# Patient Record
Sex: FEMALE | Race: BLACK OR AFRICAN AMERICAN | Employment: UNEMPLOYED | URBAN - METROPOLITAN AREA
[De-identification: names, ages, dates, MRNs, and addresses within clinical notes are randomized per-mention and may not be internally consistent; named-entity substitution may affect disease eponyms.]

---

## 2017-01-11 ENCOUNTER — OFFICE VISIT (OUTPATIENT)
Dept: HEMATOLOGY/ONCOLOGY | Facility: HOSPITAL | Age: 47
End: 2017-01-11
Attending: INTERNAL MEDICINE
Payer: COMMERCIAL

## 2017-01-11 VITALS
HEART RATE: 75 BPM | WEIGHT: 178 LBS | TEMPERATURE: 99 F | DIASTOLIC BLOOD PRESSURE: 75 MMHG | RESPIRATION RATE: 16 BRPM | SYSTOLIC BLOOD PRESSURE: 119 MMHG | BODY MASS INDEX: 29.66 KG/M2 | HEIGHT: 65 IN

## 2017-01-11 DIAGNOSIS — C34.90 CARCINOMA OF LUNG, UNSPECIFIED LATERALITY (HCC): ICD-10-CM

## 2017-01-11 DIAGNOSIS — E03.9 HYPOTHYROIDISM (ACQUIRED): ICD-10-CM

## 2017-01-11 DIAGNOSIS — C34.92 CARCINOMA OF LUNG, LEFT (HCC): ICD-10-CM

## 2017-01-11 DIAGNOSIS — C34.90 CARCINOMA OF LUNG, UNSPECIFIED LATERALITY (HCC): Primary | ICD-10-CM

## 2017-01-11 DIAGNOSIS — E03.9 HYPOTHYROIDISM, UNSPECIFIED TYPE: ICD-10-CM

## 2017-01-11 DIAGNOSIS — Z09 CHEMOTHERAPY FOLLOW-UP EXAMINATION: ICD-10-CM

## 2017-01-11 DIAGNOSIS — C78.7 LIVER METASTASES (HCC): ICD-10-CM

## 2017-01-11 DIAGNOSIS — Z45.2 ENCOUNTER FOR ADJUSTMENT OR MANAGEMENT OF VASCULAR ACCESS DEVICE: Primary | ICD-10-CM

## 2017-01-11 DIAGNOSIS — C79.31 BRAIN METASTASES (HCC): ICD-10-CM

## 2017-01-11 LAB
ALBUMIN SERPL BCP-MCNC: 3.4 G/DL (ref 3.5–4.8)
ALBUMIN/GLOB SERPL: 0.8 {RATIO} (ref 1–2)
ALP SERPL-CCNC: 107 U/L (ref 32–100)
ALT SERPL-CCNC: 21 U/L (ref 14–54)
ANION GAP SERPL CALC-SCNC: 8 MMOL/L (ref 0–18)
AST SERPL-CCNC: 16 U/L (ref 15–41)
BASOPHILS # BLD: 0.1 K/UL (ref 0–0.2)
BASOPHILS NFR BLD: 1 %
BILIRUB SERPL-MCNC: 0.3 MG/DL (ref 0.3–1.2)
BUN SERPL-MCNC: 6 MG/DL (ref 8–20)
BUN/CREAT SERPL: 7.9 (ref 10–20)
CALCIUM SERPL-MCNC: 8.4 MG/DL (ref 8.5–10.5)
CHLORIDE SERPL-SCNC: 101 MMOL/L (ref 95–110)
CO2 SERPL-SCNC: 28 MMOL/L (ref 22–32)
CREAT SERPL-MCNC: 0.76 MG/DL (ref 0.5–1.5)
EOSINOPHIL # BLD: 0.1 K/UL (ref 0–0.7)
EOSINOPHIL NFR BLD: 1 %
ERYTHROCYTE [DISTWIDTH] IN BLOOD BY AUTOMATED COUNT: 18.4 % (ref 11–15)
GLOBULIN PLAS-MCNC: 4.4 G/DL (ref 2.5–3.7)
GLUCOSE SERPL-MCNC: 121 MG/DL (ref 70–99)
HCT VFR BLD AUTO: 30 % (ref 35–48)
HGB BLD-MCNC: 9.9 G/DL (ref 12–16)
LYMPHOCYTES # BLD: 3.2 K/UL (ref 1–4)
LYMPHOCYTES NFR BLD: 31 %
MCH RBC QN AUTO: 28.5 PG (ref 27–32)
MCHC RBC AUTO-ENTMCNC: 33.2 G/DL (ref 32–37)
MCV RBC AUTO: 85.9 FL (ref 80–100)
MONOCYTES # BLD: 0.7 K/UL (ref 0–1)
MONOCYTES NFR BLD: 7 %
NEUTROPHILS # BLD AUTO: 6.1 K/UL (ref 1.8–7.7)
NEUTROPHILS NFR BLD: 60 %
OSMOLALITY UR CALC.SUM OF ELEC: 283 MOSM/KG (ref 275–295)
PLATELET # BLD AUTO: 482 K/UL (ref 140–400)
PMV BLD AUTO: 8 FL (ref 7.4–10.3)
POTASSIUM SERPL-SCNC: 3.1 MMOL/L (ref 3.3–5.1)
PROT SERPL-MCNC: 7.8 G/DL (ref 5.9–8.4)
RBC # BLD AUTO: 3.49 M/UL (ref 3.7–5.4)
SODIUM SERPL-SCNC: 137 MMOL/L (ref 136–144)
TSH SERPL-ACNC: 87.17 UIU/ML (ref 0.34–5.6)
WBC # BLD AUTO: 10.1 K/UL (ref 4–11)

## 2017-01-11 PROCEDURE — 85025 COMPLETE CBC W/AUTO DIFF WBC: CPT

## 2017-01-11 PROCEDURE — 99215 OFFICE O/P EST HI 40 MIN: CPT | Performed by: INTERNAL MEDICINE

## 2017-01-11 PROCEDURE — 84439 ASSAY OF FREE THYROXINE: CPT

## 2017-01-11 PROCEDURE — 99213 OFFICE O/P EST LOW 20 MIN: CPT | Performed by: INTERNAL MEDICINE

## 2017-01-11 PROCEDURE — 84443 ASSAY THYROID STIM HORMONE: CPT

## 2017-01-11 PROCEDURE — 36591 DRAW BLOOD OFF VENOUS DEVICE: CPT

## 2017-01-11 PROCEDURE — 80053 COMPREHEN METABOLIC PANEL: CPT

## 2017-01-11 RX ORDER — SODIUM CHLORIDE 0.9 % (FLUSH) 0.9 %
SYRINGE (ML) INJECTION
Status: DISCONTINUED
Start: 2017-01-11 | End: 2017-01-11

## 2017-01-11 RX ORDER — 0.9 % SODIUM CHLORIDE 0.9 %
VIAL (ML) INJECTION
Status: DISCONTINUED
Start: 2017-01-11 | End: 2017-01-11

## 2017-01-11 RX ORDER — HEPARIN SODIUM (PORCINE) LOCK FLUSH IV SOLN 100 UNIT/ML 100 UNIT/ML
SOLUTION INTRAVENOUS
Status: COMPLETED
Start: 2017-01-11 | End: 2017-01-11

## 2017-01-11 RX ORDER — HEPARIN SODIUM (PORCINE) LOCK FLUSH IV SOLN 100 UNIT/ML 100 UNIT/ML
5 SOLUTION INTRAVENOUS AS NEEDED
Status: DISCONTINUED | OUTPATIENT
Start: 2017-01-11 | End: 2017-01-11

## 2017-01-11 RX ADMIN — HEPARIN SODIUM (PORCINE) LOCK FLUSH IV SOLN 100 UNIT/ML 500 UNITS: 100 SOLUTION INTRAVENOUS at 09:10:00

## 2017-01-11 NOTE — PROGRESS NOTES
Carcinoma of lung ([facility] [address]    7/17/2015 Biopsy Left upper lob aspirate, no malignant cells identified    7/20/2015 Surgery Occipital Tumor resection    7/20/2015 Biopsy Left Occipital Tumor: ALK and EGFR alterations NOT DETECTED.   Two biopsies both positiv OPDIVO    5/19/2016 -  Chemotherapy Cycle #4 OPDIVO;  Scans ordered    6/10/2016 -  Chemotherapy Cycle #5 OPDIVO continues for favorable PET:  MRI Brain shows progression; RT and surgery deferred for now    6/23/2016 -  Chemotherapy Cycle #6 OPDIVO,     7/ pain and neck stiffness. Skin: Negative for pallor and rash. Neurological: Negative for dizziness, seizures, weakness, light-headedness and headaches. Hematological: Negative for adenopathy. Does not bruise/bleed easily.    Psychiatric/Behavioral: Neg BREAST, BENIGN    XS PORT-A-CATH INSERTION/EXCH/CHK      BRAIN SURGERY  JULY 2015    Comment TUMOR RESECTION    BRAIN SURGERY Left     Comment OCCIPITAL CRANIOTOMY    BRAIN SURGERY Left     Comment LOBECTOMY    BRAIN SURGERY  AUG  2016    Comment REMOVAL O tenderness. Abdominal: Soft. Bowel sounds are normal. She exhibits no distension and no mass. There is no tenderness. Musculoskeletal: Normal range of motion. She exhibits no edema or tenderness.    Lymphadenopathy:        Head (right side): No submenta mg/dL   Calcium, Total 8.4 (L) 8.5-10.5 mg/dL   ALT 21 14-54 U/L   AST 16 15-41 U/L   Alkaline Phosphatase 107 (H)  U/L   Bilirubin, Total 0.3 0.3-1.2 mg/dL   Total Protein 7.8 5.9-8.4 g/dL   Albumin 3.4 (L) 3.5-4.8 g/dL   Globulin 4.4 (H) 2.5-3.7 g/

## 2017-01-11 NOTE — PROGRESS NOTES
Pt here for fast track labs. Continue to have sinus congestion. seening md today. Port accessed ,labs drawn ,port flushed and de cannulated. Pt discharged to [facility] .

## 2017-01-12 ENCOUNTER — TELEPHONE (OUTPATIENT)
Dept: ENDOCRINOLOGY CLINIC | Facility: CLINIC | Age: 47
End: 2017-01-12

## 2017-01-12 ENCOUNTER — OFFICE VISIT (OUTPATIENT)
Dept: HEMATOLOGY/ONCOLOGY | Facility: HOSPITAL | Age: 47
End: 2017-01-12
Attending: INTERNAL MEDICINE
Payer: COMMERCIAL

## 2017-01-12 VITALS
RESPIRATION RATE: 16 BRPM | HEART RATE: 93 BPM | TEMPERATURE: 99 F | DIASTOLIC BLOOD PRESSURE: 82 MMHG | SYSTOLIC BLOOD PRESSURE: 131 MMHG

## 2017-01-12 DIAGNOSIS — E03.9 HYPOTHYROIDISM, UNSPECIFIED TYPE: Primary | ICD-10-CM

## 2017-01-12 DIAGNOSIS — C34.92 CARCINOMA OF LUNG, LEFT (HCC): ICD-10-CM

## 2017-01-12 DIAGNOSIS — Z45.2 ENCOUNTER FOR ADJUSTMENT OR MANAGEMENT OF VASCULAR ACCESS DEVICE: Primary | ICD-10-CM

## 2017-01-12 DIAGNOSIS — C34.90 CARCINOMA OF LUNG, UNSPECIFIED LATERALITY (HCC): ICD-10-CM

## 2017-01-12 LAB — T4 FREE SERPL-MCNC: 0.39 NG/DL (ref 0.58–1.64)

## 2017-01-12 PROCEDURE — 96413 CHEMO IV INFUSION 1 HR: CPT

## 2017-01-12 RX ORDER — HEPARIN SODIUM (PORCINE) LOCK FLUSH IV SOLN 100 UNIT/ML 100 UNIT/ML
5 SOLUTION INTRAVENOUS AS NEEDED
Status: DISCONTINUED | OUTPATIENT
Start: 2017-01-12 | End: 2017-01-12

## 2017-01-12 RX ORDER — 0.9 % SODIUM CHLORIDE 0.9 %
VIAL (ML) INJECTION
Status: DISCONTINUED
Start: 2017-01-12 | End: 2017-01-12

## 2017-01-12 RX ORDER — SODIUM CHLORIDE 0.9 % (FLUSH) 0.9 %
SYRINGE (ML) INJECTION
Status: DISCONTINUED
Start: 2017-01-12 | End: 2017-01-12 | Stop reason: WASHOUT

## 2017-01-12 RX ORDER — SODIUM CHLORIDE 9 MG/ML
INJECTION, SOLUTION INTRAVENOUS
Status: DISCONTINUED
Start: 2017-01-12 | End: 2017-01-12

## 2017-01-12 RX ORDER — HEPARIN SODIUM (PORCINE) LOCK FLUSH IV SOLN 100 UNIT/ML 100 UNIT/ML
SOLUTION INTRAVENOUS
Status: COMPLETED
Start: 2017-01-12 | End: 2017-01-12

## 2017-01-12 RX ADMIN — HEPARIN SODIUM (PORCINE) LOCK FLUSH IV SOLN 100 UNIT/ML 500 UNITS: 100 SOLUTION INTRAVENOUS at 12:25:00

## 2017-01-12 NOTE — PROGRESS NOTES
Pt to infusion for C18D1 Opdivo. Pt states no GI complaints. Denies fevers/infections. States a \"slight headache\" from [employer] nose\". Reports sinus pressure. K noted to be 3.1.   Pt states that MD told her to take some additional potassium suppleme

## 2017-01-12 NOTE — TELEPHONE ENCOUNTER
I have added a ft4 to specimen from yesterday. Will recommend changes once i have the results.    Thx.

## 2017-01-16 NOTE — TELEPHONE ENCOUNTER
Called patient and she states she has been taking medication consistently. Discussed that per AM will plan to increase dose to 150mcg PO daily and it was sent to the pharmacy. She understands to repeat labs in 4-5 weeks.

## 2017-01-16 NOTE — TELEPHONE ENCOUNTER
Asha and Tashia   FT4 is low. I have a strong feeling that she is forgetting her medication. Can we confirm with her ? Please let me know. Thanks! We can increase to 150 ( from 125). Repeat tsh and ft 4 in 5 weeks. Ordered.      Dr. Odin Hernandez an

## 2017-01-25 ENCOUNTER — OFFICE VISIT (OUTPATIENT)
Dept: HEMATOLOGY/ONCOLOGY | Facility: HOSPITAL | Age: 47
End: 2017-01-25
Attending: INTERNAL MEDICINE
Payer: COMMERCIAL

## 2017-01-25 VITALS
WEIGHT: 179 LBS | TEMPERATURE: 99 F | SYSTOLIC BLOOD PRESSURE: 110 MMHG | HEART RATE: 105 BPM | RESPIRATION RATE: 16 BRPM | BODY MASS INDEX: 29.82 KG/M2 | DIASTOLIC BLOOD PRESSURE: 67 MMHG | HEIGHT: 65 IN

## 2017-01-25 DIAGNOSIS — Z45.2 ENCOUNTER FOR ADJUSTMENT OR MANAGEMENT OF VASCULAR ACCESS DEVICE: Primary | ICD-10-CM

## 2017-01-25 DIAGNOSIS — Z09 CHEMOTHERAPY FOLLOW-UP EXAMINATION: ICD-10-CM

## 2017-01-25 DIAGNOSIS — C78.7 LIVER METASTASES (HCC): ICD-10-CM

## 2017-01-25 DIAGNOSIS — C34.92 CARCINOMA OF LUNG, LEFT (HCC): ICD-10-CM

## 2017-01-25 DIAGNOSIS — C79.31 BRAIN METASTASES (HCC): ICD-10-CM

## 2017-01-25 DIAGNOSIS — C34.90 CARCINOMA OF LUNG, UNSPECIFIED LATERALITY (HCC): Primary | ICD-10-CM

## 2017-01-25 DIAGNOSIS — C34.90 CARCINOMA OF LUNG, UNSPECIFIED LATERALITY (HCC): ICD-10-CM

## 2017-01-25 LAB
ALBUMIN SERPL BCP-MCNC: 3.3 G/DL (ref 3.5–4.8)
ALBUMIN/GLOB SERPL: 0.8 {RATIO} (ref 1–2)
ALP SERPL-CCNC: 118 U/L (ref 32–100)
ALT SERPL-CCNC: 13 U/L (ref 14–54)
ANION GAP SERPL CALC-SCNC: 6 MMOL/L (ref 0–18)
AST SERPL-CCNC: 14 U/L (ref 15–41)
BASOPHILS # BLD: 0.1 K/UL (ref 0–0.2)
BASOPHILS NFR BLD: 1 %
BILIRUB SERPL-MCNC: 0.4 MG/DL (ref 0.3–1.2)
BUN SERPL-MCNC: 6 MG/DL (ref 8–20)
BUN/CREAT SERPL: 9.5 (ref 10–20)
CALCIUM SERPL-MCNC: 8.8 MG/DL (ref 8.5–10.5)
CHLORIDE SERPL-SCNC: 104 MMOL/L (ref 95–110)
CO2 SERPL-SCNC: 29 MMOL/L (ref 22–32)
CREAT SERPL-MCNC: 0.63 MG/DL (ref 0.5–1.5)
EOSINOPHIL # BLD: 0.1 K/UL (ref 0–0.7)
EOSINOPHIL NFR BLD: 1 %
ERYTHROCYTE [DISTWIDTH] IN BLOOD BY AUTOMATED COUNT: 19 % (ref 11–15)
GLOBULIN PLAS-MCNC: 4.2 G/DL (ref 2.5–3.7)
GLUCOSE SERPL-MCNC: 140 MG/DL (ref 70–99)
HCT VFR BLD AUTO: 29.5 % (ref 35–48)
HGB BLD-MCNC: 9.6 G/DL (ref 12–16)
LYMPHOCYTES # BLD: 2.9 K/UL (ref 1–4)
LYMPHOCYTES NFR BLD: 25 %
MCH RBC QN AUTO: 28.1 PG (ref 27–32)
MCHC RBC AUTO-ENTMCNC: 32.5 G/DL (ref 32–37)
MCV RBC AUTO: 86.4 FL (ref 80–100)
MONOCYTES # BLD: 1 K/UL (ref 0–1)
MONOCYTES NFR BLD: 8 %
NEUTROPHILS # BLD AUTO: 7.9 K/UL (ref 1.8–7.7)
NEUTROPHILS NFR BLD: 66 %
OSMOLALITY UR CALC.SUM OF ELEC: 288 MOSM/KG (ref 275–295)
PLATELET # BLD AUTO: 359 K/UL (ref 140–400)
PMV BLD AUTO: 8.2 FL (ref 7.4–10.3)
POTASSIUM SERPL-SCNC: 3.5 MMOL/L (ref 3.3–5.1)
PROT SERPL-MCNC: 7.5 G/DL (ref 5.9–8.4)
RBC # BLD AUTO: 3.41 M/UL (ref 3.7–5.4)
SODIUM SERPL-SCNC: 139 MMOL/L (ref 136–144)
T4 FREE SERPL-MCNC: 1.18 NG/DL (ref 0.58–1.64)
TSH SERPL-ACNC: 9.55 UIU/ML (ref 0.34–5.6)
WBC # BLD AUTO: 11.9 K/UL (ref 4–11)

## 2017-01-25 PROCEDURE — 84443 ASSAY THYROID STIM HORMONE: CPT

## 2017-01-25 PROCEDURE — 85025 COMPLETE CBC W/AUTO DIFF WBC: CPT

## 2017-01-25 PROCEDURE — 80053 COMPREHEN METABOLIC PANEL: CPT

## 2017-01-25 PROCEDURE — 99212 OFFICE O/P EST SF 10 MIN: CPT | Performed by: INTERNAL MEDICINE

## 2017-01-25 PROCEDURE — 84439 ASSAY OF FREE THYROXINE: CPT

## 2017-01-25 PROCEDURE — 36591 DRAW BLOOD OFF VENOUS DEVICE: CPT

## 2017-01-25 PROCEDURE — 99215 OFFICE O/P EST HI 40 MIN: CPT | Performed by: INTERNAL MEDICINE

## 2017-01-25 RX ORDER — HEPARIN SODIUM (PORCINE) LOCK FLUSH IV SOLN 100 UNIT/ML 100 UNIT/ML
SOLUTION INTRAVENOUS
Status: COMPLETED
Start: 2017-01-25 | End: 2017-01-25

## 2017-01-25 RX ORDER — 0.9 % SODIUM CHLORIDE 0.9 %
VIAL (ML) INJECTION
Status: DISCONTINUED
Start: 2017-01-25 | End: 2017-01-25

## 2017-01-25 RX ORDER — SODIUM CHLORIDE 0.9 % (FLUSH) 0.9 %
SYRINGE (ML) INJECTION
Status: DISCONTINUED
Start: 2017-01-25 | End: 2017-01-25

## 2017-01-25 RX ORDER — HEPARIN SODIUM (PORCINE) LOCK FLUSH IV SOLN 100 UNIT/ML 100 UNIT/ML
5 SOLUTION INTRAVENOUS AS NEEDED
Status: DISCONTINUED | OUTPATIENT
Start: 2017-01-25 | End: 2017-01-25

## 2017-01-25 RX ADMIN — HEPARIN SODIUM (PORCINE) LOCK FLUSH IV SOLN 100 UNIT/ML 500 UNITS: 100 SOLUTION INTRAVENOUS at 09:05:00

## 2017-01-25 NOTE — PROGRESS NOTES
Pt here for pre-chemo labs. Port accessed using sterile technique. Labs collected per order. Port deaccessed, site covered w/ 2x2 et secured w/ tape. Appeared to tolerate well. Discharged from lab, ambulating independently w/ steady gait.

## 2017-01-25 NOTE — PROGRESS NOTES
Carcinoma of lung ([facility] [address]    7/17/2015 Biopsy Left upper lob aspirate, no malignant cells identified    7/20/2015 Surgery Occipital Tumor resection    7/20/2015 Biopsy Left Occipital Tumor: ALK and EGFR alterations NOT DETECTED.   Two biopsies both positiv OPDIVO    5/19/2016 -  Chemotherapy Cycle #4 OPDIVO;  Scans ordered    6/10/2016 -  Chemotherapy Cycle #5 OPDIVO continues for favorable PET:  MRI Brain shows progression; RT and surgery deferred for now    6/23/2016 -  Chemotherapy Cycle #6 OPDIVO,     7/ Negative for back pain, joint swelling, gait problem, neck pain and neck stiffness. Skin: Negative for pallor and rash. Neurological: Negative for dizziness, seizures, weakness, light-headedness and headaches. Hematological: Negative for adenopathy. 2016    Comment REMOVAL OF DEAD TISSUE     OTHER SURGICAL HISTORY         Social History   Marital Status:   Spouse Name: N/A    Years of Education: N/A  Number of Children: N/A     Occupational History  SELF EMPLOYED       Social History Main Topic knee: Tenderness found. Lymphadenopathy:        Head (right side): No submental and no submandibular adenopathy present. Head (left side): No submental and no submandibular adenopathy present. She has no cervical adenopathy.      She has no axi 359 140-400 K/UL   MPV 8.2 7.4-10.3 fL   Neutrophil % 66 %   Lymphocyte % 25 %   Monocyte % 8 %   Eosinophil % 1 %   Basophil % 1 %   Neutrophil Absolute 7.9 (H) 1.8-7.7 K/UL   Lymphocyte Absolute 2.9 1.0-4.0 K/UL   Monocyte Absolute 1.0 0.0-1.0 K/UL   Eos

## 2017-01-26 ENCOUNTER — OFFICE VISIT (OUTPATIENT)
Dept: HEMATOLOGY/ONCOLOGY | Facility: HOSPITAL | Age: 47
End: 2017-01-26
Attending: INTERNAL MEDICINE
Payer: COMMERCIAL

## 2017-01-26 VITALS
BODY MASS INDEX: 30 KG/M2 | HEART RATE: 103 BPM | WEIGHT: 179 LBS | SYSTOLIC BLOOD PRESSURE: 108 MMHG | TEMPERATURE: 98 F | DIASTOLIC BLOOD PRESSURE: 76 MMHG | RESPIRATION RATE: 16 BRPM

## 2017-01-26 DIAGNOSIS — C34.90 CARCINOMA OF LUNG, UNSPECIFIED LATERALITY (HCC): ICD-10-CM

## 2017-01-26 DIAGNOSIS — C34.92 CARCINOMA OF LUNG, LEFT (HCC): ICD-10-CM

## 2017-01-26 DIAGNOSIS — Z45.2 ENCOUNTER FOR ADJUSTMENT OR MANAGEMENT OF VASCULAR ACCESS DEVICE: Primary | ICD-10-CM

## 2017-01-26 PROCEDURE — 96413 CHEMO IV INFUSION 1 HR: CPT

## 2017-01-26 RX ORDER — SODIUM CHLORIDE 9 MG/ML
INJECTION, SOLUTION INTRAVENOUS
Status: DISCONTINUED
Start: 2017-01-26 | End: 2017-01-26

## 2017-01-26 RX ORDER — HEPARIN SODIUM (PORCINE) LOCK FLUSH IV SOLN 100 UNIT/ML 100 UNIT/ML
5 SOLUTION INTRAVENOUS AS NEEDED
Status: DISCONTINUED | OUTPATIENT
Start: 2017-01-26 | End: 2017-01-26

## 2017-01-26 RX ORDER — 0.9 % SODIUM CHLORIDE 0.9 %
VIAL (ML) INJECTION
Status: DISCONTINUED
Start: 2017-01-26 | End: 2017-01-26

## 2017-01-26 RX ORDER — HEPARIN SODIUM (PORCINE) LOCK FLUSH IV SOLN 100 UNIT/ML 100 UNIT/ML
SOLUTION INTRAVENOUS
Status: COMPLETED
Start: 2017-01-26 | End: 2017-01-26

## 2017-01-26 RX ADMIN — HEPARIN SODIUM (PORCINE) LOCK FLUSH IV SOLN 100 UNIT/ML 500 UNITS: 100 SOLUTION INTRAVENOUS at 12:09:00

## 2017-01-26 NOTE — PROGRESS NOTES
Pt to infusion for C19 D1 Opdivo. Pt arrived stable and ambulatory. Feeling well overall, offers no complaints. Denies any fatigue, reports good energy level. Denies fevers/infections at this time. States she is eating and drinking well without difficulty.

## 2017-01-31 ENCOUNTER — TELEPHONE (OUTPATIENT)
Dept: INTERNAL MEDICINE CLINIC | Facility: CLINIC | Age: 47
End: 2017-01-31

## 2017-01-31 NOTE — TELEPHONE ENCOUNTER
Please talk to  to clarify what medications she needs refills for, and if there is something else she going on.

## 2017-01-31 NOTE — TELEPHONE ENCOUNTER
Call transferred to triage as patient was asking for medication refills but did not know the names of the medications.  I spoke to patient who states she needs lorazepam refill-I explained to the patient that the lorazepam was called-in for her on 1/6/17 fo

## 2017-02-01 RX ORDER — QUETIAPINE 25 MG/1
25 TABLET, FILM COATED ORAL NIGHTLY
Qty: 30 TABLET | Refills: 5 | Status: ON HOLD | OUTPATIENT
Start: 2017-02-01 | End: 2017-11-14

## 2017-02-01 RX ORDER — LORAZEPAM 0.5 MG/1
0.5 TABLET ORAL NIGHTLY PRN
Qty: 30 TABLET | Refills: 5 | COMMUNITY
Start: 2017-02-01 | End: 2017-04-05 | Stop reason: DRUGHIGH

## 2017-02-01 NOTE — TELEPHONE ENCOUNTER
Called and discussed with patient ( not available). She stated she needs refill of lorazepam prior to her CT.  Also needed refill for what she thinks is seroquel-- she stated it was the \"sleeping aid\"--the only pill she takes at night, but could no

## 2017-02-01 NOTE — TELEPHONE ENCOUNTER
Medications called in/eRx'd per Dr. Bayron Godinez verbal order, Seroquel 25mg, 1 tablet nightly #30 with 5 refills and Lorazepam 0.5mg q hs PRN #30 with 5 refills.      Discussed knee pain verbally with Dr. Bayron Godinez, Pt can speak with pain clinic next time she is in [place]

## 2017-02-07 ENCOUNTER — OFFICE VISIT (OUTPATIENT)
Dept: HEMATOLOGY/ONCOLOGY | Facility: HOSPITAL | Age: 47
End: 2017-02-07
Attending: INTERNAL MEDICINE
Payer: COMMERCIAL

## 2017-02-07 VITALS
HEIGHT: 65 IN | SYSTOLIC BLOOD PRESSURE: 116 MMHG | HEART RATE: 91 BPM | BODY MASS INDEX: 29.32 KG/M2 | DIASTOLIC BLOOD PRESSURE: 70 MMHG | TEMPERATURE: 99 F | WEIGHT: 176 LBS | RESPIRATION RATE: 16 BRPM

## 2017-02-07 DIAGNOSIS — Z09 CHEMOTHERAPY FOLLOW-UP EXAMINATION: ICD-10-CM

## 2017-02-07 DIAGNOSIS — C34.90 CARCINOMA OF LUNG, UNSPECIFIED LATERALITY (HCC): Primary | ICD-10-CM

## 2017-02-07 DIAGNOSIS — C78.7 LIVER METASTASES (HCC): ICD-10-CM

## 2017-02-07 DIAGNOSIS — C34.92 CARCINOMA OF LUNG, LEFT (HCC): ICD-10-CM

## 2017-02-07 DIAGNOSIS — C79.31 BRAIN METASTASES (HCC): ICD-10-CM

## 2017-02-07 DIAGNOSIS — Z45.2 ENCOUNTER FOR ADJUSTMENT OR MANAGEMENT OF VASCULAR ACCESS DEVICE: Primary | ICD-10-CM

## 2017-02-07 DIAGNOSIS — C34.90 CARCINOMA OF LUNG, UNSPECIFIED LATERALITY (HCC): ICD-10-CM

## 2017-02-07 LAB
ALBUMIN SERPL BCP-MCNC: 3.1 G/DL (ref 3.5–4.8)
ALBUMIN/GLOB SERPL: 0.8 {RATIO} (ref 1–2)
ALP SERPL-CCNC: 108 U/L (ref 32–100)
ALT SERPL-CCNC: 7 U/L (ref 14–54)
ANION GAP SERPL CALC-SCNC: 6 MMOL/L (ref 0–18)
AST SERPL-CCNC: 11 U/L (ref 15–41)
BASOPHILS # BLD: 0 K/UL (ref 0–0.2)
BASOPHILS NFR BLD: 0 %
BILIRUB SERPL-MCNC: 0.5 MG/DL (ref 0.3–1.2)
BUN SERPL-MCNC: 3 MG/DL (ref 8–20)
BUN/CREAT SERPL: 5.6 (ref 10–20)
CALCIUM SERPL-MCNC: 8.6 MG/DL (ref 8.5–10.5)
CHLORIDE SERPL-SCNC: 103 MMOL/L (ref 95–110)
CO2 SERPL-SCNC: 30 MMOL/L (ref 22–32)
CREAT SERPL-MCNC: 0.54 MG/DL (ref 0.5–1.5)
EOSINOPHIL # BLD: 0.1 K/UL (ref 0–0.7)
EOSINOPHIL NFR BLD: 1 %
ERYTHROCYTE [DISTWIDTH] IN BLOOD BY AUTOMATED COUNT: 18.3 % (ref 11–15)
GLOBULIN PLAS-MCNC: 4 G/DL (ref 2.5–3.7)
GLUCOSE SERPL-MCNC: 104 MG/DL (ref 70–99)
HCT VFR BLD AUTO: 28.5 % (ref 35–48)
HGB BLD-MCNC: 9.2 G/DL (ref 12–16)
LYMPHOCYTES # BLD: 2.6 K/UL (ref 1–4)
LYMPHOCYTES NFR BLD: 26 %
MCH RBC QN AUTO: 27.9 PG (ref 27–32)
MCHC RBC AUTO-ENTMCNC: 32.2 G/DL (ref 32–37)
MCV RBC AUTO: 86.6 FL (ref 80–100)
MONOCYTES # BLD: 0.8 K/UL (ref 0–1)
MONOCYTES NFR BLD: 8 %
NEUTROPHILS # BLD AUTO: 6.5 K/UL (ref 1.8–7.7)
NEUTROPHILS NFR BLD: 65 %
OSMOLALITY UR CALC.SUM OF ELEC: 285 MOSM/KG (ref 275–295)
PLATELET # BLD AUTO: 423 K/UL (ref 140–400)
PMV BLD AUTO: 7.6 FL (ref 7.4–10.3)
POTASSIUM SERPL-SCNC: 3.2 MMOL/L (ref 3.3–5.1)
PROT SERPL-MCNC: 7.1 G/DL (ref 5.9–8.4)
RBC # BLD AUTO: 3.28 M/UL (ref 3.7–5.4)
SODIUM SERPL-SCNC: 139 MMOL/L (ref 136–144)
T4 FREE SERPL-MCNC: 1.21 NG/DL (ref 0.58–1.64)
TSH SERPL-ACNC: 1.56 UIU/ML (ref 0.34–5.6)
WBC # BLD AUTO: 10 K/UL (ref 4–11)

## 2017-02-07 PROCEDURE — 84439 ASSAY OF FREE THYROXINE: CPT | Performed by: INTERNAL MEDICINE

## 2017-02-07 PROCEDURE — 36591 DRAW BLOOD OFF VENOUS DEVICE: CPT

## 2017-02-07 PROCEDURE — 85025 COMPLETE CBC W/AUTO DIFF WBC: CPT

## 2017-02-07 PROCEDURE — 99212 OFFICE O/P EST SF 10 MIN: CPT | Performed by: INTERNAL MEDICINE

## 2017-02-07 PROCEDURE — 99215 OFFICE O/P EST HI 40 MIN: CPT | Performed by: INTERNAL MEDICINE

## 2017-02-07 PROCEDURE — 80053 COMPREHEN METABOLIC PANEL: CPT

## 2017-02-07 PROCEDURE — 84443 ASSAY THYROID STIM HORMONE: CPT

## 2017-02-07 RX ORDER — HEPARIN SODIUM (PORCINE) LOCK FLUSH IV SOLN 100 UNIT/ML 100 UNIT/ML
SOLUTION INTRAVENOUS
Status: COMPLETED
Start: 2017-02-07 | End: 2017-02-07

## 2017-02-07 RX ORDER — 0.9 % SODIUM CHLORIDE 0.9 %
VIAL (ML) INJECTION
Status: DISCONTINUED
Start: 2017-02-07 | End: 2017-02-07

## 2017-02-07 RX ORDER — SODIUM CHLORIDE 0.9 % (FLUSH) 0.9 %
SYRINGE (ML) INJECTION
Status: DISCONTINUED
Start: 2017-02-07 | End: 2017-02-07

## 2017-02-07 RX ORDER — HEPARIN SODIUM (PORCINE) LOCK FLUSH IV SOLN 100 UNIT/ML 100 UNIT/ML
5 SOLUTION INTRAVENOUS AS NEEDED
Status: DISCONTINUED | OUTPATIENT
Start: 2017-02-07 | End: 2017-02-07

## 2017-02-07 RX ADMIN — HEPARIN SODIUM (PORCINE) LOCK FLUSH IV SOLN 100 UNIT/ML 500 UNITS: 100 SOLUTION INTRAVENOUS at 10:24:00

## 2017-02-07 NOTE — PROGRESS NOTES
Carcinoma of lung ([facility] [address]    7/17/2015 Biopsy Left upper lob aspirate, no malignant cells identified    7/20/2015 Surgery Occipital Tumor resection    7/20/2015 Biopsy Left Occipital Tumor: ALK and EGFR alterations NOT DETECTED.   Two biopsies both positiv OPDIVO    5/19/2016 -  Chemotherapy Cycle #4 OPDIVO;  Scans ordered    6/10/2016 -  Chemotherapy Cycle #5 OPDIVO continues for favorable PET:  MRI Brain shows progression; RT and surgery deferred for now    6/23/2016 -  Chemotherapy Cycle #6 OPDIVO,     7/ abdominal pain, diarrhea, constipation, blood in stool and abdominal distention. Genitourinary: Negative for dysuria, urgency, hematuria and flank pain. Musculoskeletal: Positive for arthralgias (bilateral knee pain. ).  Negative for back pain, joint swe HEMORRHOIDECTOMY      CHOLECYSTECTOMY  2009    APPENDECTOMY  1999    OTHER  8-7-15    Comment CYBERKNIFE    CYST REMOVAL      Comment BREAST, BENIGN    XS PORT-A-CATH INSERTION/EXCH/CHK      BRAIN SURGERY  JULY 2015    Comment TUMOR RESECTION    BRAIN SURG friction rub. No murmur heard. Pulmonary/Chest: Effort normal and breath sounds normal. No respiratory distress. She exhibits no tenderness. Abdominal: Soft. Bowel sounds are normal. She exhibits no distension and no mass. There is no tenderness.    Brendon Barnes the past 48 hour(s))  -COMP METABOLIC PANEL (14)   Collection Time: 02/07/17 10:07 AM   Result Value Ref Range   Glucose 104 (H) 70-99 mg/dL   Sodium 139 136-144 mmol/L   Potassium 3.2 (L) 3.3-5.1 mmol/L   Chloride 103  mmol/L   CO2 30 22-32 mmol/L

## 2017-02-09 ENCOUNTER — OFFICE VISIT (OUTPATIENT)
Dept: HEMATOLOGY/ONCOLOGY | Facility: HOSPITAL | Age: 47
End: 2017-02-09
Attending: INTERNAL MEDICINE
Payer: COMMERCIAL

## 2017-02-09 VITALS
HEART RATE: 108 BPM | SYSTOLIC BLOOD PRESSURE: 111 MMHG | RESPIRATION RATE: 16 BRPM | TEMPERATURE: 99 F | DIASTOLIC BLOOD PRESSURE: 64 MMHG

## 2017-02-09 DIAGNOSIS — Z45.2 ENCOUNTER FOR ADJUSTMENT OR MANAGEMENT OF VASCULAR ACCESS DEVICE: Primary | ICD-10-CM

## 2017-02-09 DIAGNOSIS — C34.90 CARCINOMA OF LUNG, UNSPECIFIED LATERALITY (HCC): ICD-10-CM

## 2017-02-09 DIAGNOSIS — C34.92 CARCINOMA OF LUNG, LEFT (HCC): ICD-10-CM

## 2017-02-09 PROCEDURE — 96413 CHEMO IV INFUSION 1 HR: CPT

## 2017-02-09 RX ORDER — SODIUM CHLORIDE 9 MG/ML
INJECTION, SOLUTION INTRAVENOUS
Status: DISCONTINUED
Start: 2017-02-09 | End: 2017-02-09

## 2017-02-09 RX ORDER — 0.9 % SODIUM CHLORIDE 0.9 %
VIAL (ML) INJECTION
Status: DISCONTINUED
Start: 2017-02-09 | End: 2017-02-09

## 2017-02-09 RX ORDER — HEPARIN SODIUM (PORCINE) LOCK FLUSH IV SOLN 100 UNIT/ML 100 UNIT/ML
5 SOLUTION INTRAVENOUS AS NEEDED
Status: DISCONTINUED | OUTPATIENT
Start: 2017-02-09 | End: 2017-02-09

## 2017-02-09 RX ORDER — HEPARIN SODIUM (PORCINE) LOCK FLUSH IV SOLN 100 UNIT/ML 100 UNIT/ML
SOLUTION INTRAVENOUS
Status: COMPLETED
Start: 2017-02-09 | End: 2017-02-09

## 2017-02-09 RX ADMIN — HEPARIN SODIUM (PORCINE) LOCK FLUSH IV SOLN 100 UNIT/ML 500 UNITS: 100 SOLUTION INTRAVENOUS at 12:00:00

## 2017-02-09 NOTE — PROGRESS NOTES
Pt to infusion for C20D1 Opdivo. Pt states no GI complaints. Denies fevers/infections. States she is still suffering from sinus congestion. Reports sinus pressure and fatigue. Denies other complaints.    Port accessed and flushed with good blood return

## 2017-02-20 RX ORDER — OXYCODONE HCL 20 MG/1
20 TABLET, FILM COATED, EXTENDED RELEASE ORAL EVERY 12 HOURS
Qty: 60 TABLET | Refills: 0 | Status: SHIPPED | OUTPATIENT
Start: 2017-02-20 | End: 2017-03-17

## 2017-02-20 RX ORDER — OXYCODONE HYDROCHLORIDE 15 MG/1
15 TABLET ORAL EVERY 4 HOURS PRN
Qty: 180 TABLET | Refills: 0 | Status: SHIPPED | OUTPATIENT
Start: 2017-02-20 | End: 2017-03-17

## 2017-02-21 ENCOUNTER — HOSPITAL ENCOUNTER (OUTPATIENT)
Dept: NUCLEAR MEDICINE | Facility: HOSPITAL | Age: 47
Discharge: HOME | End: 2017-02-21
Attending: INTERNAL MEDICINE
Payer: COMMERCIAL

## 2017-02-21 DIAGNOSIS — C79.31 BRAIN METASTASES (HCC): ICD-10-CM

## 2017-02-21 DIAGNOSIS — C34.90 CARCINOMA OF LUNG, UNSPECIFIED LATERALITY (HCC): ICD-10-CM

## 2017-02-21 DIAGNOSIS — C78.7 LIVER METASTASES (HCC): ICD-10-CM

## 2017-02-21 LAB — GLUCOSE BLDC GLUCOMTR-MCNC: 110 MG/DL (ref 70–99)

## 2017-02-21 PROCEDURE — 78815 PET IMAGE W/CT SKULL-THIGH: CPT

## 2017-02-21 PROCEDURE — 82962 GLUCOSE BLOOD TEST: CPT

## 2017-02-22 ENCOUNTER — NURSE ONLY (OUTPATIENT)
Dept: HEMATOLOGY/ONCOLOGY | Facility: HOSPITAL | Age: 47
End: 2017-02-22
Attending: INTERNAL MEDICINE
Payer: COMMERCIAL

## 2017-02-22 VITALS
RESPIRATION RATE: 16 BRPM | SYSTOLIC BLOOD PRESSURE: 109 MMHG | TEMPERATURE: 99 F | HEIGHT: 65 IN | DIASTOLIC BLOOD PRESSURE: 74 MMHG | HEART RATE: 91 BPM | BODY MASS INDEX: 28.32 KG/M2 | WEIGHT: 170 LBS

## 2017-02-22 DIAGNOSIS — C34.90 CARCINOMA OF LUNG, UNSPECIFIED LATERALITY (HCC): ICD-10-CM

## 2017-02-22 DIAGNOSIS — E03.9 HYPOTHYROIDISM (ACQUIRED): ICD-10-CM

## 2017-02-22 DIAGNOSIS — Z09 CHEMOTHERAPY FOLLOW-UP EXAMINATION: ICD-10-CM

## 2017-02-22 DIAGNOSIS — C34.92 CARCINOMA OF LUNG, LEFT (HCC): ICD-10-CM

## 2017-02-22 DIAGNOSIS — C79.31 BRAIN METASTASES (HCC): ICD-10-CM

## 2017-02-22 DIAGNOSIS — C78.7 LIVER METASTASES (HCC): ICD-10-CM

## 2017-02-22 DIAGNOSIS — Z45.2 ENCOUNTER FOR ADJUSTMENT OR MANAGEMENT OF VASCULAR ACCESS DEVICE: Primary | ICD-10-CM

## 2017-02-22 DIAGNOSIS — C34.90 CARCINOMA OF LUNG, UNSPECIFIED LATERALITY (HCC): Primary | ICD-10-CM

## 2017-02-22 LAB
ALBUMIN SERPL BCP-MCNC: 3.5 G/DL (ref 3.5–4.8)
ALBUMIN/GLOB SERPL: 0.9 {RATIO} (ref 1–2)
ALP SERPL-CCNC: 117 U/L (ref 32–100)
ALT SERPL-CCNC: 7 U/L (ref 14–54)
ANION GAP SERPL CALC-SCNC: 8 MMOL/L (ref 0–18)
AST SERPL-CCNC: 10 U/L (ref 15–41)
BASOPHILS # BLD: 0.1 K/UL (ref 0–0.2)
BASOPHILS NFR BLD: 1 %
BILIRUB SERPL-MCNC: 0.5 MG/DL (ref 0.3–1.2)
BUN SERPL-MCNC: 5 MG/DL (ref 8–20)
BUN/CREAT SERPL: 7.9 (ref 10–20)
CALCIUM SERPL-MCNC: 9 MG/DL (ref 8.5–10.5)
CHLORIDE SERPL-SCNC: 104 MMOL/L (ref 95–110)
CO2 SERPL-SCNC: 27 MMOL/L (ref 22–32)
CREAT SERPL-MCNC: 0.63 MG/DL (ref 0.5–1.5)
EOSINOPHIL # BLD: 0.1 K/UL (ref 0–0.7)
EOSINOPHIL NFR BLD: 1 %
ERYTHROCYTE [DISTWIDTH] IN BLOOD BY AUTOMATED COUNT: 17.8 % (ref 11–15)
GLOBULIN PLAS-MCNC: 4.1 G/DL (ref 2.5–3.7)
GLUCOSE SERPL-MCNC: 104 MG/DL (ref 70–99)
HCT VFR BLD AUTO: 31.7 % (ref 35–48)
HGB BLD-MCNC: 10.3 G/DL (ref 12–16)
LYMPHOCYTES # BLD: 3.2 K/UL (ref 1–4)
LYMPHOCYTES NFR BLD: 34 %
MCH RBC QN AUTO: 28 PG (ref 27–32)
MCHC RBC AUTO-ENTMCNC: 32.5 G/DL (ref 32–37)
MCV RBC AUTO: 86.2 FL (ref 80–100)
MONOCYTES # BLD: 0.6 K/UL (ref 0–1)
MONOCYTES NFR BLD: 6 %
NEUTROPHILS # BLD AUTO: 5.6 K/UL (ref 1.8–7.7)
NEUTROPHILS NFR BLD: 59 %
OSMOLALITY UR CALC.SUM OF ELEC: 286 MOSM/KG (ref 275–295)
PLATELET # BLD AUTO: 394 K/UL (ref 140–400)
PMV BLD AUTO: 7.9 FL (ref 7.4–10.3)
POTASSIUM SERPL-SCNC: 3.4 MMOL/L (ref 3.3–5.1)
PROT SERPL-MCNC: 7.6 G/DL (ref 5.9–8.4)
RBC # BLD AUTO: 3.67 M/UL (ref 3.7–5.4)
SODIUM SERPL-SCNC: 139 MMOL/L (ref 136–144)
T4 FREE SERPL-MCNC: 0.76 NG/DL (ref 0.58–1.64)
TSH SERPL-ACNC: 21.79 UIU/ML (ref 0.34–5.6)
WBC # BLD AUTO: 9.6 K/UL (ref 4–11)

## 2017-02-22 PROCEDURE — 99215 OFFICE O/P EST HI 40 MIN: CPT | Performed by: INTERNAL MEDICINE

## 2017-02-22 PROCEDURE — 85025 COMPLETE CBC W/AUTO DIFF WBC: CPT

## 2017-02-22 PROCEDURE — 99212 OFFICE O/P EST SF 10 MIN: CPT | Performed by: INTERNAL MEDICINE

## 2017-02-22 PROCEDURE — 84443 ASSAY THYROID STIM HORMONE: CPT

## 2017-02-22 PROCEDURE — 80053 COMPREHEN METABOLIC PANEL: CPT

## 2017-02-22 PROCEDURE — 36591 DRAW BLOOD OFF VENOUS DEVICE: CPT

## 2017-02-22 PROCEDURE — 84439 ASSAY OF FREE THYROXINE: CPT | Performed by: INTERNAL MEDICINE

## 2017-02-22 RX ORDER — HEPARIN SODIUM (PORCINE) LOCK FLUSH IV SOLN 100 UNIT/ML 100 UNIT/ML
5 SOLUTION INTRAVENOUS AS NEEDED
Status: DISCONTINUED | OUTPATIENT
Start: 2017-02-22 | End: 2017-02-22

## 2017-02-22 RX ORDER — 0.9 % SODIUM CHLORIDE 0.9 %
VIAL (ML) INJECTION
Status: DISCONTINUED
Start: 2017-02-22 | End: 2017-02-22

## 2017-02-22 RX ORDER — HEPARIN SODIUM (PORCINE) LOCK FLUSH IV SOLN 100 UNIT/ML 100 UNIT/ML
SOLUTION INTRAVENOUS
Status: DISCONTINUED
Start: 2017-02-22 | End: 2017-02-22

## 2017-02-22 RX ADMIN — HEPARIN SODIUM (PORCINE) LOCK FLUSH IV SOLN 100 UNIT/ML 500 UNITS: 100 SOLUTION INTRAVENOUS at 09:47:00

## 2017-02-22 NOTE — PROGRESS NOTES
Carcinoma of lung ([facility] [address]    7/17/2015 Biopsy Left upper lob aspirate, no malignant cells identified    7/20/2015 Surgery Occipital Tumor resection    7/20/2015 Biopsy Left Occipital Tumor: ALK and EGFR alterations NOT DETECTED.   Two biopsies both positiv OPDIVO    5/19/2016 -  Chemotherapy Cycle #4 OPDIVO;  Scans ordered    6/10/2016 -  Chemotherapy Cycle #5 OPDIVO continues for favorable PET:  MRI Brain shows progression; RT and surgery deferred for now    6/23/2016 -  Chemotherapy Cycle #6 OPDIVO,     7/ Positive for arthralgias (bilateral knee pain. Chronic). Negative for back pain, joint swelling, gait problem, neck pain and neck stiffness. Skin: Negative for pallor and rash.    Neurological: Negative for dizziness, seizures, weakness, light-headedness HYSTERECTOMY      Comment heavy menstrual flow,     LUMPECTOMY RIGHT  2012    Comment FIBROADENOMA    HEMORRHOIDECTOMY      CHOLECYSTECTOMY  2009    APPENDECTOMY  1999    OTHER  8-7-15    Comment CYBERKNIFE    CYST REMOVAL      Comment BREAST, BENIGN    XS No thyromegaly present. Cardiovascular: Normal rate and normal heart sounds. Exam reveals no friction rub. No murmur heard. Pulmonary/Chest: Effort normal and breath sounds normal. No respiratory distress. She exhibits no tenderness.    Abdominal: So of the time was spent counseling and/or coordinating care. No orders of the defined types were placed in this encounter.        Results From Past 48 Hours:    Recent Results (from the past 48 hour(s))  -POCT GLUCOSE   Collection Time: 02/21/17  7:0 relative decreased FDG to be in the anterior right frontal lobe, adjacent to right frontal craniotomy site. Prior left parietal craniotomy. .  No pathologic FDG activity.     LUNGS:            There is evidence of left upper lobectomy.  Atelectasis and/or sc

## 2017-02-22 NOTE — PROGRESS NOTES
Labs collected per protocol via port. Excellent blood return and inflow. Labs collected, flushed per protocol, decannulated, dsg to site, tolerated well.

## 2017-02-23 ENCOUNTER — OFFICE VISIT (OUTPATIENT)
Dept: HEMATOLOGY/ONCOLOGY | Facility: HOSPITAL | Age: 47
End: 2017-02-23
Attending: INTERNAL MEDICINE
Payer: COMMERCIAL

## 2017-02-23 VITALS
TEMPERATURE: 99 F | SYSTOLIC BLOOD PRESSURE: 98 MMHG | HEART RATE: 103 BPM | DIASTOLIC BLOOD PRESSURE: 52 MMHG | RESPIRATION RATE: 18 BRPM

## 2017-02-23 DIAGNOSIS — Z45.2 ENCOUNTER FOR ADJUSTMENT OR MANAGEMENT OF VASCULAR ACCESS DEVICE: Primary | ICD-10-CM

## 2017-02-23 DIAGNOSIS — C34.92 CARCINOMA OF LUNG, LEFT (HCC): ICD-10-CM

## 2017-02-23 DIAGNOSIS — C34.90 CARCINOMA OF LUNG, UNSPECIFIED LATERALITY (HCC): ICD-10-CM

## 2017-02-23 PROCEDURE — 96413 CHEMO IV INFUSION 1 HR: CPT

## 2017-02-23 RX ORDER — HEPARIN SODIUM (PORCINE) LOCK FLUSH IV SOLN 100 UNIT/ML 100 UNIT/ML
SOLUTION INTRAVENOUS
Status: COMPLETED
Start: 2017-02-23 | End: 2017-02-23

## 2017-02-23 RX ORDER — 0.9 % SODIUM CHLORIDE 0.9 %
VIAL (ML) INJECTION
Status: DISCONTINUED
Start: 2017-02-23 | End: 2017-02-23

## 2017-02-23 RX ORDER — SODIUM CHLORIDE 9 MG/ML
INJECTION, SOLUTION INTRAVENOUS
Status: DISCONTINUED
Start: 2017-02-23 | End: 2017-02-23

## 2017-02-23 RX ORDER — HEPARIN SODIUM (PORCINE) LOCK FLUSH IV SOLN 100 UNIT/ML 100 UNIT/ML
5 SOLUTION INTRAVENOUS AS NEEDED
Status: DISCONTINUED | OUTPATIENT
Start: 2017-02-23 | End: 2017-02-23

## 2017-02-23 RX ADMIN — HEPARIN SODIUM (PORCINE) LOCK FLUSH IV SOLN 100 UNIT/ML 500 UNITS: 100 SOLUTION INTRAVENOUS at 11:30:00

## 2017-02-23 NOTE — PROGRESS NOTES
[place] to infusion today for C21 D1 of Opdivo. She is feeling well, offers no complaints. She denies fevers or flu-like illness. No N/V/D/C. She reports her PET scan last week had good results.     Lab Results  Component Value Date   WBC 9.6 02/22/2017   H

## 2017-02-24 ENCOUNTER — TELEPHONE (OUTPATIENT)
Dept: ENDOCRINOLOGY CLINIC | Facility: CLINIC | Age: 47
End: 2017-02-24

## 2017-02-24 DIAGNOSIS — E03.9 HYPOTHYROIDISM, UNSPECIFIED TYPE: Primary | ICD-10-CM

## 2017-02-24 RX ORDER — LEVOTHYROXINE SODIUM 175 UG/1
175 TABLET ORAL
Qty: 30 TABLET | Refills: 1 | Status: SHIPPED | OUTPATIENT
Start: 2017-02-24 | End: 2017-06-09

## 2017-02-24 NOTE — TELEPHONE ENCOUNTER
I reviewed labs. Thyroid hormone is still on the lower side. Please ask patient to increase LT 4 from 150 to 175. Also, she should repeat TSH and Ft 4 in 6 weeks. Ordered.    Thx.

## 2017-02-24 NOTE — TELEPHONE ENCOUNTER
Spoke with Sid Pena and informed her of Dr. Hernandez Sit message below. She verbalized her understanding and had no further questions at this time. Verified pharmacy. Sent Rx.  Labs already ordered by AM.

## 2017-03-07 ENCOUNTER — TELEPHONE (OUTPATIENT)
Dept: INTERNAL MEDICINE CLINIC | Facility: CLINIC | Age: 47
End: 2017-03-07

## 2017-03-07 DIAGNOSIS — M17.10 PRIMARY OSTEOARTHRITIS OF KNEE, UNSPECIFIED LATERALITY: Primary | ICD-10-CM

## 2017-03-07 NOTE — TELEPHONE ENCOUNTER
Pt said that Dr. Earnestine Gonzalez told her that  is referring her to see someone at the pain clinic for cortisone shots. Pt needs to know who to see, and contact information.                   Tasked to Nursing

## 2017-03-08 ENCOUNTER — NURSE ONLY (OUTPATIENT)
Dept: HEMATOLOGY/ONCOLOGY | Facility: HOSPITAL | Age: 47
End: 2017-03-08
Attending: INTERNAL MEDICINE
Payer: COMMERCIAL

## 2017-03-08 VITALS
SYSTOLIC BLOOD PRESSURE: 95 MMHG | DIASTOLIC BLOOD PRESSURE: 69 MMHG | HEIGHT: 65 IN | BODY MASS INDEX: 28.32 KG/M2 | WEIGHT: 170 LBS | RESPIRATION RATE: 16 BRPM | TEMPERATURE: 99 F | HEART RATE: 92 BPM

## 2017-03-08 DIAGNOSIS — C34.92 CARCINOMA OF LUNG, LEFT (HCC): ICD-10-CM

## 2017-03-08 DIAGNOSIS — C34.90 CARCINOMA OF LUNG, UNSPECIFIED LATERALITY (HCC): Primary | ICD-10-CM

## 2017-03-08 DIAGNOSIS — Z45.2 ENCOUNTER FOR ADJUSTMENT OR MANAGEMENT OF VASCULAR ACCESS DEVICE: Primary | ICD-10-CM

## 2017-03-08 DIAGNOSIS — Z09 CHEMOTHERAPY FOLLOW-UP EXAMINATION: ICD-10-CM

## 2017-03-08 DIAGNOSIS — C34.90 CARCINOMA OF LUNG, UNSPECIFIED LATERALITY (HCC): ICD-10-CM

## 2017-03-08 DIAGNOSIS — C79.31 BRAIN METASTASES (HCC): ICD-10-CM

## 2017-03-08 LAB
ALBUMIN SERPL BCP-MCNC: 2.9 G/DL (ref 3.5–4.8)
ALBUMIN/GLOB SERPL: 0.7 {RATIO} (ref 1–2)
ALP SERPL-CCNC: 111 U/L (ref 32–100)
ALT SERPL-CCNC: 8 U/L (ref 14–54)
ANION GAP SERPL CALC-SCNC: 9 MMOL/L (ref 0–18)
AST SERPL-CCNC: 14 U/L (ref 15–41)
BASOPHILS # BLD: 0 K/UL (ref 0–0.2)
BASOPHILS NFR BLD: 0 %
BILIRUB SERPL-MCNC: 0.2 MG/DL (ref 0.3–1.2)
BUN SERPL-MCNC: 3 MG/DL (ref 8–20)
BUN/CREAT SERPL: 5.1 (ref 10–20)
CALCIUM SERPL-MCNC: 8.4 MG/DL (ref 8.5–10.5)
CHLORIDE SERPL-SCNC: 100 MMOL/L (ref 95–110)
CO2 SERPL-SCNC: 31 MMOL/L (ref 22–32)
CREAT SERPL-MCNC: 0.59 MG/DL (ref 0.5–1.5)
EOSINOPHIL # BLD: 0.1 K/UL (ref 0–0.7)
EOSINOPHIL NFR BLD: 1 %
ERYTHROCYTE [DISTWIDTH] IN BLOOD BY AUTOMATED COUNT: 17.2 % (ref 11–15)
GLOBULIN PLAS-MCNC: 4.3 G/DL (ref 2.5–3.7)
GLUCOSE SERPL-MCNC: 106 MG/DL (ref 70–99)
HCT VFR BLD AUTO: 29.1 % (ref 35–48)
HGB BLD-MCNC: 9.3 G/DL (ref 12–16)
LYMPHOCYTES # BLD: 2.3 K/UL (ref 1–4)
LYMPHOCYTES NFR BLD: 23 %
MCH RBC QN AUTO: 27.5 PG (ref 27–32)
MCHC RBC AUTO-ENTMCNC: 32 G/DL (ref 32–37)
MCV RBC AUTO: 86 FL (ref 80–100)
MONOCYTES # BLD: 0.8 K/UL (ref 0–1)
MONOCYTES NFR BLD: 8 %
NEUTROPHILS # BLD AUTO: 6.8 K/UL (ref 1.8–7.7)
NEUTROPHILS NFR BLD: 68 %
OSMOLALITY UR CALC.SUM OF ELEC: 287 MOSM/KG (ref 275–295)
PLATELET # BLD AUTO: 380 K/UL (ref 140–400)
PMV BLD AUTO: 7.7 FL (ref 7.4–10.3)
POTASSIUM SERPL-SCNC: 3.3 MMOL/L (ref 3.3–5.1)
PROT SERPL-MCNC: 7.2 G/DL (ref 5.9–8.4)
RBC # BLD AUTO: 3.38 M/UL (ref 3.7–5.4)
SODIUM SERPL-SCNC: 140 MMOL/L (ref 136–144)
T4 FREE SERPL-MCNC: 0.68 NG/DL (ref 0.58–1.64)
TSH SERPL-ACNC: 10.9 UIU/ML (ref 0.34–5.6)
WBC # BLD AUTO: 10.1 K/UL (ref 4–11)

## 2017-03-08 PROCEDURE — 80053 COMPREHEN METABOLIC PANEL: CPT

## 2017-03-08 PROCEDURE — 84443 ASSAY THYROID STIM HORMONE: CPT

## 2017-03-08 PROCEDURE — 36591 DRAW BLOOD OFF VENOUS DEVICE: CPT

## 2017-03-08 PROCEDURE — 84439 ASSAY OF FREE THYROXINE: CPT | Performed by: INTERNAL MEDICINE

## 2017-03-08 PROCEDURE — 99212 OFFICE O/P EST SF 10 MIN: CPT | Performed by: INTERNAL MEDICINE

## 2017-03-08 PROCEDURE — 99215 OFFICE O/P EST HI 40 MIN: CPT | Performed by: INTERNAL MEDICINE

## 2017-03-08 PROCEDURE — 85025 COMPLETE CBC W/AUTO DIFF WBC: CPT

## 2017-03-08 RX ORDER — HEPARIN SODIUM (PORCINE) LOCK FLUSH IV SOLN 100 UNIT/ML 100 UNIT/ML
5 SOLUTION INTRAVENOUS AS NEEDED
Status: DISCONTINUED | OUTPATIENT
Start: 2017-03-08 | End: 2017-03-08

## 2017-03-08 RX ORDER — 0.9 % SODIUM CHLORIDE 0.9 %
VIAL (ML) INJECTION
Status: DISCONTINUED
Start: 2017-03-08 | End: 2017-03-08

## 2017-03-08 RX ORDER — HEPARIN SODIUM (PORCINE) LOCK FLUSH IV SOLN 100 UNIT/ML 100 UNIT/ML
SOLUTION INTRAVENOUS
Status: DISCONTINUED
Start: 2017-03-08 | End: 2017-03-08

## 2017-03-08 RX ADMIN — HEPARIN SODIUM (PORCINE) LOCK FLUSH IV SOLN 100 UNIT/ML 500 UNITS: 100 SOLUTION INTRAVENOUS at 10:42:00

## 2017-03-08 NOTE — TELEPHONE ENCOUNTER
Was given referral last year. New referral placed for Dr. Valente Manriquez. Please provide her with contact information.  Also please have patient return to clinic for a physical

## 2017-03-08 NOTE — PROGRESS NOTES
Patient here for Pre-chemotherapy lab draw. Right chest port accessed per sterile technique, good blood return noted, cbc,cmp,tsh, T4 obtained and sent to lab. Port flushed per protocol and pandey needle removed. Gauze and paper tape to site.   Patient di

## 2017-03-08 NOTE — PROGRESS NOTES
Carcinoma of lung ([facility] [address]    7/17/2015 Biopsy Left upper lob aspirate, no malignant cells identified    7/20/2015 Surgery Occipital Tumor resection    7/20/2015 Biopsy Left Occipital Tumor: ALK and EGFR alterations NOT DETECTED.   Two biopsies both positiv OPDIVO    5/19/2016 -  Chemotherapy Cycle #4 OPDIVO;  Scans ordered    6/10/2016 -  Chemotherapy Cycle #5 OPDIVO continues for favorable PET:  MRI Brain shows progression; RT and surgery deferred for now    6/23/2016 -  Chemotherapy Cycle #6 OPDIVO,     7/ swelling. Gastrointestinal: Negative for nausea, vomiting, abdominal pain, diarrhea, constipation, blood in stool and abdominal distention. Genitourinary: Negative for dysuria, urgency, hematuria and flank pain.    Musculoskeletal: Positive for arthralg cancer [facility])    • Pancreatic cancer ([facility] [address] JULY 2015   • Depression    • Personal history of antineoplastic chemotherapy      EVERY 2 WEEKS BEGINNING 2015    • Exposure to radiation      DISCONTINUED AUG 2015          Past Surgical History    HYSTERECTOMY Mouth/Throat: Oropharynx is clear and moist. No oropharyngeal exudate. Eyes: Conjunctivae and EOM are normal. Pupils are equal, round, and reactive to light. Neck: Normal range of motion. Neck supple. No JVD present. No tracheal deviation present.  No arthralgias can occur with Opdivo. Pt will follow up in pain clinic as she is known to clinic and has been receiving medications from them in the past.  Pt is smoking a pack a day per .  Discussed smoking cessation and encouraged pt to f/u with Dr. Nirmal Interiano 10.0-20.0   Calculated Osmolality 287 275-295 mOsm/kg   GFR, Non-African American >60 >=60   GFR, -American >60 >=60       Meds This Visit:        Imaging & Referrals:  None   No orders of the defined types were placed in this encounter.

## 2017-03-08 NOTE — TELEPHONE ENCOUNTER
Pt states this is regarding her knee pain. Informed her referral placed for Dr. Kim Morton. Phone number and location given to pt to schedule appointment.

## 2017-03-09 ENCOUNTER — TELEPHONE (OUTPATIENT)
Dept: ENDOCRINOLOGY CLINIC | Facility: CLINIC | Age: 47
End: 2017-03-09

## 2017-03-09 ENCOUNTER — OFFICE VISIT (OUTPATIENT)
Dept: HEMATOLOGY/ONCOLOGY | Facility: HOSPITAL | Age: 47
End: 2017-03-09
Attending: INTERNAL MEDICINE
Payer: COMMERCIAL

## 2017-03-09 VITALS
SYSTOLIC BLOOD PRESSURE: 114 MMHG | DIASTOLIC BLOOD PRESSURE: 63 MMHG | HEART RATE: 92 BPM | TEMPERATURE: 99 F | RESPIRATION RATE: 16 BRPM

## 2017-03-09 DIAGNOSIS — E03.9 ACQUIRED HYPOTHYROIDISM: Primary | ICD-10-CM

## 2017-03-09 DIAGNOSIS — C34.92 CARCINOMA OF LUNG, LEFT (HCC): ICD-10-CM

## 2017-03-09 DIAGNOSIS — C34.90 CARCINOMA OF LUNG, UNSPECIFIED LATERALITY (HCC): ICD-10-CM

## 2017-03-09 DIAGNOSIS — Z45.2 ENCOUNTER FOR ADJUSTMENT OR MANAGEMENT OF VASCULAR ACCESS DEVICE: Primary | ICD-10-CM

## 2017-03-09 PROCEDURE — 96413 CHEMO IV INFUSION 1 HR: CPT

## 2017-03-09 RX ORDER — 0.9 % SODIUM CHLORIDE 0.9 %
VIAL (ML) INJECTION
Status: DISCONTINUED
Start: 2017-03-09 | End: 2017-03-09

## 2017-03-09 RX ORDER — HEPARIN SODIUM (PORCINE) LOCK FLUSH IV SOLN 100 UNIT/ML 100 UNIT/ML
SOLUTION INTRAVENOUS
Status: COMPLETED
Start: 2017-03-09 | End: 2017-03-09

## 2017-03-09 RX ORDER — SODIUM CHLORIDE 9 MG/ML
INJECTION, SOLUTION INTRAVENOUS
Status: DISCONTINUED
Start: 2017-03-09 | End: 2017-03-09

## 2017-03-09 RX ORDER — HEPARIN SODIUM (PORCINE) LOCK FLUSH IV SOLN 100 UNIT/ML 100 UNIT/ML
5 SOLUTION INTRAVENOUS AS NEEDED
Status: DISCONTINUED | OUTPATIENT
Start: 2017-03-09 | End: 2017-03-09

## 2017-03-09 RX ADMIN — HEPARIN SODIUM (PORCINE) LOCK FLUSH IV SOLN 100 UNIT/ML 500 UNITS: 100 SOLUTION INTRAVENOUS at 11:40:00

## 2017-03-09 NOTE — PROGRESS NOTES
Pt presents today to infusion for C22D1 Opdivo.  Pt appears well and offers no complaints of nausea/vomiting/constipation/diarrhea.   Pt denies any fevers or flu like symptoms.  Pt states that she is fatigued after chemo but it resolves in a couple days.  N

## 2017-03-10 NOTE — TELEPHONE ENCOUNTER
Spoke with Jeb Landeros. She states she never started the 175 mcg tablet. She is currently taking 150 mcg. Her  will  the 175 mcg tablets today. Provided information regarding lab testing. Dr. Derek Marsh OK for her to increase to 175?

## 2017-03-10 NOTE — TELEPHONE ENCOUNTER
Spoke with Manas Chan and informed her of Dr. Cassidy Antoine message below. She verbalized her understanding.

## 2017-03-10 NOTE — TELEPHONE ENCOUNTER
I reviewed labs. Thyroid hormone is still on the lower side. Please ask patient to increase LT 4 from 175 to 200. Also, she should repeat TSH and Ft 4 in 6 weeks. Ordered. Please confirm that she is taking the 175 mcg daily regularly.   Please refill whe

## 2017-03-16 ENCOUNTER — HOSPITAL ENCOUNTER (OUTPATIENT)
Dept: GENERAL RADIOLOGY | Age: 47
Discharge: HOME | End: 2017-03-16
Attending: ORTHOPAEDIC SURGERY
Payer: COMMERCIAL

## 2017-03-16 DIAGNOSIS — R52 PAIN: ICD-10-CM

## 2017-03-16 PROCEDURE — 73564 X-RAY EXAM KNEE 4 OR MORE: CPT

## 2017-03-21 RX ORDER — OXYCODONE HCL 20 MG/1
20 TABLET, FILM COATED, EXTENDED RELEASE ORAL EVERY 12 HOURS
Qty: 60 TABLET | Refills: 0 | Status: SHIPPED | OUTPATIENT
Start: 2017-03-21 | End: 2017-04-03

## 2017-03-21 RX ORDER — OXYCODONE HYDROCHLORIDE 15 MG/1
15 TABLET ORAL EVERY 4 HOURS PRN
Qty: 180 TABLET | Refills: 0 | Status: SHIPPED | OUTPATIENT
Start: 2017-03-21 | End: 2017-04-03

## 2017-03-22 ENCOUNTER — OFFICE VISIT (OUTPATIENT)
Dept: HEMATOLOGY/ONCOLOGY | Facility: HOSPITAL | Age: 47
End: 2017-03-22
Attending: INTERNAL MEDICINE
Payer: COMMERCIAL

## 2017-03-22 VITALS
HEIGHT: 65 IN | SYSTOLIC BLOOD PRESSURE: 99 MMHG | HEART RATE: 98 BPM | TEMPERATURE: 98 F | DIASTOLIC BLOOD PRESSURE: 69 MMHG | WEIGHT: 172 LBS | RESPIRATION RATE: 18 BRPM | BODY MASS INDEX: 28.66 KG/M2

## 2017-03-22 DIAGNOSIS — Z09 CHEMOTHERAPY FOLLOW-UP EXAMINATION: ICD-10-CM

## 2017-03-22 DIAGNOSIS — C78.7 LIVER METASTASES (HCC): ICD-10-CM

## 2017-03-22 DIAGNOSIS — C79.31 BRAIN METASTASES (HCC): ICD-10-CM

## 2017-03-22 DIAGNOSIS — C34.90 CARCINOMA OF LUNG, UNSPECIFIED LATERALITY (HCC): ICD-10-CM

## 2017-03-22 DIAGNOSIS — C34.90 CARCINOMA OF LUNG, UNSPECIFIED LATERALITY (HCC): Primary | ICD-10-CM

## 2017-03-22 DIAGNOSIS — C34.92 CARCINOMA OF LUNG, LEFT (HCC): ICD-10-CM

## 2017-03-22 DIAGNOSIS — Z45.2 ENCOUNTER FOR ADJUSTMENT OR MANAGEMENT OF VASCULAR ACCESS DEVICE: Primary | ICD-10-CM

## 2017-03-22 LAB
ALBUMIN SERPL BCP-MCNC: 3 G/DL (ref 3.5–4.8)
ALBUMIN/GLOB SERPL: 0.7 {RATIO} (ref 1–2)
ALP SERPL-CCNC: 120 U/L (ref 32–100)
ALT SERPL-CCNC: 7 U/L (ref 14–54)
ANION GAP SERPL CALC-SCNC: 10 MMOL/L (ref 0–18)
AST SERPL-CCNC: 13 U/L (ref 15–41)
BASOPHILS # BLD: 0.1 K/UL (ref 0–0.2)
BASOPHILS NFR BLD: 1 %
BILIRUB SERPL-MCNC: 0.2 MG/DL (ref 0.3–1.2)
BUN SERPL-MCNC: 9 MG/DL (ref 8–20)
BUN/CREAT SERPL: 13.2 (ref 10–20)
CALCIUM SERPL-MCNC: 8.5 MG/DL (ref 8.5–10.5)
CHLORIDE SERPL-SCNC: 100 MMOL/L (ref 95–110)
CO2 SERPL-SCNC: 29 MMOL/L (ref 22–32)
CREAT SERPL-MCNC: 0.68 MG/DL (ref 0.5–1.5)
EOSINOPHIL # BLD: 0.1 K/UL (ref 0–0.7)
EOSINOPHIL NFR BLD: 1 %
ERYTHROCYTE [DISTWIDTH] IN BLOOD BY AUTOMATED COUNT: 17 % (ref 11–15)
GLOBULIN PLAS-MCNC: 4.1 G/DL (ref 2.5–3.7)
GLUCOSE SERPL-MCNC: 140 MG/DL (ref 70–99)
HCT VFR BLD AUTO: 28.8 % (ref 35–48)
HGB BLD-MCNC: 9.3 G/DL (ref 12–16)
LYMPHOCYTES # BLD: 3.7 K/UL (ref 1–4)
LYMPHOCYTES NFR BLD: 40 %
MCH RBC QN AUTO: 27.4 PG (ref 27–32)
MCHC RBC AUTO-ENTMCNC: 32.2 G/DL (ref 32–37)
MCV RBC AUTO: 84.9 FL (ref 80–100)
MONOCYTES # BLD: 0.7 K/UL (ref 0–1)
MONOCYTES NFR BLD: 7 %
NEUTROPHILS # BLD AUTO: 4.7 K/UL (ref 1.8–7.7)
NEUTROPHILS NFR BLD: 51 %
OSMOLALITY UR CALC.SUM OF ELEC: 289 MOSM/KG (ref 275–295)
PLATELET # BLD AUTO: 400 K/UL (ref 140–400)
PMV BLD AUTO: 7.3 FL (ref 7.4–10.3)
POTASSIUM SERPL-SCNC: 3.4 MMOL/L (ref 3.3–5.1)
PROT SERPL-MCNC: 7.1 G/DL (ref 5.9–8.4)
RBC # BLD AUTO: 3.39 M/UL (ref 3.7–5.4)
SODIUM SERPL-SCNC: 139 MMOL/L (ref 136–144)
T4 FREE SERPL-MCNC: 1.05 NG/DL (ref 0.58–1.64)
TSH SERPL-ACNC: 7.76 UIU/ML (ref 0.34–5.6)
WBC # BLD AUTO: 9.2 K/UL (ref 4–11)

## 2017-03-22 PROCEDURE — 84443 ASSAY THYROID STIM HORMONE: CPT

## 2017-03-22 PROCEDURE — 85025 COMPLETE CBC W/AUTO DIFF WBC: CPT

## 2017-03-22 PROCEDURE — 36591 DRAW BLOOD OFF VENOUS DEVICE: CPT

## 2017-03-22 PROCEDURE — 80053 COMPREHEN METABOLIC PANEL: CPT

## 2017-03-22 PROCEDURE — 99212 OFFICE O/P EST SF 10 MIN: CPT | Performed by: INTERNAL MEDICINE

## 2017-03-22 PROCEDURE — 84439 ASSAY OF FREE THYROXINE: CPT | Performed by: INTERNAL MEDICINE

## 2017-03-22 PROCEDURE — 99215 OFFICE O/P EST HI 40 MIN: CPT | Performed by: INTERNAL MEDICINE

## 2017-03-22 RX ORDER — HEPARIN SODIUM (PORCINE) LOCK FLUSH IV SOLN 100 UNIT/ML 100 UNIT/ML
SOLUTION INTRAVENOUS
Status: DISCONTINUED
Start: 2017-03-22 | End: 2017-03-22

## 2017-03-22 RX ORDER — 0.9 % SODIUM CHLORIDE 0.9 %
VIAL (ML) INJECTION
Status: DISCONTINUED
Start: 2017-03-22 | End: 2017-03-22

## 2017-03-22 RX ORDER — HEPARIN SODIUM (PORCINE) LOCK FLUSH IV SOLN 100 UNIT/ML 100 UNIT/ML
5 SOLUTION INTRAVENOUS AS NEEDED
Status: DISCONTINUED | OUTPATIENT
Start: 2017-03-22 | End: 2017-03-22

## 2017-03-22 RX ADMIN — HEPARIN SODIUM (PORCINE) LOCK FLUSH IV SOLN 100 UNIT/ML 500 UNITS: 100 SOLUTION INTRAVENOUS at 10:20:00

## 2017-03-22 NOTE — PROGRESS NOTES
Carcinoma of lung ([facility] [address]    7/17/2015 Biopsy Left upper lob aspirate, no malignant cells identified    7/20/2015 Surgery Occipital Tumor resection    7/20/2015 Biopsy Left Occipital Tumor: ALK and EGFR alterations NOT DETECTED.   Two biopsies both positiv OPDIVO    5/19/2016 -  Chemotherapy Cycle #4 OPDIVO;  Scans ordered    6/10/2016 -  Chemotherapy Cycle #5 OPDIVO continues for favorable PET:  MRI Brain shows progression; RT and surgery deferred for now    6/23/2016 -  Chemotherapy Cycle #6 OPDIVO,     7/ swelling. Gastrointestinal: Negative for nausea, vomiting, abdominal pain, diarrhea, constipation, blood in stool and abdominal distention. Genitourinary: Negative for dysuria, urgency, hematuria and flank pain.    Musculoskeletal: Positive for arthralg [facility]) JULY 2015   • Depression    • Personal history of antineoplastic chemotherapy      EVERY 2 WEEKS BEGINNING 2015    • Exposure to radiation      DISCONTINUED AUG 2015          Past Surgical History    HYSTERECTOMY      Comment heavy menstrual flow, No oropharyngeal exudate. Eyes: Conjunctivae and EOM are normal. Pupils are equal, round, and reactive to light. Neck: Normal range of motion. Neck supple. No JVD present. No tracheal deviation present. No thyromegaly present.    Cardiovascular: Normal Opdivo. Pt will follow up in pain clinic for further evaluation and management. Pt is smoking a pack a day per . Discussed smoking cessation and encouraged pt to f/u with Dr. Bhargav Pacheco for further management. This is a visit of 40 minutes.  Greater than encounter.

## 2017-03-23 ENCOUNTER — OFFICE VISIT (OUTPATIENT)
Dept: HEMATOLOGY/ONCOLOGY | Facility: HOSPITAL | Age: 47
End: 2017-03-23
Attending: INTERNAL MEDICINE
Payer: COMMERCIAL

## 2017-03-23 VITALS
SYSTOLIC BLOOD PRESSURE: 107 MMHG | TEMPERATURE: 98 F | DIASTOLIC BLOOD PRESSURE: 58 MMHG | HEART RATE: 92 BPM | RESPIRATION RATE: 18 BRPM

## 2017-03-23 DIAGNOSIS — Z45.2 ENCOUNTER FOR ADJUSTMENT OR MANAGEMENT OF VASCULAR ACCESS DEVICE: Primary | ICD-10-CM

## 2017-03-23 DIAGNOSIS — C34.92 CARCINOMA OF LUNG, LEFT (HCC): ICD-10-CM

## 2017-03-23 DIAGNOSIS — C34.90 CARCINOMA OF LUNG, UNSPECIFIED LATERALITY (HCC): ICD-10-CM

## 2017-03-23 PROCEDURE — 96413 CHEMO IV INFUSION 1 HR: CPT

## 2017-03-23 RX ORDER — HEPARIN SODIUM (PORCINE) LOCK FLUSH IV SOLN 100 UNIT/ML 100 UNIT/ML
SOLUTION INTRAVENOUS
Status: DISCONTINUED
Start: 2017-03-23 | End: 2017-03-23

## 2017-03-23 RX ORDER — HEPARIN SODIUM (PORCINE) LOCK FLUSH IV SOLN 100 UNIT/ML 100 UNIT/ML
5 SOLUTION INTRAVENOUS AS NEEDED
Status: DISCONTINUED | OUTPATIENT
Start: 2017-03-23 | End: 2017-03-23

## 2017-03-23 RX ORDER — SODIUM CHLORIDE 9 MG/ML
INJECTION, SOLUTION INTRAVENOUS
Status: DISCONTINUED
Start: 2017-03-23 | End: 2017-03-23

## 2017-03-23 RX ORDER — 0.9 % SODIUM CHLORIDE 0.9 %
VIAL (ML) INJECTION
Status: DISCONTINUED
Start: 2017-03-23 | End: 2017-03-23

## 2017-03-23 RX ADMIN — HEPARIN SODIUM (PORCINE) LOCK FLUSH IV SOLN 100 UNIT/ML 500 UNITS: 100 SOLUTION INTRAVENOUS at 11:32:00

## 2017-03-23 NOTE — PROGRESS NOTES
Pt to infusion for C23 D1 Opdivo. Pt arrived stable and ambulatory. Feeling well overall, offers no complaints. Denies any fatigue, reports good energy level. States she is returning to work again this coming [address]. Denies fevers/infections at this time.

## 2017-04-03 ENCOUNTER — OFFICE VISIT (OUTPATIENT)
Dept: PAIN CLINIC | Facility: HOSPITAL | Age: 47
End: 2017-04-03
Attending: NURSE PRACTITIONER
Payer: COMMERCIAL

## 2017-04-03 VITALS — WEIGHT: 170 LBS | BODY MASS INDEX: 28 KG/M2

## 2017-04-03 DIAGNOSIS — C34.92: Primary | ICD-10-CM

## 2017-04-03 PROCEDURE — 99211 OFF/OP EST MAY X REQ PHY/QHP: CPT

## 2017-04-03 RX ORDER — OXYCODONE HYDROCHLORIDE 15 MG/1
15 TABLET ORAL EVERY 4 HOURS PRN
Qty: 180 TABLET | Refills: 0 | Status: SHIPPED | OUTPATIENT
Start: 2017-04-20 | End: 2017-05-19

## 2017-04-03 RX ORDER — OXYCODONE HCL 20 MG/1
20 TABLET, FILM COATED, EXTENDED RELEASE ORAL EVERY 12 HOURS
Qty: 60 TABLET | Refills: 0 | Status: SHIPPED | OUTPATIENT
Start: 2017-04-20 | End: 2017-05-19

## 2017-04-03 NOTE — CHRONIC PAIN
MEDICATION EVALUATION    HISTORY OF PRESENT ILLNESS:  Keiko Salter is a 51year old old female with history of chronic headaches presents for medication evaluation. She continues to report headaches but states they are \"tolerable. \" She reports using her Negative  Hematologic: No active bleeding  Lymphatic: No current lymphedema  Allergic/Immunologic:  Negative  Musculoskeletal: As above  Neurological: As above  Denies chest pain, shortness of breath.     MEDICAL HISTORY:  Patient Active Problem List:     C Problem Relation Age of Onset   • Blood Disorder Mother    • Stroke Mother    • Clotting Disorder Mother    • Cancer Father      PROSTATE   • Psychiatric Father        SOCIAL HISTORY:    Social History   Marital Status:   Spouse Name: N/A    Years

## 2017-04-05 ENCOUNTER — NURSE ONLY (OUTPATIENT)
Dept: HEMATOLOGY/ONCOLOGY | Facility: HOSPITAL | Age: 47
End: 2017-04-05
Attending: INTERNAL MEDICINE
Payer: COMMERCIAL

## 2017-04-05 VITALS
DIASTOLIC BLOOD PRESSURE: 67 MMHG | TEMPERATURE: 100 F | HEIGHT: 65 IN | HEART RATE: 88 BPM | WEIGHT: 174 LBS | RESPIRATION RATE: 16 BRPM | SYSTOLIC BLOOD PRESSURE: 110 MMHG | BODY MASS INDEX: 28.99 KG/M2

## 2017-04-05 DIAGNOSIS — Z45.2 ENCOUNTER FOR ADJUSTMENT OR MANAGEMENT OF VASCULAR ACCESS DEVICE: Primary | ICD-10-CM

## 2017-04-05 DIAGNOSIS — C78.7 LIVER METASTASES (HCC): ICD-10-CM

## 2017-04-05 DIAGNOSIS — C79.31 BRAIN METASTASES (HCC): ICD-10-CM

## 2017-04-05 DIAGNOSIS — C34.90 CARCINOMA OF LUNG, UNSPECIFIED LATERALITY (HCC): Primary | ICD-10-CM

## 2017-04-05 DIAGNOSIS — C34.92 CARCINOMA OF LUNG, LEFT (HCC): ICD-10-CM

## 2017-04-05 DIAGNOSIS — C34.90 CARCINOMA OF LUNG, UNSPECIFIED LATERALITY (HCC): ICD-10-CM

## 2017-04-05 DIAGNOSIS — Z09 CHEMOTHERAPY FOLLOW-UP EXAMINATION: ICD-10-CM

## 2017-04-05 PROCEDURE — 85025 COMPLETE CBC W/AUTO DIFF WBC: CPT

## 2017-04-05 PROCEDURE — 36591 DRAW BLOOD OFF VENOUS DEVICE: CPT

## 2017-04-05 PROCEDURE — 84439 ASSAY OF FREE THYROXINE: CPT | Performed by: INTERNAL MEDICINE

## 2017-04-05 PROCEDURE — 99212 OFFICE O/P EST SF 10 MIN: CPT | Performed by: INTERNAL MEDICINE

## 2017-04-05 PROCEDURE — 84443 ASSAY THYROID STIM HORMONE: CPT

## 2017-04-05 PROCEDURE — 80053 COMPREHEN METABOLIC PANEL: CPT

## 2017-04-05 PROCEDURE — 99215 OFFICE O/P EST HI 40 MIN: CPT | Performed by: INTERNAL MEDICINE

## 2017-04-05 RX ORDER — 0.9 % SODIUM CHLORIDE 0.9 %
VIAL (ML) INJECTION
Status: DISCONTINUED
Start: 2017-04-05 | End: 2017-04-05

## 2017-04-05 RX ORDER — HEPARIN SODIUM (PORCINE) LOCK FLUSH IV SOLN 100 UNIT/ML 100 UNIT/ML
5 SOLUTION INTRAVENOUS AS NEEDED
Status: DISCONTINUED | OUTPATIENT
Start: 2017-04-05 | End: 2017-04-05

## 2017-04-05 RX ORDER — HEPARIN SODIUM (PORCINE) LOCK FLUSH IV SOLN 100 UNIT/ML 100 UNIT/ML
SOLUTION INTRAVENOUS
Status: COMPLETED
Start: 2017-04-05 | End: 2017-04-05

## 2017-04-05 RX ADMIN — HEPARIN SODIUM (PORCINE) LOCK FLUSH IV SOLN 100 UNIT/ML 500 UNITS: 100 SOLUTION INTRAVENOUS at 09:20:00

## 2017-04-05 NOTE — PROGRESS NOTES
Carcinoma of lung ([facility] [address]    7/17/2015 Biopsy Left upper lob aspirate, no malignant cells identified    7/20/2015 Surgery Occipital Tumor resection    7/20/2015 Biopsy Left Occipital Tumor: ALK and EGFR alterations NOT DETECTED.   Two biopsies both positiv OPDIVO    5/19/2016 -  Chemotherapy Cycle #4 OPDIVO;  Scans ordered    6/10/2016 -  Chemotherapy Cycle #5 OPDIVO continues for favorable PET:  MRI Brain shows progression; RT and surgery deferred for now    6/23/2016 -  Chemotherapy Cycle #6 OPDIVO,     7/ itching and visual disturbance. Respiratory: Negative for cough, chest tightness and shortness of breath. Cardiovascular: Negative for chest pain, palpitations and leg swelling.    Gastrointestinal: Negative for nausea, vomiting, abdominal pain, diarrh blood pressure    • Pneumonia, organism unspecified    • Migraines    • Hypothyroid    • Osteoarthritis    • Anemia    • Hepatitis C    • Gallbladder disease    • Brain cancer [facility])    • Lung cancer ([facility]    • Pancreatic cancer ([facility] JULY 2015   • Reddyi Constitutional: She is oriented to person, place, and time. She appears well-developed and well-nourished. No distress. HENT:   Head: Normocephalic and atraumatic.    Nose: Nose normal.   Mouth/Throat: Oropharynx is clear and moist. No oropharyngeal exu with next cycle treatment( cycle 24). She will continue to follow-up with endocrinology for Opdivo induced hypothyroidism. Patient is on levothyroxine and the doses was recently increased. TSH today is WNL. Discussed with pt. Pt c/o bilateral knee pain. 83.9 80.0-100.0 fL   MCH 27.7 27.0-32.0 pg   MCHC 33.0 32.0-37.0 g/dl   RDW 16.5 (H) 11.0-15.0 %    140-400 K/UL   MPV 7.7 7.4-10.3 fL   Neutrophil % 56 %   Lymphocyte % 32 %   Monocyte % 10 %   Eosinophil % 1 %   Basophil % 1 %   Neutrophil Absolut

## 2017-04-05 NOTE — PROGRESS NOTES
Patient here for fast track labs. Right chest port accessed with sterile technique ,labs drawn,port flushed and de accessed. pt seeing md today. discharged to [facility] ambulatory,gait steady.

## 2017-04-06 ENCOUNTER — OFFICE VISIT (OUTPATIENT)
Dept: HEMATOLOGY/ONCOLOGY | Facility: HOSPITAL | Age: 47
End: 2017-04-06
Attending: INTERNAL MEDICINE
Payer: COMMERCIAL

## 2017-04-06 VITALS
HEART RATE: 108 BPM | DIASTOLIC BLOOD PRESSURE: 69 MMHG | TEMPERATURE: 99 F | RESPIRATION RATE: 16 BRPM | SYSTOLIC BLOOD PRESSURE: 109 MMHG

## 2017-04-06 DIAGNOSIS — C34.92 CARCINOMA OF LUNG, LEFT (HCC): ICD-10-CM

## 2017-04-06 DIAGNOSIS — C34.90 CARCINOMA OF LUNG, UNSPECIFIED LATERALITY (HCC): ICD-10-CM

## 2017-04-06 DIAGNOSIS — Z45.2 ENCOUNTER FOR ADJUSTMENT OR MANAGEMENT OF VASCULAR ACCESS DEVICE: Primary | ICD-10-CM

## 2017-04-06 PROCEDURE — 96413 CHEMO IV INFUSION 1 HR: CPT

## 2017-04-06 RX ORDER — 0.9 % SODIUM CHLORIDE 0.9 %
VIAL (ML) INJECTION
Status: DISCONTINUED
Start: 2017-04-06 | End: 2017-04-06

## 2017-04-06 RX ORDER — HEPARIN SODIUM (PORCINE) LOCK FLUSH IV SOLN 100 UNIT/ML 100 UNIT/ML
5 SOLUTION INTRAVENOUS AS NEEDED
Status: DISCONTINUED | OUTPATIENT
Start: 2017-04-06 | End: 2017-04-06

## 2017-04-06 RX ORDER — SODIUM CHLORIDE 9 MG/ML
INJECTION, SOLUTION INTRAVENOUS
Status: DISCONTINUED
Start: 2017-04-06 | End: 2017-04-06

## 2017-04-06 RX ORDER — HEPARIN SODIUM (PORCINE) LOCK FLUSH IV SOLN 100 UNIT/ML 100 UNIT/ML
SOLUTION INTRAVENOUS
Status: DISCONTINUED
Start: 2017-04-06 | End: 2017-04-06

## 2017-04-06 RX ADMIN — HEPARIN SODIUM (PORCINE) LOCK FLUSH IV SOLN 100 UNIT/ML 500 UNITS: 100 SOLUTION INTRAVENOUS at 11:00:00

## 2017-04-06 NOTE — PROGRESS NOTES
Pt here for C24 Opdivo. She is in good spirits. Denies pain, n/v, diarrh/constip, or PN. She yusra early today to go to her son's school; she is tired today, resting in chair. Pt denies other new complaints/concerns.     Labs, including thyroid levels,

## 2017-04-10 ENCOUNTER — TELEPHONE (OUTPATIENT)
Dept: HEMATOLOGY/ONCOLOGY | Facility: HOSPITAL | Age: 47
End: 2017-04-10

## 2017-04-10 NOTE — TELEPHONE ENCOUNTER
LM on identified voice mail indicating that his [facility] paperwork is completed and signed and ready for  at the [facility]. Copies sent for scanning.

## 2017-04-13 ENCOUNTER — TELEPHONE (OUTPATIENT)
Dept: HEMATOLOGY/ONCOLOGY | Facility: HOSPITAL | Age: 47
End: 2017-04-13

## 2017-04-13 NOTE — TELEPHONE ENCOUNTER
Dalia Wright called and said she is not feeling well at all. She has these pains and similar symptoms as she had before which lead to a hospitalization. She wouldn't give more details. But she would like to speak with a nurse right away.  Please advise

## 2017-04-13 NOTE — TELEPHONE ENCOUNTER
Toxicities:  4/6/2017 C24 Opdivo    Headache/LButtock pain     Headache: Samantha Machado reports having headaches that started 4 days ago. The pain is located in the back of her head. It does not radiate anywhere. The headaches last about 30 min and then resolves.

## 2017-04-14 ENCOUNTER — TELEPHONE (OUTPATIENT)
Dept: HEMATOLOGY/ONCOLOGY | Facility: HOSPITAL | Age: 47
End: 2017-04-14

## 2017-04-14 DIAGNOSIS — C79.31 BRAIN METASTASES (HCC): ICD-10-CM

## 2017-04-14 DIAGNOSIS — R51.9 HEADACHE, UNSPECIFIED HEADACHE TYPE: ICD-10-CM

## 2017-04-14 DIAGNOSIS — C34.90 CARCINOMA OF LUNG, UNSPECIFIED LATERALITY (HCC): Primary | ICD-10-CM

## 2017-04-17 ENCOUNTER — TELEPHONE (OUTPATIENT)
Dept: HEMATOLOGY/ONCOLOGY | Facility: HOSPITAL | Age: 47
End: 2017-04-17

## 2017-04-17 NOTE — TELEPHONE ENCOUNTER
Fabby Otero reminded her that MRI was approved and to schedule her MRI. Patient stated understanding. Patient see's Dr. Aftab Mauricio tomorrow. Encouraged patient to call with any questions.

## 2017-04-18 ENCOUNTER — OFFICE VISIT (OUTPATIENT)
Dept: HEMATOLOGY/ONCOLOGY | Facility: HOSPITAL | Age: 47
End: 2017-04-18
Attending: INTERNAL MEDICINE
Payer: COMMERCIAL

## 2017-04-18 VITALS
DIASTOLIC BLOOD PRESSURE: 61 MMHG | WEIGHT: 172 LBS | HEART RATE: 90 BPM | TEMPERATURE: 99 F | HEIGHT: 65 IN | RESPIRATION RATE: 18 BRPM | SYSTOLIC BLOOD PRESSURE: 96 MMHG | BODY MASS INDEX: 28.66 KG/M2

## 2017-04-18 DIAGNOSIS — C79.31 BRAIN METASTASES (HCC): ICD-10-CM

## 2017-04-18 DIAGNOSIS — Z45.2 ENCOUNTER FOR ADJUSTMENT OR MANAGEMENT OF VASCULAR ACCESS DEVICE: ICD-10-CM

## 2017-04-18 DIAGNOSIS — C34.92 CARCINOMA OF LUNG, LEFT (HCC): Primary | ICD-10-CM

## 2017-04-18 DIAGNOSIS — E03.9 HYPOTHYROIDISM, UNSPECIFIED TYPE: ICD-10-CM

## 2017-04-18 DIAGNOSIS — C34.90: Primary | ICD-10-CM

## 2017-04-18 DIAGNOSIS — Z51.11 CHEMOTHERAPY MANAGEMENT, ENCOUNTER FOR: ICD-10-CM

## 2017-04-18 DIAGNOSIS — C78.7 LIVER METASTASIS (HCC): ICD-10-CM

## 2017-04-18 DIAGNOSIS — C34.92 CARCINOMA OF LUNG, LEFT (HCC): ICD-10-CM

## 2017-04-18 PROCEDURE — 80053 COMPREHEN METABOLIC PANEL: CPT

## 2017-04-18 PROCEDURE — 99215 OFFICE O/P EST HI 40 MIN: CPT | Performed by: INTERNAL MEDICINE

## 2017-04-18 PROCEDURE — 84443 ASSAY THYROID STIM HORMONE: CPT

## 2017-04-18 PROCEDURE — 99212 OFFICE O/P EST SF 10 MIN: CPT | Performed by: INTERNAL MEDICINE

## 2017-04-18 PROCEDURE — 36591 DRAW BLOOD OFF VENOUS DEVICE: CPT

## 2017-04-18 PROCEDURE — 85025 COMPLETE CBC W/AUTO DIFF WBC: CPT

## 2017-04-18 RX ORDER — HEPARIN SODIUM (PORCINE) LOCK FLUSH IV SOLN 100 UNIT/ML 100 UNIT/ML
SOLUTION INTRAVENOUS
Status: DISCONTINUED
Start: 2017-04-18 | End: 2017-04-18

## 2017-04-18 RX ORDER — HEPARIN SODIUM (PORCINE) LOCK FLUSH IV SOLN 100 UNIT/ML 100 UNIT/ML
5 SOLUTION INTRAVENOUS AS NEEDED
Status: DISCONTINUED | OUTPATIENT
Start: 2017-04-18 | End: 2017-04-18

## 2017-04-18 RX ORDER — 0.9 % SODIUM CHLORIDE 0.9 %
VIAL (ML) INJECTION
Status: DISCONTINUED
Start: 2017-04-18 | End: 2017-04-18

## 2017-04-18 RX ORDER — QUETIAPINE 25 MG/1
25 TABLET, FILM COATED ORAL NIGHTLY
Qty: 30 TABLET | Refills: 5 | Status: CANCELLED | OUTPATIENT
Start: 2017-04-18

## 2017-04-18 RX ORDER — LORAZEPAM 1 MG/1
TABLET ORAL
Qty: 30 TABLET | Refills: 0 | Status: CANCELLED | OUTPATIENT
Start: 2017-04-18

## 2017-04-18 RX ADMIN — HEPARIN SODIUM (PORCINE) LOCK FLUSH IV SOLN 100 UNIT/ML 500 UNITS: 100 SOLUTION INTRAVENOUS at 08:21:00

## 2017-04-18 NOTE — TELEPHONE ENCOUNTER
Pt called for refills of Lorazepam & Quetiapine Fumarate - is out of medications - uses [facility]

## 2017-04-18 NOTE — PROGRESS NOTES
Patient to center for fast track labs  Patient arrived ambulatory and report feeling well but has concerns about frequent headaches.  Narayanan April will see MD today to discuss her concerns  Port accessed with good blood return and flushed well  Labs drawn, port f

## 2017-04-18 NOTE — TELEPHONE ENCOUNTER
Pt is aware that you are off today and will wait till tomorrow on Lorazepam. Seroquel had refills, pt notified

## 2017-04-19 ENCOUNTER — APPOINTMENT (OUTPATIENT)
Dept: HEMATOLOGY/ONCOLOGY | Facility: HOSPITAL | Age: 47
End: 2017-04-19
Attending: PHYSICIAN ASSISTANT
Payer: COMMERCIAL

## 2017-04-19 NOTE — PROGRESS NOTES
Cancer Center Progress Note    Patient Name: Agnes Johnson   YOB: 1970   Medical Record Number: [id number]   Attending Physician: Jeff Mazariegos M.D. Chief Complaint:  Metastatic adenocarcinoma of the lung, brain metastasis.     History of unspecified    • Migraines    • Hypothyroid    • Osteoarthritis    • Anemia    • Hepatitis C    • Gallbladder disease    • Brain cancer [facility])    • Lung cancer ([facility]    • Pancreatic cancer ([facility] JULY 2015   • Depression    • Personal history of antineoplast Oral Tablet Extended Release 12 hour Abuse-Deterrent, Take 1 tablet (20 mg total) by mouth Q12H., Disp: 60 tablet, Rfl: 0  •  [START ON 4/20/2017] OxyCODONE HCl IR 15 MG Oral Tab, Take 1 tablet (15 mg total) by mouth every 4 (four) hours as needed for Pain 285 04/18/2017   ALKPHO 135* 04/18/2017   AST 11* 04/18/2017   ALT 9* 04/18/2017   ALKPHOS 131* 09/21/2016   BILT 0.1* 04/18/2017   TP 7.3 04/18/2017   ALB 3.0* 04/18/2017   GLOBULIN 4.3* 04/18/2017   AGRATIO 0.9* 09/21/2016    04/18/2017   K 3.5 04/ resources were discussed. Appropriate resources were reviewed and discussed with the pateint and family. I spent 40 minutes face to face with the patient. More than 50% of that time was spent counseling the patient and/or on coordination of care.   [place]

## 2017-04-20 ENCOUNTER — OFFICE VISIT (OUTPATIENT)
Dept: HEMATOLOGY/ONCOLOGY | Facility: HOSPITAL | Age: 47
End: 2017-04-20
Attending: INTERNAL MEDICINE
Payer: COMMERCIAL

## 2017-04-20 ENCOUNTER — TELEPHONE (OUTPATIENT)
Dept: INTERNAL MEDICINE CLINIC | Facility: CLINIC | Age: 47
End: 2017-04-20

## 2017-04-20 VITALS — HEART RATE: 86 BPM | SYSTOLIC BLOOD PRESSURE: 96 MMHG | TEMPERATURE: 99 F | DIASTOLIC BLOOD PRESSURE: 61 MMHG

## 2017-04-20 DIAGNOSIS — Z45.2 ENCOUNTER FOR ADJUSTMENT OR MANAGEMENT OF VASCULAR ACCESS DEVICE: ICD-10-CM

## 2017-04-20 DIAGNOSIS — C34.90 CARCINOMA OF LUNG, UNSPECIFIED LATERALITY (HCC): Primary | ICD-10-CM

## 2017-04-20 DIAGNOSIS — C34.92 CARCINOMA OF LUNG, LEFT (HCC): ICD-10-CM

## 2017-04-20 PROCEDURE — 96413 CHEMO IV INFUSION 1 HR: CPT

## 2017-04-20 RX ORDER — HEPARIN SODIUM (PORCINE) LOCK FLUSH IV SOLN 100 UNIT/ML 100 UNIT/ML
SOLUTION INTRAVENOUS
Status: COMPLETED
Start: 2017-04-20 | End: 2017-04-20

## 2017-04-20 RX ORDER — 0.9 % SODIUM CHLORIDE 0.9 %
VIAL (ML) INJECTION
Status: DISCONTINUED
Start: 2017-04-20 | End: 2017-04-20

## 2017-04-20 RX ORDER — SODIUM CHLORIDE 9 MG/ML
INJECTION, SOLUTION INTRAVENOUS
Status: DISCONTINUED
Start: 2017-04-20 | End: 2017-04-20

## 2017-04-20 RX ORDER — HEPARIN SODIUM (PORCINE) LOCK FLUSH IV SOLN 100 UNIT/ML 100 UNIT/ML
5 SOLUTION INTRAVENOUS AS NEEDED
Status: DISCONTINUED | OUTPATIENT
Start: 2017-04-20 | End: 2017-04-20

## 2017-04-20 RX ADMIN — HEPARIN SODIUM (PORCINE) LOCK FLUSH IV SOLN 100 UNIT/ML 500 UNITS: 100 SOLUTION INTRAVENOUS at 11:05:00

## 2017-04-20 NOTE — PROGRESS NOTES
Pt presents to infusion today for C25D1 Opdivo. Pt appears well and denies any nausea/vomiting/constipation/diarrhea. Pt denies any fevers or flu like symptoms. Pt denies any shortness of breath or difficulty breathing. Pt eating and drinking well.   Pt

## 2017-04-21 NOTE — TELEPHONE ENCOUNTER
Patient asking about lorazepam and seroquel refills. Spoke with pharmacist and patient can  refills.

## 2017-05-03 ENCOUNTER — NURSE NAVIGATOR ENCOUNTER (OUTPATIENT)
Dept: HEMATOLOGY/ONCOLOGY | Facility: HOSPITAL | Age: 47
End: 2017-05-03

## 2017-05-03 ENCOUNTER — NURSE ONLY (OUTPATIENT)
Dept: HEMATOLOGY/ONCOLOGY | Facility: HOSPITAL | Age: 47
End: 2017-05-03
Attending: INTERNAL MEDICINE
Payer: COMMERCIAL

## 2017-05-03 VITALS
DIASTOLIC BLOOD PRESSURE: 67 MMHG | TEMPERATURE: 99 F | WEIGHT: 174 LBS | BODY MASS INDEX: 28.99 KG/M2 | SYSTOLIC BLOOD PRESSURE: 118 MMHG | RESPIRATION RATE: 16 BRPM | HEART RATE: 96 BPM | HEIGHT: 65 IN

## 2017-05-03 DIAGNOSIS — C34.92 CARCINOMA OF LUNG, LEFT (HCC): ICD-10-CM

## 2017-05-03 DIAGNOSIS — D50.9 IRON DEFICIENCY ANEMIA, UNSPECIFIED IRON DEFICIENCY ANEMIA TYPE: ICD-10-CM

## 2017-05-03 DIAGNOSIS — E03.9 HYPOTHYROIDISM, UNSPECIFIED TYPE: ICD-10-CM

## 2017-05-03 DIAGNOSIS — C34.90 CARCINOMA OF LUNG, UNSPECIFIED LATERALITY (HCC): Primary | ICD-10-CM

## 2017-05-03 DIAGNOSIS — Z51.11 CHEMOTHERAPY MANAGEMENT, ENCOUNTER FOR: Primary | ICD-10-CM

## 2017-05-03 DIAGNOSIS — Z45.2 ENCOUNTER FOR ADJUSTMENT OR MANAGEMENT OF VASCULAR ACCESS DEVICE: ICD-10-CM

## 2017-05-03 DIAGNOSIS — C79.31 BRAIN METASTASES (HCC): ICD-10-CM

## 2017-05-03 PROCEDURE — 80053 COMPREHEN METABOLIC PANEL: CPT

## 2017-05-03 PROCEDURE — 82607 VITAMIN B-12: CPT

## 2017-05-03 PROCEDURE — 36591 DRAW BLOOD OFF VENOUS DEVICE: CPT

## 2017-05-03 PROCEDURE — 82746 ASSAY OF FOLIC ACID SERUM: CPT

## 2017-05-03 PROCEDURE — 99212 OFFICE O/P EST SF 10 MIN: CPT | Performed by: INTERNAL MEDICINE

## 2017-05-03 PROCEDURE — 99215 OFFICE O/P EST HI 40 MIN: CPT | Performed by: INTERNAL MEDICINE

## 2017-05-03 PROCEDURE — 85025 COMPLETE CBC W/AUTO DIFF WBC: CPT

## 2017-05-03 PROCEDURE — 84466 ASSAY OF TRANSFERRIN: CPT

## 2017-05-03 PROCEDURE — 83540 ASSAY OF IRON: CPT

## 2017-05-03 RX ORDER — HEPARIN SODIUM (PORCINE) LOCK FLUSH IV SOLN 100 UNIT/ML 100 UNIT/ML
SOLUTION INTRAVENOUS
Status: COMPLETED
Start: 2017-05-03 | End: 2017-05-03

## 2017-05-03 RX ORDER — 0.9 % SODIUM CHLORIDE 0.9 %
VIAL (ML) INJECTION
Status: DISCONTINUED
Start: 2017-05-03 | End: 2017-05-03

## 2017-05-03 RX ORDER — HEPARIN SODIUM (PORCINE) LOCK FLUSH IV SOLN 100 UNIT/ML 100 UNIT/ML
5 SOLUTION INTRAVENOUS ONCE
Status: COMPLETED | OUTPATIENT
Start: 2017-05-03 | End: 2017-05-03

## 2017-05-03 RX ORDER — LORAZEPAM 1 MG/1
TABLET ORAL
Qty: 30 TABLET | Refills: 0 | Status: SHIPPED | OUTPATIENT
Start: 2017-05-03 | End: 2017-05-08

## 2017-05-03 RX ADMIN — HEPARIN SODIUM (PORCINE) LOCK FLUSH IV SOLN 100 UNIT/ML 500 UNITS: 100 SOLUTION INTRAVENOUS at 09:55:00

## 2017-05-03 NOTE — PROGRESS NOTES
Cancer Center Progress Note    Patient Name: Fatoumata Redd   YOB: 1970   Medical Record Number: [id number]   Attending Physician: Nalini Dominguez M.D. Chief Complaint:  Metastatic adenocarcinoma of the lung, brain metastasis.     History of Wears glasses    • High blood pressure    • Pneumonia, organism unspecified    • Migraines    • Hypothyroid    • Osteoarthritis    • Anemia    • Hepatitis C    • Gallbladder disease    • Brain cancer [facility])    • Lung cancer (HCC)    • Pancreatic cancer ([facility]. prescriptions:   •  OxyCODONE HCl ER 20 MG Oral Tablet Extended Release 12 hour Abuse-Deterrent, Take 1 tablet (20 mg total) by mouth Q12H., Disp: 60 tablet, Rfl: 0  •  OxyCODONE HCl IR 15 MG Oral Tab, Take 1 tablet (15 mg total) by mouth every 4 (four) ho 8.4* 05/03/2017   OSMOCALC 290 05/03/2017   ALKPHO 99 05/03/2017   AST 14* 05/03/2017   ALT 9* 05/03/2017   ALKPHOS 131* 09/21/2016   BILT 0.4 05/03/2017   TP 6.6 05/03/2017   ALB 2.6* 05/03/2017   GLOBULIN 4.0* 05/03/2017   AGRATIO 0.9* 09/21/2016   NA 14 nivolumab. She is on thyroid replacement--we will continue to follow her thyroid tests on treatment. –She is following with the pain service with regard to her arthralgias.   –She has anemia on treatment and we will evaluate for nutritional deficiencies a

## 2017-05-03 NOTE — PROGRESS NOTES
Patient to lab for pre chemotherapy labs. Patient reports difficulty walking due to bilateral lower extremity edema.   Patient stated she had talked with Dr. Renita Youngblood and an MRI was ordered but is scheduled for May 10th. 2+ pitting edema noted to bilateral l

## 2017-05-03 NOTE — PROGRESS NOTES
Spoke with Sid Pena today regarding need for MRI of brain and left knee.   Scheduled iSd Pena for MRI of brain Tuesday, 5/9 at 9am and MRI of knee Wednesday, 5/10 at 9am.  Discussed need to give the transportation service that she utilizes 4 business days befor

## 2017-05-04 ENCOUNTER — TELEPHONE (OUTPATIENT)
Dept: HEMATOLOGY/ONCOLOGY | Facility: HOSPITAL | Age: 47
End: 2017-05-04

## 2017-05-04 ENCOUNTER — APPOINTMENT (OUTPATIENT)
Dept: HEMATOLOGY/ONCOLOGY | Facility: HOSPITAL | Age: 47
End: 2017-05-04
Attending: INTERNAL MEDICINE
Payer: COMMERCIAL

## 2017-05-04 NOTE — TELEPHONE ENCOUNTER
I returned Virginia's call. No answer. I left a  msg asking her to please call the office so I may do a telephone assessment.

## 2017-05-04 NOTE — TELEPHONE ENCOUNTER
Toxicities: due for C25 D1 Opdivo today    Nausea/vomiting/abdominal cramping/diarrhea      Fever: Grade 0 (Denies c/s/f. )     Flu Like Symptoms: Grade 1 (Cookie Joya reports having seasonal allergies.  She has a runny nose (clear mucus) & a sore throat x 3 day

## 2017-05-04 NOTE — TELEPHONE ENCOUNTER
Patient complaint of stomach pain feels like she has a virus no diarrhea  has to cancel today's appt.  lucille

## 2017-05-05 ENCOUNTER — TELEPHONE (OUTPATIENT)
Dept: NEUROLOGY | Facility: CLINIC | Age: 47
End: 2017-05-05

## 2017-05-05 ENCOUNTER — OFFICE VISIT (OUTPATIENT)
Dept: HEMATOLOGY/ONCOLOGY | Facility: HOSPITAL | Age: 47
End: 2017-05-05
Attending: INTERNAL MEDICINE
Payer: COMMERCIAL

## 2017-05-05 VITALS
TEMPERATURE: 98 F | HEART RATE: 79 BPM | DIASTOLIC BLOOD PRESSURE: 73 MMHG | RESPIRATION RATE: 16 BRPM | SYSTOLIC BLOOD PRESSURE: 117 MMHG

## 2017-05-05 DIAGNOSIS — C34.92 CARCINOMA OF LUNG, LEFT (HCC): ICD-10-CM

## 2017-05-05 DIAGNOSIS — C34.90 CARCINOMA OF LUNG, UNSPECIFIED LATERALITY (HCC): Primary | ICD-10-CM

## 2017-05-05 DIAGNOSIS — Z45.2 ENCOUNTER FOR ADJUSTMENT OR MANAGEMENT OF VASCULAR ACCESS DEVICE: ICD-10-CM

## 2017-05-05 PROCEDURE — 96413 CHEMO IV INFUSION 1 HR: CPT

## 2017-05-05 RX ORDER — HEPARIN SODIUM (PORCINE) LOCK FLUSH IV SOLN 100 UNIT/ML 100 UNIT/ML
5 SOLUTION INTRAVENOUS ONCE
Status: COMPLETED | OUTPATIENT
Start: 2017-05-05 | End: 2017-05-05

## 2017-05-05 RX ORDER — 0.9 % SODIUM CHLORIDE 0.9 %
VIAL (ML) INJECTION
Status: DISCONTINUED
Start: 2017-05-05 | End: 2017-05-05

## 2017-05-05 RX ORDER — SODIUM CHLORIDE 9 MG/ML
INJECTION, SOLUTION INTRAVENOUS
Status: DISCONTINUED
Start: 2017-05-05 | End: 2017-05-05

## 2017-05-05 RX ORDER — HEPARIN SODIUM (PORCINE) LOCK FLUSH IV SOLN 100 UNIT/ML 100 UNIT/ML
SOLUTION INTRAVENOUS
Status: COMPLETED
Start: 2017-05-05 | End: 2017-05-05

## 2017-05-05 RX ADMIN — HEPARIN SODIUM (PORCINE) LOCK FLUSH IV SOLN 100 UNIT/ML 500 UNITS: 100 SOLUTION INTRAVENOUS at 13:05:00

## 2017-05-08 ENCOUNTER — TELEPHONE (OUTPATIENT)
Dept: INTERNAL MEDICINE CLINIC | Facility: CLINIC | Age: 47
End: 2017-05-08

## 2017-05-08 RX ORDER — LORAZEPAM 0.5 MG/1
0.5 TABLET ORAL EVERY 12 HOURS PRN
Qty: 60 TABLET | Refills: 5 | Status: SHIPPED
Start: 2017-05-08 | End: 2017-11-20 | Stop reason: ALTCHOICE

## 2017-05-08 RX ORDER — LORAZEPAM 1 MG/1
TABLET ORAL
Qty: 30 TABLET | Refills: 0 | Status: CANCELLED | OUTPATIENT
Start: 2017-05-08

## 2017-05-08 NOTE — TELEPHONE ENCOUNTER
5/3 was a historical order. Dr. Wilfred Ludwig checked with pharm and there is not a prescription on file from Dr. Mesha Watts. Dr. Herminia Srinivasan refilled lorazepam and Rx was faxed to pharm.  Pt notified--verbalized understanding

## 2017-05-08 NOTE — TELEPHONE ENCOUNTER
Discussed with pharmacist-no rx was sent by Dr. Moe Job office. Last rx is from me for lorazepam 0.5mg qhsprn #30, 5 refills. I sent in a new rx for lorazepam 0.5mg bidprn #60, 5 refills. Patient was notified by Eugene Pearson RN.

## 2017-05-08 NOTE — TELEPHONE ENCOUNTER
Pt need refill and also like to speak to  regarding Lorazepam. Then she added she is having in brain MRI  tomorrow.

## 2017-05-09 ENCOUNTER — HOSPITAL ENCOUNTER (OUTPATIENT)
Dept: MRI IMAGING | Facility: HOSPITAL | Age: 47
Discharge: HOME | End: 2017-05-09
Attending: INTERNAL MEDICINE
Payer: COMMERCIAL

## 2017-05-09 DIAGNOSIS — C34.90 CARCINOMA OF LUNG, UNSPECIFIED LATERALITY (HCC): ICD-10-CM

## 2017-05-09 DIAGNOSIS — R51.9 HEADACHE, UNSPECIFIED HEADACHE TYPE: ICD-10-CM

## 2017-05-09 DIAGNOSIS — C79.31 BRAIN METASTASES (HCC): ICD-10-CM

## 2017-05-09 PROCEDURE — 82565 ASSAY OF CREATININE: CPT

## 2017-05-09 PROCEDURE — A9575 INJ GADOTERATE MEGLUMI 0.1ML: HCPCS | Performed by: RADIOLOGY

## 2017-05-09 PROCEDURE — 70553 MRI BRAIN STEM W/O & W/DYE: CPT | Performed by: INTERNAL MEDICINE

## 2017-05-12 ENCOUNTER — APPOINTMENT (OUTPATIENT)
Dept: HEMATOLOGY/ONCOLOGY | Facility: HOSPITAL | Age: 47
End: 2017-05-12
Attending: INTERNAL MEDICINE
Payer: COMMERCIAL

## 2017-05-12 ENCOUNTER — TELEPHONE (OUTPATIENT)
Dept: HEMATOLOGY/ONCOLOGY | Facility: HOSPITAL | Age: 47
End: 2017-05-12

## 2017-05-12 NOTE — TELEPHONE ENCOUNTER
Pt called and LM with answering service to cancel iron dextran appt for 5/12.  LM for pt to call back and r/s

## 2017-05-17 ENCOUNTER — APPOINTMENT (OUTPATIENT)
Dept: HEMATOLOGY/ONCOLOGY | Facility: HOSPITAL | Age: 47
End: 2017-05-17
Attending: INTERNAL MEDICINE
Payer: COMMERCIAL

## 2017-05-17 ENCOUNTER — TELEPHONE (OUTPATIENT)
Dept: HEMATOLOGY/ONCOLOGY | Facility: HOSPITAL | Age: 47
End: 2017-05-17

## 2017-05-17 NOTE — TELEPHONE ENCOUNTER
Toxicities: C25 D1 Opdivo on 5/5/2017  Due to see Dr Vanda Daniel & have labs drawn today. Due for C26 tomorrow    Fever/Flu like symptoms/Fatigue    Fever: Grade 2 Nohelia Fair reports she had a fever of 102.8 last night.  She took tylenol and she had drenching night s

## 2017-05-17 NOTE — TELEPHONE ENCOUNTER
I returned Virginia's call. No answer. I left a vm, msg asking her to please call the office so I may do a telephone assessment.

## 2017-05-17 NOTE — TELEPHONE ENCOUNTER
Pt called to cancel labs and MD visit for today. stts she has a fever and sore throat. Made her aware she needs those appts in order to receive tx.

## 2017-05-18 ENCOUNTER — NURSE ONLY (OUTPATIENT)
Dept: HEMATOLOGY/ONCOLOGY | Facility: HOSPITAL | Age: 47
End: 2017-05-18
Attending: INTERNAL MEDICINE
Payer: COMMERCIAL

## 2017-05-18 VITALS
BODY MASS INDEX: 28.49 KG/M2 | TEMPERATURE: 98 F | HEART RATE: 90 BPM | DIASTOLIC BLOOD PRESSURE: 61 MMHG | SYSTOLIC BLOOD PRESSURE: 97 MMHG | RESPIRATION RATE: 18 BRPM | WEIGHT: 171 LBS | HEIGHT: 65 IN

## 2017-05-18 DIAGNOSIS — C34.92 CARCINOMA OF LUNG, LEFT (HCC): ICD-10-CM

## 2017-05-18 DIAGNOSIS — C79.31 BRAIN METASTASES (HCC): ICD-10-CM

## 2017-05-18 DIAGNOSIS — C34.92 CARCINOMA OF LUNG, LEFT (HCC): Primary | ICD-10-CM

## 2017-05-18 DIAGNOSIS — D50.9 IRON DEFICIENCY ANEMIA, UNSPECIFIED IRON DEFICIENCY ANEMIA TYPE: ICD-10-CM

## 2017-05-18 DIAGNOSIS — C78.7 LIVER METASTASIS (HCC): ICD-10-CM

## 2017-05-18 DIAGNOSIS — Z51.11 CHEMOTHERAPY MANAGEMENT, ENCOUNTER FOR: ICD-10-CM

## 2017-05-18 DIAGNOSIS — Z45.2 ENCOUNTER FOR ADJUSTMENT OR MANAGEMENT OF VASCULAR ACCESS DEVICE: ICD-10-CM

## 2017-05-18 DIAGNOSIS — C34.90 CARCINOMA OF LUNG, UNSPECIFIED LATERALITY (HCC): Primary | ICD-10-CM

## 2017-05-18 DIAGNOSIS — E03.9 HYPOTHYROIDISM (ACQUIRED): ICD-10-CM

## 2017-05-18 DIAGNOSIS — L03.116 CELLULITIS OF LEFT FOOT: ICD-10-CM

## 2017-05-18 PROCEDURE — 36591 DRAW BLOOD OFF VENOUS DEVICE: CPT

## 2017-05-18 PROCEDURE — 99212 OFFICE O/P EST SF 10 MIN: CPT | Performed by: INTERNAL MEDICINE

## 2017-05-18 PROCEDURE — 85025 COMPLETE CBC W/AUTO DIFF WBC: CPT

## 2017-05-18 PROCEDURE — 99215 OFFICE O/P EST HI 40 MIN: CPT | Performed by: INTERNAL MEDICINE

## 2017-05-18 PROCEDURE — 80053 COMPREHEN METABOLIC PANEL: CPT

## 2017-05-18 RX ORDER — DOXYCYCLINE HYCLATE 100 MG
100 TABLET ORAL 2 TIMES DAILY
Qty: 14 TABLET | Refills: 0 | Status: SHIPPED | OUTPATIENT
Start: 2017-05-18 | End: 2017-06-08 | Stop reason: ALTCHOICE

## 2017-05-18 RX ORDER — AMOXICILLIN AND CLAVULANATE POTASSIUM 875; 125 MG/1; MG/1
1 TABLET, FILM COATED ORAL 2 TIMES DAILY
Qty: 14 TABLET | Refills: 0 | Status: SHIPPED | OUTPATIENT
Start: 2017-05-18 | End: 2017-06-08 | Stop reason: ALTCHOICE

## 2017-05-18 RX ORDER — 0.9 % SODIUM CHLORIDE 0.9 %
VIAL (ML) INJECTION
Status: DISCONTINUED
Start: 2017-05-18 | End: 2017-05-18

## 2017-05-18 RX ORDER — HEPARIN SODIUM (PORCINE) LOCK FLUSH IV SOLN 100 UNIT/ML 100 UNIT/ML
5 SOLUTION INTRAVENOUS ONCE
Status: COMPLETED | OUTPATIENT
Start: 2017-05-18 | End: 2017-05-18

## 2017-05-18 RX ORDER — HEPARIN SODIUM (PORCINE) LOCK FLUSH IV SOLN 100 UNIT/ML 100 UNIT/ML
SOLUTION INTRAVENOUS
Status: DISCONTINUED
Start: 2017-05-18 | End: 2017-05-18

## 2017-05-18 RX ADMIN — HEPARIN SODIUM (PORCINE) LOCK FLUSH IV SOLN 100 UNIT/ML 500 UNITS: 100 SOLUTION INTRAVENOUS at 14:15:00

## 2017-05-18 NOTE — PROGRESS NOTES
Patient here for fast track followed by MD visit. Right chest port accessed per sterile technique, good blood return noted. Cbc,cmp obtained and sent to lab. Port flushed per protocol and pandey needle removed. Gauze and paper tape to site.   Patient rep

## 2017-05-18 NOTE — PROGRESS NOTES
Cancer Center Progress Note    Patient Name: Sparkle Dodd   YOB: 1970   Medical Record Number: [id number]   Attending Physician: Gerardo Ball M.D. Chief Complaint:  Metastatic adenocarcinoma of the lung, brain metastasis.     History of without complaints.       Performance Status:  ECOG 0  Past Medical History:  Past Medical History   Diagnosis Date   • Wears glasses    • High blood pressure    • Pneumonia, organism unspecified    • Migraines    • Hypothyroid    • Osteoarthritis    • Anem Comment: 2 or 1 cup of coffee daily     Social History Narrative         Current Medications:    Current outpatient prescriptions:   •  Doxycycline Hyclate 100 MG Oral Tab, Take 1 tablet (100 mg total) by mouth 2 (two) times daily. , Disp: 14 tablet, Rfl: 0 Laboratory:  Recent Labs   Lab  05/18/17   1407   WBC  11.3*   HGB  9.2*   PLT  364   NE  8.0*             Lab Results  Component Value Date   * 05/18/2017   BUN 4* 05/18/2017   BUNCREA 5.8* 05/18/2017   CREATSERUM 0.69 05/18/2017   ANIONGAP 8 0 weight dextran. This infusion was canceled by the patient. We will try to set this up again   –Hypokalemia on oral supplementation   –Repeat imaging with chest abdomen pelvis in June 2017  –Hypothyroidism and arthralgias secondary to nivolumab.   She is o

## 2017-05-19 ENCOUNTER — TELEPHONE (OUTPATIENT)
Dept: HEMATOLOGY/ONCOLOGY | Facility: HOSPITAL | Age: 47
End: 2017-05-19

## 2017-05-19 ENCOUNTER — OFFICE VISIT (OUTPATIENT)
Dept: HEMATOLOGY/ONCOLOGY | Facility: HOSPITAL | Age: 47
End: 2017-05-19
Attending: INTERNAL MEDICINE
Payer: COMMERCIAL

## 2017-05-19 ENCOUNTER — OFFICE VISIT (OUTPATIENT)
Dept: HEMATOLOGY/ONCOLOGY | Facility: HOSPITAL | Age: 47
End: 2017-05-19
Attending: PHYSICIAN ASSISTANT
Payer: COMMERCIAL

## 2017-05-19 VITALS
TEMPERATURE: 98 F | RESPIRATION RATE: 16 BRPM | DIASTOLIC BLOOD PRESSURE: 70 MMHG | SYSTOLIC BLOOD PRESSURE: 104 MMHG | HEART RATE: 96 BPM

## 2017-05-19 DIAGNOSIS — L03.116 CELLULITIS OF FOOT, LEFT: ICD-10-CM

## 2017-05-19 DIAGNOSIS — B34.9 VIRAL SYNDROME: ICD-10-CM

## 2017-05-19 DIAGNOSIS — Z45.2 ENCOUNTER FOR ADJUSTMENT OR MANAGEMENT OF VASCULAR ACCESS DEVICE: Primary | ICD-10-CM

## 2017-05-19 DIAGNOSIS — C34.92 CARCINOMA OF LUNG, LEFT (HCC): Primary | ICD-10-CM

## 2017-05-19 DIAGNOSIS — C79.31 BRAIN METASTASES (HCC): ICD-10-CM

## 2017-05-19 DIAGNOSIS — C78.7 LIVER METASTASES (HCC): ICD-10-CM

## 2017-05-19 DIAGNOSIS — C34.92 CARCINOMA OF LUNG, LEFT (HCC): ICD-10-CM

## 2017-05-19 PROCEDURE — 99213 OFFICE O/P EST LOW 20 MIN: CPT | Performed by: PHYSICIAN ASSISTANT

## 2017-05-19 PROCEDURE — 99212 OFFICE O/P EST SF 10 MIN: CPT | Performed by: PHYSICIAN ASSISTANT

## 2017-05-19 PROCEDURE — 96523 IRRIG DRUG DELIVERY DEVICE: CPT

## 2017-05-19 RX ORDER — 0.9 % SODIUM CHLORIDE 0.9 %
VIAL (ML) INJECTION
Status: DISCONTINUED
Start: 2017-05-19 | End: 2017-05-19

## 2017-05-19 RX ORDER — OXYCODONE HCL 20 MG/1
20 TABLET, FILM COATED, EXTENDED RELEASE ORAL EVERY 12 HOURS
Qty: 60 TABLET | Refills: 0 | Status: SHIPPED | OUTPATIENT
Start: 2017-05-19 | End: 2017-06-18

## 2017-05-19 RX ORDER — SODIUM CHLORIDE 9 MG/ML
INJECTION, SOLUTION INTRAVENOUS
Status: DISCONTINUED
Start: 2017-05-19 | End: 2017-05-19

## 2017-05-19 RX ORDER — HEPARIN SODIUM (PORCINE) LOCK FLUSH IV SOLN 100 UNIT/ML 100 UNIT/ML
SOLUTION INTRAVENOUS
Status: DISCONTINUED
Start: 2017-05-19 | End: 2017-05-19

## 2017-05-19 RX ORDER — HEPARIN SODIUM (PORCINE) LOCK FLUSH IV SOLN 100 UNIT/ML 100 UNIT/ML
5 SOLUTION INTRAVENOUS ONCE
Status: DISCONTINUED | OUTPATIENT
Start: 2017-05-19 | End: 2017-05-19

## 2017-05-19 RX ORDER — OXYCODONE HYDROCHLORIDE 15 MG/1
15 TABLET ORAL EVERY 4 HOURS PRN
Qty: 180 TABLET | Refills: 0 | Status: SHIPPED | OUTPATIENT
Start: 2017-05-19 | End: 2017-06-18

## 2017-05-19 NOTE — TELEPHONE ENCOUNTER
Spoke with pt to schedule lab, chemo and iron dextran. She stts she needs atleast 4 days so she can get transportation. She would like towards the end of next week. She is on for labs on Thurs 5/25 and chemo/iron dextran on Fri 5/26.  Pt is aware of the bessie

## 2017-05-19 NOTE — PROGRESS NOTES
Jenniffer Knapp presents today for [address]; feeling chilled, nausea at times. This rn notified Artem SAUNDERS of Jenniffer Knapp 's not feeling well, Artem to assess Jenniffer Knapp in infusion. Opdivio not be ordered pending assessment.     Jenniffer Knapp brought to infusion by this rn via whee

## 2017-05-19 NOTE — PROGRESS NOTES
HPI 51year old female presents today for her 27th cycle of nivolumab. She complains of weakness, nausea and intermittent fever x 2 - 3 days. She reports a ST and Tmax of 102 a few days ago. Last night 101.   She denies emesis, dysuria, abdominal pain, Amoxicillin-Pot Clavulanate 875-125 MG Oral Tab Take 1 tablet by mouth 2 (two) times daily. Disp: 14 tablet Rfl: 0   LORazepam 0.5 MG Oral Tab Take 1 tablet (0.5 mg total) by mouth every 12 (twelve) hours as needed for Anxiety.  Disp: 60 tablet Rfl: 5   L date: 08/05/2015    Smokeless tobacco: Not on file    Comment: Current some day smoker 04/2017    Alcohol Use: Yes    Comment: occassionally.  1-2 glasses wine per month    Drug Use: No    Sexual Activity: Yes    Partners: Male     Other Topics Concern    C cycles given at 240 mg dose.)  Defer nivolumab today due to viral symptoms (reviewed with Dr. Vanda Daniel). Reschedule for early next week. Reviewed CBC & CMP from 5/18/17.   Patient is currently on Augmentin and doxycycline for left cellulitis, which is improvi K/UL   MPV 7.9 7.4-10.3 fL   Neutrophil % 71 %   Lymphocyte % 23 %   Monocyte % 5 %   Eosinophil % 1 %   Basophil % 1 %   Neutrophil Absolute 8.0 (H) 1.8-7.7 K/UL   Lymphocyte Absolute 2.6 1.0-4.0 K/UL   Monocyte Absolute 0.6 0.0-1.0 K/UL   Eosinophil Abso

## 2017-05-23 ENCOUNTER — HOSPITAL ENCOUNTER (OUTPATIENT)
Dept: MRI IMAGING | Facility: HOSPITAL | Age: 47
Discharge: HOME | End: 2017-05-23
Attending: ORTHOPAEDIC SURGERY
Payer: COMMERCIAL

## 2017-05-23 DIAGNOSIS — M23.91 DERANGEMENT OF RIGHT KNEE: ICD-10-CM

## 2017-05-23 DIAGNOSIS — M23.92 DERANGEMENT OF LEFT KNEE: ICD-10-CM

## 2017-05-23 PROCEDURE — 73721 MRI JNT OF LWR EXTRE W/O DYE: CPT | Performed by: ORTHOPAEDIC SURGERY

## 2017-05-25 ENCOUNTER — NURSE ONLY (OUTPATIENT)
Dept: HEMATOLOGY/ONCOLOGY | Facility: HOSPITAL | Age: 47
End: 2017-05-25
Attending: INTERNAL MEDICINE
Payer: COMMERCIAL

## 2017-05-25 DIAGNOSIS — C34.92 CARCINOMA OF LUNG, LEFT (HCC): ICD-10-CM

## 2017-05-25 DIAGNOSIS — Z45.2 ENCOUNTER FOR ADJUSTMENT OR MANAGEMENT OF VASCULAR ACCESS DEVICE: ICD-10-CM

## 2017-05-25 DIAGNOSIS — C34.90 CARCINOMA OF LUNG, UNSPECIFIED LATERALITY (HCC): Primary | ICD-10-CM

## 2017-05-25 PROCEDURE — 80053 COMPREHEN METABOLIC PANEL: CPT

## 2017-05-25 PROCEDURE — 36591 DRAW BLOOD OFF VENOUS DEVICE: CPT

## 2017-05-25 PROCEDURE — 85025 COMPLETE CBC W/AUTO DIFF WBC: CPT

## 2017-05-25 RX ORDER — 0.9 % SODIUM CHLORIDE 0.9 %
VIAL (ML) INJECTION
Status: DISCONTINUED
Start: 2017-05-25 | End: 2017-05-25

## 2017-05-25 RX ORDER — HEPARIN SODIUM (PORCINE) LOCK FLUSH IV SOLN 100 UNIT/ML 100 UNIT/ML
SOLUTION INTRAVENOUS
Status: DISCONTINUED
Start: 2017-05-25 | End: 2017-05-25

## 2017-05-25 RX ORDER — HEPARIN SODIUM (PORCINE) LOCK FLUSH IV SOLN 100 UNIT/ML 100 UNIT/ML
5 SOLUTION INTRAVENOUS ONCE
Status: COMPLETED | OUTPATIENT
Start: 2017-05-25 | End: 2017-05-25

## 2017-05-25 RX ADMIN — HEPARIN SODIUM (PORCINE) LOCK FLUSH IV SOLN 100 UNIT/ML 500 UNITS: 100 SOLUTION INTRAVENOUS at 13:16:00

## 2017-05-25 NOTE — PROGRESS NOTES
Patient here for Lab draw today from [facility]. Edema to Dorsal aspect of left foot resolved, No further erythema noted. Patient denies pain to left foot. Swelling noted both hands, left hand worse then right.   Patient states since being on Opdivo her hands

## 2017-05-26 ENCOUNTER — OFFICE VISIT (OUTPATIENT)
Dept: HEMATOLOGY/ONCOLOGY | Facility: HOSPITAL | Age: 47
End: 2017-05-26
Attending: INTERNAL MEDICINE
Payer: COMMERCIAL

## 2017-05-26 VITALS
DIASTOLIC BLOOD PRESSURE: 69 MMHG | SYSTOLIC BLOOD PRESSURE: 90 MMHG | RESPIRATION RATE: 16 BRPM | TEMPERATURE: 98 F | HEART RATE: 75 BPM

## 2017-05-26 DIAGNOSIS — C34.92 CARCINOMA OF LUNG, LEFT (HCC): ICD-10-CM

## 2017-05-26 DIAGNOSIS — C34.90 CARCINOMA OF LUNG, UNSPECIFIED LATERALITY (HCC): Primary | ICD-10-CM

## 2017-05-26 DIAGNOSIS — Z45.2 ENCOUNTER FOR ADJUSTMENT OR MANAGEMENT OF VASCULAR ACCESS DEVICE: ICD-10-CM

## 2017-05-26 PROCEDURE — 96413 CHEMO IV INFUSION 1 HR: CPT

## 2017-05-26 RX ORDER — HEPARIN SODIUM (PORCINE) LOCK FLUSH IV SOLN 100 UNIT/ML 100 UNIT/ML
SOLUTION INTRAVENOUS
Status: COMPLETED
Start: 2017-05-26 | End: 2017-05-26

## 2017-05-26 RX ORDER — 0.9 % SODIUM CHLORIDE 0.9 %
VIAL (ML) INJECTION
Status: DISCONTINUED
Start: 2017-05-26 | End: 2017-05-26

## 2017-05-26 RX ORDER — SODIUM CHLORIDE 9 MG/ML
INJECTION, SOLUTION INTRAVENOUS
Status: DISCONTINUED
Start: 2017-05-26 | End: 2017-05-26

## 2017-05-26 RX ORDER — HEPARIN SODIUM (PORCINE) LOCK FLUSH IV SOLN 100 UNIT/ML 100 UNIT/ML
5 SOLUTION INTRAVENOUS ONCE
Status: COMPLETED | OUTPATIENT
Start: 2017-05-26 | End: 2017-05-26

## 2017-05-26 RX ADMIN — HEPARIN SODIUM (PORCINE) LOCK FLUSH IV SOLN 100 UNIT/ML 500 UNITS: 100 SOLUTION INTRAVENOUS at 09:35:00

## 2017-05-26 NOTE — PROGRESS NOTES
Pt here for C27 Opdivo. Was supposed to have iron dextran infusion but is refusing d/t issues at home with son. Asked when she would like to reschedule, states she will have to call later et may not do it based on the long appt time.   Informed her that b

## 2017-05-31 ENCOUNTER — APPOINTMENT (OUTPATIENT)
Dept: HEMATOLOGY/ONCOLOGY | Facility: HOSPITAL | Age: 47
End: 2017-05-31
Attending: INTERNAL MEDICINE
Payer: COMMERCIAL

## 2017-06-01 ENCOUNTER — APPOINTMENT (OUTPATIENT)
Dept: HEMATOLOGY/ONCOLOGY | Facility: HOSPITAL | Age: 47
End: 2017-06-01
Attending: INTERNAL MEDICINE
Payer: COMMERCIAL

## 2017-06-08 ENCOUNTER — NURSE ONLY (OUTPATIENT)
Dept: HEMATOLOGY/ONCOLOGY | Facility: HOSPITAL | Age: 47
End: 2017-06-08
Attending: INTERNAL MEDICINE
Payer: COMMERCIAL

## 2017-06-08 VITALS
HEIGHT: 65 IN | TEMPERATURE: 98 F | RESPIRATION RATE: 18 BRPM | HEART RATE: 78 BPM | SYSTOLIC BLOOD PRESSURE: 115 MMHG | DIASTOLIC BLOOD PRESSURE: 70 MMHG | WEIGHT: 175 LBS | BODY MASS INDEX: 29.16 KG/M2

## 2017-06-08 DIAGNOSIS — D50.9 IRON DEFICIENCY ANEMIA, UNSPECIFIED IRON DEFICIENCY ANEMIA TYPE: Primary | ICD-10-CM

## 2017-06-08 DIAGNOSIS — Z51.11 CHEMOTHERAPY MANAGEMENT, ENCOUNTER FOR: ICD-10-CM

## 2017-06-08 DIAGNOSIS — E03.9 HYPOTHYROIDISM, UNSPECIFIED TYPE: ICD-10-CM

## 2017-06-08 DIAGNOSIS — C34.92 CARCINOMA OF LUNG, LEFT (HCC): ICD-10-CM

## 2017-06-08 DIAGNOSIS — C78.7 LIVER METASTASIS (HCC): ICD-10-CM

## 2017-06-08 DIAGNOSIS — E03.9 HYPOTHYROIDISM (ACQUIRED): ICD-10-CM

## 2017-06-08 DIAGNOSIS — Z45.2 ENCOUNTER FOR ADJUSTMENT OR MANAGEMENT OF VASCULAR ACCESS DEVICE: ICD-10-CM

## 2017-06-08 DIAGNOSIS — C34.90 CARCINOMA OF LUNG, UNSPECIFIED LATERALITY (HCC): Primary | ICD-10-CM

## 2017-06-08 DIAGNOSIS — C79.31 BRAIN METASTASES (HCC): ICD-10-CM

## 2017-06-08 PROCEDURE — 36591 DRAW BLOOD OFF VENOUS DEVICE: CPT

## 2017-06-08 PROCEDURE — 80053 COMPREHEN METABOLIC PANEL: CPT

## 2017-06-08 PROCEDURE — 85025 COMPLETE CBC W/AUTO DIFF WBC: CPT

## 2017-06-08 PROCEDURE — 84443 ASSAY THYROID STIM HORMONE: CPT

## 2017-06-08 PROCEDURE — 99215 OFFICE O/P EST HI 40 MIN: CPT | Performed by: INTERNAL MEDICINE

## 2017-06-08 PROCEDURE — 99212 OFFICE O/P EST SF 10 MIN: CPT | Performed by: INTERNAL MEDICINE

## 2017-06-08 PROCEDURE — 84439 ASSAY OF FREE THYROXINE: CPT

## 2017-06-08 RX ORDER — 0.9 % SODIUM CHLORIDE 0.9 %
VIAL (ML) INJECTION
Status: DISCONTINUED
Start: 2017-06-08 | End: 2017-06-08

## 2017-06-08 RX ORDER — HEPARIN SODIUM (PORCINE) LOCK FLUSH IV SOLN 100 UNIT/ML 100 UNIT/ML
5 SOLUTION INTRAVENOUS ONCE
Status: COMPLETED | OUTPATIENT
Start: 2017-06-08 | End: 2017-06-08

## 2017-06-08 RX ORDER — HEPARIN SODIUM (PORCINE) LOCK FLUSH IV SOLN 100 UNIT/ML 100 UNIT/ML
SOLUTION INTRAVENOUS
Status: COMPLETED
Start: 2017-06-08 | End: 2017-06-08

## 2017-06-08 RX ADMIN — HEPARIN SODIUM (PORCINE) LOCK FLUSH IV SOLN 100 UNIT/ML 500 UNITS: 100 SOLUTION INTRAVENOUS at 11:25:00

## 2017-06-08 NOTE — PROGRESS NOTES
Cancer Center Progress Note    Patient Name: Agnes Johnson   YOB: 1970   Medical Record Number: [id number]   Attending Physician: Jeff Mazariegos M.D. Chief Complaint:  Metastatic adenocarcinoma of the lung, brain metastasis.     History of Wears glasses    • High blood pressure    • Pneumonia, organism unspecified    • Migraines    • Hypothyroid    • Osteoarthritis    • Anemia    • Hepatitis C    • Gallbladder disease    • Brain cancer [facility])    • Lung cancer (HCC)    • Pancreatic cancer ([facility] 75. prescriptions:   •  OxyCODONE HCl IR 15 MG Oral Tab, Take 1 tablet (15 mg total) by mouth every 4 (four) hours as needed for Pain., Disp: 180 tablet, Rfl: 0  •  OxyCODONE HCl ER 20 MG Oral Tablet Extended Release 12 hour Abuse-Deterrent, Take 1 tablet (20 06/08/2017   OSMOCALC 287 06/08/2017   ALKPHO 93 06/08/2017   AST 13* 06/08/2017   ALT 7* 06/08/2017   ALKPHOS 131* 09/21/2016   BILT 0.3 06/08/2017   TP 6.9 06/08/2017   ALB 3.1* 06/08/2017   GLOBULIN 3.8* 06/08/2017   AGRATIO 0.9* 09/21/2016    06/ thyroid tests on treatment. –She is following with the pain service with regard to her arthralgias. Return to clinic in 2 weeks      The patient's emotional well being was assessed and resources were discussed.   Appropriate resources were reviewed an

## 2017-06-08 NOTE — PROGRESS NOTES
Pt to lab for pre-chemo labs. Pt offers no new complaints today. Port accessed and flushed with good blood return. CBC, CMP, TSH drawn from port and sent to lab. Pt tolerated lab draw well. Port flushed with NS and Heparin and deaccessed per protocol.

## 2017-06-09 ENCOUNTER — TELEPHONE (OUTPATIENT)
Dept: ENDOCRINOLOGY CLINIC | Facility: CLINIC | Age: 47
End: 2017-06-09

## 2017-06-09 ENCOUNTER — OFFICE VISIT (OUTPATIENT)
Dept: HEMATOLOGY/ONCOLOGY | Facility: HOSPITAL | Age: 47
End: 2017-06-09
Attending: INTERNAL MEDICINE
Payer: COMMERCIAL

## 2017-06-09 ENCOUNTER — TELEPHONE (OUTPATIENT)
Dept: HEMATOLOGY/ONCOLOGY | Facility: HOSPITAL | Age: 47
End: 2017-06-09

## 2017-06-09 VITALS
RESPIRATION RATE: 16 BRPM | HEART RATE: 85 BPM | DIASTOLIC BLOOD PRESSURE: 71 MMHG | SYSTOLIC BLOOD PRESSURE: 125 MMHG | TEMPERATURE: 99 F

## 2017-06-09 DIAGNOSIS — Z45.2 ENCOUNTER FOR ADJUSTMENT OR MANAGEMENT OF VASCULAR ACCESS DEVICE: ICD-10-CM

## 2017-06-09 DIAGNOSIS — E03.9 HYPOTHYROIDISM, UNSPECIFIED TYPE: Primary | ICD-10-CM

## 2017-06-09 DIAGNOSIS — C34.92 CARCINOMA OF LUNG, LEFT (HCC): ICD-10-CM

## 2017-06-09 DIAGNOSIS — C34.90 CARCINOMA OF LUNG, UNSPECIFIED LATERALITY (HCC): Primary | ICD-10-CM

## 2017-06-09 PROCEDURE — 96413 CHEMO IV INFUSION 1 HR: CPT

## 2017-06-09 RX ORDER — HEPARIN SODIUM (PORCINE) LOCK FLUSH IV SOLN 100 UNIT/ML 100 UNIT/ML
5 SOLUTION INTRAVENOUS ONCE
Status: COMPLETED | OUTPATIENT
Start: 2017-06-09 | End: 2017-06-09

## 2017-06-09 RX ORDER — LEVOTHYROXINE SODIUM 175 UG/1
175 TABLET ORAL
Qty: 7 TABLET | Refills: 0 | Status: SHIPPED | OUTPATIENT
Start: 2017-06-09 | End: 2017-06-12

## 2017-06-09 RX ORDER — 0.9 % SODIUM CHLORIDE 0.9 %
VIAL (ML) INJECTION
Status: DISCONTINUED
Start: 2017-06-09 | End: 2017-06-09

## 2017-06-09 RX ORDER — SODIUM CHLORIDE 9 MG/ML
INJECTION, SOLUTION INTRAVENOUS
Status: DISCONTINUED
Start: 2017-06-09 | End: 2017-06-09

## 2017-06-09 RX ORDER — HEPARIN SODIUM (PORCINE) LOCK FLUSH IV SOLN 100 UNIT/ML 100 UNIT/ML
SOLUTION INTRAVENOUS
Status: COMPLETED
Start: 2017-06-09 | End: 2017-06-09

## 2017-06-09 RX ADMIN — HEPARIN SODIUM (PORCINE) LOCK FLUSH IV SOLN 100 UNIT/ML 500 UNITS: 100 SOLUTION INTRAVENOUS at 12:20:00

## 2017-06-09 NOTE — TELEPHONE ENCOUNTER
I added FT4. Please check with the lab if it has gone through. Please have the patient come in for an apt of possible. Can overbook for any time next week.

## 2017-06-09 NOTE — TELEPHONE ENCOUNTER
Virginia's TSH is very high and FT4 is undetectable. I suspect she is forgetting to take her LT4. I will be seeing her next week.   In the past she has not been very receptive to us talking to her , but I feel that she is forgetting to take her medic

## 2017-06-09 NOTE — TELEPHONE ENCOUNTER
Spoke with Aylin Luciano RN. She is calling to stated that TSH as of 6/8/17 is increased to 158. 31. TSH was drawn for standard chemo drug monitoring. No other thyroid labs were drawn.

## 2017-06-09 NOTE — TELEPHONE ENCOUNTER
Called lab. FT4 will be added onto bloodwork. Called patient. Informed her of Dr. Ralph Asher message below. Pt agreed to come in this coming Monday at 1pm. Emailed Alex Campbell to double book patient. Will await appt to be made.

## 2017-06-09 NOTE — PROGRESS NOTES
Pt presents to infusion today for C28D1 Opdivo.  Pt appears well and denies any nausea/vomiting/constipation/diarrhea.  Pt denies any fevers or flu like symptoms.  Pt denies any shortness of breath or difficulty breathing.  Pt eating and drinking well.  Pt

## 2017-06-12 ENCOUNTER — TELEPHONE (OUTPATIENT)
Dept: ENDOCRINOLOGY CLINIC | Facility: CLINIC | Age: 47
End: 2017-06-12

## 2017-06-12 ENCOUNTER — OFFICE VISIT (OUTPATIENT)
Dept: ENDOCRINOLOGY CLINIC | Facility: CLINIC | Age: 47
End: 2017-06-12

## 2017-06-12 VITALS
SYSTOLIC BLOOD PRESSURE: 120 MMHG | TEMPERATURE: 98 F | HEART RATE: 77 BPM | BODY MASS INDEX: 29 KG/M2 | WEIGHT: 174 LBS | DIASTOLIC BLOOD PRESSURE: 81 MMHG

## 2017-06-12 DIAGNOSIS — E03.9 HYPOTHYROIDISM, UNSPECIFIED TYPE: Primary | ICD-10-CM

## 2017-06-12 PROCEDURE — 99213 OFFICE O/P EST LOW 20 MIN: CPT | Performed by: INTERNAL MEDICINE

## 2017-06-12 PROCEDURE — 99212 OFFICE O/P EST SF 10 MIN: CPT | Performed by: INTERNAL MEDICINE

## 2017-06-12 RX ORDER — LEVOTHYROXINE SODIUM 175 UG/1
175 TABLET ORAL DAILY
Qty: 90 TABLET | Refills: 0 | Status: SHIPPED | OUTPATIENT
Start: 2017-06-12 | End: 2017-06-12

## 2017-06-12 RX ORDER — LEVOTHYROXINE SODIUM 175 UG/1
175 TABLET ORAL DAILY
Qty: 90 TABLET | Refills: 0 | Status: SHIPPED | OUTPATIENT
Start: 2017-06-12 | End: 2017-09-28

## 2017-06-12 NOTE — TELEPHONE ENCOUNTER
Will address during apt. However, patient has provided permission on EMR to talk to her . Also, I spoke with Dr. Marina Peace and she was of the opinion that we should involve him since patient's labs are dangerous for her health.    [employer].

## 2017-06-12 NOTE — PROGRESS NOTES
Return Office Visit     CHIEF COMPLAINT:    Hypothyroidism       HISTORY OF PRESENT ILLNESS:  Sparkle Dodd is a 51year old female who presents for follow up for for hypothyroidism.     She has Stage IV NSCLCA (poorly diff adeno) and is on chemotherapy with hours as needed for Anxiety. Disp: 60 tablet Rfl: 5   QUEtiapine Fumarate 25 MG Oral Tab Take 1 tablet (25 mg total) by mouth nightly. Disp: 30 tablet Rfl: 5   pregabalin 75 MG Oral Cap Take 75 mg by mouth 3 (three) times daily.  Disp:  Rfl:          ALLERG and S2  ABDOMEN:  normal bowel sounds, soft, non-distended, non-tender  SKIN:  no bruising or bleeding, no rashes and no lesions  EXTREMITIES: normal pulses, no edema      DATA:      Ref.  Range 6/8/2017 11:18   T4,Free (Direct) Latest Ref Range: 0.58-1.64

## 2017-06-12 NOTE — TELEPHONE ENCOUNTER
Spoke with Sergio Rudd. Informed him of Dr. Elina Benton message below.  He is at work now but he will leave work to come with Kassi Blue to her appt today at 1pm.

## 2017-06-12 NOTE — TELEPHONE ENCOUNTER
I have spoken to her  before about this, you should feel free to talk to him as well. Since the second surgery, she has been more forgetful.

## 2017-06-12 NOTE — TELEPHONE ENCOUNTER
Spoke with Bryce Malloy. She is very upset that her  was called and asked to come to her appt with her.  She states that she is a 35year old woman and does not appreciate our office calling her  without consulting her first. She states it makes her

## 2017-06-12 NOTE — TELEPHONE ENCOUNTER
Chaka Quinetro, let's call her  and let him know that I feel that she is forgetting to take her medications. I spoke with Dr. Hortensia Echeverria who mentioned that she has been more forgetful lately, may be due to the treatment for her cancer.   Will he be able to superv

## 2017-06-20 RX ORDER — OXYCODONE HCL 20 MG/1
20 TABLET, FILM COATED, EXTENDED RELEASE ORAL EVERY 12 HOURS
Qty: 60 TABLET | Refills: 0 | Status: SHIPPED | OUTPATIENT
Start: 2017-06-20 | End: 2017-07-20

## 2017-06-20 RX ORDER — OXYCODONE HYDROCHLORIDE 15 MG/1
15 TABLET ORAL EVERY 4 HOURS PRN
Qty: 180 TABLET | Refills: 0 | Status: SHIPPED | OUTPATIENT
Start: 2017-06-20 | End: 2017-07-20

## 2017-06-21 RX ORDER — LORAZEPAM 1 MG/1
TABLET ORAL
Qty: 60 TABLET | Refills: 0 | OUTPATIENT
Start: 2017-06-21

## 2017-06-22 ENCOUNTER — TELEPHONE (OUTPATIENT)
Dept: HEMATOLOGY/ONCOLOGY | Facility: HOSPITAL | Age: 47
End: 2017-06-22

## 2017-06-22 ENCOUNTER — NURSE ONLY (OUTPATIENT)
Dept: HEMATOLOGY/ONCOLOGY | Facility: HOSPITAL | Age: 47
End: 2017-06-22
Attending: INTERNAL MEDICINE
Payer: COMMERCIAL

## 2017-06-22 VITALS
WEIGHT: 170 LBS | DIASTOLIC BLOOD PRESSURE: 65 MMHG | HEART RATE: 89 BPM | RESPIRATION RATE: 16 BRPM | TEMPERATURE: 99 F | SYSTOLIC BLOOD PRESSURE: 100 MMHG | HEIGHT: 65 IN | BODY MASS INDEX: 28.32 KG/M2

## 2017-06-22 DIAGNOSIS — Z51.11 CHEMOTHERAPY MANAGEMENT, ENCOUNTER FOR: Primary | ICD-10-CM

## 2017-06-22 DIAGNOSIS — C34.92 CARCINOMA OF LUNG, LEFT (HCC): ICD-10-CM

## 2017-06-22 DIAGNOSIS — Z45.2 ENCOUNTER FOR ADJUSTMENT OR MANAGEMENT OF VASCULAR ACCESS DEVICE: ICD-10-CM

## 2017-06-22 DIAGNOSIS — C34.90 CARCINOMA OF LUNG, UNSPECIFIED LATERALITY (HCC): Primary | ICD-10-CM

## 2017-06-22 DIAGNOSIS — D50.9 IRON DEFICIENCY ANEMIA, UNSPECIFIED IRON DEFICIENCY ANEMIA TYPE: ICD-10-CM

## 2017-06-22 DIAGNOSIS — C78.7 LIVER METASTASIS (HCC): ICD-10-CM

## 2017-06-22 DIAGNOSIS — E03.9 HYPOTHYROIDISM (ACQUIRED): ICD-10-CM

## 2017-06-22 DIAGNOSIS — C79.31 BRAIN METASTASES (HCC): ICD-10-CM

## 2017-06-22 PROCEDURE — 99212 OFFICE O/P EST SF 10 MIN: CPT | Performed by: INTERNAL MEDICINE

## 2017-06-22 PROCEDURE — 36591 DRAW BLOOD OFF VENOUS DEVICE: CPT

## 2017-06-22 PROCEDURE — 80053 COMPREHEN METABOLIC PANEL: CPT

## 2017-06-22 PROCEDURE — 85025 COMPLETE CBC W/AUTO DIFF WBC: CPT

## 2017-06-22 PROCEDURE — 99215 OFFICE O/P EST HI 40 MIN: CPT | Performed by: INTERNAL MEDICINE

## 2017-06-22 RX ORDER — 0.9 % SODIUM CHLORIDE 0.9 %
VIAL (ML) INJECTION
Status: DISCONTINUED
Start: 2017-06-22 | End: 2017-06-22

## 2017-06-22 RX ORDER — HEPARIN SODIUM (PORCINE) LOCK FLUSH IV SOLN 100 UNIT/ML 100 UNIT/ML
5 SOLUTION INTRAVENOUS ONCE
Status: COMPLETED | OUTPATIENT
Start: 2017-06-22 | End: 2017-06-22

## 2017-06-22 RX ORDER — HEPARIN SODIUM (PORCINE) LOCK FLUSH IV SOLN 100 UNIT/ML 100 UNIT/ML
SOLUTION INTRAVENOUS
Status: COMPLETED
Start: 2017-06-22 | End: 2017-06-22

## 2017-06-22 RX ADMIN — HEPARIN SODIUM (PORCINE) LOCK FLUSH IV SOLN 100 UNIT/ML 500 UNITS: 100 SOLUTION INTRAVENOUS at 13:45:00

## 2017-06-22 NOTE — PROGRESS NOTES
Patient here for lab draw and MD visit. Escorted from lobby with steady gait. Port accessed, good blood return obtained, labs drawn and sent. Port flushed per protocol, de-accessed and site covered with 2X2 and gauze.   Patient escorted back to waiting a
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
## 2017-06-22 NOTE — PROGRESS NOTES
Cancer Center Progress Note    Patient Name: Melinda Malhotra   YOB: 1970   Medical Record Number: [id number]   Attending Physician: Charline Vincent M.D. Chief Complaint:  Metastatic adenocarcinoma of the lung, brain metastasis.     History of blood pressure    • Pneumonia, organism unspecified    • Migraines    • Hypothyroid    • Osteoarthritis    • Anemia    • Hepatitis C    • Gallbladder disease    • Brain cancer [facility])    • Lung cancer ([facility]    • Pancreatic cancer ([facility] JULY 2015   • Reddyi OxyCODONE HCl ER 20 MG Oral Tablet Extended Release 12 hour Abuse-Deterrent, Take 1 tablet (20 mg total) by mouth Q12H., Disp: 60 tablet, Rfl: 0  •  OxyCODONE HCl IR 15 MG Oral Tab, Take 1 tablet (15 mg total) by mouth every 4 (four) hours as needed for Pa 06/22/2017   ALT 8* 06/22/2017   ALKPHOS 131* 09/21/2016   BILT 0.5 06/22/2017   TP 7.8 06/22/2017   ALB 3.7 06/22/2017   GLOBULIN 4.1* 06/22/2017   AGRATIO 0.9* 09/21/2016    06/22/2017   K 3.8 06/22/2017    06/22/2017   CO2 27 06/22/2017 Endocrinology is managing this  Elvin Aw is following with the pain service with regard to her arthralgias. Return to clinic in 2 weeks      The patient's emotional well being was assessed and resources were discussed.   Appropriate resources were reviewed

## 2017-06-23 ENCOUNTER — OFFICE VISIT (OUTPATIENT)
Dept: HEMATOLOGY/ONCOLOGY | Facility: HOSPITAL | Age: 47
End: 2017-06-23
Attending: INTERNAL MEDICINE
Payer: COMMERCIAL

## 2017-06-23 VITALS
SYSTOLIC BLOOD PRESSURE: 103 MMHG | RESPIRATION RATE: 16 BRPM | DIASTOLIC BLOOD PRESSURE: 63 MMHG | HEART RATE: 93 BPM | TEMPERATURE: 100 F

## 2017-06-23 DIAGNOSIS — Z45.2 ENCOUNTER FOR ADJUSTMENT OR MANAGEMENT OF VASCULAR ACCESS DEVICE: ICD-10-CM

## 2017-06-23 DIAGNOSIS — C34.92 CARCINOMA OF LUNG, LEFT (HCC): ICD-10-CM

## 2017-06-23 DIAGNOSIS — C34.90 CARCINOMA OF LUNG, UNSPECIFIED LATERALITY (HCC): Primary | ICD-10-CM

## 2017-06-23 PROCEDURE — 96413 CHEMO IV INFUSION 1 HR: CPT

## 2017-06-23 RX ORDER — SODIUM CHLORIDE 9 MG/ML
INJECTION, SOLUTION INTRAVENOUS
Status: DISCONTINUED
Start: 2017-06-23 | End: 2017-06-23

## 2017-06-23 RX ORDER — 0.9 % SODIUM CHLORIDE 0.9 %
VIAL (ML) INJECTION
Status: DISCONTINUED
Start: 2017-06-23 | End: 2017-06-23

## 2017-06-23 RX ORDER — HEPARIN SODIUM (PORCINE) LOCK FLUSH IV SOLN 100 UNIT/ML 100 UNIT/ML
5 SOLUTION INTRAVENOUS ONCE
Status: COMPLETED | OUTPATIENT
Start: 2017-06-23 | End: 2017-06-23

## 2017-06-23 RX ORDER — HEPARIN SODIUM (PORCINE) LOCK FLUSH IV SOLN 100 UNIT/ML 100 UNIT/ML
SOLUTION INTRAVENOUS
Status: DISCONTINUED
Start: 2017-06-23 | End: 2017-06-23

## 2017-06-23 RX ADMIN — HEPARIN SODIUM (PORCINE) LOCK FLUSH IV SOLN 100 UNIT/ML 500 UNITS: 100 SOLUTION INTRAVENOUS at 12:15:00

## 2017-06-23 NOTE — PROGRESS NOTES
Pt here for C29 Opdivo. She is in good spirits. Denies pain, n/v, diarrh/constip, or PN. She did not arrive for her 0930 appt; I phoned her at 10am to check on her.  She called back at 1010 to say she'd been sleeping and would be on her way for treatmen

## 2017-07-06 ENCOUNTER — TELEPHONE (OUTPATIENT)
Dept: HEMATOLOGY/ONCOLOGY | Facility: HOSPITAL | Age: 47
End: 2017-07-06

## 2017-07-06 NOTE — TELEPHONE ENCOUNTER
Jose Cardenas is calling because she cant make her appointments tomorrow. She has labs, MD and Chemo. She would like to reschedule to Wednesday or Thursday next week. She is still requesting the appointment to all be on the same day.  Is this okay, please advise

## 2017-07-07 ENCOUNTER — OFFICE VISIT (OUTPATIENT)
Dept: HEMATOLOGY/ONCOLOGY | Facility: HOSPITAL | Age: 47
End: 2017-07-07
Attending: INTERNAL MEDICINE
Payer: COMMERCIAL

## 2017-07-07 VITALS
TEMPERATURE: 99 F | DIASTOLIC BLOOD PRESSURE: 74 MMHG | HEART RATE: 85 BPM | RESPIRATION RATE: 16 BRPM | SYSTOLIC BLOOD PRESSURE: 116 MMHG

## 2017-07-07 VITALS
WEIGHT: 167 LBS | DIASTOLIC BLOOD PRESSURE: 74 MMHG | RESPIRATION RATE: 16 BRPM | HEART RATE: 85 BPM | BODY MASS INDEX: 27.82 KG/M2 | HEIGHT: 65 IN | TEMPERATURE: 99 F | SYSTOLIC BLOOD PRESSURE: 116 MMHG

## 2017-07-07 DIAGNOSIS — C34.90 CARCINOMA OF LUNG, UNSPECIFIED LATERALITY (HCC): Primary | ICD-10-CM

## 2017-07-07 DIAGNOSIS — D50.9 IRON DEFICIENCY ANEMIA, UNSPECIFIED IRON DEFICIENCY ANEMIA TYPE: ICD-10-CM

## 2017-07-07 DIAGNOSIS — Z45.2 ENCOUNTER FOR ADJUSTMENT OR MANAGEMENT OF VASCULAR ACCESS DEVICE: ICD-10-CM

## 2017-07-07 DIAGNOSIS — C34.92 CARCINOMA OF LUNG, LEFT (HCC): ICD-10-CM

## 2017-07-07 DIAGNOSIS — Z51.11 CHEMOTHERAPY MANAGEMENT, ENCOUNTER FOR: ICD-10-CM

## 2017-07-07 DIAGNOSIS — C79.31 BRAIN METASTASES (HCC): ICD-10-CM

## 2017-07-07 DIAGNOSIS — E03.9 HYPOTHYROIDISM (ACQUIRED): ICD-10-CM

## 2017-07-07 LAB
ALBUMIN SERPL BCP-MCNC: 3.3 G/DL (ref 3.5–4.8)
ALBUMIN/GLOB SERPL: 0.8 {RATIO} (ref 1–2)
ALP SERPL-CCNC: 117 U/L (ref 32–100)
ALT SERPL-CCNC: 21 U/L (ref 14–54)
ANION GAP SERPL CALC-SCNC: 7 MMOL/L (ref 0–18)
AST SERPL-CCNC: 16 U/L (ref 15–41)
BASOPHILS # BLD: 0.1 K/UL (ref 0–0.2)
BASOPHILS NFR BLD: 1 %
BILIRUB SERPL-MCNC: 0.4 MG/DL (ref 0.3–1.2)
BUN SERPL-MCNC: 7 MG/DL (ref 8–20)
BUN/CREAT SERPL: 6.7 (ref 10–20)
CALCIUM SERPL-MCNC: 8.6 MG/DL (ref 8.5–10.5)
CHLORIDE SERPL-SCNC: 105 MMOL/L (ref 95–110)
CO2 SERPL-SCNC: 25 MMOL/L (ref 22–32)
CREAT SERPL-MCNC: 1.04 MG/DL (ref 0.5–1.5)
EOSINOPHIL # BLD: 0.1 K/UL (ref 0–0.7)
EOSINOPHIL NFR BLD: 1 %
ERYTHROCYTE [DISTWIDTH] IN BLOOD BY AUTOMATED COUNT: 20.3 % (ref 11–15)
GLOBULIN PLAS-MCNC: 4.1 G/DL (ref 2.5–3.7)
GLUCOSE SERPL-MCNC: 101 MG/DL (ref 70–99)
HCT VFR BLD AUTO: 27.3 % (ref 35–48)
HGB BLD-MCNC: 9 G/DL (ref 12–16)
LYMPHOCYTES # BLD: 3.7 K/UL (ref 1–4)
LYMPHOCYTES NFR BLD: 32 %
MCH RBC QN AUTO: 27.8 PG (ref 27–32)
MCHC RBC AUTO-ENTMCNC: 32.9 G/DL (ref 32–37)
MCV RBC AUTO: 84.3 FL (ref 80–100)
MONOCYTES # BLD: 0.8 K/UL (ref 0–1)
MONOCYTES NFR BLD: 7 %
NEUTROPHILS # BLD AUTO: 6.8 K/UL (ref 1.8–7.7)
NEUTROPHILS NFR BLD: 59 %
OSMOLALITY UR CALC.SUM OF ELEC: 282 MOSM/KG (ref 275–295)
PLATELET # BLD AUTO: 462 K/UL (ref 140–400)
PMV BLD AUTO: 7 FL (ref 7.4–10.3)
POTASSIUM SERPL-SCNC: 3.3 MMOL/L (ref 3.3–5.1)
PROT SERPL-MCNC: 7.4 G/DL (ref 5.9–8.4)
RBC # BLD AUTO: 3.24 M/UL (ref 3.7–5.4)
SODIUM SERPL-SCNC: 137 MMOL/L (ref 136–144)
WBC # BLD AUTO: 11.4 K/UL (ref 4–11)

## 2017-07-07 PROCEDURE — 99212 OFFICE O/P EST SF 10 MIN: CPT | Performed by: INTERNAL MEDICINE

## 2017-07-07 PROCEDURE — 96413 CHEMO IV INFUSION 1 HR: CPT

## 2017-07-07 PROCEDURE — 99215 OFFICE O/P EST HI 40 MIN: CPT | Performed by: INTERNAL MEDICINE

## 2017-07-07 PROCEDURE — 85025 COMPLETE CBC W/AUTO DIFF WBC: CPT

## 2017-07-07 PROCEDURE — 80053 COMPREHEN METABOLIC PANEL: CPT

## 2017-07-07 RX ORDER — MEPERIDINE HYDROCHLORIDE 25 MG/ML
INJECTION INTRAMUSCULAR; INTRAVENOUS; SUBCUTANEOUS AS NEEDED
Status: CANCELLED | OUTPATIENT
Start: 2017-07-07

## 2017-07-07 RX ORDER — SODIUM CHLORIDE 9 MG/ML
INJECTION, SOLUTION INTRAVENOUS
Status: DISCONTINUED
Start: 2017-07-07 | End: 2017-07-07

## 2017-07-07 RX ORDER — ACETAMINOPHEN 325 MG/1
TABLET ORAL EVERY 6 HOURS PRN
Status: CANCELLED | OUTPATIENT
Start: 2017-07-07

## 2017-07-07 RX ORDER — 0.9 % SODIUM CHLORIDE 0.9 %
VIAL (ML) INJECTION
Status: DISCONTINUED
Start: 2017-07-07 | End: 2017-07-07

## 2017-07-07 RX ORDER — HEPARIN SODIUM (PORCINE) LOCK FLUSH IV SOLN 100 UNIT/ML 100 UNIT/ML
5 SOLUTION INTRAVENOUS ONCE
Status: CANCELLED | OUTPATIENT
Start: 2017-07-07

## 2017-07-07 RX ORDER — HEPARIN SODIUM (PORCINE) LOCK FLUSH IV SOLN 100 UNIT/ML 100 UNIT/ML
5 SOLUTION INTRAVENOUS ONCE
Status: COMPLETED | OUTPATIENT
Start: 2017-07-07 | End: 2017-07-07

## 2017-07-07 RX ORDER — ACETAMINOPHEN 325 MG/1
650 TABLET ORAL ONCE
Status: CANCELLED | OUTPATIENT
Start: 2017-07-07

## 2017-07-07 RX ORDER — DIPHENHYDRAMINE HYDROCHLORIDE 50 MG/ML
INJECTION INTRAMUSCULAR; INTRAVENOUS EVERY 4 HOURS PRN
Status: CANCELLED | OUTPATIENT
Start: 2017-07-07

## 2017-07-07 RX ORDER — RANITIDINE 25 MG/ML
50 INJECTION, SOLUTION INTRAMUSCULAR; INTRAVENOUS AS NEEDED
Status: CANCELLED | OUTPATIENT
Start: 2017-07-07

## 2017-07-07 RX ORDER — DIPHENHYDRAMINE HCL 25 MG
25 CAPSULE ORAL ONCE
Status: CANCELLED | OUTPATIENT
Start: 2017-07-07

## 2017-07-07 RX ORDER — ALBUTEROL SULFATE 90 UG/1
2 AEROSOL, METERED RESPIRATORY (INHALATION) AS NEEDED
Status: CANCELLED | OUTPATIENT
Start: 2017-07-07

## 2017-07-07 RX ORDER — HEPARIN SODIUM (PORCINE) LOCK FLUSH IV SOLN 100 UNIT/ML 100 UNIT/ML
SOLUTION INTRAVENOUS
Status: COMPLETED
Start: 2017-07-07 | End: 2017-07-07

## 2017-07-07 RX ORDER — 0.9 % SODIUM CHLORIDE 0.9 %
10 VIAL (ML) INJECTION ONCE
Status: CANCELLED | OUTPATIENT
Start: 2017-07-07

## 2017-07-07 RX ORDER — DIPHENHYDRAMINE HYDROCHLORIDE 50 MG/ML
25 INJECTION INTRAMUSCULAR; INTRAVENOUS ONCE
Status: CANCELLED | OUTPATIENT
Start: 2017-07-07

## 2017-07-07 RX ADMIN — HEPARIN SODIUM (PORCINE) LOCK FLUSH IV SOLN 100 UNIT/ML 500 UNITS: 100 SOLUTION INTRAVENOUS at 11:50:00

## 2017-07-07 NOTE — PROGRESS NOTES
Patient arrives for labs prior to MD and possible chemotherapy. Patient reports she is feeling well. Port accessed using sterile technique, positive blood return noted. Labs collected. Port flushed with saline and remained accessed for possible treatment.

## 2017-07-07 NOTE — PROGRESS NOTES
Pt presents to infusion today for C30D1 Opdivo.  Pt appears well and denies any nausea/vomiting/constipation/diarrhea.  Pt denies any fevers or flu like symptoms.  Pt denies any shortness of breath or difficulty breathing.  Pt eating and drinking well.  Pt

## 2017-07-13 ENCOUNTER — TELEPHONE (OUTPATIENT)
Dept: HEMATOLOGY/ONCOLOGY | Facility: HOSPITAL | Age: 47
End: 2017-07-13

## 2017-07-13 NOTE — TELEPHONE ENCOUNTER
Pt called and stated that she will be leaving for [place] on 7/20 and will be back on 7/23. She is requesting to change her lab,md and chemo appt to 7/17 or 7/18.  Made her aware that I will have to notify MD and see if he's ok with her getting tx early or

## 2017-07-20 ENCOUNTER — APPOINTMENT (OUTPATIENT)
Dept: HEMATOLOGY/ONCOLOGY | Facility: HOSPITAL | Age: 47
End: 2017-07-20
Attending: INTERNAL MEDICINE
Payer: COMMERCIAL

## 2017-07-20 RX ORDER — OXYCODONE HYDROCHLORIDE 15 MG/1
15 TABLET ORAL EVERY 4 HOURS PRN
Qty: 180 TABLET | Refills: 0 | Status: SHIPPED | OUTPATIENT
Start: 2017-07-20 | End: 2017-08-21

## 2017-07-20 RX ORDER — OXYCODONE HCL 20 MG/1
20 TABLET, FILM COATED, EXTENDED RELEASE ORAL EVERY 12 HOURS
Qty: 60 TABLET | Refills: 0 | Status: SHIPPED | OUTPATIENT
Start: 2017-07-20 | End: 2017-08-19

## 2017-07-21 ENCOUNTER — APPOINTMENT (OUTPATIENT)
Dept: HEMATOLOGY/ONCOLOGY | Facility: HOSPITAL | Age: 47
End: 2017-07-21
Attending: INTERNAL MEDICINE
Payer: COMMERCIAL

## 2017-07-24 ENCOUNTER — APPOINTMENT (OUTPATIENT)
Dept: CT IMAGING | Facility: HOSPITAL | Age: 47
End: 2017-07-24
Attending: EMERGENCY MEDICINE
Payer: COMMERCIAL

## 2017-07-24 ENCOUNTER — TELEPHONE (OUTPATIENT)
Dept: HEMATOLOGY/ONCOLOGY | Facility: HOSPITAL | Age: 47
End: 2017-07-24

## 2017-07-24 ENCOUNTER — APPOINTMENT (OUTPATIENT)
Dept: GENERAL RADIOLOGY | Facility: HOSPITAL | Age: 47
End: 2017-07-24
Attending: EMERGENCY MEDICINE
Payer: COMMERCIAL

## 2017-07-24 ENCOUNTER — HOSPITAL ENCOUNTER (EMERGENCY)
Facility: HOSPITAL | Age: 47
Discharge: HOME | End: 2017-07-24
Attending: EMERGENCY MEDICINE
Payer: COMMERCIAL

## 2017-07-24 VITALS
SYSTOLIC BLOOD PRESSURE: 122 MMHG | HEIGHT: 65 IN | BODY MASS INDEX: 29.66 KG/M2 | DIASTOLIC BLOOD PRESSURE: 87 MMHG | TEMPERATURE: 98 F | WEIGHT: 178 LBS | RESPIRATION RATE: 18 BRPM | OXYGEN SATURATION: 98 % | HEART RATE: 70 BPM

## 2017-07-24 DIAGNOSIS — R51.9 HEADACHE, UNSPECIFIED HEADACHE TYPE: Primary | ICD-10-CM

## 2017-07-24 DIAGNOSIS — C34.92 METASTATIC LUNG CANCER (METASTASIS FROM LUNG TO OTHER SITE), LEFT (HCC): ICD-10-CM

## 2017-07-24 LAB
ANION GAP SERPL CALC-SCNC: 6 MMOL/L (ref 0–18)
BASOPHILS # BLD: 0 K/UL (ref 0–0.2)
BASOPHILS NFR BLD: 0 %
BILIRUB UR QL: NEGATIVE
BUN SERPL-MCNC: 8 MG/DL (ref 8–20)
BUN/CREAT SERPL: 13.8 (ref 10–20)
CALCIUM SERPL-MCNC: 8.4 MG/DL (ref 8.5–10.5)
CHLORIDE SERPL-SCNC: 104 MMOL/L (ref 95–110)
CLARITY UR: CLEAR
CO2 SERPL-SCNC: 27 MMOL/L (ref 22–32)
COLOR UR: YELLOW
CREAT SERPL-MCNC: 0.58 MG/DL (ref 0.5–1.5)
EOSINOPHIL # BLD: 0.1 K/UL (ref 0–0.7)
EOSINOPHIL NFR BLD: 1 %
ERYTHROCYTE [DISTWIDTH] IN BLOOD BY AUTOMATED COUNT: 19 % (ref 11–15)
GLUCOSE SERPL-MCNC: 95 MG/DL (ref 70–99)
GLUCOSE UR-MCNC: NEGATIVE MG/DL
HCT VFR BLD AUTO: 29.5 % (ref 35–48)
HGB BLD-MCNC: 9.6 G/DL (ref 12–16)
KETONES UR-MCNC: NEGATIVE MG/DL
LEUKOCYTE ESTERASE UR QL STRIP.AUTO: NEGATIVE
LYMPHOCYTES # BLD: 4.1 K/UL (ref 1–4)
LYMPHOCYTES NFR BLD: 42 %
MCH RBC QN AUTO: 27.9 PG (ref 27–32)
MCHC RBC AUTO-ENTMCNC: 32.7 G/DL (ref 32–37)
MCV RBC AUTO: 85.4 FL (ref 80–100)
MONOCYTES # BLD: 0.7 K/UL (ref 0–1)
MONOCYTES NFR BLD: 7 %
NEUTROPHILS # BLD AUTO: 5 K/UL (ref 1.8–7.7)
NEUTROPHILS NFR BLD: 50 %
NITRITE UR QL STRIP.AUTO: NEGATIVE
OSMOLALITY UR CALC.SUM OF ELEC: 282 MOSM/KG (ref 275–295)
PH UR: 6 [PH] (ref 5–8)
PLATELET # BLD AUTO: 353 K/UL (ref 140–400)
PMV BLD AUTO: 7.2 FL (ref 7.4–10.3)
POTASSIUM SERPL-SCNC: 3.3 MMOL/L (ref 3.3–5.1)
PROT UR-MCNC: NEGATIVE MG/DL
RBC # BLD AUTO: 3.45 M/UL (ref 3.7–5.4)
RBC #/AREA URNS AUTO: 6 /HPF
SODIUM SERPL-SCNC: 137 MMOL/L (ref 136–144)
SP GR UR STRIP: 1.01 (ref 1–1.03)
UROBILINOGEN UR STRIP-ACNC: <2
VIT C UR-MCNC: NEGATIVE MG/DL
WBC # BLD AUTO: 9.9 K/UL (ref 4–11)
WBC #/AREA URNS AUTO: 1 /HPF

## 2017-07-24 PROCEDURE — 96375 TX/PRO/DX INJ NEW DRUG ADDON: CPT

## 2017-07-24 PROCEDURE — 71020 XR CHEST PA + LAT CHEST (CPT=71020): CPT | Performed by: EMERGENCY MEDICINE

## 2017-07-24 PROCEDURE — 96374 THER/PROPH/DIAG INJ IV PUSH: CPT

## 2017-07-24 PROCEDURE — 99285 EMERGENCY DEPT VISIT HI MDM: CPT

## 2017-07-24 PROCEDURE — 70450 CT HEAD/BRAIN W/O DYE: CPT | Performed by: EMERGENCY MEDICINE

## 2017-07-24 PROCEDURE — 85025 COMPLETE CBC W/AUTO DIFF WBC: CPT | Performed by: EMERGENCY MEDICINE

## 2017-07-24 PROCEDURE — 80048 BASIC METABOLIC PNL TOTAL CA: CPT | Performed by: EMERGENCY MEDICINE

## 2017-07-24 PROCEDURE — 81001 URINALYSIS AUTO W/SCOPE: CPT | Performed by: EMERGENCY MEDICINE

## 2017-07-24 RX ORDER — ONDANSETRON 2 MG/ML
4 INJECTION INTRAMUSCULAR; INTRAVENOUS ONCE
Status: COMPLETED | OUTPATIENT
Start: 2017-07-24 | End: 2017-07-24

## 2017-07-24 RX ORDER — DEXAMETHASONE 4 MG/1
4 TABLET ORAL EVERY 6 HOURS PRN
Qty: 4 TABLET | Refills: 0 | Status: SHIPPED | OUTPATIENT
Start: 2017-07-24 | End: 2017-08-24 | Stop reason: ALTCHOICE

## 2017-07-24 RX ORDER — DEXAMETHASONE 6 MG/1
TABLET ORAL
Qty: 4 TABLET | Refills: 0 | Status: SHIPPED | OUTPATIENT
Start: 2017-07-24 | End: 2017-08-24 | Stop reason: ALTCHOICE

## 2017-07-24 RX ORDER — DEXAMETHASONE SODIUM PHOSPHATE 10 MG/ML
10 INJECTION, SOLUTION INTRAMUSCULAR; INTRAVENOUS ONCE
Status: COMPLETED | OUTPATIENT
Start: 2017-07-24 | End: 2017-07-24

## 2017-07-24 RX ORDER — HYDROMORPHONE HYDROCHLORIDE 1 MG/ML
0.5 INJECTION, SOLUTION INTRAMUSCULAR; INTRAVENOUS; SUBCUTANEOUS ONCE
Status: COMPLETED | OUTPATIENT
Start: 2017-07-24 | End: 2017-07-24

## 2017-07-24 RX ORDER — HEPARIN SODIUM (PORCINE) LOCK FLUSH IV SOLN 100 UNIT/ML 100 UNIT/ML
SOLUTION INTRAVENOUS
Status: COMPLETED
Start: 2017-07-24 | End: 2017-07-24

## 2017-07-24 NOTE — ED INITIAL ASSESSMENT (HPI)
Patient complains of headache \"episodes\" x1 week, states she has hx of tumor to brain with removal, patient states she has a feeling of a strobe light going on her R eye, denies symptoms at this time but states the symptoms she has been feeling are inter

## 2017-07-24 NOTE — ED PROVIDER NOTES
Patient Seen in: [facility] AND [facility] Emergency Department    History   Patient presents with:  Headache (neurologic)    Stated Complaint: HEADACHE,VISUAL CHANGES    HPI    48-year-old female patient presents her complaining of intermittent headache for the OTHER      Comment: CYBERKNIFE  No date: OTHER SURGICAL HISTORY  No date: XS PORT-A-CATH INSERTION/EXCH/CHK    Medications :   dexamethasone 6 MG Oral Tab,  Take every 6 hours as needed for headache.    dexamethasone (DECADRON) 4 MG tablet,  Take 1 tablet ( [07/24/17 1500]  O2 Device: None (Room air) [07/24/17 1949]    Current:/87   Pulse 70   Temp 98 °F (36.7 °C)   Resp 18   Ht 165.1 cm (5' 5\")   Wt 80.7 kg   SpO2 98%   BMI 29.62 kg/m²         Physical Exam    Gen: Awake alert in no apparent distress RAINBOW DRAW LIGHT GREEN   RAINBOW DRAW DARK GREEN   RAINBOW DRAW LAVENDER TALL (BNP)       ============================================================  ED Course  ------------------------------------------------------------  MDM           Disposition a

## 2017-07-25 ENCOUNTER — TELEPHONE (OUTPATIENT)
Dept: HEMATOLOGY/ONCOLOGY | Facility: HOSPITAL | Age: 47
End: 2017-07-25

## 2017-07-25 NOTE — TELEPHONE ENCOUNTER
Patient seen in the ER with increasing headache  She evidently has loss of vision with the increased having  Patient did have CT scan  She received steroid  Patient wishes to go home and seems appropriate to the ER physician  She will require repeat MRI of

## 2017-07-25 NOTE — TELEPHONE ENCOUNTER
Tom Diehl regarding her ER visit yesterday and H/A she states she is fine now no H/A or vision issues. Not able to come into see Dr Sudhir Gresham before her appt. On Thursday. Reinforced need to contact office for any problems.

## 2017-07-27 ENCOUNTER — OFFICE VISIT (OUTPATIENT)
Dept: HEMATOLOGY/ONCOLOGY | Facility: HOSPITAL | Age: 47
End: 2017-07-27
Attending: INTERNAL MEDICINE
Payer: COMMERCIAL

## 2017-07-27 VITALS
RESPIRATION RATE: 18 BRPM | BODY MASS INDEX: 28.66 KG/M2 | HEART RATE: 80 BPM | TEMPERATURE: 99 F | DIASTOLIC BLOOD PRESSURE: 68 MMHG | SYSTOLIC BLOOD PRESSURE: 113 MMHG | HEIGHT: 65 IN | WEIGHT: 172 LBS

## 2017-07-27 DIAGNOSIS — C34.92 CARCINOMA OF LUNG, LEFT (HCC): ICD-10-CM

## 2017-07-27 DIAGNOSIS — C79.31 BRAIN METASTASES (HCC): ICD-10-CM

## 2017-07-27 DIAGNOSIS — Z45.2 ENCOUNTER FOR ADJUSTMENT OR MANAGEMENT OF VASCULAR ACCESS DEVICE: ICD-10-CM

## 2017-07-27 DIAGNOSIS — E03.9 HYPOTHYROIDISM (ACQUIRED): ICD-10-CM

## 2017-07-27 DIAGNOSIS — C34.90 CARCINOMA OF LUNG, UNSPECIFIED LATERALITY (HCC): Primary | ICD-10-CM

## 2017-07-27 DIAGNOSIS — Z51.11 CHEMOTHERAPY MANAGEMENT, ENCOUNTER FOR: ICD-10-CM

## 2017-07-27 DIAGNOSIS — D50.9 IRON DEFICIENCY ANEMIA, UNSPECIFIED IRON DEFICIENCY ANEMIA TYPE: ICD-10-CM

## 2017-07-27 LAB
ALBUMIN SERPL BCP-MCNC: 3.4 G/DL (ref 3.5–4.8)
ALBUMIN/GLOB SERPL: 1 {RATIO} (ref 1–2)
ALP SERPL-CCNC: 94 U/L (ref 32–100)
ALT SERPL-CCNC: 8 U/L (ref 14–54)
ANION GAP SERPL CALC-SCNC: 6 MMOL/L (ref 0–18)
AST SERPL-CCNC: 11 U/L (ref 15–41)
BASOPHILS # BLD: 0.1 K/UL (ref 0–0.2)
BASOPHILS NFR BLD: 1 %
BILIRUB SERPL-MCNC: 0.4 MG/DL (ref 0.3–1.2)
BUN SERPL-MCNC: 4 MG/DL (ref 8–20)
BUN/CREAT SERPL: 6.1 (ref 10–20)
CALCIUM SERPL-MCNC: 8.5 MG/DL (ref 8.5–10.5)
CHLORIDE SERPL-SCNC: 102 MMOL/L (ref 95–110)
CO2 SERPL-SCNC: 29 MMOL/L (ref 22–32)
CREAT SERPL-MCNC: 0.66 MG/DL (ref 0.5–1.5)
EOSINOPHIL # BLD: 0.1 K/UL (ref 0–0.7)
EOSINOPHIL NFR BLD: 1 %
ERYTHROCYTE [DISTWIDTH] IN BLOOD BY AUTOMATED COUNT: 18.7 % (ref 11–15)
GLOBULIN PLAS-MCNC: 3.5 G/DL (ref 2.5–3.7)
GLUCOSE SERPL-MCNC: 108 MG/DL (ref 70–99)
HCT VFR BLD AUTO: 29.3 % (ref 35–48)
HGB BLD-MCNC: 9.4 G/DL (ref 12–16)
LYMPHOCYTES # BLD: 3.5 K/UL (ref 1–4)
LYMPHOCYTES NFR BLD: 30 %
MCH RBC QN AUTO: 28.1 PG (ref 27–32)
MCHC RBC AUTO-ENTMCNC: 32.2 G/DL (ref 32–37)
MCV RBC AUTO: 87 FL (ref 80–100)
MONOCYTES # BLD: 0.8 K/UL (ref 0–1)
MONOCYTES NFR BLD: 7 %
NEUTROPHILS # BLD AUTO: 7 K/UL (ref 1.8–7.7)
NEUTROPHILS NFR BLD: 61 %
OSMOLALITY UR CALC.SUM OF ELEC: 281 MOSM/KG (ref 275–295)
PLATELET # BLD AUTO: 338 K/UL (ref 140–400)
PMV BLD AUTO: 7.4 FL (ref 7.4–10.3)
POTASSIUM SERPL-SCNC: 3.4 MMOL/L (ref 3.3–5.1)
PROT SERPL-MCNC: 6.9 G/DL (ref 5.9–8.4)
RBC # BLD AUTO: 3.36 M/UL (ref 3.7–5.4)
SODIUM SERPL-SCNC: 137 MMOL/L (ref 136–144)
TSH SERPL-ACNC: 36.71 UIU/ML (ref 0.45–5.33)
WBC # BLD AUTO: 11.5 K/UL (ref 4–11)

## 2017-07-27 PROCEDURE — 85025 COMPLETE CBC W/AUTO DIFF WBC: CPT

## 2017-07-27 PROCEDURE — 36591 DRAW BLOOD OFF VENOUS DEVICE: CPT

## 2017-07-27 PROCEDURE — 99215 OFFICE O/P EST HI 40 MIN: CPT | Performed by: INTERNAL MEDICINE

## 2017-07-27 PROCEDURE — 80053 COMPREHEN METABOLIC PANEL: CPT

## 2017-07-27 PROCEDURE — 99212 OFFICE O/P EST SF 10 MIN: CPT | Performed by: INTERNAL MEDICINE

## 2017-07-27 PROCEDURE — 84443 ASSAY THYROID STIM HORMONE: CPT

## 2017-07-27 RX ORDER — HEPARIN SODIUM (PORCINE) LOCK FLUSH IV SOLN 100 UNIT/ML 100 UNIT/ML
5 SOLUTION INTRAVENOUS ONCE
Status: COMPLETED | OUTPATIENT
Start: 2017-07-27 | End: 2017-07-27

## 2017-07-27 RX ORDER — RANITIDINE 25 MG/ML
50 INJECTION, SOLUTION INTRAMUSCULAR; INTRAVENOUS AS NEEDED
Status: CANCELLED | OUTPATIENT
Start: 2017-07-28

## 2017-07-27 RX ORDER — DIPHENHYDRAMINE HYDROCHLORIDE 50 MG/ML
25 INJECTION INTRAMUSCULAR; INTRAVENOUS ONCE
Status: CANCELLED | OUTPATIENT
Start: 2017-07-27

## 2017-07-27 RX ORDER — 0.9 % SODIUM CHLORIDE 0.9 %
VIAL (ML) INJECTION
Status: DISCONTINUED
Start: 2017-07-27 | End: 2017-07-27

## 2017-07-27 RX ORDER — MEPERIDINE HYDROCHLORIDE 25 MG/ML
INJECTION INTRAMUSCULAR; INTRAVENOUS; SUBCUTANEOUS AS NEEDED
Status: CANCELLED | OUTPATIENT
Start: 2017-07-28

## 2017-07-27 RX ORDER — ACETAMINOPHEN 325 MG/1
TABLET ORAL EVERY 6 HOURS PRN
Status: CANCELLED | OUTPATIENT
Start: 2017-07-28

## 2017-07-27 RX ORDER — HEPARIN SODIUM (PORCINE) LOCK FLUSH IV SOLN 100 UNIT/ML 100 UNIT/ML
SOLUTION INTRAVENOUS
Status: COMPLETED
Start: 2017-07-27 | End: 2017-07-27

## 2017-07-27 RX ORDER — HEPARIN SODIUM (PORCINE) LOCK FLUSH IV SOLN 100 UNIT/ML 100 UNIT/ML
5 SOLUTION INTRAVENOUS ONCE
Status: CANCELLED | OUTPATIENT
Start: 2017-07-27

## 2017-07-27 RX ORDER — DIPHENHYDRAMINE HCL 25 MG
25 CAPSULE ORAL ONCE
Status: CANCELLED | OUTPATIENT
Start: 2017-07-27

## 2017-07-27 RX ORDER — ALBUTEROL SULFATE 90 UG/1
2 AEROSOL, METERED RESPIRATORY (INHALATION) AS NEEDED
Status: CANCELLED | OUTPATIENT
Start: 2017-07-28

## 2017-07-27 RX ORDER — DIPHENHYDRAMINE HYDROCHLORIDE 50 MG/ML
INJECTION INTRAMUSCULAR; INTRAVENOUS EVERY 4 HOURS PRN
Status: CANCELLED | OUTPATIENT
Start: 2017-07-28

## 2017-07-27 RX ORDER — 0.9 % SODIUM CHLORIDE 0.9 %
10 VIAL (ML) INJECTION ONCE
Status: CANCELLED | OUTPATIENT
Start: 2017-07-27

## 2017-07-27 RX ORDER — ACETAMINOPHEN 325 MG/1
650 TABLET ORAL ONCE
Status: CANCELLED | OUTPATIENT
Start: 2017-07-27

## 2017-07-27 RX ADMIN — HEPARIN SODIUM (PORCINE) LOCK FLUSH IV SOLN 100 UNIT/ML 500 UNITS: 100 SOLUTION INTRAVENOUS at 14:40:00

## 2017-07-27 NOTE — PROGRESS NOTES
Patient arrives for labs prior to MD visit. Port accessed using sterile technique, positive blood return noted. Labs collected. Port flushed with saline and heparin, de accessed, site covered with 2x2 and paper tape.  Patient discharged to waiting area for

## 2017-07-27 NOTE — PROGRESS NOTES
Cancer Center Progress Note    Patient Name: Td Larson   YOB: 1970   Medical Record Number: [id number]   Attending Physician: Olivia Dozier M.D. Chief Complaint:  Metastatic adenocarcinoma of the lung, brain metastasis.     History of deficits. She denies any sites of bruising or bleeding  Her energy is good.       Performance Status:  ECOG 0  Past Medical History:  Past Medical History:   Diagnosis Date   • Anemia    • Brain cancer ([facility]    • Depression    • Exposure to radiation     D month    Drug use: No    Sexual activity: Yes    Partners: Male     Other Topics Concern    Caffeine Concern Yes    Comment: 2 or 1 cup of coffee daily     Social History Narrative   None on file         Current Medications:    Current Outpatient Prescript Normal S1S2  Abdomen: Soft, non tender. No hepatosplenomegaly. No palpable mass. Extremities: Left foot with mild erythema and edema warm to touch   Neurological: 5/5 motor x4. Laboratory:  Recent Labs   Lab  07/27/17   1430   WBC  11.5*   HGB  9. metastatic disease to the pancreas. She is currently on treatment with nivolumab since April 2016. –Continue nivolumab. Has had excellent radiographic response.   Orders placed and signed  –Follow-up brain MRI from this month is stable   –She has anemia

## 2017-07-28 ENCOUNTER — OFFICE VISIT (OUTPATIENT)
Dept: HEMATOLOGY/ONCOLOGY | Facility: HOSPITAL | Age: 47
End: 2017-07-28
Attending: INTERNAL MEDICINE
Payer: COMMERCIAL

## 2017-07-28 VITALS
HEART RATE: 80 BPM | TEMPERATURE: 99 F | DIASTOLIC BLOOD PRESSURE: 69 MMHG | SYSTOLIC BLOOD PRESSURE: 104 MMHG | RESPIRATION RATE: 18 BRPM

## 2017-07-28 DIAGNOSIS — Z45.2 ENCOUNTER FOR ADJUSTMENT OR MANAGEMENT OF VASCULAR ACCESS DEVICE: ICD-10-CM

## 2017-07-28 DIAGNOSIS — C34.92 CARCINOMA OF LUNG, LEFT (HCC): ICD-10-CM

## 2017-07-28 DIAGNOSIS — C34.90 CARCINOMA OF LUNG, UNSPECIFIED LATERALITY (HCC): Primary | ICD-10-CM

## 2017-07-28 PROCEDURE — 96413 CHEMO IV INFUSION 1 HR: CPT

## 2017-07-28 RX ORDER — DIPHENHYDRAMINE HYDROCHLORIDE 50 MG/ML
25 INJECTION INTRAMUSCULAR; INTRAVENOUS ONCE
Status: CANCELLED | OUTPATIENT
Start: 2017-07-28

## 2017-07-28 RX ORDER — HEPARIN SODIUM (PORCINE) LOCK FLUSH IV SOLN 100 UNIT/ML 100 UNIT/ML
5 SOLUTION INTRAVENOUS ONCE
Status: COMPLETED | OUTPATIENT
Start: 2017-07-28 | End: 2017-07-28

## 2017-07-28 RX ORDER — DIPHENHYDRAMINE HCL 25 MG
25 CAPSULE ORAL ONCE
Status: CANCELLED | OUTPATIENT
Start: 2017-07-28

## 2017-07-28 RX ORDER — SODIUM CHLORIDE 9 MG/ML
INJECTION, SOLUTION INTRAVENOUS
Status: DISCONTINUED
Start: 2017-07-28 | End: 2017-07-28

## 2017-07-28 RX ORDER — 0.9 % SODIUM CHLORIDE 0.9 %
VIAL (ML) INJECTION
Status: DISCONTINUED
Start: 2017-07-28 | End: 2017-07-28

## 2017-07-28 RX ORDER — 0.9 % SODIUM CHLORIDE 0.9 %
10 VIAL (ML) INJECTION ONCE
Status: CANCELLED | OUTPATIENT
Start: 2017-07-28

## 2017-07-28 RX ORDER — HEPARIN SODIUM (PORCINE) LOCK FLUSH IV SOLN 100 UNIT/ML 100 UNIT/ML
SOLUTION INTRAVENOUS
Status: COMPLETED
Start: 2017-07-28 | End: 2017-07-28

## 2017-07-28 RX ORDER — ACETAMINOPHEN 325 MG/1
650 TABLET ORAL ONCE
Status: CANCELLED | OUTPATIENT
Start: 2017-07-28

## 2017-07-28 RX ORDER — HEPARIN SODIUM (PORCINE) LOCK FLUSH IV SOLN 100 UNIT/ML 100 UNIT/ML
5 SOLUTION INTRAVENOUS ONCE
Status: CANCELLED | OUTPATIENT
Start: 2017-07-28

## 2017-07-28 RX ADMIN — HEPARIN SODIUM (PORCINE) LOCK FLUSH IV SOLN 100 UNIT/ML 500 UNITS: 100 SOLUTION INTRAVENOUS at 14:00:00

## 2017-07-28 NOTE — PROGRESS NOTES
Pt presents to infusion today for C31 D1 Opdivo.  Pt appears well and denies any nausea/vomiting/constipation/diarrhea.  Pt denies any fevers or flu like symptoms.  Pt denies any shortness of breath or difficulty breathing.  Pt eating and drinking well.

## 2017-08-03 ENCOUNTER — APPOINTMENT (OUTPATIENT)
Dept: HEMATOLOGY/ONCOLOGY | Facility: HOSPITAL | Age: 47
End: 2017-08-03
Attending: INTERNAL MEDICINE
Payer: COMMERCIAL

## 2017-08-04 ENCOUNTER — APPOINTMENT (OUTPATIENT)
Dept: HEMATOLOGY/ONCOLOGY | Facility: HOSPITAL | Age: 47
End: 2017-08-04
Attending: INTERNAL MEDICINE
Payer: COMMERCIAL

## 2017-08-10 ENCOUNTER — OFFICE VISIT (OUTPATIENT)
Dept: HEMATOLOGY/ONCOLOGY | Facility: HOSPITAL | Age: 47
End: 2017-08-10
Attending: INTERNAL MEDICINE
Payer: COMMERCIAL

## 2017-08-10 VITALS
BODY MASS INDEX: 28.16 KG/M2 | TEMPERATURE: 99 F | HEIGHT: 65 IN | RESPIRATION RATE: 18 BRPM | SYSTOLIC BLOOD PRESSURE: 104 MMHG | DIASTOLIC BLOOD PRESSURE: 63 MMHG | WEIGHT: 169 LBS | HEART RATE: 89 BPM

## 2017-08-10 DIAGNOSIS — C34.90 CARCINOMA OF LUNG, UNSPECIFIED LATERALITY (HCC): Primary | ICD-10-CM

## 2017-08-10 DIAGNOSIS — Z45.2 ENCOUNTER FOR ADJUSTMENT OR MANAGEMENT OF VASCULAR ACCESS DEVICE: ICD-10-CM

## 2017-08-10 DIAGNOSIS — Z51.11 CHEMOTHERAPY MANAGEMENT, ENCOUNTER FOR: ICD-10-CM

## 2017-08-10 DIAGNOSIS — C78.7 LIVER METASTASIS (HCC): ICD-10-CM

## 2017-08-10 DIAGNOSIS — C34.92 CARCINOMA OF LUNG, LEFT (HCC): Primary | ICD-10-CM

## 2017-08-10 DIAGNOSIS — C79.31 BRAIN METASTASES (HCC): ICD-10-CM

## 2017-08-10 DIAGNOSIS — C34.92 CARCINOMA OF LUNG, LEFT (HCC): ICD-10-CM

## 2017-08-10 DIAGNOSIS — E03.9 HYPOTHYROIDISM, UNSPECIFIED TYPE: ICD-10-CM

## 2017-08-10 LAB
ALBUMIN SERPL BCP-MCNC: 3.5 G/DL (ref 3.5–4.8)
ALBUMIN/GLOB SERPL: 0.9 {RATIO} (ref 1–2)
ALP SERPL-CCNC: 93 U/L (ref 32–100)
ALT SERPL-CCNC: 10 U/L (ref 14–54)
ANION GAP SERPL CALC-SCNC: 5 MMOL/L (ref 0–18)
AST SERPL-CCNC: 13 U/L (ref 15–41)
BASOPHILS # BLD: 0.1 K/UL (ref 0–0.2)
BASOPHILS NFR BLD: 1 %
BILIRUB SERPL-MCNC: 0.6 MG/DL (ref 0.3–1.2)
BUN SERPL-MCNC: 5 MG/DL (ref 8–20)
BUN/CREAT SERPL: 9.8 (ref 10–20)
CALCIUM SERPL-MCNC: 8.9 MG/DL (ref 8.5–10.5)
CHLORIDE SERPL-SCNC: 106 MMOL/L (ref 95–110)
CO2 SERPL-SCNC: 28 MMOL/L (ref 22–32)
CREAT SERPL-MCNC: 0.51 MG/DL (ref 0.5–1.5)
EOSINOPHIL # BLD: 0 K/UL (ref 0–0.7)
EOSINOPHIL NFR BLD: 1 %
ERYTHROCYTE [DISTWIDTH] IN BLOOD BY AUTOMATED COUNT: 16.9 % (ref 11–15)
GLOBULIN PLAS-MCNC: 3.9 G/DL (ref 2.5–3.7)
GLUCOSE SERPL-MCNC: 117 MG/DL (ref 70–99)
HCT VFR BLD AUTO: 28.7 % (ref 35–48)
HGB BLD-MCNC: 9.4 G/DL (ref 12–16)
LYMPHOCYTES # BLD: 1.8 K/UL (ref 1–4)
LYMPHOCYTES NFR BLD: 18 %
MCH RBC QN AUTO: 28.3 PG (ref 27–32)
MCHC RBC AUTO-ENTMCNC: 32.8 G/DL (ref 32–37)
MCV RBC AUTO: 86.5 FL (ref 80–100)
MONOCYTES # BLD: 0.6 K/UL (ref 0–1)
MONOCYTES NFR BLD: 6 %
NEUTROPHILS # BLD AUTO: 7.2 K/UL (ref 1.8–7.7)
NEUTROPHILS NFR BLD: 75 %
OSMOLALITY UR CALC.SUM OF ELEC: 286 MOSM/KG (ref 275–295)
PLATELET # BLD AUTO: 397 K/UL (ref 140–400)
PMV BLD AUTO: 7.9 FL (ref 7.4–10.3)
POTASSIUM SERPL-SCNC: 3.6 MMOL/L (ref 3.3–5.1)
PROT SERPL-MCNC: 7.4 G/DL (ref 5.9–8.4)
RBC # BLD AUTO: 3.32 M/UL (ref 3.7–5.4)
SODIUM SERPL-SCNC: 139 MMOL/L (ref 136–144)
WBC # BLD AUTO: 9.7 K/UL (ref 4–11)

## 2017-08-10 PROCEDURE — 99212 OFFICE O/P EST SF 10 MIN: CPT | Performed by: INTERNAL MEDICINE

## 2017-08-10 PROCEDURE — 36591 DRAW BLOOD OFF VENOUS DEVICE: CPT

## 2017-08-10 PROCEDURE — 80053 COMPREHEN METABOLIC PANEL: CPT

## 2017-08-10 PROCEDURE — 99215 OFFICE O/P EST HI 40 MIN: CPT | Performed by: INTERNAL MEDICINE

## 2017-08-10 PROCEDURE — 85025 COMPLETE CBC W/AUTO DIFF WBC: CPT

## 2017-08-10 RX ORDER — ACETAMINOPHEN 325 MG/1
650 TABLET ORAL ONCE
Status: CANCELLED | OUTPATIENT
Start: 2017-08-10

## 2017-08-10 RX ORDER — ACETAMINOPHEN 325 MG/1
TABLET ORAL EVERY 6 HOURS PRN
Status: CANCELLED | OUTPATIENT
Start: 2017-08-11

## 2017-08-10 RX ORDER — ALBUTEROL SULFATE 90 UG/1
2 AEROSOL, METERED RESPIRATORY (INHALATION) AS NEEDED
Status: CANCELLED | OUTPATIENT
Start: 2017-08-11

## 2017-08-10 RX ORDER — DIPHENHYDRAMINE HYDROCHLORIDE 50 MG/ML
25 INJECTION INTRAMUSCULAR; INTRAVENOUS ONCE
Status: CANCELLED | OUTPATIENT
Start: 2017-08-10

## 2017-08-10 RX ORDER — RANITIDINE 25 MG/ML
50 INJECTION, SOLUTION INTRAMUSCULAR; INTRAVENOUS AS NEEDED
Status: CANCELLED | OUTPATIENT
Start: 2017-08-11

## 2017-08-10 RX ORDER — HEPARIN SODIUM (PORCINE) LOCK FLUSH IV SOLN 100 UNIT/ML 100 UNIT/ML
5 SOLUTION INTRAVENOUS ONCE
Status: COMPLETED | OUTPATIENT
Start: 2017-08-10 | End: 2017-08-10

## 2017-08-10 RX ORDER — HEPARIN SODIUM (PORCINE) LOCK FLUSH IV SOLN 100 UNIT/ML 100 UNIT/ML
SOLUTION INTRAVENOUS
Status: DISCONTINUED
Start: 2017-08-10 | End: 2017-08-10

## 2017-08-10 RX ORDER — DIPHENHYDRAMINE HCL 25 MG
25 CAPSULE ORAL ONCE
Status: CANCELLED | OUTPATIENT
Start: 2017-08-10

## 2017-08-10 RX ORDER — MEPERIDINE HYDROCHLORIDE 25 MG/ML
INJECTION INTRAMUSCULAR; INTRAVENOUS; SUBCUTANEOUS AS NEEDED
Status: CANCELLED | OUTPATIENT
Start: 2017-08-11

## 2017-08-10 RX ORDER — 0.9 % SODIUM CHLORIDE 0.9 %
VIAL (ML) INJECTION
Status: DISCONTINUED
Start: 2017-08-10 | End: 2017-08-10

## 2017-08-10 RX ORDER — HEPARIN SODIUM (PORCINE) LOCK FLUSH IV SOLN 100 UNIT/ML 100 UNIT/ML
5 SOLUTION INTRAVENOUS ONCE
Status: CANCELLED | OUTPATIENT
Start: 2017-08-10

## 2017-08-10 RX ORDER — DIPHENHYDRAMINE HYDROCHLORIDE 50 MG/ML
INJECTION INTRAMUSCULAR; INTRAVENOUS EVERY 4 HOURS PRN
Status: CANCELLED | OUTPATIENT
Start: 2017-08-11

## 2017-08-10 RX ORDER — 0.9 % SODIUM CHLORIDE 0.9 %
10 VIAL (ML) INJECTION ONCE
Status: CANCELLED | OUTPATIENT
Start: 2017-08-10

## 2017-08-10 RX ADMIN — HEPARIN SODIUM (PORCINE) LOCK FLUSH IV SOLN 100 UNIT/ML 500 UNITS: 100 SOLUTION INTRAVENOUS at 14:00:00

## 2017-08-10 NOTE — PROGRESS NOTES
Cancer Center Progress Note    Patient Name: Meghna Garcia   YOB: 1970   Medical Record Number: [id number]   Attending Physician: Anatoliy Sharp M.D. Chief Complaint:  Metastatic adenocarcinoma of the lung, brain metastasis.     History of Anemia    • Brain cancer ([facility] [address]    • Depression    • Exposure to radiation     DISCONTINUED AUG 2015    • Gallbladder disease    • Hepatitis C    • High blood pressure    • Hypothyroid    • Lung cancer (HCC)    • Migraines    • Osteoarthritis    • Pancreati Narrative   None on file         Current Medications:    Current Outpatient Prescriptions:   •  dexamethasone 6 MG Oral Tab, Take every 6 hours as needed for headache., Disp: 4 tablet, Rfl: 0  •  dexamethasone (DECADRON) 4 MG tablet, Take 1 tablet (4 mg to Neurological: 5/5 motor x4.       Laboratory:  Recent Labs   Lab  08/10/17   1344   WBC  9.7   HGB  9.4*   PLT  397   NE  7.2             Lab Results  Component Value Date    (H) 08/10/2017   BUN 5 (L) 08/10/2017   BUNCREA 9.8 (L) 08/10/2017   CREA response. Orders placed and signed  –Follow-up brain MRI from this month is stable   –She has anemia with severe iron deficiency. We ordered 1 g of IV iron low molecular weight dextran.   This infusion has been canceled by the patient on multiple occasion

## 2017-08-10 NOTE — PROGRESS NOTES
Pt here for pre-chemo labs and oncologist visit; she returns tomorrow for chemo infusion. Pt is feeling very well today, in great spirits. Port accessed per sterile technique. Easy blood return at first, only returning 5ml though. ..  Flushed extra and

## 2017-08-11 ENCOUNTER — OFFICE VISIT (OUTPATIENT)
Dept: HEMATOLOGY/ONCOLOGY | Facility: HOSPITAL | Age: 47
End: 2017-08-11
Attending: INTERNAL MEDICINE
Payer: COMMERCIAL

## 2017-08-11 ENCOUNTER — TELEPHONE (OUTPATIENT)
Dept: HEMATOLOGY/ONCOLOGY | Facility: HOSPITAL | Age: 47
End: 2017-08-11

## 2017-08-11 VITALS
SYSTOLIC BLOOD PRESSURE: 103 MMHG | HEART RATE: 103 BPM | RESPIRATION RATE: 16 BRPM | TEMPERATURE: 99 F | DIASTOLIC BLOOD PRESSURE: 65 MMHG

## 2017-08-11 DIAGNOSIS — C34.90 CARCINOMA OF LUNG, UNSPECIFIED LATERALITY (HCC): Primary | ICD-10-CM

## 2017-08-11 DIAGNOSIS — C34.92 CARCINOMA OF LUNG, LEFT (HCC): ICD-10-CM

## 2017-08-11 DIAGNOSIS — Z45.2 ENCOUNTER FOR ADJUSTMENT OR MANAGEMENT OF VASCULAR ACCESS DEVICE: ICD-10-CM

## 2017-08-11 PROCEDURE — 99211 OFF/OP EST MAY X REQ PHY/QHP: CPT

## 2017-08-11 PROCEDURE — 96523 IRRIG DRUG DELIVERY DEVICE: CPT

## 2017-08-11 RX ORDER — HEPARIN SODIUM (PORCINE) LOCK FLUSH IV SOLN 100 UNIT/ML 100 UNIT/ML
SOLUTION INTRAVENOUS
Status: DISCONTINUED
Start: 2017-08-11 | End: 2017-08-11

## 2017-08-11 RX ORDER — HEPARIN SODIUM (PORCINE) LOCK FLUSH IV SOLN 100 UNIT/ML 100 UNIT/ML
5 SOLUTION INTRAVENOUS ONCE
Status: CANCELLED | OUTPATIENT
Start: 2017-08-11

## 2017-08-11 RX ORDER — 0.9 % SODIUM CHLORIDE 0.9 %
VIAL (ML) INJECTION
Status: DISCONTINUED
Start: 2017-08-11 | End: 2017-08-11 | Stop reason: WASHOUT

## 2017-08-11 RX ORDER — DIPHENHYDRAMINE HCL 25 MG
25 CAPSULE ORAL ONCE
Status: CANCELLED | OUTPATIENT
Start: 2017-08-11

## 2017-08-11 RX ORDER — SODIUM CHLORIDE 9 MG/ML
INJECTION, SOLUTION INTRAVENOUS
Status: DISCONTINUED
Start: 2017-08-11 | End: 2017-08-11

## 2017-08-11 RX ORDER — HEPARIN SODIUM (PORCINE) LOCK FLUSH IV SOLN 100 UNIT/ML 100 UNIT/ML
5 SOLUTION INTRAVENOUS ONCE
Status: DISCONTINUED | OUTPATIENT
Start: 2017-08-11 | End: 2017-08-11

## 2017-08-11 RX ORDER — 0.9 % SODIUM CHLORIDE 0.9 %
VIAL (ML) INJECTION
Status: DISCONTINUED
Start: 2017-08-11 | End: 2017-08-11

## 2017-08-11 RX ORDER — ACETAMINOPHEN 325 MG/1
650 TABLET ORAL ONCE
Status: CANCELLED | OUTPATIENT
Start: 2017-08-11

## 2017-08-11 RX ORDER — DIPHENHYDRAMINE HYDROCHLORIDE 50 MG/ML
25 INJECTION INTRAMUSCULAR; INTRAVENOUS ONCE
Status: CANCELLED | OUTPATIENT
Start: 2017-08-11

## 2017-08-11 RX ORDER — 0.9 % SODIUM CHLORIDE 0.9 %
10 VIAL (ML) INJECTION ONCE
Status: CANCELLED | OUTPATIENT
Start: 2017-08-11

## 2017-08-11 RX ORDER — SODIUM CHLORIDE 9 MG/ML
INJECTION, SOLUTION INTRAVENOUS
Status: DISCONTINUED
Start: 2017-08-11 | End: 2017-08-11 | Stop reason: WASHOUT

## 2017-08-11 NOTE — PROGRESS NOTES
Pt had called and said she would be around 15 minutes late for her appointment. Arrived 1107,  30 minutes late. Yesterday's labs adequate for treatment and drug released. Pt denies any problems, states she is feeling well.  Port accessed without difficulty

## 2017-08-12 ENCOUNTER — OFFICE VISIT (OUTPATIENT)
Dept: HEMATOLOGY/ONCOLOGY | Facility: HOSPITAL | Age: 47
End: 2017-08-12
Attending: INTERNAL MEDICINE
Payer: COMMERCIAL

## 2017-08-12 VITALS
TEMPERATURE: 99 F | SYSTOLIC BLOOD PRESSURE: 86 MMHG | HEART RATE: 10 BPM | RESPIRATION RATE: 16 BRPM | DIASTOLIC BLOOD PRESSURE: 56 MMHG

## 2017-08-12 DIAGNOSIS — D50.9 IRON DEFICIENCY ANEMIA, UNSPECIFIED IRON DEFICIENCY ANEMIA TYPE: ICD-10-CM

## 2017-08-12 DIAGNOSIS — Z45.2 ENCOUNTER FOR ADJUSTMENT OR MANAGEMENT OF VASCULAR ACCESS DEVICE: Primary | ICD-10-CM

## 2017-08-12 DIAGNOSIS — C34.92 CARCINOMA OF LUNG, LEFT (HCC): ICD-10-CM

## 2017-08-12 PROCEDURE — 96413 CHEMO IV INFUSION 1 HR: CPT

## 2017-08-12 RX ORDER — 0.9 % SODIUM CHLORIDE 0.9 %
10 VIAL (ML) INJECTION ONCE
Status: CANCELLED | OUTPATIENT
Start: 2017-08-12

## 2017-08-12 RX ORDER — ACETAMINOPHEN 325 MG/1
650 TABLET ORAL ONCE
Status: CANCELLED | OUTPATIENT
Start: 2017-08-12

## 2017-08-12 RX ORDER — DIPHENHYDRAMINE HCL 25 MG
25 CAPSULE ORAL ONCE
Status: DISCONTINUED | OUTPATIENT
Start: 2017-08-12 | End: 2017-08-12

## 2017-08-12 RX ORDER — 0.9 % SODIUM CHLORIDE 0.9 %
10 VIAL (ML) INJECTION ONCE
Status: COMPLETED | OUTPATIENT
Start: 2017-08-12 | End: 2017-08-12

## 2017-08-12 RX ORDER — DIPHENHYDRAMINE HCL 25 MG
25 CAPSULE ORAL ONCE
Status: CANCELLED | OUTPATIENT
Start: 2017-08-12

## 2017-08-12 RX ORDER — DIPHENHYDRAMINE HYDROCHLORIDE 50 MG/ML
25 INJECTION INTRAMUSCULAR; INTRAVENOUS ONCE
Status: CANCELLED | OUTPATIENT
Start: 2017-08-12

## 2017-08-12 RX ORDER — HEPARIN SODIUM (PORCINE) LOCK FLUSH IV SOLN 100 UNIT/ML 100 UNIT/ML
5 SOLUTION INTRAVENOUS ONCE
Status: CANCELLED | OUTPATIENT
Start: 2017-08-12

## 2017-08-12 RX ORDER — SODIUM CHLORIDE 9 MG/ML
INJECTION, SOLUTION INTRAVENOUS
Status: DISCONTINUED
Start: 2017-08-12 | End: 2017-08-12

## 2017-08-12 RX ORDER — HEPARIN SODIUM (PORCINE) LOCK FLUSH IV SOLN 100 UNIT/ML 100 UNIT/ML
5 SOLUTION INTRAVENOUS ONCE
Status: COMPLETED | OUTPATIENT
Start: 2017-08-12 | End: 2017-08-12

## 2017-08-12 RX ORDER — ACETAMINOPHEN 325 MG/1
650 TABLET ORAL ONCE
Status: DISCONTINUED | OUTPATIENT
Start: 2017-08-12 | End: 2017-08-12

## 2017-08-12 RX ORDER — 0.9 % SODIUM CHLORIDE 0.9 %
VIAL (ML) INJECTION
Status: COMPLETED
Start: 2017-08-12 | End: 2017-08-12

## 2017-08-12 RX ADMIN — 0.9 % SODIUM CHLORIDE 10 ML: 0.9 % VIAL (ML) INJECTION at 10:00:00

## 2017-08-12 RX ADMIN — HEPARIN SODIUM (PORCINE) LOCK FLUSH IV SOLN 100 UNIT/ML 500 UNITS: 100 SOLUTION INTRAVENOUS at 10:00:00

## 2017-08-12 NOTE — PROGRESS NOTES
Patient arrived to infusion for cycle 32 day 1 of opdivo. Patient was scheduled to receive yesterday, but she was upset that her drug did not come faster and refused to stay.   Port accessed using sterile technique in right chest using 20 gauge 1 inch hube

## 2017-08-22 RX ORDER — OXYCODONE HYDROCHLORIDE 15 MG/1
15 TABLET ORAL EVERY 4 HOURS PRN
Qty: 180 TABLET | Refills: 0 | Status: SHIPPED | OUTPATIENT
Start: 2017-08-22 | End: 2017-09-18

## 2017-08-22 RX ORDER — OXYCODONE HCL 20 MG/1
20 TABLET, FILM COATED, EXTENDED RELEASE ORAL EVERY 12 HOURS
Qty: 60 TABLET | Refills: 0 | Status: SHIPPED | OUTPATIENT
Start: 2017-08-22 | End: 2017-09-18

## 2017-08-24 ENCOUNTER — OFFICE VISIT (OUTPATIENT)
Dept: HEMATOLOGY/ONCOLOGY | Facility: HOSPITAL | Age: 47
End: 2017-08-24
Attending: INTERNAL MEDICINE
Payer: COMMERCIAL

## 2017-08-24 VITALS
RESPIRATION RATE: 16 BRPM | DIASTOLIC BLOOD PRESSURE: 68 MMHG | HEIGHT: 65 IN | TEMPERATURE: 98 F | HEART RATE: 96 BPM | SYSTOLIC BLOOD PRESSURE: 112 MMHG | WEIGHT: 168 LBS | BODY MASS INDEX: 27.99 KG/M2

## 2017-08-24 DIAGNOSIS — C79.31 BRAIN METASTASES (HCC): ICD-10-CM

## 2017-08-24 DIAGNOSIS — C34.92 CARCINOMA OF LUNG, LEFT (HCC): ICD-10-CM

## 2017-08-24 DIAGNOSIS — D50.9 IRON DEFICIENCY ANEMIA, UNSPECIFIED IRON DEFICIENCY ANEMIA TYPE: ICD-10-CM

## 2017-08-24 DIAGNOSIS — Z45.2 ENCOUNTER FOR ADJUSTMENT OR MANAGEMENT OF VASCULAR ACCESS DEVICE: ICD-10-CM

## 2017-08-24 DIAGNOSIS — C34.90 CARCINOMA OF LUNG, UNSPECIFIED LATERALITY (HCC): Primary | ICD-10-CM

## 2017-08-24 DIAGNOSIS — Z51.11 CHEMOTHERAPY MANAGEMENT, ENCOUNTER FOR: ICD-10-CM

## 2017-08-24 DIAGNOSIS — C78.7 LIVER METASTASIS (HCC): ICD-10-CM

## 2017-08-24 DIAGNOSIS — C34.92 CARCINOMA OF LUNG, LEFT (HCC): Primary | ICD-10-CM

## 2017-08-24 LAB
ALBUMIN SERPL BCP-MCNC: 3.4 G/DL (ref 3.5–4.8)
ALBUMIN/GLOB SERPL: 0.9 {RATIO} (ref 1–2)
ALP SERPL-CCNC: 90 U/L (ref 32–100)
ALT SERPL-CCNC: 8 U/L (ref 14–54)
ANION GAP SERPL CALC-SCNC: 8 MMOL/L (ref 0–18)
AST SERPL-CCNC: 11 U/L (ref 15–41)
BASOPHILS # BLD: 0.1 K/UL (ref 0–0.2)
BASOPHILS NFR BLD: 1 %
BILIRUB SERPL-MCNC: 0.4 MG/DL (ref 0.3–1.2)
BUN SERPL-MCNC: 7 MG/DL (ref 8–20)
BUN/CREAT SERPL: 10.1 (ref 10–20)
CALCIUM SERPL-MCNC: 8.8 MG/DL (ref 8.5–10.5)
CHLORIDE SERPL-SCNC: 104 MMOL/L (ref 95–110)
CO2 SERPL-SCNC: 26 MMOL/L (ref 22–32)
CREAT SERPL-MCNC: 0.69 MG/DL (ref 0.5–1.5)
EOSINOPHIL # BLD: 0.1 K/UL (ref 0–0.7)
EOSINOPHIL NFR BLD: 1 %
ERYTHROCYTE [DISTWIDTH] IN BLOOD BY AUTOMATED COUNT: 16.8 % (ref 11–15)
GLOBULIN PLAS-MCNC: 4 G/DL (ref 2.5–3.7)
GLUCOSE SERPL-MCNC: 155 MG/DL (ref 70–99)
HCT VFR BLD AUTO: 28.6 % (ref 35–48)
HGB BLD-MCNC: 9.3 G/DL (ref 12–16)
LYMPHOCYTES # BLD: 3.2 K/UL (ref 1–4)
LYMPHOCYTES NFR BLD: 31 %
MCH RBC QN AUTO: 27.7 PG (ref 27–32)
MCHC RBC AUTO-ENTMCNC: 32.3 G/DL (ref 32–37)
MCV RBC AUTO: 85.7 FL (ref 80–100)
MONOCYTES # BLD: 0.7 K/UL (ref 0–1)
MONOCYTES NFR BLD: 7 %
NEUTROPHILS # BLD AUTO: 6.4 K/UL (ref 1.8–7.7)
NEUTROPHILS NFR BLD: 61 %
OSMOLALITY UR CALC.SUM OF ELEC: 287 MOSM/KG (ref 275–295)
PLATELET # BLD AUTO: 456 K/UL (ref 140–400)
PMV BLD AUTO: 7.3 FL (ref 7.4–10.3)
POTASSIUM SERPL-SCNC: 3.6 MMOL/L (ref 3.3–5.1)
PROT SERPL-MCNC: 7.4 G/DL (ref 5.9–8.4)
RBC # BLD AUTO: 3.34 M/UL (ref 3.7–5.4)
SODIUM SERPL-SCNC: 138 MMOL/L (ref 136–144)
WBC # BLD AUTO: 10.6 K/UL (ref 4–11)

## 2017-08-24 PROCEDURE — 36591 DRAW BLOOD OFF VENOUS DEVICE: CPT

## 2017-08-24 PROCEDURE — 85025 COMPLETE CBC W/AUTO DIFF WBC: CPT

## 2017-08-24 PROCEDURE — 99215 OFFICE O/P EST HI 40 MIN: CPT | Performed by: INTERNAL MEDICINE

## 2017-08-24 PROCEDURE — 80053 COMPREHEN METABOLIC PANEL: CPT

## 2017-08-24 PROCEDURE — 99212 OFFICE O/P EST SF 10 MIN: CPT | Performed by: INTERNAL MEDICINE

## 2017-08-24 RX ORDER — ALBUTEROL SULFATE 90 UG/1
2 AEROSOL, METERED RESPIRATORY (INHALATION) AS NEEDED
Status: CANCELLED | OUTPATIENT
Start: 2017-08-24

## 2017-08-24 RX ORDER — DIPHENHYDRAMINE HYDROCHLORIDE 50 MG/ML
INJECTION INTRAMUSCULAR; INTRAVENOUS EVERY 4 HOURS PRN
Status: CANCELLED | OUTPATIENT
Start: 2017-08-24

## 2017-08-24 RX ORDER — HEPARIN SODIUM (PORCINE) LOCK FLUSH IV SOLN 100 UNIT/ML 100 UNIT/ML
5 SOLUTION INTRAVENOUS ONCE
Status: COMPLETED | OUTPATIENT
Start: 2017-08-24 | End: 2017-08-24

## 2017-08-24 RX ORDER — 0.9 % SODIUM CHLORIDE 0.9 %
VIAL (ML) INJECTION
Status: DISCONTINUED
Start: 2017-08-24 | End: 2017-08-24

## 2017-08-24 RX ORDER — ACETAMINOPHEN 325 MG/1
TABLET ORAL EVERY 6 HOURS PRN
Status: CANCELLED | OUTPATIENT
Start: 2017-08-24

## 2017-08-24 RX ORDER — 0.9 % SODIUM CHLORIDE 0.9 %
10 VIAL (ML) INJECTION ONCE
Status: CANCELLED | OUTPATIENT
Start: 2017-08-24

## 2017-08-24 RX ORDER — HEPARIN SODIUM (PORCINE) LOCK FLUSH IV SOLN 100 UNIT/ML 100 UNIT/ML
SOLUTION INTRAVENOUS
Status: COMPLETED
Start: 2017-08-24 | End: 2017-08-24

## 2017-08-24 RX ORDER — DIPHENHYDRAMINE HYDROCHLORIDE 50 MG/ML
25 INJECTION INTRAMUSCULAR; INTRAVENOUS ONCE
Status: CANCELLED | OUTPATIENT
Start: 2017-08-24

## 2017-08-24 RX ORDER — RANITIDINE 25 MG/ML
50 INJECTION, SOLUTION INTRAMUSCULAR; INTRAVENOUS AS NEEDED
Status: CANCELLED | OUTPATIENT
Start: 2017-08-24

## 2017-08-24 RX ORDER — HEPARIN SODIUM (PORCINE) LOCK FLUSH IV SOLN 100 UNIT/ML 100 UNIT/ML
5 SOLUTION INTRAVENOUS ONCE
Status: CANCELLED | OUTPATIENT
Start: 2017-08-24

## 2017-08-24 RX ORDER — MEPERIDINE HYDROCHLORIDE 25 MG/ML
INJECTION INTRAMUSCULAR; INTRAVENOUS; SUBCUTANEOUS AS NEEDED
Status: CANCELLED | OUTPATIENT
Start: 2017-08-24

## 2017-08-24 RX ADMIN — HEPARIN SODIUM (PORCINE) LOCK FLUSH IV SOLN 100 UNIT/ML 500 UNITS: 100 SOLUTION INTRAVENOUS at 13:06:00

## 2017-08-24 NOTE — PROGRESS NOTES
Cancer Center Progress Note    Patient Name: Dasia Arreaga   YOB: 1970   Medical Record Number: [id number]   Attending Physician: Gray Trotter M.D. Chief Complaint:  Metastatic adenocarcinoma of the lung, brain metastasis.     History of Anemia    • Brain cancer ([facility] [address]    • Depression    • Exposure to radiation     DISCONTINUED AUG 2015    • Gallbladder disease    • Hepatitis C    • High blood pressure    • Hypothyroid    • Lung cancer (HCC)    • Migraines    • Osteoarthritis    • Pancreati Narrative   None on file         Current Medications:    Current Outpatient Prescriptions:   •  OxyCODONE HCl IR 15 MG Oral Tab, Take 1 tablet (15 mg total) by mouth every 4 (four) hours as needed for Pain (max 6 per day). , Disp: 180 tablet, Rfl: 0  •  Oxy CREATSERUM 0.69 08/24/2017   ANIONGAP 8 08/24/2017   GFRNAA >60 08/24/2017   GFRAA >60 08/24/2017   CA 8.8 08/24/2017   OSMOCALC 287 08/24/2017   ALKPHO 90 08/24/2017   AST 11 (L) 08/24/2017   ALT 8 (L) 08/24/2017   ALKPHOS 131 (H) 09/21/2016   BILT 0.4 on oral supplementation   –Repeat imaging with CT has been ordered already however the patient does not remember to get this. We encouraged her again  –Hypothyroidism and arthralgias secondary to nivolumab.   She is on thyroid replacement--we will continue

## 2017-08-25 ENCOUNTER — OFFICE VISIT (OUTPATIENT)
Dept: HEMATOLOGY/ONCOLOGY | Facility: HOSPITAL | Age: 47
End: 2017-08-25
Attending: INTERNAL MEDICINE
Payer: COMMERCIAL

## 2017-08-25 VITALS
HEART RATE: 102 BPM | TEMPERATURE: 98 F | HEIGHT: 65 IN | RESPIRATION RATE: 16 BRPM | BODY MASS INDEX: 27.99 KG/M2 | DIASTOLIC BLOOD PRESSURE: 72 MMHG | WEIGHT: 168 LBS | SYSTOLIC BLOOD PRESSURE: 121 MMHG

## 2017-08-25 DIAGNOSIS — C34.90 CARCINOMA OF LUNG, UNSPECIFIED LATERALITY (HCC): Primary | ICD-10-CM

## 2017-08-25 DIAGNOSIS — Z45.2 ENCOUNTER FOR ADJUSTMENT OR MANAGEMENT OF VASCULAR ACCESS DEVICE: ICD-10-CM

## 2017-08-25 DIAGNOSIS — C34.92 CARCINOMA OF LUNG, LEFT (HCC): ICD-10-CM

## 2017-08-25 PROCEDURE — 96413 CHEMO IV INFUSION 1 HR: CPT

## 2017-08-25 RX ORDER — 0.9 % SODIUM CHLORIDE 0.9 %
VIAL (ML) INJECTION
Status: DISCONTINUED
Start: 2017-08-25 | End: 2017-08-25

## 2017-08-25 RX ORDER — DIPHENHYDRAMINE HYDROCHLORIDE 50 MG/ML
25 INJECTION INTRAMUSCULAR; INTRAVENOUS ONCE
Status: CANCELLED | OUTPATIENT
Start: 2017-08-25

## 2017-08-25 RX ORDER — 0.9 % SODIUM CHLORIDE 0.9 %
10 VIAL (ML) INJECTION ONCE
Status: CANCELLED | OUTPATIENT
Start: 2017-08-25

## 2017-08-25 RX ORDER — HEPARIN SODIUM (PORCINE) LOCK FLUSH IV SOLN 100 UNIT/ML 100 UNIT/ML
5 SOLUTION INTRAVENOUS ONCE
Status: CANCELLED | OUTPATIENT
Start: 2017-08-25

## 2017-08-25 RX ORDER — SODIUM CHLORIDE 9 MG/ML
INJECTION, SOLUTION INTRAVENOUS
Status: DISCONTINUED
Start: 2017-08-25 | End: 2017-08-25

## 2017-08-25 RX ORDER — HEPARIN SODIUM (PORCINE) LOCK FLUSH IV SOLN 100 UNIT/ML 100 UNIT/ML
5 SOLUTION INTRAVENOUS ONCE
Status: COMPLETED | OUTPATIENT
Start: 2017-08-25 | End: 2017-08-25

## 2017-08-25 RX ORDER — HEPARIN SODIUM (PORCINE) LOCK FLUSH IV SOLN 100 UNIT/ML 100 UNIT/ML
SOLUTION INTRAVENOUS
Status: COMPLETED
Start: 2017-08-25 | End: 2017-08-25

## 2017-08-25 RX ADMIN — HEPARIN SODIUM (PORCINE) LOCK FLUSH IV SOLN 100 UNIT/ML 500 UNITS: 100 SOLUTION INTRAVENOUS at 12:15:00

## 2017-08-25 NOTE — PROGRESS NOTES
Patient here for Cycle 9 Opdivo. Patient states she is feeling good. Patient with no new complaints. Patient states energy is good. Denies chest pain or shortness of breath. Reports good appetite. Denies constipation or diarrhea.   Right chest port acc

## 2017-09-06 ENCOUNTER — TELEPHONE (OUTPATIENT)
Dept: HEMATOLOGY/ONCOLOGY | Facility: HOSPITAL | Age: 47
End: 2017-09-06

## 2017-09-06 NOTE — TELEPHONE ENCOUNTER
Pt is cancelling labs/md for 9/7 and also chemo on 9/8 stating she has to leave town 9/7-9/10 for a family member that isnt doing well.  She is requesting to reschedule appts for anyday next week (9/11)    Changed labs/Md appt to [facility] 9/11 & opdivo appt to T

## 2017-09-07 ENCOUNTER — APPOINTMENT (OUTPATIENT)
Dept: HEMATOLOGY/ONCOLOGY | Facility: HOSPITAL | Age: 47
End: 2017-09-07
Attending: INTERNAL MEDICINE
Payer: COMMERCIAL

## 2017-09-08 ENCOUNTER — APPOINTMENT (OUTPATIENT)
Dept: HEMATOLOGY/ONCOLOGY | Facility: HOSPITAL | Age: 47
End: 2017-09-08
Attending: INTERNAL MEDICINE
Payer: COMMERCIAL

## 2017-09-11 ENCOUNTER — OFFICE VISIT (OUTPATIENT)
Dept: HEMATOLOGY/ONCOLOGY | Facility: HOSPITAL | Age: 47
End: 2017-09-11
Attending: INTERNAL MEDICINE
Payer: COMMERCIAL

## 2017-09-11 VITALS
BODY MASS INDEX: 27.32 KG/M2 | WEIGHT: 164 LBS | SYSTOLIC BLOOD PRESSURE: 121 MMHG | HEART RATE: 96 BPM | TEMPERATURE: 98 F | RESPIRATION RATE: 16 BRPM | DIASTOLIC BLOOD PRESSURE: 61 MMHG | HEIGHT: 65 IN

## 2017-09-11 DIAGNOSIS — E03.9 HYPOTHYROIDISM, UNSPECIFIED TYPE: ICD-10-CM

## 2017-09-11 DIAGNOSIS — C34.92 CARCINOMA OF LUNG, LEFT (HCC): Primary | ICD-10-CM

## 2017-09-11 DIAGNOSIS — Z45.2 ENCOUNTER FOR ADJUSTMENT OR MANAGEMENT OF VASCULAR ACCESS DEVICE: Primary | ICD-10-CM

## 2017-09-11 DIAGNOSIS — D50.9 IRON DEFICIENCY ANEMIA, UNSPECIFIED IRON DEFICIENCY ANEMIA TYPE: ICD-10-CM

## 2017-09-11 DIAGNOSIS — C34.92 CARCINOMA OF LUNG, LEFT (HCC): ICD-10-CM

## 2017-09-11 DIAGNOSIS — C78.7 LIVER METASTASIS (HCC): ICD-10-CM

## 2017-09-11 DIAGNOSIS — Z51.11 CHEMOTHERAPY MANAGEMENT, ENCOUNTER FOR: ICD-10-CM

## 2017-09-11 DIAGNOSIS — C79.31 BRAIN METASTASES (HCC): ICD-10-CM

## 2017-09-11 DIAGNOSIS — C34.90 CARCINOMA OF LUNG, UNSPECIFIED LATERALITY (HCC): ICD-10-CM

## 2017-09-11 LAB
ALBUMIN SERPL BCP-MCNC: 3.3 G/DL (ref 3.5–4.8)
ALBUMIN/GLOB SERPL: 0.9 {RATIO} (ref 1–2)
ALP SERPL-CCNC: 84 U/L (ref 32–100)
ALT SERPL-CCNC: 7 U/L (ref 14–54)
ANION GAP SERPL CALC-SCNC: 8 MMOL/L (ref 0–18)
AST SERPL-CCNC: 11 U/L (ref 15–41)
BASOPHILS # BLD: 0 K/UL (ref 0–0.2)
BASOPHILS NFR BLD: 0 %
BILIRUB SERPL-MCNC: 0.4 MG/DL (ref 0.3–1.2)
BUN SERPL-MCNC: 5 MG/DL (ref 8–20)
BUN/CREAT SERPL: 10.4 (ref 10–20)
CALCIUM SERPL-MCNC: 8.5 MG/DL (ref 8.5–10.5)
CHLORIDE SERPL-SCNC: 102 MMOL/L (ref 95–110)
CO2 SERPL-SCNC: 28 MMOL/L (ref 22–32)
CREAT SERPL-MCNC: 0.48 MG/DL (ref 0.5–1.5)
EOSINOPHIL # BLD: 0 K/UL (ref 0–0.7)
EOSINOPHIL NFR BLD: 0 %
ERYTHROCYTE [DISTWIDTH] IN BLOOD BY AUTOMATED COUNT: 16.3 % (ref 11–15)
GLOBULIN PLAS-MCNC: 3.8 G/DL (ref 2.5–3.7)
GLUCOSE SERPL-MCNC: 129 MG/DL (ref 70–99)
HCT VFR BLD AUTO: 28.7 % (ref 35–48)
HGB BLD-MCNC: 9.5 G/DL (ref 12–16)
LYMPHOCYTES # BLD: 1.7 K/UL (ref 1–4)
LYMPHOCYTES NFR BLD: 20 %
MCH RBC QN AUTO: 27.5 PG (ref 27–32)
MCHC RBC AUTO-ENTMCNC: 33.2 G/DL (ref 32–37)
MCV RBC AUTO: 83 FL (ref 80–100)
MONOCYTES # BLD: 0.4 K/UL (ref 0–1)
MONOCYTES NFR BLD: 5 %
NEUTROPHILS # BLD AUTO: 6.4 K/UL (ref 1.8–7.7)
NEUTROPHILS NFR BLD: 75 %
OSMOLALITY UR CALC.SUM OF ELEC: 285 MOSM/KG (ref 275–295)
PLATELET # BLD AUTO: 450 K/UL (ref 140–400)
PMV BLD AUTO: 6.7 FL (ref 7.4–10.3)
POTASSIUM SERPL-SCNC: 3.4 MMOL/L (ref 3.3–5.1)
PROT SERPL-MCNC: 7.1 G/DL (ref 5.9–8.4)
RBC # BLD AUTO: 3.46 M/UL (ref 3.7–5.4)
SODIUM SERPL-SCNC: 138 MMOL/L (ref 136–144)
TSH SERPL-ACNC: 42.34 UIU/ML (ref 0.45–5.33)
WBC # BLD AUTO: 8.5 K/UL (ref 4–11)

## 2017-09-11 PROCEDURE — 99212 OFFICE O/P EST SF 10 MIN: CPT | Performed by: INTERNAL MEDICINE

## 2017-09-11 PROCEDURE — 84443 ASSAY THYROID STIM HORMONE: CPT

## 2017-09-11 PROCEDURE — 80053 COMPREHEN METABOLIC PANEL: CPT

## 2017-09-11 PROCEDURE — 85025 COMPLETE CBC W/AUTO DIFF WBC: CPT

## 2017-09-11 PROCEDURE — 36591 DRAW BLOOD OFF VENOUS DEVICE: CPT

## 2017-09-11 PROCEDURE — 99215 OFFICE O/P EST HI 40 MIN: CPT | Performed by: INTERNAL MEDICINE

## 2017-09-11 RX ORDER — 0.9 % SODIUM CHLORIDE 0.9 %
10 VIAL (ML) INJECTION ONCE
Status: CANCELLED | OUTPATIENT
Start: 2017-09-11

## 2017-09-11 RX ORDER — 0.9 % SODIUM CHLORIDE 0.9 %
VIAL (ML) INJECTION
Status: DISCONTINUED
Start: 2017-09-11 | End: 2017-09-11

## 2017-09-11 RX ORDER — HEPARIN SODIUM (PORCINE) LOCK FLUSH IV SOLN 100 UNIT/ML 100 UNIT/ML
5 SOLUTION INTRAVENOUS ONCE
Status: COMPLETED | OUTPATIENT
Start: 2017-09-11 | End: 2017-09-11

## 2017-09-11 RX ORDER — 0.9 % SODIUM CHLORIDE 0.9 %
10 VIAL (ML) INJECTION ONCE
Status: DISCONTINUED | OUTPATIENT
Start: 2017-09-11 | End: 2017-09-11

## 2017-09-11 RX ORDER — HEPARIN SODIUM (PORCINE) LOCK FLUSH IV SOLN 100 UNIT/ML 100 UNIT/ML
SOLUTION INTRAVENOUS
Status: COMPLETED
Start: 2017-09-11 | End: 2017-09-11

## 2017-09-11 RX ORDER — DIPHENHYDRAMINE HYDROCHLORIDE 50 MG/ML
25 INJECTION INTRAMUSCULAR; INTRAVENOUS ONCE
Status: CANCELLED | OUTPATIENT
Start: 2017-09-11

## 2017-09-11 RX ORDER — HEPARIN SODIUM (PORCINE) LOCK FLUSH IV SOLN 100 UNIT/ML 100 UNIT/ML
5 SOLUTION INTRAVENOUS ONCE
Status: CANCELLED | OUTPATIENT
Start: 2017-09-11

## 2017-09-11 RX ADMIN — HEPARIN SODIUM (PORCINE) LOCK FLUSH IV SOLN 100 UNIT/ML 500 UNITS: 100 SOLUTION INTRAVENOUS at 14:14:00

## 2017-09-11 NOTE — PROGRESS NOTES
Cancer Center Progress Note    Patient Name: Jerome Briggs   YOB: 1970   Medical Record Number: [id number]   Attending Physician: Tula Severs, M.D. Chief Complaint:  Metastatic adenocarcinoma of the lung, brain metastasis.     History of teenage son    Performance Status:  ECOG 0  Past Medical History:  Past Medical History:   Diagnosis Date   • Anemia    • Brain cancer ([facility] [address]    • Depression    • Exposure to radiation     DISCONTINUED AUG 2015    • Gallbladder disease    • Hepatitis C    • Other Topics Concern    Caffeine Concern Yes    Comment: 2 or 1 cup of coffee daily     Social History Narrative   None on file         Current Medications:    Current Outpatient Prescriptions:   •  OxyCODONE HCl IR 15 MG Oral Tab, Take 1 tablet (15 mg Results  Component Value Date    (H) 08/24/2017   BUN 7 (L) 08/24/2017   BUNCREA 10.1 08/24/2017   CREATSERUM 0.69 08/24/2017   ANIONGAP 8 08/24/2017   GFRNAA >60 08/24/2017   GFRAA >60 08/24/2017   CA 8.8 08/24/2017   OSMOCALC 287 08/24/2017   ALKP weight dextran. This infusion has been canceled by the patient on multiple occasions. –Hypokalemia on oral supplementation   –Repeat imaging with CT has been ordered already however the patient does not remember to get this.   We encouraged her again  –

## 2017-09-12 ENCOUNTER — OFFICE VISIT (OUTPATIENT)
Dept: HEMATOLOGY/ONCOLOGY | Facility: HOSPITAL | Age: 47
End: 2017-09-12
Attending: INTERNAL MEDICINE
Payer: COMMERCIAL

## 2017-09-12 VITALS
SYSTOLIC BLOOD PRESSURE: 122 MMHG | RESPIRATION RATE: 16 BRPM | HEART RATE: 84 BPM | TEMPERATURE: 99 F | DIASTOLIC BLOOD PRESSURE: 64 MMHG

## 2017-09-12 DIAGNOSIS — C34.90 CARCINOMA OF LUNG, UNSPECIFIED LATERALITY (HCC): Primary | ICD-10-CM

## 2017-09-12 DIAGNOSIS — C34.92 CARCINOMA OF LUNG, LEFT (HCC): ICD-10-CM

## 2017-09-12 DIAGNOSIS — Z45.2 ENCOUNTER FOR ADJUSTMENT OR MANAGEMENT OF VASCULAR ACCESS DEVICE: ICD-10-CM

## 2017-09-12 PROCEDURE — 96413 CHEMO IV INFUSION 1 HR: CPT

## 2017-09-12 RX ORDER — DIPHENHYDRAMINE HYDROCHLORIDE 50 MG/ML
25 INJECTION INTRAMUSCULAR; INTRAVENOUS ONCE
Status: CANCELLED | OUTPATIENT
Start: 2017-09-12

## 2017-09-12 RX ORDER — 0.9 % SODIUM CHLORIDE 0.9 %
VIAL (ML) INJECTION
Status: DISCONTINUED
Start: 2017-09-12 | End: 2017-09-12

## 2017-09-12 RX ORDER — HEPARIN SODIUM (PORCINE) LOCK FLUSH IV SOLN 100 UNIT/ML 100 UNIT/ML
SOLUTION INTRAVENOUS
Status: COMPLETED
Start: 2017-09-12 | End: 2017-09-12

## 2017-09-12 RX ORDER — SODIUM CHLORIDE 9 MG/ML
INJECTION, SOLUTION INTRAVENOUS
Status: DISCONTINUED
Start: 2017-09-12 | End: 2017-09-12 | Stop reason: WASHOUT

## 2017-09-12 RX ORDER — 0.9 % SODIUM CHLORIDE 0.9 %
10 VIAL (ML) INJECTION ONCE
Status: CANCELLED | OUTPATIENT
Start: 2017-09-12

## 2017-09-12 RX ORDER — HEPARIN SODIUM (PORCINE) LOCK FLUSH IV SOLN 100 UNIT/ML 100 UNIT/ML
5 SOLUTION INTRAVENOUS ONCE
Status: COMPLETED | OUTPATIENT
Start: 2017-09-12 | End: 2017-09-12

## 2017-09-12 RX ORDER — HEPARIN SODIUM (PORCINE) LOCK FLUSH IV SOLN 100 UNIT/ML 100 UNIT/ML
5 SOLUTION INTRAVENOUS ONCE
Status: CANCELLED | OUTPATIENT
Start: 2017-09-12

## 2017-09-12 RX ORDER — SODIUM CHLORIDE 9 MG/ML
INJECTION, SOLUTION INTRAVENOUS
Status: DISCONTINUED
Start: 2017-09-12 | End: 2017-09-12

## 2017-09-12 RX ADMIN — HEPARIN SODIUM (PORCINE) LOCK FLUSH IV SOLN 100 UNIT/ML 500 UNITS: 100 SOLUTION INTRAVENOUS at 13:30:00

## 2017-09-12 NOTE — PROGRESS NOTES
Patient here for [facility]. Patient ambulated from waiting area with steady gait,  Patient states she has been feeling pretty well denies any pain, fevers, SOB.    Port a cath accessed with sterile technique good blood return noted flushed without diff

## 2017-09-19 RX ORDER — OXYCODONE HYDROCHLORIDE 15 MG/1
15 TABLET ORAL EVERY 4 HOURS PRN
Qty: 138 TABLET | Refills: 0 | Status: SHIPPED | OUTPATIENT
Start: 2017-09-20 | End: 2017-10-10

## 2017-09-19 RX ORDER — OXYCODONE HCL 20 MG/1
20 TABLET, FILM COATED, EXTENDED RELEASE ORAL EVERY 12 HOURS
Qty: 46 TABLET | Refills: 0 | Status: SHIPPED | OUTPATIENT
Start: 2017-09-20 | End: 2017-10-10

## 2017-09-21 ENCOUNTER — APPOINTMENT (OUTPATIENT)
Dept: HEMATOLOGY/ONCOLOGY | Facility: HOSPITAL | Age: 47
End: 2017-09-21
Attending: INTERNAL MEDICINE
Payer: COMMERCIAL

## 2017-09-22 ENCOUNTER — APPOINTMENT (OUTPATIENT)
Dept: HEMATOLOGY/ONCOLOGY | Facility: HOSPITAL | Age: 47
End: 2017-09-22
Attending: INTERNAL MEDICINE
Payer: COMMERCIAL

## 2017-09-25 ENCOUNTER — OFFICE VISIT (OUTPATIENT)
Dept: HEMATOLOGY/ONCOLOGY | Facility: HOSPITAL | Age: 47
End: 2017-09-25
Attending: INTERNAL MEDICINE
Payer: COMMERCIAL

## 2017-09-25 VITALS
DIASTOLIC BLOOD PRESSURE: 77 MMHG | RESPIRATION RATE: 16 BRPM | TEMPERATURE: 99 F | HEART RATE: 68 BPM | BODY MASS INDEX: 27.49 KG/M2 | WEIGHT: 165 LBS | SYSTOLIC BLOOD PRESSURE: 125 MMHG | HEIGHT: 65 IN

## 2017-09-25 DIAGNOSIS — Z45.2 ENCOUNTER FOR ADJUSTMENT OR MANAGEMENT OF VASCULAR ACCESS DEVICE: ICD-10-CM

## 2017-09-25 DIAGNOSIS — C34.90 CARCINOMA OF LUNG, UNSPECIFIED LATERALITY (HCC): Primary | ICD-10-CM

## 2017-09-25 DIAGNOSIS — C34.92 CARCINOMA OF LUNG, LEFT (HCC): ICD-10-CM

## 2017-09-25 DIAGNOSIS — Z51.11 CHEMOTHERAPY MANAGEMENT, ENCOUNTER FOR: ICD-10-CM

## 2017-09-25 DIAGNOSIS — D50.9 IRON DEFICIENCY ANEMIA, UNSPECIFIED IRON DEFICIENCY ANEMIA TYPE: ICD-10-CM

## 2017-09-25 DIAGNOSIS — C79.31 BRAIN METASTASES (HCC): ICD-10-CM

## 2017-09-25 DIAGNOSIS — C78.7 LIVER METASTASIS (HCC): Primary | ICD-10-CM

## 2017-09-25 LAB
ALBUMIN SERPL BCP-MCNC: 3.3 G/DL (ref 3.5–4.8)
ALBUMIN/GLOB SERPL: 0.9 {RATIO} (ref 1–2)
ALP SERPL-CCNC: 88 U/L (ref 32–100)
ALT SERPL-CCNC: 7 U/L (ref 14–54)
ANION GAP SERPL CALC-SCNC: 8 MMOL/L (ref 0–18)
AST SERPL-CCNC: 12 U/L (ref 15–41)
BASOPHILS # BLD: 0 K/UL (ref 0–0.2)
BASOPHILS NFR BLD: 0 %
BILIRUB SERPL-MCNC: 0.2 MG/DL (ref 0.3–1.2)
BUN SERPL-MCNC: 7 MG/DL (ref 8–20)
BUN/CREAT SERPL: 8.9 (ref 10–20)
CALCIUM SERPL-MCNC: 8.4 MG/DL (ref 8.5–10.5)
CHLORIDE SERPL-SCNC: 102 MMOL/L (ref 95–110)
CO2 SERPL-SCNC: 27 MMOL/L (ref 22–32)
CREAT SERPL-MCNC: 0.79 MG/DL (ref 0.5–1.5)
EOSINOPHIL # BLD: 0 K/UL (ref 0–0.7)
EOSINOPHIL NFR BLD: 0 %
ERYTHROCYTE [DISTWIDTH] IN BLOOD BY AUTOMATED COUNT: 17 % (ref 11–15)
GLOBULIN PLAS-MCNC: 3.8 G/DL (ref 2.5–3.7)
GLUCOSE SERPL-MCNC: 112 MG/DL (ref 70–99)
HCT VFR BLD AUTO: 27.7 % (ref 35–48)
HGB BLD-MCNC: 9.1 G/DL (ref 12–16)
LYMPHOCYTES # BLD: 2.6 K/UL (ref 1–4)
LYMPHOCYTES NFR BLD: 22 %
MCH RBC QN AUTO: 27.2 PG (ref 27–32)
MCHC RBC AUTO-ENTMCNC: 32.9 G/DL (ref 32–37)
MCV RBC AUTO: 82.5 FL (ref 80–100)
MONOCYTES # BLD: 0.7 K/UL (ref 0–1)
MONOCYTES NFR BLD: 6 %
NEUTROPHILS # BLD AUTO: 8.3 K/UL (ref 1.8–7.7)
NEUTROPHILS NFR BLD: 71 %
OSMOLALITY UR CALC.SUM OF ELEC: 283 MOSM/KG (ref 275–295)
PLATELET # BLD AUTO: 471 K/UL (ref 140–400)
PMV BLD AUTO: 7.4 FL (ref 7.4–10.3)
POTASSIUM SERPL-SCNC: 3.4 MMOL/L (ref 3.3–5.1)
PROT SERPL-MCNC: 7.1 G/DL (ref 5.9–8.4)
RBC # BLD AUTO: 3.35 M/UL (ref 3.7–5.4)
SODIUM SERPL-SCNC: 137 MMOL/L (ref 136–144)
WBC # BLD AUTO: 11.7 K/UL (ref 4–11)

## 2017-09-25 PROCEDURE — 80053 COMPREHEN METABOLIC PANEL: CPT

## 2017-09-25 PROCEDURE — 36591 DRAW BLOOD OFF VENOUS DEVICE: CPT

## 2017-09-25 PROCEDURE — 85025 COMPLETE CBC W/AUTO DIFF WBC: CPT

## 2017-09-25 PROCEDURE — 99215 OFFICE O/P EST HI 40 MIN: CPT | Performed by: INTERNAL MEDICINE

## 2017-09-25 PROCEDURE — 99212 OFFICE O/P EST SF 10 MIN: CPT | Performed by: INTERNAL MEDICINE

## 2017-09-25 RX ORDER — ACETAMINOPHEN 325 MG/1
TABLET ORAL EVERY 6 HOURS PRN
Status: CANCELLED | OUTPATIENT
Start: 2017-09-26

## 2017-09-25 RX ORDER — 0.9 % SODIUM CHLORIDE 0.9 %
10 VIAL (ML) INJECTION ONCE
Status: CANCELLED | OUTPATIENT
Start: 2017-09-25

## 2017-09-25 RX ORDER — DIPHENHYDRAMINE HYDROCHLORIDE 50 MG/ML
INJECTION INTRAMUSCULAR; INTRAVENOUS EVERY 4 HOURS PRN
Status: CANCELLED | OUTPATIENT
Start: 2017-09-26

## 2017-09-25 RX ORDER — DIPHENHYDRAMINE HYDROCHLORIDE 50 MG/ML
25 INJECTION INTRAMUSCULAR; INTRAVENOUS ONCE
Status: CANCELLED | OUTPATIENT
Start: 2017-09-25

## 2017-09-25 RX ORDER — MEPERIDINE HYDROCHLORIDE 25 MG/ML
INJECTION INTRAMUSCULAR; INTRAVENOUS; SUBCUTANEOUS AS NEEDED
Status: CANCELLED | OUTPATIENT
Start: 2017-09-26

## 2017-09-25 RX ORDER — 0.9 % SODIUM CHLORIDE 0.9 %
VIAL (ML) INJECTION
Status: DISCONTINUED
Start: 2017-09-25 | End: 2017-09-25

## 2017-09-25 RX ORDER — HEPARIN SODIUM (PORCINE) LOCK FLUSH IV SOLN 100 UNIT/ML 100 UNIT/ML
SOLUTION INTRAVENOUS
Status: DISCONTINUED
Start: 2017-09-25 | End: 2017-09-25

## 2017-09-25 RX ORDER — ALBUTEROL SULFATE 90 UG/1
2 AEROSOL, METERED RESPIRATORY (INHALATION) AS NEEDED
Status: CANCELLED | OUTPATIENT
Start: 2017-09-26

## 2017-09-25 RX ORDER — HEPARIN SODIUM (PORCINE) LOCK FLUSH IV SOLN 100 UNIT/ML 100 UNIT/ML
5 SOLUTION INTRAVENOUS ONCE
Status: CANCELLED | OUTPATIENT
Start: 2017-09-25

## 2017-09-25 RX ORDER — RANITIDINE 25 MG/ML
50 INJECTION, SOLUTION INTRAMUSCULAR; INTRAVENOUS AS NEEDED
Status: CANCELLED | OUTPATIENT
Start: 2017-09-26

## 2017-09-25 RX ORDER — HEPARIN SODIUM (PORCINE) LOCK FLUSH IV SOLN 100 UNIT/ML 100 UNIT/ML
5 SOLUTION INTRAVENOUS ONCE
Status: COMPLETED | OUTPATIENT
Start: 2017-09-25 | End: 2017-09-25

## 2017-09-25 RX ADMIN — HEPARIN SODIUM (PORCINE) LOCK FLUSH IV SOLN 100 UNIT/ML 500 UNITS: 100 SOLUTION INTRAVENOUS at 12:30:00

## 2017-09-25 NOTE — PROGRESS NOTES
Cancer Center Progress Note    Patient Name: Lilli Bocanegra   YOB: 1970   Medical Record Number: [id number]   Attending Physician: Mary Cardoso M.D. Chief Complaint:  Metastatic adenocarcinoma of the lung, brain metastasis.     History of teenage son    Performance Status:  ECOG 0  Past Medical History:  Past Medical History:   Diagnosis Date   • Anemia    • Brain cancer ([facility] [address]    • Depression    • Exposure to radiation     DISCONTINUED AUG 2015    • Gallbladder disease    • Hepatitis C    • Other Topics Concern    Caffeine Concern Yes    Comment: 2 or 1 cup of coffee daily     Social History Narrative   None on file         Current Medications:    Current Outpatient Prescriptions:   •  OxyCODONE HCl IR 15 MG Oral Tab, Take 1 tablet (15 mg Lab Results  Component Value Date    (H) 09/25/2017   BUN 7 (L) 09/25/2017   BUNCREA 8.9 (L) 09/25/2017   CREATSERUM 0.79 09/25/2017   ANIONGAP 8 09/25/2017   GFRNAA >60 09/25/2017   GFRAA >60 09/25/2017   CA 8.4 (L) 09/25/2017   OSMOCAL of IV iron low molecular weight dextran. This infusion has been canceled by the patient on multiple occasions. –Hypokalemia on oral supplementation   –Repeat imaging with CT has been ordered already however the patient does not remember to get this.   Sugey Harman

## 2017-09-25 NOTE — PROGRESS NOTES
Prechemo labs drawn via port. Port flushed and deaccesed. Tolerated well. Pt due for opdivo tomorrow.  Discharged to lobby to await MD alexis.

## 2017-09-26 ENCOUNTER — OFFICE VISIT (OUTPATIENT)
Dept: HEMATOLOGY/ONCOLOGY | Facility: HOSPITAL | Age: 47
End: 2017-09-26
Attending: INTERNAL MEDICINE
Payer: COMMERCIAL

## 2017-09-26 VITALS
BODY MASS INDEX: 27 KG/M2 | WEIGHT: 165 LBS | RESPIRATION RATE: 16 BRPM | TEMPERATURE: 99 F | DIASTOLIC BLOOD PRESSURE: 69 MMHG | SYSTOLIC BLOOD PRESSURE: 105 MMHG | HEART RATE: 77 BPM

## 2017-09-26 DIAGNOSIS — C34.92 CARCINOMA OF LUNG, LEFT (HCC): ICD-10-CM

## 2017-09-26 DIAGNOSIS — Z45.2 ENCOUNTER FOR ADJUSTMENT OR MANAGEMENT OF VASCULAR ACCESS DEVICE: ICD-10-CM

## 2017-09-26 DIAGNOSIS — C34.90 CARCINOMA OF LUNG, UNSPECIFIED LATERALITY (HCC): Primary | ICD-10-CM

## 2017-09-26 PROCEDURE — 96413 CHEMO IV INFUSION 1 HR: CPT

## 2017-09-26 RX ORDER — HEPARIN SODIUM (PORCINE) LOCK FLUSH IV SOLN 100 UNIT/ML 100 UNIT/ML
5 SOLUTION INTRAVENOUS ONCE
Status: COMPLETED | OUTPATIENT
Start: 2017-09-26 | End: 2017-09-26

## 2017-09-26 RX ORDER — 0.9 % SODIUM CHLORIDE 0.9 %
10 VIAL (ML) INJECTION ONCE
Status: CANCELLED | OUTPATIENT
Start: 2017-09-26

## 2017-09-26 RX ORDER — HEPARIN SODIUM (PORCINE) LOCK FLUSH IV SOLN 100 UNIT/ML 100 UNIT/ML
SOLUTION INTRAVENOUS
Status: DISCONTINUED
Start: 2017-09-26 | End: 2017-09-26

## 2017-09-26 RX ORDER — 0.9 % SODIUM CHLORIDE 0.9 %
VIAL (ML) INJECTION
Status: DISCONTINUED
Start: 2017-09-26 | End: 2017-09-26

## 2017-09-26 RX ORDER — DIPHENHYDRAMINE HYDROCHLORIDE 50 MG/ML
25 INJECTION INTRAMUSCULAR; INTRAVENOUS ONCE
Status: CANCELLED | OUTPATIENT
Start: 2017-09-26

## 2017-09-26 RX ORDER — HEPARIN SODIUM (PORCINE) LOCK FLUSH IV SOLN 100 UNIT/ML 100 UNIT/ML
5 SOLUTION INTRAVENOUS ONCE
Status: CANCELLED | OUTPATIENT
Start: 2017-09-26

## 2017-09-26 RX ORDER — SODIUM CHLORIDE 9 MG/ML
INJECTION, SOLUTION INTRAVENOUS
Status: DISCONTINUED
Start: 2017-09-26 | End: 2017-09-26

## 2017-09-26 RX ADMIN — HEPARIN SODIUM (PORCINE) LOCK FLUSH IV SOLN 100 UNIT/ML 500 UNITS: 100 SOLUTION INTRAVENOUS at 12:05:00

## 2017-09-26 NOTE — PROGRESS NOTES
Patient to infusion for chemotherapy cycle 35 day 1 of opdivo. Patient reports has good appetite, denies any problems at present. Port right chest accessed using sterile technique with positive blood return obtained from port.  opdivo given over one hour an

## 2017-09-28 RX ORDER — LEVOTHYROXINE SODIUM 175 UG/1
175 TABLET ORAL DAILY
Qty: 90 TABLET | Refills: 0 | Status: SHIPPED | OUTPATIENT
Start: 2017-09-28 | End: 2018-03-09

## 2017-10-09 ENCOUNTER — OFFICE VISIT (OUTPATIENT)
Dept: HEMATOLOGY/ONCOLOGY | Facility: HOSPITAL | Age: 47
End: 2017-10-09
Attending: INTERNAL MEDICINE
Payer: COMMERCIAL

## 2017-10-09 VITALS
HEIGHT: 65 IN | WEIGHT: 164 LBS | TEMPERATURE: 100 F | RESPIRATION RATE: 16 BRPM | SYSTOLIC BLOOD PRESSURE: 115 MMHG | HEART RATE: 85 BPM | DIASTOLIC BLOOD PRESSURE: 74 MMHG | BODY MASS INDEX: 27.32 KG/M2

## 2017-10-09 DIAGNOSIS — C34.90 CARCINOMA OF LUNG, UNSPECIFIED LATERALITY (HCC): Primary | ICD-10-CM

## 2017-10-09 DIAGNOSIS — D50.9 IRON DEFICIENCY ANEMIA, UNSPECIFIED IRON DEFICIENCY ANEMIA TYPE: ICD-10-CM

## 2017-10-09 DIAGNOSIS — C34.92 CARCINOMA OF LUNG, LEFT (HCC): Primary | ICD-10-CM

## 2017-10-09 DIAGNOSIS — C34.92 CARCINOMA OF LUNG, LEFT (HCC): ICD-10-CM

## 2017-10-09 DIAGNOSIS — Z45.2 ENCOUNTER FOR ADJUSTMENT OR MANAGEMENT OF VASCULAR ACCESS DEVICE: ICD-10-CM

## 2017-10-09 DIAGNOSIS — Z51.11 CHEMOTHERAPY MANAGEMENT, ENCOUNTER FOR: ICD-10-CM

## 2017-10-09 DIAGNOSIS — C78.7 LIVER METASTASIS (HCC): ICD-10-CM

## 2017-10-09 DIAGNOSIS — E03.9 HYPOTHYROIDISM, UNSPECIFIED TYPE: ICD-10-CM

## 2017-10-09 DIAGNOSIS — C79.31 BRAIN METASTASES (HCC): ICD-10-CM

## 2017-10-09 PROCEDURE — 80053 COMPREHEN METABOLIC PANEL: CPT

## 2017-10-09 PROCEDURE — 99215 OFFICE O/P EST HI 40 MIN: CPT | Performed by: INTERNAL MEDICINE

## 2017-10-09 PROCEDURE — 85025 COMPLETE CBC W/AUTO DIFF WBC: CPT

## 2017-10-09 PROCEDURE — 99212 OFFICE O/P EST SF 10 MIN: CPT | Performed by: INTERNAL MEDICINE

## 2017-10-09 PROCEDURE — 36591 DRAW BLOOD OFF VENOUS DEVICE: CPT

## 2017-10-09 RX ORDER — MEPERIDINE HYDROCHLORIDE 25 MG/ML
INJECTION INTRAMUSCULAR; INTRAVENOUS; SUBCUTANEOUS AS NEEDED
Status: CANCELLED | OUTPATIENT
Start: 2017-10-10

## 2017-10-09 RX ORDER — HEPARIN SODIUM (PORCINE) LOCK FLUSH IV SOLN 100 UNIT/ML 100 UNIT/ML
SOLUTION INTRAVENOUS
Status: DISCONTINUED
Start: 2017-10-09 | End: 2017-10-09

## 2017-10-09 RX ORDER — 0.9 % SODIUM CHLORIDE 0.9 %
10 VIAL (ML) INJECTION ONCE
Status: CANCELLED | OUTPATIENT
Start: 2017-10-09

## 2017-10-09 RX ORDER — ALBUTEROL SULFATE 90 UG/1
2 AEROSOL, METERED RESPIRATORY (INHALATION) AS NEEDED
Status: CANCELLED | OUTPATIENT
Start: 2017-10-10

## 2017-10-09 RX ORDER — HEPARIN SODIUM (PORCINE) LOCK FLUSH IV SOLN 100 UNIT/ML 100 UNIT/ML
5 SOLUTION INTRAVENOUS ONCE
Status: CANCELLED | OUTPATIENT
Start: 2017-10-09

## 2017-10-09 RX ORDER — 0.9 % SODIUM CHLORIDE 0.9 %
VIAL (ML) INJECTION
Status: DISCONTINUED
Start: 2017-10-09 | End: 2017-10-09

## 2017-10-09 RX ORDER — HEPARIN SODIUM (PORCINE) LOCK FLUSH IV SOLN 100 UNIT/ML 100 UNIT/ML
5 SOLUTION INTRAVENOUS ONCE
Status: COMPLETED | OUTPATIENT
Start: 2017-10-09 | End: 2017-10-09

## 2017-10-09 RX ORDER — DIPHENHYDRAMINE HYDROCHLORIDE 50 MG/ML
INJECTION INTRAMUSCULAR; INTRAVENOUS EVERY 4 HOURS PRN
Status: CANCELLED | OUTPATIENT
Start: 2017-10-10

## 2017-10-09 RX ORDER — RANITIDINE 25 MG/ML
50 INJECTION, SOLUTION INTRAMUSCULAR; INTRAVENOUS AS NEEDED
Status: CANCELLED | OUTPATIENT
Start: 2017-10-10

## 2017-10-09 RX ORDER — DIPHENHYDRAMINE HYDROCHLORIDE 50 MG/ML
25 INJECTION INTRAMUSCULAR; INTRAVENOUS ONCE
Status: CANCELLED | OUTPATIENT
Start: 2017-10-09

## 2017-10-09 RX ORDER — ACETAMINOPHEN 325 MG/1
TABLET ORAL EVERY 6 HOURS PRN
Status: CANCELLED | OUTPATIENT
Start: 2017-10-10

## 2017-10-09 RX ADMIN — HEPARIN SODIUM (PORCINE) LOCK FLUSH IV SOLN 100 UNIT/ML 500 UNITS: 100 SOLUTION INTRAVENOUS at 10:55:00

## 2017-10-09 NOTE — PROGRESS NOTES
Patient to lab for pre chemotherapy labwork. Port accessed right chest using sterile technique with positive blood return obtained from port. Labs obtained and sent for analysis.  Port flushed per protocol and port de accessed with 2x2 and paper tape to sit

## 2017-10-09 NOTE — PROGRESS NOTES
Cancer Center Progress Note    Patient Name: Higinio Canavan   YOB: 1970   Medical Record Number: [id number]   Attending Physician: Deyanira Zendejas M.D. Chief Complaint:  Metastatic adenocarcinoma of the lung, brain metastasis.     History of teenage son    Performance Status:  ECOG 0  Past Medical History:  Past Medical History:   Diagnosis Date   • Anemia    • Brain cancer ([facility] [address]    • Depression    • Exposure to radiation     DISCONTINUED AUG 2015    • Gallbladder disease    • Hepatitis C    • Other Topics Concern    Caffeine Concern Yes    Comment: 2 or 1 cup of coffee daily     Social History Narrative   None on file         Current Medications:    Current Outpatient Prescriptions:   •  LEVOTHYROXINE SODIUM 175 MCG Oral Tab, TAKE 1 TABLET Value Date    (H) 10/09/2017   BUN 6 (L) 10/09/2017   BUNCREA 8.8 (L) 10/09/2017   CREATSERUM 0.68 10/09/2017   ANIONGAP 6 10/09/2017   GFRNAA >60 10/09/2017   GFRAA >60 10/09/2017   CA 8.3 (L) 10/09/2017   OSMOCALC 285 10/09/2017   ALKPHO 77 10/09/ dextran. This infusion has been canceled by the patient on multiple occasions. –Hypokalemia on oral supplementation   –Repeat imaging ordered for today and scheduled. –Hypothyroidism and arthralgias secondary to nivolumab.   She is on thyroid replaceme

## 2017-10-10 ENCOUNTER — OFFICE VISIT (OUTPATIENT)
Dept: PAIN CLINIC | Facility: HOSPITAL | Age: 47
End: 2017-10-10
Attending: ANESTHESIOLOGY
Payer: COMMERCIAL

## 2017-10-10 ENCOUNTER — OFFICE VISIT (OUTPATIENT)
Dept: HEMATOLOGY/ONCOLOGY | Facility: HOSPITAL | Age: 47
End: 2017-10-10
Attending: INTERNAL MEDICINE
Payer: COMMERCIAL

## 2017-10-10 VITALS
HEART RATE: 84 BPM | SYSTOLIC BLOOD PRESSURE: 117 MMHG | TEMPERATURE: 99 F | DIASTOLIC BLOOD PRESSURE: 69 MMHG | RESPIRATION RATE: 16 BRPM

## 2017-10-10 VITALS
RESPIRATION RATE: 18 BRPM | SYSTOLIC BLOOD PRESSURE: 132 MMHG | DIASTOLIC BLOOD PRESSURE: 70 MMHG | HEART RATE: 78 BPM | WEIGHT: 177 LBS | HEIGHT: 66.6 IN | BODY MASS INDEX: 28.11 KG/M2

## 2017-10-10 DIAGNOSIS — C34.92: ICD-10-CM

## 2017-10-10 DIAGNOSIS — Z45.2 ENCOUNTER FOR ADJUSTMENT OR MANAGEMENT OF VASCULAR ACCESS DEVICE: ICD-10-CM

## 2017-10-10 DIAGNOSIS — G89.3 CANCER ASSOCIATED PAIN: Primary | ICD-10-CM

## 2017-10-10 DIAGNOSIS — C34.90 CARCINOMA OF LUNG, UNSPECIFIED LATERALITY (HCC): Primary | ICD-10-CM

## 2017-10-10 DIAGNOSIS — C34.92 CARCINOMA OF LUNG, LEFT (HCC): ICD-10-CM

## 2017-10-10 PROCEDURE — 99211 OFF/OP EST MAY X REQ PHY/QHP: CPT

## 2017-10-10 PROCEDURE — 96413 CHEMO IV INFUSION 1 HR: CPT

## 2017-10-10 RX ORDER — OXYCODONE HCL 20 MG/1
20 TABLET, FILM COATED, EXTENDED RELEASE ORAL EVERY 12 HOURS
Qty: 60 TABLET | Refills: 0 | Status: SHIPPED | OUTPATIENT
Start: 2017-10-10 | End: 2017-11-09

## 2017-10-10 RX ORDER — DIPHENHYDRAMINE HYDROCHLORIDE 50 MG/ML
25 INJECTION INTRAMUSCULAR; INTRAVENOUS ONCE
Status: CANCELLED | OUTPATIENT
Start: 2017-10-10

## 2017-10-10 RX ORDER — OXYCODONE HYDROCHLORIDE 15 MG/1
15 TABLET ORAL EVERY 4 HOURS PRN
Qty: 180 TABLET | Refills: 0 | Status: ON HOLD | OUTPATIENT
Start: 2017-11-08 | End: 2017-11-14

## 2017-10-10 RX ORDER — SODIUM CHLORIDE 9 MG/ML
INJECTION, SOLUTION INTRAVENOUS
Status: DISCONTINUED
Start: 2017-10-10 | End: 2017-10-10

## 2017-10-10 RX ORDER — HEPARIN SODIUM (PORCINE) LOCK FLUSH IV SOLN 100 UNIT/ML 100 UNIT/ML
5 SOLUTION INTRAVENOUS ONCE
Status: CANCELLED | OUTPATIENT
Start: 2017-10-10

## 2017-10-10 RX ORDER — 0.9 % SODIUM CHLORIDE 0.9 %
VIAL (ML) INJECTION
Status: DISCONTINUED
Start: 2017-10-10 | End: 2017-10-10

## 2017-10-10 RX ORDER — HEPARIN SODIUM (PORCINE) LOCK FLUSH IV SOLN 100 UNIT/ML 100 UNIT/ML
SOLUTION INTRAVENOUS
Status: COMPLETED
Start: 2017-10-10 | End: 2017-10-10

## 2017-10-10 RX ORDER — HEPARIN SODIUM (PORCINE) LOCK FLUSH IV SOLN 100 UNIT/ML 100 UNIT/ML
5 SOLUTION INTRAVENOUS ONCE
Status: COMPLETED | OUTPATIENT
Start: 2017-10-10 | End: 2017-10-10

## 2017-10-10 RX ORDER — OXYCODONE HCL 20 MG/1
20 TABLET, FILM COATED, EXTENDED RELEASE ORAL EVERY 12 HOURS
Qty: 60 TABLET | Refills: 0 | Status: ON HOLD | OUTPATIENT
Start: 2017-11-08 | End: 2017-11-14

## 2017-10-10 RX ORDER — 0.9 % SODIUM CHLORIDE 0.9 %
10 VIAL (ML) INJECTION ONCE
Status: CANCELLED | OUTPATIENT
Start: 2017-10-10

## 2017-10-10 RX ORDER — OXYCODONE HYDROCHLORIDE 15 MG/1
15 TABLET ORAL EVERY 4 HOURS PRN
Qty: 180 TABLET | Refills: 0 | Status: SHIPPED | OUTPATIENT
Start: 2017-10-10 | End: 2017-11-09

## 2017-10-10 RX ADMIN — HEPARIN SODIUM (PORCINE) LOCK FLUSH IV SOLN 100 UNIT/ML 500 UNITS: 100 SOLUTION INTRAVENOUS at 12:02:00

## 2017-10-10 NOTE — CHRONIC PAIN
Follow-up Note    HISTORY OF PRESENT ILLNESS:  Dasia Arreaga is a 49year old old female, returns to the clinic for evaluation treatment of her chronic pain due to metastatic lung cancer metastatic lesions in her liver brain status post craniotomy by Dr. Maria Eugenia Patricio by mouth nightly. Disp: 30 tablet Rfl: 5   pregabalin 75 MG Oral Cap Take 75 mg by mouth 3 (three) times daily. Disp:  Rfl:         REVIEW OF SYSTEMS:   Bowel/Bladder Incontinence: as above  Coughing/sneezing/straining does not exacerbate the pain.   [place] SURGICAL HISTORY:  Past Surgical History:  1999: APPENDECTOMY  JULY 2015: BRAIN SURGERY      Comment: TUMOR RESECTION  No date: BRAIN SURGERY Left      Comment: OCCIPITAL CRANIOTOMY  No date: BRAIN SURGERY Left      Comment: LOBECTOMY  AUG  2016: BRA Triceps 5/5 5/5                              LOWER EXTREMITY      LEFT RIGHT        Quadriceps 5/5 5/5   Foot DF 5/5 5/5   Foot EHL 5/5 5/5          PULSES      LEFT RIGHT   Radial 2/4 2/4   Dorsalis Pedis 2/4 2/4   Posterior Tibial 2/4 2/4   Brachial 2/

## 2017-10-10 NOTE — PROGRESS NOTES
Patient's Personal History/Story    PRESENTS AMBULATORY; 7/2015 DX WITH LUNG CANCER WITH METS TO THE BRAIN;   UNDERWENT VAT, CYBERKNIFE, AND LT OCCIPITAL CRAINOTOMY & BRAIN RESECTION   7/2015;  CURRENTLY HAVING RADIATION & CHEMO; RT PORT;  PT C.O OF LT RI

## 2017-10-10 NOTE — PROGRESS NOTES
Pt to infusion area for cycle 36 of opdivo, patient ambulating idependently. Labs reviewed and adequate for treatment. Patient reports she is feeling well, denies any complaints. Port accessed using sterile technique, positive blood return noted.  Puja Fuller

## 2017-10-23 ENCOUNTER — OFFICE VISIT (OUTPATIENT)
Dept: HEMATOLOGY/ONCOLOGY | Facility: HOSPITAL | Age: 47
End: 2017-10-23
Attending: INTERNAL MEDICINE
Payer: COMMERCIAL

## 2017-10-23 VITALS
SYSTOLIC BLOOD PRESSURE: 114 MMHG | BODY MASS INDEX: 26.99 KG/M2 | TEMPERATURE: 98 F | HEART RATE: 95 BPM | HEIGHT: 65 IN | RESPIRATION RATE: 16 BRPM | DIASTOLIC BLOOD PRESSURE: 76 MMHG | WEIGHT: 162 LBS

## 2017-10-23 DIAGNOSIS — C79.31 BRAIN METASTASES (HCC): ICD-10-CM

## 2017-10-23 DIAGNOSIS — Z51.11 CHEMOTHERAPY MANAGEMENT, ENCOUNTER FOR: ICD-10-CM

## 2017-10-23 DIAGNOSIS — C34.92 CARCINOMA OF LUNG, LEFT (HCC): ICD-10-CM

## 2017-10-23 DIAGNOSIS — Z45.2 ENCOUNTER FOR ADJUSTMENT OR MANAGEMENT OF VASCULAR ACCESS DEVICE: ICD-10-CM

## 2017-10-23 DIAGNOSIS — C34.90 CARCINOMA OF LUNG, UNSPECIFIED LATERALITY (HCC): Primary | ICD-10-CM

## 2017-10-23 DIAGNOSIS — C34.92 CARCINOMA OF LUNG, LEFT (HCC): Primary | ICD-10-CM

## 2017-10-23 DIAGNOSIS — C78.7 LIVER METASTASIS (HCC): ICD-10-CM

## 2017-10-23 DIAGNOSIS — D50.9 IRON DEFICIENCY ANEMIA, UNSPECIFIED IRON DEFICIENCY ANEMIA TYPE: ICD-10-CM

## 2017-10-23 PROCEDURE — 36591 DRAW BLOOD OFF VENOUS DEVICE: CPT

## 2017-10-23 PROCEDURE — 84443 ASSAY THYROID STIM HORMONE: CPT

## 2017-10-23 PROCEDURE — 99212 OFFICE O/P EST SF 10 MIN: CPT | Performed by: INTERNAL MEDICINE

## 2017-10-23 PROCEDURE — 85025 COMPLETE CBC W/AUTO DIFF WBC: CPT

## 2017-10-23 PROCEDURE — 80053 COMPREHEN METABOLIC PANEL: CPT

## 2017-10-23 PROCEDURE — 99215 OFFICE O/P EST HI 40 MIN: CPT | Performed by: INTERNAL MEDICINE

## 2017-10-23 RX ORDER — ALBUTEROL SULFATE 90 UG/1
2 AEROSOL, METERED RESPIRATORY (INHALATION) AS NEEDED
Status: CANCELLED | OUTPATIENT
Start: 2017-11-20

## 2017-10-23 RX ORDER — HEPARIN SODIUM (PORCINE) LOCK FLUSH IV SOLN 100 UNIT/ML 100 UNIT/ML
SOLUTION INTRAVENOUS
Status: DISCONTINUED
Start: 2017-10-23 | End: 2017-10-23

## 2017-10-23 RX ORDER — ACETAMINOPHEN 325 MG/1
TABLET ORAL EVERY 6 HOURS PRN
Status: CANCELLED | OUTPATIENT
Start: 2017-11-20

## 2017-10-23 RX ORDER — 0.9 % SODIUM CHLORIDE 0.9 %
VIAL (ML) INJECTION
Status: DISCONTINUED
Start: 2017-10-23 | End: 2017-10-23

## 2017-10-23 RX ORDER — 0.9 % SODIUM CHLORIDE 0.9 %
10 VIAL (ML) INJECTION ONCE
Status: CANCELLED | OUTPATIENT
Start: 2017-10-23

## 2017-10-23 RX ORDER — RANITIDINE 25 MG/ML
50 INJECTION, SOLUTION INTRAMUSCULAR; INTRAVENOUS AS NEEDED
Status: CANCELLED | OUTPATIENT
Start: 2017-11-20

## 2017-10-23 RX ORDER — MEPERIDINE HYDROCHLORIDE 25 MG/ML
INJECTION INTRAMUSCULAR; INTRAVENOUS; SUBCUTANEOUS AS NEEDED
Status: CANCELLED | OUTPATIENT
Start: 2017-11-20

## 2017-10-23 RX ORDER — DIPHENHYDRAMINE HYDROCHLORIDE 50 MG/ML
INJECTION INTRAMUSCULAR; INTRAVENOUS EVERY 4 HOURS PRN
Status: CANCELLED | OUTPATIENT
Start: 2017-11-20

## 2017-10-23 RX ORDER — DIPHENHYDRAMINE HYDROCHLORIDE 50 MG/ML
25 INJECTION INTRAMUSCULAR; INTRAVENOUS ONCE
Status: CANCELLED | OUTPATIENT
Start: 2017-10-23

## 2017-10-23 RX ORDER — HEPARIN SODIUM (PORCINE) LOCK FLUSH IV SOLN 100 UNIT/ML 100 UNIT/ML
5 SOLUTION INTRAVENOUS ONCE
Status: CANCELLED | OUTPATIENT
Start: 2017-10-23

## 2017-10-23 RX ORDER — HEPARIN SODIUM (PORCINE) LOCK FLUSH IV SOLN 100 UNIT/ML 100 UNIT/ML
5 SOLUTION INTRAVENOUS ONCE
Status: COMPLETED | OUTPATIENT
Start: 2017-10-23 | End: 2017-10-23

## 2017-10-23 RX ADMIN — HEPARIN SODIUM (PORCINE) LOCK FLUSH IV SOLN 100 UNIT/ML 500 UNITS: 100 SOLUTION INTRAVENOUS at 10:30:00

## 2017-10-23 NOTE — PROGRESS NOTES
Prechemo labs drawn via port. Port flushed and de-accessed. Tolerated well. Discharged to [facility] to await MD visit.

## 2017-10-23 NOTE — PROGRESS NOTES
Cancer Center Progress Note    Patient Name: Sparkle Dodd   YOB: 1970   Medical Record Number: [id number]   Attending Physician: Gerardo Ball M.D. Chief Complaint:  Metastatic adenocarcinoma of the lung, brain metastasis.     History of teenage son    Performance Status:  ECOG 0  Past Medical History:  Past Medical History:   Diagnosis Date   • Anemia    • Brain cancer ([facility] [address]    • Depression    • Exposure to radiation     DISCONTINUED AUG 2015    • Gallbladder disease    • Hepatitis C    • Other Topics Concern    Caffeine Concern Yes    Comment: 2 or 1 cup of coffee daily     Social History Narrative   None on file         Current Medications:    Current Outpatient Prescriptions:   •  OxyCODONE HCl ER 20 MG Oral Tablet Extended Release 1 auscultation. Cardiovascular: Regular rate and rhythm. Normal S1S2  Abdomen: Soft, non tender. No hepatosplenomegaly. No palpable mass. Extremities: Left foot with mild erythema and edema warm to touch   Neurological: 5/5 motor x4.       Laboratory:  R December 2015. In March 2016 she had biopsy-proven metastatic disease to the pancreas. She is currently on treatment with nivolumab since April 2016. –Continue nivolumab. Has had excellent radiographic response.   Orders placed and signed  –Follow-up bra

## 2017-10-24 ENCOUNTER — TELEPHONE (OUTPATIENT)
Dept: HEMATOLOGY/ONCOLOGY | Facility: HOSPITAL | Age: 47
End: 2017-10-24

## 2017-10-24 ENCOUNTER — APPOINTMENT (OUTPATIENT)
Dept: HEMATOLOGY/ONCOLOGY | Facility: HOSPITAL | Age: 47
End: 2017-10-24
Attending: INTERNAL MEDICINE
Payer: COMMERCIAL

## 2017-10-24 NOTE — TELEPHONE ENCOUNTER
Pt called requesting to cancel opdivo appt for today. stts she is going to a facility to get dextoxed from oxycotin. She isnt sure when she'll be able to reschedule.  Is requesting for right now to keep appts on 11/6 & 11/7

## 2017-11-06 ENCOUNTER — TELEPHONE (OUTPATIENT)
Dept: HEMATOLOGY/ONCOLOGY | Facility: HOSPITAL | Age: 47
End: 2017-11-06

## 2017-11-06 NOTE — TELEPHONE ENCOUNTER
Leigh Gonsalez missed her pre-chemo appt this morning, I called to check on her, no answer.   I called her , he reports she will have to miss this cycle but will be here on the 20th for her next cycle, she is doing well but cannot keep her appts for this wee

## 2017-11-07 ENCOUNTER — APPOINTMENT (OUTPATIENT)
Dept: HEMATOLOGY/ONCOLOGY | Facility: HOSPITAL | Age: 47
End: 2017-11-07
Attending: INTERNAL MEDICINE
Payer: COMMERCIAL

## 2017-11-13 ENCOUNTER — APPOINTMENT (OUTPATIENT)
Dept: GENERAL RADIOLOGY | Facility: HOSPITAL | Age: 47
DRG: 864 | End: 2017-11-13
Attending: EMERGENCY MEDICINE
Payer: COMMERCIAL

## 2017-11-13 ENCOUNTER — HOSPITAL ENCOUNTER (INPATIENT)
Facility: HOSPITAL | Age: 47
LOS: 2 days | Discharge: LEFT BEFORE DISCHARGE | DRG: 864 | End: 2017-11-15
Attending: EMERGENCY MEDICINE | Admitting: INTERNAL MEDICINE
Payer: COMMERCIAL

## 2017-11-13 DIAGNOSIS — E87.1 HYPONATREMIA: ICD-10-CM

## 2017-11-13 DIAGNOSIS — I95.9 HYPOTENSION, UNSPECIFIED HYPOTENSION TYPE: ICD-10-CM

## 2017-11-13 DIAGNOSIS — B37.81 ESOPHAGEAL CANDIDIASIS (HCC): Primary | ICD-10-CM

## 2017-11-13 DIAGNOSIS — R50.9 ACUTE FEBRILE ILLNESS: ICD-10-CM

## 2017-11-13 PROCEDURE — 71010 XR CHEST AP PORTABLE  (CPT=71010): CPT | Performed by: EMERGENCY MEDICINE

## 2017-11-13 PROCEDURE — 99222 1ST HOSP IP/OBS MODERATE 55: CPT | Performed by: INTERNAL MEDICINE

## 2017-11-13 PROCEDURE — 72170 X-RAY EXAM OF PELVIS: CPT | Performed by: EMERGENCY MEDICINE

## 2017-11-13 RX ORDER — ACETAMINOPHEN 160 MG/5ML
500 SOLUTION ORAL ONCE
Status: COMPLETED | OUTPATIENT
Start: 2017-11-13 | End: 2017-11-13

## 2017-11-13 RX ORDER — ONDANSETRON 2 MG/ML
4 INJECTION INTRAMUSCULAR; INTRAVENOUS EVERY 4 HOURS PRN
Status: DISCONTINUED | OUTPATIENT
Start: 2017-11-13 | End: 2017-11-14

## 2017-11-13 RX ORDER — ALBUMIN, HUMAN INJ 5% 5 %
500 SOLUTION INTRAVENOUS ONCE
Status: COMPLETED | OUTPATIENT
Start: 2017-11-13 | End: 2017-11-13

## 2017-11-13 RX ORDER — CETIRIZINE HYDROCHLORIDE 10 MG/1
10 TABLET ORAL DAILY
Status: DISCONTINUED | OUTPATIENT
Start: 2017-11-13 | End: 2017-11-15

## 2017-11-13 RX ORDER — FLUCONAZOLE 2 MG/ML
200 INJECTION, SOLUTION INTRAVENOUS EVERY 24 HOURS
Status: DISCONTINUED | OUTPATIENT
Start: 2017-11-13 | End: 2017-11-15

## 2017-11-13 RX ORDER — LEVOTHYROXINE SODIUM 175 UG/1
175 TABLET ORAL DAILY
Status: DISCONTINUED | OUTPATIENT
Start: 2017-11-14 | End: 2017-11-15

## 2017-11-13 RX ORDER — HEPARIN SODIUM 5000 [USP'U]/ML
5000 INJECTION, SOLUTION INTRAVENOUS; SUBCUTANEOUS EVERY 8 HOURS SCHEDULED
Status: DISCONTINUED | OUTPATIENT
Start: 2017-11-13 | End: 2017-11-15

## 2017-11-13 RX ORDER — ACETAMINOPHEN 325 MG/1
650 TABLET ORAL EVERY 6 HOURS PRN
Status: DISCONTINUED | OUTPATIENT
Start: 2017-11-13 | End: 2017-11-15

## 2017-11-13 RX ORDER — SODIUM CHLORIDE 9 MG/ML
INJECTION, SOLUTION INTRAVENOUS CONTINUOUS
Status: ACTIVE | OUTPATIENT
Start: 2017-11-13 | End: 2017-11-13

## 2017-11-13 RX ORDER — KETOROLAC TROMETHAMINE 15 MG/ML
15 INJECTION, SOLUTION INTRAMUSCULAR; INTRAVENOUS ONCE
Status: COMPLETED | OUTPATIENT
Start: 2017-11-13 | End: 2017-11-13

## 2017-11-13 NOTE — ED INITIAL ASSESSMENT (HPI)
Patient complains of white, patchy spots on tongue, generalized body aches x3 days, also complains of subjective fever

## 2017-11-13 NOTE — ED PROVIDER NOTES
Patient Seen in: [facility] AND [facility] Emergency Department    History   Patient presents with:   Body ache and/or chills    Stated Complaint: sore throat/fever     HPI    48 yo F with PMH JARETH lung CA on opdivo therapy with known brain, hepato/pancreatic mets, BY MOUTH DAILY. LORazepam 0.5 MG Oral Tab,  Take 1 tablet (0.5 mg total) by mouth every 12 (twelve) hours as needed for Anxiety. QUEtiapine Fumarate 25 MG Oral Tab,  Take 1 tablet (25 mg total) by mouth nightly.    pregabalin 75 MG Oral Cap,  Take 75 mg and equally reactive; full/painless EOM. Neck: No meningismus. Cardiovascular: RRR. Pulmonary/Chest: Effort normal. CTAB. Abdominal: Soft. Nontender. Musculoskeletal: No gross deformity. Neurological: Alert.  BUE proximally and distally with 5/5 str DIFFERENTIAL[[id number]]          Abnormal            Final result                 Please view results for these tests on the individual orders.    COMP METABOLIC PANEL (14)   TSH W REFLEX TO FREE T4   CBC WITH DIFFERENTIAL WITH PLATELET   RAINBOW DRAW BLUE lung volume loss. No large pleural effusion or pneumothorax. BONES: Mild degenerative changes of the thoracic spine are apparent. There is demineralization OTHER: There is a right chest port with tip projecting over the superior vena cava.   Dictated by (CS

## 2017-11-14 ENCOUNTER — TELEPHONE (OUTPATIENT)
Dept: INTERNAL MEDICINE CLINIC | Facility: CLINIC | Age: 47
End: 2017-11-14

## 2017-11-14 PROCEDURE — 99232 SBSQ HOSP IP/OBS MODERATE 35: CPT | Performed by: INTERNAL MEDICINE

## 2017-11-14 PROCEDURE — 99222 1ST HOSP IP/OBS MODERATE 55: CPT | Performed by: INTERNAL MEDICINE

## 2017-11-14 RX ORDER — ZOLPIDEM TARTRATE 5 MG/1
5 TABLET ORAL NIGHTLY PRN
Status: DISCONTINUED | OUTPATIENT
Start: 2017-11-14 | End: 2017-11-15

## 2017-11-14 RX ORDER — QUETIAPINE 200 MG/1
200 TABLET, FILM COATED ORAL NIGHTLY
Status: DISCONTINUED | OUTPATIENT
Start: 2017-11-14 | End: 2017-11-14

## 2017-11-14 RX ORDER — QUETIAPINE 100 MG/1
400 TABLET, FILM COATED ORAL NIGHTLY
Status: DISCONTINUED | OUTPATIENT
Start: 2017-11-14 | End: 2017-11-14

## 2017-11-14 RX ORDER — 0.9 % SODIUM CHLORIDE 0.9 %
VIAL (ML) INJECTION
Status: COMPLETED
Start: 2017-11-14 | End: 2017-11-14

## 2017-11-14 RX ORDER — QUETIAPINE 400 MG/1
400 TABLET, FILM COATED ORAL NIGHTLY
Status: ON HOLD | COMMUNITY
End: 2017-11-14

## 2017-11-14 RX ORDER — QUETIAPINE 200 MG/1
200 TABLET, FILM COATED ORAL NIGHTLY
Status: ON HOLD | COMMUNITY
End: 2017-11-14

## 2017-11-14 RX ORDER — ONDANSETRON 2 MG/ML
8 INJECTION INTRAMUSCULAR; INTRAVENOUS EVERY 6 HOURS PRN
Status: DISCONTINUED | OUTPATIENT
Start: 2017-11-14 | End: 2017-11-15

## 2017-11-14 RX ORDER — ACETAMINOPHEN 10 MG/ML
1000 INJECTION, SOLUTION INTRAVENOUS EVERY 8 HOURS PRN
Status: DISCONTINUED | OUTPATIENT
Start: 2017-11-14 | End: 2017-11-14

## 2017-11-14 RX ORDER — SODIUM CHLORIDE 9 MG/ML
INJECTION, SOLUTION INTRAVENOUS CONTINUOUS
Status: DISCONTINUED | OUTPATIENT
Start: 2017-11-14 | End: 2017-11-15

## 2017-11-14 RX ORDER — GABAPENTIN 600 MG/1
1200 TABLET ORAL 3 TIMES DAILY
Status: ON HOLD | COMMUNITY
End: 2017-11-14

## 2017-11-14 RX ORDER — ACETAMINOPHEN 650 MG/1
650 SUPPOSITORY RECTAL EVERY 6 HOURS PRN
Status: DISCONTINUED | OUTPATIENT
Start: 2017-11-14 | End: 2017-11-15

## 2017-11-14 RX ORDER — LITHIUM CARBONATE 300 MG/1
300 CAPSULE ORAL
Status: DISCONTINUED | OUTPATIENT
Start: 2017-11-14 | End: 2017-11-14

## 2017-11-14 RX ORDER — METOCLOPRAMIDE HYDROCHLORIDE 5 MG/ML
10 INJECTION INTRAMUSCULAR; INTRAVENOUS EVERY 6 HOURS PRN
Status: DISCONTINUED | OUTPATIENT
Start: 2017-11-14 | End: 2017-11-15

## 2017-11-14 RX ORDER — LITHIUM CARBONATE 300 MG/1
300 CAPSULE ORAL
Status: ON HOLD | COMMUNITY
End: 2017-11-14

## 2017-11-14 RX ORDER — GABAPENTIN 400 MG/1
1200 CAPSULE ORAL 3 TIMES DAILY
Status: DISCONTINUED | OUTPATIENT
Start: 2017-11-14 | End: 2017-11-14

## 2017-11-14 NOTE — PLAN OF CARE
DISCHARGE PLANNING    • Discharge to home or other facility with appropriate resources Progressing        Impaired Swallowing    • Minimize aspiration risk Progressing        Patient/Family Goals    • Patient/Family Long Term Goal Progressing    • Patient/

## 2017-11-14 NOTE — CONSULTS
INFECTIOUS DISEASE CONSULT NOTE    Miller Hides Patient Status:  Inpatient    1970 MRN [id number]   Location [facility] 4W/SW/SE Attending Ashley Starr, DO   Hosp Day # 1 PCP Mychal Solano, Left      Comment: LOBECTOMY  AUG  2016: BRAIN SURGERY      Comment: REMOVAL OF DEAD TISSUE   2009: CHOLECYSTECTOMY  No date: CYST REMOVAL      Comment: BREAST, BENIGN  No date: HEMORRHOIDECTOMY  No date: HYSTERECTOMY      Comment: heavy menstrual flow, Vancomycin HCl (VANCOCIN) 1,500 mg in sodium chloride 0.9 % 500 mL IVPB, 1,500 mg, Intravenous, Q12H  •  acetaminophen (TYLENOL) tab 650 mg, 650 mg, Oral, Q6H PRN    Facility-Administered Medications Ordered in Other Encounters:   •  nivolumab (OPDIVO) 240 Lab  11/14/17   0624   RBC  3.30*   HGB  8.4*   HCT  26.1*   MCV  79.1*   MCH  25.6*   MCHC  32.3   RDW  18.5*   WBC  5.6   PLT  287     Recent Labs   Lab  11/13/17   1525  11/14/17   0624   GLU  103*  93   BUN  6*  5*   CREATSERUM  0.75  0.42*   GFRAA toleration of chemo 2/2 febrile neutropenia - currently on nivolumab with associated arthralgias  # Oxycontin dependence and IV abuse - last 2 weeks PTA    PLAN:    - continue vancomycin, cefepime, fluconazole  - if no MRSA isolated will d/c vancomycin  -

## 2017-11-14 NOTE — PROGRESS NOTES
[facility] Pharmacy Note:  Renal Adjustment for Cefepime HCl (MAXIPIME)    Td Larson is a 49year old female who has been prescribed Cefepime HCl (MAXIPIME) 1 g every 24 hrs. CrCl is estimated creatinine clearance is 82.5 mL/min (based on SCr of 0.75 mg/dL).

## 2017-11-14 NOTE — H&P
[facility]    History and Physical    Lilli Bocanegra Patient Status:  Inpatient    1970 MRN [id number]   Location [facility] 4W/SW/SE Attending Junior Rosa DO   Hosp Day # 0 PCP Maryann Winkler DO     Date:  2017  Date of Ad BRAIN SURGERY      Comment: REMOVAL OF DEAD TISSUE   2009: CHOLECYSTECTOMY  No date: CYST REMOVAL      Comment: BREAST, BENIGN  No date: HEMORRHOIDECTOMY  No date: HYSTERECTOMY      Comment: heavy menstrual flow,   2012: LUMPECTOMY RIGHT      Comment: FIBR Physical Exam:   Vital Signs:  Blood pressure 106/65, pulse 70, temperature 98.2 °F (36.8 °C), temperature source Oral, resp. rate 18, height 5' 5\" (1.651 m), weight 152 lb 4.8 oz (69.1 kg), SpO2 98 %.   Physical Exam  Gen: NAD, A&O x 3  HEENT: whitish to IVDU. Esophageal candidiasis (HCC)  Nystatin 5ml qid      Hypotension, unspecified hypotension type  Improved with IVF; monitor on telemetry    Hyponatremia   Due to poor po intake.                    Yara Salvador, DO  11/13/2017

## 2017-11-14 NOTE — PROGRESS NOTES
[facility]    Progress Note    Earlyne Mock Patient Status:  Inpatient    1970 MRN [id number]   Location [facility] 4W/SW/SE Attending Mira Muñoz DO   Hosp Day # 1 PCP Vimal Reaves DO       SUBJECTIVE:  States she is feeli upper lobectomy. Minimal left basilar atelectasis/scarring. 2. No suspicious new focal consolidation or large pleural effusion.                  Assessment and Plan:     Fever  Fever curve improved, respiratory panel negative, CXR and UA negative; BCx pendi

## 2017-11-15 ENCOUNTER — TELEPHONE (OUTPATIENT)
Dept: PAIN CLINIC | Facility: HOSPITAL | Age: 47
End: 2017-11-15

## 2017-11-15 VITALS
TEMPERATURE: 98 F | WEIGHT: 152.31 LBS | DIASTOLIC BLOOD PRESSURE: 75 MMHG | HEIGHT: 65 IN | RESPIRATION RATE: 20 BRPM | HEART RATE: 64 BPM | SYSTOLIC BLOOD PRESSURE: 136 MMHG | BODY MASS INDEX: 25.38 KG/M2 | OXYGEN SATURATION: 100 %

## 2017-11-15 RX ORDER — POTASSIUM CHLORIDE 20 MEQ/1
40 TABLET, EXTENDED RELEASE ORAL ONCE
Status: DISCONTINUED | OUTPATIENT
Start: 2017-11-15 | End: 2017-11-15

## 2017-11-15 RX ORDER — POTASSIUM CHLORIDE 29.8 MG/ML
40 INJECTION INTRAVENOUS ONCE
Status: COMPLETED | OUTPATIENT
Start: 2017-11-15 | End: 2017-11-15

## 2017-11-15 RX ORDER — 0.9 % SODIUM CHLORIDE 0.9 %
VIAL (ML) INJECTION
Status: COMPLETED
Start: 2017-11-15 | End: 2017-11-15

## 2017-11-15 NOTE — SIGNIFICANT EVENT
Patient requested to leave hospital and requested paperwork for this. Nurse explained that this would be Against Medical Advice as the doctor has not released her. She said she understood and paperwork was prepared.  Patient had already deaccessed her own p

## 2017-11-15 NOTE — CONSULTS
Nocturnist Code Blue Note    Called to room 'Code Blue' as pt reportedly got up to the doorway asking for help, then had a ? Near-syncopal event and was assisted to the ground. ? Of responsiveness at that time so Ton Blank called.   Pt assisted back to bed to monitor vitals  -We will place on remote telemetry to monitor cardiac status throughout the night      D/w Dr. Gavin Antonio care time 30 mins  ----------------------------------  Addendum: K+ 3.2, Hgb stable, 1st trop 0.01, 2hr trop pending

## 2017-11-15 NOTE — CONSULTS
[facility]    PATIENT'S NAME: Lambert Mj   ATTENDING PHYSICIAN: Raymundo Carreno DO   CONSULTING PHYSICIAN: Sravan Garcia.  Eli Coleman MD   PATIENT ACCOUNT#:   [id number]    LOCATION:  [facility] [address] [facility] RECORD #:   [id number]       DATE OF BIRTH:  [birth year] answers to questions. She has been started on IV antibiotics with vancomycin and cefepime. She was given IV fluconazole for possible candidiasis, given that she had oral plaques.     The patient does also have a history of severe iron deficiency anemia an the lung (EGFR, ROS1, and ALK negative) with brain metastasis, status post resection of her brain metastasis, radiation. Recently on nivolumab since April 2016 with fairly good control of her disease.   She also has a history of substance abuse with IV mayuri

## 2017-11-15 NOTE — PLAN OF CARE
GASTROINTESTINAL - ADULT    • Minimal or absence of nausea and vomiting Not Progressing          DISCHARGE PLANNING    • Discharge to home or other facility with appropriate resources Progressing        Impaired Swallowing    • Minimize aspiration risk Pro

## 2017-11-15 NOTE — SIGNIFICANT EVENT
Code blue called after patient was eased to floor in hallway outside of room at approximately 0100. Patient with near syncopal event, no cardiac arrest. Remote tele remained in place during this time.  Patient placed on AED monitor, O2 per non rebreather ap

## 2017-11-15 NOTE — TELEPHONE ENCOUNTER
Patient called to reports that she misplaced her prescriptions.  When patient answered phone I identified myself with the [facility]. Patient states that she was just \"released from rehab and would like information of suboxone and group meetin

## 2017-11-16 ENCOUNTER — PATIENT OUTREACH (OUTPATIENT)
Dept: CASE MANAGEMENT | Age: 47
End: 2017-11-16

## 2017-11-16 ENCOUNTER — TELEPHONE (OUTPATIENT)
Dept: INTERNAL MEDICINE UNIT | Facility: HOSPITAL | Age: 47
End: 2017-11-16

## 2017-11-16 NOTE — PROGRESS NOTES
Initial Post Discharge Follow Up   Discharge Date: 11/15/17  Contact Date: 11/16/2017    Consent Verification:  Assessment Completed With: Patient  HIPAA Verified?   Yes    Discharge Dx:   Per patient-She was admitted because she had thrush and a high fev [address] [facility] [address] [phone]          PCP TCM/HFU appointment: scheduled at D/C within 7-14 days  yes     NCM Reviewed/scheduled/rescheduled PCP TCM/HFU appointment with pt:  Yes      Have you made all of your follow up appointments?  yes

## 2017-11-20 ENCOUNTER — OFFICE VISIT (OUTPATIENT)
Dept: HEMATOLOGY/ONCOLOGY | Facility: HOSPITAL | Age: 47
End: 2017-11-20
Attending: INTERNAL MEDICINE
Payer: COMMERCIAL

## 2017-11-20 VITALS
RESPIRATION RATE: 16 BRPM | BODY MASS INDEX: 24.99 KG/M2 | HEART RATE: 76 BPM | TEMPERATURE: 99 F | DIASTOLIC BLOOD PRESSURE: 55 MMHG | HEIGHT: 65 IN | SYSTOLIC BLOOD PRESSURE: 92 MMHG | WEIGHT: 150 LBS

## 2017-11-20 DIAGNOSIS — C79.31 BRAIN METASTASES (HCC): ICD-10-CM

## 2017-11-20 DIAGNOSIS — C34.90 CARCINOMA OF LUNG, UNSPECIFIED LATERALITY (HCC): Primary | ICD-10-CM

## 2017-11-20 DIAGNOSIS — C34.92 CARCINOMA OF LUNG, LEFT (HCC): ICD-10-CM

## 2017-11-20 DIAGNOSIS — D50.9 IRON DEFICIENCY ANEMIA, UNSPECIFIED IRON DEFICIENCY ANEMIA TYPE: ICD-10-CM

## 2017-11-20 DIAGNOSIS — Z51.11 CHEMOTHERAPY MANAGEMENT, ENCOUNTER FOR: ICD-10-CM

## 2017-11-20 DIAGNOSIS — E03.9 HYPOTHYROIDISM (ACQUIRED): ICD-10-CM

## 2017-11-20 DIAGNOSIS — C34.92 CARCINOMA OF LUNG, LEFT (HCC): Primary | ICD-10-CM

## 2017-11-20 DIAGNOSIS — Z45.2 ENCOUNTER FOR ADJUSTMENT OR MANAGEMENT OF VASCULAR ACCESS DEVICE: ICD-10-CM

## 2017-11-20 DIAGNOSIS — C78.7 LIVER METASTASIS (HCC): ICD-10-CM

## 2017-11-20 PROCEDURE — 99212 OFFICE O/P EST SF 10 MIN: CPT | Performed by: INTERNAL MEDICINE

## 2017-11-20 PROCEDURE — 84443 ASSAY THYROID STIM HORMONE: CPT

## 2017-11-20 PROCEDURE — 36591 DRAW BLOOD OFF VENOUS DEVICE: CPT

## 2017-11-20 PROCEDURE — 99215 OFFICE O/P EST HI 40 MIN: CPT | Performed by: INTERNAL MEDICINE

## 2017-11-20 PROCEDURE — 80053 COMPREHEN METABOLIC PANEL: CPT

## 2017-11-20 PROCEDURE — 85025 COMPLETE CBC W/AUTO DIFF WBC: CPT

## 2017-11-20 RX ORDER — HEPARIN SODIUM (PORCINE) LOCK FLUSH IV SOLN 100 UNIT/ML 100 UNIT/ML
5 SOLUTION INTRAVENOUS ONCE
Status: COMPLETED | OUTPATIENT
Start: 2017-11-20 | End: 2017-11-20

## 2017-11-20 RX ORDER — HEPARIN SODIUM (PORCINE) LOCK FLUSH IV SOLN 100 UNIT/ML 100 UNIT/ML
SOLUTION INTRAVENOUS
Status: DISCONTINUED
Start: 2017-11-20 | End: 2017-11-20

## 2017-11-20 RX ORDER — BUPRENORPHINE HYDROCHLORIDE AND NALOXONE HYDROCHLORIDE DIHYDRATE 8; 2 MG/1; MG/1
1 TABLET SUBLINGUAL 2 TIMES DAILY
COMMUNITY
End: 2017-12-20

## 2017-11-20 RX ORDER — HEPARIN SODIUM (PORCINE) LOCK FLUSH IV SOLN 100 UNIT/ML 100 UNIT/ML
5 SOLUTION INTRAVENOUS ONCE
Status: CANCELLED | OUTPATIENT
Start: 2017-11-20

## 2017-11-20 RX ORDER — 0.9 % SODIUM CHLORIDE 0.9 %
VIAL (ML) INJECTION
Status: DISCONTINUED
Start: 2017-11-20 | End: 2017-11-20

## 2017-11-20 RX ORDER — DIPHENHYDRAMINE HYDROCHLORIDE 50 MG/ML
25 INJECTION INTRAMUSCULAR; INTRAVENOUS ONCE
Status: CANCELLED | OUTPATIENT
Start: 2017-11-20

## 2017-11-20 RX ORDER — 0.9 % SODIUM CHLORIDE 0.9 %
10 VIAL (ML) INJECTION ONCE
Status: COMPLETED | OUTPATIENT
Start: 2017-11-20 | End: 2017-11-20

## 2017-11-20 RX ORDER — 0.9 % SODIUM CHLORIDE 0.9 %
10 VIAL (ML) INJECTION ONCE
Status: CANCELLED | OUTPATIENT
Start: 2017-11-20

## 2017-11-20 RX ADMIN — 0.9 % SODIUM CHLORIDE 10 ML: 0.9 % VIAL (ML) INJECTION at 10:15:00

## 2017-11-20 RX ADMIN — HEPARIN SODIUM (PORCINE) LOCK FLUSH IV SOLN 100 UNIT/ML 500 UNITS: 100 SOLUTION INTRAVENOUS at 10:15:00

## 2017-11-20 NOTE — PAYOR COMM NOTE
--------------  ADMISSION REVIEW     [facility]  Subscriber #:  [id number]  Authorization Number: [id number]    Admit date: 11/13/17  Admit time: 2040         REVIEW DOCUMENTATION:     ED Provider Notes      ED Provider Notes signed flow,   2012: LUMPECTOMY RIGHT      Comment: FIBROADENOMA  8-7-15: OTHER      Comment: CYBERKNIFE  No date: OTHER SURGICAL HISTORY  No date: XS PORT-A-CATH INSERTION/EXCH/CHK      Positive for stated complaint: sore throat/fever    ED Triage Vitals [11/13/ She reports feeling a mild sore throat on Saturday which has gotten progressively worse. Reports painful swallowing. This morning noticed whitish spots on tongue and developed fevers. She does report myalgias.  Her son was sick with flu like symptoms last w pressure 106/65, pulse 70, temperature 98.2 °F (36.8 °C), temperature source Oral, resp. rate 18, height 5' 5\" (1.651 m), weight 152 lb 4.8 oz (69.1 kg), SpO2 98 %.   Physical Exam  Gen: NAD, A&O x 3  HEENT: whitish plaques on tongue  Neck: b/l anterior ce type  Improved with IVF; monitor on telemetry    Hyponatremia   Due to poor po intake. 11/14/17  SUBJECTIVE:  States she is feeling ok; afebrile. Eating ok today.  Sore throat is better.      OBJECTIVE:  /73 (BP Location: Right arm)   Pulse

## 2017-11-20 NOTE — PROGRESS NOTES
Cancer Center Progress Note    Patient Name: Miller Calderon   YOB: 1970   Medical Record Number: [id number]   Attending Physician: Dorita Recinos M.D. Chief Complaint:  Metastatic adenocarcinoma of the lung, brain metastasis.     History of next cycle of nivolumab. She is doing well overall. She denies any fevers. She is otherwise doing well overall. She denies any chest pain or shortness of breath. She denies any focal neurological deficits.   She denies any sites of bruising or bleeding History  SELF EMPLOYED       Social History Main Topics   Smoking status: Former Smoker  1.50 Packs/day  For 17.00 Years     Types: Cigarettes    Start date: 8/5/1985    Quit date: 8/5/2015    Smokeless tobacco: Never Used    Comment: Current some day smok BUNCREA 8.8 (L) 11/15/2017   CREATSERUM 0.57 11/15/2017   ANIONGAP 10 11/15/2017   GFRNAA >60 11/15/2017   GFRAA >60 11/15/2017   CA 8.2 (L) 11/15/2017   OSMOCALC 282 11/15/2017   ALKPHO 69 11/14/2017   AST 19 11/14/2017   ALT 13 (L) 11/14/2017   ALKPHOS multiple occasions. Her hemoglobin has dropped further.   We encouraged her again to have this infusion done and she will think about it  –Hypokalemia on oral supplementation   –Repeat imaging ordered and scheduled however the patient has canceled this pre

## 2017-11-20 NOTE — PROGRESS NOTES
Prechemo labs drawn via port. Port flushed and de-accessed. Tolerated well. Discharged to [facility] to await MD visit.

## 2017-11-21 ENCOUNTER — OFFICE VISIT (OUTPATIENT)
Dept: HEMATOLOGY/ONCOLOGY | Facility: HOSPITAL | Age: 47
End: 2017-11-21
Attending: INTERNAL MEDICINE
Payer: COMMERCIAL

## 2017-11-21 VITALS
WEIGHT: 150 LBS | BODY MASS INDEX: 25 KG/M2 | RESPIRATION RATE: 16 BRPM | DIASTOLIC BLOOD PRESSURE: 48 MMHG | SYSTOLIC BLOOD PRESSURE: 90 MMHG | HEART RATE: 84 BPM | TEMPERATURE: 99 F

## 2017-11-21 DIAGNOSIS — C34.90 CARCINOMA OF LUNG, UNSPECIFIED LATERALITY (HCC): Primary | ICD-10-CM

## 2017-11-21 DIAGNOSIS — Z45.2 ENCOUNTER FOR ADJUSTMENT OR MANAGEMENT OF VASCULAR ACCESS DEVICE: ICD-10-CM

## 2017-11-21 DIAGNOSIS — C34.92 CARCINOMA OF LUNG, LEFT (HCC): ICD-10-CM

## 2017-11-21 PROCEDURE — 96413 CHEMO IV INFUSION 1 HR: CPT

## 2017-11-21 RX ORDER — 0.9 % SODIUM CHLORIDE 0.9 %
10 VIAL (ML) INJECTION ONCE
Status: CANCELLED | OUTPATIENT
Start: 2017-11-21

## 2017-11-21 RX ORDER — SODIUM CHLORIDE 9 MG/ML
INJECTION, SOLUTION INTRAVENOUS
Status: DISCONTINUED
Start: 2017-11-21 | End: 2017-11-21

## 2017-11-21 RX ORDER — 0.9 % SODIUM CHLORIDE 0.9 %
VIAL (ML) INJECTION
Status: DISCONTINUED
Start: 2017-11-21 | End: 2017-11-21

## 2017-11-21 RX ORDER — DIPHENHYDRAMINE HYDROCHLORIDE 50 MG/ML
25 INJECTION INTRAMUSCULAR; INTRAVENOUS ONCE
Status: CANCELLED | OUTPATIENT
Start: 2017-11-21

## 2017-11-21 RX ORDER — HEPARIN SODIUM (PORCINE) LOCK FLUSH IV SOLN 100 UNIT/ML 100 UNIT/ML
SOLUTION INTRAVENOUS
Status: COMPLETED
Start: 2017-11-21 | End: 2017-11-21

## 2017-11-21 RX ORDER — HEPARIN SODIUM (PORCINE) LOCK FLUSH IV SOLN 100 UNIT/ML 100 UNIT/ML
5 SOLUTION INTRAVENOUS ONCE
Status: CANCELLED | OUTPATIENT
Start: 2017-11-21

## 2017-11-21 RX ORDER — HEPARIN SODIUM (PORCINE) LOCK FLUSH IV SOLN 100 UNIT/ML 100 UNIT/ML
5 SOLUTION INTRAVENOUS ONCE
Status: COMPLETED | OUTPATIENT
Start: 2017-11-21 | End: 2017-11-21

## 2017-11-21 RX ADMIN — HEPARIN SODIUM (PORCINE) LOCK FLUSH IV SOLN 100 UNIT/ML 500 UNITS: 100 SOLUTION INTRAVENOUS at 12:16:00

## 2017-11-21 NOTE — PATIENT INSTRUCTIONS
Helpful hints during cancer treatment:    Diet:  o Avoid greasy or spicy foods on days surrounding chemotherapy  o Eat small frequent meals per day (6-7 meals) rather than 3 large meals  o Choose high calorie/high protein foods (chicken, hard cooked eggs, especially after baths.  o If scalp hair loss has occurred, protect the scalp from the sun by wearing a hat and use sunscreen. Apply alcohol-free moisturizer as needed.     When to call the doctor:  • Fever of 100.5 or greater or shaking chills  • Nausea/v they can be washed. 3. Absorbable undergarments, or any other items contaminated with chemotherapy, should be placed in a sealed plastic bag for disposal, separate from other trash.   4. Toilets should be flushed twice with the lid closed while taking thi

## 2017-11-21 NOTE — PROGRESS NOTES
Post Chemo Documentation    Patient is here for treatment today, Cycle37, Day 1.   Opdivo    Arrives Ambulating independently                                   Modifications in dose or schedule: No      Verbalizes complaints patient offers no complaints; de Home     Discharged Ambulating independently      continued from previous treatment plan  Outpatient Oncology Care Plan    Problem list:  anemia  ineffective patient coping  loss of appetite  neutropenia  fatigue  knowledge deficit    Problems related to:

## 2017-11-25 ENCOUNTER — APPOINTMENT (OUTPATIENT)
Dept: CT IMAGING | Facility: HOSPITAL | Age: 47
DRG: 100 | End: 2017-11-25
Attending: EMERGENCY MEDICINE
Payer: COMMERCIAL

## 2017-11-25 ENCOUNTER — HOSPITAL ENCOUNTER (INPATIENT)
Facility: HOSPITAL | Age: 47
LOS: 25 days | Discharge: HOME | DRG: 100 | End: 2017-12-20
Attending: EMERGENCY MEDICINE | Admitting: INTERNAL MEDICINE
Payer: COMMERCIAL

## 2017-11-25 DIAGNOSIS — E87.6 HYPOKALEMIA: ICD-10-CM

## 2017-11-25 DIAGNOSIS — G40.901 STATUS EPILEPTICUS (HCC): Primary | ICD-10-CM

## 2017-11-25 DIAGNOSIS — D64.9 ANEMIA, UNSPECIFIED TYPE: ICD-10-CM

## 2017-11-25 PROBLEM — G93.40 ACUTE ENCEPHALOPATHY: Status: ACTIVE | Noted: 2017-11-25

## 2017-11-25 PROBLEM — E87.2 METABOLIC ACIDOSIS: Status: ACTIVE | Noted: 2017-11-25

## 2017-11-25 PROBLEM — R73.9 HYPERGLYCEMIA: Status: ACTIVE | Noted: 2017-11-25

## 2017-11-25 PROBLEM — E87.20 METABOLIC ACIDOSIS: Status: ACTIVE | Noted: 2017-11-25

## 2017-11-25 PROCEDURE — 99223 1ST HOSP IP/OBS HIGH 75: CPT | Performed by: INTERNAL MEDICINE

## 2017-11-25 PROCEDURE — 72125 CT NECK SPINE W/O DYE: CPT | Performed by: EMERGENCY MEDICINE

## 2017-11-25 PROCEDURE — 99223 1ST HOSP IP/OBS HIGH 75: CPT | Performed by: OTHER

## 2017-11-25 PROCEDURE — 70450 CT HEAD/BRAIN W/O DYE: CPT | Performed by: EMERGENCY MEDICINE

## 2017-11-25 RX ORDER — LORAZEPAM 2 MG/ML
INJECTION INTRAMUSCULAR
Status: COMPLETED
Start: 2017-11-25 | End: 2017-11-25

## 2017-11-25 RX ORDER — HEPARIN SODIUM 5000 [USP'U]/ML
5000 INJECTION, SOLUTION INTRAVENOUS; SUBCUTANEOUS EVERY 12 HOURS SCHEDULED
Status: DISCONTINUED | OUTPATIENT
Start: 2017-11-25 | End: 2017-12-20

## 2017-11-25 RX ORDER — SODIUM CHLORIDE 9 MG/ML
INJECTION, SOLUTION INTRAVENOUS CONTINUOUS
Status: DISCONTINUED | OUTPATIENT
Start: 2017-11-25 | End: 2017-12-01

## 2017-11-25 RX ORDER — LEVOTHYROXINE SODIUM 175 UG/1
175 TABLET ORAL
Status: DISCONTINUED | OUTPATIENT
Start: 2017-11-26 | End: 2017-11-27

## 2017-11-25 RX ORDER — LORAZEPAM 2 MG/ML
INJECTION INTRAMUSCULAR
Status: DISCONTINUED
Start: 2017-11-25 | End: 2017-11-25 | Stop reason: WASHOUT

## 2017-11-25 RX ORDER — FAMOTIDINE 20 MG/1
20 TABLET ORAL 2 TIMES DAILY
COMMUNITY
End: 2017-12-20

## 2017-11-25 RX ORDER — HYDROXYZINE 50 MG/1
50 TABLET, FILM COATED ORAL EVERY 8 HOURS PRN
COMMUNITY
End: 2017-12-20

## 2017-11-25 RX ORDER — LORAZEPAM 2 MG/ML
2 INJECTION INTRAMUSCULAR AS NEEDED
Status: DISCONTINUED | OUTPATIENT
Start: 2017-11-25 | End: 2017-12-18

## 2017-11-25 RX ORDER — 0.9 % SODIUM CHLORIDE 0.9 %
VIAL (ML) INJECTION
Status: COMPLETED
Start: 2017-11-25 | End: 2017-11-25

## 2017-11-25 NOTE — CONSULTS
[facility] - [facility]    Report of Consultation    John Felton Patient Status:  Emergency    1970 MRN [id number]   Location [address] Attending Aurea Rios MD   Hosp Day # 0 PCP Amy Matta,      Date of Admis dealing with mental illness and her teenage son. Patient still had not any seizures until November 2017, when her  heard her f falling, he rushed into the room and found her having clonic activity, symmetrically.   He called an ambulance, apparent Concern        Yes    Comment:2 or 1 cup of coffee daily    Social History Narrative    None on file            Current Medications:    Current Facility-Administered Medications:  LORazepam (ATIVAN) 2 MG/ML injection      levETIRAcetam (KEPPRA) 1,000 mg in 137 11/25/2017   K 2.7 (L) 11/25/2017    11/25/2017   CO2 21 (L) 11/25/2017    (H) 11/25/2017   CA 8.7 11/25/2017   ALB 3.0 (L) 11/25/2017   ALKPHO 71 11/25/2017   TP 6.9 11/25/2017   AST 32 11/25/2017   ALT 10 (L) 11/25/2017   T4F <0.25 (L) 0

## 2017-11-25 NOTE — ED INITIAL ASSESSMENT (HPI)
New onset seizure today x1 at home and another one in the ambulance. She was given Versed In, Hx lung Ca and brain tumor.

## 2017-11-25 NOTE — PLAN OF CARE
MD NOTIFICATION    MD Notified: DR. Maryann Christine    Time: 7430    Reason: PATIENT AROUSABLE. FOLLOWING SIMPLE COMMANDS. SQUEEZES WITH RIGHT HAND. LEFT SIDE FLACCID. NOT FOLLOWING COMMANDS ON LEFT SIDE. New Orders: NO NEW ORDERS RECEIVED.  WILL OBTAIN MRI A

## 2017-11-25 NOTE — ED PROVIDER NOTES
Patient Seen in: [facility] 2w/sw    History   Patient presents with:  Seizure Disorder (neurologic)    Stated Complaint: Hypokalemia    HPI    49year old female with multiple medical problems including lung cancer with previous metastases to the AUG  2016: BRAIN SURGERY      Comment: REMOVAL OF DEAD TISSUE   2009: CHOLECYSTECTOMY  No date: CYST REMOVAL      Comment: BREAST, BENIGN  No date: HEMORRHOIDECTOMY  No date: HYSTERECTOMY      Comment: heavy menstrual flow,   2012: LUMPECTOMY RIGHT      Co Eyes: Conjunctivae and EOM are normal. Pupils are equal, round, and reactive to light. Neck: Normal range of motion and full passive range of motion without pain. Neck supple.    Cardiovascular: Normal rate, regular rhythm, normal heart sounds and intact Ur. Opiates Screen Detected (*)     All other components within normal limits   CBC W/ DIFFERENTIAL - Abnormal; Notable for the following:     RBC 3.36 (*)     HGB 8.6 (*)     HCT 26.9 (*)     MCH 25.6 (*)     RDW 18.9 (*)      (*)     All other co Result Date: 11/25/2017  CONCLUSION:  1. Mild dextroscoliosis and minimal multilevel cervical spondylosis. 2. No acute fracture or acute malalignment.            Radiology exams  Viewed and reviewed by myself and findings discussed with patient including ne Brain metastasis ([facility] C79.31 Unknown Unknown    Encephalopathy, ACUTE  G93.40 11/25/2017 Yes    Hyperglycemia R73.9 11/25/2017 Yes    Hypokalemia E87.6 59/10/8977     Metabolic acidosis W83.0 73/26/9036 Yes    Polysubstance dependence ([facility] F19.20 Unknow

## 2017-11-25 NOTE — H&P
[facility]    History and Physical    Alcides Cordero Patient Status:  Inpatient    1970 MRN [id number]   Location [facility] 2W/SW Attending Elisa Rutledge DO   Hosp Day # 0 CHASIDY Santacruz DO     Date:  2017  Date of Admis WEEKS BEGINNING 2015    • Pneumonia, organism unspecified(486)    • Wears glasses      Past Surgical History:  1999: APPENDECTOMY  JULY 2015: BRAIN SURGERY      Comment: TUMOR RESECTION  No date: BRAIN SURGERY Left      Comment: OCCIPITAL CRANIOTOMY  No da pulse 87, temperature 97.6 °F (36.4 °C), temperature source Temporal, resp. rate 23, height 5' 5\" (1.651 m), weight 149 lb 11.2 oz (67.9 kg), SpO2 97 %. Physical Exam  Gen: NAD, drowsy, unarousable.  Breathing spontaneously  HEENT: dry oral mucosa  Neck: negative for acute bleed; UDS + benzo, opiates, cocaine. etiology unclear: could be related to new metastases from cancer vs drug use/withdrawal; MRI ordered to r/o new metastases. Neurology consulted; patient received IV keppra. Seizure precautions.  IVF N

## 2017-11-26 ENCOUNTER — APPOINTMENT (OUTPATIENT)
Dept: MRI IMAGING | Facility: HOSPITAL | Age: 47
DRG: 100 | End: 2017-11-26
Attending: Other
Payer: COMMERCIAL

## 2017-11-26 PROCEDURE — 99232 SBSQ HOSP IP/OBS MODERATE 35: CPT | Performed by: INTERNAL MEDICINE

## 2017-11-26 PROCEDURE — 99233 SBSQ HOSP IP/OBS HIGH 50: CPT | Performed by: OTHER

## 2017-11-26 PROCEDURE — 70553 MRI BRAIN STEM W/O & W/DYE: CPT | Performed by: OTHER

## 2017-11-26 PROCEDURE — 95816 EEG AWAKE AND DROWSY: CPT | Performed by: OTHER

## 2017-11-26 PROCEDURE — 4A10X4Z MONITORING OF CENTRAL NERVOUS ELECTRICAL ACTIVITY, EXTERNAL APPROACH: ICD-10-PCS | Performed by: OTHER

## 2017-11-26 PROCEDURE — 99233 SBSQ HOSP IP/OBS HIGH 50: CPT | Performed by: INTERNAL MEDICINE

## 2017-11-26 RX ORDER — 0.9 % SODIUM CHLORIDE 0.9 %
VIAL (ML) INJECTION
Status: COMPLETED
Start: 2017-11-26 | End: 2017-11-26

## 2017-11-26 RX ORDER — MAGNESIUM SULFATE HEPTAHYDRATE 40 MG/ML
2 INJECTION, SOLUTION INTRAVENOUS ONCE
Status: COMPLETED | OUTPATIENT
Start: 2017-11-26 | End: 2017-11-26

## 2017-11-26 RX ORDER — POTASSIUM CHLORIDE 29.8 MG/ML
40 INJECTION INTRAVENOUS ONCE
Status: COMPLETED | OUTPATIENT
Start: 2017-11-26 | End: 2017-11-26

## 2017-11-26 NOTE — CONSULTS
[facility]    PATIENT'S NAME: Ayon Tony   ATTENDING PHYSICIAN: Rajani Delgadillo DO   CONSULTING PHYSICIAN: Lottie Mcnair.  Neeraj Díaz MD   PATIENT ACCOUNT#:   [id number]    LOCATION:  [address] [place] [address] RECORD #:   [id number]       DATE OF BIRTH:  [birth year]/ abuse/dependence and previous rehabilitation stays. She lives with her  and HER children. FAMILY HISTORY:  History of prostate cancer in her father. REVIEW OF SYSTEMS:  Unable to be obtained due to clinical condition.       PHYSICAL EXAMINATIO Initially a CT of the brain does not show any progression of her metastatic disease. We agree with getting an MRI of the brain.   If there are any new lesions or edema, we would recommend starting dexamethasone 8 mg twice a day, and consultation with neuro

## 2017-11-26 NOTE — PROGRESS NOTES
[facility]    Hematology/Oncology   Progress Note    Dayana Hall Patient Status:  Inpatient    1970 MRN [id number]   Location [facility] 2W/SW Attending Jyoti Carrington DO   Hosp Day # 1 PCP Umesh Day DO     Subjective:  ar (cpt=72125)    Result Date: 11/25/2017  CONCLUSION:  1. Mild dextroscoliosis and minimal multilevel cervical spondylosis. 2. No acute fracture or acute malalignment. Medications reviewed.     Assessment and Plan:  68-year-old female with a histo

## 2017-11-26 NOTE — PROGRESS NOTES
[facility]    Progress Note    Earlyne Mock Patient Status:  Inpatient    1970 MRN [id number]   Location [facility] 2W/SW Attending Mira Muñoz DO   Hosp Day # 1 PCP Vimal Reaves DO       SUBJECTIVE:  Slightly more awake; a encephalomalacic changes and surrounding gliosis. Lack of intravenous contrast limits evaluation for recurrent tumor. No significant mass effect appreciated. Findings are similar to 7/24/17.  2. Mild exvacuo dilatation of the right frontal lobe and left occ

## 2017-11-26 NOTE — PROGRESS NOTES
11/25/17 7879   Clinical Encounter Type   Visited With Patient and family together   Routine Visit Introduction   Continue Visiting Yes   Patient's Supportive Strategies/Resources Family   Referral From Nurse   Referral To    Patient Spiritual E

## 2017-11-26 NOTE — PLAN OF CARE
METABOLIC/FLUID AND ELECTROLYTES - ADULT    • Electrolytes maintained within normal limits Not Progressing        NEUROLOGICAL - ADULT    • Achieves stable or improved neurological status Not Progressing    • Achieves maximal functionality and self care No

## 2017-11-26 NOTE — PROGRESS NOTES
[facility] AND [facility]  Neurology Progress Note    Dayana Amlino Patient Status:  Inpatient    1970 MRN [id number]   Location [facility] 2W/SW Attending Jyoti Carrington, [address] [facility] Day # 1 PCP Umesh Day DO     Subjective:  Dayana Hall is a(n) 49 yea nourished, well groomed.   Head- Normocephalic, atraumatic, postsurgical scars in the right frontal and left occipital region  Eyes- No redness or swelling  Neck- No masses or adenopathy  CV: pulses were palpable and normal, no cyanosis or edema     Neurolo illicit drug use including cocaine and opiates even though she was only one week of rehab. It is not clear if there is any recurrence of tumor, or infection or cocaine induced seizures. MRI of the brain is still pending.   Continues EEG will be placed, th

## 2017-11-26 NOTE — PLAN OF CARE
CARDIOVASCULAR - ADULT    • Maintains optimal cardiac output and hemodynamic stability Progressing    • Absence of cardiac arrhythmias or at baseline Progressing        METABOLIC/FLUID AND ELECTROLYTES - ADULT    • Electrolytes maintained within normal corona

## 2017-11-26 NOTE — PROCEDURES
EEG report    REFERRING PHYSICIAN: Benita Bautista DO  PCP and phone number:  Emma Keating   [phone]    TECHNIQUE: 21 channels of EEG, 2 channels of EOG, and 1 channel of EKG were recorded utilizing the International 10/20 System.  The recording was frontal lobe. Such EEG findings are commonly observed in patients with acute cerebral infarction/anoxia and during recovery form status epilepticus. PLEDs may be seen in other clinical settings such as herpes simplex encephalitis, and cerebral abscess.  In

## 2017-11-27 ENCOUNTER — APPOINTMENT (OUTPATIENT)
Dept: GENERAL RADIOLOGY | Facility: HOSPITAL | Age: 47
DRG: 100 | End: 2017-11-27
Attending: CLINICAL NURSE SPECIALIST
Payer: COMMERCIAL

## 2017-11-27 ENCOUNTER — APPOINTMENT (OUTPATIENT)
Dept: GENERAL RADIOLOGY | Facility: HOSPITAL | Age: 47
DRG: 100 | End: 2017-11-27
Attending: INTERNAL MEDICINE
Payer: COMMERCIAL

## 2017-11-27 PROBLEM — F05 DELIRIUM DUE TO ANOTHER MEDICAL CONDITION: Status: ACTIVE | Noted: 2017-11-25

## 2017-11-27 PROCEDURE — 71010 XR CHEST AP PORTABLE  (CPT=71010): CPT | Performed by: CLINICAL NURSE SPECIALIST

## 2017-11-27 PROCEDURE — 02HV33Z INSERTION OF INFUSION DEVICE INTO SUPERIOR VENA CAVA, PERCUTANEOUS APPROACH: ICD-10-PCS | Performed by: INTERNAL MEDICINE

## 2017-11-27 PROCEDURE — 31500 INSERT EMERGENCY AIRWAY: CPT | Performed by: INTERNAL MEDICINE

## 2017-11-27 PROCEDURE — 0BH18EZ INSERTION OF ENDOTRACHEAL AIRWAY INTO TRACHEA, VIA NATURAL OR ARTIFICIAL OPENING ENDOSCOPIC: ICD-10-PCS | Performed by: INTERNAL MEDICINE

## 2017-11-27 PROCEDURE — 71010 XR CHEST AP PORTABLE  (CPT=71010): CPT | Performed by: INTERNAL MEDICINE

## 2017-11-27 PROCEDURE — 0CJS8ZZ INSPECTION OF LARYNX, VIA NATURAL OR ARTIFICIAL OPENING ENDOSCOPIC: ICD-10-PCS | Performed by: INTERNAL MEDICINE

## 2017-11-27 PROCEDURE — 90792 PSYCH DIAG EVAL W/MED SRVCS: CPT | Performed by: OTHER

## 2017-11-27 PROCEDURE — 99233 SBSQ HOSP IP/OBS HIGH 50: CPT | Performed by: OTHER

## 2017-11-27 PROCEDURE — 99291 CRITICAL CARE FIRST HOUR: CPT | Performed by: INTERNAL MEDICINE

## 2017-11-27 PROCEDURE — 5A1955Z RESPIRATORY VENTILATION, GREATER THAN 96 CONSECUTIVE HOURS: ICD-10-PCS | Performed by: INTERNAL MEDICINE

## 2017-11-27 PROCEDURE — 99233 SBSQ HOSP IP/OBS HIGH 50: CPT | Performed by: INTERNAL MEDICINE

## 2017-11-27 RX ORDER — MIDAZOLAM HYDROCHLORIDE 1 MG/ML
4 INJECTION INTRAMUSCULAR; INTRAVENOUS ONCE
Status: COMPLETED | OUTPATIENT
Start: 2017-11-27 | End: 2017-11-27

## 2017-11-27 RX ORDER — PHENYTOIN SODIUM 50 MG/ML
200 INJECTION, SOLUTION INTRAMUSCULAR; INTRAVENOUS EVERY 8 HOURS
Status: DISCONTINUED | OUTPATIENT
Start: 2017-11-27 | End: 2017-11-30

## 2017-11-27 RX ORDER — HALOPERIDOL 5 MG/ML
2.5 INJECTION INTRAMUSCULAR ONCE
Status: COMPLETED | OUTPATIENT
Start: 2017-11-27 | End: 2017-11-27

## 2017-11-27 RX ORDER — SODIUM CHLORIDE 0.9 % (FLUSH) 0.9 %
10 SYRINGE (ML) INJECTION AS NEEDED
Status: DISCONTINUED | OUTPATIENT
Start: 2017-11-27 | End: 2017-12-12

## 2017-11-27 RX ORDER — POTASSIUM CHLORIDE 29.8 MG/ML
40 INJECTION INTRAVENOUS ONCE
Status: COMPLETED | OUTPATIENT
Start: 2017-11-27 | End: 2017-11-27

## 2017-11-27 RX ORDER — MIDAZOLAM HYDROCHLORIDE 1 MG/ML
INJECTION INTRAMUSCULAR; INTRAVENOUS
Status: COMPLETED
Start: 2017-11-27 | End: 2017-11-27

## 2017-11-27 RX ORDER — 0.9 % SODIUM CHLORIDE 0.9 %
VIAL (ML) INJECTION
Status: COMPLETED
Start: 2017-11-27 | End: 2017-11-27

## 2017-11-27 RX ORDER — MAGNESIUM SULFATE HEPTAHYDRATE 40 MG/ML
2 INJECTION, SOLUTION INTRAVENOUS ONCE
Status: COMPLETED | OUTPATIENT
Start: 2017-11-27 | End: 2017-11-27

## 2017-11-27 NOTE — PROGRESS NOTES
[facility]    Progress Note    Светлана Romero Patient Status:  Inpatient    1970 MRN [id number]   Location [facility] 2W/SW Attending Price Stubbs DO   Hosp Day # 2 PCP Arslan Cardona DO       SUBJECTIVE:  Overnight, patient did 137  138  138   K  2.7*  3.3  3.4   CL  100  99  101   CO2  21*  28  29   ALKPHO  71  69   --    AST  32  18   --    ALT  10*  10*   --    BILT  0.3  0.4   --    TP  6.9  6.5   --          Imaging:  Ct Brain Or Head ([id number])    Result Date: 11/25/2017  CONC 11/27/2017  CONCLUSION:  1. Left lung changes status post partial pneumonectomy. 2. No acute abnormality. No significant change has occurred.                   Assessment and Plan:     Status epilepticus ([address]   CT head negative for acute bleed; UDS + benzo

## 2017-11-27 NOTE — PROGRESS NOTES
Vascular Access Note  Inserted by Pennie Kuhn RN  Vascular Access Screening:   Allergies to Lidocaine: no  Allergies to Latex: no  Presence of Pacemaker/Defibrillator: No  Mastectomy with Lymph Node Dissection: No  AV Fistula / AV Graft: No  Dialysis Cath

## 2017-11-27 NOTE — PROGRESS NOTES
[facility] Note: Route Optimization for Levothyroxine (SYNTHROID)    Patient is currently on Levothyroxine (SYNTHROID) 175 mcg po daily. Medication was changed from po formulation to Levothyroxine (SYNTHROID) 87.5 mcg IV daily per protocol.       Chantel Kaplan

## 2017-11-27 NOTE — CM/SW NOTE
ERMIAS following up on d/c planning for the patient. Patient was admitted from home where she lives with her . Patient was independent with care prior to admission. She has a dx lung CA with mets and is currently intubated in CCU.   Psych is on consent

## 2017-11-27 NOTE — PHYSICAL THERAPY NOTE
8:06am: PT eval orders received, Attempted to see patient for PT eval but per RN Emre Jones, pt is lethargic and sleepy this AM. Per RN, pt was agitated last night and pt was provided with medications to calm her down which  May be making her sleepy.  Per RN, p

## 2017-11-27 NOTE — PROCEDURES
Procedure : Endotracheal intubation  Date 11/27/2017  Indication patient with status epilepticus and not protecting her airway  4 mg of Versed were given  25 mcg of propofol l IV were given    Under direct laryngoscopy vocal cord were visualized  7.5 ET tu

## 2017-11-27 NOTE — PROGRESS NOTES
[facility] AND [facility]  Neurology Progress Note    Td Larson Patient Status:  Inpatient    1970 MRN [id number]   Location [facility] 2W/SW Attending Rox Flower, [address] [facility] Day # 2 PCP Nirmal Godfrey DO     Subjective:  Td Larson is a(n) 49 yea Resp: 14 16 14 15   Temp: 97.9 °F (36.6 °C)  97.8 °F (36.6 °C)    TempSrc: Temporal  Temporal    SpO2: 100% 100% 100% 100%   Weight:       Height:           General: No apparent distress, well nourished, well groomed.   Head- Normocephalic, atraumatic, po Hypomagnesemia      Patient with localization-related epilepsy, EEG showed periodic lateralized to the right frontal lobe epileptiform discharges, that will continues throughout the 30 minute recording.   Apparently patient has been relapsing on illicit mayuri

## 2017-11-27 NOTE — DIETARY NOTE
ADULT NUTRITION INITIAL ASSESSMENT    Pt is at high nutrition risk. Pt meets/does not meet malnutrition criteria--to be determined at later date. Could not go in room--pt being intubated at time of visit.      RECOMMENDATIONS TO MD:  Entered TF order as fo kg/m².   BMI CLASSIFICATION: 19-24.9 kg/m2 - WNL  IBW: 125 lbs        119% IBW  Usual Body Wt: 175-185 lbs 1 yr ago, 165# more recently       85% UBW    WEIGHT HISTORY:  Patient Weight(s) for the past 336 hrs:   Weight   11/25/17 1316 67.9 kg (149 lb 11.2 o PHYSICAL FINDINGS:  - Body Fat/Muscle Mass: appeared well nourished from brief view from door, but given wt loss history could have fat and muscle deficits--assess as able at later date.       - Fluid Accumulation: none RN documentation    - Skin Integrity:

## 2017-11-27 NOTE — CONSULTS
[facility]OSP - [facility]    Report of Consultation    Adriana Miranda Patient Status:  Inpatient    1970 MRN [id number]   Location [facility] 2W/SW Attending Christiano Lobato DO   Hosp Day # 2 PCP Pankaj Hernández DO     Date of Admission:  2017 DISCONTINUED AUG 2015    • Gallbladder disease    • Hepatitis C    • High blood pressure    • Hypothyroid    • Lung cancer (HCC)    • Migraines    • Osteoarthritis    • [facility]   • Personal history of antineoplastic chemotherapy % solution      propofol (DIPRIVAN) infusion 5-100 mcg/kg/min Intravenous Continuous   propofol (DIPRIVAN) 10 MG/ML injection      propofol (DIPRIVAN) infusion 5-100 mcg/kg/min Intravenous Continuous   Sodium Chloride 0.9 % solution      influenza vaccine source Temporal, resp. rate 12, height 5' 5\" (1.651 m), weight 149 lb 11.2 oz (67.9 kg), SpO2 100 %.     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 3. Subtle abnormal FLAIR signal in these regions including medial margin of the right thalamus.  Recommend short term interval followup to exclude progress 11/27/2017 at 13:41          Result Date: 11/27/2017  CONCLUSION:  1. Interval development of bibasilar and left perihilar pulmonary opacities which could represent atelectases, pneumonia/infiltrate, or edema.  2.  Satisfactory position of endotracheal tub

## 2017-11-27 NOTE — PLAN OF CARE
NEUROLOGICAL - ADULT    • Achieves stable or improved neurological status Not Progressing    • Absence of seizures Not Progressing    • Achieves maximal functionality and self care Not Progressing          CARDIOVASCULAR - ADULT    • Maintains optimal card

## 2017-11-27 NOTE — OCCUPATIONAL THERAPY NOTE
Reviewed OT orders and chart - attempted to see patient for OT evaluation but after discussion with MARIELLA Vera patient was not appropriate to be seen this am as she was very lethargic and responding to minimal commands - agitated previous night as well .  Wi

## 2017-11-28 ENCOUNTER — APPOINTMENT (OUTPATIENT)
Dept: GENERAL RADIOLOGY | Facility: HOSPITAL | Age: 47
DRG: 100 | End: 2017-11-28
Attending: INTERNAL MEDICINE
Payer: COMMERCIAL

## 2017-11-28 PROBLEM — D50.8 IRON DEFICIENCY ANEMIA SECONDARY TO INADEQUATE DIETARY IRON INTAKE: Status: ACTIVE | Noted: 2017-05-03

## 2017-11-28 PROBLEM — G93.40 ENCEPHALOPATHY ACUTE: Status: ACTIVE | Noted: 2017-11-25

## 2017-11-28 PROCEDURE — 99232 SBSQ HOSP IP/OBS MODERATE 35: CPT | Performed by: INTERNAL MEDICINE

## 2017-11-28 PROCEDURE — 99232 SBSQ HOSP IP/OBS MODERATE 35: CPT | Performed by: OTHER

## 2017-11-28 PROCEDURE — 99233 SBSQ HOSP IP/OBS HIGH 50: CPT | Performed by: INTERNAL MEDICINE

## 2017-11-28 PROCEDURE — 71010 XR CHEST AP PORTABLE  (CPT=71010): CPT | Performed by: INTERNAL MEDICINE

## 2017-11-28 PROCEDURE — 99233 SBSQ HOSP IP/OBS HIGH 50: CPT | Performed by: OTHER

## 2017-11-28 PROCEDURE — 95951 EEG MONITORING/VIDEORECORD: CPT | Performed by: OTHER

## 2017-11-28 RX ORDER — ARIPIPRAZOLE 15 MG/1
40 TABLET ORAL EVERY 4 HOURS
Status: COMPLETED | OUTPATIENT
Start: 2017-11-28 | End: 2017-11-28

## 2017-11-28 NOTE — OCCUPATIONAL THERAPY NOTE
Reviewed chart and spoke with nurse. Pt is not appropriate for participation in skilled therapy at this time. Current OT eval and treat order will be discharged at this time. Will await new orders if/when appropriate.

## 2017-11-28 NOTE — PROCEDURES
Continous Video EEG report    REFERRING PHYSICIAN: Shaunna Rodriguez DO  PCP and phone number:  Ksenia ColinDO  [phone]    TECHNIQUE: 21 channels of EEG, 2 channels of EOG, and 1 channel of EKG were recorded utilizing the International 10/20 System.  Darin Livingston Periodic lateralized epileptiform discharges (PLEDs) were seen over the right hemisphere. This constellation of findings is consistent with an acute abnormality involving the right hemisphere.  Such EEG findings are commonly observed in patients with acute

## 2017-11-28 NOTE — PHYSICAL THERAPY NOTE
Attempted physical therapy evaluation. Patient intubated on 11/27 for airway protection. Will re-attempt as patient is appropriate. Patient is currently on hold secondary to change in status.

## 2017-11-28 NOTE — PROGRESS NOTES
[facility]    Hematology/Oncology   Progress Note    Higinio Canavan Patient Status:  Inpatient    1970 MRN [id number]   Location [facility] 2W/SW Attending Jody ,    Hosp Day # 2 PCP June Falcon DO     Subjective:  No cervical spondylosis. 2. No acute fracture or acute malalignment. Mri Brain (w+wo) (cpt=70553)    Result Date: 11/26/2017  CONCLUSION:  1. Postoperative changes right frontal and left occipital lobes status post resection/treatment response.  Residu in the cavoatrial junction. There are surgical clips in the right  upper quadrant of the abdomen. Nasogastric tube is present extending below the diaphragm. The tip is in the mid gastric body. CONCLUSION:  1.   Interval development of bibasilar and left --Of note, the patient does have a long-standing anemia with iron deficiency and has not followed up for previous IV iron. Will give IV iron inpatient. Will follow. Please call with any questions.        Tawny Abrams MD

## 2017-11-28 NOTE — PROGRESS NOTES
[facility]    Progress Note    Melinda Malhotra Patient Status:  Inpatient    1970 MRN [id number]   Location [facility] 2W/SW Attending Charley Brittle, DO   Hosp Day # 3 PCP Kasey Tai DO     Subjective:     Unable to perform ROS 11/28/2017   HGB 7.5 (L) 11/28/2017   HCT 23.2 (L) 11/28/2017    (H) 11/28/2017   CREATSERUM 0.63 11/28/2017   BUN 6 (L) 11/28/2017    11/28/2017   K 3.0 (L) 11/28/2017   K 3.0 (L) 11/28/2017    11/28/2017   CO2 29 11/28/2017    ( Single view. FINDINGS:  CARDIAC/VASC: Normal.  No cardiac silhouette abnormality or cardiomegaly. Unremarkable pulmonary vasculature. MEDIAST/ROBINSON:   No visible mass or adenopathy.  LUNGS/PLEURA: There is lymphoma has a similar perihilar region and in

## 2017-11-28 NOTE — PROGRESS NOTES
[facility]    Progress Note    Tarry Flatter Patient Status:  Inpatient    1970 MRN [id number]   Location [facility] 2W/SW Attending Mary Stanley DO   Hosp Day # 3 PCP Doris Lopez DO       SUBJECTIVE:  Patient intubated, sed left occipital lobe lesions with residual encephalomalacic changes and surrounding gliosis. Lack of intravenous contrast limits evaluation for recurrent tumor. No significant mass effect appreciated. Findings are similar to 7/24/17.  2. Mild exvacuo dilatat Single view. FINDINGS:  CARDIAC/VASC: Normal.  No cardiac silhouette abnormality or cardiomegaly. Unremarkable pulmonary vasculature. MEDIAST/ROBINSON:   No visible mass or adenopathy.  LUNGS/PLEURA: There is lymphoma has a similar perihilar region and in frontal hemisphere. keppra 1500mg q12h IV.  Yesterday, patient back in status epilepticus without response to 6mg IV ativan. dilantin 200mg q8h IV, vaproic acid 500mg q8h IV, propofol 70mcg; patient intubated for airway protection.  Continuous EEG monitorin

## 2017-11-28 NOTE — PROGRESS NOTES
11/28/17 0728   Readings   Total RR 12   Minute Ventilation (L/min) 5.6 L/min   Expiratory Tidal Volume 450 mL   PIP Observed (cm H2O) 21 cm H2O   MAP (cm H2O) 8   Auto PEEP Observed (cm H2O) 5 cm H2O   Plateau Pressure (cm H2O) 17 cm H2O   RESPIRATORY

## 2017-11-28 NOTE — PLAN OF CARE
NEUROLOGICAL - ADULT    • Achieves stable or improved neurological status Not Progressing    • Absence of seizures Not Progressing    • Achieves maximal functionality and self care Not Progressing            Pt on continuous eeg showing consistent seizure

## 2017-11-28 NOTE — OCCUPATIONAL THERAPY NOTE
Attempted to see patient this AM for OT evaluation. Patient was intubated on 11/27/2017 and after discussing with RN is not appropriate to be seen by therapy at this time. Will re-attempt as patient's status changes and is appropriate for therapy.

## 2017-11-28 NOTE — PROGRESS NOTES
[facility]  Neurology Progress Note    Ragini Ackerman Patient Status:  Inpatient    1970 MRN [id number]   Location [facility] 2W/SW Attending Trice Hagan DO   Hosp Day # 3 PCP Salvador House DO     Subjective:  Ragiin Ackerman is a(n) 49 yea good balance injection 5,000 Units 5,000 Units Subcutaneous 2 times per day   iron sucrose (VENOFER) 300 mg in sodium chloride 0.9 % 250 mL IVPB 300 mg Intravenous Q24H     Facility-Administered Medications Ordered in Other Encounters:  nivolumab (OPDIVO) 240 mg in sod intake     Viral syndrome     Cellulitis of foot, left     Esophageal candidiasis (HCC)     Hypotension     Hyponatremia     Acute febrile illness     Primary malignant neoplasm of lung metastatic to other site [facility])     Thrush     Fever     IVDU (intraven

## 2017-11-28 NOTE — CONSULTS
[facility]    Report of Consultation    Melinda Malhotra Patient Status:  Inpatient    1970 MRN [id number]   Location [facility] 2W/SW Attending Charley Brittle,    Hosp Day # 2 PCP Chet Tillman DO     Date of Admission:  20 steroid in the past.  Patient according to the attending has worsening in her cognitive ability to make appropriate decision and has been lacking compliant and appropriate judgment.     Discussed risk and benefit agreeing with the attending Dr. Sandip Alarcon on the Types: Cigarettes     Start date: 8/5/1985     Quit date: 8/5/2015  Smokeless tobacco: Never Used                      Comment: Current some day smoker 04/2017  Alcohol use: Yes              Comment: occassionally.  1-2 glasses wine per month tablet under the tongue 2 (two) times daily. Per pt 1 film in a.m and 1 at bedtime   LEVOTHYROXINE SODIUM 175 MCG Oral Tab TAKE 1 TABLET (175 MCG TOTAL) BY MOUTH DAILY.    HydrOXYzine HCl 50 MG Oral Tab Take 50 mg by mouth every 8 (eight) hours as needed fo response. Residual areas of enhancement in the right frontal lobe liver and stable. Cortical enhancement medial aspect left occipital lobe is diminishing conspicuity suggesting postradiation changes.  2. Extensive areas of gyriform restricted diffusion in t bibasilar and left perihilar pulmonary opacities which could represent atelectases, pneumonia/infiltrate, or edema. 2.  Satisfactory position of endotracheal tube.   Dictated by (CST): Chantel Rios MD on 11/27/2017 at 13:38     Approved by (CST): Chantel Rios exhibiting altered in her personality, disinhibited behavior, impairment in her insight and poor judgment. At this point, I would recommend the following approach:     1. Focus on safety. Certified patient for inability to care for self.   2.  Provide sup

## 2017-11-29 PROCEDURE — 99232 SBSQ HOSP IP/OBS MODERATE 35: CPT | Performed by: INTERNAL MEDICINE

## 2017-11-29 PROCEDURE — 99233 SBSQ HOSP IP/OBS HIGH 50: CPT | Performed by: INTERNAL MEDICINE

## 2017-11-29 PROCEDURE — 95951 EEG MONITORING/VIDEORECORD: CPT | Performed by: OTHER

## 2017-11-29 PROCEDURE — 99233 SBSQ HOSP IP/OBS HIGH 50: CPT | Performed by: OTHER

## 2017-11-29 RX ORDER — POTASSIUM CHLORIDE 1.5 G/1.77G
40 POWDER, FOR SOLUTION ORAL ONCE
Status: COMPLETED | OUTPATIENT
Start: 2017-11-29 | End: 2017-11-29

## 2017-11-29 RX ORDER — BISACODYL 10 MG
10 SUPPOSITORY, RECTAL RECTAL
Status: DISCONTINUED | OUTPATIENT
Start: 2017-11-29 | End: 2017-12-01

## 2017-11-29 NOTE — PROGRESS NOTES
The patient seen in her room. Patient is intubated with continuous seizure status currently on Keppra drip 1500 mg twice daily, Dilantin 200 mg IV q. 8 hour and valproate 500 mg IV every 8 hour.   Otherwise the patient continue to be on Versed drip for sed

## 2017-11-29 NOTE — PROGRESS NOTES
[facility]    Progress Note    Dasia Gibson Patient Status:  Inpatient    1970 MRN [id number]   Location [facility] 2W/SW Attending Joyce Moreno DO   Hosp Day # 4 PCP Reny Bronson DO       SUBJECTIVE:  Pt intubated, sedated. PROCEDURE: XR CHEST AP PORTABLE (CPT=71010) TIME: 1318 hours. COMPARISON: [facility], XR CHEST AP PORTABLE (CPT=71010), 11/27/2017, 5:56. INDICATIONS: Follow up to intubation and NG tube placement. TECHNIQUE:   Single view.    FINDINGS: Intravenous Q8H   midazolam HCl (VERSED) 50 mg in sodium chloride 0.9 % 100 mL infusion 0.14 mg/kg/hr Intravenous Continuous   levothyroxine Sodium (SYNTHROID) 87.6 mcg in Sodium Chloride 0.9 % IV push 87.6 mcg Intravenous Daily   levETIRAcetam (KEPPRA) 1, ventilation due to #1 for airway protection  Stable, FIO2 40%     H/o aspiration   On 11/26, CXR ? haziness on R side, empiric zosyn 3.375mg q8h IV day #3.       Encephalopathy, ACUTE   2/2 status epilepticus; close monitoring in ICU.       H/o IVDU  And no

## 2017-11-29 NOTE — PROGRESS NOTES
[facility] AND [facility]  Neurology Progress Note    Adriana Miranda Patient Status:  Inpatient    1970 MRN [id number]   Location [facility] 2W/SW Attending Christiano Lobato, [address] [facility] Day # 4 PCP Pankaj Hernández DO     Subjective:  Adriana Miranda is a(n) 49 yea injection 5,000 Units 5,000 Units Subcutaneous 2 times per day     Facility-Administered Medications Ordered in Other Encounters:  nivolumab (OPDIVO) 240 mg in sodium chloride 0.9 % 124 mL chemo-IVPB 240 mg Intravenous Once   Heparin Sodium Lock Flush 100 Hyponatremia     Acute febrile illness     Primary malignant neoplasm of lung metastatic to other site [facility])     Thrush     Fever     IVDU (intravenous drug user)     Anemia     Status epilepticus ([facility] [address]     Hyperglycemia     Metabolic acidosis     Encephal

## 2017-11-29 NOTE — PROGRESS NOTES
[facility] HOSP - [facility]    Hematology/Oncology   Progress Note    Melyssa Valdez Patient Status:  Inpatient    1970 MRN [id number]   Location [facility] 2W/SW Attending Juni Sparks DO   Hosp Day # 3 PCP Cooper Monreal DO     Subjective:  No lobes with restricted diffusion involving the right side of the thalamus. Pattern of restricted diffusion may reflect immediate post seizure sequela. 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 3.  Dariel Tenorio Satisfactory position of endotracheal tube. Dictated by (CST): Ulysses Malay, MD on 11/27/2017 at 13:38     Approved by (CST): Ulysses Malay, MD on 11/27/2017 at 13:40    ADDENDUM:  Additional conclusion: 3. Satisfactory position of nasogastric tube.   Dictat

## 2017-11-29 NOTE — PLAN OF CARE
Problem: NEUROLOGICAL - ADULT  Goal: Achieves stable or improved neurological status  INTERVENTIONS  - Assess for and report changes in neurological status  - Initiate measures to prevent increased intracranial pressure  - Maintain blood pressure and fluid normal limits  INTERVENTIONS:  - Monitor labs and rhythm and assess patient for signs and symptoms of electrolyte imbalances  - Administer electrolyte replacement as ordered  - Monitor response to electrolyte replacements, including rhythm and repeat lab r medications as ordered  - Initiate emergency measures for life threatening arrhythmias  - Monitor electrolytes and administer replacement therapy as ordered   Outcome: Progressing      Problem: RESPIRATORY - ADULT  Goal: Achieves optimal ventilation and ox and family of reasons restraints applied  - Assess for any contributing factors to confusion (electrolyte disturbances, delirium, medications)  - Discontinue any unnecessary medical devices as soon as possible  - Assess the patient's physical comfort, circ

## 2017-11-29 NOTE — PROGRESS NOTES
Patient is sedated and no weaning today,doing continuous EEG.         11/29/17 0900   Readings   Total RR 12   Minute Ventilation (L/min) 5.4 L/min   Expiratory Tidal Volume 460 mL   PIP Observed (cm H2O) 22 cm H2O   MAP (cm H2O) 8   Auto PEEP Observed (

## 2017-11-29 NOTE — PROCEDURES
Continous Video EEG report Day #2    REFERRING PHYSICIAN: Joyce Moreno DO  PCP and phone number:  Reny DO Aiyana  [phone]    TECHNIQUE: 21 channels of EEG, 2 channels of EOG, and 1 channel of EKG were recorded utilizing the International 10/20 [facility] emerging.   Discharges themselves were becoming more blunt.

## 2017-11-29 NOTE — PLAN OF CARE
Problem: NEUROLOGICAL - ADULT  Goal: Absence of seizures  INTERVENTIONS  - Monitor for seizure activity  - Administer anti-seizure medications as ordered  - Monitor neurological status   Outcome: Not Progressing  Sedated, neurology asked for sedation vacat oxygenation  INTERVENTIONS:  - Assess for changes in respiratory status  - Assess for changes in mentation and behavior  - Position to facilitate oxygenation and minimize respiratory effort  - Oxygen supplementation based on oxygen saturation or ABGs  - Pr knowledge, values, beliefs, and cultural backgrounds into the planning and delivery of care  - Encourage patient/family to participate in care and decision-making at the level they choose  - Honor patient and family perspectives and choices   Outcome: Prog

## 2017-11-29 NOTE — PROGRESS NOTES
[facility] - [facility]    Progress Note    Td Larson Patient Status:  Inpatient    1970 MRN [id number]   Location [facility] 2W/SW Attending Rox Flower DO   Hosp Day # 4 PCP Nirmal Godfrey DO     Subjective:     Unable to perform ROS    8–DVT prophylaxis  Heparin subcu     9–GI prophylaxis  PPI         Results:     Lab Results  Component Value Date   WBC 8.0 11/29/2017   HGB 7.9 (L) 11/29/2017   HCT 24.6 (L) 11/29/2017    11/29/2017   CREATSERUM 0.51 11/29/2017   BUN 4 (L) 11/ present extending below the diaphragm. The tip is in the mid gastric body. CONCLUSION:  1. Interval development of bibasilar and left perihilar pulmonary opacities which could represent atelectases, pneumonia/infiltrate, or edema.  2.  Satisfactory posit

## 2017-11-30 ENCOUNTER — APPOINTMENT (OUTPATIENT)
Dept: GENERAL RADIOLOGY | Facility: HOSPITAL | Age: 47
DRG: 100 | End: 2017-11-30
Attending: INTERNAL MEDICINE
Payer: COMMERCIAL

## 2017-11-30 PROCEDURE — 71010 XR CHEST AP PORTABLE  (CPT=71010): CPT | Performed by: INTERNAL MEDICINE

## 2017-11-30 PROCEDURE — 99232 SBSQ HOSP IP/OBS MODERATE 35: CPT | Performed by: INTERNAL MEDICINE

## 2017-11-30 PROCEDURE — 99233 SBSQ HOSP IP/OBS HIGH 50: CPT | Performed by: OTHER

## 2017-11-30 PROCEDURE — 95951 EEG MONITORING/VIDEORECORD: CPT | Performed by: OTHER

## 2017-11-30 RX ORDER — MAGNESIUM OXIDE 400 MG (241.3 MG MAGNESIUM) TABLET
800 TABLET ONCE
Status: COMPLETED | OUTPATIENT
Start: 2017-11-30 | End: 2017-11-30

## 2017-11-30 RX ORDER — ARIPIPRAZOLE 15 MG/1
40 TABLET ORAL EVERY 4 HOURS
Status: COMPLETED | OUTPATIENT
Start: 2017-11-30 | End: 2017-11-30

## 2017-11-30 NOTE — PROGRESS NOTES
[facility]    Hematology/Oncology   Progress Note    Trish Arriaga Patient Status:  Inpatient    1970 MRN [id number]   Location [facility] 2W/SW Attending Nohelia Pacheco DO   Hosp Day # 4 PCP Britt Thomas DO     Subjective:   In TECHNIQUE:   Single view. FINDINGS:  CARDIAC/VASC: Normal.  No cardiac silhouette abnormality or cardiomegaly. Unremarkable pulmonary vasculature. MEDIAST/ROBINSON:   No visible mass or adenopathy.  LUNGS/PLEURA: There is lymphoma has a similar perihilar r metastasis, on nivolumab since April 2016 with excellent control for disease; also with a history of polysubstance abuse now admitted to the hospital with status epilepticus. The patient has been seen by Neurology in consultation.   She has urine drug scree

## 2017-11-30 NOTE — PROGRESS NOTES
Pulmonary/Critical Care Follow Up Note    HPI:   Keiko Salter is a 49year old female with Patient presents with:  Seizure Disorder (neurologic)      PCP Aneesh Chavez, DO  Admission Attending [address] Day #5    sedated    PMH:  Past Medical 15 mL, 15 mL, Intravenous, ONCE PRN  •  Piperacillin Sod-Tazobactam So (ZOSYN) 3.375 g in dextrose 5 % 100 mL ADD-vantage, 3.375 g, Intravenous, Q8H  •  0.9%  NaCl infusion, , Intravenous, Continuous  •  Pantoprazole Sodium (PROTONIX) 40 mg in Sodium Chlor allows   Full support today     Aspiration pneumonia  No fever  Wbc normal  Plan zosyn     Lung cancer  CNS and hepatic mets  Plan supportive care          Polysub abuse  Urine drug screen positive for benzo, cocaine , opiate  Plan psych when awake    DM

## 2017-11-30 NOTE — PROGRESS NOTES
RESPIRATORY THERAPY MECHANICAL VENTILATION PROGRESS NOTE    Ventilator Weaning:  Patient meets criteria for weaning? no Weaning was attempted no      11/30/17 0845   Readings   Total RR 14   Minute Ventilation (L/min) 6.9 L/min   Expiratory Tidal Volume 46

## 2017-11-30 NOTE — PLAN OF CARE
Patient Centered Care    • Patient preferences are identified and integrated in the patient's plan of care Progressing        Safety Risk - Non-Violent Restraints    • Patient will remain free from self-harm Progressing        Pt is on restrains; Still int

## 2017-11-30 NOTE — PROGRESS NOTES
[facility]    Progress Note    Keiko Salter Patient Status:  Inpatient    1970 MRN [id number]   Location [facility] 2W/SW Attending Jai Sesay DO   Hosp Day # 5 PCP Aneesh Chavez DO       SUBJECTIVE:  Patient intubated; off 0.3   --    TP  5.5*  5.5*   --          Imaging:  Xr Chest Ap Portable  (cpt=71010)    Result Date: 11/28/2017  CONCLUSION:  1. Interval decrease in left lung base airspace disease. Minimal right lung base atelectasis. Continued followup is advised. Interval development of bibasilar and left perihilar pulmonary opacities which could represent atelectases, pneumonia/infiltrate, or edema. 2.  Satisfactory position of endotracheal tube.               Assessment and Plan:     Status epilepticus ([address]   CT previous baseline. Discussed that we need to give her time, and as we are able to come off sedation, we may get a better idea of whether she will or will not recover.      Sandeep Valerio,   11/30/2017  8:25 AM

## 2017-11-30 NOTE — PLAN OF CARE
Problem: NEUROLOGICAL - ADULT  Goal: Absence of seizures  INTERVENTIONS  - Monitor for seizure activity  - Administer anti-seizure medications as ordered  - Monitor neurological status   Outcome: Not Progressing  Sedated, neuro md to re-evaluate in [facility] stability  INTERVENTIONS:  - Monitor vital signs, rhythm, and trends  - Monitor for bleeding, hypotension and signs of decreased cardiac output  - Evaluate effectiveness of vasoactive medications to optimize hemodynamic stability  - Monitor arterial and/or analgesics based on type and severity of pain and evaluate response  - Implement non-pharmacological measures as appropriate and evaluate response  - Consider cultural and social influences on pain and pain management  - Manage/alleviate anxiety  - Utilize

## 2017-11-30 NOTE — PROCEDURES
EEG report    REFERRING PHYSICIAN: Trice Hagan DO  PCP and phone number:  Salvador House   [phone]    TECHNIQUE: 21 channels of EEG, 2 channels of EOG, and 1 channel of EKG were recorded utilizing the International 10/20 System.  The recording was periodic small sharp waves appearing in the right hemisphere. Versed was turned back on. IMPRESSION:     This is an abnormal EEG.   Background indicating generalized anesthesia response, very minimal abnormal activity started to appear once the sedation

## 2017-11-30 NOTE — PROGRESS NOTES
[facility] AND [facility]  Neurology Progress Note    Melinda Malhotra Patient Status:  Inpatient    1970 MRN [id number]   Location [facility] 2W/SW Attending Charley Brittle, [address] [facility] Day # 5 PCP Chet Tillman DO     Subjective:  Melinda Malhotra is a(n) 49 yea (Porcine) 5000 UNIT/ML injection 5,000 Units 5,000 Units Subcutaneous 2 times per day     Facility-Administered Medications Ordered in Other Encounters:  nivolumab (OPDIVO) 240 mg in sodium chloride 0.9 % 124 mL chemo-IVPB 240 mg Intravenous Once   Heparin candidiasis ([facility] [address]     Hypotension     Hyponatremia     Acute febrile illness     Primary malignant neoplasm of lung metastatic to other site [facility])     Thrush     Fever     IVDU (intravenous drug user)     Anemia     Status epilepticus ([facility] [address]     Hyperglycem

## 2017-12-01 ENCOUNTER — APPOINTMENT (OUTPATIENT)
Dept: GENERAL RADIOLOGY | Facility: HOSPITAL | Age: 47
DRG: 100 | End: 2017-12-01
Attending: INTERNAL MEDICINE
Payer: COMMERCIAL

## 2017-12-01 PROCEDURE — 71010 XR CHEST AP PORTABLE  (CPT=71010): CPT | Performed by: INTERNAL MEDICINE

## 2017-12-01 PROCEDURE — 99233 SBSQ HOSP IP/OBS HIGH 50: CPT | Performed by: INTERNAL MEDICINE

## 2017-12-01 PROCEDURE — 95951 EEG MONITORING/VIDEORECORD: CPT | Performed by: OTHER

## 2017-12-01 PROCEDURE — 99232 SBSQ HOSP IP/OBS MODERATE 35: CPT | Performed by: INTERNAL MEDICINE

## 2017-12-01 PROCEDURE — 99233 SBSQ HOSP IP/OBS HIGH 50: CPT | Performed by: OTHER

## 2017-12-01 RX ORDER — MAGNESIUM SULFATE HEPTAHYDRATE 40 MG/ML
2 INJECTION, SOLUTION INTRAVENOUS ONCE
Status: COMPLETED | OUTPATIENT
Start: 2017-12-01 | End: 2017-12-01

## 2017-12-01 RX ORDER — BISACODYL 10 MG
10 SUPPOSITORY, RECTAL RECTAL 2 TIMES DAILY PRN
Status: DISCONTINUED | OUTPATIENT
Start: 2017-12-01 | End: 2017-12-20

## 2017-12-01 NOTE — PROCEDURES
Continuous Video EEG report Day #4    REFERRING PHYSICIAN: Barry Miles DO    PCP and phone number:  Alexia DonaldDO  [phone]    TECHNIQUE: 21 channels of EEG, 2 channels of EOG, and 1 channel of EKG were recorded utilizing the International 10/20 S seizures nor status epilepticus was present.

## 2017-12-01 NOTE — PROGRESS NOTES
[facility]    Progress Note    Carmela Ricardo Patient Status:  Inpatient    1970 MRN [id number]   Location [facility] 2W/SW Attending Davy Giraldo DO   Hosp Day # 6 PCP Hleena Mcdaniel DO     Subjective:     Unable to perform ROS , opiate     7– nutrition  Tolerating NG tube feeing      8–DVT prophylaxis  Heparin subcu     9–GI prophylaxis  PPI             Results:     Lab Results  Component Value Date   WBC 10.2 12/01/2017   HGB 8.4 (L) 12/01/2017   HCT 25.7 (L) 12/01/2017   PLT 3

## 2017-12-01 NOTE — PROGRESS NOTES
[facility]    Progress Note    Tarry Flatter Patient Status:  Inpatient    1970 MRN [id number]   Location [facility] 2W/SW Attending Mary Stanley DO   Hosp Day # 6 PCP Doris Lopez DO         Assessment and Plan:     Status e On Protonix 40 mg IV qD     FEN   On TFs at 65 cc/hr and FWF 40 cc q4hrs             SUBJECTIVE:  Intubated; off Versed since 8AM today; lethargic, though awakening; appears to be breathing comfortably on ventilator          OBJECTIVE:  PHYSICAL EXAM: Intramuscular Prior to discharge   dextrose 10 % infusion  Intravenous Continuous PRN   Normal Saline Flush 0.9 % injection 10 mL 10 mL Intravenous PRN   Gadoterate Meglumine (DOTAREM) 7.5 MMOL/15ML injection 15 mL 15 mL Intravenous ONCE PRN   Piperacillin nonspecific and may reflect worsening pneumonia or pneumonitis versus developing congestive failure and pulmonary edema changes. The possibility of fluid overload is raised. Correlate clinically.  3. Continued followup studies are recommended

## 2017-12-01 NOTE — PROGRESS NOTES
[facility] AND [facility]  Neurology Progress Note    Melinda Malhotra Patient Status:  Inpatient    1970 MRN [id number]   Location [facility] 2W/SW Attending Charley Brittle, [address] [facility] Day # 6 PCP Chet Tillman DO     Subjective:  Melinda Malhotra is a(n) 49 yea Facility-Administered Medications Ordered in Other Encounters:  nivolumab (OPDIVO) 240 mg in sodium chloride 0.9 % 124 mL chemo-IVPB 240 mg Intravenous Once   Heparin Sodium Lock Flush 100 UNIT/ML injection 500 Units 5 mL Intercatheter PRN       Object febrile illness     Primary malignant neoplasm of lung metastatic to other site [facility])     Thrush     Fever     IVDU (intravenous drug user)     Anemia     Status epilepticus ([facility] [address]     Hyperglycemia     Metabolic acidosis     Encephalopathy acute     [place]

## 2017-12-01 NOTE — PROGRESS NOTES
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

## 2017-12-01 NOTE — PROGRESS NOTES
RESPIRATORY THERAPY MECHANICAL VENTILATION PROGRESS NOTE    Ventilator Weaning:  Patient meets criteria for weaning? yes Weaning was attempted yes using pressure support 10 cmH2O + PEEP 5 cmH2O. The patient tolerated well for 5 hours.  The patient is contin

## 2017-12-01 NOTE — PLAN OF CARE
Safety Risk - Non-Violent Restraints    • Patient will remain free from self-harm Progressing        Pt remains free from self harm. Working to extubate and D/C restraints by tomorrow possibly. Will continue to monitor.

## 2017-12-01 NOTE — PLAN OF CARE
Problem: ALTERED NUTRIENT INTAKE - ADULT  Goal: Nutrient intake appropriate for improving, restoring or maintaining nutritional needs  INTERVENTIONS:  - Assess nutritional status and recommend course of action  - Monitor oral intake when eating labs, and t

## 2017-12-01 NOTE — DIETARY NOTE
ADULT NUTRITION RE- ASSESSMENT    Pt is at high nutrition risk. Pt meets/does not meet malnutrition criteria--to be determined at later date. Could not go in room--pt being intubated at time of visit.      RECOMMENDATIONS TO MD:  Adjusted TF order as follo ANTHROPOMETRICS:  HT: 165.1 cm (5' 5\")  WT: 67.9 kg (149 lb 11.2 oz) 11/25. 12/1: 160 lbs. Wt up 7% likely fluid related as +2 edema generalized, and net I/o: +7.5l  BMI: Body mass index is 26.68 kg/m².   BMI CLASSIFICATION: 19-24.9 kg/m2 - WNL  IBW: Phos: n/a- ordered for am.     Recent Labs   11/28/17  0449 11/29/17  0525 11/30/17  0626 11/30/17  1641 12/01/17  0552   * 102* 126*  --  122*   BUN 6* 4* 4*  --  5*   CREATSERUM 0.63 0.51 0.42*  --  0.31*   CA 7.8* 7.7* 7.7*  --  8.0*   MG 1.9  --

## 2017-12-01 NOTE — PAYOR COMM NOTE
--------------  ADMISSION REVIEW     Payor: Judi [employer]  Subscriber #:  [id number]  Authorization Number: [id number]    Admit date: 11/25/17  Admit time: 1       Admitting Physician: Benita Bautista DO  Attending Physician:  Benita Bautista DO • Disorder of thyroid    • Exposure to radiation     DISCONTINUED AUG 2015    • Gallbladder disease    • Hepatitis C    • High blood pressure    • Hypothyroid    • Lung cancer [facility])    • Migraines    • Osteoarthritis    • [facility] ([facility] J[facility]Y 2015 SpO2: 98 % [11/25/17 0751]  O2 Device: None (Room air) [11/25/17 0751]    Current:/90 (BP Location: Left arm)   Pulse 108   Temp 97.6 °F (36.4 °C) (Temporal)   Resp 18   Ht 165.1 cm (5' 5\")   Wt 67.9 kg   SpO2 96%   BMI 24.91 kg/m²          Physical All other components within normal limits   HEPATIC FUNCTION PANEL (7) - Abnormal; Notable for the following:     ALT 10 (*)     Albumin 3.0 (*)     All other components within normal limits   URINALYSIS WITH CULTURE REFLEX - Abnormal; Notable for the fol 11/25/17 : 108[VR.2]  , sinus, normal for rhythm, tachy for rate     Radiology findings:[VR.1] Ct Brain Or Head (53659)    Result Date: 11/25/2017  CONCLUSION:  1.  Prior resections involving the right frontal and left occipital lobe lesions with residual e I spent a total of 30 minutes of critical care time in obtaining history, performing a physical exam, bedside monitoring of interventions, collecting and interpreting tests and discussion with consultants but not including time spent performing procedures. Mrs. Abisai Adam is a 49year old female with past medical history of stage IV lung cancer, hypothyroidism, recent history of injecting oxycontin presenting with seizures. She was recently hospitalized for fevers 101F and 103F and left AMA.  At the time, blood cu 2009: CHOLECYSTECTOMY  No date: CYST REMOVAL      Comment: BREAST, BENIGN  No date: HEMORRHOIDECTOMY  No date: HYSTERECTOMY      Comment: heavy menstrual flow,   2012: LUMPECTOMY RIGHT      Comment: FIBROADENOMA  8-7-15: OTHER      Comment: CYBERKNIFE  No ABD: soft, nontender, nondistended, no guarding or rigidity  Ext: no LE edema  Skin: old track marks noted on LUE and LLE.[DN.3]       Results:     Lab Results  Component Value Date   WBC 8.0 11/25/2017   HGB 8.6 (L) 11/25/2017   HCT 26.9 (L) 11/25/2017 2/2 seizure and ativan; close monitoring in ICU. neurochecks q2h. [DN.3]       Hypokalemia[DN.1]  Given IV potassium; replace per electrolyte protocol    H/o IVDU  And now with +UDS; spoke with patient's , he was unaware of her drug use.  Will wait un 12/1/2017 0955 New Bag 2 g Intravenous Diaz Celeste RN      midazolam HCl (VERSED) 50 mg in sodium chloride 0.9 % 100 mL infusion     Date Action Dose Route User    12/1/2017 0333 New Bag 0.14 mg/kg/hr × 70.8 kg Intravenous Fatimah Almeida RN    11/30/2017 Valproate Sodium (DEPACON) 1,000 mg in sodium chloride 0.9 % 100 mL IVPB     Date Action Dose Route User    12/1/2017 0958 New Bag 1000 mg Intravenous Ada Chavez RN    12/1/2017 0236 New Bag 1000 mg Intravenous Anuj Ren RN    11/30/2017 0072 New Ba

## 2017-12-01 NOTE — PLAN OF CARE
Problem: NEUROLOGICAL - ADULT  Goal: Absence of seizures  INTERVENTIONS  - Monitor for seizure activity  - Administer anti-seizure medications as ordered  - Monitor neurological status   Outcome: Progressing  Neurology on consult, pt remains on continuous supplementation based on oxygen saturation or ABGs  - Provide Smoking Cessation handout, if applicable  - Encourage broncho-pulmonary hygiene including cough, deep breathe, Incentive Spirometry  - Assess the need for suctioning and perform as needed  - Ass

## 2017-12-02 ENCOUNTER — APPOINTMENT (OUTPATIENT)
Dept: GENERAL RADIOLOGY | Facility: HOSPITAL | Age: 47
DRG: 100 | End: 2017-12-02
Attending: INTERNAL MEDICINE
Payer: COMMERCIAL

## 2017-12-02 PROCEDURE — 71010 XR CHEST AP PORTABLE  (CPT=71010): CPT | Performed by: INTERNAL MEDICINE

## 2017-12-02 PROCEDURE — 99232 SBSQ HOSP IP/OBS MODERATE 35: CPT | Performed by: OTHER

## 2017-12-02 PROCEDURE — 99233 SBSQ HOSP IP/OBS HIGH 50: CPT | Performed by: INTERNAL MEDICINE

## 2017-12-02 RX ORDER — SCOLOPAMINE TRANSDERMAL SYSTEM 1 MG/1
1 PATCH, EXTENDED RELEASE TRANSDERMAL
Status: DISCONTINUED | OUTPATIENT
Start: 2017-12-02 | End: 2017-12-08

## 2017-12-02 RX ORDER — ARIPIPRAZOLE 15 MG/1
40 TABLET ORAL EVERY 4 HOURS
Status: COMPLETED | OUTPATIENT
Start: 2017-12-02 | End: 2017-12-02

## 2017-12-02 RX ORDER — IPRATROPIUM BROMIDE AND ALBUTEROL SULFATE 2.5; .5 MG/3ML; MG/3ML
3 SOLUTION RESPIRATORY (INHALATION) EVERY 6 HOURS PRN
Status: DISCONTINUED | OUTPATIENT
Start: 2017-12-02 | End: 2017-12-12

## 2017-12-02 NOTE — PROGRESS NOTES
Neurology Inpatient Follow-up Note      HPI:     Patient being seen in follow-up. Her best friend is at bedside. Interval notes, workup, reviewed.       Past Medical Hisotory:  Reviewed    Medications:  Reviewed    Allergies:  No Known Allergies      ROS: subtle sulcal enhancement is another diagnostic consideration. 3. Subtle abnormal FLAIR signal in these regions including medial margin of the right thalamus. Recommend short term interval followup to exclude progression.   4. No significant ventricular ef

## 2017-12-02 NOTE — PROGRESS NOTES
[facility]    [facility]      Assessment and Plan:   1. Status epilepticus    Recommendations: As per neurology, ongoing antiepileptic    2. Respiratory failure– the patient is currently on CPAP, but has low negative inspiratory force. 12/02/2017   PHOS 1.5 12/02/2017     Chest x-ray– relatively clear with tubes in good position. Subtle basilar haziness.     Ronal Rodrigues MD  Medical Director, Critical Care, [facility]  Medical Director, [facility]  Pager: [phone]

## 2017-12-02 NOTE — PROGRESS NOTES
[address] [facility]    CDI Prediction Tool Protocol (Vancomycin Initiated)    OVP (oral vancomycin prophylaxis) 125 mg PO BID is being started in this patient based on a score of 14.   This patient is currently at high risk for developing CDI due to his/h

## 2017-12-02 NOTE — PLAN OF CARE
Problem: NEUROLOGICAL - ADULT  Goal: Achieves stable or improved neurological status  INTERVENTIONS  - Assess for and report changes in neurological status  - Initiate measures to prevent increased intracranial pressure  - Maintain blood pressure and fluid limits  INTERVENTIONS:  - Monitor labs and rhythm and assess patient for signs and symptoms of electrolyte imbalances  - Administer electrolyte replacement as ordered  - Monitor response to electrolyte replacements, including rhythm and repeat lab results arrhythmias or at baseline  INTERVENTIONS:  - Continuous cardiac monitoring, monitor vital signs, obtain 12 lead EKG if indicated  - Evaluate effectiveness of antiarrhythmic and heart rate control medications as ordered  - Initiate emergency measures for l Notify MD/LIP if interventions unsuccessful or patient reports new pain  - Anticipate increased pain with activity and pre-medicate as appropriate   Outcome: Progressing  No non-verbal signs or symptoms of pain.     Problem: Patient Centered Care  Goal: Tresa Rodriguez

## 2017-12-02 NOTE — PROGRESS NOTES
[facility]    Progress Note    Adriana Miranda Patient Status:  Inpatient    1970 MRN [id number]   Location [facility] 2W/SW Attending Christiano Lobato DO   Hosp Day # 7 PCP Pankaj Hernández DO         Assessment and Plan:     Status e    DVT prophylaxis  On heparin 5000 U SQ BID, SCDs to LE     Ulcer prophylaxis   On Protonix 40 mg IV qD     FEN   On TFs at 65 cc/hr and FWF 40 cc q4hrs                 SUBJECTIVE:  Intubated; somnolent; poor inspiratory effort on command          OBJEC Daily   levETIRAcetam (KEPPRA) 1,500 mg in sodium chloride 0.9 % 100 mL IVPB 1,500 mg Intravenous Q12H   influenza vaccine split quad (FLULAVAL) ages 6 months to 65 years inj 0.5ml 0.5 mL Intramuscular Prior to discharge   dextrose 10 % infusion  Intraveno changes since earlier study today. 2. Increasing left effusion with secondary left lung compressive atelectasis. Can't exclude worsening left lung consolidation/pneumonia.  3. Interval placement of feeding tube which is coiled in the distal esophagus with t

## 2017-12-02 NOTE — PROGRESS NOTES
[facility]    Hematology/Oncology   Progress Note    Melyssa Valdez Patient Status:  Inpatient    1970 MRN [id number]   Location [facility] 2W/SW Attending Juni Sparks DO   Hosp Day # 6 PCP Cooper Monreal DO     Subjective:   In Mild bibasilar airspace disease. Findings are about the same. Can't exclude bibasilar pneumonia. Followup studies are advised. Xr Chest Ap Portable  (cpt=71010)    Result Date: 11/30/2017  CONCLUSION:  1. Mild cardiomegaly.  Support devices in place

## 2017-12-03 ENCOUNTER — APPOINTMENT (OUTPATIENT)
Dept: GENERAL RADIOLOGY | Facility: HOSPITAL | Age: 47
DRG: 100 | End: 2017-12-03
Attending: INTERNAL MEDICINE
Payer: COMMERCIAL

## 2017-12-03 PROCEDURE — 99231 SBSQ HOSP IP/OBS SF/LOW 25: CPT | Performed by: OTHER

## 2017-12-03 PROCEDURE — 71010 XR CHEST AP PORTABLE  (CPT=71010): CPT | Performed by: INTERNAL MEDICINE

## 2017-12-03 PROCEDURE — 99233 SBSQ HOSP IP/OBS HIGH 50: CPT | Performed by: INTERNAL MEDICINE

## 2017-12-03 PROCEDURE — 99232 SBSQ HOSP IP/OBS MODERATE 35: CPT | Performed by: INTERNAL MEDICINE

## 2017-12-03 RX ORDER — POTASSIUM CHLORIDE 29.8 MG/ML
40 INJECTION INTRAVENOUS ONCE
Status: COMPLETED | OUTPATIENT
Start: 2017-12-03 | End: 2017-12-03

## 2017-12-03 RX ORDER — SODIUM CHLORIDE 9 MG/ML
INJECTION, SOLUTION INTRAVENOUS
Status: COMPLETED
Start: 2017-12-03 | End: 2017-12-03

## 2017-12-03 NOTE — PROGRESS NOTES
Neurology Inpatient Follow-up Note      HPI:     Patient being seen in follow-up. No significant clinical improvement.       Past Medical Hisotory:  Reviewed    Medications:  Reviewed    Allergies:  No Known Allergies      ROS:   Unable to obtain from rhianna

## 2017-12-03 NOTE — PLAN OF CARE
Problem: NEUROLOGICAL - ADULT  Goal: Achieves stable or improved neurological status  INTERVENTIONS  - Assess for and report changes in neurological status  - Initiate measures to prevent increased intracranial pressure  - Maintain blood pressure and fluid swallowing and airway patency with patient fatigue and changes in neurological status  - Encourage and assist patient to increase activity and self care with guidance from PT/OT  - Encourage visually impaired, hearing impaired and aphasic patients to use a decreased coronary artery perfusion - ex.  Angina  - Evaluate fluid balance, assess for edema, trend weights   Outcome: Progressing  Remains sinus tachy without ectopy  Goal: Absence of cardiac arrhythmias or at baseline  INTERVENTIONS:  - Continuous cardia Monitor for opioid side effects  - Notify MD/LIP if interventions unsuccessful or patient reports new pain  - Anticipate increased pain with activity and pre-medicate as appropriate   Outcome: Progressing  Able to shake head no if asked if in pain    Probl

## 2017-12-03 NOTE — PROGRESS NOTES
[facility]    Progress Note    Adriana Miranda Patient Status:  Inpatient    1970 MRN [id number]   Location [facility] 2W/SW Attending Christiano Lobato DO   Hosp Day # 8 PCP Pankaj Hernández DO         Assessment and Plan:     Status e    DVT prophylaxis  On heparin 5000 U SQ BID, SCDs to LE     Ulcer prophylaxis   On Protonix 40 mg IV qD     FEN   On TFs at 65 cc/hr and FWF 40 cc q4hrs                      SUBJECTIVE:  Awake though does not response to verbal command; breathing comfor (SYNTHROID) 87.6 mcg in Sodium Chloride 0.9 % IV push 87.6 mcg Intravenous Daily   levETIRAcetam (KEPPRA) 1,500 mg in sodium chloride 0.9 % 100 mL IVPB 1,500 mg Intravenous Q12H   influenza vaccine split quad (FLULAVAL) ages 6 months to 65 years inj 0.5ml (cpt=71010)    Result Date: 12/2/2017  CONCLUSION:  1. ET tube and NG tube have been removed. Right portacatheter remains in place. 2. New feeding tube in place, tip below the left hemidiaphragm in the region of the gastric body.  3. Postoperative changes i

## 2017-12-03 NOTE — PROGRESS NOTES
[facility]    Progress Note      Assessment and Plan:   1. Status epilepticus–the patient does follow commands now with subtle hand  with extensive prompting    Recommendations: As per neurology, ongoing antiepileptic    2.   Respirator [facility], [address] [facility]  Medical Director, [id number] [place] [phone]  Pager: [phone]

## 2017-12-03 NOTE — PLAN OF CARE
Problem: NEUROLOGICAL - ADULT  Goal: Achieves stable or improved neurological status  INTERVENTIONS  - Assess for and report changes in neurological status  - Initiate measures to prevent increased intracranial pressure  - Maintain blood pressure and fluid physical needs  - Identify cognitive and physical deficits and behaviors that affect risk of falls.   - Switchback fall precautions as indicated by assessment.  - Educate pt/family on patient safety including physical limitations  - Instruct pt to call for a redirection as needed  - Assess for the need to continue restraints   Outcome: Progressing  Patient has BUE wrist restraints, patient does not follow commands, restless, applied for patient safety

## 2017-12-04 ENCOUNTER — APPOINTMENT (OUTPATIENT)
Dept: GENERAL RADIOLOGY | Facility: HOSPITAL | Age: 47
DRG: 100 | End: 2017-12-04
Attending: INTERNAL MEDICINE
Payer: COMMERCIAL

## 2017-12-04 ENCOUNTER — APPOINTMENT (OUTPATIENT)
Dept: HEMATOLOGY/ONCOLOGY | Facility: HOSPITAL | Age: 47
End: 2017-12-04
Attending: INTERNAL MEDICINE
Payer: COMMERCIAL

## 2017-12-04 PROCEDURE — 99232 SBSQ HOSP IP/OBS MODERATE 35: CPT | Performed by: INTERNAL MEDICINE

## 2017-12-04 PROCEDURE — 99233 SBSQ HOSP IP/OBS HIGH 50: CPT | Performed by: INTERNAL MEDICINE

## 2017-12-04 PROCEDURE — 71010 XR CHEST AP PORTABLE  (CPT=71010): CPT | Performed by: INTERNAL MEDICINE

## 2017-12-04 PROCEDURE — 99231 SBSQ HOSP IP/OBS SF/LOW 25: CPT | Performed by: OTHER

## 2017-12-04 NOTE — PLAN OF CARE
Problem: NEUROLOGICAL - ADULT  Goal: Achieves stable or improved neurological status  INTERVENTIONS  - Assess for and report changes in neurological status  - Initiate measures to prevent increased intracranial pressure  - Maintain blood pressure and fluid seizures, padded bed for seizure precautions. Patient is still restless and agitated, tossing and turning in bed. Kept patient's soft wrist restraints because he can still be impulsive.      Problem: METABOLIC/FLUID AND ELECTROLYTES - ADULT  Goal: [employer] fluid balance, assess for edema, trend weights   Outcome: Progressing    Goal: Absence of cardiac arrhythmias or at baseline  INTERVENTIONS:  - Continuous cardiac monitoring, monitor vital signs, obtain 12 lead EKG if indicated  - Evaluate effectiveness of unsuccessful or patient reports new pain  - Anticipate increased pain with activity and pre-medicate as appropriate   Outcome: Progressing  Patient denied pain and discomfort, though she sometimes moan and grunts. She denied having pain.     Problem: Manolo

## 2017-12-04 NOTE — PROGRESS NOTES
As discussed with Dr. Ludwig Hughes, since patient passed the swallow evaluation for pureed diet and nectar thick liquids, but patient is anticipated not to be eating well yet base on how she went with the swallow eval, Dr. Ludwig Hughes said to keep NGT for now until patie

## 2017-12-04 NOTE — PROGRESS NOTES
[facility]    Progress Note    Agnes Johnson Patient Status:  Inpatient    1970 MRN [id number]   Location [facility] 2W/SW Attending Harinder Gilmore DO   Hosp Day # 9 PCP Zach York DO       SUBJECTIVE  Intermittently awake; w Date: 12/4/2017  CONCLUSION:   * Feeding tube now seen coiled in the upper body of the stomach. * The rest of the findings are stable from exam earlier the same day. * A catheter is seen in the superior vena cava. * Stable heart size.  * Scarring/atelectas and the tube should be either advanced further into the stomach or replaced. 4. No pneumothorax                 Assessment and Plan:       Status epilepticus ([facility] [address]   CT head negative for acute bleed; UDS-- + benzo, opiates, cocaine.  MRI brain without evide LE     Ulcer prophylaxis   On Protonix 40 mg IV qD     FEN   On TFs at 65 cc/hr and FWF 40 cc q4hrs        Johan Schmitz DO  12/4/2017  9:17 AM

## 2017-12-04 NOTE — PAYOR COMM NOTE
REMAINS IN CC, REQUESTING ADDITIONAL DAYS    CONTINUED STAY REVIEW    Payor: [employer] PPO  Subscriber #:  [id number]  Authorization Number: [id number]    Admit date: 11/25/17  Admit time: 1    Admitting Physician: DO Mili Johnson chloride 0.9 % 100 mL IVPB     Date Action Dose Route User    12/4/2017 0308 New Bag 1000 mg Intravenous Monica Collnis RN    12/3/2017 1720 New Bag 1000 mg Intravenous Mio Hankins RN        12/1  Hosp Day # 6 PCP Amy Matta DO      Subjective: for benzo, cocaine , opiate     7– nutrition  Tolerating NG tube feeing      8–DVT prophylaxis  Heparin subcu     9–GI prophylaxis  PPI  Results:      Lab Results  Component Value Date   WBC 10.2 12/01/2017   HGB 8.4 (L) 12/01/2017   HCT 25.7 (L) 12/01/201 antibiotic. The chest x-ray looks good. She does have a gag.     Recommendations: Trial of T-piece and if patient tolerates will progress to extubate.     3.  Polysubstance abuse–cocaine, opiates and benzodiazepines in her system on arrival     4.   DVT p potentially still indicate epileptogenic focus in the right hemisphere, however no seizures nor status epilepticus was present.     MRI brain with and without contrast 11/26/2017, images and report reviewed  CONCLUSION:   1. Postoperative changes right fro prophylaxis–pantoprazole     6.  Stage IV metastatic lung cancer with cerebral metastasis status post resection     Recommendations: As per oncology     7.   Hypokalemia–to supplement     Subjective:   Melinda Malhotra is a(n) 49year old female who is very let Constitutional: She appears well-nourished. HENT:   Head: Atraumatic. Eyes: Pupils are equal, round, and reactive to light. Neck: No JVD present. Cardiovascular: Normal rate. Pulmonary/Chest: Breath sounds normal. She has no wheezes.  She has n atelectasis and small effusion. No change minimal right basilar atelectasis.          Magi Khan MD  12/4/2017

## 2017-12-04 NOTE — PROGRESS NOTES
[facility]    Hematology/Oncology   Progress Note    Gamaliel Altman Patient Status:  Inpatient    1970 MRN [id number]   Location [facility] 2W/SW Attending Marine Loya DO   Hosp Day # 9 PCP Elias Mendez DO     Subjective:  Ex least to the level of the gastric body. Xr Chest Ap Portable  (cpt=71010)    Result Date: 12/3/2017  CONCLUSION:  1. No acute findings.          Xr Chest Ap Portable  (cpt=71010)    Result Date: 12/2/2017  CONCLUSION:  1. ET tube and NG tube have be cocaine, and benzodiazepines. Initially a CT of the brain does not show any progression of her metastatic disease. No definitive evidence of progression on brain MRI  --Extubated for encephalopathy/respiratory failure/status epilepticus.  appreciate neuro

## 2017-12-04 NOTE — SLP NOTE
ADULT SWALLOWING EVALUATION    ASSESSMENT    ASSESSMENT/OVERALL IMPRESSION:  Pt seen sitting upright in bed for all PO trials. Pt extubated 12/02. Alertness level has improved and patient was able to safely participate in PO trials for BSSE.  Pt with poor o Plan/Recommendations: Aspiration precautions  Discharge Recommendations/Plan: Undetermined    HISTORY   MEDICAL HISTORY  Reason for Referral: R/O aspiration    Problem List  Principal Problem:    Status epilepticus ([facility] [address]  Active Problems:    Hyperglycemia Elevation Timing: Appears impaired  Laryngeal Elevation Strength: Appears impaired  Laryngeal Elevation Coordination: Appears impaired  (Please note: Silent aspiration cannot be evaluated clinically.  Videofluoroscopic Swallow Study is required to rule-out

## 2017-12-04 NOTE — PHYSICAL THERAPY NOTE
PHYSICAL THERAPY EVALUATION - INPATIENT     Room Number: 217/217-A  Evaluation Date: 12/4/2017  Type of Evaluation: Initial  Physician Order: PT Eval and Treat    Presenting Problem:  (Status Epilepticus,Seizures,H/O Lung CA with Brain Mets)  Reason fo generalized strength, decrease activity tolerance, Rt side neglect affecting pt's ability for sitting/standing tolerance/balance, safe mobility, transfers and amb ability, which are below the patient's pre-admission status.    Patient will benefit from cont EMR: Patient is a 49year old  female with history of adenocarcinoma of the lungs with metastasis to the brain with the previous multiple admission who presented this time for status epilepsy.   Patient found with positive toxicology of benzodiazepin EMR)    Prior Level of [place]: Pt was unable to provide any history and was non verbal. Per SW notes, pt lives with her spouse and was independent. SUBJECTIVE  Pt didn't verbalize much.      PHYSICAL THERAPY EXAMINATION     OBJECTIVE  Precautions: railing?: Total     AM-PAC Score:  Raw Score: 8   PT Approx Degree of Impairment Score: 86.62%   Standardized Score (AM-PAC Scale): 28.58   CMS Modifier (G-Code): CM    FUNCTIONAL ABILITY STATUS  Gait Assessment   Gait Assistance: Not tested           [facility]

## 2017-12-04 NOTE — PLAN OF CARE
Problem: NEUROLOGICAL - ADULT  Goal: Achieves stable or improved neurological status  INTERVENTIONS  - Assess for and report changes in neurological status  - Initiate measures to prevent increased intracranial pressure  - Maintain blood pressure and fluid METABOLIC/FLUID AND ELECTROLYTES - ADULT  Goal: Electrolytes maintained within normal limits  INTERVENTIONS:  - Monitor labs and rhythm and assess patient for signs and symptoms of electrolyte imbalances  - Administer electrolyte replacement as ordered  - trace pitting edema to the bilateral hands.   Goal: Absence of cardiac arrhythmias or at baseline  INTERVENTIONS:  - Continuous cardiac monitoring, monitor vital signs, obtain 12 lead EKG if indicated  - Evaluate effectiveness of antiarrhythmic and heart ra pain  - Anticipate increased pain with activity and pre-medicate as appropriate   Outcome: Progressing  When asked about pain the patient either shakes her head \"NO\" or verbalizes the word \"NO\".     Problem: Patient Centered Care  Goal: Patient preferen

## 2017-12-04 NOTE — PROGRESS NOTES
[facility]    Progress Note    Alcides Cordero Patient Status:  Inpatient    1970 MRN [id number]   Location [facility] 2W/SW Attending Elisa Rutledge DO   Hosp Day # 9 PCP Jennifer Santacruz DO     Subjective:   Subjective:  She is brice HCT 31.9 (L) 12/04/2017    (H) 12/04/2017   CREATSERUM 0.44 (L) 12/04/2017   BUN 12 12/04/2017    12/04/2017   K 3.9 12/04/2017    12/04/2017   CO2 28 12/04/2017    (H) 12/04/2017   CA 9.0 12/04/2017   ALB 2.2 (L) 11/29/2017   A

## 2017-12-04 NOTE — PROGRESS NOTES
Neurology Inpatient Follow-up Note      HPI:     Patient being seen in follow-up.          Past Medical Hisotory:  Reviewed    Medications:  Reviewed    Allergies:  No Known Allergies      ROS:   Unable to obtain from patient      PHYSICAL EXAM:     Vitals:

## 2017-12-05 ENCOUNTER — APPOINTMENT (OUTPATIENT)
Dept: HEMATOLOGY/ONCOLOGY | Facility: HOSPITAL | Age: 47
End: 2017-12-05
Attending: INTERNAL MEDICINE
Payer: COMMERCIAL

## 2017-12-05 PROCEDURE — 99232 SBSQ HOSP IP/OBS MODERATE 35: CPT | Performed by: INTERNAL MEDICINE

## 2017-12-05 PROCEDURE — 99231 SBSQ HOSP IP/OBS SF/LOW 25: CPT | Performed by: OTHER

## 2017-12-05 PROCEDURE — 99233 SBSQ HOSP IP/OBS HIGH 50: CPT | Performed by: OTHER

## 2017-12-05 PROCEDURE — 99231 SBSQ HOSP IP/OBS SF/LOW 25: CPT | Performed by: INTERNAL MEDICINE

## 2017-12-05 RX ORDER — PANTOPRAZOLE SODIUM 40 MG/1
40 TABLET, DELAYED RELEASE ORAL
Status: DISCONTINUED | OUTPATIENT
Start: 2017-12-05 | End: 2017-12-12

## 2017-12-05 RX ORDER — SODIUM CHLORIDE 9 MG/ML
INJECTION, SOLUTION INTRAVENOUS
Status: DISPENSED
Start: 2017-12-05 | End: 2017-12-05

## 2017-12-05 RX ORDER — LEVOTHYROXINE SODIUM 175 UG/1
175 TABLET ORAL
Status: DISCONTINUED | OUTPATIENT
Start: 2017-12-05 | End: 2017-12-12

## 2017-12-05 RX ORDER — LEVETIRACETAM 500 MG/1
1500 TABLET ORAL 2 TIMES DAILY
Status: DISCONTINUED | OUTPATIENT
Start: 2017-12-05 | End: 2017-12-06

## 2017-12-05 RX ORDER — LEVETIRACETAM 100 MG/ML
1500 SOLUTION ORAL 2 TIMES DAILY
Status: DISCONTINUED | OUTPATIENT
Start: 2017-12-05 | End: 2017-12-05

## 2017-12-05 RX ORDER — PHENYTOIN 50 MG/1
300 TABLET, CHEWABLE ORAL 3 TIMES DAILY
Status: DISCONTINUED | OUTPATIENT
Start: 2017-12-05 | End: 2017-12-12

## 2017-12-05 RX ORDER — DIVALPROEX SODIUM 125 MG/1
1000 CAPSULE, COATED PELLETS ORAL EVERY 8 HOURS SCHEDULED
Status: DISCONTINUED | OUTPATIENT
Start: 2017-12-05 | End: 2017-12-12

## 2017-12-05 RX ORDER — SODIUM CHLORIDE 9 MG/ML
INJECTION, SOLUTION INTRAVENOUS CONTINUOUS
Status: DISCONTINUED | OUTPATIENT
Start: 2017-12-05 | End: 2017-12-12

## 2017-12-05 NOTE — PROGRESS NOTES
[facility]    Progress Note    Tommy Velazquez Patient Status:  Inpatient    1970 MRN [id number]   Location [facility] 2W/SW Attending Shaunna Rodriguez DO   Hosp Day # 10 PCP Ksenia Colin DO     Subjective:   Subjective:  Awake / a 4.2 12/05/2017    12/05/2017   CO2 28 12/05/2017    (H) 12/05/2017   CA 9.0 12/05/2017   ALB 2.2 (L) 11/29/2017   ALKPHO 54 11/29/2017   BILT 0.3 11/29/2017   TP 5.5 (L) 11/29/2017   AST 24 11/29/2017   ALT 8 (L) 11/29/2017   T4F <0.25 (L) 06/

## 2017-12-05 NOTE — PROGRESS NOTES
[facility]    [facility]    Pilloshanell Cordero Patient Status:  Inpatient    1970 MRN [id number]   Location [facility] 2W/SW Attending Elisa Rutledge DO   Hosp Day # 8 PCP Jennifer Santacruz DO       SUBJECTIVE:  Doing well today; no 12/4/2017  CONCLUSION:   * Feeding tube now seen coiled in the upper body of the stomach. * The rest of the findings are stable from exam earlier the same day. * A catheter is seen in the superior vena cava. * Stable heart size.  * Scarring/atelectasis lef empiric zosyn 3.375mg q8h IV day #7; WBC 13.5>13.3>12.9; afebrile. Speech recommends nectar thick/puree.  Still with poor oral intake, would still continue TFs until patient tolerating more PO.       Encephalopathy, Acute  Due to status epilepticus, polysub

## 2017-12-05 NOTE — OCCUPATIONAL THERAPY NOTE
OCCUPATIONAL THERAPY EVALUATION - INPATIENT     Room Number: 217/217-A  Evaluation Date: 12/5/2017  Type of Evaluation: Initial  Presenting Problem: status epilepticus    Physician Order: IP Consult to Occupational Therapy  Reason for Therapy: ADL/IADL Dys limit patient's ability to complete IADLs such as medication management. Stable upon OT exit w/ all needs met, wrist restraints in place, RN in room and aware of patient's status.     In this OT evaluation patient presents with the following impairments: de EVERY 2 WEEKS BEGINNING 2015    • Pneumonia, organism unspecified(486)    • Wears glasses        Past Surgical History  Past Surgical History:  1999: APPENDECTOMY  JULY 2015: BRAIN SURGERY      Comment: TUMOR RESECTION  No date: BRAIN SURGERY Left      Com on hallucinations    SENSATION  unable to formally assess d/t confusion and fixation on hallucinations    Communication: Unable to answer simple questions     Behavioral/Emotional/Social: Patient with decreased alertness and orientation during session.  Fix seated position. Comment:     Patient will tolerate standing for 2 minutes in prep for adls with mod A.    Comment:            Goals  on: 2017   Frequency: 3-5x/week    DANIEL Woodruff/L  [facility]

## 2017-12-05 NOTE — PROGRESS NOTES
[facility] Pharmacy Note: Route Optimization for Levothyroxine (SYNTHROID)    Patient is currently on Levothyroxine (SYNTHROID) 87.6 mcg IV daily.    The patient meets the criteria to convert to the oral equivalent as established by the IV to Oral conversion torin

## 2017-12-05 NOTE — PLAN OF CARE
Problem: NEUROLOGICAL - ADULT  Goal: Achieves stable or improved neurological status  INTERVENTIONS  - Assess for and report changes in neurological status  - Initiate measures to prevent increased intracranial pressure  - Maintain blood pressure and fluid patient for signs and symptoms of electrolyte imbalances  - Administer electrolyte replacement as ordered  - Monitor response to electrolyte replacements, including rhythm and repeat lab results as appropriate  - Fluid restriction as ordered  - Instruct pa measures for life threatening arrhythmias  - Monitor electrolytes and administer replacement therapy as ordered   Outcome: Progressing  No arrhythmias noted    Problem: RESPIRATORY - ADULT  Goal: Achieves optimal ventilation and oxygenation  INTERVENTIONS: (electrolyte disturbances, delirium, medications)  - Discontinue any unnecessary medical devices as soon as possible  - Assess the patient's physical comfort, circulation, skin condition, hydration, nutrition and elimination needs   - Reorient and redirect

## 2017-12-05 NOTE — PLAN OF CARE
NEUROLOGICAL - ADULT    • Achieves maximal functionality and self care Not Progressing          CARDIOVASCULAR - ADULT    • Maintains optimal cardiac output and hemodynamic stability Progressing    • Absence of cardiac arrhythmias or at baseline Progressin

## 2017-12-05 NOTE — PROGRESS NOTES
Neurology Inpatient Follow-up Note      HPI:     Patient being seen in follow-up. Improved cognitively.         Past Medical Hisotory:  Reviewed    Medications:  Reviewed    Allergies:  No Known Allergies      ROS:   Unable to obtain from patient      PHYS

## 2017-12-05 NOTE — PHYSICAL THERAPY NOTE
PHYSICAL THERAPY TREATMENT NOTE - INPATIENT    Room Number: 217/217-A       Presenting Problem:  (Status Epilepticus,Seizures,H/O Lung CA with Brain Mets)    Problem List  Principal Problem:    Status epilepticus ([address]  Active Problems:    Hyperglycemia Seizure; Restraints    WEIGHT BEARING RESTRICTION  Weight Bearing Restriction: None                PAIN ASSESSMENT   Rating: Unable to rate  Location:  (seemed comfortable, denies pain)  Management Techniques: Activity promotion; Body mechanics;Breathing shereen #2  Current Status Not tested    Goal #3 Patient will be able to tolerate sitting at EOB for 10 minutes with CGAx1 from therapist    Goal #3   Current Status 8-10 minutes SBA   Goal #4 Will assess transfers/amb and updated goals as appropriate during futur

## 2017-12-06 ENCOUNTER — TELEPHONE (OUTPATIENT)
Dept: INTERNAL MEDICINE CLINIC | Facility: CLINIC | Age: 47
End: 2017-12-06

## 2017-12-06 PROCEDURE — 99232 SBSQ HOSP IP/OBS MODERATE 35: CPT | Performed by: OTHER

## 2017-12-06 PROCEDURE — 99232 SBSQ HOSP IP/OBS MODERATE 35: CPT | Performed by: INTERNAL MEDICINE

## 2017-12-06 RX ORDER — ESCITALOPRAM OXALATE 10 MG/1
5 TABLET ORAL EVERY MORNING
Status: DISCONTINUED | OUTPATIENT
Start: 2017-12-06 | End: 2017-12-08

## 2017-12-06 RX ORDER — POTASSIUM CHLORIDE 20 MEQ/1
40 TABLET, EXTENDED RELEASE ORAL ONCE
Status: COMPLETED | OUTPATIENT
Start: 2017-12-06 | End: 2017-12-06

## 2017-12-06 RX ORDER — LEVETIRACETAM 500 MG/1
1000 TABLET ORAL 2 TIMES DAILY
Status: DISCONTINUED | OUTPATIENT
Start: 2017-12-06 | End: 2017-12-12

## 2017-12-06 NOTE — OCCUPATIONAL THERAPY NOTE
OCCUPATIONAL THERAPY TREATMENT NOTE - INPATIENT     Room Number: 330/330-A       Presenting Problem: status epilepticus    Problem List  Principal Problem:    Status epilepticus ([address]  Active Problems:    Hyperglycemia    Metabolic acidosis    Encephalopat paused and placed the toothbrush, toothpaste, and wash rags into the container of water and said she would finish later. Patient used her LUE throughout the session when writing with a pen or reaching for the wash clothes.  Patient denied any pain or dizzin clothing?: A Lot  -   Taking care of personal grooming such as brushing teeth?: A Lot  -   Eating meals?: A Lot    AM-PAC Score:  Score: 12  Approx Degree of Impairment: 66.57%  Standardized Score (AM-PAC Scale): 30.6  CMS Modifier (G-Code): CL    FUNCTION

## 2017-12-06 NOTE — PAYOR COMM NOTE
REQUESTING 1 ADDITIONAL DAY  CONTINUED STAY REVIEW    Payor: Cindy Friend [employer] [facility] PPO  Subscriber #:  [id number]  Authorization Number: [id number]    Admit date: 11/25/17  Admit time: 1    Admitting Physician: Rox Flower DO  Attending Physician:  Anabell Hernadez 12/5/2017 1548 Given 300 mg Oral Kameron Stringer RN      Potassium Chloride ER (K-DUR M20) CR tab 40 mEq     Date Action Dose Route User    12/6/2017 1033 Given 40 mEq Oral Diaz Celeste RN      scopolamine (TRANSDERM-SCOP) 1.5 mg patch     Date Actio EXTREMITIES: no cyanosis, clubbing or edema     Data Review:      Labs:      Lab Results  Component Value Date   WBC 12.1 12/06/2017   HGB 10.0 12/06/2017   HCT 30.0 12/06/2017    12/06/2017   CREATSERUM 0.44 12/06/2017   BUN 11 12/06/2017    Heparin Sodium Lock Flush 100 UNIT/ML injection 500 Units 5 mL Intercatheter PRN            Assessment & Plan     Status epilepticus (HCC)   CT head negative for acute bleed; UDS-- + benzo, opiates, cocaine. MRI brain without evidence of new metastases.  Ch                                Yeyo Olivera MD  12/6/2017  9:03 AM

## 2017-12-06 NOTE — PROGRESS NOTES
The patient is a 54-year-old female with history of metastatic adenocarcinoma of the lung with brain and liver metastasis status post resection of the brain metastasis.   The patient have a history of polysubstance abuse, mood alternation and presented with

## 2017-12-06 NOTE — DIETARY NOTE
ADULT NUTRITION RE- ASSESSMENT    Pt is at high nutrition risk.        RECOMMENDATIONS TO MD:  Adjusted TF order as follows: NUTRITION DIAGNOSIS/PROBLEM:  Inadequate protein/energy intake related to intubation/NPO as evidenced by 0 nutrition intake and need 5% likely fluid related as edema generalized.l  BMI: Body mass index is 26.24 kg/m².   BMI CLASSIFICATION: 25-29.9 kg/m2 - overweight  IBW: 125 lbs        Now 126% IBW  Usual Body Wt: 175-185 lbs 1 yr ago, 165# more recently       85% UBW  WEIGHT HISTORY: wt loss d/t fluid loss, oral diet  >80% of nutrition needs, labs wnl. Support wound healing    DIETITIAN FOLLOW UP: RD to follow up within 5 days.    [address], [address] [facility]  Ext [phone]

## 2017-12-06 NOTE — PROGRESS NOTES
[facility]    Hematology/Oncology   Progress Note    Keiko Salter Patient Status:  Inpatient    1970 MRN [id number]   Location [facility] 2W/SW Attending Jai Sesay DO   Hosp Day # 10 PCP Aneesh Chavez DO     Subjective: Xr Chest Ap Portable  (cpt=71010)    Result Date: 12/3/2017  CONCLUSION:  1. No acute findings. Medications reviewed.     Assessment and Plan:  54-year-old female with a history of metastatic adenocarcinoma of the left lung (EGFR, ROS1, ALK

## 2017-12-06 NOTE — PROGRESS NOTES
[facility]    Progress Note    Earlyne Mock Patient Status:  Inpatient    1970 MRN [id number]   Location [facility] 2W/SW Attending Mira Muñoz DO   Hosp Day # 6 PCP Vimal Reaves DO       SUBJECTIVE:  Pt awake, alert, slow TID   Divalproex Sodium (DEPAKOTE SPRINKLE) sprinkle cap 1,000 mg 1,000 mg Oral Q8H DYLAN   levETIRAcetam (KEPPRA) tab 1,500 mg 1,500 mg Oral BID   0.9%  NaCl infusion  Intravenous Continuous   ipratropium-albuterol (DUONEB) nebulizer solution 3 mL 3 mL Nebu     Encephalopathy, Acute  Due to status epilepticus, polysubstance abuse.      H/o IVDU  And now with +UDS;  patient's  was unaware of her drug use. Dr. Kvng Carrillo consult appreciated.  Social work consulted for possible inpatient rehab programs.

## 2017-12-06 NOTE — CM/SW NOTE
SW following up on d/c planning for the patient. Patient is still being seen by psych, but per report no plan for inpatient psych placement. PT/OT recommending rehab at this time and SW spoke with the patient's  re options.  SNF list in the room for

## 2017-12-06 NOTE — PROGRESS NOTES
Neurology Inpatient Follow-up Note      HPI:     Patient being seen in follow-up. Continuing to improve.   She has had some hallucinations, secondary to delirium      Past Medical Hisotory:  Reviewed    Medications:  Reviewed    Allergies:  No Known Allerg

## 2017-12-07 ENCOUNTER — APPOINTMENT (OUTPATIENT)
Dept: HEMATOLOGY/ONCOLOGY | Facility: HOSPITAL | Age: 47
End: 2017-12-07
Attending: INTERNAL MEDICINE
Payer: COMMERCIAL

## 2017-12-07 PROCEDURE — 99231 SBSQ HOSP IP/OBS SF/LOW 25: CPT | Performed by: INTERNAL MEDICINE

## 2017-12-07 PROCEDURE — 99232 SBSQ HOSP IP/OBS MODERATE 35: CPT | Performed by: INTERNAL MEDICINE

## 2017-12-07 PROCEDURE — 99231 SBSQ HOSP IP/OBS SF/LOW 25: CPT | Performed by: OTHER

## 2017-12-07 RX ORDER — 0.9 % SODIUM CHLORIDE 0.9 %
VIAL (ML) INJECTION
Status: COMPLETED
Start: 2017-12-07 | End: 2017-12-07

## 2017-12-07 RX ORDER — POTASSIUM CHLORIDE 20 MEQ/1
40 TABLET, EXTENDED RELEASE ORAL EVERY 4 HOURS
Status: COMPLETED | OUTPATIENT
Start: 2017-12-07 | End: 2017-12-07

## 2017-12-07 NOTE — OCCUPATIONAL THERAPY NOTE
OCCUPATIONAL THERAPY TREATMENT NOTE - INPATIENT     Room Number: 330/330-A     Presenting Problem: status epilepticus    Problem List  Principal Problem:    Status epilepticus ([address]  Active Problems:    Hyperglycemia    Metabolic acidosis    Encephalopathy DISCHARGE RECOMMENDATIONS  OT Discharge Recommendations: Cont skilled therapy in a supervised setting;24 hour care/supervision  OT Device Recommendations: TBD      PLAN  OT Treatment Plan: Balance activities; ADL training;Visual perceptual training; Func Session: In bed; With [facility] staff;Needs met;Call light within reach;RN aware of session/findings; All patient questions and concerns addressed    OT Goals:      OT Goals:      Patient will complete bed <> toilet transfer with mod assist   Comment: min a x2 - go

## 2017-12-07 NOTE — PHYSICAL THERAPY NOTE
PHYSICAL THERAPY TREATMENT NOTE - INPATIENT    Room Number: 330/330-A       Presenting Problem:  (Status Epilepticus,Seizures,H/O Lung CA with Brain Mets)    Problem List  Principal Problem:    Status epilepticus ([address]  Active Problems:    Hyperglycemia BEARING RESTRICTION  Weight Bearing Restriction: None                PAIN ASSESSMENT   Rating: Unable to rate  Location:  (seemed comfortable, denies pain)  Management Techniques: Activity promotion; Body mechanics;Breathing techniques;Repositioning    YUNIER moderate assistance with walker - rolling   Goal #2  Current Status Min A x 1    Goal #3 Patient will be able to tolerate sitting at EOB for 10 minutes with CGAx1 from therapist    Goal #3   Current Status 8-10 minutes SBA   Goal #4 Will assess transfers/a

## 2017-12-07 NOTE — PROGRESS NOTES
[facility]    Hematology/Oncology   Progress Note    Talia Ward Patient Status:  Inpatient    1970 MRN [id number]   Location [facility] 2W/SW Attending Barry Miles DO   Hosp Day # 11 PCP Alexia Donald DO     Subjective: the gastric body. Medications reviewed.     Assessment and Plan:  25-year-old female with a history of metastatic adenocarcinoma of the left lung (EGFR, ROS1, ALK negative) with brain and liver metastasis, status post previous resection of brain

## 2017-12-07 NOTE — CONSULTS
[facility]    PATIENT'S NAME: Nohemy Rodriguez   ATTENDING PHYSICIAN: Norman Mccloud DO   CONSULTING PHYSICIAN: Marley Reed DO   PATIENT ACCOUNT#:   [de-identified]    LOCATION:  [address] [place] [address] #:   [id number]       DATE OF BIRTH:   involving right frontal lobe. Hospital course notable for vomiting, aggression, agitation with delirium, hallucinations. The patient underwent intubation on 11/27/2017. The patient was extubated on 12/02/2017.   The patient continues to clear cognitively \"patient does not follow commands. \"    PRECAUTIONS:  Fall precautions, seizure precautions, aspiration precautions. CODE STATUS:  Patient is FULL CODE. PHYSICAL EXAMINATION:    GENERAL:  No acute distress, appears stated age, thin build.     VITAL S deficits. 4.   Gait dysfunction with fall risk. 5.   Episodic mood disorder. 6.   Agitation with delirium. 7.   Polysubstance abuse. IMPAIRMENTS:  ADLs, functional ability, balance, strength, cognition, endurance, self-cares, transfers, hygiene.

## 2017-12-07 NOTE — PROGRESS NOTES
[place] [facility] - [facility]    Progress Note    Fatoumata Redd Patient Status:  Inpatient    1970 MRN [id number]   Location [facility] 3W/SW Attending Benedicto Patel DO   Hosp Day # 15 PCP Silvano Vargas DO       SUBJECTIVE:    Awake and alert thi Imaging:              Assessment and Plan:     Status epilepticus ([facility] [address]   CT head negative for acute bleed; UDS-- + benzo, opiates, cocaine-->patient admits to cocaine and heroin use prior to admission. MRI brain without evidence of new metastases. AM

## 2017-12-07 NOTE — PROGRESS NOTES
[facility]    Hematology/Oncology   Progress Note    Dasia Arreaga Patient Status:  Inpatient    1970 MRN [id number]   Location [facility] 2W/SW Attending Joyce Moreno DO   Hosp Day # 12 PCP Reny Bronson DO     Subjective: encephalopathy/respiratory failure/status epilepticus. appreciate neuro/ccm care/IM care. Clinically improved. --completed course of IV iron for iron def anemia. Thrombocytosis is reactive.      We will arrange and resume next immunotherapy infusion upo

## 2017-12-07 NOTE — SLP NOTE
SPEECH DAILY NOTE - INPATIENT    ASSESSMENT & PLAN   ASSESSMENT  Pt seen sitting upright alert an awake for PO trials per BSSE results and recommendations. Pt with hallucinations and decreased PO acceptance/participation during session.  However, pt accepte Goal #2 The patient/family/caregiver will demonstrate understanding and implementation of aspiration precautions and swallow strategies independently over 3 session(s). Pt unable to demonstrate retention of information due to current cognitive state.

## 2017-12-07 NOTE — CONSULTS
PM&R Consult Dictated. Patient has made tremendous gains functionally from yesterday to today.  If patient progresses to a supervision level with PT and OT prior to discharge, she could possibly return home with 24hr supervision/assist. If she does not p

## 2017-12-07 NOTE — CM/SW NOTE
ERMIAS following up on d/c planning for the patient. PM&R pending at this time and the patient's  is interested in [facility] and [address] [place] [address]. He is looking into other options as well on the medicare. gov website.   Referral sent to [facility] and DO

## 2017-12-07 NOTE — PROGRESS NOTES
Neurology Inpatient Follow-up Note      HPI:     Patient being seen in follow-up. Continuing to improve.         Past Medical Hisotory:  Reviewed    Medications:  Reviewed    Allergies:  No Known Allergies      ROS:   Unable to obtain comprehensive review

## 2017-12-07 NOTE — SLP NOTE
SPEECH DAILY NOTE - INPATIENT    ASSESSMENT & PLAN   ASSESSMENT      Pt seen upright in bed with current diet of pureed/thin liquids for monitoring of diet tolerance. Swallowing precautions/strategies discussed and demonstrated with Pt.  Mild cues needed fo session(s). Pt seen with pureed and nectar-thick liquids (current diet). Limited trials as Pt verbalized strong dislike of this diet. Functional oral skills.  No overt clinical signs of aspiration on any swallow (no coughing, no throat clearing and clear

## 2017-12-07 NOTE — PROGRESS NOTES
Pt was attempting to get out of bed this morning and became agitated and verbally aggressive with PCT and RN. She was also pulling at her port. We were unable to redirect her.  Dr Mita Suh was notified and placed an order for soft-wrist restraints due to th

## 2017-12-07 NOTE — PAYOR COMM NOTE
REQUESTING 1 ADDITIONAL DAY  CONTINUED STAY REVIEW    Payor: Donnie Villavicencio [employer] [facility] PPO  Subscriber #:  [id number]  Authorization Number: [id number]    Admit date: 11/25/17  Admit time: 1    Admitting Physician: Jody Lim DO  Attending Physician:  Casilda Koyanagi 12/6/2017 2125 Given 300 mg Oral Daljit Mortensen RN    12/6/2017 1718 Given 300 mg Oral Fan Puente RN      Potassium Chloride ER (K-DUR M20) CR tab 40 mEq     Date Action Dose Route User    12/7/2017 1408 Given 40 mEq Oral Murali Mir RN    12/7/2 –Patient can remain on current antiepileptic doses for time being; however, she will need a close follow-up in neurology clinic on discharge (within 1 month) for close monitoring and possible adjustment of her antiepileptic regimen        No further inpati MSK: no clubbing, no cyanosis, no edema  Skin: no rashes or lesions  Neuro: A&O x 2, 4/5 strength in RUE, 5/5 strength in BLE and LUE. Hemineglect? Did not recognize me; was able to state that Dr. Sola Abebe is her oncologist and Dr. Griselda Merida is her regular doctor. And now with +UDS;  patient's  was unaware of her drug use.  Dr. Kvng Carrillo consult appreciated. Enrique Hudson work consulted for possible inpatient rehab programs.      Stage IV lung cancer with mets  S/p resection of brain mets x 2, initially not respondi

## 2017-12-08 ENCOUNTER — APPOINTMENT (OUTPATIENT)
Dept: HEMATOLOGY/ONCOLOGY | Facility: HOSPITAL | Age: 47
End: 2017-12-08
Attending: INTERNAL MEDICINE
Payer: COMMERCIAL

## 2017-12-08 PROCEDURE — 99231 SBSQ HOSP IP/OBS SF/LOW 25: CPT | Performed by: INTERNAL MEDICINE

## 2017-12-08 PROCEDURE — 99232 SBSQ HOSP IP/OBS MODERATE 35: CPT | Performed by: INTERNAL MEDICINE

## 2017-12-08 PROCEDURE — 99233 SBSQ HOSP IP/OBS HIGH 50: CPT | Performed by: OTHER

## 2017-12-08 RX ORDER — AMANTADINE HYDROCHLORIDE 100 MG/1
50 TABLET ORAL
Status: DISCONTINUED | OUTPATIENT
Start: 2017-12-08 | End: 2017-12-11

## 2017-12-08 RX ORDER — ARIPIPRAZOLE 2 MG/1
1 TABLET ORAL NIGHTLY
Status: DISCONTINUED | OUTPATIENT
Start: 2017-12-08 | End: 2017-12-11

## 2017-12-08 RX ORDER — ESCITALOPRAM OXALATE 10 MG/1
10 TABLET ORAL EVERY MORNING
Status: DISCONTINUED | OUTPATIENT
Start: 2017-12-09 | End: 2017-12-20

## 2017-12-08 RX ORDER — SODIUM CHLORIDE 9 MG/ML
INJECTION, SOLUTION INTRAVENOUS
Status: COMPLETED
Start: 2017-12-08 | End: 2017-12-08

## 2017-12-08 NOTE — PLAN OF CARE
NEUROLOGICAL - ADULT    • Achieves stable or improved neurological status Not Progressing          CARDIOVASCULAR - ADULT    • Maintains optimal cardiac output and hemodynamic stability Progressing    • Absence of cardiac arrhythmias or at baseline Progres

## 2017-12-08 NOTE — OCCUPATIONAL THERAPY NOTE
OCCUPATIONAL THERAPY TREATMENT NOTE - INPATIENT     Room Number: 330/330-A          Presenting Problem: status epilepticus    Problem List  Principal Problem:    Status epilepticus ([address]  Active Problems:    Hyperglycemia    Metabolic acidosis    Encephalo air    ACTIVITIES OF DAILY LIVING ASSESSMENT  AM-PAC ‘6-Clicks’ Inpatient Daily Activity Short Form  How much help from another person does the patient currently need…  -   Putting on and taking off regular lower body clothing?: A Little  -   Bathing (incl 12/16/2017   Frequency: 3-5x/week

## 2017-12-08 NOTE — SLP NOTE
SPEECH DAILY NOTE - INPATIENT    ASSESSMENT & PLAN   ASSESSMENT      Pt seen upright in bed with current diet of mechanical soft/thin liquids for monitoring of diet tolerance. Swallowing precautions/strategies discussed and demonstrated with Pt.  Pt able to swallows of solid nor thin liquids (no coughing, no throat clearing and clear vocal quality). Pt verbalized preference to remain on current diet of mechanical soft consistency.      GOAL MET     Goal #4 The patient will utilize compensatory strategies as o

## 2017-12-08 NOTE — PROGRESS NOTES
[facility]    Progress Note    Dayana Hall Patient Status:  Inpatient    1970 MRN [id number]   Location [facility] 3W/SW Attending Jyoti Carrington DO   Hosp Day # 15 PCP Umesh Day DO       SUBJECTIVE:  Patient doing well to Assessment and Plan:     Status epilepticus ([facility] [address]   CT head negative for acute bleed; UDS-- + benzo, opiates, cocaine-->patient admits to cocaine and heroin use prior to admission. MRI brain without evidence of new metastases.   Keppra 1500mg po B SCDs to LE     Ulcer prophylaxis   On Protonix 40 mg PO qD     Disposition:  Appreciate rehab consult; patient continuing to improve and will continue to monitor over the weekend.  Will consider d/c to rehab or home with home health on Mon/Tues next week if

## 2017-12-09 PROCEDURE — 99232 SBSQ HOSP IP/OBS MODERATE 35: CPT | Performed by: INTERNAL MEDICINE

## 2017-12-09 RX ORDER — PHENYTOIN 50 MG/1
150 TABLET, CHEWABLE ORAL ONCE
Status: COMPLETED | OUTPATIENT
Start: 2017-12-09 | End: 2017-12-09

## 2017-12-09 RX ORDER — PHENYTOIN 50 MG/1
150 TABLET, CHEWABLE ORAL
Status: COMPLETED | OUTPATIENT
Start: 2017-12-09 | End: 2017-12-09

## 2017-12-09 NOTE — PROGRESS NOTES
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   The patient have a history of polysubstance abuse, mood alternation and presented with

## 2017-12-09 NOTE — PROGRESS NOTES
[place] [facility] - [facility]    Hematology/Oncology   Progress Note    Sardis Bone Patient Status:  Inpatient    1970 MRN [id number]   Location [facility] 2W/SW Attending Good Edwards DO   Hosp Day # 15 PCP Johan Schmitz DO     Subjective:  N covering this weekend if any issues.        Matthew Suero MD

## 2017-12-09 NOTE — PLAN OF CARE
CARDIOVASCULAR - ADULT    • Maintains optimal cardiac output and hemodynamic stability Not Progressing        NEUROLOGICAL - ADULT    • Achieves stable or improved neurological status Not Progressing    • Absence of seizures Not Progressing    • Remains fr

## 2017-12-09 NOTE — PROGRESS NOTES
[facility]    Progress Note    Henry Nails Patient Status:  Inpatient    1970 MRN [id number]   Location [facility] 3W/SW Attending Gold Summers DO   Hosp Day # 15 PCP Kenn Blackmon DO       SUBJECTIVE:  Pt sleepy but easily Oral 2x Daily(Beta Blocker)   phenytoin (DILANTIN INFATABS) tab 300 mg 300 mg Oral TID   Divalproex Sodium (DEPAKOTE SPRINKLE) sprinkle cap 1,000 mg 1,000 mg Oral Q8H [address]   0.9%  NaCl infusion  Intravenous Continuous   ipratropium-albuterol (DUONEB) nebuli brain injury and cognition per Dr. Zandra Chen. Unfortunately, pt continues to exhibit periods of extreme agitation -- was trying to pull out IV's this am, requiring temporary restraints. Currently more calm and restraint-free.      Acute encephalopathy  Due

## 2017-12-09 NOTE — PLAN OF CARE
PAIN - ADULT    • Verbalizes/displays adequate comfort level or patient's stated pain goal Adequate for Discharge          Safety Risk - Non-Violent Restraints    • Patient will remain free from self-harm Completed          Soft-wrist restraints removed at

## 2017-12-10 PROCEDURE — 99232 SBSQ HOSP IP/OBS MODERATE 35: CPT | Performed by: INTERNAL MEDICINE

## 2017-12-10 RX ORDER — UBIDECARENONE 75 MG
100 CAPSULE ORAL DAILY
Status: DISCONTINUED | OUTPATIENT
Start: 2017-12-11 | End: 2017-12-11

## 2017-12-10 RX ORDER — POTASSIUM CHLORIDE 29.8 MG/ML
40 INJECTION INTRAVENOUS ONCE
Status: COMPLETED | OUTPATIENT
Start: 2017-12-10 | End: 2017-12-10

## 2017-12-10 RX ORDER — CYANOCOBALAMIN 1000 UG/ML
1000 INJECTION INTRAMUSCULAR; SUBCUTANEOUS ONCE
Status: COMPLETED | OUTPATIENT
Start: 2017-12-10 | End: 2017-12-10

## 2017-12-10 NOTE — PROGRESS NOTES
[place] [facility] - [facility]    Progress Note    Td Larson Patient Status:  Inpatient    1970 MRN [id number]   Location [facility] 3W/SW Attending Rox Flower DO   Hosp Day # 13 PCP Nirmal Godfrey DO       SUBJECTIVE:  Pt awake, alert, stil levothyroxine (SYNTHROID, LEVOTHROID) tab 175 mcg Oral Before breakfast   Pantoprazole Sodium (PROTONIX) EC tab 40 mg 40 mg Oral QAM AC   metoprolol Tartrate (LOPRESSOR) tab 12.5 mg 12.5 mg Oral 2x Daily(Beta Blocker)   phenytoin (DILANTIN INFATABS) tab whether she ate breakfast (only small amount per her )      Mood disorder, opiate dependence (cocaine, heroin)  Dr. Susan Lim following; certified-forms signed and in chart. Syble Hench increased to 10mg/day on 12/8. Abilify 1mg qhs started 12/8.   Amant weekend. Discussed with pt's  today -- he is looking over the list of rehab facilities and is working with social work to coordinate a place for pt to go.   Unfortunately, pt is still requiring intermittent use of restraints due to confusion/agitatio

## 2017-12-10 NOTE — PLAN OF CARE
CARDIOVASCULAR - ADULT    • Maintains optimal cardiac output and hemodynamic stability Not Progressing    • Absence of cardiac arrhythmias or at baseline Not Progressing        METABOLIC/FLUID AND ELECTROLYTES - ADULT    • Electrolytes maintained within no

## 2017-12-11 ENCOUNTER — APPOINTMENT (OUTPATIENT)
Dept: GENERAL RADIOLOGY | Facility: HOSPITAL | Age: 47
DRG: 100 | End: 2017-12-11
Attending: INTERNAL MEDICINE
Payer: COMMERCIAL

## 2017-12-11 PROCEDURE — 99233 SBSQ HOSP IP/OBS HIGH 50: CPT | Performed by: INTERNAL MEDICINE

## 2017-12-11 PROCEDURE — 71010 XR CHEST AP PORTABLE  (CPT=71010): CPT | Performed by: INTERNAL MEDICINE

## 2017-12-11 PROCEDURE — 99232 SBSQ HOSP IP/OBS MODERATE 35: CPT | Performed by: OTHER

## 2017-12-11 PROCEDURE — 99232 SBSQ HOSP IP/OBS MODERATE 35: CPT | Performed by: INTERNAL MEDICINE

## 2017-12-11 RX ORDER — POTASSIUM CHLORIDE 20 MEQ/1
40 TABLET, EXTENDED RELEASE ORAL EVERY 4 HOURS
Status: COMPLETED | OUTPATIENT
Start: 2017-12-11 | End: 2017-12-11

## 2017-12-11 RX ORDER — ACETAMINOPHEN 325 MG/1
650 TABLET ORAL EVERY 6 HOURS PRN
Status: DISCONTINUED | OUTPATIENT
Start: 2017-12-11 | End: 2017-12-18

## 2017-12-11 RX ORDER — SODIUM CHLORIDE AND POTASSIUM CHLORIDE .9; .15 G/100ML; G/100ML
SOLUTION INTRAVENOUS CONTINUOUS
Status: DISCONTINUED | OUTPATIENT
Start: 2017-12-11 | End: 2017-12-13

## 2017-12-11 RX ORDER — 0.9 % SODIUM CHLORIDE 0.9 %
VIAL (ML) INJECTION
Status: COMPLETED
Start: 2017-12-11 | End: 2017-12-11

## 2017-12-11 RX ORDER — CHOLECALCIFEROL (VITAMIN D3) 125 MCG
1000 CAPSULE ORAL DAILY
Status: DISCONTINUED | OUTPATIENT
Start: 2017-12-12 | End: 2017-12-20

## 2017-12-11 RX ORDER — MAGNESIUM OXIDE 400 MG (241.3 MG MAGNESIUM) TABLET
400 TABLET ONCE
Status: COMPLETED | OUTPATIENT
Start: 2017-12-11 | End: 2017-12-11

## 2017-12-11 RX ORDER — POTASSIUM CHLORIDE 29.8 MG/ML
40 INJECTION INTRAVENOUS ONCE
Status: COMPLETED | OUTPATIENT
Start: 2017-12-11 | End: 2017-12-11

## 2017-12-11 RX ORDER — OLANZAPINE 2.5 MG/1
2.5 TABLET ORAL NIGHTLY
Status: DISCONTINUED | OUTPATIENT
Start: 2017-12-11 | End: 2017-12-13

## 2017-12-11 RX ORDER — HALOPERIDOL 5 MG/ML
0.5 INJECTION INTRAMUSCULAR EVERY 4 HOURS PRN
Status: DISCONTINUED | OUTPATIENT
Start: 2017-12-11 | End: 2017-12-12

## 2017-12-11 NOTE — PHYSICAL THERAPY NOTE
PHYSICAL THERAPY TREATMENT NOTE - INPATIENT    Room Number: 330/330-A       Presenting Problem:  (Status Epilepticus,Seizures,H/O Lung CA with Brain Mets)    Problem List  Principal Problem:    Status epilepticus ([address]  Active Problems:    Hyperglycemia and blankets)?: A Little   -   Sitting down on and standing up from a chair with arms (e.g., wheelchair, bedside commode, etc.): A Little   -   Moving from lying on back to sitting on the side of the bed?: A Little   How much help from another person does

## 2017-12-11 NOTE — PROGRESS NOTES
[place] [facility] - [facility]    Hematology/Oncology   Progress Note    Fatoumata Redd Patient Status:  Inpatient    1970 MRN [id number]   Location [facility] 2W/SW Attending Benedicto Patel DO   Hosp Day # 12 PCP Silvano Vargas DO     Subjective:  F polysubstance abuse now admitted to the hospital with status epilepticus. The patient has been seen by Neurology in consultation. She has urine drug screen positive for opiates, cocaine, and benzodiazepines.   Initially a CT of the brain does not show any

## 2017-12-11 NOTE — PROGRESS NOTES
[facility]    Progress Note    Keiko Wilmerdarwin Patient Status:  Inpatient    1970 MRN [id number]   Location [facility] 3W/SW Attending Jai Sesay DO   Hosp Day # 12 PCP Aneesh Chavez DO         Assessment and Plan:     Fever infiltrate;completed zosyn 3.375mg q8h IV day x7 days; speech reevaluation and patient now with [facility] soft/regular liquid diet. Still with poor po intake; continue IVF @ 125 cc/hr, anticipate patient may eat more today with advanced diet.  D/c ivf once patie 0 ml   Net             1352 ml       Wt Readings from Last 3 Encounters:  12/11/17 : 140 lb 6.4 oz (63.7 kg)  11/21/17 : 150 lb (68 kg)  11/20/17 : 150 lb (68 kg)        Constitutional: alert and oriented x3 in no acute distress  HEENT- EOMI, P mL Intramuscular Prior to discharge   dextrose 10 % infusion  Intravenous Continuous PRN   Normal Saline Flush 0.9 % injection 10 mL 10 mL Intravenous PRN   Gadoterate Meglumine (DOTAREM) 7.5 MMOL/15ML injection 15 mL 15 mL Intravenous ONCE PRN   LORazepam

## 2017-12-12 ENCOUNTER — APPOINTMENT (OUTPATIENT)
Dept: GENERAL RADIOLOGY | Facility: HOSPITAL | Age: 47
DRG: 100 | End: 2017-12-12
Attending: INTERNAL MEDICINE
Payer: COMMERCIAL

## 2017-12-12 PROCEDURE — 99233 SBSQ HOSP IP/OBS HIGH 50: CPT | Performed by: INTERNAL MEDICINE

## 2017-12-12 PROCEDURE — 71010 XR CHEST AP/PA (1 VIEW) (CPT=71010): CPT | Performed by: INTERNAL MEDICINE

## 2017-12-12 PROCEDURE — 99291 CRITICAL CARE FIRST HOUR: CPT | Performed by: INTERNAL MEDICINE

## 2017-12-12 RX ORDER — METOPROLOL TARTRATE 5 MG/5ML
5 INJECTION INTRAVENOUS EVERY 6 HOURS PRN
Status: DISCONTINUED | OUTPATIENT
Start: 2017-12-12 | End: 2017-12-15

## 2017-12-12 RX ORDER — ACETAMINOPHEN 160 MG/5ML
650 SOLUTION ORAL EVERY 6 HOURS PRN
Status: DISCONTINUED | OUTPATIENT
Start: 2017-12-12 | End: 2017-12-18

## 2017-12-12 RX ORDER — SODIUM CHLORIDE 9 MG/ML
INJECTION, SOLUTION INTRAVENOUS CONTINUOUS
Status: DISCONTINUED | OUTPATIENT
Start: 2017-12-12 | End: 2017-12-13

## 2017-12-12 RX ORDER — ACETAMINOPHEN 650 MG/1
650 SUPPOSITORY RECTAL EVERY 6 HOURS PRN
Status: DISCONTINUED | OUTPATIENT
Start: 2017-12-12 | End: 2017-12-18

## 2017-12-12 RX ORDER — MAGNESIUM SULFATE HEPTAHYDRATE 40 MG/ML
2 INJECTION, SOLUTION INTRAVENOUS ONCE
Status: COMPLETED | OUTPATIENT
Start: 2017-12-12 | End: 2017-12-12

## 2017-12-12 RX ORDER — DILTIAZEM HYDROCHLORIDE 5 MG/ML
INJECTION INTRAVENOUS
Status: DISPENSED
Start: 2017-12-12 | End: 2017-12-13

## 2017-12-12 RX ORDER — MAGNESIUM OXIDE 400 MG (241.3 MG MAGNESIUM) TABLET
400 TABLET ONCE
Status: DISCONTINUED | OUTPATIENT
Start: 2017-12-12 | End: 2017-12-14

## 2017-12-12 RX ORDER — SODIUM CHLORIDE 9 MG/ML
INJECTION, SOLUTION INTRAVENOUS CONTINUOUS
Status: DISCONTINUED | OUTPATIENT
Start: 2017-12-12 | End: 2017-12-12

## 2017-12-12 NOTE — PROGRESS NOTES
[facility]    Hematology/Oncology   Progress Note    Jerome Briggs Patient Status:  Inpatient    1970 MRN [id number]   Location [facility] 2W/SW Attending Lily Villatoro DO   Hosp Day # 16 PCP Evelin Fountain DO     Subjective:  C removed. 2. Prominent markings but no gross consolidation and little change from December 4, 2017. 3. Catheter in superior vena cava. 4. Postop changes left chest. 5. A preliminary report was submitted and there are no major discrepancies.              Medi

## 2017-12-12 NOTE — PLAN OF CARE
NEUROLOGICAL - ADULT    • Achieves stable or improved neurological status Not Progressing    • Achieves maximal functionality and self care Not Progressing          CARDIOVASCULAR - ADULT    • Maintains optimal cardiac output and hemodynamic stability Prog

## 2017-12-12 NOTE — PROGRESS NOTES
[facility]    Progress Note    Jaspal Sheldon Patient Status:  Inpatient    1970 MRN [id number]   Location [facility] 2W/SW Attending Eliana Oliver DO   Hosp Day # 16 PCP Maryan William DO     Subjective:     Unable to perform RO cancer diagnosed in 2015 status post left lower lobe resection in August 2015  Patient with brain metastases and liver metastases  Status post resection of brain metastases  EGFR , ROS1 , ALk negative   Followed by oncology and responded well to [facility]

## 2017-12-12 NOTE — OCCUPATIONAL THERAPY NOTE
Pt placed on hold from OT services at this time with a decline in medical status and xfer to ICU. Will require new orders to re evaluate when appropriate.

## 2017-12-12 NOTE — PROGRESS NOTES
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   The patient have a history of polysubstance abuse, mood alternation and presented with

## 2017-12-12 NOTE — PROGRESS NOTES
[facility]    CDI Prediction Tool Protocol (Vancomycin Initiated)    OVP (oral vancomycin prophylaxis) 125 mg PO BID is being started in this patient based on a score of 14.       Score Breakdown:  High risk antibiotic use (5 points)  [facility]

## 2017-12-12 NOTE — PROGRESS NOTES
[facility]      Sepsis Reassessment Note    /57 (BP Location: Right arm)   Pulse 118   Temp 100.4 °F (38 °C)   Resp 24   Ht 5' 5\" (1.651 m)   Wt 137 lb 4.8 oz (62.3 kg)   SpO2 100%   BMI 22.85 kg/m²      11:30 AM    Cardiac:  Regul

## 2017-12-12 NOTE — PROGRESS NOTES
[facility] Dosing Service  Antibiotic Dosing    Keiko Salter is a 49year old female for whom pharmacy is dosing Meropenem for treatment of  fever of unknown origin. .  Other antibiotics (Not dosed by pharmacy): Allergies: has No Known Allergies. from [facility] line    Blood Culture FREQ X 2 [[id number]]    Order Status: Sent Lab Status: No result    Specimen: Blood from Blood,peripheral    Blood Culture FREQ X 2 [[id number]] Collected: 12/11/17 0553   Order Status: Resulted Lab Status:  In process

## 2017-12-12 NOTE — PROGRESS NOTES
See progress note dated earlier today    Update:  Fever persists with temp 103; on Zosyn 3.375 gm IV q8hrs; BCx pending; since pt has port, will add vancomycin pending blood culture results; will stop amantadine for now in case it is contributing to fever;

## 2017-12-12 NOTE — PROGRESS NOTES
[facility] dosing service    Initial Pharmacokinetic Consult for Vancomycin Dosing     Sen Walsh is a 49year old female admitted on 12/11 who is being treated for fever of unknown origin.   Pharmacy has been asked to dose Vancomycin by Dr. Hand Mon pharmacokinetics. 2.  Pharmacy ordered Vancomycin trough level(s) prior to 4 dose. Goal trough level 15-20 ug/mL. 3.  Pharmacy will need BUN/Scr daily while on Vancomycin to assess renal function.     4.  Pharmacy will follow and monitor renal funct

## 2017-12-12 NOTE — PLAN OF CARE
Safety Risk - Non-Violent Restraints    • Patient will remain free from self-harm Not Progressing        freq rounding on pt; bed alarm activated; call light in reach

## 2017-12-12 NOTE — DIETARY NOTE
ADULT NUTRITION RE- ASSESSMENT    Pt is at high nutrition risk. RECOMMENDATIONS TO MD: See Nutrition Intervention for TF orders.      NUTRITION DIAGNOSIS/PROBLEM:  Inadequate protein/energy intake related to intubation/NPO as evidenced by 0 nutrition edema generalized. l   12/12 137#--19# wt loss or 14% likely related to combination of fluid, poor po, meds and medical status. BMI: Body mass index is 22.85 kg/m².   BMI CLASSIFICATION: 25-29.9 kg/m2 - overweight  IBW: 125 lbs        Now 126% IBW  Usual B clinical status.    ESTIMATED NUTRITIONAL NEEDS:  Calories: Adjusted to 4895-4436 ( 25-30 calories/62 kg/day )   Protein: 74-85 grams protein/day (1.3-1.5 grams protein per kg)  Estimated Fluid needs: min 7238-5143 ml/day---adjust as needed pending fluid st

## 2017-12-12 NOTE — PROGRESS NOTES
[facility]    Progress Note    Carmela Ricardo Patient Status:  Inpatient    1970 MRN [id number]   Location [facility] 3W/SW Attending Davy Giraldo DO   Hosp Day # 16 PCP Helena Mcdaniel DO       SUBJECTIVE:  Pt obtunded.  Arousabl Meds:     Current Facility-Administered Medications:  meropenem (MERREM) IVPB 500 mg/100 ml in 0.9% NaCl minibag 500 mg Intravenous Q8H   Valproate Sodium (DEPACON) 1,000 mg in sodium chloride 0.9 % 100 mL IVPB 1,000 mg Intravenous Q8H   levETIRAcet sodium chloride 0.9 % 124 mL chemo-IVPB 240 mg Intravenous Once   Heparin Sodium Lock Flush 100 UNIT/ML injection 500 Units 5 mL Intercatheter PRN         Assessment & Plan    Fever/Sepsis  Pt continues to have high fever up to 103.9 (104.9 rectally), liliaen infiltrate;completed zosyn 3.375mg q8h IV day x7 days; speech reevaluated; patient upgraded to [facility] soft/regular liquid diet, although had poor po intake.  continue IVF @ 125 cc/hr.     Stage IV lung cancer with mets  S/p resection of brain mets x 2, initia

## 2017-12-13 ENCOUNTER — APPOINTMENT (OUTPATIENT)
Dept: GENERAL RADIOLOGY | Facility: HOSPITAL | Age: 47
DRG: 100 | End: 2017-12-13
Attending: INTERNAL MEDICINE
Payer: COMMERCIAL

## 2017-12-13 ENCOUNTER — APPOINTMENT (OUTPATIENT)
Dept: CT IMAGING | Facility: HOSPITAL | Age: 47
DRG: 100 | End: 2017-12-13
Attending: INTERNAL MEDICINE
Payer: COMMERCIAL

## 2017-12-13 PROCEDURE — 71010 XR CHEST AP PORTABLE  (CPT=71010): CPT | Performed by: INTERNAL MEDICINE

## 2017-12-13 PROCEDURE — 99233 SBSQ HOSP IP/OBS HIGH 50: CPT | Performed by: INTERNAL MEDICINE

## 2017-12-13 PROCEDURE — 99232 SBSQ HOSP IP/OBS MODERATE 35: CPT | Performed by: OTHER

## 2017-12-13 PROCEDURE — 74177 CT ABD & PELVIS W/CONTRAST: CPT | Performed by: INTERNAL MEDICINE

## 2017-12-13 RX ORDER — SODIUM CHLORIDE 9 MG/ML
INJECTION, SOLUTION INTRAVENOUS
Status: COMPLETED
Start: 2017-12-13 | End: 2017-12-13

## 2017-12-13 RX ORDER — 0.9 % SODIUM CHLORIDE 0.9 %
VIAL (ML) INJECTION
Status: COMPLETED
Start: 2017-12-13 | End: 2017-12-13

## 2017-12-13 RX ORDER — POTASSIUM CHLORIDE 29.8 MG/ML
40 INJECTION INTRAVENOUS ONCE
Status: DISCONTINUED | OUTPATIENT
Start: 2017-12-13 | End: 2017-12-13

## 2017-12-13 RX ORDER — ARIPIPRAZOLE 15 MG/1
40 TABLET ORAL EVERY 4 HOURS
Status: COMPLETED | OUTPATIENT
Start: 2017-12-13 | End: 2017-12-13

## 2017-12-13 NOTE — OCCUPATIONAL THERAPY NOTE
OCCUPATIONAL THERAPY TREATMENT NOTE - INPATIENT     Room Number: 148/040-O          Presenting Problem: status epilepticus    Problem List  Principal Problem:    Status epilepticus ([facility] [address]  Active Problems:    Hyperglycemia    Metabolic acidosis    Encephalo off regular lower body clothing?: A Lot  -   Bathing (including washing, rinsing, drying)?: A Lot  -   Toileting, which includes using toilet, bedpan or urinal? : A Lot  -   Putting on and taking off regular upper body clothing?: A Lot  -   Taking care of

## 2017-12-13 NOTE — PROGRESS NOTES
120 [facility] dosing service    Follow-up Pharmacokinetic Consult for Vancomycin Dosing     Sparkle Dodd is a 49year old female admitted on 11/25 who is being treated for fever of unknown origin and sepsis.    Patient is on day 3 of Vancomycin 1 gm IV Q 12 2. BLOOD CULTURE Status: None (Preliminary result)   Collection Time: 12/11/17 5:53 AM   Result Value Ref Range   Blood Culture Result No Growth 2 Days N/A       Based on the above:    1.  Increase Vancomycin at 1 gm IVPB Q 8 hours (based on Trough of 5.9

## 2017-12-13 NOTE — CM/SW NOTE
SW spoke with the patient's RN re d/c planning. No d/c plan at the time, but the patient will likely need SNF placement pending nutrition (patient has a dobhoff).   ERMIAS spoke with the  re SNF options and he agreed to referrals to [facility], [employer], [place]

## 2017-12-13 NOTE — PLAN OF CARE
CARDIOVASCULAR - ADULT    • Maintains optimal cardiac output and hemodynamic stability Progressing    • Absence of cardiac arrhythmias or at baseline Progressing    VSS. Sinus tach/NSR. BP stable. Monitoring closely.     METABOLIC/FLUID AND ELECTROLYTES - A

## 2017-12-13 NOTE — PROGRESS NOTES
[facility]    Progress Note    Earlyne Mock Patient Status:  Inpatient    1970 MRN [id number]   Location [facility] 2W/SW Attending Mira Muñoz DO   Hosp Day # 25 PCP Vimal Reaves DO         Assessment and Plan:     Fever/S upgraded to [facility] soft/regular liquid diet, although had poor po intake. continue IVF @ 125 cc/hr.     Stage IV lung cancer with mets  S/p resection of brain mets x 2, initially not responding to chemotherapy, but has responded very well with opdivo.  Apprec or edema; SCDs to LEs        Meds:     Current Facility-Administered Medications:  potassium chloride (K-SOL) 40 meq/30 ml (10%) oral solution 40 mEq 40 mEq Per NG Tube Q4H   Sodium Chloride 0.9 % solution      Vancomycin HCl (VANCOCIN) 1,000 mg in sodium bisacodyl (DULCOLAX) rectal suppository 10 mg 10 mg Rectal BID PRN   influenza vaccine split quad (FLULAVAL) ages 6 months to 65 years inj 0.5ml 0.5 mL Intramuscular Prior to discharge   LORazepam (ATIVAN) injection 2 mg 2 mg Intravenous PRN   Heparin So Portable  (cpt=71010)    Result Date: 12/11/2017  CONCLUSION:  1. Feeding tube has been removed. 2. Prominent markings but no gross consolidation and little change from December 4, 2017. 3. Catheter in superior vena cava.  4. Postop changes left chest. 5. A

## 2017-12-13 NOTE — PROGRESS NOTES
[place] [facility] - [facility]    Progress Note    Samy Blackavan Patient Status:  Inpatient    1970 MRN [id number]   Location [facility] 2W/SW Attending Jody , [address] [facility] Day # 25 PCP June Falcon DO       Subjective:   Higinio Canavan is a(n) 4 sodium chloride 0.9 % 100 mL IVPB 1,000 mg Intravenous Q8H   levETIRAcetam (KEPPRA) 1,000 mg in sodium chloride 0.9 % 100 mL IVPB 1,000 mg Intravenous Q12H   levothyroxine Sodium (SYNTHROID) 87.6 mcg in Sodium Chloride 0.9 % IV push 87.6 mcg Intravenous Da Sodium Lock Flush 100 UNIT/ML injection 500 Units 5 mL Intercatheter PRN       Results:     Lab Results  Component Value Date   WBC 6.6 12/13/2017   HGB 8.6 (L) 12/13/2017   HCT 25.8 (L) 12/13/2017    12/13/2017   CREATSERUM 0.41 (L) 12/13/2017   BU tree. 8. Post hysterectomy and appendectomy. 9. Atherosclerotic vascular calcification without aneurysm. Xr Chest Ap Portable  (cpt=71010)    Result Date: 12/13/2017  CONCLUSION:  1.  Heart size upper limits of normal with mild to moderate congestiv

## 2017-12-13 NOTE — PROGRESS NOTES
[facility]    Progress Note    Musa Kellogg Patient Status:  Inpatient    1970 MRN [id number]   Location [facility] 2W/SW Attending Eri Herrera DO   Hosp Day # 25 PCP Sandeep Valerio DO     Subjective:     Unable to perform RO ALk negative   Followed by oncology and responded well to Nivolumab         6–  poor nutrition status   Ng feeding tube inserted / tolerating feeding      7–DVT prophylaxis  Heparin subcu     8–GI prophylaxis  PPI       Poor prognosis overall       Results

## 2017-12-13 NOTE — PROGRESS NOTES
INFECTIOUS DISEASE PROGRESS NOTE    Светланаjeannie Romero Patient Status:  Inpatient    1970 MRN [id number]   Location [facility] 2W/SW Attending Price Stubbs DO   Hosp Day # 25 PCP Arslan Cardona DO     Subjective:  Improved fever curve although still inadequate dietary iron intake     Viral syndrome     Cellulitis of foot, left     Esophageal candidiasis (HCC)     Hypotension     Hyponatremia     Acute febrile illness     Primary malignant neoplasm of lung metastatic to other site [facility])     Carmen Mcneil or worsening pulmonary process; no SSTI   - PCT 2.38  # High grade fever - possibly central source vs related to liver mets; consider non-infectious etiology if workup unrevealing  # Metastatic lung cancer to pancreas, liver, brain  # IV substance abuse in

## 2017-12-13 NOTE — CONSULTS
INFECTIOUS DISEASE CONSULT NOTE    Sen Walsh Patient Status:  Inpatient    1970 MRN [id number]   Location [facility] 2W/SW Attending Cecilia Watkins, [address] [facility] Day # 16 PCP SARA Blanchard • Pneumonia, organism unspecified(486)    • Wears glasses      Past Surgical History:  1999: APPENDECTOMY  JULY 2015: BRAIN SURGERY      Comment: TUMOR RESECTION  No date: BRAIN SURGERY Left      Comment: OCCIPITAL CRANIOTOMY  No date: BRAIN SURGERY Left magnesium oxide (MAG-OX) tab 400 mg, 400 mg, Oral, Once  •  acetaminophen (TYLENOL) 650 MG rectal suppository 650 mg, 650 mg, Rectal, Q6H PRN  •  dextrose 10 % infusion, , Intravenous, Continuous PRN  •  norepinephrine (LEVOPHED) 4 mg/250 ml premix infusi temperature (!) 97.3 °F (36.3 °C), resp. rate 17, height 5' 5\" (1.651 m), weight 137 lb 4.8 oz (62.3 kg), SpO2 100 %. General: In bed, opens eyes, does not follow commands, no responsive  HEENT: PERRL, does not open mouth  Neck: No lymphadenopathy.   Love increasing fevers 12/10 and now up to 104.9. Given IV vancomycin and started on Zosyn that was switched to meropenem. Also OVP started. CXR with prominent vascular markings and mild patchy R perihilar and basilar opacities. Respiratory PCR negative.  Cultur

## 2017-12-13 NOTE — OCCUPATIONAL THERAPY NOTE
PT not seen for OT at this time secondary to per nursing, pt going off of floor to CT.  Will follow up with pt next date as is appropriate

## 2017-12-14 PROCEDURE — 99233 SBSQ HOSP IP/OBS HIGH 50: CPT | Performed by: INTERNAL MEDICINE

## 2017-12-14 PROCEDURE — 99232 SBSQ HOSP IP/OBS MODERATE 35: CPT | Performed by: OTHER

## 2017-12-14 RX ORDER — MAGNESIUM OXIDE 400 MG (241.3 MG MAGNESIUM) TABLET
400 TABLET ONCE
Status: COMPLETED | OUTPATIENT
Start: 2017-12-14 | End: 2017-12-14

## 2017-12-14 RX ORDER — SODIUM CHLORIDE 9 MG/ML
INJECTION, SOLUTION INTRAVENOUS CONTINUOUS
Status: DISCONTINUED | OUTPATIENT
Start: 2017-12-14 | End: 2017-12-15

## 2017-12-14 RX ORDER — SODIUM CHLORIDE 9 MG/ML
INJECTION, SOLUTION INTRAVENOUS
Status: COMPLETED
Start: 2017-12-14 | End: 2017-12-15

## 2017-12-14 RX ORDER — 0.9 % SODIUM CHLORIDE 0.9 %
VIAL (ML) INJECTION
Status: DISPENSED
Start: 2017-12-14 | End: 2017-12-15

## 2017-12-14 RX ORDER — ARIPIPRAZOLE 15 MG/1
40 TABLET ORAL EVERY 4 HOURS
Status: COMPLETED | OUTPATIENT
Start: 2017-12-14 | End: 2017-12-14

## 2017-12-14 NOTE — OCCUPATIONAL THERAPY NOTE
OCCUPATIONAL THERAPY TREATMENT NOTE - INPATIENT     Room Number: 626/464-C          Presenting Problem: status epilepticus    Problem List  Principal Problem:    Status epilepticus ([address]  Active Problems:    Hyperglycemia    Metabolic acidosis    Encephalo ASSESSMENT  AM-PAC ‘6-Clicks’ Inpatient Daily Activity Short Form  How much help from another person does the patient currently need…  -   Putting on and taking off regular lower body clothing?: A Lot  -   Bathing (including washing, rinsing, drying)?: A L 3-5x/week

## 2017-12-14 NOTE — PROGRESS NOTES
INFECTIOUS DISEASE PROGRESS NOTE    Carmela Ricardo Patient Status:  Inpatient    1970 MRN [id number]   Location [facility] 2W/SW Attending Davy Giraldo DO   Hosp Day # 23 PCP Helena Mcdaniel DO     Subjective:  Fever curve much improved.  Last dose Iron deficiency anemia secondary to inadequate dietary iron intake     Viral syndrome     Cellulitis of foot, left     Esophageal candidiasis (HCC)     Hypotension     Hyponatremia     Acute febrile illness     Primary malignant neoplasm of lung metastatic port and pICC in place; no signs of UTI or worsening pulmonary process; no SSTI   - PCT 2.38  # High grade fever - possibly central source vs related to liver mets; consider non-infectious etiology if workup unrevealing  # Metastatic lung cancer to pancrea

## 2017-12-14 NOTE — PLAN OF CARE
Problem: NEUROLOGICAL - ADULT  Goal: Achieves stable or improved neurological status  INTERVENTIONS  - Assess for and report changes in neurological status  - Initiate measures to prevent increased intracranial pressure  - Maintain blood pressure and fluid limits  INTERVENTIONS:  - Monitor labs and rhythm and assess patient for signs and symptoms of electrolyte imbalances  - Administer electrolyte replacement as ordered  - Monitor response to electrolyte replacements, including rhythm and repeat lab results emergency measures for life threatening arrhythmias  - Monitor electrolytes and administer replacement therapy as ordered   Outcome: Progressing      Problem: RESPIRATORY - ADULT  Goal: Achieves optimal ventilation and oxygenation  INTERVENTIONS:  - Assess Assess for any contributing factors to confusion (electrolyte disturbances, delirium, medications)  - Discontinue any unnecessary medical devices as soon as possible  - Assess the patient's physical comfort, circulation, skin condition, hydration, nutritio

## 2017-12-14 NOTE — CM/SW NOTE
SW following up on d/c planning for the patient. SNF referrals pendin) [place]: declined  2) [facility]- declined  3) [facility]- declined  4) [facility]- declined   5) [address] [place] [address]- declined    [facility] are still considering.      Ruth Arriaga

## 2017-12-14 NOTE — SLP NOTE
ADULT SWALLOWING EVALUATION    ASSESSMENT    ASSESSMENT/OVERALL IMPRESSION:  Pt seen sitting upright in bed for all PO trials. Pt with fair oral acceptance and bilabial seal across all trials. No anterior loss of bolus on any consistency given.  Bolus forma [facility])      Past Medical History  Past Medical History:   Diagnosis Date   • Anemia    • Brain cancer ([facility] [address]    • Depression    • Disorder of thyroid    • Exposure to radiation     DISCONTINUED AUG 2015    • Gallbladder disease    • Hepatitis C    • High blo Impaired  Bolus Formation: Impaired  Bolus Propulsion: Impaired  Mastication: Impaired  Retention: Impaired    Pharyngeal Phase of Swallow: Impaired  Laryngeal Elevation Timing: Appears impaired  Laryngeal Elevation Strength: Appears impaired  Laryngeal El

## 2017-12-14 NOTE — PHYSICAL THERAPY NOTE
PHYSICAL THERAPY TREATMENT NOTE - INPATIENT    Room Number: 254/392-H       Presenting Problem:  (Status Epilepticus,Seizures,H/O Lung CA with Brain Mets)    Problem List  Principal Problem:    Status epilepticus ([address]  Active Problems:    Hyperglycemia mobility; Endurance; Patient education;Gait training;Strengthening;Transfer training;Balance training    SUBJECTIVE  Responds to questions with her name and \"[place]\" as responses. . No spontaneous conversation    OBJECTIVE  Precautions: Bed/chair alarm direction to task and visual education    THERAPEUTIC EXERCISES  Lower Extremity Ankle pumps  Hip AB/AD  Heel slides     Position Sitting and Supine       Patient End of Session: In bed; With [facility] staff;Needs met;Call light within reach;RN aware of session/fi

## 2017-12-14 NOTE — PROGRESS NOTES
[facility] HOSP - [facility]    Hematology/Oncology   Progress Note    Jerome Briggs Patient Status:  Inpatient    1970 MRN [id number]   Location [facility] 2W/SW Attending Lily Villatoro DO   Hosp Day # 25 PCP Evelin Fountain DO     Subjective:  C source of fever. 2. Bibasilar subsegmental atelectasis. Less likely a coexistent right lower lobe pneumonia. 3. A 1.1 cm incompletely characterized splenic lesion.  Differential considerations include a hemangioma which was previously inconspicuous, or a ne consultation. She has urine drug screen positive for opiates, cocaine, and benzodiazepines. Initially a CT of the brain does not show any progression of her metastatic disease.   No definitive evidence of progression on brain MRI  --encephalopathy/respira

## 2017-12-14 NOTE — PAYOR COMM NOTE
REEQUESTING 7 ADDITIONAL DAYS PLUS  PT ON GMF/TELE ON 12/7 THRU 12/12  TRANSFERRED TO CC 12/12 & REMAINS IN CC  CONTINUED STAY REVIEW    Payor: [employer]  Subscriber #:  [id number]  Authorization Number: [id number]    Admit date: 11/25/17 Sodium (SYNTHROID) 87.6 mcg in Sodium Chloride 0.9 % IV push     Date Action Dose Route User    12/14/2017 1030 Given 87.6 mcg Intravenous Chioma Sparks RN    12/13/2017 1129 Given 87.6 mcg Intravenous Ok Duran RN      magnesium oxide (MAG-OX) tab 0.9 % 250 mL IVPB     Date Action Dose Route User    12/14/2017 1005 New Bag 1000 mg Intravenous Zhanna Patricia RN    12/14/2017 0100 New Bag 1000 mg Intravenous Rehan Mckinnon RN    12/13/2017 1714 New Bag 1000 mg Intravenous Alessandro Rodriguez RN --    BUN  11  9   --    CREATSERUM  0.44*  0.42*   --    GFRAA  >60  >60   --    GFRNAA  >60  >60   --    CA  8.7  8.5   --    NA  138  135*   --    K  3.8  3.5  4.2   CL  101  101   --    CO2  28  26   --             Imaging:                  Assessment and in chart. Discussed this with her  and he is in agreement with this as well. lexapro and seroquel per Dr. Kerline Smalls.  Will try to remove soft restraints for a little while today.     DVT prophylaxis  On heparin 5000 U SQ BID, SCDs to LE     Ulcer HCl (SYMMETREL) tab 50 mg 50 mg Oral [email or web address] Fit   ARIPiprazole (ABILIFY) tab 1 mg 1 mg Oral Nightly   escitalopram (LEXAPRO) tablet 10 mg 10 mg Oral QAM   levETIRAcetam (KEPPRA) tab 1,000 mg 1,000 mg Oral BID   levothyroxine (SYNTHROID, LEVOTHROID) tab mentation; she is alert and oriented, but has difficulty with processing; some hallucinations as well.   Today more sleepy, although easily arousable, able to answer simple questions.         Mood disorder, opiate dependence (cocaine, heroin)  Dr. Bertin Ngo Will consider d/c to rehab or home with home health on Mon/Tues next week if patient remains stable. If pt continues to exhibit significant agitation requiring restraints, possibly consider inpt psych for med titration?     Sabiha Starr MD  12/9/2017 (LEXAPRO) tablet 10 mg 10 mg Oral QAM   levETIRAcetam (KEPPRA) tab 1,000 mg 1,000 mg Oral BID   levothyroxine (SYNTHROID, LEVOTHROID) tab 175 mcg Oral Before breakfast   Pantoprazole Sodium (PROTONIX) EC tab 40 mg 40 mg Oral QAM AC   metoprolol Tartrate (L more awake today than yesterday.   Able to answer simple questions, but was unable to remember whether she ate breakfast (only small amount per her )      Mood disorder, opiate dependence (cocaine, heroin)  Dr. Prudencio Kebede following; certified-forms sign protocol     Disposition:  Patient continuing to improve and will continue to monitor over the weekend. Discussed with pt's  today -- he is looking over the list of rehab facilities and is working with social work to coordinate a place for pt to go. encephalopathy  Due to status epilepticus, polysubstance abuse, improving.  Was having intermittent hallucinations, agitation and still requires frequent re-orientation.      Acute respiratory failure -- resolved  Extubated 12/2; O2 sats normal on RA;  Rx a Location: Right arm)   Pulse 119   Temp 100.6 °F (38.1 °C) (Oral)   Resp 20   Ht 5' 5\" (1.651 m)   Wt 140 lb 6.4 oz (63.7 kg)   SpO2 95%   BMI 23.36 kg/m²      Intake/Output:     Intake/Output Summary (Last 24 hours) at 12/11/17 0912  Last data filed at 1 Sodium (DEPAKOTE SPRINKLE) sprinkle cap 1,000 mg 1,000 mg Oral Q8H [address]   0.9%  NaCl infusion   Intravenous Continuous   ipratropium-albuterol (DUONEB) nebulizer solution 3 mL 3 mL Nebulization Q6H PRN   bisacodyl (DULCOLAX) rectal suppository 10 mg 10 mg R Postop changes left chest. 5. A preliminary report was submitted and there are no major discrepancies.       Radha Rodriguez MD   Update:  Fever persists with temp 103; on Zosyn 3.375 gm IV q8hrs; BCx pending; since pt has port, will add vancomycin p 12/11/2017  CONCLUSION:  1. Feeding tube has been removed. 2. Prominent markings but no gross consolidation and little change from December 4, 2017. 3. Catheter in superior vena cava.  4. Postop changes left chest. 5. A preliminary report was submitted and MMOL/15ML injection 15 mL 15 mL Intravenous ONCE PRN   LORazepam (ATIVAN) injection 2 mg 2 mg Intravenous PRN   Heparin Sodium (Porcine) 5000 UNIT/ML injection 5,000 Units 5,000 Units Subcutaneous 2 times per day      Facility-Administered Medications Orde as above.  Unfortunately, pt continues to exhibit periods of extreme agitation, requiring restraints intermittently due to trying to pull out lines.     Acute encephalopathy  Due to status epilepticus, polysubstance abuse.   Today obtunded, likely due in pa Pulmonary/Chest: She has no wheezes. She has no rales. Abdominal: Bowel sounds are normal. She exhibits no distension. There is no tenderness. Neurological: No motor deficit.    Skin: Skin is dry.         Assessment and Plan:         1- high fever / 1 vitals reviewed. Constitutional: She appears well-nourished. HENT:   Head: Atraumatic. Eyes: Pupils are equal, round, and reactive to light. Neck: No JVD present. Cardiovascular: Regular rhythm. Exam reveals no gallop. No murmur heard.   Pulmo 12/13/2017    (H) 12/13/2017   CA 7.3 (L) 12/13/2017   ALB 2.2 (L) 12/13/2017   ALKPHO 51 12/13/2017   BILT 0.2 (L) 12/13/2017   TP 5.5 (L) 12/13/2017   AST 51 (H) 12/13/2017   ALT 29 12/13/2017   PTT 56.6 (H) 12/12/2017   INR 1.3 (H) 12/13/2017   T 27.7*  25.8*   MCV  81.9  82.6   MCH  27.5  27.4   MCHC  33.6  33.2   RDW  26.1*  26.2*   WBC  8.0  6.6   PLT  282  227                   Recent Labs   Lab  12/11/17   1704  12/12/17   0442  12/13/17   0458  12/13/17   4149   GLU  115*  137*  150*   -- calcification without aneurysm.          Xr Chest Ap Portable  (cpt=71010)     Result Date: 12/13/2017  CONCLUSION:  1. Heart size upper limits of normal with mild to moderate congestive changes.  Prominent pulmonary markings may be related to pulmonary con evaluate today and try to transition to oral meds and start diet.      Aspiration   CXR 12/3 with no infiltrate;completed zosyn 3.375mg q8h IV day x7 days; speech reevaluated; patient upgraded to [facility] soft/regular liquid diet, although had poor po intake.

## 2017-12-14 NOTE — PROGRESS NOTES
[facility] HOSP - [facility]    Progress Note      Assessment and Plan:   1. Fevers–improving. May have been drug reaction to Dilantin. Otherwise, patient is improving clinically. No leukocytosis at this moment although had bandemia.  The abdominal CT was  12/14/2017   K 3.5 12/14/2017    12/14/2017   CO2 26 12/14/2017    12/14/2017   CA 7.8 12/14/2017   ALB 2.3 12/14/2017   ALKPHO 65 12/14/2017   BILT 0.1 12/14/2017   TP 6.5 12/14/2017   AST 37 12/14/2017   ALT 23 12/14/2017   T4F 0.55

## 2017-12-14 NOTE — PHYSICAL THERAPY NOTE
PHYSICAL THERAPY TREATMENT NOTE - INPATIENT    Room Number: 760/312-G       Presenting Problem:  (Status Epilepticus,Seizures,H/O Lung CA with Brain Mets)    Problem List  Principal Problem:    Status epilepticus ([address]  Active Problems:    Hyperglycemia training;Balance training    SUBJECTIVE  Responds to questions with her name and \"[place]\" as responses. . No spontaneous conversation    OBJECTIVE  Precautions: Bed/chair alarm    WEIGHT BEARING RESTRICTION  Weight Bearing Restriction: None EXERCISES  Lower Extremity Alternating marching  Heel raises  Toe raises  mini-squats     Position Standing       Patient End of Session: In bed;Needs met;Call light within reach;RN aware of session/findings;SCDs in place; Alarm set    [place]

## 2017-12-14 NOTE — PROGRESS NOTES
[place] [facility] - [facility]    Progress Note    Trish Arriaga Patient Status:  Inpatient    1970 MRN [id number]   Location [facility] 2W/SW Attending Nohelia Pacheco, [address] [facility] Day # 23 PCP Britt Thomas DO       Subjective:   Trish Arriaga is a(n) 4 Intravenous Daily   metoprolol Tartrate (LOPRESSOR) 5 MG/5ML injection 5 mg 5 mg Intravenous Q6H PRN   Pantoprazole Sodium (PROTONIX) 40 mg in Sodium Chloride 0.9 % 10 mL IV push 40 mg Intravenous Daily   magnesium oxide (MAG-OX) tab 400 mg 400 mg Oral Onc 12/14/2017   ALB 2.3 (L) 12/14/2017   ALKPHO 65 12/14/2017   BILT 0.1 (L) 12/14/2017   TP 6.5 12/14/2017   AST 37 12/14/2017   ALT 23 12/14/2017   PTT 56.6 (H) 12/12/2017   INR 1.3 (H) 12/13/2017   T4F <0.25 (L) 06/08/2017   TSH 6.07 (H) 12/12/2017   MG 1. left parahilar region. . Followup study advised.                  Maliha Henry MD, MD  12/14/2017

## 2017-12-14 NOTE — PROGRESS NOTES
120 [facility] dosing service    Follow-up Pharmacokinetic Consult for Vancomycin Dosing     Jaspal Sheldon is a 49year old female admitted on 11/25 who is being treated for fever of unknown origin. Patient is on day 4 of Vancomycin 1 gm IV Q 8 hours.   Goal Based on the above:    1. Continue Vancomycin at 1 gm IVPB Q 8 hours (based on Trough of 10.1 ug/mL, pharmacokinetics, actual weight and renal function)    2.   Pharmacy will re-check Vancomycin trough levels in 3 days to check for accumulation (12/17

## 2017-12-15 ENCOUNTER — APPOINTMENT (OUTPATIENT)
Dept: GENERAL RADIOLOGY | Facility: HOSPITAL | Age: 47
DRG: 100 | End: 2017-12-15
Attending: CLINICAL NURSE SPECIALIST
Payer: COMMERCIAL

## 2017-12-15 PROCEDURE — 99232 SBSQ HOSP IP/OBS MODERATE 35: CPT | Performed by: OTHER

## 2017-12-15 PROCEDURE — 99233 SBSQ HOSP IP/OBS HIGH 50: CPT | Performed by: INTERNAL MEDICINE

## 2017-12-15 PROCEDURE — 99233 SBSQ HOSP IP/OBS HIGH 50: CPT | Performed by: OTHER

## 2017-12-15 PROCEDURE — 99232 SBSQ HOSP IP/OBS MODERATE 35: CPT | Performed by: INTERNAL MEDICINE

## 2017-12-15 PROCEDURE — 71010 XR CHEST AP PORTABLE  (CPT=71010): CPT | Performed by: CLINICAL NURSE SPECIALIST

## 2017-12-15 RX ORDER — PANTOPRAZOLE SODIUM 40 MG/1
40 TABLET, DELAYED RELEASE ORAL
Status: DISCONTINUED | OUTPATIENT
Start: 2017-12-15 | End: 2017-12-20

## 2017-12-15 RX ORDER — VALPROIC ACID 250 MG/1
1250 CAPSULE, LIQUID FILLED ORAL EVERY 8 HOURS
Status: DISCONTINUED | OUTPATIENT
Start: 2017-12-15 | End: 2017-12-20

## 2017-12-15 RX ORDER — RISPERIDONE 0.25 MG/1
0.25 TABLET, FILM COATED ORAL ONCE
Status: COMPLETED | OUTPATIENT
Start: 2017-12-15 | End: 2017-12-15

## 2017-12-15 RX ORDER — LEVETIRACETAM 500 MG/1
1000 TABLET ORAL 2 TIMES DAILY
Status: DISCONTINUED | OUTPATIENT
Start: 2017-12-15 | End: 2017-12-20

## 2017-12-15 RX ORDER — 0.9 % SODIUM CHLORIDE 0.9 %
VIAL (ML) INJECTION
Status: COMPLETED
Start: 2017-12-15 | End: 2017-12-15

## 2017-12-15 NOTE — PROGRESS NOTES
[facility]    Hematology/Oncology   Progress Note    Dasia Gibson Patient Status:  Inpatient    1970 MRN [id number]   Location [facility] 2W/SW Attending Joyce Moreno DO   Hosp Day # 23 PCP Reny Bronson DO     Subjective:  F coexistent right lower lobe pneumonia. 3. A 1.1 cm incompletely characterized splenic lesion. Differential considerations include a hemangioma which was previously inconspicuous, or a new metastases.  4. Stable benign hypervascular left lobe hepatic lesion- mental status change--improved. She is being treated for sepsis of unclear source. Phenytoin has been discontinued as another possible source of fevers. No major new endocrinopathies.    Continues thyroid replacement for hypothyroidism from immune checkp

## 2017-12-15 NOTE — PLAN OF CARE
Problem: SAFETY ADULT - FALL  Goal: Free from fall injury  INTERVENTIONS:  - Assess pt frequently for physical needs  - Identify cognitive and physical deficits and behaviors that affect risk of falls.   - [place] fall precautions as indicated by assessme or ABGs  - Provide Smoking Cessation handout, if applicable  - Encourage broncho-pulmonary hygiene including cough, deep breathe, Incentive Spirometry  - Assess the need for suctioning and perform as needed  - Assess and instruct to report SOB or any respi auscultated. Pt refused mechanical soft diet dinner. Urine per dimas. Repositioned q 2. Restraints on. Patient attempted to pull dobbhoff out during repositioning. SCDs on. Denies pain. Mepilex on.

## 2017-12-15 NOTE — PHYSICAL THERAPY NOTE
PHYSICAL THERAPY TREATMENT NOTE - INPATIENT    Room Number: 546/184-R       Presenting Problem:  (Status Epilepticus,Seizures,H/O Lung CA with Brain Mets)    Problem List  Principal Problem:    Status epilepticus ([address]  Active Problems:    Hyperglycemia both legs pain)  Management Techniques: Activity promotion; Body mechanics;Breathing techniques;Repositioning    BALANCE                                                                                                                     Static Sitting: Good moderate assistance with walker - rolling   Goal #2  Current Status CGA with rolling walker    Goal #3 Patient will be able to tolerate sitting at EOB for 10 minutes with CGAx1 from therapist    Goal #3   Current Status 5ft x 1 and 4ft x 1 with rolling wal

## 2017-12-15 NOTE — PROGRESS NOTES
[place] [facility]    Progress Note    Sen Walsh Patient Status:  Inpatient    1970 MRN [id number]   Location [facility] 2W/SW Attending Cecilia Watkins DO   Hosp Day # 21 PCP Jae Napoles DO       SUBJECTIVE:  Awake, alert.  [facility] >60   CA  7.3*   --   7.8*  7.5*  7.9*   ALB  2.2*   --   2.3*   --    --    NA  137   --   134*  136  137   K  3.0*   < >  3.5  4.5  4.1   CL  107   --   101  106  105   CO2  26   --   26  25  26   ALKPHO  51   --   65   --    --    AST  51*   --   37   - to moderate congestive changes. Prominent pulmonary markings may be related to pulmonary congestion, but I cannot exclude inflammatory airspace disease especially in the left parahilar region. . Followup study advised.                  Assessment and Plan: responding to chemotherapy, but has responded very well with opdivo. Appreciate Dr. Ella Botello consult. MRI without new brain metastases     Hypothyroidism  Secondary to Opdivo; synthroid 175mcg daily     Iron deficiency anemia   S/p Venofer x5d; Hgb stable.

## 2017-12-15 NOTE — CM/SW NOTE
ERMIAS following up on d/c planning for the patient. ERMIAS spoke with Dr. Cameron Davenport and the plan is for a reeval from PM&R on Monday. Luis also declined her. The [facility] will likely accept her and are aware of tent d/c Tues/Wed next week.   They will nee

## 2017-12-15 NOTE — PROGRESS NOTES
[facility] Pharmacy Note: Route Optimization for Levetiracetam (KEPPRA)    Patient is currently on Levetiracetam (KEPPRA) 1000 mg IV every 12 hours.    The patient meets the criteria to convert to the oral equivalent as established by the IV to Oral conversion pro

## 2017-12-15 NOTE — PROGRESS NOTES
[place] [facility] - [facility]    Progress Note    Henry Nails Patient Status:  Inpatient    1970 MRN [id number]   Location [facility] 2W/SW Attending Gold Summers, [address] [facility] Day # 21 PCP Kenn Blackmon DO       Subjective:   Henry Nails is a(n) 4 mg Oral QAM AC   levETIRAcetam (KEPPRA) tab 1,000 mg 1,000 mg Oral BID   valproic acid (DEPAKENE) cap 1,250 mg 1,250 mg Oral Q8H   acetaminophen (TYLENOL) 650 MG rectal suppository 650 mg 650 mg Rectal Q6H PRN   dextrose 10 % infusion  Intravenous Continuo 12/13/2017  CONCLUSION:  1. No evidence for abscess or other intra-abdominal source of fever. 2. Bibasilar subsegmental atelectasis. Less likely a coexistent right lower lobe pneumonia. 3. A 1.1 cm incompletely characterized splenic lesion.  Differential co

## 2017-12-15 NOTE — PROGRESS NOTES
[facility]    Progress Note    Ragini Ackerman Patient Status:  Inpatient    1970 MRN [id number]   Location [facility] 2W/SW Attending Trice Hagan DO   Hosp Day # 21 PCP Salvador House DO     Subjective:   Subjective:  Awake , a    7–DVT prophylaxis  Heparin subcu     8–GI prophylaxis  PPI      PCU status   Better       Results:     Lab Results  Component Value Date   WBC 6.1 12/15/2017   HGB 9.0 (L) 12/15/2017   HCT 27.2 (L) 12/15/2017    12/15/2017   CREATSERUM 0.38 (L)

## 2017-12-15 NOTE — PLAN OF CARE
Problem: NEUROLOGICAL - ADULT  Goal: Achieves stable or improved neurological status  INTERVENTIONS  - Assess for and report changes in neurological status  - Initiate measures to prevent increased intracranial pressure  - Maintain blood pressure and fluid lab results as appropriate  - Fluid restriction as ordered  - Instruct patient on fluid and nutrition restrictions as appropriate   Outcome: Progressing  Patient K was covered today     Problem: SAFETY ADULT - FALL  Goal: Free from fall injury  INTERVENTIO ventilation and oxygenation  INTERVENTIONS:  - Assess for changes in respiratory status  - Assess for changes in mentation and behavior  - Position to facilitate oxygenation and minimize respiratory effort  - Oxygen supplementation based on oxygen saturati perspectives and choices   Outcome: Progressing      Problem: Safety Risk - Non-Violent Restraints  Goal: Patient will remain free from self-harm  INTERVENTIONS:  - Apply the least restrictive restraint to prevent harm  - Notify patient and family of reaso monitor.

## 2017-12-15 NOTE — OCCUPATIONAL THERAPY NOTE
OCCUPATIONAL THERAPY TREATMENT NOTE - INPATIENT     Room Number: 944/788-U     Presenting Problem: status epilepticus    Problem List  Principal Problem:    Status epilepticus ([address]  Active Problems:    Hyperglycemia    Metabolic acidosis    Encephalopathy training; Endurance training      SUBJECTIVE  \"This bed is soaked\"    OBJECTIVE  Precautions: Bed/chair alarm    WEIGHT BEARING RESTRICTION  Weight Bearing Restriction: None                PAIN ASSESSMENT  Ratin  Location:  (GUILLE lower legs)  Managemen with min assist Comment: Patient requires min a for OFH in sitting, cues required, goal upgraded    Patient will complete LE dressing with CGA  Comment: New Goal           Goals  on: 2017   Frequency: 3-5x/week    Eunice TEJEDA/YANELY Gerber

## 2017-12-15 NOTE — PROGRESS NOTES
[facility] Pharmacy Note: Route Optimization for Levothyroxine (SYNTHROID)    Patient is currently on Levothyroxine (SYNTHROID) 87.6 mcg IV daily.    The patient meets the criteria to convert to the oral equivalent as established by the IV to Oral conversion torin

## 2017-12-16 PROCEDURE — 99232 SBSQ HOSP IP/OBS MODERATE 35: CPT | Performed by: INTERNAL MEDICINE

## 2017-12-16 RX ORDER — ONDANSETRON 2 MG/ML
4 INJECTION INTRAMUSCULAR; INTRAVENOUS EVERY 8 HOURS PRN
Status: DISCONTINUED | OUTPATIENT
Start: 2017-12-16 | End: 2017-12-20

## 2017-12-16 RX ORDER — 0.9 % SODIUM CHLORIDE 0.9 %
VIAL (ML) INJECTION
Status: COMPLETED
Start: 2017-12-16 | End: 2017-12-16

## 2017-12-16 RX ORDER — POTASSIUM CHLORIDE 20 MEQ/1
40 TABLET, EXTENDED RELEASE ORAL EVERY 4 HOURS
Status: DISPENSED | OUTPATIENT
Start: 2017-12-16 | End: 2017-12-17

## 2017-12-16 NOTE — PROGRESS NOTES
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   The patient have a history of polysubstance abuse, mood alternation and presented with

## 2017-12-16 NOTE — PROGRESS NOTES
[facility]    Progress Note      Assessment and Plan:   1. Fevers– resolved. May have been drug reaction. Recommendations: Expectant management.     2.  Respiratory failure– the patient has history of lung cancer and aspiration pneumoni 12/16/2017    12/16/2017   K 3.5 12/16/2017    12/16/2017   CO2 27 12/16/2017    12/16/2017   CA 8.3 12/16/2017     Chest x-ray– no acute findings. CT scan the abdomen–no specific source of infection.       Chance Lantigua MD  [facility]

## 2017-12-16 NOTE — PROGRESS NOTES
[facility]    Progress Note    Dasia Arreaga Patient Status:  Inpatient    1970 MRN [id number]   Location [facility] 4W/SW/SE Attending Joyce Moreno DO   Hosp Day # 24 PCP Reny Bronson DO       SUBJECTIVE:  States she is feel 23   --    --    --    BILT  0.1*   --    --    --    TP  6.5   --    --    --          Imaging:  Ct Abdomen+pelvis(contrast Only)(cpt=74177)    Result Date: 12/13/2017  CONCLUSION:  1. No evidence for abscess or other intra-abdominal source of fever. 2.  B admission.  MRI brain without evidence of new metastases.  Keppra 1000mg PO BID, valproic acid 1000mg PO q8h; Cont seizure precautions.        Anemia  Hgb 8.5-9; will monitor; on Protonix     Mood disorder, opiate dependence (cocaine, heroin)  Dr. Dorys hernandez

## 2017-12-16 NOTE — PROGRESS NOTES
[facility]    Hematology/Oncology   Progress Note    Adriana Miranda Patient Status:  Inpatient    1970 MRN [id number]   Location [facility] 2W/SW Attending Christiano Lobato DO   Hosp Day # 21 PCP Pankaj Hernández DO     Subjective:  N benzodiazepines. Initially a CT of the brain does not show any progression of her metastatic disease. No definitive evidence of progression on brain MRI  --encephalopathy/respiratory failure/status epilepticus. appreciate neuro/ccm care/IM care.  Clinical

## 2017-12-16 NOTE — PROGRESS NOTES
[facility]    Hematology/Oncology   Progress Note    Delano Cue Patient Status:  Inpatient    1970 MRN [id number]   Location [facility] 2W/SW Attending Arvind Ruby DO   Hosp Day # 21 PCP Amie Lopez DO     Subjective:  Cleopatra Small beam hardening artifact from feeding tube. 6. Mucosal hyperemia and mild thickening involving gastric fundus suggesting gastritis. 7. Post cholecystectomy with mild stable dilatation of extrahepatic biliary tree. 8. Post hysterectomy and appendectomy.  9. A Will follow. Please call with any questions.      Reese Corona MD

## 2017-12-16 NOTE — PROGRESS NOTES
INFECTIOUS DISEASE PROGRESS NOTE    Milena Donovan Patient Status:  Inpatient    1970 MRN [id number]   Location [facility] 2W/SW Attending Nena Nolen DO   Hosp Day # 21 PCP Agustina Stanford DO     Subjective:  Fever curve improved.  Much more awak syndrome     Cellulitis of foot, left     Esophageal candidiasis (HCC)     Hypotension     Hyponatremia     Acute febrile illness     Primary malignant neoplasm of lung metastatic to other site [facility])     Thrush     Fever     IVDU (intravenous drug user) worsening pulmonary process; no SSTI   - PCT 2.38  # High grade fever - possibly central source vs related to liver mets vs medication associated - seems improved after discontinuation of multiple medications  # Metastatic lung cancer to pancreas, liver, b

## 2017-12-17 PROCEDURE — 99232 SBSQ HOSP IP/OBS MODERATE 35: CPT | Performed by: INTERNAL MEDICINE

## 2017-12-17 RX ORDER — 0.9 % SODIUM CHLORIDE 0.9 %
VIAL (ML) INJECTION
Status: COMPLETED
Start: 2017-12-17 | End: 2017-12-17

## 2017-12-17 RX ORDER — POLYETHYLENE GLYCOL 3350 17 G/17G
17 POWDER, FOR SOLUTION ORAL DAILY
Status: DISCONTINUED | OUTPATIENT
Start: 2017-12-17 | End: 2017-12-20

## 2017-12-17 NOTE — PROGRESS NOTES
[facility]    Hematology/Oncology   Progress Note    Milena Donovan Patient Status:  Inpatient    1970 MRN [id number]   Location [facility] 2W/SW Attending Nena Nolen DO   Hosp Day # 25 PCP Agustina Stanford DO     Subjective: of IV iron for iron def anemia. --high fevers and mental status change--significantly improved. Phenytoin has been discontinued as another possible source of fevers. She was treated for sepsis of unclear source. No major new endocrinopathies.    Contin

## 2017-12-17 NOTE — PROGRESS NOTES
[facility]    Progress Note    Miller Hides Patient Status:  Inpatient    1970 MRN [id number]   Location [facility] 4W/SW/SE Attending Ashley Starr DO   Hosp Day # 25 PCP Mychal Solano DO       SUBJECTIVE:    Reports feeling (cpt=71010)    Result Date: 12/15/2017  CONCLUSION: No acute cardiopulmonary abnormality.                  Assessment and Plan:       Fever/Sepsis  RESOLVED; Suspect drug fever-dilantin was discontinued by neurology, only on lexapro (other meds-abilify and metastases     Hypothyroidism  Secondary to Opdivo; synthroid 175mcg daily     Iron deficiency anemia   S/p Venofer x5d; Hgb stable.  Transfuse if <7. No signs of bleeding.  Hgb stable  Repeat iron studies normal.     Vitamin B12 deficiency  Level 251.  s/

## 2017-12-17 NOTE — PLAN OF CARE
Risk for Violence-Violent Restraints/Seclusion    • Patient will not express any violent or self-destructive behaviors Completed    Free of restraint use at this time.       CARDIOVASCULAR - ADULT    • Maintains optimal cardiac output and hemodynamic stabil

## 2017-12-17 NOTE — PROGRESS NOTES
[facility]     [facility] Note        Talia Ward Patient Status:  Inpatient    1970 MRN [id number]   Location [facility] 4W/SW/SE Attending Barry Miles DO   Hosp Day # 25 PCP Alexia Donald DO       Subjective:   Patient seen a (Porcine) 5000 UNIT/ML injection 5,000 Units 5,000 Units Subcutaneous 2 times per day     Facility-Administered Medications Ordered in Other Encounters:  nivolumab (OPDIVO) 240 mg in sodium chloride 0.9 % 124 mL chemo-IVPB 240 mg Intravenous Once   Heparin

## 2017-12-18 ENCOUNTER — APPOINTMENT (OUTPATIENT)
Dept: HEMATOLOGY/ONCOLOGY | Facility: HOSPITAL | Age: 47
End: 2017-12-18
Attending: INTERNAL MEDICINE
Payer: COMMERCIAL

## 2017-12-18 ENCOUNTER — APPOINTMENT (OUTPATIENT)
Dept: ULTRASOUND IMAGING | Facility: HOSPITAL | Age: 47
DRG: 100 | End: 2017-12-18
Attending: INTERNAL MEDICINE
Payer: COMMERCIAL

## 2017-12-18 PROCEDURE — 99232 SBSQ HOSP IP/OBS MODERATE 35: CPT | Performed by: INTERNAL MEDICINE

## 2017-12-18 PROCEDURE — 76705 ECHO EXAM OF ABDOMEN: CPT | Performed by: INTERNAL MEDICINE

## 2017-12-18 RX ORDER — 0.9 % SODIUM CHLORIDE 0.9 %
VIAL (ML) INJECTION
Status: COMPLETED
Start: 2017-12-18 | End: 2017-12-18

## 2017-12-18 RX ORDER — METOCLOPRAMIDE HYDROCHLORIDE 5 MG/ML
5 INJECTION INTRAMUSCULAR; INTRAVENOUS
Status: DISCONTINUED | OUTPATIENT
Start: 2017-12-19 | End: 2017-12-20

## 2017-12-18 RX ORDER — METOCLOPRAMIDE HYDROCHLORIDE 5 MG/ML
5 INJECTION INTRAMUSCULAR; INTRAVENOUS ONCE
Status: COMPLETED | OUTPATIENT
Start: 2017-12-18 | End: 2017-12-18

## 2017-12-18 RX ORDER — SODIUM CHLORIDE 9 MG/ML
INJECTION, SOLUTION INTRAVENOUS CONTINUOUS
Status: DISCONTINUED | OUTPATIENT
Start: 2017-12-18 | End: 2017-12-20

## 2017-12-18 NOTE — CONSULTS
SUBJECTIVE:   ADL MOB dysfxn due to status epilepticus    CC: Patient seen and examined at bedside. She states she is feeling much better. Doing well with therapy, denies pain.        Scheduled Meds:  • PEG 3350  17 g Oral Daily   • Levothyroxine Sodium  17 extremities. ABDOMEN:  Soft, nontender; did not appreciate hepatomegaly on exam.  EXTREMITIES:  No clubbing or cyanosis noted to digits or nails. SKIN:  Skin color and turgor appear normal.  No rash or subcutaneous nodules appreciated on the hands.   [address] [place] [address] Hypokalemia    Polysubstance dependence (HCC)    Brain metastasis (HCC)    Hypomagnesemia    Respiratory failure (HCC)    Episodic mood disorder (HCC)    Iron deficiency anemia    Sepsis ([address]    Drug induced fever    Polyuria    Transaminitis    Nausea  D

## 2017-12-18 NOTE — PROGRESS NOTES
[facility]    Progress Note    Vidhi Schilling Patient Status:  Inpatient    1970 MRN [id number]   Location [facility] 4W/SW/SE Attending Colette Joseph DO   Hosp Day # 23 PCP Santa Melo DO     Subjective:   Subjective:  No sob 12/12/2017    (H) 12/18/2017   MG 1.9 12/15/2017   PHOS 2.9 12/13/2017   TROP 0.01 11/15/2017   CK 86 12/12/2017   B12 251 12/10/2017   ETOH 2 11/25/2017                         Stephanie Griffith.  Sandy Romero MD  12/18/2017

## 2017-12-18 NOTE — PROGRESS NOTES
INFECTIOUS DISEASE PROGRESS NOTE    Clement Florencia Patient Status:  Inpatient    1970 MRN [id number]   Location [facility] 2W/SW Attending Herb Estevez DO   Hosp Day # 21 PCP Kristy Jeter DO     Subjective:  Resolved fevers.  Transferred to [facility] illness     Primary malignant neoplasm of lung metastatic to other site [facility])     Thrush     Fever     IVDU (intravenous drug user)     Anemia     Status epilepticus [address]     Hyperglycemia     Metabolic acidosis     Encephalopathy acute     Hypokalemia source vs related to liver mets vs medication associated - seems improved after discontinuation of multiple medications  # Metastatic lung cancer to pancreas, liver, brain  # IV substance abuse including cocaine, heroin, oxycontin     PLAN:     - continue

## 2017-12-18 NOTE — PROGRESS NOTES
[facility]    Hematology/Oncology   Progress Note    Agnes Alex Patient Status:  Inpatient    1970 MRN [id number]   Location [facility] 2W/SW Attending Harinder Gilmore DO   Hosp Day # 21 PCP Zach York DO     Subjective: evidence of progression on brain MRI  --encephalopathy/respiratory failure/status epilepticus. appreciate neuro/ccm care/IM care. Clinically improved. Antiepileptics per neuro  --completed course of IV iron for iron def anemia.     --high fevers and mental

## 2017-12-18 NOTE — PHYSICAL THERAPY NOTE
PHYSICAL THERAPY TREATMENT NOTE - INPATIENT    Room Number: 466/466-A       Presenting Problem:  (Status Epilepticus,Seizures,H/O Lung CA with Brain Mets)    Problem List  Principal Problem:    Status epilepticus ([address]  Active Problems:    Hyperglycemia BASIC MOBILITY  How much difficulty does the patient currently have. ..  -   Turning over in bed (including adjusting bedclothes, sheets and blankets)?: A Little   -   Sitting down on and standing up from a chair with arms (e.g., wheelchair, bedside commode to 150' x 1 with sba with rw.     Goal #6  Pt demo amb with rw 120' x 1, 10' x 1, 80' x 1 with min assist x 1   Goal #6  Current Status

## 2017-12-18 NOTE — DIETARY NOTE
ADULT NUTRITION RE- ASSESSMENT    Pt is at high nutrition risk.        RECOMMENDATIONS TO MD:   RD to order and manage ONS (oral nutritional supplements)    NUTRITION DIAGNOSIS/PROBLEM:  Inadequate protein/energy intake related to intubation/NPO as evidence accuracy. BMI: Body mass index is 22.05 kg/m². --based on lower wt--may be incorrect.     BMI CLASSIFICATION: 19-24.9 kg/m2 - WNL  IBW: 125 lbs        Now 126% IBW  Usual Body Wt: 175-185 lbs 1 yr ago, 165# more recently       85% UBW  WEIGHT HISTORY:  Pa documentation reviewed. NUTRITION PRESCRIPTION:  Diet: general/ground. Ensure bid--chocolate flavor started. Osmolite 1.5 @  rate 50ml/hr-ran last night from 11p-5a to supplement po intake.      ESTIMATED NUTRITIONAL NEEDS:  Calories: 8710-6996 ( 25-30

## 2017-12-18 NOTE — PLAN OF CARE
CARDIOVASCULAR - ADULT    • Maintains optimal cardiac output and hemodynamic stability Not Progressing    • Absence of cardiac arrhythmias or at baseline Not Progressing        GASTROINTESTINAL - ADULT    • Minimal or absence of nausea and vomiting Not Pro

## 2017-12-18 NOTE — CONSULTS
Gastroenterology consultation note    Reason for consultation:  Elevated liver chemistry tests      History of present illness: The patient is a 49year old female with a complex past medical history and complex hospitalization over the past 3 weeks.     Shana Flowers patient currently feels well. She was talking on the phone to her daughter upon my arrival.  She has had some nausea. She is receiving tube feedings and is attempting to eat on her own as well.   No current vomiting although she vomited earlier in the hos Flush 100 UNIT/ML injection 500 Units 5 mL Intercatheter PRN       Outpatient Prescriptions Marked as Taking for the 11/25/17 encounter [facility] Encounter):   Buprenorphine HCl-Naloxone HCl 8-2 MG Sublingual SL Tab Place 1 tablet under the tongue 2 (two) t edema      Laboratory data:  Recent Labs   Lab  12/16/17   0504  12/18/17   0342   RBC  3.18*  3.38*   HGB  8.8*  9.4*   HCT  26.3*  27.8*   MCV  82.7  82.1   MCH  27.7  27.8   MCHC  33.5  33.9   RDW  27.0*  26.1*   WBC  7.6  10.5   PLT  356  682*       Re testing and trend in liver chemistries. Hopefully we will begin to see improvement in the next few days. Abnormal CT of the liver  I suspect that this is related to the patient's previously metastatic lung cancer.   This lesion appears to have been pres

## 2017-12-18 NOTE — PROGRESS NOTES
[place] [facility]    Progress Note    Samycecilio Oteron Patient Status:  Inpatient    1970 MRN [id number]   Location [facility] 4W/SW/SE Attending Jody ,    Hosp Day # 21 PCP June Falcon DO       SUBJECTIVE:      OBJECTIVE:  BP Fever/Sepsis  RESOLVED; Suspect drug fever-dilantin was discontinued by neurology, only on lexapro (other meds-abilify and amantadine discontinued by psychiatry).  Dr Rebecca Weston ID consultation noted; s/p cefepime 1 gm IV q8hrs and vancomycin 1000 mg IV signs of bleeding.  Hgb stable  Repeat iron studies normal.     Vitamin B12 deficiency  Level 251.  s/p Vit B12 1000mcg IM x 1 on 12/10, and has started 1000 mcg po qd      DVT prophylaxis  On heparin 5000 U SQ BID, SCDs to LE     Ulcer prophylaxis   On Pr

## 2017-12-19 ENCOUNTER — APPOINTMENT (OUTPATIENT)
Dept: HEMATOLOGY/ONCOLOGY | Facility: HOSPITAL | Age: 47
End: 2017-12-19
Attending: INTERNAL MEDICINE
Payer: COMMERCIAL

## 2017-12-19 ENCOUNTER — TELEPHONE (OUTPATIENT)
Dept: INTERNAL MEDICINE CLINIC | Facility: CLINIC | Age: 47
End: 2017-12-19

## 2017-12-19 PROCEDURE — 99232 SBSQ HOSP IP/OBS MODERATE 35: CPT | Performed by: INTERNAL MEDICINE

## 2017-12-19 RX ORDER — 0.9 % SODIUM CHLORIDE 0.9 %
VIAL (ML) INJECTION
Status: COMPLETED
Start: 2017-12-19 | End: 2017-12-19

## 2017-12-19 RX ORDER — 0.9 % SODIUM CHLORIDE 0.9 %
VIAL (ML) INJECTION
Status: DISPENSED
Start: 2017-12-19 | End: 2017-12-19

## 2017-12-19 NOTE — PROGRESS NOTES
[facility]     Gastroenterology Progress Note    Dorla Aram Patient Status:  Inpatient    1970 MRN [id number]   Location [facility] 4W/SW/SE Attending Benita Bautista DO   Hosp Day # 25 PCP Emma Keating DO       Assessm - -   12/19/17 0000 - - - 87 - -   12/18/17 2345 - - - 85 - -   12/18/17 2330 - - - 120 - -   12/18/17 1598 - - - 78 - -   12/18/17 2300 - - - 78 - -   12/18/17 2245 - - - 75 - -   12/18/17 2230 - - - 74 - -   12/18/17 2215 - - - 77 - -   12/18/17 2200 - - Value Date   INR 1.2 12/19/2017   INR 1.3 (H) 12/13/2017   INR 1.4 (H) 12/12/2017                   Dae Bennett  12/19/2017

## 2017-12-19 NOTE — PHYSICAL THERAPY NOTE
PHYSICAL THERAPY TREATMENT NOTE - INPATIENT    Room Number: 466/466-A       Presenting Problem:  (Status Epilepticus,Seizures,H/O Lung CA with Brain Mets)    Problem List  Principal Problem:    Status epilepticus ([address]  Active Problems:    Hyperglycemia Saturation with activity 99%  Room air  Heart Rate: with activity 122    AM-PAC '6-Clicks' INPATIENT SHORT FORM - BASIC MOBILITY  How much difficulty does the patient currently have. ..  -   Turning over in bed (including adjusting bedclothes, sheets and bl will be able to perform transfers sba with rw   Goal #4   Current Status CGA with rolling walker with cues   Goal #5     Goal #5   Current Status  Pt will be able to ambulate to 150' x 1 with sba with rw.     Goal #6  Pt demo amb with rw  80' x 2 with min a

## 2017-12-19 NOTE — CM/SW NOTE
Addend 404p: SW spoke w/ Dr. Adrien De Jesus who stated pt can return home at discharge w/ HHPT. [facility]= declined, SW spoke w/ Ava[facility]Tobi [phone] who stated they are able to accept and have received insurance auth.      *[facility]HC orders needed prior to disch

## 2017-12-19 NOTE — OCCUPATIONAL THERAPY NOTE
OCCUPATIONAL THERAPY TREATMENT NOTE - INPATIENT     Room Number: 466/466-A     Presenting Problem: status epilepticus    Problem List  Principal Problem:    Status epilepticus ([facility]  Active Problems:    Hyperglycemia    Metabolic acidosis    Encephalopathy hour care/supervision;Cont skilled therapy in a supervised setting (physical assist)  OT Device Recommendations: Grab bars; Shower chair      PLAN  OT Treatment Plan: Balance activities; Functional transfer training;ADL training;Patient/Family education; Rochelle w/ dynamic acivities discussed  Patient End of Session: Up in chair;Needs met;Call light within reach;RN aware of session/findings; All patient questions and concerns addressed; Alarm set    OT Goals:    Pt will perform toilet transfers w/ min a      Patient

## 2017-12-19 NOTE — PROGRESS NOTES
[facility]    Hematology/Oncology   Progress Note    Carmela Ricardo Patient Status:  Inpatient    1970 MRN [id number]   Location [facility] 2W/SW Attending Davy Giraldo,    Hosp Day # 25 PCP Helena Mcdaniel DO     Subjective: status change--significantly improved. Phenytoin has been discontinued as another possible source of fevers. She was treated for sepsis of unclear source. No major new endocrinopathies.    Continues thyroid replacement for hypothyroidism from immune check

## 2017-12-19 NOTE — PROGRESS NOTES
[facility]    Progress Note    John Felton Patient Status:  Inpatient    1970 MRN [id number]   Location [facility] 4W/SW/SE Attending Ortega Atkins DO   Hosp Day # 25 PCP Amy Matta DO       SUBJECTIVE:  Pt with 2 large ex acid (DEPAKENE) cap 1,250 mg 1,250 mg Oral Q8H   dextrose 10 % infusion  Intravenous Continuous PRN   Vitamin B-12 (VITAMIN B12) tab 1,000 mcg 1,000 mcg Oral Daily   escitalopram (LEXAPRO) tablet 10 mg 10 mg Oral QAM   bisacodyl (DULCOLAX) rectal supposito ([facility] [address]   CT head negative for acute bleed; UDS-- + benzo, opiates, cocaine-->patient admits to cocaine and heroin use prior to admission.  MRI brain without evidence of new metastases.  Keppra 1000mg PO BID, valproic acid 1250mg PO q12h; Cont seizure precaut

## 2017-12-19 NOTE — PLAN OF CARE
GASTROINTESTINAL - ADULT    • Minimal or absence of nausea and vomiting Not Progressing        SAFETY ADULT - FALL    • Free from fall injury Not Progressing          CARDIOVASCULAR - ADULT    • Maintains optimal cardiac output and hemodynamic stability Pr

## 2017-12-19 NOTE — PAYOR COMM NOTE
REF: [id number]  APPROVED THROUGH 12/15/17 PER DEION[facility]- REQUESTING ADDITIONAL DAYS      12/15/17    OBJECTIVE:  /93 (BP Location: Right arm)   Pulse 95   Temp 98.9 °F (37.2 °C) (Temporal)   Resp 17   Ht 5' 5\" (1.651 m)   Wt 150 lb 8 oz (68.3 kg) meds-abilify and amantadine discontinued by psychiatry).   Dr Beulah West ID consultation noted; currently on cefepime 1 gm IV q8hrs and vancomycin 1000 mg IV q8hrs; CXR with prominent markings though no gross infiltrate; BCx 12/11 and 12/12 no growth to date; nursing staff.    Also reports visual hallucinations-saw people in the room when they are not there.         OBJECTIVE:  /92 (BP Location: Right arm)   Pulse 92   Temp 97.9 °F (36.6 °C) (Oral)   Resp 20   Ht 5' 5\" (1.651 m)   Wt 147 lb 9.6 oz (67 kg) cefepime 1 gm IV q8hrs and vancomycin 1000 mg IV q8hrs; CX (x 4 days).  R with prominent markings though no gross infiltrate; BCx 12/11 and 12/12 no growth to date;  CT abdomen and pelvis with IV contrast without obvious source of infection.      Polyuria arm)   Pulse 108   Temp 98.6 °F (37 °C) (Oral)   Resp 18   Ht 5' 5\" (1.651 m)   Wt 132 lb 8 oz (60.1 kg)   SpO2 100%   BMI 22.05 kg/m²        EXAM:  GENERAL: well developed, well nourished, in no apparent distress.   Alert and oriented x 3  LUNGS: clear to Plan     Transaminitis  HCV Ab positive, HBVsAb and HBVcAb positive. The positive core Ab could be c/w acute infection. Quantitative HCV and HBV pending. Pt states that she was told she had hepatitis 27 years ago after the birth of her son.   Not sure if daily     Iron deficiency anemia   S/p Venofer x5d; Hgb stable.  Transfuse if <7. No signs of bleeding.  Hgb stable  Repeat iron studies normal.     Vitamin B12 deficiency  Level 251.  s/p Vit B12 1000mcg IM x 1 on 12/10, and has started 1000 mcg po qd

## 2017-12-20 VITALS
HEART RATE: 84 BPM | OXYGEN SATURATION: 99 % | DIASTOLIC BLOOD PRESSURE: 86 MMHG | SYSTOLIC BLOOD PRESSURE: 150 MMHG | RESPIRATION RATE: 18 BRPM | TEMPERATURE: 100 F | BODY MASS INDEX: 23.43 KG/M2 | HEIGHT: 65 IN | WEIGHT: 140.63 LBS

## 2017-12-20 PROCEDURE — 99238 HOSP IP/OBS DSCHRG MGMT 30/<: CPT | Performed by: INTERNAL MEDICINE

## 2017-12-20 PROCEDURE — 99233 SBSQ HOSP IP/OBS HIGH 50: CPT | Performed by: OTHER

## 2017-12-20 RX ORDER — VALPROIC ACID 250 MG/1
1250 CAPSULE, LIQUID FILLED ORAL EVERY 8 HOURS
Qty: 90 CAPSULE | Refills: 5 | Status: SHIPPED | OUTPATIENT
Start: 2017-12-20 | End: 2018-02-07

## 2017-12-20 RX ORDER — POLYETHYLENE GLYCOL 3350 17 G/17G
17 POWDER, FOR SOLUTION ORAL DAILY PRN
Qty: 10 EACH | Refills: 0 | Status: SHIPPED | COMMUNITY
Start: 2017-12-20 | End: 2017-12-29 | Stop reason: ALTCHOICE

## 2017-12-20 RX ORDER — PANTOPRAZOLE SODIUM 40 MG/1
40 TABLET, DELAYED RELEASE ORAL
Qty: 30 TABLET | Refills: 11 | Status: SHIPPED | OUTPATIENT
Start: 2017-12-21 | End: 2017-12-29 | Stop reason: ALTCHOICE

## 2017-12-20 RX ORDER — 0.9 % SODIUM CHLORIDE 0.9 %
VIAL (ML) INJECTION
Status: COMPLETED
Start: 2017-12-20 | End: 2017-12-20

## 2017-12-20 RX ORDER — ESCITALOPRAM OXALATE 10 MG/1
10 TABLET ORAL EVERY MORNING
Qty: 30 TABLET | Refills: 5 | Status: SHIPPED | OUTPATIENT
Start: 2017-12-21 | End: 2018-03-09

## 2017-12-20 RX ORDER — QUETIAPINE 25 MG/1
25 TABLET, FILM COATED ORAL NIGHTLY
Status: DISCONTINUED | OUTPATIENT
Start: 2017-12-20 | End: 2017-12-20

## 2017-12-20 RX ORDER — LEVETIRACETAM 1000 MG/1
1000 TABLET ORAL 2 TIMES DAILY
Qty: 60 TABLET | Refills: 5 | Status: SHIPPED | OUTPATIENT
Start: 2017-12-20 | End: 2017-12-29 | Stop reason: ALTCHOICE

## 2017-12-20 RX ORDER — HEPARIN SODIUM (PORCINE) LOCK FLUSH IV SOLN 100 UNIT/ML 100 UNIT/ML
SOLUTION INTRAVENOUS
Status: COMPLETED
Start: 2017-12-20 | End: 2017-12-20

## 2017-12-20 NOTE — PROGRESS NOTES
[facility]    Progress Note    Gamaliel Altman Patient Status:  Inpatient    1970 MRN [id number]   Location [facility] 4W/SW/SE Attending Marine Loya DO   Hosp Day # 22 PCP Elias Mendez,        SUBJECTIVE:  Feels well.   Nause BID   valproic acid (DEPAKENE) cap 1,250 mg 1,250 mg Oral Q8H   dextrose 10 % infusion  Intravenous Continuous PRN   Vitamin B-12 (VITAMIN B12) tab 1,000 mcg 1,000 mcg Oral Daily   escitalopram (LEXAPRO) tablet 10 mg 10 mg Oral QAM   bisacodyl (DULCOLAX) r protonix     Mood disorder, opiate dependence (cocaine, heroin)  Dr. Prudencio Kebede following; certified-forms signed and in chart. Ayanna Elks increased to 10mg/day on 12/8.  Abilify and Amantadine discontinued due to fever as above.  Pt much more alert, oriented t

## 2017-12-20 NOTE — PHYSICAL THERAPY NOTE
PHYSICAL THERAPY TREATMENT NOTE - INPATIENT    Room Number: 466/466-A       Presenting Problem:  (Status Epilepticus,Seizures,H/O Lung CA with Brain Mets)    Problem List  Principal Problem:    Status epilepticus ([address]  Active Problems:    Hyperglycemia arms (e.g., wheelchair, bedside commode, etc.): None   -   Moving from lying on back to sitting on the side of the bed?: None   How much help from another person does the patient currently need. ..   -   Moving to and from a bed to a chair (including a whee

## 2017-12-21 ENCOUNTER — APPOINTMENT (OUTPATIENT)
Dept: HEMATOLOGY/ONCOLOGY | Facility: HOSPITAL | Age: 47
End: 2017-12-21
Attending: INTERNAL MEDICINE
Payer: COMMERCIAL

## 2017-12-21 ENCOUNTER — PATIENT OUTREACH (OUTPATIENT)
Dept: CASE MANAGEMENT | Age: 47
End: 2017-12-21

## 2017-12-21 ENCOUNTER — TELEPHONE (OUTPATIENT)
Dept: INTERNAL MEDICINE CLINIC | Facility: CLINIC | Age: 47
End: 2017-12-21

## 2017-12-21 NOTE — TELEPHONE ENCOUNTER
Please call Home Health nurse, Armida  Pt requested that assessment be scheduled for tomorrow rather than today  Requesting verbal ok from Dr Shirlene Rush  Tasked to nursing

## 2017-12-21 NOTE — PROGRESS NOTES
Initial Post Discharge Follow Up   Discharge Date: 12/20/17  Contact Date: 12/21/2017    Consent Verification:  Assessment Completed With: Patient  HIPAA Verified?   Yes    Discharge Dx:   Status epilepticus      Medications:     Current Outpatient Prescr instructions? • No  • Before leaving the hospital was your diagnoses explained to you? Yes  • Are you able to perform normal daily activities of living as you have prior to your hospital stay? yes with help from .   • Were you given a specific diet reviewed and discussed. Any changes or updates to medications and or orders sent to PCP.

## 2017-12-22 ENCOUNTER — APPOINTMENT (OUTPATIENT)
Dept: HEMATOLOGY/ONCOLOGY | Facility: HOSPITAL | Age: 47
End: 2017-12-22
Attending: INTERNAL MEDICINE
Payer: COMMERCIAL

## 2017-12-27 NOTE — DISCHARGE SUMMARY
[facility]    PATIENT'S NAME: Harry Miguel Angel   ATTENDING PHYSICIAN: Tesha Pritchard DO   PATIENT ACCOUNT#:   [de-identified]    LOCATION:  [address] [place] [address],[address] [place] [address] #:   [id number]       YOB: 1970  ADMISSION DATE:       11/25/2017 metastases and recent IV drug use as well as new-onset status epilepticus for concern of metastases; however, MRI showed that this was stable.   She was noted to have iron deficiency anemia and had failed to follow up with Oncology for Venofer, so this was

## 2017-12-29 ENCOUNTER — APPOINTMENT (OUTPATIENT)
Dept: HEMATOLOGY/ONCOLOGY | Facility: HOSPITAL | Age: 47
End: 2017-12-29
Attending: INTERNAL MEDICINE
Payer: COMMERCIAL

## 2017-12-31 NOTE — PROGRESS NOTES
HPI:    Henry Nails is a 49year old female here today for hospital follow up.    She was discharged from Inpatient hospital, [facility] AND [facility]  to Home   Admission Date: 11/25/17   Discharge Date: 12/20/17  Hospital Discharge Diagnosis: status epileptic psychiatry Dr. Jaciel Smith, hem/onc Dr. Cyndi Rao, pulm/CC Dr. Rosa Mott. Allergies:  She has No Known Allergies.     Current Meds:    Current Outpatient Prescriptions on File Prior to Visit:  valproic acid 250 MG Oral Cap Take 5 capsules (1,25 reports that she does not use drugs.      ROS:   GENERAL: weight stable, energy stable, no sweating  SKIN: denies any unusual skin lesions  EYES: denies blurred vision or double vision  HEENT: denies nasal congestion, sinus pain or ST  LUNGS: denies shortne anna assistance with this and is agreeable to utilize navigator to help us find a new psychiatrist that also has some background in addiction medicine. 4. Abnormal transaminases  Likely drug related. HBV and HCV exposure but not active infection.  Daniela Beltrán

## 2018-01-02 ENCOUNTER — APPOINTMENT (OUTPATIENT)
Dept: HEMATOLOGY/ONCOLOGY | Facility: HOSPITAL | Age: 48
End: 2018-01-02
Attending: INTERNAL MEDICINE
Payer: COMMERCIAL

## 2018-01-04 ENCOUNTER — TELEPHONE (OUTPATIENT)
Dept: INTERNAL MEDICINE CLINIC | Facility: CLINIC | Age: 48
End: 2018-01-04

## 2018-01-04 ENCOUNTER — OFFICE VISIT (OUTPATIENT)
Dept: HEMATOLOGY/ONCOLOGY | Facility: HOSPITAL | Age: 48
End: 2018-01-04
Attending: INTERNAL MEDICINE
Payer: COMMERCIAL

## 2018-01-04 ENCOUNTER — TELEPHONE (OUTPATIENT)
Dept: ENDOCRINOLOGY CLINIC | Facility: CLINIC | Age: 48
End: 2018-01-04

## 2018-01-04 VITALS
RESPIRATION RATE: 16 BRPM | WEIGHT: 143 LBS | HEART RATE: 100 BPM | DIASTOLIC BLOOD PRESSURE: 64 MMHG | BODY MASS INDEX: 24 KG/M2 | TEMPERATURE: 99 F | SYSTOLIC BLOOD PRESSURE: 124 MMHG

## 2018-01-04 DIAGNOSIS — C78.7 LIVER METASTASIS (HCC): ICD-10-CM

## 2018-01-04 DIAGNOSIS — Z51.11 CHEMOTHERAPY MANAGEMENT, ENCOUNTER FOR: ICD-10-CM

## 2018-01-04 DIAGNOSIS — C34.92 CARCINOMA OF LUNG, LEFT (HCC): Primary | ICD-10-CM

## 2018-01-04 DIAGNOSIS — C79.31 BRAIN METASTASES (HCC): ICD-10-CM

## 2018-01-04 DIAGNOSIS — C34.90 CARCINOMA OF LUNG, UNSPECIFIED LATERALITY (HCC): Primary | ICD-10-CM

## 2018-01-04 DIAGNOSIS — E03.9 HYPOTHYROIDISM (ACQUIRED): ICD-10-CM

## 2018-01-04 DIAGNOSIS — C34.92 CARCINOMA OF LUNG, LEFT (HCC): ICD-10-CM

## 2018-01-04 DIAGNOSIS — E03.9 HYPOTHYROIDISM, UNSPECIFIED TYPE: Primary | ICD-10-CM

## 2018-01-04 DIAGNOSIS — Z45.2 ENCOUNTER FOR ADJUSTMENT OR MANAGEMENT OF VASCULAR ACCESS DEVICE: ICD-10-CM

## 2018-01-04 DIAGNOSIS — D50.9 IRON DEFICIENCY ANEMIA, UNSPECIFIED IRON DEFICIENCY ANEMIA TYPE: ICD-10-CM

## 2018-01-04 LAB
ALBUMIN SERPL BCP-MCNC: 3.1 G/DL (ref 3.5–4.8)
ALBUMIN/GLOB SERPL: 0.8 {RATIO} (ref 1–2)
ALP SERPL-CCNC: 61 U/L (ref 32–100)
ALT SERPL-CCNC: 9 U/L (ref 14–54)
ANION GAP SERPL CALC-SCNC: 10 MMOL/L (ref 0–18)
AST SERPL-CCNC: 13 U/L (ref 15–41)
BASOPHILS # BLD: 0 K/UL (ref 0–0.2)
BASOPHILS NFR BLD: 0 %
BILIRUB SERPL-MCNC: 0.4 MG/DL (ref 0.3–1.2)
BUN SERPL-MCNC: 11 MG/DL (ref 8–20)
BUN/CREAT SERPL: 21.6 (ref 10–20)
CALCIUM SERPL-MCNC: 8.4 MG/DL (ref 8.5–10.5)
CHLORIDE SERPL-SCNC: 103 MMOL/L (ref 95–110)
CO2 SERPL-SCNC: 26 MMOL/L (ref 22–32)
CREAT SERPL-MCNC: 0.51 MG/DL (ref 0.5–1.5)
EOSINOPHIL # BLD: 0 K/UL (ref 0–0.7)
EOSINOPHIL NFR BLD: 0 %
ERYTHROCYTE [DISTWIDTH] IN BLOOD BY AUTOMATED COUNT: 27.1 % (ref 11–15)
GLOBULIN PLAS-MCNC: 3.8 G/DL (ref 2.5–3.7)
GLUCOSE SERPL-MCNC: 94 MG/DL (ref 70–99)
HCT VFR BLD AUTO: 29.2 % (ref 35–48)
HGB BLD-MCNC: 9.6 G/DL (ref 12–16)
LYMPHOCYTES # BLD: 1.9 K/UL (ref 1–4)
LYMPHOCYTES NFR BLD: 28 %
MCH RBC QN AUTO: 30.1 PG (ref 27–32)
MCHC RBC AUTO-ENTMCNC: 32.9 G/DL (ref 32–37)
MCV RBC AUTO: 91.3 FL (ref 80–100)
MONOCYTES # BLD: 0.8 K/UL (ref 0–1)
MONOCYTES NFR BLD: 12 %
NEUTROPHILS # BLD AUTO: 4 K/UL (ref 1.8–7.7)
NEUTROPHILS NFR BLD: 60 %
OSMOLALITY UR CALC.SUM OF ELEC: 287 MOSM/KG (ref 275–295)
PLATELET # BLD AUTO: 223 K/UL (ref 140–400)
PMV BLD AUTO: 8.4 FL (ref 7.4–10.3)
POTASSIUM SERPL-SCNC: 3.6 MMOL/L (ref 3.3–5.1)
PROT SERPL-MCNC: 6.9 G/DL (ref 5.9–8.4)
RBC # BLD AUTO: 3.2 M/UL (ref 3.7–5.4)
SODIUM SERPL-SCNC: 139 MMOL/L (ref 136–144)
T3 SERPL-MCNC: 1.2 NG/ML (ref 0.87–1.78)
T4 FREE SERPL-MCNC: 1.51 NG/DL (ref 0.58–1.64)
TSH SERPL-ACNC: 0.15 UIU/ML (ref 0.45–5.33)
WBC # BLD AUTO: 6.8 K/UL (ref 4–11)

## 2018-01-04 PROCEDURE — 99212 OFFICE O/P EST SF 10 MIN: CPT | Performed by: INTERNAL MEDICINE

## 2018-01-04 PROCEDURE — 80053 COMPREHEN METABOLIC PANEL: CPT

## 2018-01-04 PROCEDURE — 85025 COMPLETE CBC W/AUTO DIFF WBC: CPT

## 2018-01-04 PROCEDURE — 84480 ASSAY TRIIODOTHYRONINE (T3): CPT

## 2018-01-04 PROCEDURE — 84443 ASSAY THYROID STIM HORMONE: CPT

## 2018-01-04 PROCEDURE — 36591 DRAW BLOOD OFF VENOUS DEVICE: CPT

## 2018-01-04 PROCEDURE — 84439 ASSAY OF FREE THYROXINE: CPT

## 2018-01-04 PROCEDURE — 80307 DRUG TEST PRSMV CHEM ANLYZR: CPT

## 2018-01-04 PROCEDURE — 99215 OFFICE O/P EST HI 40 MIN: CPT | Performed by: INTERNAL MEDICINE

## 2018-01-04 RX ORDER — 0.9 % SODIUM CHLORIDE 0.9 %
VIAL (ML) INJECTION
Status: DISCONTINUED
Start: 2018-01-04 | End: 2018-01-04

## 2018-01-04 RX ORDER — DIPHENHYDRAMINE HYDROCHLORIDE 50 MG/ML
INJECTION INTRAMUSCULAR; INTRAVENOUS EVERY 4 HOURS PRN
Status: CANCELLED | OUTPATIENT
Start: 2018-01-05

## 2018-01-04 RX ORDER — HEPARIN SODIUM (PORCINE) LOCK FLUSH IV SOLN 100 UNIT/ML 100 UNIT/ML
SOLUTION INTRAVENOUS
Status: DISCONTINUED
Start: 2018-01-04 | End: 2018-01-04

## 2018-01-04 RX ORDER — RANITIDINE 25 MG/ML
50 INJECTION, SOLUTION INTRAMUSCULAR; INTRAVENOUS AS NEEDED
Status: CANCELLED | OUTPATIENT
Start: 2018-01-05

## 2018-01-04 RX ORDER — 0.9 % SODIUM CHLORIDE 0.9 %
10 VIAL (ML) INJECTION ONCE
Status: CANCELLED | OUTPATIENT
Start: 2018-01-04

## 2018-01-04 RX ORDER — HEPARIN SODIUM (PORCINE) LOCK FLUSH IV SOLN 100 UNIT/ML 100 UNIT/ML
5 SOLUTION INTRAVENOUS ONCE
Status: COMPLETED | OUTPATIENT
Start: 2018-01-04 | End: 2018-01-04

## 2018-01-04 RX ORDER — HEPARIN SODIUM (PORCINE) LOCK FLUSH IV SOLN 100 UNIT/ML 100 UNIT/ML
5 SOLUTION INTRAVENOUS ONCE
Status: CANCELLED | OUTPATIENT
Start: 2018-01-04

## 2018-01-04 RX ORDER — MEPERIDINE HYDROCHLORIDE 25 MG/ML
INJECTION INTRAMUSCULAR; INTRAVENOUS; SUBCUTANEOUS AS NEEDED
Status: CANCELLED | OUTPATIENT
Start: 2018-01-05

## 2018-01-04 RX ORDER — ACETAMINOPHEN 325 MG/1
TABLET ORAL EVERY 6 HOURS PRN
Status: CANCELLED | OUTPATIENT
Start: 2018-01-05

## 2018-01-04 RX ORDER — DIPHENHYDRAMINE HYDROCHLORIDE 50 MG/ML
25 INJECTION INTRAMUSCULAR; INTRAVENOUS ONCE
Status: CANCELLED | OUTPATIENT
Start: 2018-01-04

## 2018-01-04 RX ORDER — ALBUTEROL SULFATE 90 UG/1
2 AEROSOL, METERED RESPIRATORY (INHALATION) AS NEEDED
Status: CANCELLED | OUTPATIENT
Start: 2018-01-05

## 2018-01-04 RX ADMIN — HEPARIN SODIUM (PORCINE) LOCK FLUSH IV SOLN 100 UNIT/ML 500 UNITS: 100 SOLUTION INTRAVENOUS at 12:25:00

## 2018-01-04 NOTE — PROGRESS NOTES
Pt here for pre-chemo labs and oncologist visit; she returns tomorrow for chemo infusion. Pt is feeling very well today, in good spirits.   She's just been hospitalized \"for 42 days - I was in a coma for nearly a month, I still have some memory problems w

## 2018-01-04 NOTE — TELEPHONE ENCOUNTER
Home Health physical therapist visited pt today, they both feel that pt does not need therapy  Therapist went to pt home,  pt made the decision.  Pt fully independent  Tasked to nursing as Rosalino Chong

## 2018-01-04 NOTE — PROGRESS NOTES
Cancer Center Progress Note    Patient Name: Ilia Phillip   YOB: 1970   Medical Record Number: [id number]   Attending Physician: Romario Arias M.D. Chief Complaint:  Metastatic adenocarcinoma of the lung, brain metastasis.     History of in the setting of polysubstance abuse/dependence. She required mechanical ventilation for secondary respiratory failure. She was seen by neurology and critical care medicine consultation.   After long hospitalization she was eventually discharged to rehab History:  Family History   Problem Relation Age of Onset   • Blood Disorder Mother    • Stroke Mother    • Clotting Disorder Mother    • Psychiatric Mother    • Cancer Father      PROSTATE       Social History:    Social History  Social History   Marital s supple. Lymphatics: There is no palpable peripheral lymphadenopathy   Chest: Clear to auscultation. Cardiovascular: Regular rate and rhythm. Normal S1S2  Abdomen: Soft, non tender. No hepatosplenomegaly. No palpable mass.   Extremities: Left foot with pancreas. She is currently on treatment with nivolumab since April 2016. –Continue/resume nivolumab. Has had excellent radiographic response. Orders placed and signed  –Follow-up brain MRI stable  –She has anemia with severe iron deficiency.   She has re

## 2018-01-05 ENCOUNTER — OFFICE VISIT (OUTPATIENT)
Dept: HEMATOLOGY/ONCOLOGY | Facility: HOSPITAL | Age: 48
End: 2018-01-05
Attending: INTERNAL MEDICINE
Payer: COMMERCIAL

## 2018-01-05 VITALS
SYSTOLIC BLOOD PRESSURE: 136 MMHG | HEART RATE: 113 BPM | DIASTOLIC BLOOD PRESSURE: 74 MMHG | RESPIRATION RATE: 16 BRPM | TEMPERATURE: 99 F

## 2018-01-05 DIAGNOSIS — C34.90 CARCINOMA OF LUNG, UNSPECIFIED LATERALITY (HCC): Primary | ICD-10-CM

## 2018-01-05 DIAGNOSIS — C34.92 CARCINOMA OF LUNG, LEFT (HCC): ICD-10-CM

## 2018-01-05 DIAGNOSIS — Z45.2 ENCOUNTER FOR ADJUSTMENT OR MANAGEMENT OF VASCULAR ACCESS DEVICE: ICD-10-CM

## 2018-01-05 PROCEDURE — 96413 CHEMO IV INFUSION 1 HR: CPT

## 2018-01-05 RX ORDER — HEPARIN SODIUM (PORCINE) LOCK FLUSH IV SOLN 100 UNIT/ML 100 UNIT/ML
5 SOLUTION INTRAVENOUS ONCE
Status: COMPLETED | OUTPATIENT
Start: 2018-01-05 | End: 2018-01-05

## 2018-01-05 RX ORDER — LEVOTHYROXINE SODIUM 0.15 MG/1
150 TABLET ORAL
Qty: 90 TABLET | Refills: 0 | Status: SHIPPED | OUTPATIENT
Start: 2018-01-05 | End: 2018-01-18 | Stop reason: DRUGHIGH

## 2018-01-05 RX ORDER — 0.9 % SODIUM CHLORIDE 0.9 %
VIAL (ML) INJECTION
Status: DISCONTINUED
Start: 2018-01-05 | End: 2018-01-05

## 2018-01-05 RX ORDER — HEPARIN SODIUM (PORCINE) LOCK FLUSH IV SOLN 100 UNIT/ML 100 UNIT/ML
SOLUTION INTRAVENOUS
Status: COMPLETED
Start: 2018-01-05 | End: 2018-01-05

## 2018-01-05 RX ORDER — DIPHENHYDRAMINE HYDROCHLORIDE 50 MG/ML
25 INJECTION INTRAMUSCULAR; INTRAVENOUS ONCE
Status: CANCELLED | OUTPATIENT
Start: 2018-01-05

## 2018-01-05 RX ORDER — HEPARIN SODIUM (PORCINE) LOCK FLUSH IV SOLN 100 UNIT/ML 100 UNIT/ML
5 SOLUTION INTRAVENOUS ONCE
Status: CANCELLED | OUTPATIENT
Start: 2018-01-05

## 2018-01-05 RX ORDER — SODIUM CHLORIDE 9 MG/ML
INJECTION, SOLUTION INTRAVENOUS
Status: DISCONTINUED
Start: 2018-01-05 | End: 2018-01-05

## 2018-01-05 RX ORDER — 0.9 % SODIUM CHLORIDE 0.9 %
10 VIAL (ML) INJECTION ONCE
Status: CANCELLED | OUTPATIENT
Start: 2018-01-05

## 2018-01-05 RX ADMIN — HEPARIN SODIUM (PORCINE) LOCK FLUSH IV SOLN 100 UNIT/ML 500 UNITS: 100 SOLUTION INTRAVENOUS at 12:38:00

## 2018-01-05 NOTE — TELEPHONE ENCOUNTER
Endo Staff:  Please see message below. Please call patient/  to conform current LT 4 dose. Please let me know. Thanks. Dr. Татьяна Gallardo:  Thanks for letting us know. We will contact her and make changes.

## 2018-01-05 NOTE — TELEPHONE ENCOUNTER
Kyaw Kramer. Informed her of Dr. Nilda Rios instructions below.  Sent Rx and ordered labs at [place] per patient request.

## 2018-01-05 NOTE — TELEPHONE ENCOUNTER
----- Message from Vimal Reaves DO sent at 1/4/2018  1:57 PM CST -----  Rebecca Sage,   She was recently hospitalized for status epilepticus. She had +UDS and I think that contributed. She is now on depakote and keppra.  Her TSH is a bit lower, and I'm sure if you

## 2018-01-05 NOTE — PROGRESS NOTES
Pt to infusion area for cycle 42 of opdivo, patient ambulating idependently. Labs reviewed and adequate for treatment. Patient reports she is feeling well, denies any complaints. Port accessed using sterile technique, positive blood return noted.  Vivian Jainable

## 2018-01-12 NOTE — TELEPHONE ENCOUNTER
Phone line continues to be busy. Will wait for call back if needed from [facility] to notify that Dr. Hanh Elias is okay if patient does not need therapy. Will close encounter.

## 2018-01-18 ENCOUNTER — OFFICE VISIT (OUTPATIENT)
Dept: HEMATOLOGY/ONCOLOGY | Facility: HOSPITAL | Age: 48
End: 2018-01-18
Attending: INTERNAL MEDICINE
Payer: COMMERCIAL

## 2018-01-18 VITALS
DIASTOLIC BLOOD PRESSURE: 73 MMHG | SYSTOLIC BLOOD PRESSURE: 131 MMHG | HEIGHT: 65 IN | RESPIRATION RATE: 16 BRPM | BODY MASS INDEX: 22.66 KG/M2 | HEART RATE: 83 BPM | TEMPERATURE: 99 F | WEIGHT: 136 LBS

## 2018-01-18 DIAGNOSIS — C79.31 BRAIN METASTASES (HCC): ICD-10-CM

## 2018-01-18 DIAGNOSIS — C34.92 CARCINOMA OF LUNG, LEFT (HCC): Primary | ICD-10-CM

## 2018-01-18 DIAGNOSIS — C78.7 LIVER METASTASIS (HCC): ICD-10-CM

## 2018-01-18 DIAGNOSIS — Z45.2 ENCOUNTER FOR ADJUSTMENT OR MANAGEMENT OF VASCULAR ACCESS DEVICE: ICD-10-CM

## 2018-01-18 DIAGNOSIS — C34.90 CARCINOMA OF LUNG, UNSPECIFIED LATERALITY (HCC): Primary | ICD-10-CM

## 2018-01-18 DIAGNOSIS — C34.92 CARCINOMA OF LUNG, LEFT (HCC): ICD-10-CM

## 2018-01-18 DIAGNOSIS — E03.9 HYPOTHYROIDISM (ACQUIRED): ICD-10-CM

## 2018-01-18 DIAGNOSIS — D50.9 IRON DEFICIENCY ANEMIA, UNSPECIFIED IRON DEFICIENCY ANEMIA TYPE: ICD-10-CM

## 2018-01-18 DIAGNOSIS — Z51.11 CHEMOTHERAPY MANAGEMENT, ENCOUNTER FOR: ICD-10-CM

## 2018-01-18 LAB
ALBUMIN SERPL BCP-MCNC: 3 G/DL (ref 3.5–4.8)
ALBUMIN/GLOB SERPL: 0.8 {RATIO} (ref 1–2)
ALP SERPL-CCNC: 63 U/L (ref 32–100)
ALT SERPL-CCNC: 8 U/L (ref 14–54)
ANION GAP SERPL CALC-SCNC: 7 MMOL/L (ref 0–18)
AST SERPL-CCNC: 13 U/L (ref 15–41)
BASOPHILS # BLD: 0.1 K/UL (ref 0–0.2)
BASOPHILS NFR BLD: 1 %
BILIRUB SERPL-MCNC: 0.4 MG/DL (ref 0.3–1.2)
BUN SERPL-MCNC: 8 MG/DL (ref 8–20)
BUN/CREAT SERPL: 17.4 (ref 10–20)
CALCIUM SERPL-MCNC: 8.4 MG/DL (ref 8.5–10.5)
CHLORIDE SERPL-SCNC: 103 MMOL/L (ref 95–110)
CO2 SERPL-SCNC: 27 MMOL/L (ref 22–32)
CREAT SERPL-MCNC: 0.46 MG/DL (ref 0.5–1.5)
EOSINOPHIL # BLD: 0.1 K/UL (ref 0–0.7)
EOSINOPHIL NFR BLD: 1 %
ERYTHROCYTE [DISTWIDTH] IN BLOOD BY AUTOMATED COUNT: 23.6 % (ref 11–15)
GLOBULIN PLAS-MCNC: 3.8 G/DL (ref 2.5–3.7)
GLUCOSE SERPL-MCNC: 106 MG/DL (ref 70–99)
HCT VFR BLD AUTO: 28.8 % (ref 35–48)
HGB BLD-MCNC: 9.5 G/DL (ref 12–16)
LYMPHOCYTES # BLD: 2.8 K/UL (ref 1–4)
LYMPHOCYTES NFR BLD: 35 %
MCH RBC QN AUTO: 31.1 PG (ref 27–32)
MCHC RBC AUTO-ENTMCNC: 33.1 G/DL (ref 32–37)
MCV RBC AUTO: 93.9 FL (ref 80–100)
MONOCYTES # BLD: 0.8 K/UL (ref 0–1)
MONOCYTES NFR BLD: 10 %
NEUTROPHILS # BLD AUTO: 4.2 K/UL (ref 1.8–7.7)
NEUTROPHILS NFR BLD: 53 %
OSMOLALITY UR CALC.SUM OF ELEC: 283 MOSM/KG (ref 275–295)
PLATELET # BLD AUTO: 460 K/UL (ref 140–400)
PMV BLD AUTO: 7.7 FL (ref 7.4–10.3)
POTASSIUM SERPL-SCNC: 3.5 MMOL/L (ref 3.3–5.1)
PROT SERPL-MCNC: 6.8 G/DL (ref 5.9–8.4)
RBC # BLD AUTO: 3.07 M/UL (ref 3.7–5.4)
SODIUM SERPL-SCNC: 137 MMOL/L (ref 136–144)
WBC # BLD AUTO: 7.9 K/UL (ref 4–11)

## 2018-01-18 PROCEDURE — 80053 COMPREHEN METABOLIC PANEL: CPT

## 2018-01-18 PROCEDURE — 36592 COLLECT BLOOD FROM PICC: CPT

## 2018-01-18 PROCEDURE — 99212 OFFICE O/P EST SF 10 MIN: CPT | Performed by: INTERNAL MEDICINE

## 2018-01-18 PROCEDURE — 99215 OFFICE O/P EST HI 40 MIN: CPT | Performed by: INTERNAL MEDICINE

## 2018-01-18 PROCEDURE — A4216 STERILE WATER/SALINE, 10 ML: HCPCS

## 2018-01-18 PROCEDURE — 85025 COMPLETE CBC W/AUTO DIFF WBC: CPT

## 2018-01-18 RX ORDER — DIPHENHYDRAMINE HYDROCHLORIDE 50 MG/ML
25 INJECTION INTRAMUSCULAR; INTRAVENOUS ONCE
Status: CANCELLED | OUTPATIENT
Start: 2018-01-18

## 2018-01-18 RX ORDER — MEPERIDINE HYDROCHLORIDE 25 MG/ML
INJECTION INTRAMUSCULAR; INTRAVENOUS; SUBCUTANEOUS AS NEEDED
Status: CANCELLED | OUTPATIENT
Start: 2018-01-19

## 2018-01-18 RX ORDER — RANITIDINE 25 MG/ML
50 INJECTION, SOLUTION INTRAMUSCULAR; INTRAVENOUS AS NEEDED
Status: CANCELLED | OUTPATIENT
Start: 2018-01-19

## 2018-01-18 RX ORDER — 0.9 % SODIUM CHLORIDE 0.9 %
VIAL (ML) INJECTION
Status: DISCONTINUED
Start: 2018-01-18 | End: 2018-01-18

## 2018-01-18 RX ORDER — HEPARIN SODIUM (PORCINE) LOCK FLUSH IV SOLN 100 UNIT/ML 100 UNIT/ML
5 SOLUTION INTRAVENOUS ONCE
Status: COMPLETED | OUTPATIENT
Start: 2018-01-18 | End: 2018-01-18

## 2018-01-18 RX ORDER — DIPHENHYDRAMINE HYDROCHLORIDE 50 MG/ML
INJECTION INTRAMUSCULAR; INTRAVENOUS EVERY 4 HOURS PRN
Status: CANCELLED | OUTPATIENT
Start: 2018-01-19

## 2018-01-18 RX ORDER — HEPARIN SODIUM (PORCINE) LOCK FLUSH IV SOLN 100 UNIT/ML 100 UNIT/ML
5 SOLUTION INTRAVENOUS ONCE
Status: CANCELLED | OUTPATIENT
Start: 2018-01-18

## 2018-01-18 RX ORDER — ALBUTEROL SULFATE 90 UG/1
2 AEROSOL, METERED RESPIRATORY (INHALATION) AS NEEDED
Status: CANCELLED | OUTPATIENT
Start: 2018-01-19

## 2018-01-18 RX ORDER — ACETAMINOPHEN 325 MG/1
TABLET ORAL EVERY 6 HOURS PRN
Status: CANCELLED | OUTPATIENT
Start: 2018-01-19

## 2018-01-18 RX ORDER — 0.9 % SODIUM CHLORIDE 0.9 %
10 VIAL (ML) INJECTION ONCE
Status: CANCELLED | OUTPATIENT
Start: 2018-01-18

## 2018-01-18 RX ORDER — HEPARIN SODIUM (PORCINE) LOCK FLUSH IV SOLN 100 UNIT/ML 100 UNIT/ML
SOLUTION INTRAVENOUS
Status: COMPLETED
Start: 2018-01-18 | End: 2018-01-18

## 2018-01-18 RX ADMIN — HEPARIN SODIUM (PORCINE) LOCK FLUSH IV SOLN 100 UNIT/ML 500 UNITS: 100 SOLUTION INTRAVENOUS at 13:33:00

## 2018-01-18 NOTE — PROGRESS NOTES
Port accessed using sterile technique. Labs collected per order. Port deaccessed, site covered w/ 2x2 and secured w/ tape. Appeared to tolerate well. Patient discharged in stable condition with future appointments scheduled.

## 2018-01-18 NOTE — PROGRESS NOTES
Cancer Center Progress Note    Patient Name: Malakoff Kaushal   YOB: 1970   Medical Record Number: [id number]   Attending Physician: Noelle Rich M.D. Chief Complaint:  Metastatic adenocarcinoma of the lung, brain metastasis.     History of in the setting of polysubstance abuse/dependence. She required mechanical ventilation for secondary respiratory failure. She was seen by neurology and critical care medicine consultation.   After long hospitalization she was eventually discharged to rehab History:  Family History   Problem Relation Age of Onset   • Blood Disorder Mother    • Stroke Mother    • Clotting Disorder Mother    • Psychiatric Mother    • Cancer Father      PROSTATE       Social History:    Social History  Social History   Marital s lymphadenopathy. Neck is supple. Lymphatics: There is no palpable peripheral lymphadenopathy   Chest: Clear to auscultation. Cardiovascular: Regular rate and rhythm. Normal S1S2  Abdomen: Soft, non tender. No hepatosplenomegaly. No palpable mass.   Ext disease to the pancreas. She is currently on treatment with nivolumab since April 2016. –Continue/ nivolumab. Has had excellent radiographic response. Orders placed and signed  –Follow-up brain MRI stable  –She has anemia with severe iron deficiency.   Lauryn No

## 2018-01-19 ENCOUNTER — OFFICE VISIT (OUTPATIENT)
Dept: HEMATOLOGY/ONCOLOGY | Facility: HOSPITAL | Age: 48
End: 2018-01-19
Attending: INTERNAL MEDICINE
Payer: COMMERCIAL

## 2018-01-19 VITALS
HEART RATE: 123 BPM | RESPIRATION RATE: 16 BRPM | SYSTOLIC BLOOD PRESSURE: 127 MMHG | TEMPERATURE: 98 F | DIASTOLIC BLOOD PRESSURE: 77 MMHG

## 2018-01-19 DIAGNOSIS — Z45.2 ENCOUNTER FOR ADJUSTMENT OR MANAGEMENT OF VASCULAR ACCESS DEVICE: ICD-10-CM

## 2018-01-19 DIAGNOSIS — C34.90 CARCINOMA OF LUNG, UNSPECIFIED LATERALITY (HCC): Primary | ICD-10-CM

## 2018-01-19 DIAGNOSIS — C34.92 CARCINOMA OF LUNG, LEFT (HCC): ICD-10-CM

## 2018-01-19 PROCEDURE — 96413 CHEMO IV INFUSION 1 HR: CPT

## 2018-01-19 PROCEDURE — A4216 STERILE WATER/SALINE, 10 ML: HCPCS

## 2018-01-19 RX ORDER — DIPHENHYDRAMINE HYDROCHLORIDE 50 MG/ML
25 INJECTION INTRAMUSCULAR; INTRAVENOUS ONCE
Status: CANCELLED | OUTPATIENT
Start: 2018-01-19

## 2018-01-19 RX ORDER — HEPARIN SODIUM (PORCINE) LOCK FLUSH IV SOLN 100 UNIT/ML 100 UNIT/ML
5 SOLUTION INTRAVENOUS ONCE
Status: CANCELLED | OUTPATIENT
Start: 2018-01-19

## 2018-01-19 RX ORDER — 0.9 % SODIUM CHLORIDE 0.9 %
VIAL (ML) INJECTION
Status: DISCONTINUED
Start: 2018-01-19 | End: 2018-01-19

## 2018-01-19 RX ORDER — 0.9 % SODIUM CHLORIDE 0.9 %
10 VIAL (ML) INJECTION ONCE
Status: CANCELLED | OUTPATIENT
Start: 2018-01-19

## 2018-01-19 RX ORDER — HEPARIN SODIUM (PORCINE) LOCK FLUSH IV SOLN 100 UNIT/ML 100 UNIT/ML
SOLUTION INTRAVENOUS
Status: COMPLETED
Start: 2018-01-19 | End: 2018-01-19

## 2018-01-19 RX ORDER — SODIUM CHLORIDE 9 MG/ML
INJECTION, SOLUTION INTRAVENOUS
Status: DISCONTINUED
Start: 2018-01-19 | End: 2018-01-19 | Stop reason: WASHOUT

## 2018-01-19 RX ORDER — HEPARIN SODIUM (PORCINE) LOCK FLUSH IV SOLN 100 UNIT/ML 100 UNIT/ML
5 SOLUTION INTRAVENOUS ONCE
Status: COMPLETED | OUTPATIENT
Start: 2018-01-19 | End: 2018-01-19

## 2018-01-19 RX ADMIN — HEPARIN SODIUM (PORCINE) LOCK FLUSH IV SOLN 100 UNIT/ML 500 UNITS: 100 SOLUTION INTRAVENOUS at 13:15:00

## 2018-01-19 NOTE — PROGRESS NOTES
Post Chemo Documentation    Patient is here for treatment today, Cycle 43, Day 1.   Opdivo    Arrives Ambulating independently                                     Modifications in dose or schedule: No      Verbalizes complaints none per patient - states she and  information (if applicable).  Not applicable   Discharge instructions: see AVS- call with any questions or concerns  Reviewed symptoms to report to doctor and phone number to call  Yes and provided and reviewed on AVS  Discharged to Home

## 2018-01-19 NOTE — PATIENT INSTRUCTIONS
When to call the doctor:  • Fever of 100.5 or greater or shaking chills  • Nausea/vomiting not controlled with anti-nausea medications: Unable to drink for 24 hours or have signs of dehydration: tiredness, thirst, dry mouth, dark and decreased amount of ur be as physically active as you can, but start out slowly, and increase your activity over time as you feel stronger. Listen to your body and rest when you need to. Do what you can when you feel up to it. Oral Care  Keep your mouth clean.   Rinse your they can be washed. 3. Absorbable undergarments, or any other items contaminated with chemotherapy, should be placed in a sealed plastic bag for disposal, separate from other trash.   4. Toilets should be flushed twice with the lid closed while taking thi

## 2018-01-22 ENCOUNTER — TELEPHONE (OUTPATIENT)
Dept: INTERNAL MEDICINE CLINIC | Facility: CLINIC | Age: 48
End: 2018-01-22

## 2018-01-22 NOTE — TELEPHONE ENCOUNTER
Received documents from [facility] regarding CMS-485 Form. MD signed forms faxed to [phone]. Fax confirmation received.      Original forms sent to scan

## 2018-01-27 ENCOUNTER — APPOINTMENT (OUTPATIENT)
Dept: CT IMAGING | Facility: HOSPITAL | Age: 48
DRG: 100 | End: 2018-01-27
Attending: EMERGENCY MEDICINE
Payer: COMMERCIAL

## 2018-01-27 ENCOUNTER — APPOINTMENT (OUTPATIENT)
Dept: GENERAL RADIOLOGY | Facility: HOSPITAL | Age: 48
DRG: 100 | End: 2018-01-27
Attending: EMERGENCY MEDICINE
Payer: COMMERCIAL

## 2018-01-27 ENCOUNTER — HOSPITAL ENCOUNTER (INPATIENT)
Facility: HOSPITAL | Age: 48
LOS: 2 days | Discharge: HOME | DRG: 100 | End: 2018-01-29
Attending: EMERGENCY MEDICINE | Admitting: INTERNAL MEDICINE
Payer: COMMERCIAL

## 2018-01-27 DIAGNOSIS — R56.9 SEIZURE SECONDARY TO SUBTHERAPEUTIC ANTICONVULSANT MEDICATION (HCC): ICD-10-CM

## 2018-01-27 DIAGNOSIS — Z79.899 SEIZURE SECONDARY TO SUBTHERAPEUTIC ANTICONVULSANT MEDICATION (HCC): ICD-10-CM

## 2018-01-27 DIAGNOSIS — G40.901 STATUS EPILEPTICUS (HCC): Primary | ICD-10-CM

## 2018-01-27 LAB
ANION GAP SERPL CALC-SCNC: 7 MMOL/L (ref 0–18)
BASOPHILS # BLD: 0 K/UL (ref 0–0.2)
BASOPHILS NFR BLD: 0 %
BILIRUB UR QL: NEGATIVE
BUN SERPL-MCNC: 9 MG/DL (ref 8–20)
BUN/CREAT SERPL: 21.4 (ref 10–20)
CALCIUM SERPL-MCNC: 8.7 MG/DL (ref 8.5–10.5)
CHLORIDE SERPL-SCNC: 104 MMOL/L (ref 95–110)
CLARITY UR: CLEAR
CO2 SERPL-SCNC: 27 MMOL/L (ref 22–32)
COLOR UR: YELLOW
CREAT SERPL-MCNC: 0.42 MG/DL (ref 0.5–1.5)
EOSINOPHIL # BLD: 0 K/UL (ref 0–0.7)
EOSINOPHIL NFR BLD: 1 %
ERYTHROCYTE [DISTWIDTH] IN BLOOD BY AUTOMATED COUNT: 21.5 % (ref 11–15)
FLUAV + FLUBV RNA SPEC NAA+PROBE: NEGATIVE
GLUCOSE SERPL-MCNC: 96 MG/DL (ref 70–99)
GLUCOSE UR-MCNC: NEGATIVE MG/DL
HCT VFR BLD AUTO: 29.6 % (ref 35–48)
HGB BLD-MCNC: 9.8 G/DL (ref 12–16)
KETONES UR-MCNC: NEGATIVE MG/DL
LEUKOCYTE ESTERASE UR QL STRIP.AUTO: NEGATIVE
LYMPHOCYTES # BLD: 2 K/UL (ref 1–4)
LYMPHOCYTES NFR BLD: 28 %
MAGNESIUM SERPL-MCNC: 1.9 MG/DL (ref 1.8–2.5)
MCH RBC QN AUTO: 31 PG (ref 27–32)
MCHC RBC AUTO-ENTMCNC: 33 G/DL (ref 32–37)
MCV RBC AUTO: 93.8 FL (ref 80–100)
MONOCYTES # BLD: 0.9 K/UL (ref 0–1)
MONOCYTES NFR BLD: 12 %
NEUTROPHILS # BLD AUTO: 4.3 K/UL (ref 1.8–7.7)
NEUTROPHILS NFR BLD: 59 %
NITRITE UR QL STRIP.AUTO: NEGATIVE
OSMOLALITY UR CALC.SUM OF ELEC: 285 MOSM/KG (ref 275–295)
PH UR: 6 [PH] (ref 5–8)
PLATELET # BLD AUTO: 309 K/UL (ref 140–400)
PMV BLD AUTO: 8.4 FL (ref 7.4–10.3)
POTASSIUM SERPL-SCNC: 3.6 MMOL/L (ref 3.3–5.1)
PROT UR-MCNC: NEGATIVE MG/DL
RBC # BLD AUTO: 3.16 M/UL (ref 3.7–5.4)
RBC #/AREA URNS AUTO: 6 /HPF
SODIUM SERPL-SCNC: 138 MMOL/L (ref 136–144)
SP GR UR STRIP: 1.01 (ref 1–1.03)
UROBILINOGEN UR STRIP-ACNC: <2
VALPROATE SERPL-MCNC: 13 MCG/ML (ref 50–100)
VIT C UR-MCNC: NEGATIVE MG/DL
WBC # BLD AUTO: 7.2 K/UL (ref 4–11)
WBC #/AREA URNS AUTO: 3 /HPF

## 2018-01-27 PROCEDURE — 70450 CT HEAD/BRAIN W/O DYE: CPT | Performed by: EMERGENCY MEDICINE

## 2018-01-27 PROCEDURE — 72125 CT NECK SPINE W/O DYE: CPT | Performed by: EMERGENCY MEDICINE

## 2018-01-27 RX ORDER — POTASSIUM CHLORIDE 29.8 MG/ML
40 INJECTION INTRAVENOUS ONCE
Status: COMPLETED | OUTPATIENT
Start: 2018-01-27 | End: 2018-01-28

## 2018-01-27 RX ORDER — LORAZEPAM 2 MG/ML
INJECTION INTRAMUSCULAR
Status: COMPLETED
Start: 2018-01-27 | End: 2018-01-27

## 2018-01-27 RX ORDER — PANTOPRAZOLE SODIUM 40 MG/1
40 TABLET, DELAYED RELEASE ORAL
Status: DISCONTINUED | OUTPATIENT
Start: 2018-01-28 | End: 2018-01-29

## 2018-01-27 RX ORDER — ESCITALOPRAM OXALATE 10 MG/1
10 TABLET ORAL EVERY MORNING
Status: DISCONTINUED | OUTPATIENT
Start: 2018-01-28 | End: 2018-01-29

## 2018-01-27 RX ORDER — LEVETIRACETAM 1000 MG/1
1000 TABLET ORAL 2 TIMES DAILY
COMMUNITY
End: 2018-02-07

## 2018-01-27 RX ORDER — ACETAMINOPHEN 325 MG/1
650 TABLET ORAL EVERY 4 HOURS PRN
Status: DISCONTINUED | OUTPATIENT
Start: 2018-01-27 | End: 2018-01-28

## 2018-01-27 RX ORDER — HEPARIN SODIUM 5000 [USP'U]/ML
5000 INJECTION, SOLUTION INTRAVENOUS; SUBCUTANEOUS EVERY 8 HOURS SCHEDULED
Status: DISCONTINUED | OUTPATIENT
Start: 2018-01-27 | End: 2018-01-29

## 2018-01-27 RX ORDER — CHOLECALCIFEROL (VITAMIN D3) 125 MCG
1000 CAPSULE ORAL DAILY
Status: DISCONTINUED | OUTPATIENT
Start: 2018-01-27 | End: 2018-01-29

## 2018-01-27 RX ORDER — PANTOPRAZOLE SODIUM 40 MG/1
40 TABLET, DELAYED RELEASE ORAL
COMMUNITY
End: 2018-02-26 | Stop reason: ALTCHOICE

## 2018-01-27 RX ORDER — LORAZEPAM 2 MG/ML
1 INJECTION INTRAMUSCULAR ONCE
Status: COMPLETED | OUTPATIENT
Start: 2018-01-27 | End: 2018-01-27

## 2018-01-27 RX ORDER — MIDAZOLAM HYDROCHLORIDE 5 MG/ML
10 INJECTION INTRAMUSCULAR; INTRAVENOUS ONCE
Status: COMPLETED | OUTPATIENT
Start: 2018-01-27 | End: 2018-01-27

## 2018-01-27 RX ORDER — LEVOTHYROXINE SODIUM 175 UG/1
175 TABLET ORAL DAILY
Status: DISCONTINUED | OUTPATIENT
Start: 2018-01-27 | End: 2018-01-29

## 2018-01-27 RX ORDER — 0.9 % SODIUM CHLORIDE 0.9 %
VIAL (ML) INJECTION
Status: COMPLETED
Start: 2018-01-27 | End: 2018-01-27

## 2018-01-27 RX ORDER — LORAZEPAM 2 MG/ML
2 INJECTION INTRAMUSCULAR ONCE
Status: COMPLETED | OUTPATIENT
Start: 2018-01-27 | End: 2018-01-27

## 2018-01-27 RX ORDER — POTASSIUM CHLORIDE 20 MEQ/1
40 TABLET, EXTENDED RELEASE ORAL EVERY 4 HOURS
Status: DISCONTINUED | OUTPATIENT
Start: 2018-01-27 | End: 2018-01-27

## 2018-01-27 NOTE — ED NOTES
Per boyfriend pt was aox3 yesterday but today he noticed she was a bit drowsy. Pts boyfriend states that pt fell onto her but down about 4 steps at home and fell back onto her head.  Once pt arrived to the ed pt's boyfriend stated that he noticed pt left ey

## 2018-01-27 NOTE — ED PROVIDER NOTES
Patient Seen in: [facility] AND [facility] Emergency Department    History   Patient presents with:  Seizure Disorder (neurologic)  Numbness Weakness (neurologic)    Stated Complaint: Seizures, facial twitching    HPI    54-year-old female with history of seizur 1.50      Years: 17.00        Types: Cigarettes     Start date: 8/5/1985     Quit date: 8/5/2015  Smokeless tobacco: Never Used                      Comment: Current some day smoker 04/2017  Alcohol use: Yes              Comment: occassionally.  1-2 glasses Urine Few (*)     All other components within normal limits   VALPROIC ACID, (DEPAKENE) - Abnormal; Notable for the following:     Valproic Acid 13 (*)     All other components within normal limits   CBC W/ DIFFERENTIAL - Abnormal; Notable for the followin INDICATIONS: Seizures, facial twitching. Headache. History of lung carcinoma status post brain metastasis. Status post resection of right frontal lobe and left occipital lobe metastatic lesions. Seizure disorder.   TECHNIQUE: CT images were obtained without in the areas of postsurgical change. 3. No acute hemorrhage, mass effect infarct. No localizing focal sulcal effacement or vasogenic edema to suggest significant new intracranial metastasis. 4. No significant change has occurred.               SpO2: Normal

## 2018-01-27 NOTE — ED INITIAL ASSESSMENT (HPI)
Facial twitching, per  he feels pt is having \"trouble seeing. \" Weakness to lt arm about 1 hr ago. Fell down stairs.

## 2018-01-28 LAB
LEVETIRACETAM (KEPPRA): 3 UG/ML
POTASSIUM SERPL-SCNC: 3.6 MMOL/L (ref 3.3–5.1)
POTASSIUM SERPL-SCNC: 3.6 MMOL/L (ref 3.3–5.1)
VALPROATE SERPL-MCNC: 92 MCG/ML (ref 50–100)

## 2018-01-28 PROCEDURE — 99222 1ST HOSP IP/OBS MODERATE 55: CPT | Performed by: INTERNAL MEDICINE

## 2018-01-28 RX ORDER — ACETAMINOPHEN 325 MG/1
650 TABLET ORAL EVERY 6 HOURS PRN
Status: DISCONTINUED | OUTPATIENT
Start: 2018-01-28 | End: 2018-01-29

## 2018-01-28 RX ORDER — VALPROIC ACID 250 MG/5ML
1250 SOLUTION ORAL EVERY 8 HOURS
Status: DISCONTINUED | OUTPATIENT
Start: 2018-01-28 | End: 2018-01-29

## 2018-01-28 RX ORDER — LEVETIRACETAM 500 MG/1
1000 TABLET ORAL 2 TIMES DAILY
Status: DISCONTINUED | OUTPATIENT
Start: 2018-01-28 | End: 2018-01-29

## 2018-01-28 RX ORDER — 0.9 % SODIUM CHLORIDE 0.9 %
VIAL (ML) INJECTION
Status: COMPLETED
Start: 2018-01-28 | End: 2018-01-28

## 2018-01-28 RX ORDER — POTASSIUM CHLORIDE 20 MEQ/1
40 TABLET, EXTENDED RELEASE ORAL EVERY 4 HOURS
Status: COMPLETED | OUTPATIENT
Start: 2018-01-28 | End: 2018-01-28

## 2018-01-28 NOTE — PLAN OF CARE
NEUROLOGICAL - ADULT    • Achieves stable or improved neurological status Not Progressing          NEUROLOGICAL - ADULT    • Absence of seizures Progressing    • Remains free of injury related to seizure activity Progressing    • Achieves maximal functiona

## 2018-01-28 NOTE — PROGRESS NOTES
[facility] Pharmacy Note: Route Optimization for Levetiracetam (KEPPRA)    Patient is currently on Levetiracetam (KEPPRA) 1000 mg IV every 12 hours.    The patient meets the criteria to convert to the oral equivalent as established by the IV to Oral conversion pro

## 2018-01-28 NOTE — H&P
Zachary Junito    PATIENT'S NAME: Anny Cardenas   ATTENDING PHYSICIAN: Zandra Kussmaul, DO   PATIENT ACCOUNT#:   [de-identified]    LOCATION:  [facility] [address] #:   [id number]       YOB: 1970  ADMISSION DATE:       01/27/2018 told her not to move, and stepped out to go get a couple of things to help her, and the next thing was that she had fallen and fallen down the stairs. In the emergency room, she was noted to have some facial twitching and left upper extremity weakness.   Emmanuelle Escobedo for metastatic disease. She was then started on chemotherapy with cisplatin and Navelbine in October 2015, but this was complicated by several hospitalizations, nausea, and vomiting. She had progression of disease and started [facility] April 2016.   She has GENERAL:  The patient appears in no apparent distress, is alert and oriented x3. VITAL SIGNS:  Patient is afebrile with temperature 98.1, heart rate is 86, respiratory rate is 18, blood pressure 104/67, pulse oximetry 99% on room air.     LUNGS:  Clear and if this persists, may need to call Dr. Sonia Bingham again for a consult. 3.   Hypothyroidism. Continue her Synthroid. 4.   Metastatic adenocarcinoma of the lung. She has been stable on Opdivo. Will continue to follow with Dr. Sola Abebe as an outpatient.   5

## 2018-01-29 VITALS
TEMPERATURE: 99 F | OXYGEN SATURATION: 100 % | BODY MASS INDEX: 23.56 KG/M2 | RESPIRATION RATE: 18 BRPM | WEIGHT: 141.38 LBS | HEART RATE: 86 BPM | DIASTOLIC BLOOD PRESSURE: 74 MMHG | SYSTOLIC BLOOD PRESSURE: 123 MMHG | HEIGHT: 65 IN

## 2018-01-29 LAB — POTASSIUM SERPL-SCNC: 4 MMOL/L (ref 3.3–5.1)

## 2018-01-29 PROCEDURE — 4A00X4Z MEASUREMENT OF CENTRAL NERVOUS ELECTRICAL ACTIVITY, EXTERNAL APPROACH: ICD-10-PCS | Performed by: OTHER

## 2018-01-29 PROCEDURE — 99238 HOSP IP/OBS DSCHRG MGMT 30/<: CPT | Performed by: INTERNAL MEDICINE

## 2018-01-29 PROCEDURE — 99223 1ST HOSP IP/OBS HIGH 75: CPT | Performed by: OTHER

## 2018-01-29 PROCEDURE — 95816 EEG AWAKE AND DROWSY: CPT | Performed by: OTHER

## 2018-01-29 RX ORDER — LEVOTHYROXINE SODIUM 175 UG/1
175 TABLET ORAL
Status: DISCONTINUED | OUTPATIENT
Start: 2018-01-30 | End: 2018-01-29

## 2018-01-29 RX ORDER — 0.9 % SODIUM CHLORIDE 0.9 %
VIAL (ML) INJECTION
Status: DISCONTINUED
Start: 2018-01-29 | End: 2018-01-29

## 2018-01-29 RX ORDER — VALPROIC ACID 250 MG/1
1250 CAPSULE, LIQUID FILLED ORAL 3 TIMES DAILY
Status: DISCONTINUED | OUTPATIENT
Start: 2018-01-29 | End: 2018-01-29

## 2018-01-29 RX ORDER — 0.9 % SODIUM CHLORIDE 0.9 %
VIAL (ML) INJECTION
Status: COMPLETED
Start: 2018-01-29 | End: 2018-01-29

## 2018-01-29 RX ORDER — HEPARIN SODIUM (PORCINE) LOCK FLUSH IV SOLN 100 UNIT/ML 100 UNIT/ML
SOLUTION INTRAVENOUS
Status: DISCONTINUED
Start: 2018-01-29 | End: 2018-01-29

## 2018-01-29 NOTE — PROGRESS NOTES
[facility]    Progress Note    Arpit Caballero Patient Status:  Inpatient    1970 MRN [id number]   Location [facility] 3W/SW Attending Steph Velásquez DO   Hosp Day # 2 PCP Ranjan May DO       SUBJECTIVE:    States she is doing encephalomalacia and surrounding gliosis. 2. Mild expected dilatation of the frontal horn of the right lateral ventricle and left occipital horn in the areas of postsurgical change. 3. No acute hemorrhage, mass effect infarct.  No localizing focal sulcal ef

## 2018-01-29 NOTE — PROCEDURES
EEG report    REFERRING PHYSICIAN: Jyoti Carrington DO  PCP and phone number:  Samuelmanuel DO Shay  [phone]    TECHNIQUE: 21 channels of EEG, 2 channels of EOG, and 1 channel of EKG were recorded utilizing the International 10/20 System.  The recording was abnormality involving the right hemisphere. Such EEG findings are commonly observed in patients with acute cerebral infarction/anoxia and during recovery form status epilepticus.  PLEDs may be seen in other clinical settings such as herpes simplex encephali

## 2018-01-29 NOTE — CONSULTS
Neurology Inpatient Consult Note    Keiko Salter : 1970   Referring Physician: Dr. Margy Prajapati  HPI:     Keiko Salter is a 49year old female who is being seen in neurologic evaluation.     Patient is being seen in eval for fall, left side Psychiatric Mother    • Cancer Father      PROSTATE      Social History:  Social History    Marital status:              Spouse name:                       Years of education:                 Number of children:               Occupational History  O proximally and distally; normal bulk and tone; left arm  Sensory: intact to light touch and pinprick throughout  Reflexes: DTRs 2+ in right and 3+ in left biceps, brachioradialis, patella  Coordination / gait: no finger-nose-finger dysmetria, gait testing Hemalatha Marie

## 2018-01-30 NOTE — PLAN OF CARE
NEUROLOGICAL - ADULT    • Achieves stable or improved neurological status Completed    • Absence of seizures Completed    • Remains free of injury related to seizure activity Completed    • Achieves maximal functionality and self care Completed        Rochelle

## 2018-01-30 NOTE — PAYOR COMM NOTE
DISCHARGED ON 1/29. PER I EXCHANGE ALL DAYS APPROVED. JUST PROVIDING D/C SO YOU CAN CLOSE THE CASE WITH CORRECT D/C DATE.  THANK YOU      Assessment and Plan:         1.       Seizure likely due to nonadherence to the regimen.  Patient was previously stab

## 2018-01-31 ENCOUNTER — APPOINTMENT (OUTPATIENT)
Dept: CT IMAGING | Facility: HOSPITAL | Age: 48
DRG: 101 | End: 2018-01-31
Attending: EMERGENCY MEDICINE
Payer: COMMERCIAL

## 2018-01-31 ENCOUNTER — APPOINTMENT (OUTPATIENT)
Dept: GENERAL RADIOLOGY | Facility: HOSPITAL | Age: 48
DRG: 101 | End: 2018-01-31
Attending: EMERGENCY MEDICINE
Payer: COMMERCIAL

## 2018-01-31 ENCOUNTER — HOSPITAL ENCOUNTER (INPATIENT)
Facility: HOSPITAL | Age: 48
LOS: 7 days | Discharge: NURSING, REHAB OR LONG-TERM CARE | DRG: 101 | End: 2018-02-07
Attending: EMERGENCY MEDICINE | Admitting: INTERNAL MEDICINE
Payer: COMMERCIAL

## 2018-01-31 DIAGNOSIS — C79.31 BRAIN METASTASES (HCC): ICD-10-CM

## 2018-01-31 DIAGNOSIS — G40.901 STATUS EPILEPTICUS (HCC): Primary | ICD-10-CM

## 2018-01-31 LAB
BASOPHILS # BLD: 0 K/UL (ref 0–0.2)
BASOPHILS NFR BLD: 1 %
EOSINOPHIL # BLD: 0.1 K/UL (ref 0–0.7)
EOSINOPHIL NFR BLD: 1 %
ERYTHROCYTE [DISTWIDTH] IN BLOOD BY AUTOMATED COUNT: 20.6 % (ref 11–15)
ETHANOL SERPL-MCNC: 1 MG/DL
GLUCOSE BLDC GLUCOMTR-MCNC: 120 MG/DL (ref 70–99)
HCT VFR BLD AUTO: 30.2 % (ref 35–48)
HGB BLD-MCNC: 10 G/DL (ref 12–16)
LYMPHOCYTES # BLD: 2.3 K/UL (ref 1–4)
LYMPHOCYTES NFR BLD: 31 %
MCH RBC QN AUTO: 31 PG (ref 27–32)
MCHC RBC AUTO-ENTMCNC: 33 G/DL (ref 32–37)
MCV RBC AUTO: 93.9 FL (ref 80–100)
MONOCYTES # BLD: 0.8 K/UL (ref 0–1)
MONOCYTES NFR BLD: 10 %
MRSA DNA SPEC QL NAA+PROBE: NEGATIVE
NEUTROPHILS # BLD AUTO: 4.3 K/UL (ref 1.8–7.7)
NEUTROPHILS NFR BLD: 57 %
PLATELET # BLD AUTO: 371 K/UL (ref 140–400)
PMV BLD AUTO: 7.7 FL (ref 7.4–10.3)
RBC # BLD AUTO: 3.22 M/UL (ref 3.7–5.4)
VALPROATE SERPL-MCNC: 70 MCG/ML (ref 50–100)
WBC # BLD AUTO: 7.5 K/UL (ref 4–11)

## 2018-01-31 PROCEDURE — 73080 X-RAY EXAM OF ELBOW: CPT | Performed by: EMERGENCY MEDICINE

## 2018-01-31 PROCEDURE — 73060 X-RAY EXAM OF HUMERUS: CPT | Performed by: EMERGENCY MEDICINE

## 2018-01-31 PROCEDURE — 70450 CT HEAD/BRAIN W/O DYE: CPT | Performed by: EMERGENCY MEDICINE

## 2018-01-31 PROCEDURE — 72125 CT NECK SPINE W/O DYE: CPT | Performed by: EMERGENCY MEDICINE

## 2018-01-31 RX ORDER — LORAZEPAM 2 MG/ML
1 CONCENTRATE ORAL EVERY 4 HOURS PRN
Status: DISCONTINUED | OUTPATIENT
Start: 2018-01-31 | End: 2018-02-07

## 2018-01-31 RX ORDER — HEPARIN SODIUM 5000 [USP'U]/ML
5000 INJECTION, SOLUTION INTRAVENOUS; SUBCUTANEOUS EVERY 12 HOURS SCHEDULED
Status: DISCONTINUED | OUTPATIENT
Start: 2018-01-31 | End: 2018-02-07

## 2018-01-31 RX ORDER — PANTOPRAZOLE SODIUM 40 MG/1
40 TABLET, DELAYED RELEASE ORAL
Status: DISCONTINUED | OUTPATIENT
Start: 2018-02-01 | End: 2018-02-07

## 2018-01-31 RX ORDER — LORAZEPAM 2 MG/ML
1 INJECTION INTRAMUSCULAR ONCE
Status: COMPLETED | OUTPATIENT
Start: 2018-01-31 | End: 2018-01-31

## 2018-01-31 RX ORDER — VALPROIC ACID 250 MG/1
1250 CAPSULE, LIQUID FILLED ORAL EVERY 8 HOURS
Status: DISCONTINUED | OUTPATIENT
Start: 2018-01-31 | End: 2018-02-01

## 2018-01-31 RX ORDER — LEVETIRACETAM 500 MG/1
1000 TABLET ORAL 2 TIMES DAILY
Status: DISCONTINUED | OUTPATIENT
Start: 2018-01-31 | End: 2018-02-02

## 2018-01-31 RX ORDER — LEVOTHYROXINE SODIUM 175 UG/1
175 TABLET ORAL DAILY
Status: DISCONTINUED | OUTPATIENT
Start: 2018-02-01 | End: 2018-02-07

## 2018-01-31 RX ORDER — CHOLECALCIFEROL (VITAMIN D3) 125 MCG
1000 CAPSULE ORAL DAILY
Status: DISCONTINUED | OUTPATIENT
Start: 2018-01-31 | End: 2018-02-07

## 2018-01-31 RX ORDER — ESCITALOPRAM OXALATE 10 MG/1
10 TABLET ORAL EVERY MORNING
Status: DISCONTINUED | OUTPATIENT
Start: 2018-02-01 | End: 2018-02-07

## 2018-01-31 NOTE — ED INITIAL ASSESSMENT (HPI)
C/o fall this morning, patient hit her head. Began having seizure about 30 minutes later. Pt has hx seizures, here last week for same thing. Hx brain cancer, managed at home by .  Pt is awake, alert, oriented and verbal

## 2018-01-31 NOTE — ED PROVIDER NOTES
Patient Seen in: [facility] AND [facility] Emergency Department     History   Patient presents with:  Seizure Disorder (neurologic)    Stated Complaint:     HPI    49year old female with history of non-small cell lung carcinoma with metastases to occipital lobe s OF DEAD TISSUE   2009: CHOLECYSTECTOMY  No date: CYST REMOVAL      Comment: BREAST, BENIGN  No date: HEMORRHOIDECTOMY  No date: HYSTERECTOMY      Comment: heavy menstrual flow,   2012: LUMPECTOMY RIGHT      Comment: FIBROADENOMA  8-7-15: OTHER      Comment air)    Current:/86   Pulse 71   Temp (!) 97 °F (36.1 °C)   Resp 22   SpO2 99%         Physical Exam   Constitutional: She is oriented to person, place, and time. She is cooperative. Non-toxic appearance. She does not have a sickly appearance.  She d 30.2 (*)     RDW 20.6 (*)     All other components within normal limits   VALPROIC ACID, (DEPAKENE) - Normal   CBC WITH DIFFERENTIAL WITH PLATELET    Narrative: The following orders were created for panel order CBC WITH DIFFERENTIAL WITH PLATELET.   Pro swelling. EFFUSION: None visible. OTHER: Negative. Dictated by (CST): Mikaela Burkett MD on 1/31/2018 at 12:54         Approved by (CST): Mikaela Burkett MD on 1/31/2018 at 12:56              =====    CONCLUSION: Normal examination. ORBITS: Limited views are unremarkable. OTHER: Negative.            Dictated by (CST): Pranav Cervantes MD on 1/31/2018 at 12:35         Approved by (CST): Pranav Cervantes MD on 1/31/2018 at 12:41              =====    CONCLUSION: ED Medications Administered:   Medications   LORazepam (ATIVAN) injection 1 mg (not administered)         Procedures: None     01/31/18  1156 01/31/18  1300   BP: 129/86    Pulse: 71 71   Resp: 20 22   Temp: (!) 97 °F (36.1 °C)    SpO2: 99% 99 records for any recent pertinent discharge summaries, testing, and procedures and reviewed those reports. Radiology Interpretation: Radiology reports and images reviewed personally and are negative for acute injury.  I discussed these results, their acute Prescribed:  Current Discharge Medication List            Anticipatory guidance, discharge instructions upon discharge, disease/injury specific precautions, return instructions, and follow up information were provided prior to discharge from the ED if sent

## 2018-02-01 ENCOUNTER — TELEPHONE (OUTPATIENT)
Dept: HEMATOLOGY/ONCOLOGY | Facility: HOSPITAL | Age: 48
End: 2018-02-01

## 2018-02-01 ENCOUNTER — APPOINTMENT (OUTPATIENT)
Dept: HEMATOLOGY/ONCOLOGY | Facility: HOSPITAL | Age: 48
End: 2018-02-01
Attending: INTERNAL MEDICINE
Payer: COMMERCIAL

## 2018-02-01 PROBLEM — R56.9 SEIZURE (HCC): Status: ACTIVE | Noted: 2018-01-27

## 2018-02-01 PROCEDURE — 90792 PSYCH DIAG EVAL W/MED SRVCS: CPT | Performed by: OTHER

## 2018-02-01 PROCEDURE — 99222 1ST HOSP IP/OBS MODERATE 55: CPT | Performed by: INTERNAL MEDICINE

## 2018-02-01 PROCEDURE — 99254 IP/OBS CNSLTJ NEW/EST MOD 60: CPT | Performed by: OTHER

## 2018-02-01 RX ORDER — VALPROIC ACID 250 MG/1
1500 CAPSULE, LIQUID FILLED ORAL EVERY 8 HOURS
Status: DISCONTINUED | OUTPATIENT
Start: 2018-02-01 | End: 2018-02-02

## 2018-02-01 RX ORDER — LAMOTRIGINE 25 MG/1
25 TABLET ORAL DAILY
Status: DISCONTINUED | OUTPATIENT
Start: 2018-02-01 | End: 2018-02-02

## 2018-02-01 RX ORDER — QUETIAPINE FUMARATE 50 MG/1
50 TABLET, EXTENDED RELEASE ORAL NIGHTLY
Status: DISCONTINUED | OUTPATIENT
Start: 2018-02-01 | End: 2018-02-07

## 2018-02-01 NOTE — PAYOR COMM NOTE
--------------  ADMISSION REVIEW     Payor: Anne George [employer] [facility] PPO  Subscriber #:  [id number]  Authorization Number: [id number]    Admit date: 1/31/18  Admit time: 12       Admitting Physician: Jeannine Leyva DO  Attending Physician:  Jeannine Leyva DO thyroid    • Exposure to radiation     DISCONTINUED AUG 2015    • Gallbladder disease    • Hepatitis C    • High blood pressure    • Hypothyroid    • Lung cancer (HCC)    • Migraines    • Osteoarthritis    • [facility] JULY 2015   • Personal h date: 8/5/1985     Quit date: 8/5/2015  Smokeless tobacco: Never Used                      Comment: Current some day smoker 04/2017  Alcohol use: Yes              Comment: occassionally.  1-2 glasses wine per month[JH.2]      Review of Systems    Positive f seizure-like activity   Skin: Skin is intact. No petechiae noted. She is not diaphoretic. No cyanosis. No pallor. Psychiatric: She has a normal mood and affect. Her behavior is normal.   Nursing note and vitals reviewed.          Emergency Differential Laquita Nowak TISSUES: Negative. No visible soft tissue swelling. EFFUSION: None visible. OTHER: Negative.            Dictated by (CST): Trina Arreaga MD on 1/31/2018 at 12:56         Approved by (CST): Trina Arreaga MD on 1/31/2018 at 12:57              === bowing of the interventricular septum, similar to     previous studies. CEREBRUM: No hemorrhage or acute cortical infarction. Encephalomalacia in     the right frontal and left occipital distribution redemonstrated.      Hyperdense focus along the med towards the superior vena cava beyond the field of imaging. BONES: Plate is present in the the occipital bone. There is incomplete     fusion of the posterior C1 arch.   There is rotatory subluxation of C1 on     C2, likely positional.  There is otherw Esophageal candidiasis ([facility] Hypotension; Hyponatremia; Acute febrile illness; Primary malignant neoplasm of lung metastatic to other site [facility]); Delta [place]; Fever; IVDU (intravenous drug user); Anemia; Status epilepticus ([facility]  Hyperglycemia; Metabolic acido abnormal or significant results:[JH.1]  ED Course as of Jan 31 1309  ------------------------------------------------------------[JH.2]    Impression:  After review and interpretation of the above emergency department workup, the patient was found to have[ 2/1/2018  9:42 AM     Author:  Shaunna Rodriguez DO Service:  Internal Medicine Author Type:  Physician    Filed:  2/1/2018  9:42 AM Date of Service:  2/1/2018  9:32 AM Status:  Signed    :  Shaunna Rodriguez DO (Physician)         [address] [address] faces.      PAST MEDICAL HISTORY:    1.       Stage IV lung cancer. She had a left upper lobe mass that was noted on chest x-ray of November 2014, but she did not follow up on imaging due to loss of insurance.   In July 2015 she began having a headache and Synthroid 175 mcg daily, vitamin B12 of 1000 mcg daily.       ALLERGIES:  Patient has drug fever with Dilantin. Otherwise, no other drug allergies.     SOCIAL HISTORY:  The patient is  lives with her  and children.   She had smoked 1 pack a d stable on Opdivo. Will continue to follow with Dr. Mckenzie Zacarias as an outpatient. 5.       Mood disorder. Continue Lexapro 10 mg daily. 6.       History of drug use. The patient has been clean. urine tox screen in ED negative.    7.       Heparin subcutaneo MARIELLA Becerril      Valproate Sodium (DEPACON) 1,000 mg in sodium chloride 0.9 % 100 mL IVPB     Date Action Dose Route User    2/1/2018 1200 New Bag 1000 mg Intravenous Shahana Robins RN      valproic acid (DEPAKENE) cap 1,250 mg     Date Action Dose Rout

## 2018-02-01 NOTE — CM/SW NOTE
Pt currently in critical care unit following seizures. Pt has a hx lung cancer with mets to brain, followed by Dr. Vanda Daniel. Pt has a hx [facility] visit in Dec 2017. At that time, pt was using benzos, opiates, cocaine, and heroin, as well as alcohol and cigarettes.

## 2018-02-01 NOTE — H&P
[facility]     PATIENT'S NAME: Jess Ford   ATTENDING PHYSICIAN: Rosanna Sanchez DO   PATIENT ACCOUNT#:   [de-identified]  MEDICAL RECORD #:   [id number]       YOB: 1970  ADMISSION DATE:       01/31/2018     HISTORY AND PHYSICAL EXAMINA began having a headache and had slurred speech. She came to the emergency room and was noted to have a right occipital frontal mass and a left upper lobe mass.   On July 20, 2015, she had resection of a left occipital tumor by Dr. Ham Quiros and pathology confir had smoked 1 pack a day for about 17 years. She quit temporarily when she was diagnosed with metastatic lung cancer. She does still smoke occasionally. She drinks alcohol occasionally.   Late summer and fall of 2017 she began using cocaine inhalation onl deep venous thrombosis prophylaxis. 8.       General diet. Ok to transfer to telemetry.

## 2018-02-01 NOTE — PHYSICAL THERAPY NOTE
Pt was to be seen for PT eval. Consulted w/ RN prior to seeing pt. Pt was received supine in bed. Pt was able to sit up in bed Mod I during history intake. Pt was A & O x 3-4 (she correctly said Feb 2018, but she thought the date was 25st).  She received a

## 2018-02-01 NOTE — BH PROGRESS NOTE
Behavioral Health SOAP Note  SUBJECTIVE   Patient is a 50 y/o female admitted with seizure and a h/o metastatic CA. Patient recently had a fall and was d/c'd form [facility] on 1/29.  Per , ,the patient left her home on 1/30 in a state of confusion and nee

## 2018-02-01 NOTE — CONSULTS
Neurology Inpatient Consult Note    Blanca May : 1970   Referring Physician: Dr. Mckenna De Leon  HPI:     Blanca May is a 49year old female who is being seen in neurologic evaluation.     Patient presented to the hospital with recurrent seizures Disorder Mother    • Psychiatric Mother    • Cancer Father      PROSTATE      Social History:  Social History    Marital status:              Spouse name:                       Years of education:                 Number of children:               Oc symmetric without facial twitching noted, hearing intact, no dysarthria or dysphonia  Motor: 5/5 strength proximally and distally; normal bulk and tone; left arm with intermittent rhythmic twitching  Sensory:   Patient appears to have left-sided hemineglec disease. –1 g Depacon IV given ×1, increase standing Depakote dose to 1500 mg 3 times daily; recheck level in a.m.     –Continue Keppra 1 g twice daily    –We will repeat MRI brain with and without contrast        Neurology service will continue to evangelina

## 2018-02-01 NOTE — PROGRESS NOTES
Received pt from ER.pt awake and alert. answer all the question appropriate. forgetfull,need reinforced.  at bed side. updated poc. paged and spoke with MD new orders placed and carried out. no seizure activitiy noted. noted twitching on left shoulder. MD delgado

## 2018-02-02 ENCOUNTER — APPOINTMENT (OUTPATIENT)
Dept: MRI IMAGING | Facility: HOSPITAL | Age: 48
DRG: 101 | End: 2018-02-02
Attending: Other
Payer: COMMERCIAL

## 2018-02-02 ENCOUNTER — APPOINTMENT (OUTPATIENT)
Dept: HEMATOLOGY/ONCOLOGY | Facility: HOSPITAL | Age: 48
End: 2018-02-02
Attending: INTERNAL MEDICINE
Payer: COMMERCIAL

## 2018-02-02 ENCOUNTER — TELEPHONE (OUTPATIENT)
Dept: HEMATOLOGY/ONCOLOGY | Facility: HOSPITAL | Age: 48
End: 2018-02-02

## 2018-02-02 LAB — VALPROATE SERPL-MCNC: 117 MCG/ML (ref 50–100)

## 2018-02-02 PROCEDURE — 70553 MRI BRAIN STEM W/O & W/DYE: CPT | Performed by: OTHER

## 2018-02-02 PROCEDURE — 99232 SBSQ HOSP IP/OBS MODERATE 35: CPT | Performed by: OTHER

## 2018-02-02 PROCEDURE — 99232 SBSQ HOSP IP/OBS MODERATE 35: CPT | Performed by: INTERNAL MEDICINE

## 2018-02-02 PROCEDURE — 99223 1ST HOSP IP/OBS HIGH 75: CPT | Performed by: INTERNAL MEDICINE

## 2018-02-02 RX ORDER — DEXAMETHASONE 4 MG/1
4 TABLET ORAL EVERY 12 HOURS SCHEDULED
Status: DISCONTINUED | OUTPATIENT
Start: 2018-02-03 | End: 2018-02-07

## 2018-02-02 RX ORDER — 0.9 % SODIUM CHLORIDE 0.9 %
VIAL (ML) INJECTION
Status: COMPLETED
Start: 2018-02-02 | End: 2018-02-02

## 2018-02-02 RX ORDER — QUETIAPINE 25 MG/1
50 TABLET, FILM COATED ORAL NIGHTLY
Status: DISCONTINUED | OUTPATIENT
Start: 2018-02-02 | End: 2018-02-07

## 2018-02-02 RX ORDER — LAMOTRIGINE 25 MG/1
25 TABLET ORAL 2 TIMES DAILY
Status: DISCONTINUED | OUTPATIENT
Start: 2018-02-02 | End: 2018-02-06

## 2018-02-02 RX ORDER — VALPROIC ACID 250 MG/1
1250 CAPSULE, LIQUID FILLED ORAL EVERY 8 HOURS
Status: DISCONTINUED | OUTPATIENT
Start: 2018-02-02 | End: 2018-02-03

## 2018-02-02 RX ORDER — DEXAMETHASONE SODIUM PHOSPHATE 4 MG/ML
8 VIAL (ML) INJECTION ONCE
Status: DISCONTINUED | OUTPATIENT
Start: 2018-02-02 | End: 2018-02-06

## 2018-02-02 NOTE — CONSULTS
[facility]    Report of Consultation    Rui Polanco Patient Status:  Inpatient    1970 MRN [id number]   Location [facility] 2W/SW Attending Gregorio Diamond DO   Hosp Day # 1 PCP Yara Salvaodr DO     Date of Admission:   any medication or drugs since last admission. Apparently, her  has been relying on her to take medication and he tend to leave it for her in the morning but patient admitted in front of her  that she does not take medication every day.     Yolie Parry History  Family History   Problem Relation Age of Onset   • Blood Disorder Mother    • Stroke Mother    • Clotting Disorder Mother    • Psychiatric Mother    • Cancer Father      PROSTATE       Social History  Smoking status: Former Smoker every morning. Vitamin B-12 1000 MCG Oral Tab Take 1 tablet (1,000 mcg total) by mouth daily. LEVOTHYROXINE SODIUM 175 MCG Oral Tab TAKE 1 TABLET (175 MCG TOTAL) BY MOUTH DAILY.        Allergies  No Known Allergies      Review of Systems:     As by Admi with social smile. Patient reported that she was aware what did happen and she did not loss consciousness. The patient today presented with a slight psychomotor slowness and soft low-tone speech.   Patient was hallucinating early this morning and being ge

## 2018-02-02 NOTE — PHYSICAL THERAPY NOTE
PHYSICAL THERAPY EVALUATION - INPATIENT     Room Number: 692/760-T  Evaluation Date: 2/2/2018  Type of Evaluation: Initial  Physician Order: PT Eval and Treat    Presenting Problem: status epilepticus  Reason for Therapy: Mobility Dysfunction and Disch CASSANDRA. She demonstrates decreased insight, problem solving, and safety awareness. She has significant L neglect. After completing stand to sit transfer, pt was sitting on her L hand and she has no awareness of it. Pt has impaired peripheral vision.      Rochelle elevated temperatures which were deemed to be drug-induced and likely related to the Dilantin.  She had been discharged and was doing well on Depakote and Keppra, and had been seizure-free. Merline Evangelist, the patient and  note that the Depakote was diffi Comment: heavy menstrual flow,   2012: LUMPECTOMY RIGHT      Comment: FIBROADENOMA  8-7-15: OTHER      Comment: CYBERKNIFE  No date: OTHER SURGICAL HISTORY  No date: XS PORT-A-CATH INSERTION/EXCH/CHK    HOME SITUATION  Type of Home: House   Home Layout: light touch on LUE & LLE)           AM-PAC '6-Clicks' INPATIENT SHORT FORM - BASIC MOBILITY  How much difficulty does the patient currently have. ..  -   Turning over in bed (including adjusting bedclothes, sheets and blankets)?: A Little   -   Sitting down Mod A x 2   Goal #4 Patient will negotiate 3 stairs/one curb w/ assistive device and supervision   Goal #4   Current Status NT   Goal #5 Patient to demonstrate independence with home activity/exercise instructions provided to patient in preparation for dis

## 2018-02-02 NOTE — OCCUPATIONAL THERAPY NOTE
Oneal Claude   OCCUPATIONAL THERAPY EVALUATION - INPATIENT     Room Number: 767/834-D  Evaluation Date: 2/2/2018  Type of Evaluation: Initial  Presenting Problem: Seizures/Falls    Physician Order: IP Consult to Occupational Therapy  Reason for Therapy: ADL/IADL Dysfu assist. When walking in straight line Min-Mod A. Max cueing for visual scanning from R to L as pt completely misses items on L side.  Attempted to ambulate around room with rW involving some turns and navigating around bed; a significant amount of assistanc carcinoma [facility])      Past Medical History  Past Medical History:   Diagnosis Date   • Anemia    • Brain cancer ([facility]    • Depression    • Disorder of thyroid    • Exposure to radiation     DISCONTINUED AUG 2015    • Gallbladder disease    • Hepatitis C dressed (although she is in her teens per PMH)    SUBJECTIVE  'I would love to get a bowl of soup. \"      OCCUPATIONAL THERAPY EXAMINATION      OBJECTIVE  Precautions: Seizure; Low vision  Fall Risk: High fall risk    PAIN ASSESSMENT             ACTIVITY TO Form  How much help from another person does the patient currently need…  -   Putting on and taking off regular lower body clothing?: A Little  -   Bathing (including washing, rinsing, drying)?: A Little  -   Toileting, which includes using toilet, bedpan 2/20/18  Frequency: 3-5x week    . Levora Ion Aper, OTR/L   [facility]

## 2018-02-02 NOTE — PROGRESS NOTES
[facility]    Progress Note    John Felton Patient Status:  Inpatient    1970 MRN [id number]   Location [facility] 2W/SW Attending Ortega Atkins DO   Hosp Day # 2 PCP Amy Matta DO       SUBJECTIVE:  Per nursing staff, pat craniotomy changes in similar distribution. Ct Spine Cervical (cpt=72125)    Result Date: 1/31/2018  CONCLUSION: No acute cervical fracture. Stable mild degenerative changes C5-6.        Mri Brain (w+wo) (cpt=70553)    Result Date: 2/2/2018  CONCLUSIO po intake.                  Agustina Stanford DO  2/2/2018  9:39 AM

## 2018-02-02 NOTE — BH PROGRESS NOTE
Behavioral Health SOAP Note  SUBJECTIVE           Patient is a 48 y/o female admitted with seizure and a h/o metastatic CA. Patient recently had a fall and was d/c'd form [address] [place] [address] on 1/29.  Per , ,the patient left her home on 1/30 in a state of confusion

## 2018-02-02 NOTE — PROGRESS NOTES
Notified of admit. Met with patient at bedside. Agree with f/u MRI to eval again for any new treatable lesions. Please call with questions.

## 2018-02-02 NOTE — PROGRESS NOTES
Neurology Inpatient Follow-up Note      HPI:     Patient is being seen in follow-up. Continued intermittent partial seizures, consisting of left sided twitching, neglect.       Past Medical Hisotory:  Reviewed    Medications:  Reviewed    Allergies:  No Kn with 11/26/17.       ASSESSMENT/PLAN:   Seizure disorder  Intracranial metastatic disease   Very clinically complex case; difficult to control partial seizures refractory to and/or with adverse effects from multiple antiepileptic medications.     –Radha callaway

## 2018-02-02 NOTE — CM/SW NOTE
SW spoke with therapy who are recommending acute rehab at [place]. They feel that the pt would be most appropriate to start at that level. Pt's drug screen from 1/31 was negative, which is a good sign for her wellness.  ERMIAS spoke with Dr. John Enamorado regarding this upda

## 2018-02-03 PROCEDURE — 99232 SBSQ HOSP IP/OBS MODERATE 35: CPT | Performed by: OTHER

## 2018-02-03 PROCEDURE — 99233 SBSQ HOSP IP/OBS HIGH 50: CPT | Performed by: INTERNAL MEDICINE

## 2018-02-03 PROCEDURE — 99232 SBSQ HOSP IP/OBS MODERATE 35: CPT | Performed by: INTERNAL MEDICINE

## 2018-02-03 PROCEDURE — 99233 SBSQ HOSP IP/OBS HIGH 50: CPT | Performed by: OTHER

## 2018-02-03 RX ORDER — VALPROIC ACID 250 MG/1
1000 CAPSULE, LIQUID FILLED ORAL EVERY 8 HOURS
Status: DISCONTINUED | OUTPATIENT
Start: 2018-02-03 | End: 2018-02-06

## 2018-02-03 NOTE — PROGRESS NOTES
[facility]    Progress Note    Blanca Sour Patient Status:  Inpatient    1970 MRN [id number]   Location [facility] 2W/SW Attending Marco John DO   Hosp Day # 3 PCP Basia Aguilar DO     Subjective:   Subjective:  Patient st alert, no tremors or focal seizure activity noted. Assessment and Plan:     Mynor Don is a 49year old female with dx of stage IV NSCLCA (adeno) (EGFR/ALK and ROS1 neg). Patient with brain metastasis treated in the past with SBRT.   --Systemic diseas the right frontal lobe resection site. There is also stable diffuse dural thickening of the right cerebral hemisphere which may reflect post treatment change or chronic dural inflammation. No new lesions.  2.  Interval normalization of FLAIR signal change

## 2018-02-03 NOTE — CONSULTS
Oncology Initial Inpatient Encounter    Patient Name: Agnes Johsnon   YOB: 1970   Medical Record Number: [id number]   CSN: [id number]     Date of Visit: 2/2/2018     Chief Complaint:  Patient presents with:  Seizure Disorder (neurologic)  in t received IV antibiotics for roughly 48 hours     She was admitted to the hospital in December 2017 with seizures in the setting of polysubstance abuse/dependence. She required mechanical ventilation for secondary respiratory failure.   She was seen by neur INSERTION/EXCH/CHK    Family History:  Family History   Problem Relation Age of Onset   • Blood Disorder Mother    • Stroke Mother    • Clotting Disorder Mother    • Psychiatric Mother    • Cancer Father      PROSTATE       Social History:    Social Histor twitching   Lymphatics: There is no palpable lymphadenopathy throughout in the cervical, supraclavicular, axillary, or inguinal regions.        Laboratory:      Lab Results  Component Value Date   WBC 7.5 01/31/2018   RBC 3.22 (L) 01/31/2018   HGB 10.0 (L)

## 2018-02-03 NOTE — PROGRESS NOTES
[facility]    Progress Note    Dorla Aram Patient Status:  Inpatient    1970 MRN [id number]   Location [facility] 2W/SW Attending Benita Bautista DO   Hosp Day # 3 PCP Emma Keating DO         Assessment and Plan:     Seizures without erythema  Neck/Thyroid: neck supple; no thyromegaly  Cardiovascular: RRR, S1, S2, no S3 or murmur  Respiratory: lungs without crackles or wheezes  Abdomen: normoactive bowel sounds, soft, non-tender and non-distended  Extremities: no clubbing, cyan (cpt=73080)    Result Date: 1/31/2018  CONCLUSION: Normal examination. Xr Humerus (min 2 Views), Left (cpt=73060)    Result Date: 1/31/2018  CONCLUSION: Normal examination.           Ct Brain Or Head (39432)    Result Date: 1/31/2018  CONCLUSION:

## 2018-02-03 NOTE — PROGRESS NOTES
[facility] HOSP - [facility]    Progress Note    Adriana Miranda Patient Status:  Inpatient    1970 MRN [id number]   Location [facility] 2W/SW Attending Christiano Lobato DO   Hosp Day # 3 PCP Pankaj Hernández DO       Subjective:   Adriana Miranda is a(n) 50 Pantoprazole Sodium (PROTONIX) EC tab 40 mg 40 mg Oral QAM AC   Vitamin B-12 (VITAMIN B12) tab 1,000 mcg 1,000 mcg Oral Daily   LORazepam (INTENSOL) 2 MG/ML ORAL concentrated solution 1 mg 1 mg Oral Q4H PRN   Heparin Sodium (Porcine) 5000 UNIT/ML injecti normalization of FLAIR signal change in the right thalamus compared with 11/26/17.                   Zulma Bains MD, MD  2/3/2018

## 2018-02-03 NOTE — PLAN OF CARE
Problem: Patient Centered Care  Goal: Patient preferences are identified and integrated in the patient's plan of care  Interventions:    - Provide timely, complete, and accurate information to patient/family  - Incorporate patient and family knowledge, alphonso

## 2018-02-03 NOTE — PLAN OF CARE
Problem: NEUROLOGICAL - ADULT  Goal: Absence of seizures  INTERVENTIONS  - Monitor for seizure activity  - Administer anti-seizure medications as ordered  - Monitor neurological status   Outcome: Progressing  Dr. Ayanna Moreau to transfer patient to [facility]

## 2018-02-04 LAB
ALBUMIN SERPL BCP-MCNC: 2.7 G/DL (ref 3.5–4.8)
ALBUMIN/GLOB SERPL: 0.6 {RATIO} (ref 1–2)
ALP SERPL-CCNC: 45 U/L (ref 32–100)
ALT SERPL-CCNC: 8 U/L (ref 14–54)
ANION GAP SERPL CALC-SCNC: 8 MMOL/L (ref 0–18)
AST SERPL-CCNC: 13 U/L (ref 15–41)
BASOPHILS # BLD: 0 K/UL (ref 0–0.2)
BASOPHILS NFR BLD: 1 %
BILIRUB SERPL-MCNC: 0.3 MG/DL (ref 0.3–1.2)
BUN SERPL-MCNC: 15 MG/DL (ref 8–20)
BUN/CREAT SERPL: 27.8 (ref 10–20)
CALCIUM SERPL-MCNC: 8.8 MG/DL (ref 8.5–10.5)
CHLORIDE SERPL-SCNC: 102 MMOL/L (ref 95–110)
CO2 SERPL-SCNC: 28 MMOL/L (ref 22–32)
CREAT SERPL-MCNC: 0.54 MG/DL (ref 0.5–1.5)
EOSINOPHIL # BLD: 0 K/UL (ref 0–0.7)
EOSINOPHIL NFR BLD: 0 %
ERYTHROCYTE [DISTWIDTH] IN BLOOD BY AUTOMATED COUNT: 18.4 % (ref 11–15)
GLOBULIN PLAS-MCNC: 4.4 G/DL (ref 2.5–3.7)
GLUCOSE SERPL-MCNC: 99 MG/DL (ref 70–99)
HCT VFR BLD AUTO: 30.4 % (ref 35–48)
HGB BLD-MCNC: 9.9 G/DL (ref 12–16)
IRON SATN MFR SERPL: 44 % (ref 15–50)
IRON SERPL-MCNC: 92 MCG/DL (ref 28–170)
LYMPHOCYTES # BLD: 2.5 K/UL (ref 1–4)
LYMPHOCYTES NFR BLD: 36 %
MCH RBC QN AUTO: 30.9 PG (ref 27–32)
MCHC RBC AUTO-ENTMCNC: 32.6 G/DL (ref 32–37)
MCV RBC AUTO: 94.9 FL (ref 80–100)
MONOCYTES # BLD: 0.5 K/UL (ref 0–1)
MONOCYTES NFR BLD: 7 %
NEUTROPHILS # BLD AUTO: 3.9 K/UL (ref 1.8–7.7)
NEUTROPHILS NFR BLD: 57 %
OSMOLALITY UR CALC.SUM OF ELEC: 287 MOSM/KG (ref 275–295)
PLATELET # BLD AUTO: 438 K/UL (ref 140–400)
PMV BLD AUTO: 7.8 FL (ref 7.4–10.3)
POTASSIUM SERPL-SCNC: 3.9 MMOL/L (ref 3.3–5.1)
PROT SERPL-MCNC: 7.1 G/DL (ref 5.9–8.4)
RBC # BLD AUTO: 3.21 M/UL (ref 3.7–5.4)
SODIUM SERPL-SCNC: 138 MMOL/L (ref 136–144)
TIBC SERPL-MCNC: 209 MCG/DL (ref 228–428)
TRANSFERRIN SERPL-MCNC: 158 MG/DL (ref 192–382)
WBC # BLD AUTO: 6.8 K/UL (ref 4–11)

## 2018-02-04 PROCEDURE — 99232 SBSQ HOSP IP/OBS MODERATE 35: CPT | Performed by: INTERNAL MEDICINE

## 2018-02-04 PROCEDURE — 99233 SBSQ HOSP IP/OBS HIGH 50: CPT | Performed by: OTHER

## 2018-02-04 RX ORDER — 0.9 % SODIUM CHLORIDE 0.9 %
VIAL (ML) INJECTION
Status: DISPENSED
Start: 2018-02-04 | End: 2018-02-04

## 2018-02-04 RX ORDER — 0.9 % SODIUM CHLORIDE 0.9 %
VIAL (ML) INJECTION
Status: COMPLETED
Start: 2018-02-04 | End: 2018-02-04

## 2018-02-04 NOTE — CONSULTS
Seen and examined. Consult dictated. Recommend acute inpatient rehab.   Thank you     Daily Uriostegui MD  MMG

## 2018-02-04 NOTE — OCCUPATIONAL THERAPY NOTE
OCCUPATIONAL THERAPY TREATMENT NOTE - INPATIENT     Room Number: 455/455-A   Presenting Problem: Seizures/Falls    Problem List  Principal Problem:    Status epilepticus ([facility]  Active Problems:    Visual disturbance    Confusion and disorientation    Metas perceptual training    SUBJECTIVE  Pt offering no complaints    OBJECTIVE  Precautions: Seizure; Low vision    WEIGHT BEARING RESTRICTION  Weight Bearing Restriction: None    PAIN ASSESSMENT  Ratin    ACTIVITY TOLERANCE  good    ACTIVITIES OF DAILY FABRICIO visual scanning strategies to compensate for L side neglect  Comment:na          Goals  on: 18  Frequency: 3-5x week

## 2018-02-04 NOTE — PROGRESS NOTES
LUE involuntary movements are no longer present. She is feeling better overall this evening. Will continue dexamethasone 4mg bid for several days then taper. Next 2-3 cycles of nivolumab will need to be held. Antiepileptics per neurology.       Will fol

## 2018-02-04 NOTE — PROGRESS NOTES
[facility]    Progress Note    Tommy Velazquez Patient Status:  Inpatient    1970 MRN [id number]   Location [facility] 4W/SW/SE Attending Shaunna Rodriguez DO   Hosp Day # 4 PCP Ksenia Colin DO         Assessment and Plan:     [facility] Encounters:  02/02/18 : 140 lb 8 oz (63.7 kg)  02/01/18 : 147 lb 4.3 oz (66.8 kg)  01/29/18 : 141 lb 6.4 oz (64.1 kg)        Constitutional: alert and oriented x3 in no acute distress  HEENT- EOMI, PERRL  Nose/Mouth/Throat: pharynx without erythema  Neck/T 8.8 02/04/2018   ALB 2.7 02/04/2018   ALKPHO 45 02/04/2018   BILT 0.3 02/04/2018   TP 7.1 02/04/2018   AST 13 02/04/2018   ALT 8 02/04/2018       Recent Labs   Lab  02/04/18   0630   WBC  6.8   HGB  9.9*   HCT  30.4*   MCV  94.9   MCH  30.9   MCHC  32.6

## 2018-02-04 NOTE — PROGRESS NOTES
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    The patient also have a history of polysubstance abuse, mo INSERTION/EXCH/CHK   Family History   Problem Relation Age of Onset   • Blood Disorder Mother    • Stroke Mother    • Clotting Disorder Mother    • Psychiatric Mother    • Cancer Father      PROSTATE      Social History: Smoking status: Former Smoker 01/31/2018   HCT 30.2 (L) 01/31/2018    01/31/2018   CREATSERUM 0.42 (L) 01/27/2018   BUN 9 01/27/2018    01/27/2018   K 4.0 01/29/2018    01/27/2018   CO2 27 01/27/2018   GLU 96 01/27/2018   CA 8.7 01/27/2018   ALB 3.0 (L) 01/18/2018 Episodic mood disorder. Opiate dependency  Delirium secondary to general medical condition, concussion        Discussed risk and benefit, acknowledging the current symptom and severity.   At this point, I would recommend the following approach:

## 2018-02-04 NOTE — PHYSICAL THERAPY NOTE
PHYSICAL THERAPY TREATMENT NOTE - INPATIENT    Room Number: 455/455-A       Presenting Problem: status epilepticus    Problem List  Principal Problem:    Status epilepticus ([facility]  Active Problems:    Visual disturbance    Confusion and disorientation    M Poor    ACTIVITY TOLERANCE  Room air    AM-PAC '6-Clicks' INPATIENT SHORT FORM - BASIC MOBILITY  How much difficulty does the patient currently have. ..  -   Turning over in bed (including adjusting bedclothes, sheets and blankets)?: A Little   -   Sitting independence with home activity/exercise instructions provided to patient in preparation for discharge.    Goal #5   Current Status IN PROGRESS   Goal #6    Goal #6  Current Status

## 2018-02-05 LAB — VALPROATE SERPL-MCNC: 102 MCG/ML (ref 50–100)

## 2018-02-05 PROCEDURE — 99233 SBSQ HOSP IP/OBS HIGH 50: CPT | Performed by: INTERNAL MEDICINE

## 2018-02-05 PROCEDURE — 99232 SBSQ HOSP IP/OBS MODERATE 35: CPT | Performed by: OTHER

## 2018-02-05 NOTE — PROGRESS NOTES
[facility] - [facility]    Progress Note    Columbia Bone Patient Status:  Inpatient    1970 MRN [id number]   Location [facility] 4W/SW/SE Attending Good Edwards DO   Hosp Day # 5 PCP Johan Schmitz DO       SUBJECTIVE:  States she would li activity; improved today; depakote level 102 today.      Visual disturbance  on Seroquel XR 50mg qhs, and Seroquel 50 mg po qHS; visual disturbances have lessened;  Rx per Dr Nice Legacy     Hypothyroidism  Continue Synthroid 175 mcg po qD     Metastatic adenoc

## 2018-02-05 NOTE — CONSULTS
[facility]    PATIENT'S NAME: Jess Ford   ATTENDING PHYSICIAN: Rosanna Sanchez DO   CONSULTING PHYSICIAN: Adal Farmer MD   PATIENT ACCOUNT#:   [id number]    LOCATION:  [address] [place] [address]  RECORD #:   [id number]       DATE OF BIRTH:  [birth year] motor apraxia, difficulty with left-sided neglect, ambulating 15 feet with a rolling walker minimum assist x2, moderate assist x2 around narrow doorways and around obstacles.   She states that she has never been to a rehab facility following her numerous mabry inspection of ears and nose within normal limits. Normal functional hearing. NECK:  Midline trachea. No tenderness to palpation over thyroid. LYMPHATICS:  No cervical or no axillary lymphadenopathy. LUNGS:  Clear to auscultation.   No accessory muscl to the supervision to modified independent level and through speech therapy improve memory, problem-solving, and carryover to improve safety. Will benefit from family education through rehab course. Rehab potential is fair to good.   Estimated length of s

## 2018-02-05 NOTE — PHYSICAL THERAPY NOTE
PHYSICAL THERAPY TREATMENT NOTE - INPATIENT    Room Number: 455/455-A       Presenting Problem: status epilepticus    Problem List  Principal Problem:    Status epilepticus ([facility]  Active Problems:    Visual disturbance    Confusion and disorientation    M much difficulty does the patient currently have. ..  -   Turning over in bed (including adjusting bedclothes, sheets and blankets)?: A Little   -   Sitting down on and standing up from a chair with arms (e.g., wheelchair, bedside commode, etc.): A Little provided to patient in preparation for discharge.    Goal #5   Current Status IN PROGRESS   Goal #6    Goal #6  Current Status

## 2018-02-05 NOTE — PROGRESS NOTES
[facility]    Hematology/Oncology   Progress Note    Ragini Ackerman Patient Status:  Inpatient    1970 MRN [id number]   Location [facility] 4W/SW/SE Attending Trice Hagan DO   Hosp Day # 5 PCP Salvador House DO     Subjective: days  –Continue dexamethasone 4 mg twice daily for 1 week then taper  –We will have her return to clinic later this week    Elias Chowdhury MD

## 2018-02-05 NOTE — OCCUPATIONAL THERAPY NOTE
Arturo Mathur OCCUPATIONAL THERAPY TREATMENT NOTE - INPATIENT     Room Number: 455/455-A          Presenting Problem: seizures    Problem List  Principal Problem:    Status epilepticus ([address]  Active Problems:    Visual disturbance    Confusion and disorientation    Met Acute rehabilitation is still recommended for pt as she is not safe to return to home at this level without 24hour care and SPV.  Deficits still persist in occupational performances making many tasks including but not limited toileting and functional mobili Toileting: NT  Upper Extremity Dressing: NT   Lower Extremity Dressing: NT     Education Provided: Visual scanning strategies, transfer sequencing, rationale for tasks   Patient End of Session: In bed;Needs met    OT Goals:   Patient will complete functi

## 2018-02-05 NOTE — PROGRESS NOTES
[facility]    Progress Note    Ragini Ackerman Patient Status:  Inpatient    1970 MRN [id number]   Location [facility] 4W/SW/SE Attending Trice Hagan DO   Hosp Day # 5 PCP Salvador House DO       Subjective:   Ragini Ackerman is a(n) LEVOTHROID) tab 175 mcg Oral Daily   Pantoprazole Sodium (PROTONIX) EC tab 40 mg 40 mg Oral QAM AC   Vitamin B-12 (VITAMIN B12) tab 1,000 mcg 1,000 mcg Oral Daily   LORazepam (INTENSOL) 2 MG/ML ORAL concentrated solution 1 mg 1 mg Oral Q4H PRN   Heparin So

## 2018-02-05 NOTE — PLAN OF CARE
Patient very restless during the night. Attempted to exit the bed without calling for assistance numerous times. Patient seemed confused at times as well. PRN medication given to help with anxiousness and patient rested for a small period.   Patient had anthony

## 2018-02-05 NOTE — PROGRESS NOTES
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    The patient also have a history of polysubstance abuse, mo Left      Comment: LOBECTOMY  AUG  2016: BRAIN SURGERY      Comment: REMOVAL OF DEAD TISSUE   2009: CHOLECYSTECTOMY  No date: CYST REMOVAL      Comment: BREAST, BENIGN  No date: HEMORRHOIDECTOMY  No date: HYSTERECTOMY      Comment: heavy menstrual flow, Subcutaneous 2 times per day     Facility-Administered Medications Ordered in Other Encounters:  nivolumab (OPDIVO) 240 mg in sodium chloride 0.9 % 124 mL chemo-IVPB 240 mg Intravenous Once   Heparin Sodium Lock Flush 100 UNIT/ML injection 500 Units 5 mL I self place and condition. The patient presented calm and cooperative with social smile. Patient did not exhibit any disorientation or any response to internal stimuli. Patient reported her mood has been much better today because she slept well.   Patient

## 2018-02-06 ENCOUNTER — TELEPHONE (OUTPATIENT)
Dept: INTERNAL MEDICINE CLINIC | Facility: CLINIC | Age: 48
End: 2018-02-06

## 2018-02-06 PROCEDURE — 99233 SBSQ HOSP IP/OBS HIGH 50: CPT | Performed by: INTERNAL MEDICINE

## 2018-02-06 PROCEDURE — 99232 SBSQ HOSP IP/OBS MODERATE 35: CPT | Performed by: INTERNAL MEDICINE

## 2018-02-06 PROCEDURE — 99233 SBSQ HOSP IP/OBS HIGH 50: CPT | Performed by: OTHER

## 2018-02-06 PROCEDURE — 99232 SBSQ HOSP IP/OBS MODERATE 35: CPT | Performed by: OTHER

## 2018-02-06 RX ORDER — VALPROIC ACID 250 MG/1
1000 CAPSULE, LIQUID FILLED ORAL DAILY
Status: DISCONTINUED | OUTPATIENT
Start: 2018-02-07 | End: 2018-02-07

## 2018-02-06 RX ORDER — DEXAMETHASONE 4 MG/1
4 TABLET ORAL EVERY 12 HOURS SCHEDULED
Qty: 60 TABLET | Refills: 0 | Status: SHIPPED | OUTPATIENT
Start: 2018-02-06 | End: 2018-03-09 | Stop reason: ALTCHOICE

## 2018-02-06 RX ORDER — LAMOTRIGINE 25 MG/1
50 TABLET ORAL 2 TIMES DAILY
Status: DISCONTINUED | OUTPATIENT
Start: 2018-02-06 | End: 2018-02-06

## 2018-02-06 RX ORDER — LAMOTRIGINE 25 MG/1
25 TABLET ORAL DAILY
Status: DISCONTINUED | OUTPATIENT
Start: 2018-02-06 | End: 2018-02-07

## 2018-02-06 RX ORDER — DIVALPROEX SODIUM 500 MG/1
1500 TABLET, DELAYED RELEASE ORAL NIGHTLY
Status: DISCONTINUED | OUTPATIENT
Start: 2018-02-06 | End: 2018-02-07

## 2018-02-06 RX ORDER — LAMOTRIGINE 25 MG/1
50 TABLET ORAL NIGHTLY
Status: DISCONTINUED | OUTPATIENT
Start: 2018-02-06 | End: 2018-02-07

## 2018-02-06 NOTE — PROGRESS NOTES
[facility]    Hematology/Oncology   Progress Note    Meghna Eastern Patient Status:  Inpatient    1970 MRN [id number]   Location [facility] 4W/SW/SE Attending María Cheung,    Hosp Day # 6 PCP Scarlett Mcduffie DO     Subjective: daily for 3 more dailys then taper. Orders placed for discharge taper. –Anemia with previous iron def.   Iron sat now adequate  –We will have her return to clinic in one week to monitor progress    Gustavo Toney MD

## 2018-02-06 NOTE — TELEPHONE ENCOUNTER
Spouse wants to discuss some issues with Dr Ludwig Hughes. FYI -  not listed on HIPPA that is on file.

## 2018-02-06 NOTE — PAYOR COMM NOTE
REF: [id number]  - APPROVED 01/31/2018 - 02/05/2018 PER CHAY- REQUESTING ADDITIONAL DAYS    2/5/18       OBJECTIVE:  /66 (BP Location: Right arm)   Pulse 112   Temp (!) 97.5 °F (36.4 °C) (Oral)   Resp 18   Wt 140 lb 8 oz (63.7 kg)   SpO2 100%

## 2018-02-06 NOTE — PROGRESS NOTES
[facility]    Progress Note    Sen Walsh Patient Status:  Inpatient    1970 MRN [id number]   Location [facility] 4W/SW/SE Attending Cecilia Watkins DO   Hosp Day # 6 PCP Jae Napoles DO       SUBJECTIVE:    States she is fee meningeal inflammatory changed seen on brain MRI are contributing to seizure activity; improved today; repeat depakote level in am.      Visual disturbance  on Seroquel XR 50mg qhs, and Seroquel 50 mg po qHS; had some hallucinations during the daytime yest

## 2018-02-06 NOTE — BH PROGRESS NOTE
Behavioral Health SOAP Note  SUBJECTIVE           Patient is a 48 y/o female admitted with seizure and a h/o metastatic CA.  Patient recently had a fall and was d/c'd form [address] [place] [address] on 1/29.  Per , ,the patient left her home on 1/30 in a state of confusion

## 2018-02-06 NOTE — CM/SW NOTE
2/6CM-This Writer spoke with the Patient's  Narcisa Ambriz ([phone]) in regards to discharge planning. The Patient's  is agreeable to the Patient going to acute rehab and requested acute rehab close to the Patient's home.  This Writer informed the

## 2018-02-06 NOTE — DISCHARGE SUMMARY
[facility]    PATIENT'S NAME: Shagufta Salamanca   ATTENDING PHYSICIAN: Татьяна Cardenas DO   PATIENT ACCOUNT#:   [de-identified]    LOCATION:  [address] [place] [address] #:   [id number]       YOB: 1970  ADMISSION DATE:       01/27/2018

## 2018-02-06 NOTE — PROGRESS NOTES
[facility]    Progress Note    Gamaliel Altman Patient Status:  Inpatient    1970 MRN [id number]   Location [facility] 4W/SW/SE Attending Marine Loya DO   Hosp Day # 6 PCP Elias Mendez DO       Subjective:   Gamaliel Altman is a(n) tab 25 mg 25 mg Oral Daily   lamoTRIgine (LAMICTAL) tab 50 mg 50 mg Oral Nightly   QUEtiapine Fumarate (SEROQUEL) tab 50 mg 50 mg Oral Nightly   levETIRAcetam (KEPPRA) tab 1,250 mg 1,250 mg Oral BID   dexamethasone (DECADRON) tab 4 mg 4 mg Oral 2 times per

## 2018-02-06 NOTE — OCCUPATIONAL THERAPY NOTE
George Tao OCCUPATIONAL THERAPY TREATMENT NOTE - INPATIENT     Room Number: 455/455-A          Presenting Problem: seizures    Problem List  Principal Problem:    Status epilepticus ([facility] [address]  Active Problems:    Visual disturbance    Confusion and disorientation    Met when therapist completed. This therapist believes patient would still benefit from acute rehab. IF she continues to refuse, 24 SPV is needed for pt safety.      DISCHARGE RECOMMENDATIONS  OT Discharge Recommendations: Acute rehabilitation (if patient refuse Goals:     OT Goals:   Patient will complete functional transfer with SBA with LRD   Comment: Pt requires CGA for sit to stand transfers;max cues to incorporate lue into functional tasks    Patient will complete toileting with SBA   Comment: Pt required set

## 2018-02-07 ENCOUNTER — TELEPHONE (OUTPATIENT)
Dept: HEMATOLOGY/ONCOLOGY | Facility: HOSPITAL | Age: 48
End: 2018-02-07

## 2018-02-07 VITALS
RESPIRATION RATE: 18 BRPM | HEART RATE: 70 BPM | SYSTOLIC BLOOD PRESSURE: 131 MMHG | DIASTOLIC BLOOD PRESSURE: 78 MMHG | OXYGEN SATURATION: 100 % | WEIGHT: 140.5 LBS | BODY MASS INDEX: 23 KG/M2 | TEMPERATURE: 98 F

## 2018-02-07 LAB
ALBUMIN SERPL BCP-MCNC: 2.8 G/DL (ref 3.5–4.8)
ALBUMIN/GLOB SERPL: 0.7 {RATIO} (ref 1–2)
ALP SERPL-CCNC: 42 U/L (ref 32–100)
ALT SERPL-CCNC: 8 U/L (ref 14–54)
ANION GAP SERPL CALC-SCNC: 6 MMOL/L (ref 0–18)
AST SERPL-CCNC: 12 U/L (ref 15–41)
BASOPHILS # BLD: 0.1 K/UL (ref 0–0.2)
BASOPHILS NFR BLD: 1 %
BILIRUB SERPL-MCNC: 0.4 MG/DL (ref 0.3–1.2)
BUN SERPL-MCNC: 14 MG/DL (ref 8–20)
BUN/CREAT SERPL: 28 (ref 10–20)
CALCIUM SERPL-MCNC: 8.8 MG/DL (ref 8.5–10.5)
CHLORIDE SERPL-SCNC: 102 MMOL/L (ref 95–110)
CO2 SERPL-SCNC: 32 MMOL/L (ref 22–32)
CREAT SERPL-MCNC: 0.5 MG/DL (ref 0.5–1.5)
EOSINOPHIL # BLD: 0 K/UL (ref 0–0.7)
EOSINOPHIL NFR BLD: 0 %
ERYTHROCYTE [DISTWIDTH] IN BLOOD BY AUTOMATED COUNT: 17.9 % (ref 11–15)
GLOBULIN PLAS-MCNC: 3.8 G/DL (ref 2.5–3.7)
GLUCOSE SERPL-MCNC: 93 MG/DL (ref 70–99)
HCT VFR BLD AUTO: 30.1 % (ref 35–48)
HGB BLD-MCNC: 10 G/DL (ref 12–16)
LYMPHOCYTES # BLD: 3.3 K/UL (ref 1–4)
LYMPHOCYTES NFR BLD: 40 %
MCH RBC QN AUTO: 31.5 PG (ref 27–32)
MCHC RBC AUTO-ENTMCNC: 33.4 G/DL (ref 32–37)
MCV RBC AUTO: 94.3 FL (ref 80–100)
MONOCYTES # BLD: 0.5 K/UL (ref 0–1)
MONOCYTES NFR BLD: 7 %
NEUTROPHILS # BLD AUTO: 4.3 K/UL (ref 1.8–7.7)
NEUTROPHILS NFR BLD: 52 %
OSMOLALITY UR CALC.SUM OF ELEC: 290 MOSM/KG (ref 275–295)
PLATELET # BLD AUTO: 414 K/UL (ref 140–400)
PMV BLD AUTO: 7.7 FL (ref 7.4–10.3)
POTASSIUM SERPL-SCNC: 3.6 MMOL/L (ref 3.3–5.1)
PROT SERPL-MCNC: 6.6 G/DL (ref 5.9–8.4)
RBC # BLD AUTO: 3.19 M/UL (ref 3.7–5.4)
SODIUM SERPL-SCNC: 140 MMOL/L (ref 136–144)
VALPROATE SERPL-MCNC: 73 MCG/ML (ref 50–100)
WBC # BLD AUTO: 8.2 K/UL (ref 4–11)

## 2018-02-07 PROCEDURE — 99238 HOSP IP/OBS DSCHRG MGMT 30/<: CPT | Performed by: INTERNAL MEDICINE

## 2018-02-07 PROCEDURE — 99232 SBSQ HOSP IP/OBS MODERATE 35: CPT | Performed by: INTERNAL MEDICINE

## 2018-02-07 PROCEDURE — 99232 SBSQ HOSP IP/OBS MODERATE 35: CPT | Performed by: OTHER

## 2018-02-07 RX ORDER — 0.9 % SODIUM CHLORIDE 0.9 %
VIAL (ML) INJECTION
Status: COMPLETED
Start: 2018-02-07 | End: 2018-02-07

## 2018-02-07 RX ORDER — QUETIAPINE 50 MG/1
50 TABLET, FILM COATED ORAL NIGHTLY
Qty: 30 TABLET | Refills: 2 | Status: SHIPPED | OUTPATIENT
Start: 2018-02-07 | End: 2018-02-26 | Stop reason: DRUGHIGH

## 2018-02-07 RX ORDER — LAMOTRIGINE 25 MG/1
50 TABLET ORAL 2 TIMES DAILY
Qty: 60 TABLET | Refills: 0 | Status: SHIPPED | OUTPATIENT
Start: 2018-02-07 | End: 2018-02-26 | Stop reason: ALTCHOICE

## 2018-02-07 RX ORDER — LEVETIRACETAM 250 MG/1
1250 TABLET ORAL 2 TIMES DAILY
Qty: 300 TABLET | Refills: 2 | Status: SHIPPED | OUTPATIENT
Start: 2018-02-07 | End: 2018-03-09

## 2018-02-07 RX ORDER — QUETIAPINE FUMARATE 50 MG/1
50 TABLET, EXTENDED RELEASE ORAL NIGHTLY
Qty: 30 TABLET | Refills: 2 | Status: SHIPPED | OUTPATIENT
Start: 2018-02-07 | End: 2018-02-26 | Stop reason: DRUGHIGH

## 2018-02-07 RX ORDER — HEPARIN SODIUM (PORCINE) LOCK FLUSH IV SOLN 100 UNIT/ML 100 UNIT/ML
SOLUTION INTRAVENOUS
Status: COMPLETED
Start: 2018-02-07 | End: 2018-02-07

## 2018-02-07 RX ORDER — DIVALPROEX SODIUM 500 MG/1
TABLET, DELAYED RELEASE ORAL
Qty: 150 TABLET | Refills: 2 | Status: SHIPPED | OUTPATIENT
Start: 2018-02-07 | End: 2018-03-28

## 2018-02-07 NOTE — OCCUPATIONAL THERAPY NOTE
OCCUPATIONAL THERAPY TREATMENT NOTE - INPATIENT     Room Number: 455/455-A   Presenting Problem: seizures    Problem List  Principal Problem:    Status epilepticus ([facility] [address]  Active Problems:    Visual disturbance    Confusion and disorientation    Metastatic impairment, and visual perception limitations, she was unable to see objects in her pathway and was frequently running into obstacles, walls, doors etc.     She was given a three step instruction to complete (walk to the bathroom,  front of the sin urinal? : A Little  -   Putting on and taking off regular upper body clothing?: A Little  -   Taking care of personal grooming such as brushing teeth?: A Little  -   Eating meals?: A Little    AM-PAC Score:  Score: 18  Approx Degree of Impairment: 46.65%

## 2018-02-07 NOTE — DIETARY NOTE
ADULT NUTRITION INITIAL ASSESSMENT    Pt is at moderate nutrition risk. Pt does not meet malnutrition criteria. RECOMMENDATIONS TO MD:  None at this time.        NUTRITION DIAGNOSIS/PROBLEM:  Inadequate oral intake related to decreased ability to cons 140-150#        See wt record below dating back 10 months and even further back 200-205#.    WEIGHT HISTORY:  Patient Weight(s) for the past 336 hrs:   Weight   02/02/18 0335 63.7 kg (140 lb 8 oz)   02/01/18 0500 66.8 kg (147 lb 4.8 oz)       Wt Readings fr Oral Daily   • Heparin Sodium (Porcine)  5,000 Units Subcutaneous 2 times per day       LABS: reviewed    Recent Labs   02/04/18  0630 02/07/18  0624   GLU 99 93   BUN 15 14   CREATSERUM 0.54 0.50   CA 8.8 8.8    140   K 3.9 3.6    102   CO2 28

## 2018-02-07 NOTE — CM/SW NOTE
CTL progression of care note: Spoke with Omer Marmolejo from Dr. Ernst Augustin office. Pt will need to see Dr. Mitchel Bennett in 1 week. Natacha Plummer will contact pt's  Toni Memos to schedule appointment.      Medication arrangements for Optivo will be arranged per Dr. Ernst Augustin offic

## 2018-02-07 NOTE — TELEPHONE ENCOUNTER
Krysten Bacon the patient  called and said the patient is being discharged and going to [facility] rehab, she is current scheduled for MD, labs and infusion next week.  He wanted to know if they can hold off until she is discharged from rehab, please advise

## 2018-02-07 NOTE — CM/SW NOTE
2/7CMYordanTraceysharad has insurance authorization and is able to accept the Patient  today (2/7) at 1:30 p.m. This Writer informed the Patient's RN of the above. [address] [address] Room 3374 at 1:30 p.m.   Medicar  2/7/18  Met with the Patient, s

## 2018-02-07 NOTE — PROGRESS NOTES
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    The patient also have a history of polysubstance abuse, mo BRAIN SURGERY Left      Comment: OCCIPITAL CRANIOTOMY  No date: BRAIN SURGERY Left      Comment: LOBECTOMY  AUG  2016: BRAIN SURGERY      Comment: REMOVAL OF DEAD TISSUE   2009: CHOLECYSTECTOMY  No date: CYST REMOVAL      Comment: BREAST, BENIGN  No date: (INTENSOL) 2 MG/ML ORAL concentrated solution 1 mg 1 mg Oral Q4H PRN   Heparin Sodium (Porcine) 5000 UNIT/ML injection 5,000 Units 5,000 Units Subcutaneous 2 times per day     Facility-Administered Medications Ordered in Other Encounters:  nivolumab (OPDIV patient continue indicated titration between Lamictal and Depakote. Most probably that need longer time to be done in an outpatient setting with a neurologist.  At this point the patient has been exhibiting significant psychological improvement.   At this

## 2018-02-08 NOTE — DISCHARGE SUMMARY
[facility]    DISCHARGE SUMMARY    ADMISSION DATE:       01/31/2018  DISCHARGE DATE:       02/07/2018    DISCHARGE DIAGNOSIS: recurrent seizure     HISTORY AND PHYSICAL EXAMINATION     CHIEF COMPLAINT:  Seizure.     HISTORY OF PRESENT ILL the emergency room and was noted to have a right occipital frontal mass and a left upper lobe mass.  On July 20, 2015, she had resection of a left occipital tumor by Dr. Davi Mcclelland and pathology confirmed adenocarcinoma of the lung.  She then underwent CyberKnif temporarily when she was diagnosed with metastatic lung cancer. Anusha Moyer does still smoke occasionally.  She drinks alcohol occasionally.  Late summer and fall of 2017 she began using cocaine inhalation only.  She has since stopped this.       OBJECTIVE:  Avelina Synthroid 175 mcg po qD     Metastatic adenocarcinoma of the lung  stable on Opdivo; Rx per Dr. Berny Bajwa; infusions to be held while on decadron     Mood disorder  Continue Lexapro 10 mg daily;  Rx per Dr Micheal Valdez     History of drug use  urine tox screen in ED

## 2018-02-08 NOTE — PROGRESS NOTES
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    The patient also have a history of polysubstance abuse, mo SURGERY      Comment: TUMOR RESECTION  No date: BRAIN SURGERY Left      Comment: OCCIPITAL CRANIOTOMY  No date: BRAIN SURGERY Left      Comment: LOBECTOMY  AUG  2016: BRAIN SURGERY      Comment: REMOVAL OF DEAD TISSUE   2009: CHOLECYSTECTOMY  No date: CYST 02/07/2018   PTT 56.6 (H) 12/12/2017   INR 1.2 12/19/2017   PTP 14.5 12/19/2017   T4F 1.51 01/04/2018   TSH 0.15 (L) 01/04/2018    (H) 12/18/2017   MG 1.9 01/27/2018   PHOS 2.9 12/13/2017   TROP 0.01 11/15/2017   CK 86 12/12/2017   B12 251 12/10/201 Platelet      Valproic Acid, (Depakene)      Ethyl Alcohol      Drug screen 7 w/out confirmation, urine STAT      Valproic Acid, (Depakene)      CBC With Differential With Platelet      Comp Metabolic Panel (14)      Iron And Tibc      Valproic Acid, (Depa

## 2018-02-08 NOTE — PROGRESS NOTES
[facility]    Hematology/Oncology   Progress Note    John Felton Patient Status:  Inpatient    1970 MRN [id number]   Location [facility] 4W/SW/SE Attending Ortega Atkins DO   Hosp Day # 7 PCP Amy Matta DO     Subjective: extremity involuntary movements (patrial seizures) confusion and weakness in the setting of known CNS disease.    –given the right sided pachymeningeal inflamatory mri changes and hx of immunotherapy and CNS metastasis with new neurologic symptoms, proceedi

## 2018-02-08 NOTE — TELEPHONE ENCOUNTER
Spoke with Toni Loja, I let him know that Dr Mitchel Bennett would like to see Jona Ariel in the next week or two. She is currently at [facility]. He agreed to keep the 2/15 appointment and said he will call me if he needs to reschedule this.

## 2018-02-09 NOTE — PAYOR COMM NOTE
REF: [id number]- PER IEXCHANGE APPROVED THROUGH 2/6/17- REQUESTING 1 ADDITIONAL DAY     2/6/18  SUBJECTIVE:     States she is feeling well; anxious to get home.    Still had some hallucinations during the daytime yesterday, but improved last night and this prophylaxis.     FEN  On ensure bid; oral intake has improved.        DISCHARGE DATE:       02/07/2018

## 2018-02-12 ENCOUNTER — TELEPHONE (OUTPATIENT)
Dept: HEMATOLOGY/ONCOLOGY | Facility: HOSPITAL | Age: 48
End: 2018-02-12

## 2018-02-12 NOTE — TELEPHONE ENCOUNTER
Chantel Shannon calling to inform us of canceling 2/15 appt. Chava Pinzon was admitted to [facility] from [facility].  lucille

## 2018-02-12 NOTE — PAYOR COMM NOTE
--------------  DISCHARGE REVIEW    Payor: [employer] P[employer]  Subscriber #:  [id number]  Authorization Number: [id number]    Admit date: 1/31/18  Admit time:  3087  Discharge Date: 2/7/2018  1:59 PM     Admitting Physician: DO Mili Aguilar visual hallucinations at home with distortion of faces.      PAST MEDICAL HISTORY:    1.       Stage IV lung cancer. Deisy Fonseca had a left upper lobe mass that was noted on chest x-ray of November 2014, but she did not follow up on imaging due to loss of insuran pantoprazole 40 mg daily, Depakote 1250 mg q.8 h., Synthroid 175 mcg daily, vitamin B12 of 1000 mcg daily.       ALLERGIES:  Patient has drug fever with Dilantin.  Otherwise, no other drug allergies.     SOCIAL HISTORY:  The patient is  lives with h 50mg qhs, and Seroquel 50 mg po qHS; had some hallucinations during the daytime 2/5 but since improved. Rx per Dr Jessie Dalal     Hypothyroidism  Continue Synthroid 175 mcg po qD     Metastatic adenocarcinoma of the lung  stable on Opdivo;  Rx per Dr. Puneet Srivastava; in

## 2018-02-12 NOTE — TELEPHONE ENCOUNTER
Dr Gilford Gauze returned call to pt's . She is at [facility] because of more seizure activity.

## 2018-02-12 NOTE — PAYOR COMM NOTE
--------------  CONTINUED STAY REVIEW    Payor: Anne Maxin [employer] PPO  Subscriber #:  [id number]  Authorization Number: [id number]    Admit date: 1/31/18  Admit time: 12    Admitting Physician: Jeannine Leyva DO  Attending Physician:  No att. provide thrombosis prophylaxis. 8.       General diet. Add ensure bid as patient does not have much po intake.       02/03/2018  Shabbir Amaro MD Physician Signed Hematology/Oncology Progress Notes Date of Service: 2/3/2018 10:14 AM  Mri Brain (w+wo) ([id number])

## 2018-02-15 ENCOUNTER — APPOINTMENT (OUTPATIENT)
Dept: HEMATOLOGY/ONCOLOGY | Facility: HOSPITAL | Age: 48
End: 2018-02-15
Attending: INTERNAL MEDICINE
Payer: COMMERCIAL

## 2018-02-15 ENCOUNTER — APPOINTMENT (OUTPATIENT)
Dept: HEMATOLOGY/ONCOLOGY | Facility: HOSPITAL | Age: 48
End: 2018-02-15
Payer: COMMERCIAL

## 2018-02-16 ENCOUNTER — APPOINTMENT (OUTPATIENT)
Dept: HEMATOLOGY/ONCOLOGY | Facility: HOSPITAL | Age: 48
End: 2018-02-16
Attending: INTERNAL MEDICINE
Payer: COMMERCIAL

## 2018-02-20 ENCOUNTER — TELEPHONE (OUTPATIENT)
Dept: HEMATOLOGY/ONCOLOGY | Facility: HOSPITAL | Age: 48
End: 2018-02-20

## 2018-02-20 NOTE — TELEPHONE ENCOUNTER
Dr. Mary Reid called about a mutual patient Eris Madrid. He said Joshua Reynolds missed an appointment with Dr. Karyna Green because she was admitted to [facility] on 2/15/2018. He wanted to know if the patient needs to reschedule this appointment.  Please Advise

## 2018-02-26 ENCOUNTER — OFFICE VISIT (OUTPATIENT)
Dept: HEMATOLOGY/ONCOLOGY | Facility: HOSPITAL | Age: 48
End: 2018-02-26
Attending: INTERNAL MEDICINE
Payer: COMMERCIAL

## 2018-02-26 VITALS
WEIGHT: 134 LBS | DIASTOLIC BLOOD PRESSURE: 59 MMHG | HEIGHT: 65 IN | RESPIRATION RATE: 16 BRPM | TEMPERATURE: 99 F | HEART RATE: 85 BPM | SYSTOLIC BLOOD PRESSURE: 101 MMHG | BODY MASS INDEX: 22.33 KG/M2

## 2018-02-26 DIAGNOSIS — Z51.11 CHEMOTHERAPY MANAGEMENT, ENCOUNTER FOR: ICD-10-CM

## 2018-02-26 DIAGNOSIS — D50.9 IRON DEFICIENCY ANEMIA, UNSPECIFIED IRON DEFICIENCY ANEMIA TYPE: ICD-10-CM

## 2018-02-26 DIAGNOSIS — C79.31 BRAIN METASTASES (HCC): ICD-10-CM

## 2018-02-26 DIAGNOSIS — R56.9 SEIZURES (HCC): ICD-10-CM

## 2018-02-26 DIAGNOSIS — C34.92 CARCINOMA OF LUNG, LEFT (HCC): Primary | ICD-10-CM

## 2018-02-26 PROCEDURE — 99215 OFFICE O/P EST HI 40 MIN: CPT | Performed by: INTERNAL MEDICINE

## 2018-02-26 RX ORDER — POLYETHYLENE GLYCOL 3350 17 G/17G
17 POWDER, FOR SOLUTION ORAL DAILY
COMMUNITY
End: 2018-07-02

## 2018-02-26 RX ORDER — ACETAMINOPHEN 325 MG/1
650 TABLET ORAL EVERY 6 HOURS PRN
COMMUNITY
End: 2018-07-02

## 2018-02-26 RX ORDER — BISACODYL 10 MG
10 SUPPOSITORY, RECTAL RECTAL DAILY
COMMUNITY
End: 2018-07-02

## 2018-02-26 RX ORDER — LACTOSE-REDUCED FOOD
LIQUID (ML) ORAL
COMMUNITY
End: 2020-02-28 | Stop reason: ALTCHOICE

## 2018-02-26 RX ORDER — LORAZEPAM 2 MG/1
1 TABLET ORAL 2 TIMES DAILY
COMMUNITY
End: 2018-03-28

## 2018-02-26 RX ORDER — HEPARIN SODIUM 5000 [USP'U]/ML
5000 INJECTION, SOLUTION INTRAVENOUS; SUBCUTANEOUS EVERY 8 HOURS SCHEDULED
Status: ON HOLD | COMMUNITY
End: 2018-04-23

## 2018-02-26 RX ORDER — AMOXICILLIN 250 MG
2 CAPSULE ORAL DAILY
COMMUNITY
End: 2018-03-28

## 2018-02-26 RX ORDER — QUETIAPINE 25 MG/1
25 TABLET, FILM COATED ORAL NIGHTLY
COMMUNITY
End: 2018-03-09

## 2018-02-26 RX ORDER — ONDANSETRON 4 MG/1
4 TABLET, ORALLY DISINTEGRATING ORAL EVERY 8 HOURS PRN
COMMUNITY
End: 2018-07-02

## 2018-02-26 RX ORDER — DOCUSATE SODIUM 100 MG/1
100 CAPSULE, LIQUID FILLED ORAL 2 TIMES DAILY PRN
COMMUNITY
End: 2018-07-02

## 2018-02-26 RX ORDER — OXCARBAZEPINE 600 MG/1
600 TABLET, FILM COATED ORAL 2 TIMES DAILY
COMMUNITY
End: 2018-03-09

## 2018-02-26 RX ORDER — OMEPRAZOLE 20 MG/1
20 CAPSULE, DELAYED RELEASE ORAL
COMMUNITY
End: 2018-03-28

## 2018-02-26 RX ORDER — ESCITALOPRAM OXALATE 5 MG/1
5 TABLET ORAL DAILY
COMMUNITY
End: 2018-03-28

## 2018-02-26 RX ORDER — 0.9 % SODIUM CHLORIDE 0.9 %
3 VIAL (ML) INJECTION EVERY 8 HOURS
COMMUNITY
End: 2018-03-28

## 2018-02-26 NOTE — PROGRESS NOTES
Cancer Center Progress Note    Patient Name: Wade Chiu   YOB: 1970   Medical Record Number: [id number]   Attending Physician: Su Steel M.D. Chief Complaint:  Metastatic adenocarcinoma of the lung, brain metastasis.     History of in the setting of polysubstance abuse/dependence. She required mechanical ventilation for secondary respiratory failure. She was seen by neurology and critical care medicine consultation.   After long hospitalization she was eventually discharged to rehab BREAST, BENIGN  No date: HEMORRHOIDECTOMY  No date: HYSTERECTOMY      Comment: heavy menstrual flow,   2012: LUMPECTOMY RIGHT      Comment: FIBROADENOMA  8-7-15: OTHER      Comment: CYBERKNIFE  No date: OTHER SURGICAL HISTORY  No date: XS PORT-A-CATH INSER 2 mg twice daily for 7 days then 1 mg twice daily for 7 days then 1 mg every morning for 7 days then stop, Disp: 60 tablet, Rfl: 0  •  Pantoprazole Sodium 40 MG Oral Tab EC, Take 40 mg by mouth every morning before breakfast., Disp: , Rfl:   •  escitalopra 02/07/2018   ALB 2.8 (L) 02/07/2018   GLOBULIN 3.8 (H) 02/07/2018   AGRATIO 0.9 (L) 09/21/2016    02/07/2018   K 3.6 02/07/2018    02/07/2018   CO2 32 02/07/2018     Radiology:  None new    Impression and Plan:  25-year-old female with metastat this  –Previous hypokalemia --monitor. She is currently on supplementation  –Hypothyroidism secondary to nivolumab. She is on thyroid replacement--we will continue to follow her thyroid tests on treatment.  Endocrinology is managing this  –Polysubstance a

## 2018-02-27 ENCOUNTER — TELEPHONE (OUTPATIENT)
Dept: HEMATOLOGY/ONCOLOGY | Facility: HOSPITAL | Age: 48
End: 2018-02-27

## 2018-02-27 NOTE — TELEPHONE ENCOUNTER
SW reach out to patient's spouse per referral from [employer]. SW provide to spouse information on Social Security disability.  Patient's spouse inquiring for more information regarding financial assistance and supportive resources as patient is currently

## 2018-03-01 ENCOUNTER — APPOINTMENT (OUTPATIENT)
Dept: HEMATOLOGY/ONCOLOGY | Facility: HOSPITAL | Age: 48
End: 2018-03-01
Payer: COMMERCIAL

## 2018-03-01 ENCOUNTER — APPOINTMENT (OUTPATIENT)
Dept: HEMATOLOGY/ONCOLOGY | Facility: HOSPITAL | Age: 48
End: 2018-03-01
Attending: INTERNAL MEDICINE
Payer: COMMERCIAL

## 2018-03-02 ENCOUNTER — APPOINTMENT (OUTPATIENT)
Dept: HEMATOLOGY/ONCOLOGY | Facility: HOSPITAL | Age: 48
End: 2018-03-02
Attending: INTERNAL MEDICINE
Payer: COMMERCIAL

## 2018-03-09 ENCOUNTER — NURSE ONLY (OUTPATIENT)
Dept: HEMATOLOGY/ONCOLOGY | Facility: HOSPITAL | Age: 48
End: 2018-03-09
Attending: INTERNAL MEDICINE
Payer: COMMERCIAL

## 2018-03-09 ENCOUNTER — TELEPHONE (OUTPATIENT)
Dept: INTERNAL MEDICINE CLINIC | Facility: CLINIC | Age: 48
End: 2018-03-09

## 2018-03-09 VITALS
OXYGEN SATURATION: 97 % | DIASTOLIC BLOOD PRESSURE: 61 MMHG | RESPIRATION RATE: 16 BRPM | HEIGHT: 65 IN | TEMPERATURE: 98 F | SYSTOLIC BLOOD PRESSURE: 107 MMHG | HEART RATE: 83 BPM

## 2018-03-09 DIAGNOSIS — C78.7 LIVER METASTASIS (HCC): ICD-10-CM

## 2018-03-09 DIAGNOSIS — R56.9 SEIZURES (HCC): ICD-10-CM

## 2018-03-09 DIAGNOSIS — C34.90 LUNG CANCER (HCC): ICD-10-CM

## 2018-03-09 DIAGNOSIS — D50.9 IRON DEFICIENCY ANEMIA, UNSPECIFIED IRON DEFICIENCY ANEMIA TYPE: ICD-10-CM

## 2018-03-09 DIAGNOSIS — E03.9 HYPOTHYROIDISM (ACQUIRED): ICD-10-CM

## 2018-03-09 DIAGNOSIS — C79.31 BRAIN METASTASES (HCC): ICD-10-CM

## 2018-03-09 DIAGNOSIS — C34.92 CARCINOMA OF LUNG, LEFT (HCC): Primary | ICD-10-CM

## 2018-03-09 LAB
ALBUMIN SERPL BCP-MCNC: 2.8 G/DL (ref 3.5–4.8)
ALBUMIN/GLOB SERPL: 0.7 {RATIO} (ref 1–2)
ALP SERPL-CCNC: 54 U/L (ref 32–100)
ALT SERPL-CCNC: 8 U/L (ref 14–54)
ANION GAP SERPL CALC-SCNC: 7 MMOL/L (ref 0–18)
AST SERPL-CCNC: 12 U/L (ref 15–41)
BASOPHILS # BLD: 0 K/UL (ref 0–0.2)
BASOPHILS NFR BLD: 1 %
BILIRUB SERPL-MCNC: 0.4 MG/DL (ref 0.3–1.2)
BUN SERPL-MCNC: 15 MG/DL (ref 8–20)
BUN/CREAT SERPL: 24.6 (ref 10–20)
CALCIUM SERPL-MCNC: 8.6 MG/DL (ref 8.5–10.5)
CHLORIDE SERPL-SCNC: 101 MMOL/L (ref 95–110)
CO2 SERPL-SCNC: 28 MMOL/L (ref 22–32)
CREAT SERPL-MCNC: 0.61 MG/DL (ref 0.5–1.5)
EOSINOPHIL # BLD: 0 K/UL (ref 0–0.7)
EOSINOPHIL NFR BLD: 0 %
ERYTHROCYTE [DISTWIDTH] IN BLOOD BY AUTOMATED COUNT: 14 % (ref 11–15)
GLOBULIN PLAS-MCNC: 3.8 G/DL (ref 2.5–3.7)
GLUCOSE SERPL-MCNC: 92 MG/DL (ref 70–99)
HCT VFR BLD AUTO: 29.2 % (ref 35–48)
HGB BLD-MCNC: 10 G/DL (ref 12–16)
LYMPHOCYTES # BLD: 3.2 K/UL (ref 1–4)
LYMPHOCYTES NFR BLD: 37 %
MCH RBC QN AUTO: 33 PG (ref 27–32)
MCHC RBC AUTO-ENTMCNC: 34.1 G/DL (ref 32–37)
MCV RBC AUTO: 96.8 FL (ref 80–100)
MONOCYTES # BLD: 1 K/UL (ref 0–1)
MONOCYTES NFR BLD: 12 %
NEUTROPHILS # BLD AUTO: 4.2 K/UL (ref 1.8–7.7)
NEUTROPHILS NFR BLD: 50 %
OSMOLALITY UR CALC.SUM OF ELEC: 282 MOSM/KG (ref 275–295)
PATIENT FASTING: NO
PLATELET # BLD AUTO: 430 K/UL (ref 140–400)
PMV BLD AUTO: 7.1 FL (ref 7.4–10.3)
POTASSIUM SERPL-SCNC: 3.5 MMOL/L (ref 3.3–5.1)
PROT SERPL-MCNC: 6.6 G/DL (ref 5.9–8.4)
RBC # BLD AUTO: 3.02 M/UL (ref 3.7–5.4)
SODIUM SERPL-SCNC: 136 MMOL/L (ref 136–144)
WBC # BLD AUTO: 8.5 K/UL (ref 4–11)

## 2018-03-09 PROCEDURE — 36415 COLL VENOUS BLD VENIPUNCTURE: CPT

## 2018-03-09 PROCEDURE — 85025 COMPLETE CBC W/AUTO DIFF WBC: CPT

## 2018-03-09 PROCEDURE — 99215 OFFICE O/P EST HI 40 MIN: CPT | Performed by: INTERNAL MEDICINE

## 2018-03-09 PROCEDURE — 80053 COMPREHEN METABOLIC PANEL: CPT

## 2018-03-09 RX ORDER — LEVETIRACETAM 1000 MG/1
2000 TABLET ORAL 2 TIMES DAILY
Refills: 0 | COMMUNITY
Start: 2018-03-01 | End: 2018-03-28

## 2018-03-09 RX ORDER — DIVALPROEX SODIUM 500 MG/1
1500 TABLET, DELAYED RELEASE ORAL
COMMUNITY
Start: 2018-02-16 | End: 2018-03-28

## 2018-03-09 RX ORDER — DEXAMETHASONE SODIUM PHOSPHATE 4 MG/ML
4 INJECTION, SOLUTION INTRA-ARTICULAR; INTRALESIONAL; INTRAMUSCULAR; INTRAVENOUS; SOFT TISSUE
COMMUNITY
End: 2018-03-09 | Stop reason: ALTCHOICE

## 2018-03-09 RX ORDER — LEVOTHYROXINE SODIUM 175 UG/1
175 TABLET ORAL
COMMUNITY
Start: 2017-09-28 | End: 2018-03-28

## 2018-03-09 RX ORDER — ESCITALOPRAM OXALATE 10 MG/1
10 TABLET ORAL
COMMUNITY
End: 2018-03-09

## 2018-03-09 RX ORDER — DOCUSATE SODIUM 100 MG/1
100 CAPSULE, LIQUID FILLED ORAL
COMMUNITY
End: 2018-03-09

## 2018-03-09 RX ORDER — DEXAMETHASONE 1 MG
TABLET ORAL
Refills: 0 | COMMUNITY
Start: 2018-03-01 | End: 2018-03-09

## 2018-03-09 RX ORDER — VALPROATE SODIUM 100 MG/ML
1500 INJECTION, SOLUTION INTRAVENOUS
COMMUNITY
End: 2018-03-28

## 2018-03-09 RX ORDER — OXCARBAZEPINE 300 MG/1
TABLET, FILM COATED ORAL
Refills: 0 | COMMUNITY
Start: 2018-03-01 | End: 2018-03-28

## 2018-03-09 RX ORDER — LEVETIRACETAM 1000 MG/1
2000 TABLET ORAL
COMMUNITY
Start: 2018-02-16 | End: 2018-03-09

## 2018-03-09 RX ORDER — PANTOPRAZOLE SODIUM 40 MG/1
40 TABLET, DELAYED RELEASE ORAL
COMMUNITY
End: 2018-11-02 | Stop reason: ALTCHOICE

## 2018-03-09 NOTE — PROGRESS NOTES
Cancer Center Progress Note    Patient Name: Alcides Cordero   YOB: 1970   Medical Record Number: [id number]   Attending Physician: Shell Johnson M.D. Chief Complaint:  Metastatic adenocarcinoma of the lung, brain metastasis.     History of in the setting of polysubstance abuse/dependence. She required mechanical ventilation for secondary respiratory failure. She was seen by neurology and critical care medicine consultation.   After long hospitalization she was eventually discharged to rehab TISSUE   2009: CHOLECYSTECTOMY  No date: CYST REMOVAL      Comment: BREAST, BENIGN  No date: HEMORRHOIDECTOMY  No date: HYSTERECTOMY      Comment: heavy menstrual flow,   2012: LUMPECTOMY RIGHT      Comment: FIBROADENOMA  8-7-15: OTHER      Comment: GUS levetiracetam 1000 MG Oral Tab, , Disp: , Rfl: 0  •  OXcarbazepine 300 MG Oral Tab, TAKE 2 TS BY MOUTH TWICE DAILY FOR SEIZURE.  CONTACT PCP FOR REFILLS, Disp: , Rfl: 0  •  omeprazole 20 MG Oral Capsule Delayed Release, Take 20 mg by mouth every morning bef then 1 mg twice daily for 7 days then 1 mg every morning for 7 days then stop, Disp: 60 tablet, Rfl: 0  •  Vitamin B-12 1000 MCG Oral Tab, Take 1 tablet (1,000 mcg total) by mouth daily. , Disp: 30 tablet, Rfl: 0    Allergies:  No Known Allergies     Review to the pancreas. The patient was initially diagnosed in July 2015 with a left occipital brain lesion and is status post resection with adjuvant radiation. She is also status post a right frontal lobe resection in August 2016.   Her left upper lung primary managing her antiepileptics    Return to clinic 3 weeks    Risk assessment: High metastatic lung cancer with brain metastasis recurrent seizures undergoing active cancer directed therapy    The patient's emotional well being was assessed and resources were

## 2018-03-09 NOTE — PROGRESS NOTES
Pt here for cbc,cmp and to see Dr Kenny Bailey today. Her personality is flat. She is cooperative. She must be told directions step by step and doesn't seem to recall easily. Left side weakness. Transfer assist x2 from wc to chair/ chair to wc.     Wearing a john

## 2018-03-12 NOTE — TELEPHONE ENCOUNTER
Spoke to Kelly at [facility] gave her Dr. Kylee Clark message. Also tried to call patient to schedule post hospital appointment but no answer.

## 2018-03-14 NOTE — TELEPHONE ENCOUNTER
To front office- please call patient to schedule post hospital / rehab visit with GERA WEBER thank you

## 2018-03-19 ENCOUNTER — TELEPHONE (OUTPATIENT)
Dept: INTERNAL MEDICINE CLINIC | Facility: CLINIC | Age: 48
End: 2018-03-19

## 2018-03-20 ENCOUNTER — TELEPHONE (OUTPATIENT)
Dept: INTERNAL MEDICINE CLINIC | Facility: CLINIC | Age: 48
End: 2018-03-20

## 2018-03-20 NOTE — TELEPHONE ENCOUNTER
Sorry, Dr Griselda Merida is not in the office today. He will need to call back tomorrow. Thanks. Routed to nursing and to Dr Griselda Merida.

## 2018-03-20 NOTE — TELEPHONE ENCOUNTER
DCFS investigator contacted and notified that he will need to wait until tomorrow to discuss with Julian Zamudio.

## 2018-03-20 NOTE — TELEPHONE ENCOUNTER
Renee Sarah / DCFS Investigator calling to speak to Dr Nirmal Interiano urgent  Per Tammi Alan give to  on call.    [phone]     Tasked to nursing high

## 2018-03-25 ENCOUNTER — HOSPITAL ENCOUNTER (OUTPATIENT)
Dept: CT IMAGING | Facility: HOSPITAL | Age: 48
Discharge: HOME | End: 2018-03-25
Attending: INTERNAL MEDICINE
Payer: COMMERCIAL

## 2018-03-25 DIAGNOSIS — C34.92 CARCINOMA OF LUNG, LEFT (HCC): ICD-10-CM

## 2018-03-25 PROCEDURE — 71260 CT THORAX DX C+: CPT | Performed by: INTERNAL MEDICINE

## 2018-03-25 PROCEDURE — 74177 CT ABD & PELVIS W/CONTRAST: CPT | Performed by: INTERNAL MEDICINE

## 2018-03-26 ENCOUNTER — TELEPHONE (OUTPATIENT)
Dept: INTERNAL MEDICINE CLINIC | Facility: CLINIC | Age: 48
End: 2018-03-26

## 2018-03-26 NOTE — TELEPHONE ENCOUNTER
Per Dr. Maynor Trivedi he called in lorazepam 0.5mg #20 for patients anxity having a catscan and very nervous.

## 2018-03-28 ENCOUNTER — HOSPITAL ENCOUNTER (EMERGENCY)
Facility: HOSPITAL | Age: 48
Discharge: HOME | End: 2018-03-28
Attending: EMERGENCY MEDICINE
Payer: COMMERCIAL

## 2018-03-28 ENCOUNTER — APPOINTMENT (OUTPATIENT)
Dept: CT IMAGING | Facility: HOSPITAL | Age: 48
End: 2018-03-28
Attending: EMERGENCY MEDICINE
Payer: COMMERCIAL

## 2018-03-28 ENCOUNTER — TELEPHONE (OUTPATIENT)
Dept: INTERNAL MEDICINE CLINIC | Facility: CLINIC | Age: 48
End: 2018-03-28

## 2018-03-28 VITALS
RESPIRATION RATE: 18 BRPM | HEIGHT: 65 IN | OXYGEN SATURATION: 96 % | TEMPERATURE: 99 F | WEIGHT: 135 LBS | BODY MASS INDEX: 22.49 KG/M2 | SYSTOLIC BLOOD PRESSURE: 107 MMHG | DIASTOLIC BLOOD PRESSURE: 64 MMHG | HEART RATE: 87 BPM

## 2018-03-28 DIAGNOSIS — S01.81XA FACIAL LACERATION, INITIAL ENCOUNTER: ICD-10-CM

## 2018-03-28 DIAGNOSIS — S09.90XA TRAUMATIC INJURY OF HEAD, INITIAL ENCOUNTER: Primary | ICD-10-CM

## 2018-03-28 PROCEDURE — 99284 EMERGENCY DEPT VISIT MOD MDM: CPT

## 2018-03-28 PROCEDURE — 12013 RPR F/E/E/N/L/M 2.6-5.0 CM: CPT

## 2018-03-28 PROCEDURE — 70450 CT HEAD/BRAIN W/O DYE: CPT | Performed by: EMERGENCY MEDICINE

## 2018-03-28 RX ORDER — METOCLOPRAMIDE 10 MG/1
10 TABLET ORAL 3 TIMES DAILY PRN
Qty: 20 TABLET | Refills: 0 | Status: SHIPPED | OUTPATIENT
Start: 2018-03-28 | End: 2018-03-28

## 2018-03-28 NOTE — PROGRESS NOTES
Tommy Velazquez is a 49year old female. Patient presents with:  Hospital F/U: Patient was in [address] [place] [address] ED today morning from a fall in bathtub; prior to today she went to [facility] ER due to unresposiveness and seizure.    Anxiety: Complaints of paranoia, delusion and unr escitalopram 5 MG Oral Tab Take 1 tablet (5 mg total) by mouth daily. Disp: 90 tablet Rfl: 3   OXcarbazepine 300 MG Oral Tab Take 2 tablets (600 mg total) by mouth 2 (two) times daily.  Disp: 360 tablet Rfl: 0   QUEtiapine Fumarate (SEROQUEL) 25 MG Oral T SURGERY Left      Comment: OCCIPITAL CRANIOTOMY  No date: BRAIN SURGERY Left      Comment: LOBECTOMY  AUG  2016: BRAIN SURGERY      Comment: REMOVAL OF DEAD TISSUE   2009: CHOLECYSTECTOMY  No date: CYST REMOVAL      Comment: BREAST, BENIGN  No date: HEMORR follow-up with Dr. Yuri Miles. New Keppra 2000 mg twice daily, ask her because of pain 600 mg twice a day, lacosamide 200 mg twice a day, Depakote 1000 mg in the morning and 1500 mg in the evening. Will check levels with blood test this Friday    2.   Hyp

## 2018-03-28 NOTE — TELEPHONE ENCOUNTER
Lalito Norris @ [facility] seeing patient  Called to report a fall   Pt had slipped out of bed, Lalito Norris found her on the floor, seems ok & vitals are ok, but is shaky   Declining physical therapy - today is the last day    Requests call back from [phone]

## 2018-03-28 NOTE — ED NOTES
Wound care with asepsis and irrigation done to eyebrow laceration. Pt tolerated well. Awaiting MD to suture.

## 2018-03-28 NOTE — ED INITIAL ASSESSMENT (HPI)
Pt presents to the ED s/p fall with a head laceration to the right eye brow. AOx3 disoriented to time.

## 2018-03-28 NOTE — ED PROVIDER NOTES
Patient Seen in: [facility] AND [facility] Emergency Department     History   Patient presents with:  Fall (musculoskeletal, neurologic)  Laceration Abrasion (integumentary)    Stated Complaint: fall and lac    HPI    49year old female complains of falling while Levothyroxine Sodium 175 MCG Oral Tab,  Take 175 mcg by mouth.   levetiracetam 1000 MG Oral Tab,     OXcarbazepine 300 MG Oral Tab,  TAKE 2 TS BY MOUTH TWICE DAILY FOR SEIZURE.  CONTACT PCP FOR REFILLS   omeprazole 20 MG Oral Capsule Delayed Release,  Take Packs/day: 1.50      Years: 17.00        Types: Cigarettes     Start date: 8/5/1985     Quit date: 8/5/2015  Smokeless tobacco: Never Used                      Comment: Current some day smoker 04/2017  Alcohol use:  Yes              Comment: occassionall Neurological: She is alert and oriented to person, place, and time. She displays no seizure activity. Skin: Skin is intact. No petechiae noted. She is not diaphoretic. No cyanosis. No pallor. Psychiatric: She has a normal mood and affect.  Her behavior overlying craniotomy- corresponding to sites of previous tumor resection.                         Dictated by (CST): Harini Sanchez MD on 3/28/2018 at 11:38         Approved by (CST): Harini Sanchez MD on 3/28/2018 at 11:46                 ED Medications Complicating Factors: The patient already has has Malignant neoplasm of lung ([address] Brain metastases ([address] Encounter for adjustment or management of vascular access device; Depression with anxiety; Pain at surgical site;  Chemotherapy follow-up examination Radiology Interpretation: Radiology reports and images reviewed personally and are negative for emergent cause of patient symptoms.  I discussed pertinent results, any required  acute management issues, and/or I did need for follow-up, with the patient/jose [address] [address]  [phone]    Schedule an appointment as soon as possible for a visit        Medications Prescribed:  Current Discharge Medication List            Anticipatory guidance, discharge instructions upon discharge, disease/injury sp

## 2018-03-28 NOTE — ED NOTES
Pt is alert to norm, verbal.  Denies any other discomfort. Only has soreness to the lac site. Bleeding controlled with dressing to site. Comfort measures provided.

## 2018-03-28 NOTE — TELEPHONE ENCOUNTER
As FYI to DR. WEBER [facility] tried to get out of bed and fell , no injury, Yenni Torrez notified  and Uyen Eaton spoke with patients 5 year young daughter that her mother should not get up and she was sitting with her mom when she l

## 2018-03-30 ENCOUNTER — OFFICE VISIT (OUTPATIENT)
Dept: HEMATOLOGY/ONCOLOGY | Facility: HOSPITAL | Age: 48
End: 2018-03-30
Attending: INTERNAL MEDICINE
Payer: COMMERCIAL

## 2018-03-30 ENCOUNTER — TELEPHONE (OUTPATIENT)
Dept: INTERNAL MEDICINE CLINIC | Facility: CLINIC | Age: 48
End: 2018-03-30

## 2018-03-30 ENCOUNTER — LAB ENCOUNTER (OUTPATIENT)
Dept: LAB | Facility: HOSPITAL | Age: 48
End: 2018-03-30
Attending: INTERNAL MEDICINE
Payer: COMMERCIAL

## 2018-03-30 VITALS
TEMPERATURE: 98 F | DIASTOLIC BLOOD PRESSURE: 69 MMHG | HEART RATE: 76 BPM | SYSTOLIC BLOOD PRESSURE: 113 MMHG | RESPIRATION RATE: 16 BRPM

## 2018-03-30 DIAGNOSIS — E03.9 HYPOTHYROIDISM: ICD-10-CM

## 2018-03-30 DIAGNOSIS — G40.909 SEIZURE DISORDER (HCC): ICD-10-CM

## 2018-03-30 DIAGNOSIS — C34.92 CARCINOMA OF LUNG, LEFT (HCC): ICD-10-CM

## 2018-03-30 DIAGNOSIS — F19.11 H/O DRUG ABUSE (HCC): ICD-10-CM

## 2018-03-30 DIAGNOSIS — Z45.2 ENCOUNTER FOR ADJUSTMENT OR MANAGEMENT OF VASCULAR ACCESS DEVICE: ICD-10-CM

## 2018-03-30 DIAGNOSIS — D50.9 IRON DEFICIENCY ANEMIA, UNSPECIFIED IRON DEFICIENCY ANEMIA TYPE: ICD-10-CM

## 2018-03-30 DIAGNOSIS — C34.92 CARCINOMA OF LUNG, LEFT (HCC): Primary | ICD-10-CM

## 2018-03-30 DIAGNOSIS — E03.9 HYPOTHYROIDISM (ACQUIRED): ICD-10-CM

## 2018-03-30 DIAGNOSIS — G40.909 SEIZURE DISORDER (HCC): Primary | ICD-10-CM

## 2018-03-30 DIAGNOSIS — Z51.11 CHEMOTHERAPY MANAGEMENT, ENCOUNTER FOR: ICD-10-CM

## 2018-03-30 DIAGNOSIS — R56.9 SEIZURES (HCC): ICD-10-CM

## 2018-03-30 DIAGNOSIS — C79.31 BRAIN METASTASES (HCC): Primary | ICD-10-CM

## 2018-03-30 LAB
ALBUMIN SERPL BCP-MCNC: 2.4 G/DL (ref 3.5–4.8)
ALBUMIN/GLOB SERPL: 0.6 {RATIO} (ref 1–2)
ALP SERPL-CCNC: 119 U/L (ref 32–100)
ALT SERPL-CCNC: 31 U/L (ref 14–54)
ANION GAP SERPL CALC-SCNC: 10 MMOL/L (ref 0–18)
AST SERPL-CCNC: 20 U/L (ref 15–41)
BASOPHILS # BLD: 0 K/UL (ref 0–0.2)
BASOPHILS NFR BLD: 0 %
BILIRUB SERPL-MCNC: 0.3 MG/DL (ref 0.3–1.2)
BUN SERPL-MCNC: 11 MG/DL (ref 8–20)
BUN/CREAT SERPL: 16.2 (ref 10–20)
CALCIUM SERPL-MCNC: 8.4 MG/DL (ref 8.5–10.5)
CHLORIDE SERPL-SCNC: 101 MMOL/L (ref 95–110)
CO2 SERPL-SCNC: 26 MMOL/L (ref 22–32)
CREAT SERPL-MCNC: 0.68 MG/DL (ref 0.5–1.5)
EOSINOPHIL # BLD: 0 K/UL (ref 0–0.7)
EOSINOPHIL NFR BLD: 1 %
ERYTHROCYTE [DISTWIDTH] IN BLOOD BY AUTOMATED COUNT: 15.6 % (ref 11–15)
GLOBULIN PLAS-MCNC: 3.9 G/DL (ref 2.5–3.7)
GLUCOSE SERPL-MCNC: 134 MG/DL (ref 70–99)
HCT VFR BLD AUTO: 30 % (ref 35–48)
HGB BLD-MCNC: 10 G/DL (ref 12–16)
LYMPHOCYTES # BLD: 2.5 K/UL (ref 1–4)
LYMPHOCYTES NFR BLD: 48 %
MCH RBC QN AUTO: 32.5 PG (ref 27–32)
MCHC RBC AUTO-ENTMCNC: 33.5 G/DL (ref 32–37)
MCV RBC AUTO: 97.2 FL (ref 80–100)
MONOCYTES # BLD: 0.8 K/UL (ref 0–1)
MONOCYTES NFR BLD: 15 %
NEUTROPHILS # BLD AUTO: 1.9 K/UL (ref 1.8–7.7)
NEUTROPHILS NFR BLD: 36 %
OSMOLALITY UR CALC.SUM OF ELEC: 285 MOSM/KG (ref 275–295)
PATIENT FASTING: NO
PLATELET # BLD AUTO: 356 K/UL (ref 140–400)
PMV BLD AUTO: 7.5 FL (ref 7.4–10.3)
POTASSIUM SERPL-SCNC: 3.5 MMOL/L (ref 3.3–5.1)
PROT SERPL-MCNC: 6.3 G/DL (ref 5.9–8.4)
RBC # BLD AUTO: 3.08 M/UL (ref 3.7–5.4)
SODIUM SERPL-SCNC: 137 MMOL/L (ref 136–144)
T3 SERPL-MCNC: 0.97 NG/ML (ref 0.87–1.78)
T4 FREE SERPL-MCNC: 1.04 NG/DL (ref 0.58–1.64)
TSH SERPL-ACNC: 0.01 UIU/ML (ref 0.45–5.33)
VALPROATE SERPL-MCNC: 109 MCG/ML (ref 50–100)
WBC # BLD AUTO: 5.2 K/UL (ref 4–11)

## 2018-03-30 PROCEDURE — 84439 ASSAY OF FREE THYROXINE: CPT

## 2018-03-30 PROCEDURE — 36591 DRAW BLOOD OFF VENOUS DEVICE: CPT

## 2018-03-30 PROCEDURE — 80053 COMPREHEN METABOLIC PANEL: CPT

## 2018-03-30 PROCEDURE — 80177 DRUG SCRN QUAN LEVETIRACETAM: CPT

## 2018-03-30 PROCEDURE — 99215 OFFICE O/P EST HI 40 MIN: CPT | Performed by: INTERNAL MEDICINE

## 2018-03-30 PROCEDURE — 84480 ASSAY TRIIODOTHYRONINE (T3): CPT

## 2018-03-30 PROCEDURE — A4216 STERILE WATER/SALINE, 10 ML: HCPCS

## 2018-03-30 PROCEDURE — 80307 DRUG TEST PRSMV CHEM ANLYZR: CPT

## 2018-03-30 PROCEDURE — 80164 ASSAY DIPROPYLACETIC ACD TOT: CPT

## 2018-03-30 PROCEDURE — 80050 GENERAL HEALTH PANEL: CPT

## 2018-03-30 RX ORDER — ALBUTEROL SULFATE 90 UG/1
2 AEROSOL, METERED RESPIRATORY (INHALATION) AS NEEDED
Status: CANCELLED | OUTPATIENT
Start: 2018-05-18

## 2018-03-30 RX ORDER — DIPHENHYDRAMINE HYDROCHLORIDE 50 MG/ML
INJECTION INTRAMUSCULAR; INTRAVENOUS EVERY 4 HOURS PRN
Status: CANCELLED | OUTPATIENT
Start: 2018-05-18

## 2018-03-30 RX ORDER — MEPERIDINE HYDROCHLORIDE 25 MG/ML
INJECTION INTRAMUSCULAR; INTRAVENOUS; SUBCUTANEOUS AS NEEDED
Status: CANCELLED | OUTPATIENT
Start: 2018-05-18

## 2018-03-30 RX ORDER — HEPARIN SODIUM (PORCINE) LOCK FLUSH IV SOLN 100 UNIT/ML 100 UNIT/ML
5 SOLUTION INTRAVENOUS ONCE
Status: CANCELLED | OUTPATIENT
Start: 2018-03-30

## 2018-03-30 RX ORDER — HEPARIN SODIUM (PORCINE) LOCK FLUSH IV SOLN 100 UNIT/ML 100 UNIT/ML
5 SOLUTION INTRAVENOUS ONCE
Status: COMPLETED | OUTPATIENT
Start: 2018-03-30 | End: 2018-03-30

## 2018-03-30 RX ORDER — RANITIDINE 25 MG/ML
50 INJECTION, SOLUTION INTRAMUSCULAR; INTRAVENOUS AS NEEDED
Status: CANCELLED | OUTPATIENT
Start: 2018-05-18

## 2018-03-30 RX ORDER — LEVOTHYROXINE SODIUM 0.15 MG/1
150 TABLET ORAL
Qty: 90 TABLET | Refills: 3 | Status: ON HOLD | OUTPATIENT
Start: 2018-03-30 | End: 2018-04-20

## 2018-03-30 RX ORDER — 0.9 % SODIUM CHLORIDE 0.9 %
10 VIAL (ML) INJECTION ONCE
Status: CANCELLED | OUTPATIENT
Start: 2018-03-30

## 2018-03-30 RX ORDER — 0.9 % SODIUM CHLORIDE 0.9 %
VIAL (ML) INJECTION
Status: DISCONTINUED
Start: 2018-03-30 | End: 2018-03-30

## 2018-03-30 RX ORDER — ACETAMINOPHEN 325 MG/1
TABLET ORAL EVERY 6 HOURS PRN
Status: CANCELLED | OUTPATIENT
Start: 2018-05-18

## 2018-03-30 RX ORDER — HEPARIN SODIUM (PORCINE) LOCK FLUSH IV SOLN 100 UNIT/ML 100 UNIT/ML
SOLUTION INTRAVENOUS
Status: COMPLETED
Start: 2018-03-30 | End: 2018-03-30

## 2018-03-30 RX ORDER — DIPHENHYDRAMINE HYDROCHLORIDE 50 MG/ML
25 INJECTION INTRAMUSCULAR; INTRAVENOUS ONCE
Status: CANCELLED | OUTPATIENT
Start: 2018-03-30

## 2018-03-30 RX ADMIN — HEPARIN SODIUM (PORCINE) LOCK FLUSH IV SOLN 100 UNIT/ML 500 UNITS: 100 SOLUTION INTRAVENOUS at 08:45:00

## 2018-03-30 NOTE — TELEPHONE ENCOUNTER
Discussed abnormal thyroid levels. They have synthroid 150mcg tabs at home, and he will switch to this dose. I sent in a new rx.    Advised to get repeat testing in 6-8 weeks (at infusion visit with Dr. Mesha Watts)

## 2018-03-30 NOTE — TELEPHONE ENCOUNTER
Dr. Can Osullivan had seen this patient when she was hospitalized with status epilepticus. She went to [facility]. While there, she had recurrent seizure and was hospitalized at [facility]. You should be able to view those notes in the care everywhere section.

## 2018-03-30 NOTE — TELEPHONE ENCOUNTER
Received fax from [address] [place] [address] requesting updated info for pt address and phone number. I called Ne Peck (spouse) and confirmed pt will be using [employer]. Verified address and phone on file.  Completed form with this info and faxed back

## 2018-03-30 NOTE — PROGRESS NOTES
Patient here for Lab draw followed by MD visit. Ambulated into the Lab with 2 assist.  Patient was very unsteady.  here.  brought in Lab ordered from Dr Gege Amos to draw with today labs.   Right chest port was accessed without difficulty, good

## 2018-03-30 NOTE — PROGRESS NOTES
Cancer Center Progress Note    Patient Name: Milena Donovan   YOB: 1970   Medical Record Number: [id number]   Attending Physician: Reynold King M.D. Chief Complaint:  Metastatic adenocarcinoma of the lung, brain metastasis.     History of in the setting of polysubstance abuse/dependence. She required mechanical ventilation for secondary respiratory failure. She was seen by neurology and critical care medicine consultation.   After long hospitalization she was eventually discharged to rehab APPENDECTOMY  JULY 2015: BRAIN SURGERY      Comment: TUMOR RESECTION  No date: BRAIN SURGERY Left      Comment: OCCIPITAL CRANIOTOMY  No date: BRAIN SURGERY Left      Comment: LOBECTOMY  AUG  2016: BRAIN SURGERY      Comment: REMOVAL OF DEAD TISSUE   2009: (200 mg total) by mouth 2 (two) times daily. , Disp: 180 tablet, Rfl: 0  •  escitalopram 5 MG Oral Tab, Take 1 tablet (5 mg total) by mouth daily. , Disp: 90 tablet, Rfl: 3  •  OXcarbazepine 300 MG Oral Tab, Take 2 tablets (600 mg total) by mouth 2 (two) ania There is no palpable peripheral lymphadenopathy   Chest: Clear to auscultation. Cardiovascular: Regular rate and rhythm. Normal S1S2  Abdomen: Soft, non tender. No hepatosplenomegaly. No palpable mass.   Extremities: Left foot with mild erythema and david nivolumab since April 2016. She has had issues with recurrent seizures at one point thought to be due to polysubstance abuse and withdrawal later having upper extremity with weakness requiring multiple antiepileptics.   She is following closely with neurol

## 2018-03-30 NOTE — TELEPHONE ENCOUNTER
Received call from Lucinda Rodriguez in the lab. He requests additional instructions on written drug screen Rx from Dr. Belem Barrientos. Patient is at lab waiting. Per previous order in Jan 2018, lab order clarified with Lucinda Rodriguez.  Okay to do the same test as previous: Drug scre

## 2018-03-31 LAB — LEVETIRACETAM (KEPPRA): 87 UG/ML

## 2018-04-02 ENCOUNTER — TELEPHONE (OUTPATIENT)
Dept: INTERNAL MEDICINE CLINIC | Facility: CLINIC | Age: 48
End: 2018-04-02

## 2018-04-02 NOTE — TELEPHONE ENCOUNTER
To Dr MAYBERRY-----can you review levetiracetam result on behalf of Dr Simons-------is this ok to wait for her return? It appears she has reviewed the other labs but this result appears to have come back later. Thank you.

## 2018-04-02 NOTE — TELEPHONE ENCOUNTER
Pt seeing neurologist Dr Ernesto Colbert on 4/4/18; Keppra level elevated at 87 on 2000 mg po BID; will forward to Dr Anabell Hernadez for review

## 2018-04-02 NOTE — TELEPHONE ENCOUNTER
Could recall the patient and find out if she is planning to follow-up with neurology in our clinic or with [facility] neurology? If she plans to follow-up with us, we should make an appointment for her.

## 2018-04-02 NOTE — TELEPHONE ENCOUNTER
Left detailed message for patient requesting that she call the office and schedule a follow-up with Dr. Yanira Rodriguez if she plans to follow-up with neurology at [facility].

## 2018-04-04 ENCOUNTER — OFFICE VISIT (OUTPATIENT)
Dept: NEUROLOGY | Facility: CLINIC | Age: 48
End: 2018-04-04

## 2018-04-04 ENCOUNTER — HOSPITAL ENCOUNTER (OUTPATIENT)
Age: 48
Discharge: HOME | End: 2018-04-04
Attending: EMERGENCY MEDICINE
Payer: COMMERCIAL

## 2018-04-04 VITALS
DIASTOLIC BLOOD PRESSURE: 59 MMHG | OXYGEN SATURATION: 100 % | BODY MASS INDEX: 22.49 KG/M2 | TEMPERATURE: 98 F | RESPIRATION RATE: 16 BRPM | HEIGHT: 65 IN | SYSTOLIC BLOOD PRESSURE: 98 MMHG | WEIGHT: 135 LBS | HEART RATE: 70 BPM

## 2018-04-04 DIAGNOSIS — R56.9 SEIZURES (HCC): Primary | ICD-10-CM

## 2018-04-04 DIAGNOSIS — C79.31 BRAIN METASTASIS (HCC): ICD-10-CM

## 2018-04-04 DIAGNOSIS — Z48.02 ENCOUNTER FOR REMOVAL OF SUTURES: Primary | ICD-10-CM

## 2018-04-04 PROCEDURE — 99214 OFFICE O/P EST MOD 30 MIN: CPT | Performed by: OTHER

## 2018-04-04 RX ORDER — ESCITALOPRAM OXALATE 10 MG/1
5 TABLET ORAL EVERY MORNING
COMMUNITY
Start: 2018-03-21 | End: 2018-07-02

## 2018-04-04 RX ORDER — OXCARBAZEPINE 300 MG/1
600 TABLET, FILM COATED ORAL 2 TIMES DAILY
Qty: 360 TABLET | Refills: 3 | Status: ON HOLD | OUTPATIENT
Start: 2018-04-04 | End: 2018-04-25

## 2018-04-04 RX ORDER — LEVETIRACETAM 1000 MG/1
2000 TABLET ORAL 2 TIMES DAILY
Qty: 360 TABLET | Refills: 3 | Status: SHIPPED | OUTPATIENT
Start: 2018-04-04 | End: 2018-04-06

## 2018-04-04 RX ORDER — DIVALPROEX SODIUM 500 MG/1
TABLET, DELAYED RELEASE ORAL
Qty: 450 TABLET | Refills: 3 | Status: SHIPPED | OUTPATIENT
Start: 2018-04-04 | End: 2018-04-06

## 2018-04-04 RX ORDER — GINSENG 100 MG
CAPSULE ORAL ONCE
Status: COMPLETED | OUTPATIENT
Start: 2018-04-04 | End: 2018-04-04

## 2018-04-04 NOTE — ED INITIAL ASSESSMENT (HPI)
Patient states she fell into the tub x 7 days ago and received stitches to right eyebrow x 7 days ago in [facility]. Patient states she is here for suture removal.  Patient denies redness, denies drainage to aa or fever.

## 2018-04-04 NOTE — PROGRESS NOTES
Neurology Follow up Visit     Referred By: Dr. Tejada ref.  provider found    Chief Complaint: Patient presents with:  Seizures: referred by Dr. Yasmeen Townsend, full body seizures started 11/17 was in ER for 4 wks and was fine for a month after discharge but started wi epilepticus. She was intubated, she was treated, after 1 week of stay in ICU she improved dramatically. She went home. She was doing quite well, but then in January 2018 she started to have focal seizures, primarily affecting her left side.   Some adjust REMOVAL      Comment: BREAST, BENIGN  No date: HEMORRHOIDECTOMY  No date: HYSTERECTOMY      Comment: heavy menstrual flow,   2012: LUMPECTOMY RIGHT      Comment: FIBROADENOMA  8-7-15: OTHER      Comment: CYBERKNIFE  No date: OTHER SURGICAL HISTORY  No date (four) times daily. , Disp: , Rfl:   •  Vitamin B-12 1000 MCG Oral Tab, Take 1 tablet (1,000 mcg total) by mouth daily. , Disp: 30 tablet, Rfl: 0  •  Levothyroxine Sodium 175 MCG Oral Tab, Take 1 tablet (175 mcg total) by mouth before breakfast., Disp: 90 ta Tongue is midline    Motor Exam:  Muscle tone normal  No atrophy or fasciculations  Strength- upper extremities 5/5 proximally and distally                  - lower  extremities 5/5 proximally and distally    Sensory Exam:  Light touch sensation- intact in seek medical attention immediately if symptoms worsen. Patient verbalized understanding of information given. All questions were answered. All side effects of drugs were discussed.      Time spent for examination, counseling and coordination of care such a

## 2018-04-04 NOTE — ED PROVIDER NOTES
Patient Seen in: [address]    History   Stated Complaint: suture removal    HPI    Patient presents for suture removal.  Patient was seen in the emergency department on March 28 after sustaining a facial laceration and Systems    Positive for stated complaint: suture removal  Other systems are as noted in HPI. Constitutional no fever  Skin no purulent drainage from wound    All other systems reviewed and negative except as noted above.     Physical Exam   ED Triage Vital

## 2018-04-06 ENCOUNTER — TELEPHONE (OUTPATIENT)
Dept: HEMATOLOGY/ONCOLOGY | Facility: HOSPITAL | Age: 48
End: 2018-04-06

## 2018-04-06 ENCOUNTER — APPOINTMENT (OUTPATIENT)
Dept: HEMATOLOGY/ONCOLOGY | Facility: HOSPITAL | Age: 48
End: 2018-04-06
Attending: INTERNAL MEDICINE
Payer: COMMERCIAL

## 2018-04-06 ENCOUNTER — TELEPHONE (OUTPATIENT)
Dept: NEUROLOGY | Facility: CLINIC | Age: 48
End: 2018-04-06

## 2018-04-06 RX ORDER — DIVALPROEX SODIUM 500 MG/1
TABLET, DELAYED RELEASE ORAL
Qty: 540 TABLET | Refills: 3 | Status: SHIPPED | OUTPATIENT
Start: 2018-04-06 | End: 2018-07-02

## 2018-04-06 RX ORDER — LEVETIRACETAM 1000 MG/1
2000 TABLET ORAL 2 TIMES DAILY
Qty: 360 TABLET | Refills: 3 | Status: SHIPPED | OUTPATIENT
Start: 2018-04-06 | End: 2019-04-01

## 2018-04-06 NOTE — TELEPHONE ENCOUNTER
I attempted to return Doug's call. It went to voice mail. I left a  msg asking him to please return my call.

## 2018-04-06 NOTE — TELEPHONE ENCOUNTER
Clayton Elmore called back. He wanted to update me that when his wife was last hospitalized, Dr Hortensia Echeverria had decreased Virginia's Divalproex Sodium dose.  The new script took Virginia's dose from 1500 mg po Q AM & 1500mg po Q PM to 1000mg po Q AM & 1500 mg po Q PM.  Clayton Elmore did n

## 2018-04-06 NOTE — TELEPHONE ENCOUNTER
Nuzhat Arteaga returned my call. He reports Leigh Gonsalez had a focal seizure. Her right side of her body was shaking. The seizure lasted 7 minutes. When it was done she vomited. Nuzhat Arteaga reports she is postictal and is sleeping now. I reviewed Virginia's medications with Nuzhat Arteaga.  He

## 2018-04-06 NOTE — TELEPHONE ENCOUNTER
I called Simone Ashraf back. I updated him that Dr Mckenzie Zacarias would like him to page the neurologist who will likely recommend the ER. Simone Ashraf stated he just left a message for the neurologist office. He is in clinic seeing patients now.  I did ask Simone Ashraf to please call the [facility]

## 2018-04-06 NOTE — TELEPHONE ENCOUNTER
Bam Frankel called stating his wife had a seizure around 8:45 am that lasted about 7-8 minutes. He also states she vomited afterwards.  He stated she takes keppra 1000 mg 2 tabs BID, depakote 500 mg 2 tabs in the am and 3 tabs in the evening, Oxcarbazepine

## 2018-04-06 NOTE — TELEPHONE ENCOUNTER
Ashlie Patel called and said Kaur Jefferson has been vomiting all morning, so she will not be in for her Chemo appointment this morning. I did cancel the appointment and notified infusion. Ashlie Patel said he believes she had a seizure this morning as well.

## 2018-04-15 RX ORDER — LACOSAMIDE 200 MG/1
TABLET, FILM COATED ORAL
Qty: 30 TABLET | Refills: 0 | OUTPATIENT
Start: 2018-04-15

## 2018-04-15 RX ORDER — OXCARBAZEPINE 300 MG/1
TABLET, FILM COATED ORAL
Qty: 56 TABLET | Refills: 0 | OUTPATIENT
Start: 2018-04-15

## 2018-04-16 RX ORDER — QUETIAPINE 25 MG/1
TABLET, FILM COATED ORAL
Qty: 15 TABLET | Refills: 0 | OUTPATIENT
Start: 2018-04-16

## 2018-04-17 ENCOUNTER — HOSPITAL ENCOUNTER (EMERGENCY)
Facility: HOSPITAL | Age: 48
Discharge: HOME | End: 2018-04-17
Attending: EMERGENCY MEDICINE
Payer: COMMERCIAL

## 2018-04-17 VITALS
BODY MASS INDEX: 23.32 KG/M2 | DIASTOLIC BLOOD PRESSURE: 71 MMHG | TEMPERATURE: 98 F | OXYGEN SATURATION: 100 % | HEART RATE: 69 BPM | HEIGHT: 65 IN | WEIGHT: 140 LBS | SYSTOLIC BLOOD PRESSURE: 110 MMHG | RESPIRATION RATE: 21 BRPM

## 2018-04-17 DIAGNOSIS — G40.909 SEIZURE DISORDER (HCC): ICD-10-CM

## 2018-04-17 DIAGNOSIS — C34.90 PRIMARY MALIGNANT NEOPLASM OF LUNG METASTATIC TO OTHER SITE, UNSPECIFIED LATERALITY (HCC): Primary | ICD-10-CM

## 2018-04-17 PROCEDURE — 93010 ELECTROCARDIOGRAM REPORT: CPT | Performed by: EMERGENCY MEDICINE

## 2018-04-17 PROCEDURE — 80164 ASSAY DIPROPYLACETIC ACD TOT: CPT | Performed by: EMERGENCY MEDICINE

## 2018-04-17 PROCEDURE — 93005 ELECTROCARDIOGRAM TRACING: CPT

## 2018-04-17 PROCEDURE — 84484 ASSAY OF TROPONIN QUANT: CPT | Performed by: EMERGENCY MEDICINE

## 2018-04-17 PROCEDURE — 80048 BASIC METABOLIC PNL TOTAL CA: CPT | Performed by: EMERGENCY MEDICINE

## 2018-04-17 PROCEDURE — 36415 COLL VENOUS BLD VENIPUNCTURE: CPT

## 2018-04-17 PROCEDURE — 99284 EMERGENCY DEPT VISIT MOD MDM: CPT

## 2018-04-17 PROCEDURE — 85025 COMPLETE CBC W/AUTO DIFF WBC: CPT | Performed by: EMERGENCY MEDICINE

## 2018-04-17 PROCEDURE — 80076 HEPATIC FUNCTION PANEL: CPT | Performed by: EMERGENCY MEDICINE

## 2018-04-17 RX ORDER — HEPARIN SODIUM (PORCINE) LOCK FLUSH IV SOLN 100 UNIT/ML 100 UNIT/ML
5 SOLUTION INTRAVENOUS ONCE
Status: DISCONTINUED | OUTPATIENT
Start: 2018-04-17 | End: 2018-04-17

## 2018-04-17 RX ORDER — HEPARIN SODIUM (PORCINE) LOCK FLUSH IV SOLN 100 UNIT/ML 100 UNIT/ML
SOLUTION INTRAVENOUS
Status: DISCONTINUED
Start: 2018-04-17 | End: 2018-04-17

## 2018-04-17 RX ORDER — POTASSIUM CHLORIDE 20 MEQ/1
40 TABLET, EXTENDED RELEASE ORAL ONCE
Status: COMPLETED | OUTPATIENT
Start: 2018-04-17 | End: 2018-04-17

## 2018-04-17 NOTE — TELEPHONE ENCOUNTER
Refill request for oxcarbazepine 300 mg, BID, #56, no refills  Refill request for vimpat 200 mg, BID, #30, no refills    LOV: 4/4/18  NOV: none

## 2018-04-17 NOTE — ED INITIAL ASSESSMENT (HPI)
AMS when talking to  on the phone pta, EMS states pt was \"acting confused\". Hx of seizures- states she has been complaint with her meds. Aox2-3 on arrival. Vitals and blood glucose stable per EMS. Pt also reports hx of lung ca with mets to brain.

## 2018-04-18 RX ORDER — OXCARBAZEPINE 300 MG/1
TABLET, FILM COATED ORAL
Qty: 56 TABLET | Refills: 0 | Status: ON HOLD | OUTPATIENT
Start: 2018-04-18 | End: 2018-04-20

## 2018-04-18 RX ORDER — LACOSAMIDE 200 MG/1
TABLET, FILM COATED ORAL
Qty: 30 TABLET | Refills: 0 | Status: ON HOLD | OUTPATIENT
Start: 2018-04-18 | End: 2018-04-23

## 2018-04-20 ENCOUNTER — HOSPITAL ENCOUNTER (INPATIENT)
Facility: HOSPITAL | Age: 48
LOS: 3 days | Discharge: HOME | DRG: 101 | End: 2018-04-23
Attending: EMERGENCY MEDICINE | Admitting: INTERNAL MEDICINE
Payer: COMMERCIAL

## 2018-04-20 ENCOUNTER — APPOINTMENT (OUTPATIENT)
Dept: CT IMAGING | Facility: HOSPITAL | Age: 48
DRG: 101 | End: 2018-04-20
Attending: EMERGENCY MEDICINE
Payer: COMMERCIAL

## 2018-04-20 ENCOUNTER — OFFICE VISIT (OUTPATIENT)
Dept: HEMATOLOGY/ONCOLOGY | Facility: HOSPITAL | Age: 48
End: 2018-04-20
Attending: INTERNAL MEDICINE
Payer: COMMERCIAL

## 2018-04-20 ENCOUNTER — TELEPHONE (OUTPATIENT)
Dept: INTERNAL MEDICINE CLINIC | Facility: CLINIC | Age: 48
End: 2018-04-20

## 2018-04-20 DIAGNOSIS — C34.90 LUNG CANCER (HCC): ICD-10-CM

## 2018-04-20 DIAGNOSIS — C79.49 SECONDARY MALIGNANT NEOPLASM OF BRAIN AND SPINAL CORD (HCC): ICD-10-CM

## 2018-04-20 DIAGNOSIS — C34.90 PRIMARY MALIGNANT NEOPLASM OF LUNG METASTATIC TO OTHER SITE, UNSPECIFIED LATERALITY (HCC): Primary | ICD-10-CM

## 2018-04-20 DIAGNOSIS — R41.82 ALTERED MENTAL STATUS, UNSPECIFIED ALTERED MENTAL STATUS TYPE: ICD-10-CM

## 2018-04-20 DIAGNOSIS — R56.9 SEIZURE (HCC): ICD-10-CM

## 2018-04-20 DIAGNOSIS — C78.7 LIVER METASTASES (HCC): ICD-10-CM

## 2018-04-20 DIAGNOSIS — R56.9 SEIZURE (HCC): Primary | ICD-10-CM

## 2018-04-20 DIAGNOSIS — Z51.81 MEDICATION MONITORING ENCOUNTER: ICD-10-CM

## 2018-04-20 DIAGNOSIS — C79.31 SECONDARY MALIGNANT NEOPLASM OF BRAIN AND SPINAL CORD (HCC): ICD-10-CM

## 2018-04-20 PROCEDURE — 99215 OFFICE O/P EST HI 40 MIN: CPT | Performed by: INTERNAL MEDICINE

## 2018-04-20 PROCEDURE — 70450 CT HEAD/BRAIN W/O DYE: CPT | Performed by: EMERGENCY MEDICINE

## 2018-04-20 PROCEDURE — 99223 1ST HOSP IP/OBS HIGH 75: CPT | Performed by: INTERNAL MEDICINE

## 2018-04-20 PROCEDURE — 99211 OFF/OP EST MAY X REQ PHY/QHP: CPT

## 2018-04-20 PROCEDURE — A4216 STERILE WATER/SALINE, 10 ML: HCPCS

## 2018-04-20 RX ORDER — LORAZEPAM 2 MG/ML
1 INJECTION INTRAMUSCULAR ONCE
Status: COMPLETED | OUTPATIENT
Start: 2018-04-20 | End: 2018-04-20

## 2018-04-20 RX ORDER — LACTOSE-REDUCED FOOD
1 LIQUID (ML) ORAL 3 TIMES DAILY
Status: DISCONTINUED | OUTPATIENT
Start: 2018-04-20 | End: 2018-04-20 | Stop reason: RX

## 2018-04-20 RX ORDER — 0.9 % SODIUM CHLORIDE 0.9 %
VIAL (ML) INJECTION
Status: DISCONTINUED
Start: 2018-04-20 | End: 2018-04-20 | Stop reason: WASHOUT

## 2018-04-20 RX ORDER — LORAZEPAM 2 MG/ML
1 INJECTION INTRAMUSCULAR
Status: DISCONTINUED | OUTPATIENT
Start: 2018-04-20 | End: 2018-04-23

## 2018-04-20 RX ORDER — TROLAMINE SALICYLATE 10 G/100G
CREAM TOPICAL 4 TIMES DAILY
Status: DISCONTINUED | OUTPATIENT
Start: 2018-04-20 | End: 2018-04-23

## 2018-04-20 RX ORDER — OXCARBAZEPINE 300 MG/1
600 TABLET, FILM COATED ORAL 2 TIMES DAILY
Status: DISCONTINUED | OUTPATIENT
Start: 2018-04-20 | End: 2018-04-23

## 2018-04-20 RX ORDER — BISACODYL 10 MG
10 SUPPOSITORY, RECTAL RECTAL DAILY
Status: DISCONTINUED | OUTPATIENT
Start: 2018-04-20 | End: 2018-04-23

## 2018-04-20 RX ORDER — ESCITALOPRAM OXALATE 10 MG/1
10 TABLET ORAL EVERY MORNING
Status: DISCONTINUED | OUTPATIENT
Start: 2018-04-21 | End: 2018-04-23

## 2018-04-20 RX ORDER — PANTOPRAZOLE SODIUM 40 MG/1
40 TABLET, DELAYED RELEASE ORAL
Status: DISCONTINUED | OUTPATIENT
Start: 2018-04-21 | End: 2018-04-23

## 2018-04-20 RX ORDER — DIVALPROEX SODIUM 500 MG/1
1500 TABLET, DELAYED RELEASE ORAL 2 TIMES DAILY
Status: DISCONTINUED | OUTPATIENT
Start: 2018-04-20 | End: 2018-04-23

## 2018-04-20 RX ORDER — LORAZEPAM 2 MG/ML
INJECTION INTRAMUSCULAR
Status: COMPLETED
Start: 2018-04-20 | End: 2018-04-20

## 2018-04-20 RX ORDER — ACETAMINOPHEN 325 MG/1
650 TABLET ORAL EVERY 6 HOURS PRN
Status: DISCONTINUED | OUTPATIENT
Start: 2018-04-20 | End: 2018-04-23

## 2018-04-20 RX ORDER — POLYETHYLENE GLYCOL 3350 17 G/17G
17 POWDER, FOR SOLUTION ORAL DAILY
Status: DISCONTINUED | OUTPATIENT
Start: 2018-04-21 | End: 2018-04-23

## 2018-04-20 RX ORDER — DOCUSATE SODIUM 100 MG/1
100 CAPSULE, LIQUID FILLED ORAL 2 TIMES DAILY PRN
Status: DISCONTINUED | OUTPATIENT
Start: 2018-04-20 | End: 2018-04-23

## 2018-04-20 RX ORDER — HEPARIN SODIUM 5000 [USP'U]/ML
5000 INJECTION, SOLUTION INTRAVENOUS; SUBCUTANEOUS EVERY 8 HOURS SCHEDULED
Status: DISCONTINUED | OUTPATIENT
Start: 2018-04-20 | End: 2018-04-23

## 2018-04-20 RX ORDER — LEVETIRACETAM 500 MG/1
2000 TABLET ORAL 2 TIMES DAILY
Status: DISCONTINUED | OUTPATIENT
Start: 2018-04-20 | End: 2018-04-23

## 2018-04-20 RX ORDER — CHOLECALCIFEROL (VITAMIN D3) 125 MCG
1000 CAPSULE ORAL DAILY
Status: DISCONTINUED | OUTPATIENT
Start: 2018-04-21 | End: 2018-04-23

## 2018-04-20 RX ORDER — ONDANSETRON 4 MG/1
4 TABLET, ORALLY DISINTEGRATING ORAL EVERY 8 HOURS PRN
Status: DISCONTINUED | OUTPATIENT
Start: 2018-04-20 | End: 2018-04-23

## 2018-04-20 RX ORDER — LEVOTHYROXINE SODIUM 175 UG/1
175 TABLET ORAL
Status: DISCONTINUED | OUTPATIENT
Start: 2018-04-21 | End: 2018-04-21

## 2018-04-20 RX ORDER — QUETIAPINE 25 MG/1
25 TABLET, FILM COATED ORAL NIGHTLY
Status: DISCONTINUED | OUTPATIENT
Start: 2018-04-20 | End: 2018-04-23

## 2018-04-20 NOTE — PROGRESS NOTES
Patient arrived with her  15 min late for appointment time. Patient arrived in wheelchair and appears to be slightly confused.  states Reji Jara had a seizure around 9 this am that lasted about 8 min or so according to .   Patient states

## 2018-04-20 NOTE — PROGRESS NOTES
Patient seen and examined with NICKY Guo. She has metastatic adenocarcinoma of the left upper lobe of the lung is currently on immunotherapy with no evidence of disease. She has had recurrent seizures.   She is having involuntary movements again to

## 2018-04-20 NOTE — ED PROVIDER NOTES
Patient Seen in: [facility] AND [facility] Emergency Department     History   Patient presents with:  Seizures    Stated Complaint:     HPI    49year old female brought to ER for evaluation of continued seizure activity home.   Patient has history of lung cancer w INSERTION/EXCH/CHK    Medications :   OXCARBAZEPINE 300 MG Oral Tab,  TAKE 2 TABLETS(600 MG) BY MOUTH TWICE DAILY   VIMPAT 200 MG Oral Tab,  TAKE 1 TABLET BY MOUTH TWICE DAILY   divalproex Sodium 500 MG Oral Tab EC DR tab,  1500 mg BID   levetiracetam 1000 Years: 17.00        Types: Cigarettes     Start date: 8/5/1985     Quit date: 8/5/2015  Smokeless tobacco: Never Used                      Comment: Current some day smoker 04/2017  Alcohol use: Yes              Comment: occassionally.  1-2 glasses wine seizure activity. GCS eye subscore is 3. GCS verbal subscore is 1. GCS motor subscore is 6. Skin: Skin is intact. No petechiae noted. She is not diaphoretic. No cyanosis. No pallor. Psychiatric: She has a normal mood and affect.  Her behavior is normal. metastasis post resection. Multiple back to back seizure today         TECHNIQUE: CT images were obtained without contrast material.  Automated     exposure control for dose reduction was used.            FINDINGS:     CEREBRUM: Vasogenic edema involving th Dictated by (CST): Brandt Dong MD on 4/20/2018 at 14:55         Approved by (CST): Brandt Dong MD on 4/20/2018 at 15:07                 ED Medications Administered:   Medications   LORazepam (ATIVAN) injection 1 mg (1 mg Intraven Marleny Glow; Fever; IVDU (intravenous drug user); Anemia; Status epilepticus ([facility] Hyperglycemia; Metabolic acidosis; Delirium due to another medical condition; Hypokalemia; Polysubstance dependence ([facility] Brain metastasis ([facility] Hypomagnesemia;  Respiratory fa After review and interpretation of the above emergency department workup, the patient was found to have Seizure ([facility]  (primary encounter diagnosis)  Altered mental status, unspecified altered mental status type    Plan: General supportive care recommendat

## 2018-04-20 NOTE — ED NOTES
Straight cath for urine- patient tolerated well. UA/10Dap in process. Patient resting comfortably more arousable at this time but remains drowsy. Comfort measures provided. Patient repositioned on right side. Continuous monitoring in place.   at beds

## 2018-04-20 NOTE — PROGRESS NOTES
Cancer Center Progress Note    Patient Name: Rui Polanco   YOB: 1970   Medical Record Number: [id number]   Attending Physician: Roger Townsend M.D. Chief Complaint:  Metastatic adenocarcinoma of the lung, brain metastasis.     History of in the setting of polysubstance abuse/dependence. She required mechanical ventilation for secondary respiratory failure. She was seen by neurology and critical care medicine consultation.   After long hospitalization she was eventually discharged to rehab High blood pressure    • Hypothyroid    • Lung cancer (HCC)    • Lung cancer (HCC)    • Migraines    • Osteoarthritis    • Pancreatic cancer ([facility] JULY 2015   • Personal history of antineoplastic chemotherapy     EVERY 2 WEEKS BEGINNING 2015    • Pneumonia OXCARBAZEPINE 300 MG Oral Tab, TAKE 2 TABLETS(600 MG) BY MOUTH TWICE DAILY, Disp: 56 tablet, Rfl: 0  •  VIMPAT 200 MG Oral Tab, TAKE 1 TABLET BY MOUTH TWICE DAILY, Disp: 30 tablet, Rfl: 0  •  divalproex Sodium 500 MG Oral Tab EC DR tab, 1500 mg BID, Disp: daily., Disp: 30 tablet, Rfl: 0    Allergies:  No Known Allergies     Review of Systems:  All other systems reviewed and negative x12    Vital Signs: There were no vitals taken for this visit.     Physical Examination:  General: Presents in w/c. NAD, chron Navelbine being for 1 cycle however this was changed to carboplatin and paclitaxel for cycles 2 through 4 due to issues with febrile neutropenia and hospitalization following cycle 1. She completed adjuvant chemotherapy in December 2015.  In March 2016 she resources were reviewed and discussed with the pateint and family. Dr. Vanda Daniel aware and evaluated patient.     Kristy Wilson PA-C

## 2018-04-21 PROCEDURE — 99254 IP/OBS CNSLTJ NEW/EST MOD 60: CPT | Performed by: OTHER

## 2018-04-21 PROCEDURE — 99232 SBSQ HOSP IP/OBS MODERATE 35: CPT | Performed by: INTERNAL MEDICINE

## 2018-04-21 RX ORDER — 0.9 % SODIUM CHLORIDE 0.9 %
VIAL (ML) INJECTION
Status: DISPENSED
Start: 2018-04-21 | End: 2018-04-21

## 2018-04-21 RX ORDER — LEVOTHYROXINE SODIUM 0.15 MG/1
150 TABLET ORAL
Status: DISCONTINUED | OUTPATIENT
Start: 2018-04-22 | End: 2018-04-23

## 2018-04-21 NOTE — PROGRESS NOTES
[facility]    Progress Note    Keiko Salter Patient Status:  Inpatient    1970 MRN [id number]   Location [facility] 3W/SW Attending Jai Sesay DO   Hosp Day # 1 PCP Aneesh Chavez DO       SUBJECTIVE:  Pt somnolent, very dif tissue attenuation measuring 6 mm transverse thickness similar to prior exam. Limited characterization on noncontrast CT head. 3. MRI of the brain with gadolinium would be more sensitive in further interpretation. . 4. No acute intracranial hemorrhage. breakthrough seizures. Head CT does not show acute abnormality and is similar to MRI brain in Feb 2018, and urine toxicology is negative.   She will be resumed on Vipmat 200 mg po BID, Keppra 2000 mg po BID, Depakote 1500 mg po BID, and Trileptal 600 mg po

## 2018-04-21 NOTE — PLAN OF CARE
NEUROLOGICAL - ADULT    • Absence of seizures Not Progressing          NEUROLOGICAL - ADULT    • Remains free of injury related to seizure activity Progressing    • Achieves maximal functionality and self care Progressing        Patient Centered Care    •

## 2018-04-21 NOTE — H&P
[address] Patient Status:  Inpatient    1970 MRN [id number]   Location [facility] 2W/SW Attending Mira Muñoz DO   Hosp Day # 0 PCP Vimal Reaves DO     Date:  2018  Date of Admissio acid level was checked and was elevated at 119 (normal range ), so Depakote dose was advised to be decreased to Depakote 1000 mg po BID (from prior 1500 mg po BID).   Also, she had trouble procuring Vipmat, and she was without medicine for three days, INSERTION/EXCH/CHK    Allergies:  No Known Allergies    Home Medications:  Medications   Current Medications as of April 20, 2018   Generic Name Strength Route/ Site Freq.    Acetaminophen (Tab) 325 MG Oral Take 650 mg by mouth every 6 (six) hours as needed • Blood Disorder Mother    • Stroke Mother    • Clotting Disorder Mother    • Psychiatric Mother    • Cancer Father      PROSTATE             Review of Systems:    ROS:   Constitutional: no weight loss; no fatigue  ENMT:  Negative for ear drainage, heari 04/20/2018   K 4.0 04/20/2018    04/20/2018   CO2 29 04/20/2018   BUN 7 04/20/2018   CREATSERUM 0.54 04/20/2018   CA 8.5 04/20/2018       Recent Labs   Lab  04/20/18   1237   WBC  5.3   HGB  9.1*   HCT  26.9*   MCV  97.3   MCH  32.9*   MCHC  33.9   R of her lung cancer. She had brain MRI on 2/2/18, and she had a repeat brain MRI 2/9/2018 at [facility] that showed no new lesions.   She had been on Opdivo (nivolumab), though has not received since Jan 2018; awaits resumption of infusions whe

## 2018-04-21 NOTE — PLAN OF CARE
Problem: Patient Centered Care  Goal: Patient preferences are identified and integrated in the patient's plan of care  Interventions:  - What would you like us to know as we care for you?  Keep family involved with care   - Provide timely, complete, and acc factors for pressure ulcer development  - Assess and document skin integrity  - Monitor for areas of redness and/or skin breakdown  - Initiate interventions, skin care algorithm/standards of care as needed  Outcome: Progressing  Patient has lesion on right Encourage visually impaired, hearing impaired and aphasic patients to use assistive/communication devices  Outcome: Progressing  Bed locked in lowest position, call light within reach. Will continue to monitor.

## 2018-04-21 NOTE — CONSULTS
Neurology Inpatient Consult Note    Henry Nails : 1970   Referring Physician: Dr. Lucio Jolly  HPI:     Henry Nails is a 49year old female who is being seen in neurologic evaluation. Patient presented to ED yesterday.      At 9 am yesterday, OTHER      Comment: CYBERKNIFE  No date: OTHER SURGICAL HISTORY  No date: XS PORT-A-CATH INSERTION/EXCH/CHK   Family History   Problem Relation Age of Onset   • Blood Disorder Mother    • Stroke Mother    • Clotting Disorder Mother    • Psychiatric Mother Unable to accurately assess; no involuntary movements noted  Sensory:   Patient appears to have left-sided hemineglect  Reflexes: DTRs 2+ in right and 3+ in left biceps, brachioradialis, patella  Cerebellar/gait: Unable to assess    IMAGING / STUDIES:  rev

## 2018-04-22 PROCEDURE — 99232 SBSQ HOSP IP/OBS MODERATE 35: CPT | Performed by: INTERNAL MEDICINE

## 2018-04-22 PROCEDURE — 99231 SBSQ HOSP IP/OBS SF/LOW 25: CPT | Performed by: OTHER

## 2018-04-22 NOTE — PROGRESS NOTES
Neurology Inpatient Follow-up Note      HPI:     Patient significantly clinically improved today. Sitting in bed on the phone, speaking with her children.       Past Medical Hisotory:  Reviewed    Medications:  Reviewed    Allergies:  No Known Allergies control seizures    –Trileptal level pending      Neurology service will continue to follow. Please page with any questions / concerns.     MD BALDOMERO Edwards [facility]

## 2018-04-22 NOTE — PROGRESS NOTES
[facility] - [facility]    Progress Note    Milena Donovan Patient Status:  Inpatient    1970 MRN [id number]   Location [facility] 3W/SW Attending Nena Nolen DO   Hosp Day # 2 PCP Agustina Stanford DO       SUBJECTIVE:  Pt awake, alert today. 4/20/2018 at 14:55     Approved by (CST): Stefany Rocha MD on 4/20/2018 at 15:07                    Meds:     Current Facility-Administered Medications:  Levothyroxine Sodium (SYNTHROID) tab 150 mcg 150 mcg Oral Before breakfast   acetaminophen (TYLE appreciated. Cont to monitor.   If pt remains seizure free, possibly home tomorrow.     Visual disturbance  on Seroquel 25 mg po qHS since Feb 2018;  hallucinations have lessened with Seroquel     Hypothyroidism  On Synthroid 175 mcg po qD; TSH 0.01 -- dec

## 2018-04-23 ENCOUNTER — HOSPITAL ENCOUNTER (INPATIENT)
Facility: HOSPITAL | Age: 48
LOS: 1 days | Discharge: ANOTHER HOSPITAL | DRG: 101 | End: 2018-04-25
Attending: EMERGENCY MEDICINE | Admitting: INTERNAL MEDICINE
Payer: COMMERCIAL

## 2018-04-23 VITALS
WEIGHT: 127.69 LBS | HEART RATE: 71 BPM | OXYGEN SATURATION: 96 % | DIASTOLIC BLOOD PRESSURE: 83 MMHG | BODY MASS INDEX: 21.27 KG/M2 | HEIGHT: 65 IN | SYSTOLIC BLOOD PRESSURE: 114 MMHG | RESPIRATION RATE: 20 BRPM | TEMPERATURE: 98 F

## 2018-04-23 DIAGNOSIS — R56.9 SEIZURES (HCC): ICD-10-CM

## 2018-04-23 DIAGNOSIS — R11.2 NAUSEA AND VOMITING, INTRACTABILITY OF VOMITING NOT SPECIFIED, UNSPECIFIED VOMITING TYPE: Primary | ICD-10-CM

## 2018-04-23 DIAGNOSIS — R53.1 WEAKNESS GENERALIZED: ICD-10-CM

## 2018-04-23 PROCEDURE — 99238 HOSP IP/OBS DSCHRG MGMT 30/<: CPT | Performed by: INTERNAL MEDICINE

## 2018-04-23 PROCEDURE — 99231 SBSQ HOSP IP/OBS SF/LOW 25: CPT | Performed by: OTHER

## 2018-04-23 RX ORDER — ONDANSETRON 2 MG/ML
4 INJECTION INTRAMUSCULAR; INTRAVENOUS ONCE
Status: COMPLETED | OUTPATIENT
Start: 2018-04-23 | End: 2018-04-23

## 2018-04-23 RX ORDER — QUETIAPINE 25 MG/1
25 TABLET, FILM COATED ORAL NIGHTLY
Qty: 30 TABLET | Refills: 2 | Status: SHIPPED | OUTPATIENT
Start: 2018-04-23 | End: 2018-07-02

## 2018-04-23 RX ORDER — LEVOTHYROXINE SODIUM 0.15 MG/1
150 TABLET ORAL
Qty: 90 TABLET | Refills: 1 | Status: SHIPPED | OUTPATIENT
Start: 2018-04-24 | End: 2018-07-02 | Stop reason: DRUGHIGH

## 2018-04-23 NOTE — PAYOR COMM NOTE
--------------  ADMISSION REVIEW     Payor: Judi Roberson [employer] PPO  Subscriber #:  [id number]  Authorization Number: N/A    Admit date: 4/20/18  Admit time: 2312       Patient Seen in: [facility] Emergency Department     History   Patient prese cooperative. Non-toxic appearance. She does not have a sickly appearance. She does not appear ill. No distress. HENT:   Head: Normocephalic and atraumatic.  Head is without raccoon's eyes, without right periorbital erythema and without left periorbital e (CPT=70450)         COMPARISON: [facility], MRI BRAIN (W+WO) (VQV=84986),     2/02/2018, 6:52. [facility], CT BRAIN OR HEAD     (CPT=70450), 3/28/2018, 11:15.          INDICATIONS: History of metastatic small cell lung carci tissue     attenuation measuring 6 mm transverse thickness similar to prior exam.     Limited characterization on noncontrast CT head. 3. MRI of the brain with gadolinium would be more sensitive in further     interpretation. .    4. No acute intracrania case right sided parenchymal meningeal inflammatory changes on brain MRI were contributing to seizures. She was discharged 2/7/18 from [address] [place] [address], and she was re-admitted to [facility] on 2/9/18 with recurrent seizures.   She was discharged on Vipmat 2 WEEKS BEGINNING 2015    • Pneumonia, organism unspecified(486)    • Seizure disorder (HCC)    • Wears glasses          Review of Systems:    ROS:   Constitutional: no weight loss; no fatigue  ENMT:  Negative for ear drainage, hearing loss and nasal drain 04/20/2018    04/20/2018   CO2 29 04/20/2018   BUN 7 04/20/2018   CREATSERUM 0.54 04/20/2018   CA 8.5 04/20/2018       Recent Labs   Lab  04/20/18   1237   WBC  5.3   HGB  9.1*   HCT  26.9*   MCV  97.3   MCH  32.9*   MCHC  33.9   RDW  15.2*   PLT  40 cancer. She had brain MRI on 2/2/18, and she had a repeat brain MRI 2/9/2018 at [facility] that showed no new lesions.   She had been on Opdivo (nivolumab), though has not received since Jan 2018; awaits resumption of infusions when medically (86373)     Result Date: 4/20/2018  CONCLUSION:  1. Vasogenic edema and/or minimal encephalomalacia in the right frontal lobe adjacent to dural based soft tissue attenuation in the right frontal /right parafalcine region.  Soft tissue attenuation is region Q3H PRN      Facility-Administered Medications Ordered in Other Encounters:  nivolumab (OPDIVO) 240 mg in sodium chloride 0.9 % 124 mL chemo-IVPB 240 mg Intravenous Once   Heparin Sodium Lock Flush 100 UNIT/ML injection 500 Units 5 mL Intercatheter PRN     Location: Right arm)   Pulse 67   Temp 98.7 °F (37.1 °C) (Oral)   Resp 20   Ht 65\"   Wt 126 lb 14.4 oz   SpO2 97%   BMI 21.12 kg/m²    General: no apparent distress, pleasant  CV: regular rate and rhythm   Lungs: clear to auscultation bilaterally  Neuro:

## 2018-04-23 NOTE — PROGRESS NOTES
[facility] - [facility]    Progress Note    Melinda Malhotra Patient Status:  Inpatient    1970 MRN [id number]   Location [facility] 3W/SW Attending Charley Brittle, DO   Hosp Day # 3 PCP Chet Tillman DO       SUBJECTIVE:  States she is feeling 2/2/18. 2. Left parietal encephalomalacic changes with parafalcine soft tissue attenuation measuring 6 mm transverse thickness similar to prior exam. Limited characterization on noncontrast CT head.  3. MRI of the brain with gadolinium would be more sensiti prophylaxis.     FEN  On ensure bid; oral intake has improved.      Anemia   Hgb up to 10.7; stable from previous;  She has anemia with severe iron deficiency.  She has received IV iron in the past                                         Amanda Simons DO  4/2

## 2018-04-23 NOTE — PROGRESS NOTES
Neurology Inpatient Follow-up Note      HPI:     Patient with no complaints. Continues to be alert.       Past Medical Hisotory:  Reviewed    Medications:  Reviewed    Allergies:  No Known Allergies      ROS:   GENERAL: no weight loss or fevers  HEENT: den questions / concerns.     Maryann Winkler MD  [address] [place] [address] [phone]

## 2018-04-24 ENCOUNTER — TELEPHONE (OUTPATIENT)
Dept: INTERNAL MEDICINE CLINIC | Facility: CLINIC | Age: 48
End: 2018-04-24

## 2018-04-24 PROBLEM — R11.2 NAUSEA AND VOMITING, INTRACTABILITY OF VOMITING NOT SPECIFIED, UNSPECIFIED VOMITING TYPE: Status: ACTIVE | Noted: 2018-04-24

## 2018-04-24 PROBLEM — R53.1 WEAKNESS GENERALIZED: Status: ACTIVE | Noted: 2018-04-24

## 2018-04-24 PROCEDURE — 99232 SBSQ HOSP IP/OBS MODERATE 35: CPT | Performed by: INTERNAL MEDICINE

## 2018-04-24 PROCEDURE — 99254 IP/OBS CNSLTJ NEW/EST MOD 60: CPT | Performed by: OTHER

## 2018-04-24 PROCEDURE — 99222 1ST HOSP IP/OBS MODERATE 55: CPT | Performed by: INTERNAL MEDICINE

## 2018-04-24 RX ORDER — ESCITALOPRAM OXALATE 10 MG/1
10 TABLET ORAL EVERY MORNING
Status: DISCONTINUED | OUTPATIENT
Start: 2018-04-24 | End: 2018-04-26

## 2018-04-24 RX ORDER — CHOLECALCIFEROL (VITAMIN D3) 125 MCG
1000 CAPSULE ORAL DAILY
Status: DISCONTINUED | OUTPATIENT
Start: 2018-04-24 | End: 2018-04-26

## 2018-04-24 RX ORDER — OXCARBAZEPINE 300 MG/1
600 TABLET, FILM COATED ORAL 2 TIMES DAILY
Status: DISCONTINUED | OUTPATIENT
Start: 2018-04-24 | End: 2018-04-24

## 2018-04-24 RX ORDER — LEVETIRACETAM 500 MG/1
2000 TABLET ORAL 2 TIMES DAILY
Status: DISCONTINUED | OUTPATIENT
Start: 2018-04-24 | End: 2018-04-26

## 2018-04-24 RX ORDER — ACETAMINOPHEN 325 MG/1
650 TABLET ORAL EVERY 6 HOURS PRN
Status: DISCONTINUED | OUTPATIENT
Start: 2018-04-24 | End: 2018-04-26

## 2018-04-24 RX ORDER — POLYETHYLENE GLYCOL 3350 17 G/17G
17 POWDER, FOR SOLUTION ORAL DAILY
Status: DISCONTINUED | OUTPATIENT
Start: 2018-04-24 | End: 2018-04-26

## 2018-04-24 RX ORDER — ONDANSETRON 4 MG/1
4 TABLET, ORALLY DISINTEGRATING ORAL EVERY 8 HOURS PRN
Status: DISCONTINUED | OUTPATIENT
Start: 2018-04-24 | End: 2018-04-26

## 2018-04-24 RX ORDER — DOCUSATE SODIUM 100 MG/1
100 CAPSULE, LIQUID FILLED ORAL 2 TIMES DAILY PRN
Status: DISCONTINUED | OUTPATIENT
Start: 2018-04-24 | End: 2018-04-26

## 2018-04-24 RX ORDER — LEVOTHYROXINE SODIUM 0.15 MG/1
150 TABLET ORAL
Status: DISCONTINUED | OUTPATIENT
Start: 2018-04-24 | End: 2018-04-26

## 2018-04-24 RX ORDER — DIVALPROEX SODIUM 500 MG/1
1500 TABLET, DELAYED RELEASE ORAL EVERY 12 HOURS SCHEDULED
Status: DISCONTINUED | OUTPATIENT
Start: 2018-04-24 | End: 2018-04-26

## 2018-04-24 RX ORDER — TROLAMINE SALICYLATE 10 G/100G
CREAM TOPICAL 4 TIMES DAILY
Status: DISCONTINUED | OUTPATIENT
Start: 2018-04-24 | End: 2018-04-26

## 2018-04-24 RX ORDER — QUETIAPINE 25 MG/1
25 TABLET, FILM COATED ORAL NIGHTLY
Status: DISCONTINUED | OUTPATIENT
Start: 2018-04-24 | End: 2018-04-26

## 2018-04-24 RX ORDER — OXCARBAZEPINE 300 MG/1
900 TABLET, FILM COATED ORAL EVERY EVENING
Status: DISCONTINUED | OUTPATIENT
Start: 2018-04-24 | End: 2018-04-26

## 2018-04-24 RX ORDER — PANTOPRAZOLE SODIUM 40 MG/1
40 TABLET, DELAYED RELEASE ORAL
Status: DISCONTINUED | OUTPATIENT
Start: 2018-04-24 | End: 2018-04-26

## 2018-04-24 RX ORDER — ARIPIPRAZOLE 15 MG/1
40 TABLET ORAL EVERY 4 HOURS
Status: COMPLETED | OUTPATIENT
Start: 2018-04-24 | End: 2018-04-24

## 2018-04-24 RX ORDER — BISACODYL 10 MG
10 SUPPOSITORY, RECTAL RECTAL
Status: DISCONTINUED | OUTPATIENT
Start: 2018-04-24 | End: 2018-04-26

## 2018-04-24 RX ORDER — OXCARBAZEPINE 300 MG/1
600 TABLET, FILM COATED ORAL EVERY MORNING
Status: DISCONTINUED | OUTPATIENT
Start: 2018-04-25 | End: 2018-04-26

## 2018-04-24 NOTE — PHYSICAL THERAPY NOTE
PHYSICAL THERAPY EVALUATION - INPATIENT     Room Number: 467/467-A  Evaluation Date: 4/24/2018  Type of Evaluation: Initial   Physician Order: PT Eval and Treat    Presenting Problem: Seizure, weakness. Hx of lung cancer with brain mets.    Reason for Ther transfers, ambulation, ADLs and stairs which are below the patient's pre-admission status. She will most likely require 24/7 care upon discharge due to impaired cognitive, attentional and motor deficits.      Patient will benefit from continued IP PT servic 2012: LUMPECTOMY RIGHT      Comment: FIBROADENOMA  8-7-15: OTHER      Comment: CYBERKNIFE  No date: OTHER SURGICAL HISTORY  No date: XS PORT-A-CATH INSERTION/EXCH/CHK    HOME SITUATION  Type of Home: House   Home Layout: Able to live on main level  Stair NEUROLOGICAL FINDINGS  Neurological Findings: Tone              Tone:  (decreased tone/motor control LUE, LLE. L visual deficit )    ACTIVITY TOLERANCE  Room air. RN reports vitals stable. Had just taken them prior to entering room.      AM-P demonstrate supine - sit EOB @ level: MIN A   Goal #1   Current Status    Goal #2 Patient is able to demonstrate transfers MIN A from bed, chair and toilet.       Goal #2  Current Status    Goal #3 Patient is able to ambulate 20'x2 with RW with MIN A or les

## 2018-04-24 NOTE — ED PROVIDER NOTES
Patient Seen in: [facility] AND [facility] Emergency Department    History   Patient presents with:  Seizure Disorder (neurologic)    Stated Complaint: fall    HPI  History is provided by patient's  and EMS.     62-year-old female with history of brain can BRAIN SURGERY Left      Comment: LOBECTOMY  AUG  2016: BRAIN SURGERY      Comment: REMOVAL OF DEAD TISSUE   2009: CHOLECYSTECTOMY  No date: CYST REMOVAL      Comment: BREAST, BENIGN  No date: HEMORRHOIDECTOMY  No date: HYSTERECTOMY      Comment: heavy mens gag reflex, follows extremities and repeated stimulation   Skin: Skin is warm. Nursing note and vitals reviewed. ED Course     Pulse Oximeter:  Pulse oximetry on room air is 98%, indicating adequate oxygenation.     PROCEDURES:  none    DIAGNOSTICS % 15 %   Eosinophil % 0 %   Basophil % 0 %   Neutrophil Absolute 3.2 1.8 - 7.7 K/UL   Lymphocyte Absolute 2.5 1.0 - 4.0 K/UL   Monocyte Absolute 1.0 0.0 - 1.0 K/UL   Eosinophil Absolute 0.0 0.0 - 0.7 K/UL   Basophil Absolute 0.0 0.0 - 0.2 K/UL       Imagin Hyperglycemia; Metabolic acidosis; Delirium due to another medical condition; Hypokalemia; Polysubstance dependence ([facility] Brain metastasis ([facility] Hypomagnesemia; Respiratory failure ([facility] Episodic mood disorder ([facility] Iron deficiency anemia;  Sepsis ([facility]

## 2018-04-24 NOTE — PROGRESS NOTES
[facility]    Hematology/Oncology   Progress Note    Vidhi Schilling Patient Status:  Inpatient    1970 MRN [id number]   Location [facility] 4W/SW/SE Attending Colette Joseph DO   Hosp Day # 0 PCP Santa Melo DO     Subjective: questions.      Juan Rae MD

## 2018-04-24 NOTE — ED INITIAL ASSESSMENT (HPI)
EMS states that the Pt was released from the hospital; this lui. States that the   states that the pt fell to the bed absd started to have shaking.  (+) vomiting X1. No C/O fever.  Hx of brain CA

## 2018-04-24 NOTE — OCCUPATIONAL THERAPY NOTE
OCCUPATIONAL THERAPY EVALUATION - INPATIENT     Room Number: 467/467-A  Evaluation Date: 4/24/2018  Type of Evaluation: Initial  Presenting Problem: seizure/brain mets    Physician Order: IP Consult to Occupational Therapy  Reason for Therapy: ADL/IADL Dys --provided additional time, tactile/verbal cuing to assist w/ moving L side extremities. Total A to don socks. Pt agreeable to sit up --sat EOB w/ SBA x 6 min using BLUE to support self. While seated, pt unable to locate the clock --does not scan.  Pt compl 2015    • Gallbladder disease    • Hepatitis C    • High blood pressure    • Hypothyroid    • Lung cancer (HCC)    • Lung cancer (HCC)    • Migraines    • Osteoarthritis    • Pancreatic cancer ([facility] JULY 2015   • Personal history of antineoplastic chemothe to stimuli  Attention Span:  attends with cues to redirect and difficulty attending to directions  Orientation Level:  oriented to time, oriented to person, disoriented to place, disoriented to time and disoriented to situation  Memory:  decreased recall o A Lot  -   Putting on and taking off regular upper body clothing?: A Lot  -   Taking care of personal grooming such as brushing teeth?: A Lot  -   Eating meals?: A Lot    AM-PAC Score:  Score: 12  Approx Degree of Impairment: 66.57%  Standardized Score (AM

## 2018-04-24 NOTE — CONSULTS
Neurology Inpatient Consult Note    Higinio Canavan : 1970   Referring Physician: Dr. June Falcon  HPI:     Higinio Canavan is a 49year old female who is being seen in neurologic evaluation. Patient presented to the ED today.   She was brought in by he REMOVAL OF DEAD TISSUE   2009: CHOLECYSTECTOMY  No date: CYST REMOVAL      Comment: BREAST, BENIGN  No date: HEMORRHOIDECTOMY  No date: HYSTERECTOMY      Comment: heavy menstrual flow,   2012: LUMPECTOMY RIGHT      Comment: FIBROADENOMA  8-7-15: OTHER preference with extraocular movements impaired to left side and pt appears to have left hemifield deficit; face symmetric, hearing intact, no dysarthria or dysphonia  Motor: Rare right arm twitching noted  Sensory:   Patient appears to have left-sided nick

## 2018-04-24 NOTE — H&P
[facility]    History and Physical    Musa Kellogg Patient Status:  Inpatient    1970 MRN [id number]   Location [facility] 4W/SW/SE Attending Eri Herrera DO   Hosp Day # 0 CHASIDY Valerio DO     Date:  2018  Date of Adm confusional state, though  states she was near baseline. A valproic acid level was checked and was elevated at 119 (normal range ), so Depakote dose was advised to be decreased to Depakote 1000 mg po BID (from prior 1500 mg po BID).   Also, sh HYSTERECTOMY      Comment: heavy menstrual flow,   2012: LUMPECTOMY RIGHT      Comment: FIBROADENOMA  8-7-15: OTHER      Comment: CYBERKNIFE  No date: OTHER SURGICAL HISTORY  No date: XS PORT-A-CATH INSERTION/EXCH/CHK  Family History   Problem Relation Age Diclofenac Sodium 1 % Transdermal Gel Apply 100 g topically 4 (four) times daily. Nutritional Supplements (ENSURE) Oral Liquid Take by mouth. Vitamin B-12 1000 MCG Oral Tab Take 1 tablet (1,000 mcg total) by mouth daily.        Review of Systems: 04/23/2018   BUN 10 04/23/2018    04/23/2018   K 3.6 04/23/2018   CL 97 04/23/2018   CO2 31 04/23/2018   GLU 94 04/23/2018   CA 8.7 04/23/2018   ALB 2.6 (L) 04/21/2018   ALKPHO 54 04/21/2018   BILT 0.4 04/21/2018   TP 6.8 04/21/2018   AST 10 (L) 04/2

## 2018-04-25 ENCOUNTER — TELEPHONE (OUTPATIENT)
Dept: NEUROLOGY | Facility: CLINIC | Age: 48
End: 2018-04-25

## 2018-04-25 VITALS
WEIGHT: 128.19 LBS | HEIGHT: 65 IN | DIASTOLIC BLOOD PRESSURE: 74 MMHG | TEMPERATURE: 98 F | BODY MASS INDEX: 21.36 KG/M2 | OXYGEN SATURATION: 99 % | SYSTOLIC BLOOD PRESSURE: 123 MMHG | HEART RATE: 60 BPM | RESPIRATION RATE: 20 BRPM

## 2018-04-25 PROCEDURE — 99232 SBSQ HOSP IP/OBS MODERATE 35: CPT | Performed by: INTERNAL MEDICINE

## 2018-04-25 PROCEDURE — 99232 SBSQ HOSP IP/OBS MODERATE 35: CPT | Performed by: OTHER

## 2018-04-25 RX ORDER — OXCARBAZEPINE 300 MG/1
900 TABLET, FILM COATED ORAL EVERY EVENING
Qty: 90 TABLET | Refills: 0 | Status: SHIPPED | COMMUNITY
Start: 2018-04-26 | End: 2018-07-02

## 2018-04-25 RX ORDER — MAGNESIUM OXIDE 400 MG (241.3 MG MAGNESIUM) TABLET
400 TABLET ONCE
Status: COMPLETED | OUTPATIENT
Start: 2018-04-25 | End: 2018-04-25

## 2018-04-25 RX ORDER — OXCARBAZEPINE 600 MG/1
600 TABLET, FILM COATED ORAL EVERY MORNING
Qty: 30 TABLET | Refills: 0 | Status: SHIPPED | COMMUNITY
Start: 2018-04-26 | End: 2018-07-02

## 2018-04-25 NOTE — PLAN OF CARE
Absence of seizures Progressing      Remains free of injury related to seizure activity Progressing      Patient preferences are identified and integrated in the patient's plan of care Progressing      Skin integrity remains intact Progressing        Pt ha

## 2018-04-25 NOTE — PROGRESS NOTES
[facility]    Hematology/Oncology   Progress Note    Gamaliel Altman Patient Status:  Inpatient    1970 MRN [id number]   Location [facility] 4W/SW/SE Attending Marine Loya DO   Hosp Day # 1 PCP Elias Mendez DO     Subjective:

## 2018-04-25 NOTE — PROGRESS NOTES
Neurology Inpatient Follow-up Note      HPI:     Patient with no complaints. Having episodic altered awareness.       Past Medical Hisotory:  Reviewed    Medications:  Reviewed    Allergies:  No Known Allergies      ROS:   GENERAL: no weight loss or fevers MD  [address] [place] [address]  [phone]

## 2018-04-25 NOTE — TELEPHONE ENCOUNTER
s/w transfer center at [facility], accepted to neuro svc; sign out given to neuro attg. Transfer center will facilitate transfer.

## 2018-04-25 NOTE — PROGRESS NOTES
[facility]    Progress Note    Tarry Flatter Patient Status:  Inpatient    1970 MRN [id number]   Location [facility] 4W/SW/SE Attending Mary Stanley DO   Hosp Day # 1 PCP Doris Lopez DO       SUBJECTIVE:  Pt sleepy but easil Q8H PRN   Pantoprazole Sodium (PROTONIX) EC tab 40 mg 40 mg Oral QAM AC   PEG 3350 (MIRALAX) powder packet 17 g 17 g Oral Daily   QUEtiapine Fumarate (SEROQUEL) tab 25 mg 25 mg Oral Nightly   Vitamin B-12 (VITAMIN B12) tab 1,000 mcg 1,000 mcg Oral Daily received IV iron in the past                     Lionel Man MD  4/25/2018  8:19 AM

## 2018-04-26 NOTE — PROGRESS NOTES
RN notified by Dr. Bernice Nava that pt's potential transfer to [facility] was accepted. Dr. Bernice Nava completed MD transfer handoff with [facility], per report. Spoke with Dr. Guevara Perry, Dr. Mitchel Bennett, who are aware and agreeable to patient's transfer. MD completed discharge.

## 2018-05-01 NOTE — DISCHARGE SUMMARY
[facility]    PATIENT'S NAME: Roman José   ATTENDING PHYSICIAN: Catracho Gresham DO   PATIENT ACCOUNT#:   [de-identified]    LOCATION:  [address] [place] [address] #:   [id number]       YOB: 1970  ADMISSION DATE:       04/23/2018

## 2018-05-02 ENCOUNTER — TELEPHONE (OUTPATIENT)
Dept: HEMATOLOGY/ONCOLOGY | Facility: HOSPITAL | Age: 48
End: 2018-05-02

## 2018-05-02 NOTE — TELEPHONE ENCOUNTER
José Miguel So called, Jeramie Hammond is hosptialized at [facility]. I cancelled her appointments for 5/4/18. José Miguel So can be reached at [phone] if you have any questions.  Please Advise

## 2018-05-04 ENCOUNTER — APPOINTMENT (OUTPATIENT)
Dept: HEMATOLOGY/ONCOLOGY | Facility: HOSPITAL | Age: 48
End: 2018-05-04
Attending: INTERNAL MEDICINE
Payer: COMMERCIAL

## 2018-05-08 NOTE — DISCHARGE SUMMARY
[facility]    PATIENT'S NAME: Mayraoyon Mckinney   ATTENDING PHYSICIAN: Jeffrey Landeros DO   PATIENT ACCOUNT#:   [de-identified]    LOCATION:  [address] [place] [address] #:   [id number]       YOB: 1970  ADMISSION DATE:       04/20/2018

## 2018-05-14 ENCOUNTER — TELEPHONE (OUTPATIENT)
Dept: INTERNAL MEDICINE CLINIC | Facility: CLINIC | Age: 48
End: 2018-05-14

## 2018-05-14 NOTE — TELEPHONE ENCOUNTER
Home health started today for nursing & physical therapy  Speech & occupational therapy to start next week

## 2018-05-16 ENCOUNTER — HOSPITAL ENCOUNTER (EMERGENCY)
Facility: HOSPITAL | Age: 48
Discharge: HOME | End: 2018-05-16
Attending: EMERGENCY MEDICINE
Payer: COMMERCIAL

## 2018-05-16 VITALS
TEMPERATURE: 98 F | OXYGEN SATURATION: 100 % | SYSTOLIC BLOOD PRESSURE: 94 MMHG | DIASTOLIC BLOOD PRESSURE: 61 MMHG | RESPIRATION RATE: 18 BRPM | HEART RATE: 108 BPM

## 2018-05-16 DIAGNOSIS — R41.0 OCCASIONAL CONFUSION: Primary | ICD-10-CM

## 2018-05-16 PROCEDURE — 99283 EMERGENCY DEPT VISIT LOW MDM: CPT

## 2018-05-16 NOTE — ED NOTES
Patient and  received discharge instructions with follow-up instructions and verbalized understanding. Patient discharged out of ER in stable condition in no apparent distress.

## 2018-05-16 NOTE — ED INITIAL ASSESSMENT (HPI)
Found [facility], aox1 per ems, recent dx from long term care facility for seizures/ca.

## 2018-05-16 NOTE — ED PROVIDER NOTES
Patient Seen in: [facility] AND [facility] Emergency Department    History   Patient presents with:  Altered Mental Status (neurologic)    Stated Complaint:     HPI    49year old female with a history of lung cancer with metastases to the brain status post multip tablets (900 mg total) by mouth every evening. Lacosamide (VIMPAT) 200 MG Oral Tab,  Take 1 tablet (200 mg total) by mouth 2 (two) times daily. QUEtiapine Fumarate (SEROQUEL) 25 MG Oral Tab,  Take 1 tablet (25 mg total) by mouth nightly.    Levothyroxin noted in HPI. Constitutional and vital signs reviewed. All other systems reviewed and negative except as noted above. PSFH elements reviewed from today and agreed except as otherwise stated in HPI.     Physical Exam   ED Triage Vitals  BP: (!) 87/6 Nursing notes and Triage vitals reviewed  Labs Reviewed - No data to display    ED Course as of May 16 1746  ------------------------------------------------------------    Imaging Results Available and Reviewed while here: No orders to display    ED Med

## 2018-05-17 ENCOUNTER — TELEPHONE (OUTPATIENT)
Dept: INTERNAL MEDICINE CLINIC | Facility: CLINIC | Age: 48
End: 2018-05-17

## 2018-05-17 NOTE — TELEPHONE ENCOUNTER
Hernan Lam / Bruce requesting approval to reschedule the OT evaluation to next week    Tasked to nursing

## 2018-05-18 ENCOUNTER — NURSE ONLY (OUTPATIENT)
Dept: HEMATOLOGY/ONCOLOGY | Facility: HOSPITAL | Age: 48
End: 2018-05-18
Attending: INTERNAL MEDICINE
Payer: COMMERCIAL

## 2018-05-18 VITALS
SYSTOLIC BLOOD PRESSURE: 98 MMHG | TEMPERATURE: 98 F | HEIGHT: 65 IN | RESPIRATION RATE: 16 BRPM | BODY MASS INDEX: 20.99 KG/M2 | HEART RATE: 77 BPM | WEIGHT: 126 LBS | DIASTOLIC BLOOD PRESSURE: 57 MMHG

## 2018-05-18 VITALS
TEMPERATURE: 98 F | WEIGHT: 126 LBS | DIASTOLIC BLOOD PRESSURE: 65 MMHG | SYSTOLIC BLOOD PRESSURE: 112 MMHG | HEIGHT: 65 IN | BODY MASS INDEX: 20.99 KG/M2 | RESPIRATION RATE: 16 BRPM | HEART RATE: 75 BPM

## 2018-05-18 DIAGNOSIS — C79.31 BRAIN METASTASES (HCC): ICD-10-CM

## 2018-05-18 DIAGNOSIS — E03.2 HYPOTHYROIDISM DUE TO MEDICATION: Primary | ICD-10-CM

## 2018-05-18 DIAGNOSIS — Z45.2 ENCOUNTER FOR ADJUSTMENT OR MANAGEMENT OF VASCULAR ACCESS DEVICE: ICD-10-CM

## 2018-05-18 DIAGNOSIS — D50.9 IRON DEFICIENCY ANEMIA, UNSPECIFIED IRON DEFICIENCY ANEMIA TYPE: ICD-10-CM

## 2018-05-18 DIAGNOSIS — Z51.11 CHEMOTHERAPY MANAGEMENT, ENCOUNTER FOR: ICD-10-CM

## 2018-05-18 DIAGNOSIS — G40.909 SEIZURE DISORDER (HCC): ICD-10-CM

## 2018-05-18 DIAGNOSIS — C34.92 CARCINOMA OF LUNG, LEFT (HCC): ICD-10-CM

## 2018-05-18 DIAGNOSIS — E03.9 HYPOTHYROIDISM (ACQUIRED): ICD-10-CM

## 2018-05-18 DIAGNOSIS — C34.90 PRIMARY MALIGNANT NEOPLASM OF LUNG METASTATIC TO OTHER SITE, UNSPECIFIED LATERALITY (HCC): Primary | ICD-10-CM

## 2018-05-18 DIAGNOSIS — C34.90 MALIGNANT NEOPLASM OF LUNG, UNSPECIFIED LATERALITY, UNSPECIFIED PART OF LUNG (HCC): ICD-10-CM

## 2018-05-18 DIAGNOSIS — C34.90 MALIGNANT NEOPLASM OF LUNG, UNSPECIFIED LATERALITY, UNSPECIFIED PART OF LUNG (HCC): Primary | ICD-10-CM

## 2018-05-18 PROCEDURE — A4216 STERILE WATER/SALINE, 10 ML: HCPCS

## 2018-05-18 PROCEDURE — 84443 ASSAY THYROID STIM HORMONE: CPT

## 2018-05-18 PROCEDURE — 96413 CHEMO IV INFUSION 1 HR: CPT

## 2018-05-18 PROCEDURE — 99215 OFFICE O/P EST HI 40 MIN: CPT | Performed by: INTERNAL MEDICINE

## 2018-05-18 PROCEDURE — 80053 COMPREHEN METABOLIC PANEL: CPT

## 2018-05-18 PROCEDURE — 85025 COMPLETE CBC W/AUTO DIFF WBC: CPT

## 2018-05-18 RX ORDER — HEPARIN SODIUM (PORCINE) LOCK FLUSH IV SOLN 100 UNIT/ML 100 UNIT/ML
SOLUTION INTRAVENOUS
Status: COMPLETED
Start: 2018-05-18 | End: 2018-05-18

## 2018-05-18 RX ORDER — 0.9 % SODIUM CHLORIDE 0.9 %
VIAL (ML) INJECTION
Status: DISCONTINUED
Start: 2018-05-18 | End: 2018-05-18

## 2018-05-18 RX ORDER — HEPARIN SODIUM (PORCINE) LOCK FLUSH IV SOLN 100 UNIT/ML 100 UNIT/ML
5 SOLUTION INTRAVENOUS ONCE
Status: CANCELLED | OUTPATIENT
Start: 2018-05-18

## 2018-05-18 RX ORDER — DIPHENHYDRAMINE HYDROCHLORIDE 50 MG/ML
25 INJECTION INTRAMUSCULAR; INTRAVENOUS ONCE
Status: CANCELLED | OUTPATIENT
Start: 2018-05-18

## 2018-05-18 RX ORDER — HEPARIN SODIUM (PORCINE) LOCK FLUSH IV SOLN 100 UNIT/ML 100 UNIT/ML
5 SOLUTION INTRAVENOUS ONCE
Status: COMPLETED | OUTPATIENT
Start: 2018-05-18 | End: 2018-05-18

## 2018-05-18 RX ORDER — SODIUM CHLORIDE 9 MG/ML
INJECTION, SOLUTION INTRAVENOUS
Status: DISCONTINUED
Start: 2018-05-18 | End: 2018-05-18

## 2018-05-18 RX ORDER — 0.9 % SODIUM CHLORIDE 0.9 %
10 VIAL (ML) INJECTION ONCE
Status: CANCELLED | OUTPATIENT
Start: 2018-05-18

## 2018-05-18 RX ADMIN — HEPARIN SODIUM (PORCINE) LOCK FLUSH IV SOLN 100 UNIT/ML 500 UNITS: 100 SOLUTION INTRAVENOUS at 15:25:00

## 2018-05-18 NOTE — PROGRESS NOTES
Cancer Center Progress Note    Patient Name: Jaspal Sheldon   YOB: 1970   Medical Record Number: [id number]   Attending Physician: Kate Kessler M.D. Chief Complaint:  Metastatic adenocarcinoma of the lung, brain metastasis.     History of in the setting of polysubstance abuse/dependence. She required mechanical ventilation for secondary respiratory failure. She was seen by neurology and critical care medicine consultation.   After long hospitalization she was eventually discharged to rehab BRAIN SURGERY Left      Comment: OCCIPITAL CRANIOTOMY  No date: BRAIN SURGERY Left      Comment: LOBECTOMY  AUG  2016: BRAIN SURGERY      Comment: REMOVAL OF DEAD TISSUE   2009: CHOLECYSTECTOMY  No date: CYST REMOVAL      Comment: BREAST, BENIGN  No date: taking differently: Take 500 mg by mouth 2 (two) times daily.  1500 BID daily per pts  ), Disp: 360 tablet, Rfl: 3  •  Pantoprazole Sodium 40 MG Oral Tab EC, Take 40 mg by mouth., Disp: , Rfl:   •  docusate sodium 100 MG Oral Cap, Take 100 mg by mout There is no palpable peripheral lymphadenopathy   Chest: Clear to auscultation. Cardiovascular: Regular rate and rhythm. Normal S1S2  Abdomen: Soft, non tender. No hepatosplenomegaly. No palpable mass.   Extremities: Left foot with mild erythema and david nivolumab since April 2016. She has had issues with recurrent seizures at one point thought to be due to polysubstance abuse and withdrawal later having upper extremity with weakness requiring multiple antiepileptics.   She is following closely with neurol

## 2018-05-18 NOTE — PROGRESS NOTES
Pt here for [address]. Arrives Ambulating independently, accompanied by Spouse           Modifications in dose or schedule: No. Pt reports feeling \"much better,\" in high spirits.  States she is no longer having seizures and states they were attributed

## 2018-05-18 NOTE — PROGRESS NOTES
Right  Chest port accessed for Labs, labs obtained as ordered and sent. Flushed with 20 ml ns, capped with alcohol caps. Escorted to Exam room #5 with .

## 2018-05-18 NOTE — TELEPHONE ENCOUNTER
Called Monica (therapy supervisor) and left message on her confidential VM notifying her that it is okay to reschedule OT eval for next week.

## 2018-05-21 ENCOUNTER — TELEPHONE (OUTPATIENT)
Dept: INTERNAL MEDICINE CLINIC | Facility: CLINIC | Age: 48
End: 2018-05-21

## 2018-05-21 NOTE — TELEPHONE ENCOUNTER
Per Dr. Roxy Craven 5/18/18 OV note:  50-year-old female with metastatic adenocarcinoma of the left upper lobe of the lung (EGFR,ALK,ROS1 negative). She has liver and brain metastasis as well as a metastatic lesion to the pancreas.     Routed to Dr. Bayron Godinez for rev

## 2018-05-21 NOTE — TELEPHONE ENCOUNTER
Pk Tejada needs a clarification on the diagnosis - lung ca - needs to know which side and what part of the lung.       Tasked high to Nursing

## 2018-05-31 ENCOUNTER — NURSE ONLY (OUTPATIENT)
Dept: HEMATOLOGY/ONCOLOGY | Facility: HOSPITAL | Age: 48
End: 2018-05-31
Attending: INTERNAL MEDICINE
Payer: COMMERCIAL

## 2018-05-31 VITALS
HEART RATE: 81 BPM | BODY MASS INDEX: 21.16 KG/M2 | HEIGHT: 65 IN | SYSTOLIC BLOOD PRESSURE: 102 MMHG | RESPIRATION RATE: 18 BRPM | TEMPERATURE: 98 F | WEIGHT: 127 LBS | DIASTOLIC BLOOD PRESSURE: 76 MMHG

## 2018-05-31 DIAGNOSIS — C34.90 MALIGNANT NEOPLASM OF LUNG, UNSPECIFIED LATERALITY, UNSPECIFIED PART OF LUNG (HCC): Primary | ICD-10-CM

## 2018-05-31 DIAGNOSIS — C79.31 BRAIN METASTASES (HCC): ICD-10-CM

## 2018-05-31 DIAGNOSIS — C34.92 CARCINOMA OF LUNG, LEFT (HCC): ICD-10-CM

## 2018-05-31 DIAGNOSIS — C34.90 PRIMARY MALIGNANT NEOPLASM OF LUNG METASTATIC TO OTHER SITE, UNSPECIFIED LATERALITY (HCC): Primary | ICD-10-CM

## 2018-05-31 DIAGNOSIS — Z51.11 CHEMOTHERAPY MANAGEMENT, ENCOUNTER FOR: ICD-10-CM

## 2018-05-31 DIAGNOSIS — E03.9 HYPOTHYROIDISM (ACQUIRED): ICD-10-CM

## 2018-05-31 DIAGNOSIS — G40.909 SEIZURE DISORDER (HCC): ICD-10-CM

## 2018-05-31 DIAGNOSIS — Z45.2 ENCOUNTER FOR ADJUSTMENT OR MANAGEMENT OF VASCULAR ACCESS DEVICE: ICD-10-CM

## 2018-05-31 PROCEDURE — 85025 COMPLETE CBC W/AUTO DIFF WBC: CPT

## 2018-05-31 PROCEDURE — 83540 ASSAY OF IRON: CPT

## 2018-05-31 PROCEDURE — 36591 DRAW BLOOD OFF VENOUS DEVICE: CPT

## 2018-05-31 PROCEDURE — 84466 ASSAY OF TRANSFERRIN: CPT

## 2018-05-31 PROCEDURE — 99215 OFFICE O/P EST HI 40 MIN: CPT | Performed by: INTERNAL MEDICINE

## 2018-05-31 PROCEDURE — A4216 STERILE WATER/SALINE, 10 ML: HCPCS

## 2018-05-31 PROCEDURE — 80053 COMPREHEN METABOLIC PANEL: CPT

## 2018-05-31 RX ORDER — HEPARIN SODIUM (PORCINE) LOCK FLUSH IV SOLN 100 UNIT/ML 100 UNIT/ML
5 SOLUTION INTRAVENOUS ONCE
Status: COMPLETED | OUTPATIENT
Start: 2018-05-31 | End: 2018-05-31

## 2018-05-31 RX ORDER — DIPHENHYDRAMINE HYDROCHLORIDE 50 MG/ML
INJECTION INTRAMUSCULAR; INTRAVENOUS EVERY 4 HOURS PRN
Status: CANCELLED | OUTPATIENT
Start: 2018-06-01

## 2018-05-31 RX ORDER — HEPARIN SODIUM (PORCINE) LOCK FLUSH IV SOLN 100 UNIT/ML 100 UNIT/ML
SOLUTION INTRAVENOUS
Status: COMPLETED
Start: 2018-05-31 | End: 2018-05-31

## 2018-05-31 RX ORDER — ACETAMINOPHEN 325 MG/1
TABLET ORAL EVERY 6 HOURS PRN
Status: CANCELLED | OUTPATIENT
Start: 2018-06-01

## 2018-05-31 RX ORDER — 0.9 % SODIUM CHLORIDE 0.9 %
VIAL (ML) INJECTION
Status: DISCONTINUED
Start: 2018-05-31 | End: 2018-05-31

## 2018-05-31 RX ORDER — ALBUTEROL SULFATE 90 UG/1
2 AEROSOL, METERED RESPIRATORY (INHALATION) AS NEEDED
Status: CANCELLED | OUTPATIENT
Start: 2018-06-01

## 2018-05-31 RX ORDER — HEPARIN SODIUM (PORCINE) LOCK FLUSH IV SOLN 100 UNIT/ML 100 UNIT/ML
5 SOLUTION INTRAVENOUS ONCE
Status: CANCELLED | OUTPATIENT
Start: 2018-05-31

## 2018-05-31 RX ORDER — 0.9 % SODIUM CHLORIDE 0.9 %
10 VIAL (ML) INJECTION ONCE
Status: CANCELLED | OUTPATIENT
Start: 2018-05-31

## 2018-05-31 RX ORDER — MEPERIDINE HYDROCHLORIDE 25 MG/ML
INJECTION INTRAMUSCULAR; INTRAVENOUS; SUBCUTANEOUS AS NEEDED
Status: CANCELLED | OUTPATIENT
Start: 2018-06-01

## 2018-05-31 RX ORDER — RANITIDINE 25 MG/ML
50 INJECTION, SOLUTION INTRAMUSCULAR; INTRAVENOUS AS NEEDED
Status: CANCELLED | OUTPATIENT
Start: 2018-06-01

## 2018-05-31 RX ORDER — DIPHENHYDRAMINE HYDROCHLORIDE 50 MG/ML
25 INJECTION INTRAMUSCULAR; INTRAVENOUS ONCE
Status: CANCELLED | OUTPATIENT
Start: 2018-05-31

## 2018-05-31 RX ADMIN — HEPARIN SODIUM (PORCINE) LOCK FLUSH IV SOLN 100 UNIT/ML 500 UNITS: 100 SOLUTION INTRAVENOUS at 14:15:00

## 2018-05-31 NOTE — PROGRESS NOTES
Cancer Center Progress Note    Patient Name: Fatoumata Redd   YOB: 1970   Medical Record Number: [id number]   Attending Physician: Nalini Dominguez M.D. Chief Complaint:  Metastatic adenocarcinoma of the lung, brain metastasis.     History of in the setting of polysubstance abuse/dependence. She required mechanical ventilation for secondary respiratory failure. She was seen by neurology and critical care medicine consultation.   After long hospitalization she was eventually discharged to rehab BRAIN SURGERY Left      Comment: OCCIPITAL CRANIOTOMY  No date: BRAIN SURGERY Left      Comment: LOBECTOMY  AUG  2016: BRAIN SURGERY      Comment: REMOVAL OF DEAD TISSUE   2009: CHOLECYSTECTOMY  No date: CYST REMOVAL      Comment: BREAST, BENIGN  No date: Levothyroxine Sodium 150 MCG Oral Tab, Take 1 tablet (150 mcg total) by mouth before breakfast., Disp: 90 tablet, Rfl: 1  •  divalproex Sodium 500 MG Oral Tab EC DR tab, 1500 mg BID, Disp: 540 tablet, Rfl: 3  •  levetiracetam 1000 MG Oral Tab, Take 2 table There is no palpable peripheral lymphadenopathy   Chest: Clear to auscultation. Cardiovascular: Regular rate and rhythm. Normal S1S2  Abdomen: Soft, non tender. No hepatosplenomegaly. No palpable mass.   Extremities: Left foot with mild erythema and david since April 2016. She has had issues with recurrent seizures at one point thought to be due to polysubstance abuse and withdrawal later having upper extremity with weakness requiring multiple antiepileptics.   She is following closely with neurology  –Sara

## 2018-06-01 ENCOUNTER — OFFICE VISIT (OUTPATIENT)
Dept: HEMATOLOGY/ONCOLOGY | Facility: HOSPITAL | Age: 48
End: 2018-06-01
Attending: INTERNAL MEDICINE
Payer: COMMERCIAL

## 2018-06-01 VITALS
HEART RATE: 90 BPM | SYSTOLIC BLOOD PRESSURE: 116 MMHG | TEMPERATURE: 98 F | DIASTOLIC BLOOD PRESSURE: 73 MMHG | RESPIRATION RATE: 18 BRPM

## 2018-06-01 DIAGNOSIS — C34.90 MALIGNANT NEOPLASM OF LUNG, UNSPECIFIED LATERALITY, UNSPECIFIED PART OF LUNG (HCC): Primary | ICD-10-CM

## 2018-06-01 DIAGNOSIS — Z45.2 ENCOUNTER FOR ADJUSTMENT OR MANAGEMENT OF VASCULAR ACCESS DEVICE: ICD-10-CM

## 2018-06-01 DIAGNOSIS — C34.92 CARCINOMA OF LUNG, LEFT (HCC): ICD-10-CM

## 2018-06-01 PROCEDURE — A4216 STERILE WATER/SALINE, 10 ML: HCPCS

## 2018-06-01 PROCEDURE — 96413 CHEMO IV INFUSION 1 HR: CPT

## 2018-06-01 RX ORDER — SODIUM CHLORIDE 9 MG/ML
INJECTION, SOLUTION INTRAVENOUS
Status: DISCONTINUED
Start: 2018-06-01 | End: 2018-06-01

## 2018-06-01 RX ORDER — HEPARIN SODIUM (PORCINE) LOCK FLUSH IV SOLN 100 UNIT/ML 100 UNIT/ML
5 SOLUTION INTRAVENOUS ONCE
Status: COMPLETED | OUTPATIENT
Start: 2018-06-01 | End: 2018-06-01

## 2018-06-01 RX ORDER — DIPHENHYDRAMINE HYDROCHLORIDE 50 MG/ML
25 INJECTION INTRAMUSCULAR; INTRAVENOUS ONCE
Status: CANCELLED | OUTPATIENT
Start: 2018-06-01

## 2018-06-01 RX ORDER — 0.9 % SODIUM CHLORIDE 0.9 %
VIAL (ML) INJECTION
Status: DISCONTINUED
Start: 2018-06-01 | End: 2018-06-01

## 2018-06-01 RX ORDER — HEPARIN SODIUM (PORCINE) LOCK FLUSH IV SOLN 100 UNIT/ML 100 UNIT/ML
5 SOLUTION INTRAVENOUS ONCE
Status: CANCELLED | OUTPATIENT
Start: 2018-06-01

## 2018-06-01 RX ORDER — 0.9 % SODIUM CHLORIDE 0.9 %
10 VIAL (ML) INJECTION ONCE
Status: CANCELLED | OUTPATIENT
Start: 2018-06-01

## 2018-06-01 RX ORDER — HEPARIN SODIUM (PORCINE) LOCK FLUSH IV SOLN 100 UNIT/ML 100 UNIT/ML
SOLUTION INTRAVENOUS
Status: COMPLETED
Start: 2018-06-01 | End: 2018-06-01

## 2018-06-01 RX ADMIN — HEPARIN SODIUM (PORCINE) LOCK FLUSH IV SOLN 100 UNIT/ML 500 UNITS: 100 SOLUTION INTRAVENOUS at 15:50:00

## 2018-06-01 NOTE — PROGRESS NOTES
Pt here for C17D1.  OPDIVO  Arrives Ambulating independently, accompanied by Family member, Lisandra Manning           Modifications in dose or schedule: No  Port accessed using sterile techinque- good blood return noted, flushes without difficulty     Frequ

## 2018-06-05 ENCOUNTER — TELEPHONE (OUTPATIENT)
Dept: INTERNAL MEDICINE CLINIC | Facility: CLINIC | Age: 48
End: 2018-06-05

## 2018-06-05 NOTE — TELEPHONE ENCOUNTER
Speech therapist, Violet, called to inform Dr Basia Harris that the pt told them about a fall yesterday, she fell onto her buttox in the morning when getting out of bed. Pt claims she is fine and says she is good, and Violet said all seemed fine.  Call back for Violet is

## 2018-06-05 NOTE — TELEPHONE ENCOUNTER
To Dr. Demetria Henry - see below - per pt's : She did not hit head, denies bruising, bleeding, etc  He said pt is doing great, much better than she has been.

## 2018-06-06 ENCOUNTER — TELEPHONE (OUTPATIENT)
Dept: INTERNAL MEDICINE CLINIC | Facility: CLINIC | Age: 48
End: 2018-06-06

## 2018-06-06 NOTE — TELEPHONE ENCOUNTER
Called Mary  from [facility] and relayed DR. WEBER message - verbalized understanding

## 2018-06-11 ENCOUNTER — TELEPHONE (OUTPATIENT)
Dept: INTERNAL MEDICINE CLINIC | Facility: CLINIC | Age: 48
End: 2018-06-11

## 2018-06-11 NOTE — TELEPHONE ENCOUNTER
Armida from [employer].  Is calling to request an extension of OT services  Ph. # [phone]   Routed to clinical

## 2018-06-14 ENCOUNTER — TELEPHONE (OUTPATIENT)
Dept: INTERNAL MEDICINE CLINIC | Facility: CLINIC | Age: 48
End: 2018-06-14

## 2018-06-14 NOTE — TELEPHONE ENCOUNTER
Received a call from speech therapist Antoine who is requesting verbal order to extend ST 2x/wk for 3weeks. Patient has missed a couple sessions and antoine would like to work on cognition. To DR. Bobby Slater

## 2018-06-15 ENCOUNTER — OFFICE VISIT (OUTPATIENT)
Dept: HEMATOLOGY/ONCOLOGY | Facility: HOSPITAL | Age: 48
End: 2018-06-15
Attending: INTERNAL MEDICINE
Payer: COMMERCIAL

## 2018-06-15 VITALS
TEMPERATURE: 99 F | BODY MASS INDEX: 21.33 KG/M2 | RESPIRATION RATE: 16 BRPM | DIASTOLIC BLOOD PRESSURE: 59 MMHG | HEIGHT: 65 IN | WEIGHT: 128 LBS | HEART RATE: 91 BPM | SYSTOLIC BLOOD PRESSURE: 108 MMHG

## 2018-06-15 DIAGNOSIS — Z51.11 CHEMOTHERAPY MANAGEMENT, ENCOUNTER FOR: ICD-10-CM

## 2018-06-15 DIAGNOSIS — C34.90 MALIGNANT NEOPLASM OF LUNG, UNSPECIFIED LATERALITY, UNSPECIFIED PART OF LUNG (HCC): Primary | ICD-10-CM

## 2018-06-15 DIAGNOSIS — G40.909 SEIZURE DISORDER (HCC): ICD-10-CM

## 2018-06-15 DIAGNOSIS — E03.9 HYPOTHYROIDISM (ACQUIRED): ICD-10-CM

## 2018-06-15 DIAGNOSIS — C79.31 BRAIN METASTASES (HCC): ICD-10-CM

## 2018-06-15 DIAGNOSIS — Z45.2 ENCOUNTER FOR ADJUSTMENT OR MANAGEMENT OF VASCULAR ACCESS DEVICE: ICD-10-CM

## 2018-06-15 DIAGNOSIS — C34.92 CARCINOMA OF LUNG, LEFT (HCC): ICD-10-CM

## 2018-06-15 PROCEDURE — 99215 OFFICE O/P EST HI 40 MIN: CPT | Performed by: INTERNAL MEDICINE

## 2018-06-15 PROCEDURE — 80053 COMPREHEN METABOLIC PANEL: CPT

## 2018-06-15 PROCEDURE — 96413 CHEMO IV INFUSION 1 HR: CPT

## 2018-06-15 PROCEDURE — A4216 STERILE WATER/SALINE, 10 ML: HCPCS

## 2018-06-15 PROCEDURE — 85025 COMPLETE CBC W/AUTO DIFF WBC: CPT

## 2018-06-15 RX ORDER — ACETAMINOPHEN 325 MG/1
TABLET ORAL EVERY 6 HOURS PRN
Status: CANCELLED | OUTPATIENT
Start: 2018-06-15

## 2018-06-15 RX ORDER — 0.9 % SODIUM CHLORIDE 0.9 %
10 VIAL (ML) INJECTION ONCE
Status: CANCELLED | OUTPATIENT
Start: 2018-06-15

## 2018-06-15 RX ORDER — ALBUTEROL SULFATE 90 UG/1
2 AEROSOL, METERED RESPIRATORY (INHALATION) AS NEEDED
Status: CANCELLED | OUTPATIENT
Start: 2018-06-15

## 2018-06-15 RX ORDER — HEPARIN SODIUM (PORCINE) LOCK FLUSH IV SOLN 100 UNIT/ML 100 UNIT/ML
SOLUTION INTRAVENOUS
Status: COMPLETED
Start: 2018-06-15 | End: 2018-06-15

## 2018-06-15 RX ORDER — DIPHENHYDRAMINE HYDROCHLORIDE 50 MG/ML
INJECTION INTRAMUSCULAR; INTRAVENOUS EVERY 4 HOURS PRN
Status: CANCELLED | OUTPATIENT
Start: 2018-06-15

## 2018-06-15 RX ORDER — HEPARIN SODIUM (PORCINE) LOCK FLUSH IV SOLN 100 UNIT/ML 100 UNIT/ML
5 SOLUTION INTRAVENOUS ONCE
Status: CANCELLED | OUTPATIENT
Start: 2018-06-15

## 2018-06-15 RX ORDER — RANITIDINE 25 MG/ML
50 INJECTION, SOLUTION INTRAMUSCULAR; INTRAVENOUS AS NEEDED
Status: CANCELLED | OUTPATIENT
Start: 2018-06-15

## 2018-06-15 RX ORDER — 0.9 % SODIUM CHLORIDE 0.9 %
VIAL (ML) INJECTION
Status: DISCONTINUED
Start: 2018-06-15 | End: 2018-06-15

## 2018-06-15 RX ORDER — HEPARIN SODIUM (PORCINE) LOCK FLUSH IV SOLN 100 UNIT/ML 100 UNIT/ML
5 SOLUTION INTRAVENOUS ONCE
Status: COMPLETED | OUTPATIENT
Start: 2018-06-15 | End: 2018-06-15

## 2018-06-15 RX ORDER — 0.9 % SODIUM CHLORIDE 0.9 %
VIAL (ML) INJECTION
Status: DISPENSED
Start: 2018-06-15 | End: 2018-06-16

## 2018-06-15 RX ORDER — HEPARIN SODIUM (PORCINE) LOCK FLUSH IV SOLN 100 UNIT/ML 100 UNIT/ML
SOLUTION INTRAVENOUS
Status: DISCONTINUED
Start: 2018-06-15 | End: 2018-06-15 | Stop reason: WASHOUT

## 2018-06-15 RX ORDER — MEPERIDINE HYDROCHLORIDE 25 MG/ML
INJECTION INTRAMUSCULAR; INTRAVENOUS; SUBCUTANEOUS AS NEEDED
Status: CANCELLED | OUTPATIENT
Start: 2018-06-15

## 2018-06-15 RX ORDER — SODIUM CHLORIDE 9 MG/ML
INJECTION, SOLUTION INTRAVENOUS
Status: DISCONTINUED
Start: 2018-06-15 | End: 2018-06-15

## 2018-06-15 RX ORDER — DIPHENHYDRAMINE HYDROCHLORIDE 50 MG/ML
25 INJECTION INTRAMUSCULAR; INTRAVENOUS ONCE
Status: CANCELLED | OUTPATIENT
Start: 2018-06-15

## 2018-06-15 RX ADMIN — HEPARIN SODIUM (PORCINE) LOCK FLUSH IV SOLN 100 UNIT/ML 500 UNITS: 100 SOLUTION INTRAVENOUS at 13:08:00

## 2018-06-15 NOTE — PROGRESS NOTES
Cancer Center Progress Note    Patient Name: Milena Donovan   YOB: 1970   Medical Record Number: [id number]   Attending Physician: Reynold King M.D. Chief Complaint:  Metastatic adenocarcinoma of the lung, brain metastasis.     History of in the setting of polysubstance abuse/dependence. She required mechanical ventilation for secondary respiratory failure. She was seen by neurology and critical care medicine consultation.   After long hospitalization she was eventually discharged to rehab BRAIN SURGERY Left      Comment: OCCIPITAL CRANIOTOMY  No date: BRAIN SURGERY Left      Comment: LOBECTOMY  AUG  2016: BRAIN SURGERY      Comment: REMOVAL OF DEAD TISSUE   2009: CHOLECYSTECTOMY  No date: CYST REMOVAL      Comment: BREAST, BENIGN  No date: Levothyroxine Sodium 150 MCG Oral Tab, Take 1 tablet (150 mcg total) by mouth before breakfast., Disp: 90 tablet, Rfl: 1  •  divalproex Sodium 500 MG Oral Tab EC DR tab, 1500 mg BID, Disp: 540 tablet, Rfl: 3  •  levetiracetam 1000 MG Oral Tab, Take 2 table There is no palpable peripheral lymphadenopathy   Chest: Clear to auscultation. Cardiovascular: Regular rate and rhythm. Normal S1S2  Abdomen: Soft, non tender. No hepatosplenomegaly. No palpable mass.   Extremities: Left foot with mild erythema and david with nivolumab since April 2016. She has had issues with recurrent seizures at one point thought to be due to polysubstance abuse and withdrawal later having upper extremity with weakness requiring multiple antiepileptics.   She is following closely with velia

## 2018-06-15 NOTE — PROGRESS NOTES
Pt here for C18D1.  OPDIVO  Arrives Ambulating independently, accompanied by Family member, Sidney Spurling           Modifications in dose or schedule: No  Port accessed using sterile techinque- good blood return noted  Pt is going to make appointment with

## 2018-06-15 NOTE — PROGRESS NOTES
Pre-chemo labs drawn via port, kept accessed for infusion today; capped/clamped and secured line. Pt tolerated well. Escorted back to lobby, accompanied by her daughter. Gait slow, steady. Vision impairment to left field, per pt.

## 2018-06-26 ENCOUNTER — TELEPHONE (OUTPATIENT)
Dept: INTERNAL MEDICINE CLINIC | Facility: CLINIC | Age: 48
End: 2018-06-26

## 2018-06-26 NOTE — TELEPHONE ENCOUNTER
To Dr. Wilfred tineo pt's : pt had fall a couple days ago - slipped out of bed (daughter sleeps with pt and bed is crowded) and clipped top of R eye on nightstand. Treated with ice. Only a bruise, no laceration.   Minimal swelling, just discolored a b

## 2018-06-26 NOTE — TELEPHONE ENCOUNTER
To Dr. John Enamorado - see below - per CHRIS ELISE RN: R eye swollen per PT at 2 PM today. LMTCB for more info from pt/.

## 2018-06-26 NOTE — TELEPHONE ENCOUNTER
Pt had a fall  Requests order for additional visits to see her this week & to continue seeing patient    Please call Doreen Maldonado @ [facility] [phone]

## 2018-06-27 ENCOUNTER — TELEPHONE (OUTPATIENT)
Dept: INTERNAL MEDICINE CLINIC | Facility: CLINIC | Age: 48
End: 2018-06-27

## 2018-06-27 NOTE — TELEPHONE ENCOUNTER
Called patient , spokewith spouse per hipaa and relayed DR. WEBER message - verbalized understanding

## 2018-06-27 NOTE — TELEPHONE ENCOUNTER
Frnacis from [facility]. H.H. Is calling to inform Dr. Roxana Mendoza that pt. reported she fell out of bed over the weekend she has some swelling over her right eye lid but denies pain.  Ph. # [phone]   Routed to clinical

## 2018-06-28 ENCOUNTER — TELEPHONE (OUTPATIENT)
Dept: INTERNAL MEDICINE CLINIC | Facility: CLINIC | Age: 48
End: 2018-06-28

## 2018-06-28 NOTE — TELEPHONE ENCOUNTER
Please call Heather Amaro from [facility] at [phone]  She is requesting a verbal order to extend Home OT- 2x per week for 2 weeks  Tasked to nursing

## 2018-06-29 ENCOUNTER — NURSE ONLY (OUTPATIENT)
Dept: HEMATOLOGY/ONCOLOGY | Facility: HOSPITAL | Age: 48
End: 2018-06-29
Attending: INTERNAL MEDICINE
Payer: COMMERCIAL

## 2018-06-29 VITALS
SYSTOLIC BLOOD PRESSURE: 100 MMHG | BODY MASS INDEX: 21.83 KG/M2 | RESPIRATION RATE: 16 BRPM | DIASTOLIC BLOOD PRESSURE: 70 MMHG | WEIGHT: 131 LBS | HEART RATE: 96 BPM | TEMPERATURE: 99 F | HEIGHT: 65 IN

## 2018-06-29 VITALS
HEART RATE: 96 BPM | DIASTOLIC BLOOD PRESSURE: 70 MMHG | RESPIRATION RATE: 16 BRPM | SYSTOLIC BLOOD PRESSURE: 100 MMHG | TEMPERATURE: 99 F

## 2018-06-29 DIAGNOSIS — C34.90 MALIGNANT NEOPLASM OF LUNG, UNSPECIFIED LATERALITY, UNSPECIFIED PART OF LUNG (HCC): Primary | ICD-10-CM

## 2018-06-29 DIAGNOSIS — Z51.11 CHEMOTHERAPY MANAGEMENT, ENCOUNTER FOR: ICD-10-CM

## 2018-06-29 DIAGNOSIS — G40.909 SEIZURE DISORDER (HCC): ICD-10-CM

## 2018-06-29 DIAGNOSIS — D64.9 ANEMIA, UNSPECIFIED TYPE: ICD-10-CM

## 2018-06-29 DIAGNOSIS — C79.31 BRAIN METASTASES (HCC): ICD-10-CM

## 2018-06-29 DIAGNOSIS — E03.9 HYPOTHYROIDISM (ACQUIRED): ICD-10-CM

## 2018-06-29 PROCEDURE — 85025 COMPLETE CBC W/AUTO DIFF WBC: CPT

## 2018-06-29 PROCEDURE — 80053 COMPREHEN METABOLIC PANEL: CPT

## 2018-06-29 PROCEDURE — 84443 ASSAY THYROID STIM HORMONE: CPT

## 2018-06-29 PROCEDURE — A4216 STERILE WATER/SALINE, 10 ML: HCPCS

## 2018-06-29 PROCEDURE — 96413 CHEMO IV INFUSION 1 HR: CPT

## 2018-06-29 PROCEDURE — 99215 OFFICE O/P EST HI 40 MIN: CPT | Performed by: INTERNAL MEDICINE

## 2018-06-29 RX ORDER — HEPARIN SODIUM (PORCINE) LOCK FLUSH IV SOLN 100 UNIT/ML 100 UNIT/ML
SOLUTION INTRAVENOUS
Status: COMPLETED
Start: 2018-06-29 | End: 2018-06-29

## 2018-06-29 RX ORDER — DIPHENHYDRAMINE HYDROCHLORIDE 50 MG/ML
INJECTION INTRAMUSCULAR; INTRAVENOUS EVERY 4 HOURS PRN
Status: CANCELLED | OUTPATIENT
Start: 2018-06-29

## 2018-06-29 RX ORDER — SODIUM CHLORIDE 9 MG/ML
INJECTION, SOLUTION INTRAVENOUS
Status: DISCONTINUED
Start: 2018-06-29 | End: 2018-06-29

## 2018-06-29 RX ORDER — 0.9 % SODIUM CHLORIDE 0.9 %
VIAL (ML) INJECTION
Status: DISCONTINUED
Start: 2018-06-29 | End: 2018-06-29

## 2018-06-29 RX ORDER — ACETAMINOPHEN 325 MG/1
TABLET ORAL EVERY 6 HOURS PRN
Status: CANCELLED | OUTPATIENT
Start: 2018-06-29

## 2018-06-29 RX ORDER — ALBUTEROL SULFATE 90 UG/1
2 AEROSOL, METERED RESPIRATORY (INHALATION) AS NEEDED
Status: CANCELLED | OUTPATIENT
Start: 2018-06-29

## 2018-06-29 RX ORDER — RANITIDINE 25 MG/ML
50 INJECTION, SOLUTION INTRAMUSCULAR; INTRAVENOUS AS NEEDED
Status: CANCELLED | OUTPATIENT
Start: 2018-06-29

## 2018-06-29 RX ORDER — MEPERIDINE HYDROCHLORIDE 25 MG/ML
INJECTION INTRAMUSCULAR; INTRAVENOUS; SUBCUTANEOUS AS NEEDED
Status: CANCELLED | OUTPATIENT
Start: 2018-06-29

## 2018-06-29 RX ADMIN — HEPARIN SODIUM (PORCINE) LOCK FLUSH IV SOLN 100 UNIT/ML 500 UNITS: 100 SOLUTION INTRAVENOUS at 13:42:00

## 2018-06-29 NOTE — PROGRESS NOTES
Pt here for C19D1.  OPDIVO  Arrives Ambulating independently, accompanied by Family member, Yamiletia Wakarusa           Modifications in dose or schedule: No  Port accessed using sterile techinque- good blood return noted, flushes without difficulty     Frequ

## 2018-06-29 NOTE — TELEPHONE ENCOUNTER
Called and relayed MDs message on Armida's confidential VM giving verbal order to extend OT as requested.

## 2018-06-29 NOTE — PROGRESS NOTES
Patient here for lab draw- ambulated from lobby with steady gait, accompanied by her daughter. She states she needs assistance due to her problems seeing. Port accessed using sterile technique- good blood return noted and flushes without difficulty.   Lab

## 2018-06-29 NOTE — PROGRESS NOTES
Cancer Center Progress Note    Patient Name: Vidhi Schilling   YOB: 1970   Medical Record Number: [id number]   Attending Physician: Gabriele Ybarra M.D. Chief Complaint:  Metastatic adenocarcinoma of the lung, brain metastasis.     History of in the setting of polysubstance abuse/dependence. She required mechanical ventilation for secondary respiratory failure. She was seen by neurology and critical care medicine consultation.   After long hospitalization she was eventually discharged to rehab BRAIN SURGERY Left      Comment: OCCIPITAL CRANIOTOMY  No date: BRAIN SURGERY Left      Comment: LOBECTOMY  AUG  2016: BRAIN SURGERY      Comment: REMOVAL OF DEAD TISSUE   2009: CHOLECYSTECTOMY  No date: CYST REMOVAL      Comment: BREAST, BENIGN  No date: Levothyroxine Sodium 150 MCG Oral Tab, Take 1 tablet (150 mcg total) by mouth before breakfast., Disp: 90 tablet, Rfl: 1  •  divalproex Sodium 500 MG Oral Tab EC DR tab, 1500 mg BID, Disp: 540 tablet, Rfl: 3  •  levetiracetam 1000 MG Oral Tab, Take 2 table There is no palpable peripheral lymphadenopathy   Chest: Clear to auscultation. Cardiovascular: Regular rate and rhythm. Normal S1S2  Abdomen: Soft, non tender. No hepatosplenomegaly. No palpable mass.   Extremities: Left foot with mild erythema and david nivolumab since April 2016. She has had issues with recurrent seizures at one point thought to be due to polysubstance abuse and withdrawal later having upper extremity with weakness requiring multiple antiepileptics.   She is following closely with neurol

## 2018-07-02 ENCOUNTER — OFFICE VISIT (OUTPATIENT)
Dept: INTERNAL MEDICINE CLINIC | Facility: CLINIC | Age: 48
End: 2018-07-02

## 2018-07-02 VITALS
HEIGHT: 65 IN | HEART RATE: 80 BPM | DIASTOLIC BLOOD PRESSURE: 70 MMHG | OXYGEN SATURATION: 98 % | SYSTOLIC BLOOD PRESSURE: 104 MMHG | RESPIRATION RATE: 16 BRPM | BODY MASS INDEX: 21.99 KG/M2 | TEMPERATURE: 99 F | WEIGHT: 132 LBS

## 2018-07-02 DIAGNOSIS — E03.9 HYPOTHYROIDISM, UNSPECIFIED TYPE: Primary | ICD-10-CM

## 2018-07-02 DIAGNOSIS — F41.9 ANXIETY: ICD-10-CM

## 2018-07-02 DIAGNOSIS — G40.911 INTRACTABLE EPILEPSY WITH STATUS EPILEPTICUS, UNSPECIFIED EPILEPSY TYPE (HCC): ICD-10-CM

## 2018-07-02 DIAGNOSIS — Z12.39 SCREENING FOR BREAST CANCER: ICD-10-CM

## 2018-07-02 PROCEDURE — 99214 OFFICE O/P EST MOD 30 MIN: CPT | Performed by: INTERNAL MEDICINE

## 2018-07-02 PROCEDURE — 99212 OFFICE O/P EST SF 10 MIN: CPT | Performed by: INTERNAL MEDICINE

## 2018-07-02 RX ORDER — ESCITALOPRAM OXALATE 5 MG/1
5 TABLET ORAL DAILY
Qty: 90 TABLET | Refills: 3 | Status: SHIPPED | OUTPATIENT
Start: 2018-07-02 | End: 2019-07-24

## 2018-07-02 RX ORDER — QUETIAPINE 25 MG/1
25 TABLET, FILM COATED ORAL NIGHTLY
Qty: 90 TABLET | Refills: 3 | Status: SHIPPED | OUTPATIENT
Start: 2018-07-02 | End: 2019-07-19

## 2018-07-02 RX ORDER — LEVOTHYROXINE SODIUM 0.15 MG/1
150 TABLET ORAL
Qty: 90 TABLET | Refills: 1 | Status: CANCELLED | OUTPATIENT
Start: 2018-07-02

## 2018-07-02 RX ORDER — ESCITALOPRAM OXALATE 10 MG/1
5 TABLET ORAL EVERY MORNING
Qty: 90 TABLET | Refills: 1 | Status: CANCELLED | OUTPATIENT
Start: 2018-07-02

## 2018-07-02 RX ORDER — LEVOTHYROXINE SODIUM 0.12 MG/1
125 TABLET ORAL
Qty: 90 TABLET | Refills: 1 | Status: SHIPPED | OUTPATIENT
Start: 2018-07-02 | End: 2018-09-04

## 2018-07-03 NOTE — PROGRESS NOTES
Alcides Cordero is a 49year old female. Patient presents with: Anxiety: 3 month follow up  Arm Pain: Left arm nerve pain 5/10 that radiates to her back and top of head.        HPI:   Alcides Cordero is a 49year old female who presents for: post hospital follow cancer [facility])    • Lung cancer ([facility] [place] [address]    • Migraines    • Osteoarthritis    • Pancreatic cancer ([facility] JULY 2015   • Personal history of antineoplastic chemotherapy     EVERY 2 WEEKS BEGINNING 2015    • Pneumonia, organism unspecified(486)    • Seizure disord apparent distress  NECK: supple, no carotic bruits, no thyromegaly, no cervical or supraclavicular LAD  LUNGS: clear to auscultation bilaterally, no wheezing or rhonchi  CARDIO: RRR, normal S1S2, no gallops or murmurs      ASSESSMENT AND PLAN:     1.  Hypot

## 2018-07-09 ENCOUNTER — TELEPHONE (OUTPATIENT)
Dept: INTERNAL MEDICINE CLINIC | Facility: CLINIC | Age: 48
End: 2018-07-09

## 2018-07-13 ENCOUNTER — NURSE ONLY (OUTPATIENT)
Dept: HEMATOLOGY/ONCOLOGY | Facility: HOSPITAL | Age: 48
End: 2018-07-13
Attending: INTERNAL MEDICINE
Payer: COMMERCIAL

## 2018-07-13 VITALS
WEIGHT: 128 LBS | HEIGHT: 65 IN | HEART RATE: 83 BPM | DIASTOLIC BLOOD PRESSURE: 76 MMHG | RESPIRATION RATE: 16 BRPM | BODY MASS INDEX: 21.33 KG/M2 | SYSTOLIC BLOOD PRESSURE: 114 MMHG | TEMPERATURE: 98 F

## 2018-07-13 DIAGNOSIS — Z51.11 CHEMOTHERAPY MANAGEMENT, ENCOUNTER FOR: ICD-10-CM

## 2018-07-13 DIAGNOSIS — C34.90 MALIGNANT NEOPLASM OF LUNG, UNSPECIFIED LATERALITY, UNSPECIFIED PART OF LUNG (HCC): Primary | ICD-10-CM

## 2018-07-13 DIAGNOSIS — E03.9 HYPOTHYROIDISM (ACQUIRED): ICD-10-CM

## 2018-07-13 DIAGNOSIS — G40.909 SEIZURE DISORDER (HCC): ICD-10-CM

## 2018-07-13 DIAGNOSIS — C34.92 CARCINOMA OF LUNG, LEFT (HCC): ICD-10-CM

## 2018-07-13 DIAGNOSIS — Z45.2 ENCOUNTER FOR ADJUSTMENT OR MANAGEMENT OF VASCULAR ACCESS DEVICE: ICD-10-CM

## 2018-07-13 DIAGNOSIS — D64.9 ANEMIA, UNSPECIFIED TYPE: ICD-10-CM

## 2018-07-13 DIAGNOSIS — C79.31 BRAIN METASTASES (HCC): ICD-10-CM

## 2018-07-13 LAB
ALBUMIN SERPL BCP-MCNC: 3.4 G/DL (ref 3.5–4.8)
ALBUMIN/GLOB SERPL: 0.8 {RATIO} (ref 1–2)
ALP SERPL-CCNC: 96 U/L (ref 32–100)
ALT SERPL-CCNC: 16 U/L (ref 14–54)
ANION GAP SERPL CALC-SCNC: 8 MMOL/L (ref 0–18)
AST SERPL-CCNC: 16 U/L (ref 15–41)
BASOPHILS # BLD: 0 K/UL (ref 0–0.2)
BASOPHILS NFR BLD: 0 %
BILIRUB SERPL-MCNC: 0.5 MG/DL (ref 0.3–1.2)
BUN SERPL-MCNC: 10 MG/DL (ref 8–20)
BUN/CREAT SERPL: 17.9 (ref 10–20)
CALCIUM SERPL-MCNC: 9.2 MG/DL (ref 8.5–10.5)
CHLORIDE SERPL-SCNC: 103 MMOL/L (ref 95–110)
CO2 SERPL-SCNC: 27 MMOL/L (ref 22–32)
CREAT SERPL-MCNC: 0.56 MG/DL (ref 0.5–1.5)
EOSINOPHIL # BLD: 0 K/UL (ref 0–0.7)
EOSINOPHIL NFR BLD: 0 %
ERYTHROCYTE [DISTWIDTH] IN BLOOD BY AUTOMATED COUNT: 14.2 % (ref 11–15)
GLOBULIN PLAS-MCNC: 4.2 G/DL (ref 2.5–3.7)
GLUCOSE SERPL-MCNC: 103 MG/DL (ref 70–99)
HCT VFR BLD AUTO: 30 % (ref 35–48)
HGB BLD-MCNC: 10 G/DL (ref 12–16)
LYMPHOCYTES # BLD: 2.3 K/UL (ref 1–4)
LYMPHOCYTES NFR BLD: 29 %
MCH RBC QN AUTO: 30.4 PG (ref 27–32)
MCHC RBC AUTO-ENTMCNC: 33.4 G/DL (ref 32–37)
MCV RBC AUTO: 91 FL (ref 80–100)
MONOCYTES # BLD: 0.5 K/UL (ref 0–1)
MONOCYTES NFR BLD: 6 %
NEUTROPHILS # BLD AUTO: 5.1 K/UL (ref 1.8–7.7)
NEUTROPHILS NFR BLD: 65 %
OSMOLALITY UR CALC.SUM OF ELEC: 285 MOSM/KG (ref 275–295)
PATIENT FASTING: NO
PLATELET # BLD AUTO: 570 K/UL (ref 140–400)
PMV BLD AUTO: 7.5 FL (ref 7.4–10.3)
POTASSIUM SERPL-SCNC: 4 MMOL/L (ref 3.3–5.1)
PROT SERPL-MCNC: 7.6 G/DL (ref 5.9–8.4)
RBC # BLD AUTO: 3.3 M/UL (ref 3.7–5.4)
SODIUM SERPL-SCNC: 138 MMOL/L (ref 136–144)
WBC # BLD AUTO: 7.9 K/UL (ref 4–11)

## 2018-07-13 PROCEDURE — 85025 COMPLETE CBC W/AUTO DIFF WBC: CPT

## 2018-07-13 PROCEDURE — 96413 CHEMO IV INFUSION 1 HR: CPT

## 2018-07-13 PROCEDURE — A4216 STERILE WATER/SALINE, 10 ML: HCPCS

## 2018-07-13 PROCEDURE — 80053 COMPREHEN METABOLIC PANEL: CPT

## 2018-07-13 PROCEDURE — 99215 OFFICE O/P EST HI 40 MIN: CPT | Performed by: INTERNAL MEDICINE

## 2018-07-13 RX ORDER — HEPARIN SODIUM (PORCINE) LOCK FLUSH IV SOLN 100 UNIT/ML 100 UNIT/ML
SOLUTION INTRAVENOUS
Status: COMPLETED
Start: 2018-07-13 | End: 2018-07-13

## 2018-07-13 RX ORDER — ACETAMINOPHEN 325 MG/1
TABLET ORAL EVERY 6 HOURS PRN
Status: CANCELLED | OUTPATIENT
Start: 2018-07-13

## 2018-07-13 RX ORDER — DIPHENHYDRAMINE HYDROCHLORIDE 50 MG/ML
INJECTION INTRAMUSCULAR; INTRAVENOUS EVERY 4 HOURS PRN
Status: CANCELLED | OUTPATIENT
Start: 2018-07-13

## 2018-07-13 RX ORDER — ALBUTEROL SULFATE 90 UG/1
2 AEROSOL, METERED RESPIRATORY (INHALATION) AS NEEDED
Status: CANCELLED | OUTPATIENT
Start: 2018-07-13

## 2018-07-13 RX ORDER — SODIUM CHLORIDE 9 MG/ML
INJECTION, SOLUTION INTRAVENOUS
Status: DISCONTINUED
Start: 2018-07-13 | End: 2018-07-13 | Stop reason: WASHOUT

## 2018-07-13 RX ORDER — 0.9 % SODIUM CHLORIDE 0.9 %
VIAL (ML) INJECTION
Status: DISCONTINUED
Start: 2018-07-13 | End: 2018-07-13

## 2018-07-13 RX ORDER — HEPARIN SODIUM (PORCINE) LOCK FLUSH IV SOLN 100 UNIT/ML 100 UNIT/ML
5 SOLUTION INTRAVENOUS ONCE
Status: COMPLETED | OUTPATIENT
Start: 2018-07-13 | End: 2018-07-13

## 2018-07-13 RX ORDER — 0.9 % SODIUM CHLORIDE 0.9 %
10 VIAL (ML) INJECTION ONCE
Status: CANCELLED | OUTPATIENT
Start: 2018-07-13

## 2018-07-13 RX ORDER — SODIUM CHLORIDE 9 MG/ML
INJECTION, SOLUTION INTRAVENOUS
Status: DISCONTINUED
Start: 2018-07-13 | End: 2018-07-13

## 2018-07-13 RX ORDER — RANITIDINE 25 MG/ML
50 INJECTION, SOLUTION INTRAMUSCULAR; INTRAVENOUS AS NEEDED
Status: CANCELLED | OUTPATIENT
Start: 2018-07-13

## 2018-07-13 RX ORDER — MEPERIDINE HYDROCHLORIDE 25 MG/ML
INJECTION INTRAMUSCULAR; INTRAVENOUS; SUBCUTANEOUS AS NEEDED
Status: CANCELLED | OUTPATIENT
Start: 2018-07-13

## 2018-07-13 RX ORDER — HEPARIN SODIUM (PORCINE) LOCK FLUSH IV SOLN 100 UNIT/ML 100 UNIT/ML
5 SOLUTION INTRAVENOUS ONCE
Status: CANCELLED | OUTPATIENT
Start: 2018-07-13

## 2018-07-13 RX ORDER — DIPHENHYDRAMINE HYDROCHLORIDE 50 MG/ML
25 INJECTION INTRAMUSCULAR; INTRAVENOUS ONCE
Status: CANCELLED | OUTPATIENT
Start: 2018-07-13

## 2018-07-13 RX ADMIN — HEPARIN SODIUM (PORCINE) LOCK FLUSH IV SOLN 100 UNIT/ML 500 UNITS: 100 SOLUTION INTRAVENOUS at 15:35:00

## 2018-07-13 NOTE — PROGRESS NOTES
Cancer Center Progress Note    Patient Name: Rui Polanco   YOB: 1970   Medical Record Number: [id number]   Attending Physician: Roger Townsend M.D. Chief Complaint:  Metastatic adenocarcinoma of the lung, brain metastasis.     History of in the setting of polysubstance abuse/dependence. She required mechanical ventilation for secondary respiratory failure. She was seen by neurology and critical care medicine consultation.   After long hospitalization she was eventually discharged to rehab BRAIN SURGERY Left      Comment: OCCIPITAL CRANIOTOMY  No date: BRAIN SURGERY Left      Comment: LOBECTOMY  AUG  2016: BRAIN SURGERY      Comment: REMOVAL OF DEAD TISSUE   2009: CHOLECYSTECTOMY  No date: CYST REMOVAL      Comment: BREAST, BENIGN  No date: 2  •  levetiracetam 1000 MG Oral Tab, Take 2 tablets (2,000 mg total) by mouth 2 (two) times daily. (Patient taking differently: Take 500 mg by mouth 2 (two) times daily.  1500 BID daily per pts  ), Disp: 360 tablet, Rfl: 3  •  Pantoprazole Sodium 40 07/13/2018   AGRATIO 0.9 (L) 09/21/2016    07/13/2018   K 4.0 07/13/2018    07/13/2018   CO2 27 07/13/2018     Radiology:  None new    Impression and Plan:  25-year-old female with metastatic adenocarcinoma of the left upper lobe of the lung (E this  –Previous hypokalemia --imporved  –Hypothyroidism secondary to nivolumab. She is on thyroid replacement--we will continue to follow her thyroid tests on treatment. Endocrinology is managing this  –Polysubstance abuse/dependence with seizures.   She i

## 2018-07-13 NOTE — PROGRESS NOTES
Pt here for C20D1. OPDIVO  Arrives Ambulating independently, accompanied by Spouse         Modifications in dose or schedule: No    States she is feeling well. Denies any vision changes or dizziness. States she is eating well. Denies any dyspnea or cough.

## 2018-07-27 ENCOUNTER — OFFICE VISIT (OUTPATIENT)
Dept: HEMATOLOGY/ONCOLOGY | Facility: HOSPITAL | Age: 48
End: 2018-07-27
Attending: PHYSICIAN ASSISTANT
Payer: COMMERCIAL

## 2018-07-27 ENCOUNTER — NURSE ONLY (OUTPATIENT)
Dept: HEMATOLOGY/ONCOLOGY | Facility: HOSPITAL | Age: 48
End: 2018-07-27
Attending: INTERNAL MEDICINE
Payer: COMMERCIAL

## 2018-07-27 ENCOUNTER — TELEPHONE (OUTPATIENT)
Dept: HEMATOLOGY/ONCOLOGY | Facility: HOSPITAL | Age: 48
End: 2018-07-27

## 2018-07-27 VITALS
TEMPERATURE: 98 F | RESPIRATION RATE: 16 BRPM | SYSTOLIC BLOOD PRESSURE: 109 MMHG | DIASTOLIC BLOOD PRESSURE: 69 MMHG | HEART RATE: 89 BPM

## 2018-07-27 VITALS
DIASTOLIC BLOOD PRESSURE: 59 MMHG | HEART RATE: 89 BPM | SYSTOLIC BLOOD PRESSURE: 109 MMHG | RESPIRATION RATE: 16 BRPM | BODY MASS INDEX: 22 KG/M2 | TEMPERATURE: 98 F | WEIGHT: 134 LBS

## 2018-07-27 VITALS
DIASTOLIC BLOOD PRESSURE: 59 MMHG | SYSTOLIC BLOOD PRESSURE: 109 MMHG | TEMPERATURE: 98 F | BODY MASS INDEX: 22.33 KG/M2 | HEIGHT: 65 IN | RESPIRATION RATE: 16 BRPM | HEART RATE: 89 BPM | WEIGHT: 134 LBS

## 2018-07-27 DIAGNOSIS — E03.2 HYPOTHYROIDISM DUE TO MEDICATION: Primary | ICD-10-CM

## 2018-07-27 DIAGNOSIS — C78.7 LIVER METASTASES (HCC): ICD-10-CM

## 2018-07-27 DIAGNOSIS — C34.92 CARCINOMA OF LUNG, LEFT (HCC): ICD-10-CM

## 2018-07-27 DIAGNOSIS — Z45.2 ENCOUNTER FOR ADJUSTMENT OR MANAGEMENT OF VASCULAR ACCESS DEVICE: ICD-10-CM

## 2018-07-27 DIAGNOSIS — C34.90 PRIMARY MALIGNANT NEOPLASM OF LUNG METASTATIC TO OTHER SITE, UNSPECIFIED LATERALITY (HCC): ICD-10-CM

## 2018-07-27 DIAGNOSIS — Z51.81 MEDICATION MONITORING ENCOUNTER: ICD-10-CM

## 2018-07-27 DIAGNOSIS — C34.90 MALIGNANT NEOPLASM OF LUNG, UNSPECIFIED LATERALITY, UNSPECIFIED PART OF LUNG (HCC): Primary | ICD-10-CM

## 2018-07-27 DIAGNOSIS — C79.49 SECONDARY MALIGNANT NEOPLASM OF BRAIN AND SPINAL CORD (HCC): ICD-10-CM

## 2018-07-27 DIAGNOSIS — E03.2 HYPOTHYROIDISM DUE TO MEDICATION: ICD-10-CM

## 2018-07-27 DIAGNOSIS — C79.31 SECONDARY MALIGNANT NEOPLASM OF BRAIN AND SPINAL CORD (HCC): ICD-10-CM

## 2018-07-27 DIAGNOSIS — H53.9 VISION CHANGES: ICD-10-CM

## 2018-07-27 LAB
ALBUMIN SERPL BCP-MCNC: 3.3 G/DL (ref 3.5–4.8)
ALBUMIN/GLOB SERPL: 0.9 {RATIO} (ref 1–2)
ALP SERPL-CCNC: 85 U/L (ref 32–100)
ALT SERPL-CCNC: 12 U/L (ref 14–54)
ANION GAP SERPL CALC-SCNC: 8 MMOL/L (ref 0–18)
AST SERPL-CCNC: 16 U/L (ref 15–41)
BASOPHILS # BLD: 0.1 K/UL (ref 0–0.2)
BASOPHILS NFR BLD: 1 %
BILIRUB SERPL-MCNC: 0.3 MG/DL (ref 0.3–1.2)
BUN SERPL-MCNC: 14 MG/DL (ref 8–20)
BUN/CREAT SERPL: 26.4 (ref 10–20)
CALCIUM SERPL-MCNC: 9.2 MG/DL (ref 8.5–10.5)
CHLORIDE SERPL-SCNC: 104 MMOL/L (ref 95–110)
CO2 SERPL-SCNC: 25 MMOL/L (ref 22–32)
CREAT SERPL-MCNC: 0.53 MG/DL (ref 0.5–1.5)
EOSINOPHIL # BLD: 0.1 K/UL (ref 0–0.7)
EOSINOPHIL NFR BLD: 1 %
ERYTHROCYTE [DISTWIDTH] IN BLOOD BY AUTOMATED COUNT: 14.6 % (ref 11–15)
GLOBULIN PLAS-MCNC: 3.8 G/DL (ref 2.5–3.7)
GLUCOSE SERPL-MCNC: 77 MG/DL (ref 70–99)
HCT VFR BLD AUTO: 30.1 % (ref 35–48)
HGB BLD-MCNC: 10 G/DL (ref 12–16)
LYMPHOCYTES # BLD: 2.7 K/UL (ref 1–4)
LYMPHOCYTES NFR BLD: 28 %
MCH RBC QN AUTO: 29.7 PG (ref 27–32)
MCHC RBC AUTO-ENTMCNC: 33 G/DL (ref 32–37)
MCV RBC AUTO: 89.9 FL (ref 80–100)
MONOCYTES # BLD: 0.8 K/UL (ref 0–1)
MONOCYTES NFR BLD: 8 %
NEUTROPHILS # BLD AUTO: 6.3 K/UL (ref 1.8–7.7)
NEUTROPHILS NFR BLD: 63 %
OSMOLALITY UR CALC.SUM OF ELEC: 283 MOSM/KG (ref 275–295)
PATIENT FASTING: NO
PLATELET # BLD AUTO: 414 K/UL (ref 140–400)
PMV BLD AUTO: 7.5 FL (ref 7.4–10.3)
POTASSIUM SERPL-SCNC: 3.8 MMOL/L (ref 3.3–5.1)
PROT SERPL-MCNC: 7.1 G/DL (ref 5.9–8.4)
RBC # BLD AUTO: 3.35 M/UL (ref 3.7–5.4)
SODIUM SERPL-SCNC: 137 MMOL/L (ref 136–144)
TSH SERPL-ACNC: 0.02 UIU/ML (ref 0.45–5.33)
WBC # BLD AUTO: 10 K/UL (ref 4–11)

## 2018-07-27 PROCEDURE — 80053 COMPREHEN METABOLIC PANEL: CPT

## 2018-07-27 PROCEDURE — 96413 CHEMO IV INFUSION 1 HR: CPT

## 2018-07-27 PROCEDURE — 99215 OFFICE O/P EST HI 40 MIN: CPT | Performed by: PHYSICIAN ASSISTANT

## 2018-07-27 PROCEDURE — 84443 ASSAY THYROID STIM HORMONE: CPT

## 2018-07-27 PROCEDURE — 85025 COMPLETE CBC W/AUTO DIFF WBC: CPT

## 2018-07-27 PROCEDURE — A4216 STERILE WATER/SALINE, 10 ML: HCPCS

## 2018-07-27 RX ORDER — 0.9 % SODIUM CHLORIDE 0.9 %
10 VIAL (ML) INJECTION ONCE
Status: CANCELLED | OUTPATIENT
Start: 2018-07-27

## 2018-07-27 RX ORDER — RANITIDINE 25 MG/ML
50 INJECTION, SOLUTION INTRAMUSCULAR; INTRAVENOUS AS NEEDED
Status: CANCELLED | OUTPATIENT
Start: 2018-07-27

## 2018-07-27 RX ORDER — ALBUTEROL SULFATE 90 UG/1
2 AEROSOL, METERED RESPIRATORY (INHALATION) AS NEEDED
Status: CANCELLED | OUTPATIENT
Start: 2018-07-27

## 2018-07-27 RX ORDER — DIPHENHYDRAMINE HYDROCHLORIDE 50 MG/ML
25 INJECTION INTRAMUSCULAR; INTRAVENOUS ONCE
Status: CANCELLED | OUTPATIENT
Start: 2018-07-27

## 2018-07-27 RX ORDER — 0.9 % SODIUM CHLORIDE 0.9 %
VIAL (ML) INJECTION
Status: DISCONTINUED
Start: 2018-07-27 | End: 2018-07-27

## 2018-07-27 RX ORDER — HEPARIN SODIUM (PORCINE) LOCK FLUSH IV SOLN 100 UNIT/ML 100 UNIT/ML
5 SOLUTION INTRAVENOUS ONCE
Status: COMPLETED | OUTPATIENT
Start: 2018-07-27 | End: 2018-07-27

## 2018-07-27 RX ORDER — MEPERIDINE HYDROCHLORIDE 25 MG/ML
INJECTION INTRAMUSCULAR; INTRAVENOUS; SUBCUTANEOUS AS NEEDED
Status: CANCELLED | OUTPATIENT
Start: 2018-07-27

## 2018-07-27 RX ORDER — HEPARIN SODIUM (PORCINE) LOCK FLUSH IV SOLN 100 UNIT/ML 100 UNIT/ML
SOLUTION INTRAVENOUS
Status: COMPLETED
Start: 2018-07-27 | End: 2018-07-27

## 2018-07-27 RX ORDER — HEPARIN SODIUM (PORCINE) LOCK FLUSH IV SOLN 100 UNIT/ML 100 UNIT/ML
5 SOLUTION INTRAVENOUS ONCE
Status: DISCONTINUED | OUTPATIENT
Start: 2018-07-27 | End: 2018-07-27

## 2018-07-27 RX ORDER — ACETAMINOPHEN 325 MG/1
TABLET ORAL EVERY 6 HOURS PRN
Status: CANCELLED | OUTPATIENT
Start: 2018-07-27

## 2018-07-27 RX ORDER — HEPARIN SODIUM (PORCINE) LOCK FLUSH IV SOLN 100 UNIT/ML 100 UNIT/ML
5 SOLUTION INTRAVENOUS ONCE
Status: CANCELLED | OUTPATIENT
Start: 2018-07-27

## 2018-07-27 RX ORDER — HEPARIN SODIUM (PORCINE) LOCK FLUSH IV SOLN 100 UNIT/ML 100 UNIT/ML
SOLUTION INTRAVENOUS
Status: DISCONTINUED
Start: 2018-07-27 | End: 2018-07-27 | Stop reason: WASHOUT

## 2018-07-27 RX ORDER — DIPHENHYDRAMINE HYDROCHLORIDE 50 MG/ML
INJECTION INTRAMUSCULAR; INTRAVENOUS EVERY 4 HOURS PRN
Status: CANCELLED | OUTPATIENT
Start: 2018-07-27

## 2018-07-27 RX ORDER — SODIUM CHLORIDE 9 MG/ML
INJECTION, SOLUTION INTRAVENOUS
Status: DISCONTINUED
Start: 2018-07-27 | End: 2018-07-27

## 2018-07-27 RX ADMIN — HEPARIN SODIUM (PORCINE) LOCK FLUSH IV SOLN 100 UNIT/ML 500 UNITS: 100 SOLUTION INTRAVENOUS at 13:40:00

## 2018-07-27 NOTE — PROGRESS NOTES
Kyra American to infusion today for sameday bloodwork. She arrives alert and independent. Feels good, smiling, no complaints. She is accompanied by her  today. She has an appointment with NICKY Baptiste followed by possible treatment of Opdivo today.   Patient'

## 2018-07-27 NOTE — PROGRESS NOTES
[place] to infusion today for C21 D1 Opdivo. Arrives Ambulating independently, accompanied by Family member,  and daughter in law    Patient is feeling well, no complaints except occasional fatigue.  She states her seizures have been controlled now,

## 2018-07-27 NOTE — TELEPHONE ENCOUNTER
Spoke with Jeb Landeros, she declined the MRI for tomorrow, states she is unable to go to [place] and prefers to wait for an \"evening appt someplace closer. \"  Scheduling doesn't have an opening in [facility] area until 8/8.   Updated NICKY Mcgill- she said the MRI

## 2018-07-27 NOTE — TELEPHONE ENCOUNTER
LM for Steven Tate regarding MRI for tomorrow, asked him to please call back so I can go over schedule details with him.

## 2018-07-27 NOTE — PATIENT INSTRUCTIONS
1.  Continue with cycle 21 nivolumab    2. MRI Brain. If negative, follow-up with an ophthalmologist    3. Call prn    4.   Follow-up in 2 weeks

## 2018-07-27 NOTE — PROGRESS NOTES
Cancer Center Progress Note    Patient Name: Mynor Don   YOB: 1970   Medical Record Number: [id number]   Attending Physician: Олег Hughes M.D. Chief Complaint:  Metastatic adenocarcinoma of the lung, brain metastasis.     History of in the setting of polysubstance abuse/dependence. She required mechanical ventilation for secondary respiratory failure. She was seen by neurology and critical care medicine consultation.   After long hospitalization she was eventually discharged to rehab Gallbladder disease    • Hepatitis C    • High blood pressure    • Hypothyroid    • Lung cancer (HCC)    • Lung cancer (HCC)    • Migraines    • Osteoarthritis    • [facility]   • Personal history of antineoplastic chemotherapy     E Medications:    Current Outpatient Prescriptions:   •  QUEtiapine Fumarate (SEROQUEL) 25 MG Oral Tab, Take 1 tablet (25 mg total) by mouth nightly., Disp: 90 tablet, Rfl: 3  •  Levothyroxine Sodium (SYNTHROID) 125 MCG Oral Tab, Take 1 tablet (125 mcg total HGB  10.0*   PLT  570*   NE  5.1             Lab Results  Component Value Date   GLU 77 07/27/2018   BUN 14 07/27/2018   BUNCREA 26.4 (H) 07/27/2018   CREATSERUM 0.53 07/27/2018   ANIONGAP 8 07/27/2018   GFRNAA >60 07/27/2018   GFRAA >60 07/27/2018   CA imaging does not show any new discrete lesions. She had a repeat brain MRI 2/9/2018 [facility] that showed no new lesion. Seizures are now being managed by neurology at [facility].   She significantly improved neurologically      –Marques

## 2018-07-31 ENCOUNTER — HOSPITAL ENCOUNTER (OUTPATIENT)
Dept: MRI IMAGING | Age: 48
Discharge: HOME | End: 2018-07-31
Attending: PHYSICIAN ASSISTANT
Payer: COMMERCIAL

## 2018-07-31 DIAGNOSIS — C34.90 PRIMARY MALIGNANT NEOPLASM OF LUNG METASTATIC TO OTHER SITE, UNSPECIFIED LATERALITY (HCC): ICD-10-CM

## 2018-07-31 DIAGNOSIS — C79.49 SECONDARY MALIGNANT NEOPLASM OF BRAIN AND SPINAL CORD (HCC): ICD-10-CM

## 2018-07-31 DIAGNOSIS — H53.9 VISION CHANGES: ICD-10-CM

## 2018-07-31 DIAGNOSIS — C79.31 SECONDARY MALIGNANT NEOPLASM OF BRAIN AND SPINAL CORD (HCC): ICD-10-CM

## 2018-07-31 PROCEDURE — 70553 MRI BRAIN STEM W/O & W/DYE: CPT | Performed by: PHYSICIAN ASSISTANT

## 2018-07-31 PROCEDURE — A9576 INJ PROHANCE MULTIPACK: HCPCS | Performed by: PHYSICIAN ASSISTANT

## 2018-08-01 ENCOUNTER — TELEPHONE (OUTPATIENT)
Dept: HEMATOLOGY/ONCOLOGY | Facility: HOSPITAL | Age: 48
End: 2018-08-01

## 2018-08-01 NOTE — TELEPHONE ENCOUNTER
Patient's spouse notified that [place]'s brain MRI is stable - no new findings. Given h/o vision changes - white flashes of light in right visual field - recommend ophtho consult.   Ioana Younger states that Josh Dinh has not had recurrent flashes of light since her 7/2

## 2018-08-06 ENCOUNTER — TELEPHONE (OUTPATIENT)
Dept: ENDOCRINOLOGY CLINIC | Facility: CLINIC | Age: 48
End: 2018-08-06

## 2018-08-06 NOTE — TELEPHONE ENCOUNTER
Received thyroid labs. Medication needs adjustment. Have not seen patient for more than a year  Please book apt to discuss results and adjust medication. Thanks.

## 2018-08-10 ENCOUNTER — OFFICE VISIT (OUTPATIENT)
Dept: HEMATOLOGY/ONCOLOGY | Facility: HOSPITAL | Age: 48
End: 2018-08-10
Attending: INTERNAL MEDICINE
Payer: COMMERCIAL

## 2018-08-10 VITALS
HEIGHT: 65 IN | RESPIRATION RATE: 16 BRPM | WEIGHT: 138 LBS | SYSTOLIC BLOOD PRESSURE: 107 MMHG | HEART RATE: 74 BPM | BODY MASS INDEX: 22.99 KG/M2 | DIASTOLIC BLOOD PRESSURE: 63 MMHG | TEMPERATURE: 98 F

## 2018-08-10 DIAGNOSIS — D64.9 ANEMIA, UNSPECIFIED TYPE: ICD-10-CM

## 2018-08-10 DIAGNOSIS — E03.9 HYPOTHYROIDISM (ACQUIRED): ICD-10-CM

## 2018-08-10 DIAGNOSIS — C34.92 CARCINOMA OF LUNG, LEFT (HCC): ICD-10-CM

## 2018-08-10 DIAGNOSIS — C78.7 LIVER METASTASES (HCC): Primary | ICD-10-CM

## 2018-08-10 DIAGNOSIS — C34.90 MALIGNANT NEOPLASM OF LUNG, UNSPECIFIED LATERALITY, UNSPECIFIED PART OF LUNG (HCC): Primary | ICD-10-CM

## 2018-08-10 DIAGNOSIS — G40.909 SEIZURE DISORDER (HCC): ICD-10-CM

## 2018-08-10 DIAGNOSIS — Z45.2 ENCOUNTER FOR ADJUSTMENT OR MANAGEMENT OF VASCULAR ACCESS DEVICE: ICD-10-CM

## 2018-08-10 DIAGNOSIS — Z51.11 CHEMOTHERAPY MANAGEMENT, ENCOUNTER FOR: ICD-10-CM

## 2018-08-10 DIAGNOSIS — C79.31 BRAIN METASTASES (HCC): ICD-10-CM

## 2018-08-10 DIAGNOSIS — C34.90 PRIMARY MALIGNANT NEOPLASM OF LUNG METASTATIC TO OTHER SITE, UNSPECIFIED LATERALITY (HCC): ICD-10-CM

## 2018-08-10 LAB
ALBUMIN SERPL BCP-MCNC: 3.4 G/DL (ref 3.5–4.8)
ALBUMIN/GLOB SERPL: 0.8 {RATIO} (ref 1–2)
ALP SERPL-CCNC: 88 U/L (ref 32–100)
ALT SERPL-CCNC: 9 U/L (ref 14–54)
ANION GAP SERPL CALC-SCNC: 6 MMOL/L (ref 0–18)
AST SERPL-CCNC: 15 U/L (ref 15–41)
BASOPHILS # BLD: 0 K/UL (ref 0–0.2)
BASOPHILS NFR BLD: 1 %
BILIRUB SERPL-MCNC: 0.1 MG/DL (ref 0.3–1.2)
BUN SERPL-MCNC: 11 MG/DL (ref 8–20)
BUN/CREAT SERPL: 17.2 (ref 10–20)
CALCIUM SERPL-MCNC: 8.9 MG/DL (ref 8.5–10.5)
CHLORIDE SERPL-SCNC: 103 MMOL/L (ref 95–110)
CO2 SERPL-SCNC: 27 MMOL/L (ref 22–32)
CREAT SERPL-MCNC: 0.64 MG/DL (ref 0.5–1.5)
EOSINOPHIL # BLD: 0 K/UL (ref 0–0.7)
EOSINOPHIL NFR BLD: 0 %
ERYTHROCYTE [DISTWIDTH] IN BLOOD BY AUTOMATED COUNT: 14.7 % (ref 11–15)
GLOBULIN PLAS-MCNC: 4.2 G/DL (ref 2.5–3.7)
GLUCOSE SERPL-MCNC: 111 MG/DL (ref 70–99)
HCT VFR BLD AUTO: 30.9 % (ref 35–48)
HGB BLD-MCNC: 10.1 G/DL (ref 12–16)
LYMPHOCYTES # BLD: 2.5 K/UL (ref 1–4)
LYMPHOCYTES NFR BLD: 27 %
MCH RBC QN AUTO: 29.2 PG (ref 27–32)
MCHC RBC AUTO-ENTMCNC: 32.7 G/DL (ref 32–37)
MCV RBC AUTO: 89.3 FL (ref 80–100)
MONOCYTES # BLD: 0.6 K/UL (ref 0–1)
MONOCYTES NFR BLD: 7 %
NEUTROPHILS # BLD AUTO: 6.1 K/UL (ref 1.8–7.7)
NEUTROPHILS NFR BLD: 66 %
OSMOLALITY UR CALC.SUM OF ELEC: 282 MOSM/KG (ref 275–295)
PATIENT FASTING: NO
PLATELET # BLD AUTO: 499 K/UL (ref 140–400)
PMV BLD AUTO: 7.4 FL (ref 7.4–10.3)
POTASSIUM SERPL-SCNC: 4.1 MMOL/L (ref 3.3–5.1)
PROT SERPL-MCNC: 7.6 G/DL (ref 5.9–8.4)
RBC # BLD AUTO: 3.46 M/UL (ref 3.7–5.4)
SODIUM SERPL-SCNC: 136 MMOL/L (ref 136–144)
TSH SERPL-ACNC: 0.05 UIU/ML (ref 0.45–5.33)
WBC # BLD AUTO: 9.4 K/UL (ref 4–11)

## 2018-08-10 PROCEDURE — 85025 COMPLETE CBC W/AUTO DIFF WBC: CPT

## 2018-08-10 PROCEDURE — 99215 OFFICE O/P EST HI 40 MIN: CPT | Performed by: INTERNAL MEDICINE

## 2018-08-10 PROCEDURE — A4216 STERILE WATER/SALINE, 10 ML: HCPCS

## 2018-08-10 PROCEDURE — 96413 CHEMO IV INFUSION 1 HR: CPT

## 2018-08-10 PROCEDURE — 80053 COMPREHEN METABOLIC PANEL: CPT

## 2018-08-10 PROCEDURE — 84443 ASSAY THYROID STIM HORMONE: CPT

## 2018-08-10 RX ORDER — SODIUM CHLORIDE 9 MG/ML
INJECTION, SOLUTION INTRAVENOUS
Status: DISCONTINUED
Start: 2018-08-10 | End: 2018-08-10

## 2018-08-10 RX ORDER — HEPARIN SODIUM (PORCINE) LOCK FLUSH IV SOLN 100 UNIT/ML 100 UNIT/ML
5 SOLUTION INTRAVENOUS ONCE
Status: COMPLETED | OUTPATIENT
Start: 2018-08-10 | End: 2018-08-10

## 2018-08-10 RX ORDER — DIPHENHYDRAMINE HYDROCHLORIDE 50 MG/ML
25 INJECTION INTRAMUSCULAR; INTRAVENOUS ONCE
Status: CANCELLED | OUTPATIENT
Start: 2018-08-10

## 2018-08-10 RX ORDER — 0.9 % SODIUM CHLORIDE 0.9 %
VIAL (ML) INJECTION
Status: DISCONTINUED
Start: 2018-08-10 | End: 2018-08-10

## 2018-08-10 RX ORDER — HEPARIN SODIUM (PORCINE) LOCK FLUSH IV SOLN 100 UNIT/ML 100 UNIT/ML
5 SOLUTION INTRAVENOUS ONCE
Status: CANCELLED | OUTPATIENT
Start: 2018-08-10

## 2018-08-10 RX ORDER — HEPARIN SODIUM (PORCINE) LOCK FLUSH IV SOLN 100 UNIT/ML 100 UNIT/ML
SOLUTION INTRAVENOUS
Status: COMPLETED
Start: 2018-08-10 | End: 2018-08-10

## 2018-08-10 RX ORDER — ACETAMINOPHEN 325 MG/1
TABLET ORAL EVERY 6 HOURS PRN
Status: CANCELLED | OUTPATIENT
Start: 2018-08-10

## 2018-08-10 RX ORDER — DIPHENHYDRAMINE HYDROCHLORIDE 50 MG/ML
INJECTION INTRAMUSCULAR; INTRAVENOUS EVERY 4 HOURS PRN
Status: CANCELLED | OUTPATIENT
Start: 2018-08-10

## 2018-08-10 RX ORDER — ALBUTEROL SULFATE 90 UG/1
2 AEROSOL, METERED RESPIRATORY (INHALATION) AS NEEDED
Status: CANCELLED | OUTPATIENT
Start: 2018-08-10

## 2018-08-10 RX ORDER — RANITIDINE 25 MG/ML
50 INJECTION, SOLUTION INTRAMUSCULAR; INTRAVENOUS AS NEEDED
Status: CANCELLED | OUTPATIENT
Start: 2018-08-10

## 2018-08-10 RX ORDER — MEPERIDINE HYDROCHLORIDE 25 MG/ML
INJECTION INTRAMUSCULAR; INTRAVENOUS; SUBCUTANEOUS AS NEEDED
Status: CANCELLED | OUTPATIENT
Start: 2018-08-10

## 2018-08-10 RX ORDER — 0.9 % SODIUM CHLORIDE 0.9 %
10 VIAL (ML) INJECTION ONCE
Status: CANCELLED | OUTPATIENT
Start: 2018-08-10

## 2018-08-10 RX ADMIN — HEPARIN SODIUM (PORCINE) LOCK FLUSH IV SOLN 100 UNIT/ML 500 UNITS: 100 SOLUTION INTRAVENOUS at 13:23:00

## 2018-08-10 NOTE — PROGRESS NOTES
Cancer Center Progress Note    Patient Name: Sparkle Dodd   YOB: 1970   Medical Record Number: [id number]   Attending Physician: Gerardo Ball M.D. Chief Complaint:  Metastatic adenocarcinoma of the lung, brain metastasis.     History of in the setting of polysubstance abuse/dependence. She required mechanical ventilation for secondary respiratory failure. She was seen by neurology and critical care medicine consultation.   After long hospitalization she was eventually discharged to rehab BRAIN SURGERY Left      Comment: OCCIPITAL CRANIOTOMY  No date: BRAIN SURGERY Left      Comment: LOBECTOMY  AUG  2016: BRAIN SURGERY      Comment: REMOVAL OF DEAD TISSUE   2009: CHOLECYSTECTOMY  No date: CYST REMOVAL      Comment: BREAST, BENIGN  No date: 2  •  levetiracetam 1000 MG Oral Tab, Take 2 tablets (2,000 mg total) by mouth 2 (two) times daily. (Patient taking differently: Take 500 mg by mouth 2 (two) times daily.  1500 BID daily per pts  ), Disp: 360 tablet, Rfl: 3  •  Pantoprazole Sodium 40 08/10/2018   AGRATIO 0.9 (L) 09/21/2016    08/10/2018   K 4.1 08/10/2018    08/10/2018   CO2 27 08/10/2018     Radiology:  None new    Impression and Plan:  25-year-old female with metastatic adenocarcinoma of the left upper lobe of the lung (E this  –Previous hypokalemia --imporved  –Hypothyroidism secondary to nivolumab. She is on thyroid replacement--we will continue to follow her thyroid tests on treatment. Endocrinology is managing this  –Polysubstance abuse/dependence with seizures.   She i

## 2018-08-10 NOTE — PROGRESS NOTES
Pt here for [address] Arrives Ambulating independently, accompanied by daughters.         Modifications in dose or schedule: No. Pt states she is going to be switching to monthly Opdivo after next cycle.                             Frequency of blood r

## 2018-08-24 ENCOUNTER — APPOINTMENT (OUTPATIENT)
Dept: HEMATOLOGY/ONCOLOGY | Facility: HOSPITAL | Age: 48
End: 2018-08-24
Attending: INTERNAL MEDICINE
Payer: COMMERCIAL

## 2018-09-04 ENCOUNTER — OFFICE VISIT (OUTPATIENT)
Dept: ENDOCRINOLOGY CLINIC | Facility: CLINIC | Age: 48
End: 2018-09-04
Payer: COMMERCIAL

## 2018-09-04 VITALS
DIASTOLIC BLOOD PRESSURE: 81 MMHG | HEART RATE: 82 BPM | WEIGHT: 139.19 LBS | BODY MASS INDEX: 23 KG/M2 | SYSTOLIC BLOOD PRESSURE: 119 MMHG

## 2018-09-04 DIAGNOSIS — E03.9 HYPOTHYROIDISM, UNSPECIFIED TYPE: Primary | ICD-10-CM

## 2018-09-04 PROCEDURE — 99213 OFFICE O/P EST LOW 20 MIN: CPT | Performed by: INTERNAL MEDICINE

## 2018-09-04 PROCEDURE — 99212 OFFICE O/P EST SF 10 MIN: CPT | Performed by: INTERNAL MEDICINE

## 2018-09-04 RX ORDER — LEVOTHYROXINE SODIUM 0.1 MG/1
100 TABLET ORAL
Qty: 60 TABLET | Refills: 0 | Status: SHIPPED | OUTPATIENT
Start: 2018-09-04 | End: 2018-11-01

## 2018-09-04 RX ORDER — LEVETIRACETAM 500 MG/1
1500 TABLET ORAL 2 TIMES DAILY
Refills: 0 | COMMUNITY
Start: 2018-06-30 | End: 2020-10-13 | Stop reason: ALTCHOICE

## 2018-09-04 RX ORDER — GABAPENTIN 300 MG/1
800 CAPSULE ORAL 2 TIMES DAILY
COMMUNITY
Start: 2018-07-24 | End: 2020-06-05

## 2018-09-05 ENCOUNTER — NURSE ONLY (OUTPATIENT)
Dept: HEMATOLOGY/ONCOLOGY | Facility: HOSPITAL | Age: 48
End: 2018-09-05
Attending: INTERNAL MEDICINE
Payer: COMMERCIAL

## 2018-09-05 ENCOUNTER — TELEPHONE (OUTPATIENT)
Dept: ENDOCRINOLOGY CLINIC | Facility: CLINIC | Age: 48
End: 2018-09-05

## 2018-09-05 VITALS
HEIGHT: 65 IN | RESPIRATION RATE: 16 BRPM | SYSTOLIC BLOOD PRESSURE: 118 MMHG | BODY MASS INDEX: 22.99 KG/M2 | DIASTOLIC BLOOD PRESSURE: 76 MMHG | TEMPERATURE: 98 F | WEIGHT: 138 LBS | HEART RATE: 79 BPM

## 2018-09-05 DIAGNOSIS — C34.90 MALIGNANT NEOPLASM OF LUNG, UNSPECIFIED LATERALITY, UNSPECIFIED PART OF LUNG (HCC): Primary | ICD-10-CM

## 2018-09-05 DIAGNOSIS — C78.7 LIVER METASTASES (HCC): ICD-10-CM

## 2018-09-05 DIAGNOSIS — C34.92 CARCINOMA OF LUNG, LEFT (HCC): ICD-10-CM

## 2018-09-05 DIAGNOSIS — Z51.11 CHEMOTHERAPY MANAGEMENT, ENCOUNTER FOR: ICD-10-CM

## 2018-09-05 DIAGNOSIS — Z45.2 ENCOUNTER FOR ADJUSTMENT OR MANAGEMENT OF VASCULAR ACCESS DEVICE: ICD-10-CM

## 2018-09-05 DIAGNOSIS — C79.31 BRAIN METASTASES (HCC): ICD-10-CM

## 2018-09-05 DIAGNOSIS — R73.01 IMPAIRED FASTING GLUCOSE: Primary | ICD-10-CM

## 2018-09-05 DIAGNOSIS — G40.909 SEIZURE DISORDER (HCC): ICD-10-CM

## 2018-09-05 LAB
ALBUMIN SERPL BCP-MCNC: 3.6 G/DL (ref 3.5–4.8)
ALBUMIN/GLOB SERPL: 0.9 {RATIO} (ref 1–2)
ALP SERPL-CCNC: 71 U/L (ref 32–100)
ALT SERPL-CCNC: 11 U/L (ref 14–54)
ANION GAP SERPL CALC-SCNC: 8 MMOL/L (ref 0–18)
AST SERPL-CCNC: 14 U/L (ref 15–41)
BASOPHILS # BLD: 0 K/UL (ref 0–0.2)
BASOPHILS NFR BLD: 0 %
BILIRUB SERPL-MCNC: 0.6 MG/DL (ref 0.3–1.2)
BUN SERPL-MCNC: 13 MG/DL (ref 8–20)
BUN/CREAT SERPL: 21.3 (ref 10–20)
CALCIUM SERPL-MCNC: 9.1 MG/DL (ref 8.5–10.5)
CHLORIDE SERPL-SCNC: 104 MMOL/L (ref 95–110)
CO2 SERPL-SCNC: 26 MMOL/L (ref 22–32)
CREAT SERPL-MCNC: 0.61 MG/DL (ref 0.5–1.5)
EOSINOPHIL # BLD: 0 K/UL (ref 0–0.7)
EOSINOPHIL NFR BLD: 0 %
ERYTHROCYTE [DISTWIDTH] IN BLOOD BY AUTOMATED COUNT: 16 % (ref 11–15)
GLOBULIN PLAS-MCNC: 4.1 G/DL (ref 2.5–3.7)
GLUCOSE SERPL-MCNC: 135 MG/DL (ref 70–99)
HCT VFR BLD AUTO: 33 % (ref 35–48)
HGB BLD-MCNC: 11.1 G/DL (ref 12–16)
LYMPHOCYTES # BLD: 2.4 K/UL (ref 1–4)
LYMPHOCYTES NFR BLD: 23 %
MCH RBC QN AUTO: 29.8 PG (ref 27–32)
MCHC RBC AUTO-ENTMCNC: 33.6 G/DL (ref 32–37)
MCV RBC AUTO: 88.6 FL (ref 80–100)
MONOCYTES # BLD: 0.8 K/UL (ref 0–1)
MONOCYTES NFR BLD: 8 %
NEUTROPHILS # BLD AUTO: 6.9 K/UL (ref 1.8–7.7)
NEUTROPHILS NFR BLD: 68 %
OSMOLALITY UR CALC.SUM OF ELEC: 288 MOSM/KG (ref 275–295)
PATIENT FASTING: NO
PLATELET # BLD AUTO: 417 K/UL (ref 140–400)
PMV BLD AUTO: 8.1 FL (ref 7.4–10.3)
POTASSIUM SERPL-SCNC: 3.5 MMOL/L (ref 3.3–5.1)
PROT SERPL-MCNC: 7.7 G/DL (ref 5.9–8.4)
RBC # BLD AUTO: 3.72 M/UL (ref 3.7–5.4)
SODIUM SERPL-SCNC: 138 MMOL/L (ref 136–144)
T4 FREE SERPL-MCNC: 1.03 NG/DL (ref 0.58–1.64)
TSH SERPL-ACNC: 0.04 UIU/ML (ref 0.45–5.33)
WBC # BLD AUTO: 10.2 K/UL (ref 4–11)

## 2018-09-05 PROCEDURE — 85025 COMPLETE CBC W/AUTO DIFF WBC: CPT

## 2018-09-05 PROCEDURE — 84439 ASSAY OF FREE THYROXINE: CPT | Performed by: INTERNAL MEDICINE

## 2018-09-05 PROCEDURE — 80053 COMPREHEN METABOLIC PANEL: CPT

## 2018-09-05 PROCEDURE — A4216 STERILE WATER/SALINE, 10 ML: HCPCS

## 2018-09-05 PROCEDURE — 99215 OFFICE O/P EST HI 40 MIN: CPT | Performed by: INTERNAL MEDICINE

## 2018-09-05 PROCEDURE — 36591 DRAW BLOOD OFF VENOUS DEVICE: CPT

## 2018-09-05 PROCEDURE — 84443 ASSAY THYROID STIM HORMONE: CPT | Performed by: INTERNAL MEDICINE

## 2018-09-05 RX ORDER — 0.9 % SODIUM CHLORIDE 0.9 %
10 VIAL (ML) INJECTION ONCE
Status: CANCELLED | OUTPATIENT
Start: 2018-09-05

## 2018-09-05 RX ORDER — DIPHENHYDRAMINE HYDROCHLORIDE 50 MG/ML
INJECTION INTRAMUSCULAR; INTRAVENOUS EVERY 4 HOURS PRN
Status: CANCELLED | OUTPATIENT
Start: 2018-09-05

## 2018-09-05 RX ORDER — RANITIDINE 25 MG/ML
50 INJECTION, SOLUTION INTRAMUSCULAR; INTRAVENOUS AS NEEDED
Status: CANCELLED | OUTPATIENT
Start: 2018-09-05

## 2018-09-05 RX ORDER — DIPHENHYDRAMINE HYDROCHLORIDE 50 MG/ML
25 INJECTION INTRAMUSCULAR; INTRAVENOUS ONCE
Status: CANCELLED | OUTPATIENT
Start: 2018-09-05

## 2018-09-05 RX ORDER — HEPARIN SODIUM (PORCINE) LOCK FLUSH IV SOLN 100 UNIT/ML 100 UNIT/ML
5 SOLUTION INTRAVENOUS ONCE
Status: COMPLETED | OUTPATIENT
Start: 2018-09-05 | End: 2018-09-05

## 2018-09-05 RX ORDER — ACETAMINOPHEN 325 MG/1
TABLET ORAL EVERY 6 HOURS PRN
Status: CANCELLED | OUTPATIENT
Start: 2018-09-05

## 2018-09-05 RX ORDER — HEPARIN SODIUM (PORCINE) LOCK FLUSH IV SOLN 100 UNIT/ML 100 UNIT/ML
5 SOLUTION INTRAVENOUS ONCE
Status: CANCELLED | OUTPATIENT
Start: 2018-09-05

## 2018-09-05 RX ORDER — HEPARIN SODIUM (PORCINE) LOCK FLUSH IV SOLN 100 UNIT/ML 100 UNIT/ML
SOLUTION INTRAVENOUS
Status: COMPLETED
Start: 2018-09-05 | End: 2018-09-05

## 2018-09-05 RX ORDER — MEPERIDINE HYDROCHLORIDE 25 MG/ML
INJECTION INTRAMUSCULAR; INTRAVENOUS; SUBCUTANEOUS AS NEEDED
Status: CANCELLED | OUTPATIENT
Start: 2018-09-05

## 2018-09-05 RX ORDER — 0.9 % SODIUM CHLORIDE 0.9 %
VIAL (ML) INJECTION
Status: DISCONTINUED
Start: 2018-09-05 | End: 2018-09-05

## 2018-09-05 RX ORDER — ALBUTEROL SULFATE 90 UG/1
2 AEROSOL, METERED RESPIRATORY (INHALATION) AS NEEDED
Status: CANCELLED | OUTPATIENT
Start: 2018-09-05

## 2018-09-05 RX ADMIN — HEPARIN SODIUM (PORCINE) LOCK FLUSH IV SOLN 100 UNIT/ML 500 UNITS: 100 SOLUTION INTRAVENOUS at 10:00:00

## 2018-09-05 NOTE — TELEPHONE ENCOUNTER
Patient was to repeat labs in 6 weeks after dose change  She repeated in today? Please clarify and order  TSH and Free T4    Also she should repeat an A1c . Last was done in 2016. Thanks.

## 2018-09-05 NOTE — PROGRESS NOTES
Cancer Center Progress Note    Patient Name: Blanca May   YOB: 1970   Medical Record Number: [id number]   Attending Physician: Armando Vera M.D. Chief Complaint:  Metastatic adenocarcinoma of the lung, brain metastasis.     History of in the setting of polysubstance abuse/dependence. She required mechanical ventilation for secondary respiratory failure. She was seen by neurology and critical care medicine consultation.   After long hospitalization she was eventually discharged to rehab BRAIN SURGERY Left      Comment: OCCIPITAL CRANIOTOMY  No date: BRAIN SURGERY Left      Comment: LOBECTOMY  AUG  2016: BRAIN SURGERY      Comment: REMOVAL OF DEAD TISSUE   2009: CHOLECYSTECTOMY  No date: CYST REMOVAL      Comment: BREAST, BENIGN  No date: (VIMPAT) 200 MG Oral Tab, Take 1 tablet (200 mg total) by mouth 2 (two) times daily. , Disp: 60 tablet, Rfl: 2  •  levetiracetam 1000 MG Oral Tab, Take 2 tablets (2,000 mg total) by mouth 2 (two) times daily.  (Patient taking differently: Take 500 mg by mout 131 (H) 09/21/2016   BILT 0.1 (L) 08/10/2018   TP 7.6 08/10/2018   ALB 3.4 (L) 08/10/2018   GLOBULIN 4.2 (H) 08/10/2018   AGRATIO 0.9 (L) 09/21/2016    08/10/2018   K 4.1 08/10/2018    08/10/2018   CO2 27 08/10/2018     Radiology:  None new She is now receiving treatment every 4 weeks    –She has anemia with severe iron deficiency. She has received IV iron. We will continue to monitor this  –Previous hypokalemia --imporved  –Hypothyroidism secondary to nivolumab.   She is on thyroid replace

## 2018-09-05 NOTE — PROGRESS NOTES
Return Office Visit     CHIEF COMPLAINT:    Hypothyroidism       HISTORY OF PRESENT ILLNESS:  Carmela Ricardo is a 49year old female who presents for follow up for for hypothyroidism.     She has Stage IV NSCLCA (poorly diff adeno) and is on chemotherapy with reviewed. See past social history marked as reviewed.     ASSESSMENTS:     REVIEW OF SYSTEMS:  Constitutional: Negative for: weight change, fever, fatigue, cold/heat intolerance  Eyes: Negative for:  Visual changes, proptosis, blurring  ENT: Negative for: I diabetes mellitus can occur with OPDIVO treatment    1. She has hypothyroidism. She is on LT 4 125 mcg daily. TSH has been very low. Will decrease dose of Lt 4 to 100 mcg daily  TSH and Free T4 in 6-8 weeks after dose change.   Orders printed and giv

## 2018-09-07 ENCOUNTER — OFFICE VISIT (OUTPATIENT)
Dept: HEMATOLOGY/ONCOLOGY | Facility: HOSPITAL | Age: 48
End: 2018-09-07
Attending: INTERNAL MEDICINE
Payer: COMMERCIAL

## 2018-09-07 VITALS
HEART RATE: 75 BPM | SYSTOLIC BLOOD PRESSURE: 128 MMHG | TEMPERATURE: 98 F | RESPIRATION RATE: 16 BRPM | DIASTOLIC BLOOD PRESSURE: 76 MMHG

## 2018-09-07 DIAGNOSIS — C34.92 CARCINOMA OF LUNG, LEFT (HCC): ICD-10-CM

## 2018-09-07 DIAGNOSIS — Z45.2 ENCOUNTER FOR ADJUSTMENT OR MANAGEMENT OF VASCULAR ACCESS DEVICE: ICD-10-CM

## 2018-09-07 DIAGNOSIS — C34.90 MALIGNANT NEOPLASM OF LUNG, UNSPECIFIED LATERALITY, UNSPECIFIED PART OF LUNG (HCC): Primary | ICD-10-CM

## 2018-09-07 PROCEDURE — 96413 CHEMO IV INFUSION 1 HR: CPT

## 2018-09-07 PROCEDURE — A4216 STERILE WATER/SALINE, 10 ML: HCPCS

## 2018-09-07 RX ORDER — DIPHENHYDRAMINE HYDROCHLORIDE 50 MG/ML
25 INJECTION INTRAMUSCULAR; INTRAVENOUS ONCE
Status: CANCELLED | OUTPATIENT
Start: 2018-09-07

## 2018-09-07 RX ORDER — 0.9 % SODIUM CHLORIDE 0.9 %
VIAL (ML) INJECTION
Status: DISCONTINUED
Start: 2018-09-07 | End: 2018-09-07

## 2018-09-07 RX ORDER — SODIUM CHLORIDE 9 MG/ML
INJECTION, SOLUTION INTRAVENOUS
Status: DISCONTINUED
Start: 2018-09-07 | End: 2018-09-07

## 2018-09-07 RX ORDER — HEPARIN SODIUM (PORCINE) LOCK FLUSH IV SOLN 100 UNIT/ML 100 UNIT/ML
5 SOLUTION INTRAVENOUS ONCE
Status: COMPLETED | OUTPATIENT
Start: 2018-09-07 | End: 2018-09-07

## 2018-09-07 RX ORDER — 0.9 % SODIUM CHLORIDE 0.9 %
10 VIAL (ML) INJECTION ONCE
Status: CANCELLED | OUTPATIENT
Start: 2018-09-07

## 2018-09-07 RX ORDER — HEPARIN SODIUM (PORCINE) LOCK FLUSH IV SOLN 100 UNIT/ML 100 UNIT/ML
5 SOLUTION INTRAVENOUS ONCE
Status: CANCELLED | OUTPATIENT
Start: 2018-09-07

## 2018-09-07 RX ORDER — HEPARIN SODIUM (PORCINE) LOCK FLUSH IV SOLN 100 UNIT/ML 100 UNIT/ML
SOLUTION INTRAVENOUS
Status: COMPLETED
Start: 2018-09-07 | End: 2018-09-07

## 2018-09-07 RX ADMIN — HEPARIN SODIUM (PORCINE) LOCK FLUSH IV SOLN 100 UNIT/ML 500 UNITS: 100 SOLUTION INTRAVENOUS at 12:40:00

## 2018-09-07 NOTE — PROGRESS NOTES
Pt here for [address]. Arrives Ambulating independently     Modifications in dose or schedule: Yes; Pt now on q4 week dose/schedule. Infusion given over 30 minutes and tolerated well without incident.      Frequency of blood return and site check throug

## 2018-09-21 ENCOUNTER — APPOINTMENT (OUTPATIENT)
Dept: HEMATOLOGY/ONCOLOGY | Facility: HOSPITAL | Age: 48
End: 2018-09-21
Attending: INTERNAL MEDICINE
Payer: COMMERCIAL

## 2018-10-05 ENCOUNTER — OFFICE VISIT (OUTPATIENT)
Dept: HEMATOLOGY/ONCOLOGY | Facility: HOSPITAL | Age: 48
End: 2018-10-05
Attending: INTERNAL MEDICINE
Payer: COMMERCIAL

## 2018-10-05 VITALS
BODY MASS INDEX: 24 KG/M2 | DIASTOLIC BLOOD PRESSURE: 67 MMHG | WEIGHT: 146 LBS | HEART RATE: 87 BPM | SYSTOLIC BLOOD PRESSURE: 112 MMHG | TEMPERATURE: 98 F | RESPIRATION RATE: 16 BRPM

## 2018-10-05 VITALS
WEIGHT: 146.19 LBS | DIASTOLIC BLOOD PRESSURE: 67 MMHG | SYSTOLIC BLOOD PRESSURE: 112 MMHG | BODY MASS INDEX: 24.36 KG/M2 | HEIGHT: 65 IN | RESPIRATION RATE: 16 BRPM | HEART RATE: 87 BPM | TEMPERATURE: 98 F

## 2018-10-05 DIAGNOSIS — C34.90 MALIGNANT NEOPLASM OF LUNG, UNSPECIFIED LATERALITY, UNSPECIFIED PART OF LUNG (HCC): Primary | ICD-10-CM

## 2018-10-05 DIAGNOSIS — G40.909 SEIZURE DISORDER (HCC): ICD-10-CM

## 2018-10-05 DIAGNOSIS — C78.7 LIVER METASTASES (HCC): Primary | ICD-10-CM

## 2018-10-05 DIAGNOSIS — C34.90 PRIMARY MALIGNANT NEOPLASM OF LUNG METASTATIC TO OTHER SITE, UNSPECIFIED LATERALITY (HCC): ICD-10-CM

## 2018-10-05 DIAGNOSIS — C79.31 BRAIN METASTASES (HCC): ICD-10-CM

## 2018-10-05 DIAGNOSIS — C34.92 CARCINOMA OF LUNG, LEFT (HCC): ICD-10-CM

## 2018-10-05 DIAGNOSIS — Z51.11 CHEMOTHERAPY MANAGEMENT, ENCOUNTER FOR: ICD-10-CM

## 2018-10-05 DIAGNOSIS — Z45.2 ENCOUNTER FOR ADJUSTMENT OR MANAGEMENT OF VASCULAR ACCESS DEVICE: ICD-10-CM

## 2018-10-05 PROCEDURE — 99215 OFFICE O/P EST HI 40 MIN: CPT | Performed by: INTERNAL MEDICINE

## 2018-10-05 PROCEDURE — 85025 COMPLETE CBC W/AUTO DIFF WBC: CPT

## 2018-10-05 PROCEDURE — A4216 STERILE WATER/SALINE, 10 ML: HCPCS

## 2018-10-05 PROCEDURE — 80053 COMPREHEN METABOLIC PANEL: CPT

## 2018-10-05 PROCEDURE — 96413 CHEMO IV INFUSION 1 HR: CPT

## 2018-10-05 RX ORDER — 0.9 % SODIUM CHLORIDE 0.9 %
10 VIAL (ML) INJECTION ONCE
Status: CANCELLED | OUTPATIENT
Start: 2018-10-05

## 2018-10-05 RX ORDER — 0.9 % SODIUM CHLORIDE 0.9 %
VIAL (ML) INJECTION
Status: DISCONTINUED
Start: 2018-10-05 | End: 2018-10-05

## 2018-10-05 RX ORDER — ACETAMINOPHEN 325 MG/1
TABLET ORAL EVERY 6 HOURS PRN
Status: CANCELLED | OUTPATIENT
Start: 2018-10-05

## 2018-10-05 RX ORDER — SODIUM CHLORIDE 9 MG/ML
INJECTION, SOLUTION INTRAVENOUS
Status: DISCONTINUED
Start: 2018-10-05 | End: 2018-10-05

## 2018-10-05 RX ORDER — MEPERIDINE HYDROCHLORIDE 25 MG/ML
INJECTION INTRAMUSCULAR; INTRAVENOUS; SUBCUTANEOUS AS NEEDED
Status: CANCELLED | OUTPATIENT
Start: 2018-10-05

## 2018-10-05 RX ORDER — HEPARIN SODIUM (PORCINE) LOCK FLUSH IV SOLN 100 UNIT/ML 100 UNIT/ML
SOLUTION INTRAVENOUS
Status: COMPLETED
Start: 2018-10-05 | End: 2018-10-05

## 2018-10-05 RX ORDER — ALBUTEROL SULFATE 90 UG/1
2 AEROSOL, METERED RESPIRATORY (INHALATION) AS NEEDED
Status: CANCELLED | OUTPATIENT
Start: 2018-10-05

## 2018-10-05 RX ORDER — DIPHENHYDRAMINE HYDROCHLORIDE 50 MG/ML
25 INJECTION INTRAMUSCULAR; INTRAVENOUS ONCE
Status: CANCELLED | OUTPATIENT
Start: 2018-10-05

## 2018-10-05 RX ORDER — DIPHENHYDRAMINE HYDROCHLORIDE 50 MG/ML
INJECTION INTRAMUSCULAR; INTRAVENOUS EVERY 4 HOURS PRN
Status: CANCELLED | OUTPATIENT
Start: 2018-10-05

## 2018-10-05 RX ORDER — HEPARIN SODIUM (PORCINE) LOCK FLUSH IV SOLN 100 UNIT/ML 100 UNIT/ML
5 SOLUTION INTRAVENOUS ONCE
Status: COMPLETED | OUTPATIENT
Start: 2018-10-05 | End: 2018-10-05

## 2018-10-05 RX ORDER — HEPARIN SODIUM (PORCINE) LOCK FLUSH IV SOLN 100 UNIT/ML 100 UNIT/ML
5 SOLUTION INTRAVENOUS ONCE
Status: CANCELLED | OUTPATIENT
Start: 2018-10-05

## 2018-10-05 RX ORDER — RANITIDINE 25 MG/ML
50 INJECTION, SOLUTION INTRAMUSCULAR; INTRAVENOUS AS NEEDED
Status: CANCELLED | OUTPATIENT
Start: 2018-10-05

## 2018-10-05 RX ADMIN — HEPARIN SODIUM (PORCINE) LOCK FLUSH IV SOLN 100 UNIT/ML 500 UNITS: 100 SOLUTION INTRAVENOUS at 11:02:00

## 2018-10-05 NOTE — PROGRESS NOTES
Pt here for [address] [facility] Arrives Ambulating independently     Modifications in dose or schedule: no   Pt has some symptoms of sinus infection, however Dr. Regla Del Real said we were ok to proceed. Denies fevers/chills.   Frequency of blood return and site check th
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
## 2018-10-10 ENCOUNTER — APPOINTMENT (OUTPATIENT)
Dept: GENERAL RADIOLOGY | Facility: HOSPITAL | Age: 48
End: 2018-10-10
Payer: COMMERCIAL

## 2018-10-10 ENCOUNTER — HOSPITAL ENCOUNTER (EMERGENCY)
Facility: HOSPITAL | Age: 48
Discharge: HOME | End: 2018-10-10
Payer: COMMERCIAL

## 2018-10-10 VITALS
HEART RATE: 87 BPM | OXYGEN SATURATION: 97 % | DIASTOLIC BLOOD PRESSURE: 81 MMHG | BODY MASS INDEX: 24 KG/M2 | WEIGHT: 145.94 LBS | SYSTOLIC BLOOD PRESSURE: 111 MMHG | RESPIRATION RATE: 16 BRPM | TEMPERATURE: 98 F

## 2018-10-10 DIAGNOSIS — S92.902A CLOSED FRACTURE OF LEFT FOOT, INITIAL ENCOUNTER: Primary | ICD-10-CM

## 2018-10-10 DIAGNOSIS — S82.892A CLOSED FRACTURE OF LEFT ANKLE, INITIAL ENCOUNTER: ICD-10-CM

## 2018-10-10 PROCEDURE — 99284 EMERGENCY DEPT VISIT MOD MDM: CPT

## 2018-10-10 PROCEDURE — 73630 X-RAY EXAM OF FOOT: CPT

## 2018-10-10 PROCEDURE — 73610 X-RAY EXAM OF ANKLE: CPT

## 2018-10-10 PROCEDURE — 29515 APPLICATION SHORT LEG SPLINT: CPT

## 2018-10-10 RX ORDER — TRAMADOL HYDROCHLORIDE 50 MG/1
50 TABLET ORAL ONCE
Status: COMPLETED | OUTPATIENT
Start: 2018-10-10 | End: 2018-10-10

## 2018-10-10 RX ORDER — TRAMADOL HYDROCHLORIDE 50 MG/1
TABLET ORAL EVERY 6 HOURS PRN
Qty: 15 TABLET | Refills: 0 | Status: SHIPPED | OUTPATIENT
Start: 2018-10-10 | End: 2018-10-17

## 2018-10-10 NOTE — ED PROVIDER NOTES
Patient Seen in: [facility] Emergency Department    History   CC: ankle pain  HPI: Ragini Ackerman 51year old female w/ hx stage 4 lung cancer undergoing chemo who presents to the ER c/o left sided anterior ankle and medial ankle pain s/p \"twisting Clotting Disorder Mother    • Psychiatric Mother    • Cancer Father         PROSTATE       Social History    Tobacco Use      Smoking status: Former Smoker        Packs/day: 1.50        Years: 17.00        Pack years: 25.5        Types: Cigarettes        S Vitals [10/10/18 1112]   /71   Pulse 89   Resp 16   Temp 97.9 °F (36.6 °C)   Temp src    SpO2 100 %   O2 Device        Current:/71   Pulse 89   Temp 97.9 °F (36.6 °C)   Resp 16   Wt 66.2 kg   SpO2 100%   BMI 24.29 kg/m²         General - Appear 10/10/2018 at 11:52                    Narrative:    PROCEDURE: XR ANKLE (MIN 3 VIEWS), LEFT (CPT=73610)     COMPARISON: None.     INDICATIONS: Lateral left ankle pain and swelling post twisting injury yesterday.     TECHNIQUE: 3 views were obtained.      navicular. Mild   degenerative narrowing of the left first metatarsal phalangeal joint.   SOFT TISSUES: Negative.  No visible soft tissue swelling.    EFFUSION: None visible.    OTHER: Negative.                All results discussed with patient as well as d

## 2018-10-10 NOTE — ED NOTES
On phone yesterday when she twisted her left ankle. Continued to have pain so came in today. Denies any dizziness or any other injuries apart from ankle and foot.

## 2018-10-17 ENCOUNTER — HOSPITAL ENCOUNTER (OUTPATIENT)
Dept: CT IMAGING | Facility: HOSPITAL | Age: 48
Discharge: HOME | End: 2018-10-17
Attending: INTERNAL MEDICINE
Payer: COMMERCIAL

## 2018-10-17 DIAGNOSIS — C34.90 PRIMARY MALIGNANT NEOPLASM OF LUNG METASTATIC TO OTHER SITE, UNSPECIFIED LATERALITY (HCC): ICD-10-CM

## 2018-10-17 DIAGNOSIS — C78.7 LIVER METASTASES (HCC): ICD-10-CM

## 2018-10-17 PROCEDURE — 82565 ASSAY OF CREATININE: CPT

## 2018-10-17 PROCEDURE — 74177 CT ABD & PELVIS W/CONTRAST: CPT | Performed by: INTERNAL MEDICINE

## 2018-10-17 PROCEDURE — 71260 CT THORAX DX C+: CPT | Performed by: INTERNAL MEDICINE

## 2018-10-30 ENCOUNTER — TELEPHONE (OUTPATIENT)
Dept: INTERNAL MEDICINE CLINIC | Facility: CLINIC | Age: 48
End: 2018-10-30

## 2018-10-30 DIAGNOSIS — E03.9 HYPOTHYROIDISM, UNSPECIFIED TYPE: ICD-10-CM

## 2018-10-30 NOTE — TELEPHONE ENCOUNTER
Spoke to pt's  and informed meds was filled for 1 year at [facility] 7/2/18. He will call [facility] for refill, will call back if new Rx needed.

## 2018-10-30 NOTE — TELEPHONE ENCOUNTER
Pt's   Rajani Smith called stating his wife needs refills on Quetiapine 25 mgs. 1 daily. #30 with refills. The pt. is been out of meds. Please send to [place] in [place]. Rajani Smith can be reached at [phone].

## 2018-11-01 RX ORDER — LEVOTHYROXINE SODIUM 0.1 MG/1
TABLET ORAL
Qty: 90 TABLET | Refills: 0 | Status: SHIPPED | OUTPATIENT
Start: 2018-11-01 | End: 2019-02-07

## 2018-11-02 ENCOUNTER — NURSE ONLY (OUTPATIENT)
Dept: HEMATOLOGY/ONCOLOGY | Facility: HOSPITAL | Age: 48
End: 2018-11-02
Attending: INTERNAL MEDICINE
Payer: COMMERCIAL

## 2018-11-02 VITALS
RESPIRATION RATE: 18 BRPM | SYSTOLIC BLOOD PRESSURE: 118 MMHG | WEIGHT: 155 LBS | HEART RATE: 73 BPM | BODY MASS INDEX: 26 KG/M2 | DIASTOLIC BLOOD PRESSURE: 71 MMHG | TEMPERATURE: 98 F

## 2018-11-02 VITALS
SYSTOLIC BLOOD PRESSURE: 118 MMHG | HEART RATE: 73 BPM | TEMPERATURE: 98 F | RESPIRATION RATE: 18 BRPM | DIASTOLIC BLOOD PRESSURE: 71 MMHG | WEIGHT: 155 LBS | HEIGHT: 65 IN | BODY MASS INDEX: 25.83 KG/M2

## 2018-11-02 DIAGNOSIS — D64.9 ANEMIA, UNSPECIFIED TYPE: ICD-10-CM

## 2018-11-02 DIAGNOSIS — C34.90 MALIGNANT NEOPLASM OF LUNG, UNSPECIFIED LATERALITY, UNSPECIFIED PART OF LUNG (HCC): Primary | ICD-10-CM

## 2018-11-02 DIAGNOSIS — C79.31 BRAIN METASTASES (HCC): ICD-10-CM

## 2018-11-02 DIAGNOSIS — C34.92 CARCINOMA OF LUNG, LEFT (HCC): ICD-10-CM

## 2018-11-02 DIAGNOSIS — G40.909 SEIZURE DISORDER (HCC): ICD-10-CM

## 2018-11-02 DIAGNOSIS — Z51.11 CHEMOTHERAPY MANAGEMENT, ENCOUNTER FOR: ICD-10-CM

## 2018-11-02 DIAGNOSIS — C78.7 LIVER METASTASES (HCC): ICD-10-CM

## 2018-11-02 DIAGNOSIS — Z45.2 ENCOUNTER FOR ADJUSTMENT OR MANAGEMENT OF VASCULAR ACCESS DEVICE: ICD-10-CM

## 2018-11-02 PROCEDURE — 96413 CHEMO IV INFUSION 1 HR: CPT

## 2018-11-02 PROCEDURE — 99215 OFFICE O/P EST HI 40 MIN: CPT | Performed by: INTERNAL MEDICINE

## 2018-11-02 PROCEDURE — 85025 COMPLETE CBC W/AUTO DIFF WBC: CPT

## 2018-11-02 PROCEDURE — A4216 STERILE WATER/SALINE, 10 ML: HCPCS

## 2018-11-02 PROCEDURE — 80053 COMPREHEN METABOLIC PANEL: CPT

## 2018-11-02 RX ORDER — SODIUM CHLORIDE 9 MG/ML
INJECTION, SOLUTION INTRAVENOUS
Status: DISCONTINUED
Start: 2018-11-02 | End: 2018-11-02

## 2018-11-02 RX ORDER — HEPARIN SODIUM (PORCINE) LOCK FLUSH IV SOLN 100 UNIT/ML 100 UNIT/ML
5 SOLUTION INTRAVENOUS ONCE
Status: COMPLETED | OUTPATIENT
Start: 2018-11-02 | End: 2018-11-02

## 2018-11-02 RX ORDER — ACETAMINOPHEN 325 MG/1
TABLET ORAL EVERY 6 HOURS PRN
Status: CANCELLED | OUTPATIENT
Start: 2018-11-02

## 2018-11-02 RX ORDER — HEPARIN SODIUM (PORCINE) LOCK FLUSH IV SOLN 100 UNIT/ML 100 UNIT/ML
5 SOLUTION INTRAVENOUS ONCE
Status: CANCELLED | OUTPATIENT
Start: 2018-11-02

## 2018-11-02 RX ORDER — MEPERIDINE HYDROCHLORIDE 25 MG/ML
INJECTION INTRAMUSCULAR; INTRAVENOUS; SUBCUTANEOUS AS NEEDED
Status: CANCELLED | OUTPATIENT
Start: 2018-11-02

## 2018-11-02 RX ORDER — 0.9 % SODIUM CHLORIDE 0.9 %
VIAL (ML) INJECTION
Status: DISCONTINUED
Start: 2018-11-02 | End: 2018-11-02

## 2018-11-02 RX ORDER — ALBUTEROL SULFATE 90 UG/1
2 AEROSOL, METERED RESPIRATORY (INHALATION) AS NEEDED
Status: CANCELLED | OUTPATIENT
Start: 2018-11-02

## 2018-11-02 RX ORDER — DIPHENHYDRAMINE HYDROCHLORIDE 50 MG/ML
INJECTION INTRAMUSCULAR; INTRAVENOUS EVERY 4 HOURS PRN
Status: CANCELLED | OUTPATIENT
Start: 2018-11-02

## 2018-11-02 RX ORDER — HEPARIN SODIUM (PORCINE) LOCK FLUSH IV SOLN 100 UNIT/ML 100 UNIT/ML
SOLUTION INTRAVENOUS
Status: COMPLETED
Start: 2018-11-02 | End: 2018-11-02

## 2018-11-02 RX ORDER — 0.9 % SODIUM CHLORIDE 0.9 %
10 VIAL (ML) INJECTION ONCE
Status: CANCELLED | OUTPATIENT
Start: 2018-11-02

## 2018-11-02 RX ORDER — RANITIDINE 25 MG/ML
50 INJECTION, SOLUTION INTRAMUSCULAR; INTRAVENOUS AS NEEDED
Status: CANCELLED | OUTPATIENT
Start: 2018-11-02

## 2018-11-02 RX ORDER — DIPHENHYDRAMINE HYDROCHLORIDE 50 MG/ML
25 INJECTION INTRAMUSCULAR; INTRAVENOUS ONCE
Status: CANCELLED | OUTPATIENT
Start: 2018-11-02

## 2018-11-02 RX ADMIN — HEPARIN SODIUM (PORCINE) LOCK FLUSH IV SOLN 100 UNIT/ML 500 UNITS: 100 SOLUTION INTRAVENOUS at 13:27:00

## 2018-11-02 NOTE — PROGRESS NOTES
Cancer Center Progress Note    Patient Name: Tommy Velazquez   YOB: 1970   Medical Record Number: [id number]   Attending Physician: Neli Hilliard M.D. Chief Complaint:  Metastatic adenocarcinoma of the lung, brain metastasis.     History of in the setting of polysubstance abuse/dependence. She required mechanical ventilation for secondary respiratory failure. She was seen by neurology and critical care medicine consultation.   After long hospitalization she was eventually discharged to rehab RESECTION   • BRAIN SURGERY Left     OCCIPITAL CRANIOTOMY   • BRAIN SURGERY Left     LOBECTOMY   • BRAIN SURGERY  AUG  2016    REMOVAL OF DEAD TISSUE    • CHOLECYSTECTOMY  2009   • CYST REMOVAL      BREAST, BENIGN   • HEMORRHOIDECTOMY     • HYSTERECTOMY Stress Concern: Not Asked        Weight Concern: Not Asked        Special Diet: Not Asked        Back Care: Not Asked        Exercise: Not Asked        Bike Helmet: Not Asked        Seat Belt: Not Asked        Self-Exams: Not Asked    Social History Criss Zendejas Clear to auscultation. Cardiovascular: Regular rate and rhythm. Normal S1S2  Abdomen: Soft, non tender. No hepatosplenomegaly. No palpable mass. Extremities: Left foot with mild erythema and edema warm to touch   Neurological: 5/5 motor x4.       Labor which is not fully characterized   with this exam, and it likely represents a cyst or hemangioma. 6.  Post hysterectomy. No adnexal mass. 7.  Post appendectomy.   8.  Chronic sclerosis in the right femoral head suggesting osteonecrosis without osseous co radiographic response to nivolumab. Currently no evidence of disease. She is now receiving treatment every 4 weeks. Next imaging in February 2019 to follow-up on pulmonary nodule    –She has anemia with severe iron deficiency. She has received IV iron.

## 2018-11-02 NOTE — PROGRESS NOTES
Pt here for [address] . Arrives Ambulating with walker, accompanied by Family member, daughter          Modifications in dose or schedule: No    Patient reports she is feeling well, denies any complaints.  Patient recently broke her foot, she currently h

## 2018-11-30 ENCOUNTER — NURSE ONLY (OUTPATIENT)
Dept: HEMATOLOGY/ONCOLOGY | Facility: HOSPITAL | Age: 48
End: 2018-11-30
Attending: INTERNAL MEDICINE
Payer: COMMERCIAL

## 2018-11-30 VITALS
HEIGHT: 65 IN | RESPIRATION RATE: 18 BRPM | HEART RATE: 71 BPM | TEMPERATURE: 99 F | BODY MASS INDEX: 26.66 KG/M2 | DIASTOLIC BLOOD PRESSURE: 83 MMHG | WEIGHT: 160 LBS | SYSTOLIC BLOOD PRESSURE: 130 MMHG

## 2018-11-30 VITALS
RESPIRATION RATE: 18 BRPM | TEMPERATURE: 99 F | DIASTOLIC BLOOD PRESSURE: 83 MMHG | HEART RATE: 71 BPM | WEIGHT: 160.94 LBS | SYSTOLIC BLOOD PRESSURE: 130 MMHG | BODY MASS INDEX: 26.81 KG/M2 | HEIGHT: 65 IN

## 2018-11-30 DIAGNOSIS — C34.90 MALIGNANT NEOPLASM OF LUNG, UNSPECIFIED LATERALITY, UNSPECIFIED PART OF LUNG (HCC): Primary | ICD-10-CM

## 2018-11-30 DIAGNOSIS — Z51.11 CHEMOTHERAPY MANAGEMENT, ENCOUNTER FOR: ICD-10-CM

## 2018-11-30 DIAGNOSIS — C34.92 CARCINOMA OF LUNG, LEFT (HCC): ICD-10-CM

## 2018-11-30 DIAGNOSIS — Z45.2 ENCOUNTER FOR ADJUSTMENT OR MANAGEMENT OF VASCULAR ACCESS DEVICE: ICD-10-CM

## 2018-11-30 DIAGNOSIS — C79.31 BRAIN METASTASES (HCC): ICD-10-CM

## 2018-11-30 DIAGNOSIS — G40.909 SEIZURE DISORDER (HCC): ICD-10-CM

## 2018-11-30 DIAGNOSIS — E03.9 HYPOTHYROIDISM (ACQUIRED): ICD-10-CM

## 2018-11-30 PROCEDURE — 99215 OFFICE O/P EST HI 40 MIN: CPT | Performed by: INTERNAL MEDICINE

## 2018-11-30 PROCEDURE — A4216 STERILE WATER/SALINE, 10 ML: HCPCS

## 2018-11-30 PROCEDURE — 80053 COMPREHEN METABOLIC PANEL: CPT

## 2018-11-30 PROCEDURE — 96413 CHEMO IV INFUSION 1 HR: CPT

## 2018-11-30 PROCEDURE — 85025 COMPLETE CBC W/AUTO DIFF WBC: CPT

## 2018-11-30 RX ORDER — ACETAMINOPHEN 325 MG/1
TABLET ORAL EVERY 6 HOURS PRN
Status: CANCELLED | OUTPATIENT
Start: 2018-11-30

## 2018-11-30 RX ORDER — ALBUTEROL SULFATE 90 UG/1
2 AEROSOL, METERED RESPIRATORY (INHALATION) AS NEEDED
Status: CANCELLED | OUTPATIENT
Start: 2018-11-30

## 2018-11-30 RX ORDER — 0.9 % SODIUM CHLORIDE 0.9 %
10 VIAL (ML) INJECTION ONCE
Status: CANCELLED | OUTPATIENT
Start: 2018-11-30

## 2018-11-30 RX ORDER — HEPARIN SODIUM (PORCINE) LOCK FLUSH IV SOLN 100 UNIT/ML 100 UNIT/ML
SOLUTION INTRAVENOUS
Status: COMPLETED
Start: 2018-11-30 | End: 2018-11-30

## 2018-11-30 RX ORDER — 0.9 % SODIUM CHLORIDE 0.9 %
VIAL (ML) INJECTION
Status: DISCONTINUED
Start: 2018-11-30 | End: 2018-11-30

## 2018-11-30 RX ORDER — HEPARIN SODIUM (PORCINE) LOCK FLUSH IV SOLN 100 UNIT/ML 100 UNIT/ML
5 SOLUTION INTRAVENOUS ONCE
Status: CANCELLED | OUTPATIENT
Start: 2018-11-30

## 2018-11-30 RX ORDER — DIPHENHYDRAMINE HYDROCHLORIDE 50 MG/ML
25 INJECTION INTRAMUSCULAR; INTRAVENOUS ONCE
Status: CANCELLED | OUTPATIENT
Start: 2018-11-30

## 2018-11-30 RX ORDER — RANITIDINE 25 MG/ML
50 INJECTION, SOLUTION INTRAMUSCULAR; INTRAVENOUS AS NEEDED
Status: CANCELLED | OUTPATIENT
Start: 2018-11-30

## 2018-11-30 RX ORDER — HEPARIN SODIUM (PORCINE) LOCK FLUSH IV SOLN 100 UNIT/ML 100 UNIT/ML
SOLUTION INTRAVENOUS
Status: DISCONTINUED
Start: 2018-11-30 | End: 2018-11-30 | Stop reason: WASHOUT

## 2018-11-30 RX ORDER — SODIUM CHLORIDE 9 MG/ML
INJECTION, SOLUTION INTRAVENOUS
Status: DISCONTINUED
Start: 2018-11-30 | End: 2018-11-30

## 2018-11-30 RX ORDER — DIPHENHYDRAMINE HYDROCHLORIDE 50 MG/ML
INJECTION INTRAMUSCULAR; INTRAVENOUS EVERY 4 HOURS PRN
Status: CANCELLED | OUTPATIENT
Start: 2018-11-30

## 2018-11-30 RX ORDER — HEPARIN SODIUM (PORCINE) LOCK FLUSH IV SOLN 100 UNIT/ML 100 UNIT/ML
5 SOLUTION INTRAVENOUS ONCE
Status: COMPLETED | OUTPATIENT
Start: 2018-11-30 | End: 2018-11-30

## 2018-11-30 RX ORDER — MEPERIDINE HYDROCHLORIDE 25 MG/ML
INJECTION INTRAMUSCULAR; INTRAVENOUS; SUBCUTANEOUS AS NEEDED
Status: CANCELLED | OUTPATIENT
Start: 2018-11-30

## 2018-11-30 RX ADMIN — HEPARIN SODIUM (PORCINE) LOCK FLUSH IV SOLN 100 UNIT/ML 500 UNITS: 100 SOLUTION INTRAVENOUS at 14:15:00

## 2018-11-30 NOTE — PROGRESS NOTES
Pt here for [address]. Arrives Ambulating with walker, accompanied by Family member. Modifications in dose or schedule: No    Patient reports she is feeling well, denies any complaints. Denies fevers or flu like symptoms.      Port previously a No

## 2018-11-30 NOTE — PROGRESS NOTES
Cancer Center Progress Note    Patient Name: Musa Kellogg   YOB: 1970   Medical Record Number: [id number]   Attending Physician: Kyra Barrientos M.D. Chief Complaint:  Metastatic adenocarcinoma of the lung, brain metastasis.     History of in the setting of polysubstance abuse/dependence. She required mechanical ventilation for secondary respiratory failure. She was seen by neurology and critical care medicine consultation.   After long hospitalization she was eventually discharged to rehab RESECTION   • BRAIN SURGERY Left     OCCIPITAL CRANIOTOMY   • BRAIN SURGERY Left     LOBECTOMY   • BRAIN SURGERY  AUG  2016    REMOVAL OF DEAD TISSUE    • CHOLECYSTECTOMY  2009   • CYST REMOVAL      BREAST, BENIGN   • HEMORRHOIDECTOMY     • HYSTERECTOMY Stress Concern: Not Asked        Weight Concern: Not Asked        Special Diet: Not Asked        Back Care: Not Asked        Exercise: Not Asked        Bike Helmet: Not Asked        Seat Belt: Not Asked        Self-Exams: Not Asked    Social History Dave Preston lymphadenopathy   Chest: Clear to auscultation. Cardiovascular: Regular rate and rhythm. Normal S1S2  Abdomen: Soft, non tender. No hepatosplenomegaly. No palpable mass.   Extremities: Left foot with mild erythema and edema warm to touch   Neurological: of the spleen which is not fully characterized   with this exam, and it likely represents a cyst or hemangioma. 6.  Post hysterectomy. No adnexal mass. 7.  Post appendectomy.   8.  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 Currently no evidence of disease. She is now receiving treatment every 4 weeks. Next imaging in February 2019 to follow-up on pulmonary nodule    –She has anemia with severe iron deficiency.   She has receive

## 2018-12-28 ENCOUNTER — OFFICE VISIT (OUTPATIENT)
Dept: HEMATOLOGY/ONCOLOGY | Facility: HOSPITAL | Age: 48
End: 2018-12-28
Attending: INTERNAL MEDICINE
Payer: COMMERCIAL

## 2018-12-28 VITALS
DIASTOLIC BLOOD PRESSURE: 67 MMHG | RESPIRATION RATE: 18 BRPM | HEIGHT: 65 IN | HEART RATE: 81 BPM | TEMPERATURE: 98 F | BODY MASS INDEX: 27.7 KG/M2 | SYSTOLIC BLOOD PRESSURE: 121 MMHG | WEIGHT: 166.25 LBS

## 2018-12-28 DIAGNOSIS — C34.90 MALIGNANT NEOPLASM OF LUNG, UNSPECIFIED LATERALITY, UNSPECIFIED PART OF LUNG (HCC): Primary | ICD-10-CM

## 2018-12-28 DIAGNOSIS — Z45.2 ENCOUNTER FOR ADJUSTMENT OR MANAGEMENT OF VASCULAR ACCESS DEVICE: ICD-10-CM

## 2018-12-28 DIAGNOSIS — Z51.11 CHEMOTHERAPY MANAGEMENT, ENCOUNTER FOR: ICD-10-CM

## 2018-12-28 DIAGNOSIS — C34.92 CARCINOMA OF LUNG, LEFT (HCC): ICD-10-CM

## 2018-12-28 DIAGNOSIS — G40.909 SEIZURE DISORDER (HCC): ICD-10-CM

## 2018-12-28 DIAGNOSIS — C79.31 BRAIN METASTASES (HCC): ICD-10-CM

## 2018-12-28 DIAGNOSIS — E03.9 HYPOTHYROIDISM (ACQUIRED): ICD-10-CM

## 2018-12-28 PROCEDURE — 80053 COMPREHEN METABOLIC PANEL: CPT

## 2018-12-28 PROCEDURE — 99215 OFFICE O/P EST HI 40 MIN: CPT | Performed by: INTERNAL MEDICINE

## 2018-12-28 PROCEDURE — 96413 CHEMO IV INFUSION 1 HR: CPT

## 2018-12-28 PROCEDURE — A4216 STERILE WATER/SALINE, 10 ML: HCPCS

## 2018-12-28 PROCEDURE — 85025 COMPLETE CBC W/AUTO DIFF WBC: CPT

## 2018-12-28 RX ORDER — DIPHENHYDRAMINE HYDROCHLORIDE 50 MG/ML
INJECTION INTRAMUSCULAR; INTRAVENOUS EVERY 4 HOURS PRN
Status: CANCELLED | OUTPATIENT
Start: 2019-01-11

## 2018-12-28 RX ORDER — ACETAMINOPHEN 325 MG/1
TABLET ORAL EVERY 6 HOURS PRN
Status: CANCELLED | OUTPATIENT
Start: 2019-01-11

## 2018-12-28 RX ORDER — SODIUM CHLORIDE 9 MG/ML
INJECTION, SOLUTION INTRAVENOUS
Status: DISCONTINUED
Start: 2018-12-28 | End: 2018-12-28

## 2018-12-28 RX ORDER — HEPARIN SODIUM (PORCINE) LOCK FLUSH IV SOLN 100 UNIT/ML 100 UNIT/ML
SOLUTION INTRAVENOUS
Status: COMPLETED
Start: 2018-12-28 | End: 2018-12-28

## 2018-12-28 RX ORDER — 0.9 % SODIUM CHLORIDE 0.9 %
VIAL (ML) INJECTION
Status: DISCONTINUED
Start: 2018-12-28 | End: 2018-12-28

## 2018-12-28 RX ORDER — HEPARIN SODIUM (PORCINE) LOCK FLUSH IV SOLN 100 UNIT/ML 100 UNIT/ML
5 SOLUTION INTRAVENOUS ONCE
Status: COMPLETED | OUTPATIENT
Start: 2018-12-28 | End: 2018-12-28

## 2018-12-28 RX ORDER — DIPHENHYDRAMINE HYDROCHLORIDE 50 MG/ML
25 INJECTION INTRAMUSCULAR; INTRAVENOUS ONCE
Status: CANCELLED | OUTPATIENT
Start: 2018-12-28

## 2018-12-28 RX ORDER — RANITIDINE 25 MG/ML
50 INJECTION, SOLUTION INTRAMUSCULAR; INTRAVENOUS AS NEEDED
Status: CANCELLED | OUTPATIENT
Start: 2019-01-11

## 2018-12-28 RX ORDER — MEPERIDINE HYDROCHLORIDE 25 MG/ML
INJECTION INTRAMUSCULAR; INTRAVENOUS; SUBCUTANEOUS AS NEEDED
Status: CANCELLED | OUTPATIENT
Start: 2019-01-11

## 2018-12-28 RX ORDER — HEPARIN SODIUM (PORCINE) LOCK FLUSH IV SOLN 100 UNIT/ML 100 UNIT/ML
5 SOLUTION INTRAVENOUS ONCE
Status: CANCELLED | OUTPATIENT
Start: 2018-12-28

## 2018-12-28 RX ORDER — ALBUTEROL SULFATE 90 UG/1
2 AEROSOL, METERED RESPIRATORY (INHALATION) AS NEEDED
Status: CANCELLED | OUTPATIENT
Start: 2019-01-11

## 2018-12-28 RX ORDER — 0.9 % SODIUM CHLORIDE 0.9 %
10 VIAL (ML) INJECTION ONCE
Status: CANCELLED | OUTPATIENT
Start: 2018-12-28

## 2018-12-28 RX ADMIN — HEPARIN SODIUM (PORCINE) LOCK FLUSH IV SOLN 100 UNIT/ML 500 UNITS: 100 SOLUTION INTRAVENOUS at 13:42:00

## 2018-12-28 NOTE — PROGRESS NOTES
Pt here for [address]. Arrives Ambulating independently  Modifications in dose or schedule: No    Patient reports she is feeling well denies any complaints.       Port previously accessed from lab draw, port flushed with saline, positive blood return no

## 2018-12-28 NOTE — PROGRESS NOTES
Cancer Center Progress Note    Patient Name: Delano Jordan   YOB: 1970   Medical Record Number: [id number]   Attending Physician: Urmila Ronquillo M.D. Chief Complaint:  Metastatic adenocarcinoma of the lung, brain metastasis.     History of in the setting of polysubstance abuse/dependence. She required mechanical ventilation for secondary respiratory failure. She was seen by neurology and critical care medicine consultation.   After long hospitalization she was eventually discharged to rehab RESECTION   • BRAIN SURGERY Left     OCCIPITAL CRANIOTOMY   • BRAIN SURGERY Left     LOBECTOMY   • BRAIN SURGERY  AUG  2016    REMOVAL OF DEAD TISSUE    • CHOLECYSTECTOMY  2009   • CYST REMOVAL      BREAST, BENIGN   • HEMORRHOIDECTOMY     • HYSTERECTOMY Stress Concern: Not Asked        Weight Concern: Not Asked        Special Diet: Not Asked        Back Care: Not Asked        Exercise: Not Asked        Bike Helmet: Not Asked        Seat Belt: Not Asked        Self-Exams: Not Asked    Social History Yulia Carson Chest: Clear to auscultation. Cardiovascular: Regular rate and rhythm. Normal S1S2  Abdomen: Soft, non tender. No hepatosplenomegaly. No palpable mass. Extremities: Left foot with mild erythema and edema warm to touch   Neurological: 5/5 motor x4. is not fully characterized   with this exam, and it likely represents a cyst or hemangioma. 6.  Post hysterectomy. No adnexal mass. 7.  Post appendectomy.   8.  Chronic sclerosis in the right femoral head suggesting osteonecrosis without osseous collapse response to nivolumab. Currently no evidence of disease. She is now receiving treatment every 4 weeks. Next imaging in February 2019 to follow-up on pulmonary nodule    –She has anemia with severe iron deficiency. She has received IV iron.   We will con issues with febrile neutropenia and hospitalization following cycle 1. She completed adjuvant chemotherapy in December 2015. In March 2016 she had biopsy-proven metastatic disease to the pancreas.  She is currently on treatment with nivolumab since April 2 seizures are well-controlled. She is much more active. She denies any chest pain or shortness of breath. She denies any new focal neurological deficits.   She denies any headaches or vision changes    Performance Status:  ECOG 0  Past Medical History:  P insecurity - inability: Not on file      Transportation needs - medical: Not on file      Transportation needs - non-medical: Not on file    Occupational History      Occupation: SELF EMPLOYED    Tobacco Use      Smoking status: Former Smoker        Packs/ (2,000 mg total) by mouth 2 (two) times daily. (Patient taking differently: Take 500 mg by mouth 2 (two) times daily.  1500 BID daily per pts  ), Disp: 360 tablet, Rfl: 3  •  Diclofenac Sodium 1 % Transdermal Gel, Apply 100 g topically 4 (four) times history of left upper lobe lung cancer. There has been prior left upper lobectomy and there are expected postprocedural changes within the left lung base.   There has been interval development of a 5 mm pleural-based nodule along the right   major fissure to carboplatin and paclitaxel for cycles 2 through 4 due to issues with febrile neutropenia and hospitalization following cycle 1. She completed adjuvant chemotherapy in December 2015.  In March 2016 she had biopsy-proven metastatic disease to the pancreas

## 2019-01-25 ENCOUNTER — OFFICE VISIT (OUTPATIENT)
Dept: HEMATOLOGY/ONCOLOGY | Facility: HOSPITAL | Age: 49
End: 2019-01-25
Attending: INTERNAL MEDICINE
Payer: COMMERCIAL

## 2019-01-25 VITALS
DIASTOLIC BLOOD PRESSURE: 75 MMHG | RESPIRATION RATE: 18 BRPM | BODY MASS INDEX: 29 KG/M2 | HEART RATE: 80 BPM | OXYGEN SATURATION: 100 % | WEIGHT: 172 LBS | TEMPERATURE: 99 F | SYSTOLIC BLOOD PRESSURE: 127 MMHG

## 2019-01-25 VITALS
SYSTOLIC BLOOD PRESSURE: 127 MMHG | DIASTOLIC BLOOD PRESSURE: 75 MMHG | RESPIRATION RATE: 18 BRPM | BODY MASS INDEX: 28.66 KG/M2 | HEART RATE: 80 BPM | HEIGHT: 65 IN | WEIGHT: 172 LBS | TEMPERATURE: 99 F

## 2019-01-25 DIAGNOSIS — C78.7 LIVER METASTASES (HCC): ICD-10-CM

## 2019-01-25 DIAGNOSIS — Z51.11 CHEMOTHERAPY MANAGEMENT, ENCOUNTER FOR: ICD-10-CM

## 2019-01-25 DIAGNOSIS — C34.92 CARCINOMA OF LUNG, LEFT (HCC): ICD-10-CM

## 2019-01-25 DIAGNOSIS — C34.90 MALIGNANT NEOPLASM OF LUNG, UNSPECIFIED LATERALITY, UNSPECIFIED PART OF LUNG (HCC): Primary | ICD-10-CM

## 2019-01-25 DIAGNOSIS — C79.31 BRAIN METASTASES (HCC): ICD-10-CM

## 2019-01-25 DIAGNOSIS — E03.9 HYPOTHYROIDISM (ACQUIRED): ICD-10-CM

## 2019-01-25 DIAGNOSIS — Z45.2 ENCOUNTER FOR ADJUSTMENT OR MANAGEMENT OF VASCULAR ACCESS DEVICE: ICD-10-CM

## 2019-01-25 LAB
ALBUMIN SERPL BCP-MCNC: 3.8 G/DL (ref 3.5–4.8)
ALBUMIN/GLOB SERPL: 1 {RATIO} (ref 1–2)
ALP SERPL-CCNC: 102 U/L (ref 32–100)
ALT SERPL-CCNC: 10 U/L (ref 14–54)
ANION GAP SERPL CALC-SCNC: 10 MMOL/L (ref 0–18)
AST SERPL-CCNC: 15 U/L (ref 15–41)
BASOPHILS # BLD: 0.1 K/UL (ref 0–0.2)
BASOPHILS NFR BLD: 1 %
BILIRUB SERPL-MCNC: 0.4 MG/DL (ref 0.3–1.2)
BUN SERPL-MCNC: 13 MG/DL (ref 8–20)
BUN/CREAT SERPL: 21 (ref 10–20)
CALCIUM SERPL-MCNC: 8.7 MG/DL (ref 8.5–10.5)
CHLORIDE SERPL-SCNC: 101 MMOL/L (ref 95–110)
CO2 SERPL-SCNC: 23 MMOL/L (ref 22–32)
CREAT SERPL-MCNC: 0.62 MG/DL (ref 0.5–1.5)
EOSINOPHIL # BLD: 0.1 K/UL (ref 0–0.7)
EOSINOPHIL NFR BLD: 1 %
ERYTHROCYTE [DISTWIDTH] IN BLOOD BY AUTOMATED COUNT: 14.6 % (ref 11–15)
GLOBULIN PLAS-MCNC: 3.7 G/DL (ref 2.5–3.7)
GLUCOSE SERPL-MCNC: 98 MG/DL (ref 70–99)
HCT VFR BLD AUTO: 34 % (ref 35–48)
HGB BLD-MCNC: 11.2 G/DL (ref 12–16)
LYMPHOCYTES # BLD: 2.9 K/UL (ref 1–4)
LYMPHOCYTES NFR BLD: 23 %
MCH RBC QN AUTO: 30 PG (ref 27–32)
MCHC RBC AUTO-ENTMCNC: 33 G/DL (ref 32–37)
MCV RBC AUTO: 91.1 FL (ref 80–100)
MONOCYTES # BLD: 0.9 K/UL (ref 0–1)
MONOCYTES NFR BLD: 8 %
NEUTROPHILS # BLD AUTO: 8.3 K/UL (ref 1.8–7.7)
NEUTROPHILS NFR BLD: 68 %
OSMOLALITY UR CALC.SUM OF ELEC: 278 MOSM/KG (ref 275–295)
PATIENT FASTING: NO
PLATELET # BLD AUTO: 362 K/UL (ref 140–400)
PMV BLD AUTO: 7.7 FL (ref 7.4–10.3)
POTASSIUM SERPL-SCNC: 4.1 MMOL/L (ref 3.3–5.1)
PROT SERPL-MCNC: 7.5 G/DL (ref 5.9–8.4)
RBC # BLD AUTO: 3.74 M/UL (ref 3.7–5.4)
SODIUM SERPL-SCNC: 134 MMOL/L (ref 136–144)
WBC # BLD AUTO: 12.3 K/UL (ref 4–11)

## 2019-01-25 PROCEDURE — 96413 CHEMO IV INFUSION 1 HR: CPT

## 2019-01-25 PROCEDURE — 80053 COMPREHEN METABOLIC PANEL: CPT

## 2019-01-25 PROCEDURE — 99215 OFFICE O/P EST HI 40 MIN: CPT | Performed by: INTERNAL MEDICINE

## 2019-01-25 PROCEDURE — 85025 COMPLETE CBC W/AUTO DIFF WBC: CPT

## 2019-01-25 RX ORDER — HEPARIN SODIUM (PORCINE) LOCK FLUSH IV SOLN 100 UNIT/ML 100 UNIT/ML
5 SOLUTION INTRAVENOUS ONCE
Status: COMPLETED | OUTPATIENT
Start: 2019-01-25 | End: 2019-01-25

## 2019-01-25 RX ORDER — MEPERIDINE HYDROCHLORIDE 25 MG/ML
INJECTION INTRAMUSCULAR; INTRAVENOUS; SUBCUTANEOUS AS NEEDED
Status: CANCELLED | OUTPATIENT
Start: 2019-01-25

## 2019-01-25 RX ORDER — ALBUTEROL SULFATE 90 UG/1
2 AEROSOL, METERED RESPIRATORY (INHALATION) AS NEEDED
Status: CANCELLED | OUTPATIENT
Start: 2019-01-25

## 2019-01-25 RX ORDER — RANITIDINE 25 MG/ML
50 INJECTION, SOLUTION INTRAMUSCULAR; INTRAVENOUS AS NEEDED
Status: CANCELLED | OUTPATIENT
Start: 2019-01-25

## 2019-01-25 RX ORDER — HEPARIN SODIUM (PORCINE) LOCK FLUSH IV SOLN 100 UNIT/ML 100 UNIT/ML
SOLUTION INTRAVENOUS
Status: DISCONTINUED
Start: 2019-01-25 | End: 2019-01-25

## 2019-01-25 RX ORDER — DIPHENHYDRAMINE HYDROCHLORIDE 50 MG/ML
INJECTION INTRAMUSCULAR; INTRAVENOUS EVERY 4 HOURS PRN
Status: CANCELLED | OUTPATIENT
Start: 2019-01-25

## 2019-01-25 RX ORDER — SODIUM CHLORIDE 9 MG/ML
INJECTION, SOLUTION INTRAVENOUS
Status: DISCONTINUED
Start: 2019-01-25 | End: 2019-01-25

## 2019-01-25 RX ORDER — HEPARIN SODIUM (PORCINE) LOCK FLUSH IV SOLN 100 UNIT/ML 100 UNIT/ML
5 SOLUTION INTRAVENOUS ONCE
Status: CANCELLED | OUTPATIENT
Start: 2019-01-25

## 2019-01-25 RX ORDER — 0.9 % SODIUM CHLORIDE 0.9 %
VIAL (ML) INJECTION
Status: DISCONTINUED
Start: 2019-01-25 | End: 2019-01-25

## 2019-01-25 RX ORDER — DIPHENHYDRAMINE HYDROCHLORIDE 50 MG/ML
25 INJECTION INTRAMUSCULAR; INTRAVENOUS ONCE
Status: CANCELLED | OUTPATIENT
Start: 2019-01-25

## 2019-01-25 RX ORDER — 0.9 % SODIUM CHLORIDE 0.9 %
10 VIAL (ML) INJECTION ONCE
Status: CANCELLED | OUTPATIENT
Start: 2019-01-25

## 2019-01-25 RX ORDER — ACETAMINOPHEN 325 MG/1
TABLET ORAL EVERY 6 HOURS PRN
Status: CANCELLED | OUTPATIENT
Start: 2019-01-25

## 2019-01-25 RX ADMIN — HEPARIN SODIUM (PORCINE) LOCK FLUSH IV SOLN 100 UNIT/ML 500 UNITS: 100 SOLUTION INTRAVENOUS at 13:20:00

## 2019-01-25 NOTE — PROGRESS NOTES
Cancer Center Progress Note    Patient Name: Lilli Bocanegra   YOB: 1970   Medical Record Number: [id number]   Attending Physician: Mary Cardoso M.D. Chief Complaint:  Metastatic adenocarcinoma of the lung, brain metastasis.     History of in the setting of polysubstance abuse/dependence. She required mechanical ventilation for secondary respiratory failure. She was seen by neurology and critical care medicine consultation.   After long hospitalization she was eventually discharged to rehab RESECTION   • BRAIN SURGERY Left     OCCIPITAL CRANIOTOMY   • BRAIN SURGERY Left     LOBECTOMY   • BRAIN SURGERY  AUG  2016    REMOVAL OF DEAD TISSUE    • CHOLECYSTECTOMY  2009   • CYST REMOVAL      BREAST, BENIGN   • HEMORRHOIDECTOMY     • HYSTERECTOMY Stress Concern: Not Asked        Weight Concern: Not Asked        Special Diet: Not Asked        Back Care: Not Asked        Exercise: Not Asked        Bike Helmet: Not Asked        Seat Belt: Not Asked        Self-Exams: Not Asked    Social History Magi Calix Clear to auscultation. Cardiovascular: Regular rate and rhythm. Normal S1S2  Abdomen: Soft, non tender. No hepatosplenomegaly. No palpable mass. Extremities: Left foot with mild erythema and edema warm to touch   Neurological: 5/5 motor x4.       Labor which is not fully characterized   with this exam, and it likely represents a cyst or hemangioma. 6.  Post hysterectomy. No adnexal mass. 7.  Post appendectomy.   8.  Chronic sclerosis in the right femoral head suggesting osteonecrosis without osseous co radiographic response to nivolumab. Currently no evidence of disease. She is now receiving treatment every 4 weeks. Next imaging in February 2019 to follow-up on pulmonary nodule    –She has anemia with severe iron deficiency. She has received IV iron. 4 due to issues with febrile neutropenia and hospitalization following cycle 1. She completed adjuvant chemotherapy in December 2015. In March 2016 she had biopsy-proven metastatic disease to the pancreas.  She is currently on treatment with nivolumab [facility] Her seizures are well-controlled. She is much more active. She denies any chest pain or shortness of breath. She denies any new focal neurological deficits.   She denies any headaches or vision changes    Performance Status:  ECOG 0  Past Medical History Food insecurity - inability: Not on file      Transportation needs - medical: Not on file      Transportation needs - non-medical: Not on file    Occupational History      Occupation: SELF EMPLOYED    Tobacco Use      Smoking status: Former Smoker        P tablets (2,000 mg total) by mouth 2 (two) times daily. (Patient taking differently: Take 500 mg by mouth 2 (two) times daily.  1500 BID daily per pts  ), Disp: 360 tablet, Rfl: 3  •  Diclofenac Sodium 1 % Transdermal Gel, Apply 100 g topically 4 (fou There is history of left upper lobe lung cancer. There has been prior left upper lobectomy and there are expected postprocedural changes within the left lung base.   There has been interval development of a 5 mm pleural-based nodule along the right   major changed to carboplatin and paclitaxel for cycles 2 through 4 due to issues with febrile neutropenia and hospitalization following cycle 1. She completed adjuvant chemotherapy in December 2015.  In March 2016 she had biopsy-proven metastatic disease to the

## 2019-01-25 NOTE — PROGRESS NOTES
Pt here for C28 Opdivo. Arrives Ambulating independently, here with friend. Modifications in dose or schedule: No    Patient reports she is feeling well denies any complaints.       Port previously accessed from lab draw, port flushed with saline, positiv

## 2019-01-25 NOTE — PROGRESS NOTES
Right chest port accessed for labs followed by infusion. Brisk blood return noted. Labs obtained as ordered then sent. Port flushed with 20 ml ns, then blue end cap applied. Discharged back to [facility] for MD visit.

## 2019-02-07 ENCOUNTER — TELEPHONE (OUTPATIENT)
Dept: ENDOCRINOLOGY CLINIC | Facility: CLINIC | Age: 49
End: 2019-02-07

## 2019-02-07 DIAGNOSIS — E03.9 HYPOTHYROIDISM, UNSPECIFIED TYPE: Primary | ICD-10-CM

## 2019-02-07 RX ORDER — LEVOTHYROXINE SODIUM 0.1 MG/1
TABLET ORAL
Qty: 30 TABLET | Refills: 0 | Status: SHIPPED | OUTPATIENT
Start: 2019-02-07 | End: 2019-03-11

## 2019-02-08 ENCOUNTER — HOSPITAL ENCOUNTER (OUTPATIENT)
Dept: CT IMAGING | Facility: HOSPITAL | Age: 49
Discharge: HOME | End: 2019-02-08
Attending: INTERNAL MEDICINE
Payer: COMMERCIAL

## 2019-02-08 DIAGNOSIS — C34.90 MALIGNANT NEOPLASM OF LUNG, UNSPECIFIED LATERALITY, UNSPECIFIED PART OF LUNG (HCC): ICD-10-CM

## 2019-02-08 LAB — CREAT BLD-MCNC: 0.6 MG/DL (ref 0.5–1.5)

## 2019-02-08 PROCEDURE — 82565 ASSAY OF CREATININE: CPT

## 2019-02-08 PROCEDURE — 71250 CT THORAX DX C-: CPT | Performed by: INTERNAL MEDICINE

## 2019-02-08 NOTE — IMAGING NOTE
Unable to access a vein.   I telephoned the [facility] 137 returned my call and said that Dr. Kenny Bailey instructed to perform today's scan without IV contrast

## 2019-02-08 NOTE — TELEPHONE ENCOUNTER
Spoke with Ashlyn Sanders (EC). He will check with Adam Day on when she can schedule appointment in March. He states Adam Day is due to repeat labs on 2/22 for another provider and asking if AM would like to order thyroid labs?

## 2019-02-22 ENCOUNTER — OFFICE VISIT (OUTPATIENT)
Dept: HEMATOLOGY/ONCOLOGY | Facility: HOSPITAL | Age: 49
End: 2019-02-22
Attending: INTERNAL MEDICINE
Payer: COMMERCIAL

## 2019-02-22 VITALS
OXYGEN SATURATION: 98 % | RESPIRATION RATE: 16 BRPM | HEART RATE: 83 BPM | SYSTOLIC BLOOD PRESSURE: 136 MMHG | TEMPERATURE: 98 F | DIASTOLIC BLOOD PRESSURE: 84 MMHG

## 2019-02-22 DIAGNOSIS — C34.92 CARCINOMA OF LUNG, LEFT (HCC): ICD-10-CM

## 2019-02-22 DIAGNOSIS — C78.7 LIVER METASTASES (HCC): ICD-10-CM

## 2019-02-22 DIAGNOSIS — E03.9 HYPOTHYROIDISM (ACQUIRED): ICD-10-CM

## 2019-02-22 DIAGNOSIS — C34.90 MALIGNANT NEOPLASM OF LUNG, UNSPECIFIED LATERALITY, UNSPECIFIED PART OF LUNG (HCC): ICD-10-CM

## 2019-02-22 DIAGNOSIS — C34.90 MALIGNANT NEOPLASM OF LUNG, UNSPECIFIED LATERALITY, UNSPECIFIED PART OF LUNG (HCC): Primary | ICD-10-CM

## 2019-02-22 DIAGNOSIS — C79.31 BRAIN METASTASES (HCC): ICD-10-CM

## 2019-02-22 DIAGNOSIS — Z51.11 CHEMOTHERAPY MANAGEMENT, ENCOUNTER FOR: ICD-10-CM

## 2019-02-22 DIAGNOSIS — Z45.2 ENCOUNTER FOR ADJUSTMENT OR MANAGEMENT OF VASCULAR ACCESS DEVICE: Primary | ICD-10-CM

## 2019-02-22 DIAGNOSIS — Z45.2 ENCOUNTER FOR ADJUSTMENT OR MANAGEMENT OF VASCULAR ACCESS DEVICE: ICD-10-CM

## 2019-02-22 LAB
ALBUMIN SERPL-MCNC: 3.6 G/DL (ref 3.4–5)
ALBUMIN/GLOB SERPL: 0.8 {RATIO} (ref 1–2)
ALP LIVER SERPL-CCNC: 111 U/L (ref 39–100)
ALT SERPL-CCNC: 14 U/L (ref 13–56)
ANION GAP SERPL CALC-SCNC: 5 MMOL/L (ref 0–18)
AST SERPL-CCNC: 13 U/L (ref 15–37)
BASOPHILS # BLD AUTO: 0.06 X10(3) UL (ref 0–0.2)
BASOPHILS NFR BLD AUTO: 0.6 %
BILIRUB SERPL-MCNC: 0.2 MG/DL (ref 0.1–2)
BUN BLD-MCNC: 13 MG/DL (ref 7–18)
BUN/CREAT SERPL: 19.1 (ref 10–20)
CALCIUM BLD-MCNC: 9 MG/DL (ref 8.5–10.1)
CHLORIDE SERPL-SCNC: 107 MMOL/L (ref 98–107)
CO2 SERPL-SCNC: 27 MMOL/L (ref 21–32)
CREAT BLD-MCNC: 0.68 MG/DL (ref 0.55–1.02)
DEPRECATED RDW RBC AUTO: 51.8 FL (ref 35.1–46.3)
EOSINOPHIL # BLD AUTO: 0.03 X10(3) UL (ref 0–0.7)
EOSINOPHIL NFR BLD AUTO: 0.3 %
ERYTHROCYTE [DISTWIDTH] IN BLOOD BY AUTOMATED COUNT: 14.9 % (ref 11–15)
GLOBULIN PLAS-MCNC: 4.4 G/DL (ref 2.8–4.4)
GLUCOSE BLD-MCNC: 84 MG/DL (ref 70–99)
HCT VFR BLD AUTO: 34.8 % (ref 35–48)
HGB BLD-MCNC: 11.2 G/DL (ref 12–16)
IMM GRANULOCYTES # BLD AUTO: 0.09 X10(3) UL (ref 0–1)
IMM GRANULOCYTES NFR BLD: 0.9 %
LYMPHOCYTES # BLD AUTO: 2.79 X10(3) UL (ref 1–4)
LYMPHOCYTES NFR BLD AUTO: 26.5 %
M PROTEIN MFR SERPL ELPH: 8 G/DL (ref 6.4–8.2)
MCH RBC QN AUTO: 30.3 PG (ref 26–34)
MCHC RBC AUTO-ENTMCNC: 32.2 G/DL (ref 31–37)
MCV RBC AUTO: 94.1 FL (ref 80–100)
MONOCYTES # BLD AUTO: 0.72 X10(3) UL (ref 0.1–1)
MONOCYTES NFR BLD AUTO: 6.9 %
NEUTROPHILS # BLD AUTO: 6.82 X10 (3) UL (ref 1.5–7.7)
NEUTROPHILS # BLD AUTO: 6.82 X10(3) UL (ref 1.5–7.7)
NEUTROPHILS NFR BLD AUTO: 64.8 %
OSMOLALITY SERPL CALC.SUM OF ELEC: 287 MOSM/KG (ref 275–295)
PLATELET # BLD AUTO: 374 10(3)UL (ref 150–450)
POTASSIUM SERPL-SCNC: 4.2 MMOL/L (ref 3.5–5.1)
RBC # BLD AUTO: 3.7 X10(6)UL (ref 3.8–5.3)
SODIUM SERPL-SCNC: 139 MMOL/L (ref 136–145)
WBC # BLD AUTO: 10.5 X10(3) UL (ref 4–11)

## 2019-02-22 PROCEDURE — 80053 COMPREHEN METABOLIC PANEL: CPT

## 2019-02-22 PROCEDURE — 99215 OFFICE O/P EST HI 40 MIN: CPT | Performed by: INTERNAL MEDICINE

## 2019-02-22 PROCEDURE — 85025 COMPLETE CBC W/AUTO DIFF WBC: CPT

## 2019-02-22 PROCEDURE — 96413 CHEMO IV INFUSION 1 HR: CPT

## 2019-02-22 RX ORDER — RANITIDINE 25 MG/ML
50 INJECTION, SOLUTION INTRAMUSCULAR; INTRAVENOUS AS NEEDED
Status: CANCELLED | OUTPATIENT
Start: 2019-02-22

## 2019-02-22 RX ORDER — 0.9 % SODIUM CHLORIDE 0.9 %
10 VIAL (ML) INJECTION ONCE
Status: DISCONTINUED | OUTPATIENT
Start: 2019-02-22 | End: 2019-02-22

## 2019-02-22 RX ORDER — 0.9 % SODIUM CHLORIDE 0.9 %
10 VIAL (ML) INJECTION ONCE
Status: CANCELLED | OUTPATIENT
Start: 2019-02-22

## 2019-02-22 RX ORDER — 0.9 % SODIUM CHLORIDE 0.9 %
VIAL (ML) INJECTION
Status: DISCONTINUED
Start: 2019-02-22 | End: 2019-02-22

## 2019-02-22 RX ORDER — DIPHENHYDRAMINE HYDROCHLORIDE 50 MG/ML
25 INJECTION INTRAMUSCULAR; INTRAVENOUS ONCE
Status: CANCELLED | OUTPATIENT
Start: 2019-02-22

## 2019-02-22 RX ORDER — HEPARIN SODIUM (PORCINE) LOCK FLUSH IV SOLN 100 UNIT/ML 100 UNIT/ML
5 SOLUTION INTRAVENOUS ONCE
Status: CANCELLED | OUTPATIENT
Start: 2019-02-22

## 2019-02-22 RX ORDER — DIPHENHYDRAMINE HYDROCHLORIDE 50 MG/ML
INJECTION INTRAMUSCULAR; INTRAVENOUS EVERY 4 HOURS PRN
Status: CANCELLED | OUTPATIENT
Start: 2019-02-22

## 2019-02-22 RX ORDER — HEPARIN SODIUM (PORCINE) LOCK FLUSH IV SOLN 100 UNIT/ML 100 UNIT/ML
SOLUTION INTRAVENOUS
Status: DISCONTINUED
Start: 2019-02-22 | End: 2019-02-22

## 2019-02-22 RX ORDER — ACETAMINOPHEN 325 MG/1
TABLET ORAL EVERY 6 HOURS PRN
Status: CANCELLED | OUTPATIENT
Start: 2019-02-22

## 2019-02-22 RX ORDER — ALBUTEROL SULFATE 90 UG/1
2 AEROSOL, METERED RESPIRATORY (INHALATION) AS NEEDED
Status: CANCELLED | OUTPATIENT
Start: 2019-02-22

## 2019-02-22 RX ORDER — HEPARIN SODIUM (PORCINE) LOCK FLUSH IV SOLN 100 UNIT/ML 100 UNIT/ML
5 SOLUTION INTRAVENOUS ONCE
Status: COMPLETED | OUTPATIENT
Start: 2019-02-22 | End: 2019-02-22

## 2019-02-22 RX ORDER — HEPARIN SODIUM (PORCINE) LOCK FLUSH IV SOLN 100 UNIT/ML 100 UNIT/ML
5 SOLUTION INTRAVENOUS ONCE
Status: DISCONTINUED | OUTPATIENT
Start: 2019-02-22 | End: 2019-02-22

## 2019-02-22 RX ORDER — MEPERIDINE HYDROCHLORIDE 25 MG/ML
INJECTION INTRAMUSCULAR; INTRAVENOUS; SUBCUTANEOUS AS NEEDED
Status: CANCELLED | OUTPATIENT
Start: 2019-02-22

## 2019-02-22 RX ORDER — SODIUM CHLORIDE 9 MG/ML
INJECTION, SOLUTION INTRAVENOUS
Status: DISCONTINUED
Start: 2019-02-22 | End: 2019-02-22

## 2019-02-22 RX ADMIN — HEPARIN SODIUM (PORCINE) LOCK FLUSH IV SOLN 100 UNIT/ML 500 UNITS: 100 SOLUTION INTRAVENOUS at 14:01:00

## 2019-02-22 NOTE — PROGRESS NOTES
Patient here for lab draw- ambulated from lobby with steady gait,  Port accessed using sterile technique- good blood return noted and flushes without difficulty. Labs drawn and sent- port flushed with 20cc Ns and blue and alcohol caps applied.   Patient es

## 2019-02-22 NOTE — PROGRESS NOTES
Pt here for C29D1.  OPDIVO  Arrives Ambulating independently     Modifications in dose or schedule: No  Patient arrived with port accessed - good blood return noted, flushes without difficulty     Frequency of blood return and site check throughout administ

## 2019-02-22 NOTE — PROGRESS NOTES
Cancer Center Progress Note    Patient Name: Agnes Johnson   YOB: 1970   Medical Record Number: [id number]   Attending Physician: Jeff Mazariegos M.D. Chief Complaint:  Metastatic adenocarcinoma of the lung, brain metastasis.     History of in the setting of polysubstance abuse/dependence. She required mechanical ventilation for secondary respiratory failure. She was seen by neurology and critical care medicine consultation.   After long hospitalization she was eventually discharged to rehab RESECTION   • BRAIN SURGERY Left     OCCIPITAL CRANIOTOMY   • BRAIN SURGERY Left     LOBECTOMY   • BRAIN SURGERY  AUG  2016    REMOVAL OF DEAD TISSUE    • CHOLECYSTECTOMY  2009   • CYST REMOVAL      BREAST, BENIGN   • HEMORRHOIDECTOMY     • HYSTERECTOMY Active member of club or organization: Not on file        Attends meetings of clubs or organizations: Not on file        Relationship status: Not on file      Intimate partner violence:        Fear of current or ex partner: Not on file        Emotionally a mouth., Disp: , Rfl:     Allergies:  No Known Allergies     Review of Systems:  All other systems reviewed and negative x12    Vital Signs: There were no vitals taken for this visit.     Physical Examination:  General: Patient is alert and oriented x 3, no lobectomy and mediastinal lymph node dissection on 8/25/2015 (pT1aN0).   She received adjuvant chemo originally with cisplatin and Navelbine being for 1 cycle however this was changed to carboplatin and paclitaxel for cycles 2 through 4 due to issues with f well being was assessed and resources were discussed. Appropriate resources were reviewed and discussed with the pateint and family.        Kenrick Mueller, [address] Progress Note    Patient Name: Светлана Romero   YOB: 1970   Med when her fevers resolved seen by infectious disease and received IV antibiotics for roughly 48 hours    She was admitted to the hospital in December 2017 with seizures in the setting of polysubstance abuse/dependence.   She required mechanical ventilation f [facility])    • Wears glasses        Past Surgical History:  Past Surgical History:   Procedure Laterality Date   • APPENDECTOMY  1999   • BRAIN SURGERY  JULY 2015    TUMOR RESECTION   • BRAIN SURGERY Left     OCCIPITAL CRANIOTOMY   • BRAIN SURGERY Left     LOB file    Relationships      Social connections:        Talks on phone: Not on file        Gets together: Not on file        Attends Buddhist service: Not on file        Active member of club or organization: Not on file        Attends meetings of clubs or tablet, Rfl: 3  •  Diclofenac Sodium 1 % Transdermal Gel, Apply 100 g topically 4 (four) times daily. , Disp: , Rfl:   •  Nutritional Supplements (ENSURE) Oral Liquid, Take by mouth., Disp: , Rfl:     Allergies:  No Known Allergies     Review of Systems:  A and is status post resection with adjuvant radiation. She is also status post a right frontal lobe resection in August 2016. Her left upper lung primary lesion was treated with lobectomy and mediastinal lymph node dissection on 8/25/2015 (pT1aN0).   She r High metastatic lung cancer with brain metastasis recurrent seizures undergoing active cancer directed therapy    The patient's emotional well being was assessed and resources were discussed.   Appropriate resources were reviewed and discussed with the breanne

## 2019-03-12 ENCOUNTER — TELEPHONE (OUTPATIENT)
Dept: ENDOCRINOLOGY CLINIC | Facility: CLINIC | Age: 49
End: 2019-03-12

## 2019-03-12 RX ORDER — LEVOTHYROXINE SODIUM 0.1 MG/1
TABLET ORAL
Qty: 30 TABLET | Refills: 0 | Status: SHIPPED | OUTPATIENT
Start: 2019-03-12 | End: 2019-04-12

## 2019-03-12 NOTE — TELEPHONE ENCOUNTER
Endo nurses:  Please call patient for apt  Last seen in 9/2018  No labs since then  Needs labs and FU  We did notify the patient/ family at the time of last refill  Please call and send a letter that she requires blood work and a follow up appointment.

## 2019-03-12 NOTE — TELEPHONE ENCOUNTER
Spoke with , Sophia Mae [facility]). He states they will call back tomorrow to schedule follow up. He is aware that Noahkirstie Blue needs follow up for future medication refills.

## 2019-03-22 ENCOUNTER — OFFICE VISIT (OUTPATIENT)
Dept: HEMATOLOGY/ONCOLOGY | Facility: HOSPITAL | Age: 49
End: 2019-03-22
Attending: INTERNAL MEDICINE
Payer: COMMERCIAL

## 2019-03-22 VITALS
HEART RATE: 65 BPM | RESPIRATION RATE: 18 BRPM | HEIGHT: 65 IN | DIASTOLIC BLOOD PRESSURE: 93 MMHG | WEIGHT: 185.19 LBS | TEMPERATURE: 98 F | BODY MASS INDEX: 30.85 KG/M2 | SYSTOLIC BLOOD PRESSURE: 108 MMHG

## 2019-03-22 DIAGNOSIS — G40.909 SEIZURE DISORDER (HCC): ICD-10-CM

## 2019-03-22 DIAGNOSIS — C34.92 CARCINOMA OF LUNG, LEFT (HCC): ICD-10-CM

## 2019-03-22 DIAGNOSIS — C34.90 MALIGNANT NEOPLASM OF LUNG, UNSPECIFIED LATERALITY, UNSPECIFIED PART OF LUNG (HCC): Primary | ICD-10-CM

## 2019-03-22 DIAGNOSIS — E03.9 HYPOTHYROIDISM (ACQUIRED): ICD-10-CM

## 2019-03-22 DIAGNOSIS — C78.7 LIVER METASTASES (HCC): ICD-10-CM

## 2019-03-22 DIAGNOSIS — Z45.2 ENCOUNTER FOR ADJUSTMENT OR MANAGEMENT OF VASCULAR ACCESS DEVICE: ICD-10-CM

## 2019-03-22 DIAGNOSIS — C79.31 BRAIN METASTASES (HCC): ICD-10-CM

## 2019-03-22 DIAGNOSIS — Z51.11 CHEMOTHERAPY MANAGEMENT, ENCOUNTER FOR: ICD-10-CM

## 2019-03-22 LAB
ALBUMIN SERPL-MCNC: 3.8 G/DL (ref 3.4–5)
ALBUMIN/GLOB SERPL: 0.9 {RATIO} (ref 1–2)
ALP LIVER SERPL-CCNC: 105 U/L (ref 39–100)
ALT SERPL-CCNC: 12 U/L (ref 13–56)
ANION GAP SERPL CALC-SCNC: 6 MMOL/L (ref 0–18)
AST SERPL-CCNC: 10 U/L (ref 15–37)
BASOPHILS # BLD AUTO: 0.06 X10(3) UL (ref 0–0.2)
BASOPHILS NFR BLD AUTO: 0.5 %
BILIRUB SERPL-MCNC: 0.2 MG/DL (ref 0.1–2)
BUN BLD-MCNC: 18 MG/DL (ref 7–18)
BUN/CREAT SERPL: 27.3 (ref 10–20)
CALCIUM BLD-MCNC: 8.5 MG/DL (ref 8.5–10.1)
CHLORIDE SERPL-SCNC: 110 MMOL/L (ref 98–107)
CO2 SERPL-SCNC: 25 MMOL/L (ref 21–32)
CREAT BLD-MCNC: 0.66 MG/DL (ref 0.55–1.02)
DEPRECATED RDW RBC AUTO: 51.5 FL (ref 35.1–46.3)
EOSINOPHIL # BLD AUTO: 0.08 X10(3) UL (ref 0–0.7)
EOSINOPHIL NFR BLD AUTO: 0.7 %
ERYTHROCYTE [DISTWIDTH] IN BLOOD BY AUTOMATED COUNT: 14.8 % (ref 11–15)
GLOBULIN PLAS-MCNC: 4.1 G/DL (ref 2.8–4.4)
GLUCOSE BLD-MCNC: 97 MG/DL (ref 70–99)
HCT VFR BLD AUTO: 34.6 % (ref 35–48)
HGB BLD-MCNC: 11 G/DL (ref 12–16)
IMM GRANULOCYTES # BLD AUTO: 0.11 X10(3) UL (ref 0–1)
IMM GRANULOCYTES NFR BLD: 1 %
LYMPHOCYTES # BLD AUTO: 2.87 X10(3) UL (ref 1–4)
LYMPHOCYTES NFR BLD AUTO: 25.5 %
M PROTEIN MFR SERPL ELPH: 7.9 G/DL (ref 6.4–8.2)
MCH RBC QN AUTO: 30.1 PG (ref 26–34)
MCHC RBC AUTO-ENTMCNC: 31.8 G/DL (ref 31–37)
MCV RBC AUTO: 94.5 FL (ref 80–100)
MONOCYTES # BLD AUTO: 0.79 X10(3) UL (ref 0.1–1)
MONOCYTES NFR BLD AUTO: 7 %
NEUTROPHILS # BLD AUTO: 7.35 X10 (3) UL (ref 1.5–7.7)
NEUTROPHILS # BLD AUTO: 7.35 X10(3) UL (ref 1.5–7.7)
NEUTROPHILS NFR BLD AUTO: 65.3 %
OSMOLALITY SERPL CALC.SUM OF ELEC: 294 MOSM/KG (ref 275–295)
PLATELET # BLD AUTO: 358 10(3)UL (ref 150–450)
POTASSIUM SERPL-SCNC: 4.2 MMOL/L (ref 3.5–5.1)
RBC # BLD AUTO: 3.66 X10(6)UL (ref 3.8–5.3)
SODIUM SERPL-SCNC: 141 MMOL/L (ref 136–145)
WBC # BLD AUTO: 11.3 X10(3) UL (ref 4–11)

## 2019-03-22 PROCEDURE — 99215 OFFICE O/P EST HI 40 MIN: CPT | Performed by: INTERNAL MEDICINE

## 2019-03-22 PROCEDURE — 80053 COMPREHEN METABOLIC PANEL: CPT

## 2019-03-22 PROCEDURE — 85025 COMPLETE CBC W/AUTO DIFF WBC: CPT

## 2019-03-22 PROCEDURE — 96413 CHEMO IV INFUSION 1 HR: CPT

## 2019-03-22 RX ORDER — HEPARIN SODIUM (PORCINE) LOCK FLUSH IV SOLN 100 UNIT/ML 100 UNIT/ML
SOLUTION INTRAVENOUS
Status: COMPLETED
Start: 2019-03-22 | End: 2019-03-22

## 2019-03-22 RX ORDER — ALBUTEROL SULFATE 90 UG/1
2 AEROSOL, METERED RESPIRATORY (INHALATION) AS NEEDED
Status: CANCELLED | OUTPATIENT
Start: 2019-03-22

## 2019-03-22 RX ORDER — DIPHENHYDRAMINE HYDROCHLORIDE 50 MG/ML
25 INJECTION INTRAMUSCULAR; INTRAVENOUS ONCE
Status: CANCELLED | OUTPATIENT
Start: 2019-03-22

## 2019-03-22 RX ORDER — 0.9 % SODIUM CHLORIDE 0.9 %
VIAL (ML) INJECTION
Status: DISCONTINUED
Start: 2019-03-22 | End: 2019-03-22

## 2019-03-22 RX ORDER — 0.9 % SODIUM CHLORIDE 0.9 %
10 VIAL (ML) INJECTION ONCE
Status: CANCELLED | OUTPATIENT
Start: 2019-03-22

## 2019-03-22 RX ORDER — PREDNISONE 20 MG/1
20 TABLET ORAL DAILY
Qty: 11 TABLET | Refills: 0 | Status: SHIPPED | OUTPATIENT
Start: 2019-03-22 | End: 2019-04-05 | Stop reason: ALTCHOICE

## 2019-03-22 RX ORDER — RANITIDINE 25 MG/ML
50 INJECTION, SOLUTION INTRAMUSCULAR; INTRAVENOUS AS NEEDED
Status: CANCELLED | OUTPATIENT
Start: 2019-03-22

## 2019-03-22 RX ORDER — HEPARIN SODIUM (PORCINE) LOCK FLUSH IV SOLN 100 UNIT/ML 100 UNIT/ML
5 SOLUTION INTRAVENOUS ONCE
Status: COMPLETED | OUTPATIENT
Start: 2019-03-22 | End: 2019-03-22

## 2019-03-22 RX ORDER — MEPERIDINE HYDROCHLORIDE 25 MG/ML
INJECTION INTRAMUSCULAR; INTRAVENOUS; SUBCUTANEOUS AS NEEDED
Status: CANCELLED | OUTPATIENT
Start: 2019-03-22

## 2019-03-22 RX ORDER — HEPARIN SODIUM (PORCINE) LOCK FLUSH IV SOLN 100 UNIT/ML 100 UNIT/ML
5 SOLUTION INTRAVENOUS ONCE
Status: CANCELLED | OUTPATIENT
Start: 2019-03-22

## 2019-03-22 RX ORDER — SODIUM CHLORIDE 9 MG/ML
INJECTION, SOLUTION INTRAVENOUS
Status: DISCONTINUED
Start: 2019-03-22 | End: 2019-03-22

## 2019-03-22 RX ORDER — ACETAMINOPHEN 325 MG/1
TABLET ORAL EVERY 6 HOURS PRN
Status: CANCELLED | OUTPATIENT
Start: 2019-03-22

## 2019-03-22 RX ORDER — DIPHENHYDRAMINE HYDROCHLORIDE 50 MG/ML
INJECTION INTRAMUSCULAR; INTRAVENOUS EVERY 4 HOURS PRN
Status: CANCELLED | OUTPATIENT
Start: 2019-03-22

## 2019-03-22 RX ADMIN — HEPARIN SODIUM (PORCINE) LOCK FLUSH IV SOLN 100 UNIT/ML 500 UNITS: 100 SOLUTION INTRAVENOUS at 12:55:00

## 2019-03-22 NOTE — PROGRESS NOTES
Cancer Center Progress Note    Patient Name: Carmela Ricardo   YOB: 1970   Medical Record Number: [id number]   Attending Physician: Jay James M.D. Chief Complaint:  Metastatic adenocarcinoma of the lung, brain metastasis.     History of in the setting of polysubstance abuse/dependence. She required mechanical ventilation for secondary respiratory failure. She was seen by neurology and critical care medicine consultation.   After long hospitalization she was eventually discharged to rehab APPENDECTOMY  1999   • BRAIN SURGERY  JULY 2015    TUMOR RESECTION   • BRAIN SURGERY Left     OCCIPITAL CRANIOTOMY   • BRAIN SURGERY Left     LOBECTOMY   • BRAIN SURGERY  AUG  2016    REMOVAL OF DEAD TISSUE    • CHOLECYSTECTOMY  2009   • CYST REMOVAL file        Attends Baptism service: Not on file        Active member of club or organization: Not on file        Attends meetings of clubs or organizations: Not on file        Relationship status: Not on file      Intimate partner violence:        Fear mouth 2 (two) times daily. 1500 BID daily per pts  ), Disp: 360 tablet, Rfl: 3  •  Diclofenac Sodium 1 % Transdermal Gel, Apply 100 g topically 4 (four) times daily. , Disp: , Rfl:   •  Nutritional Supplements (ENSURE) Oral Liquid, Take by mouth., Jennifer Mckinney upper lobe of the lung (EGFR,ALK,ROS1 negative). She has liver and brain metastasis as well as a metastatic lesion to the pancreas.  The patient was initially diagnosed in July 2015 with a left occipital brain lesion and is status post resection with adjuv continue to follow her thyroid tests on treatment. Endocrinology is managing this  –Polysubstance abuse/dependence with seizures. She is not using any alcohol or illicit drugs at this time.   Neurology [facility]) is managing her antiepileptics    Risk assess in May 2017. IV iron infusion has been ordered however the patient has declined scheduling this on multiple occasions    She is follow with the pain service for chronic pain/arthralgias. She has had issues with polysubstance abuse/opioid dependence.   She Exposure to radiation     DISCONTINUED AUG 2015    • Gallbladder disease    • Hepatitis C    • High blood pressure    • Hypothyroid    • Lung cancer (HCC)    • Lung cancer (HCC)    • Migraines    • Osteoarthritis    • [facility] JULY 2015   • Smokeless tobacco: Never Used      Tobacco comment: Current some day smoker 04/2017    Substance and Sexual Activity      Alcohol use: Yes        Comment: occassionally.  1-2 glasses wine per month      Drug use: No      Sexual activity: Yes        Partn Tab, , Disp: , Rfl: 0  •  QUEtiapine Fumarate (SEROQUEL) 25 MG Oral Tab, Take 1 tablet (25 mg total) by mouth nightly., Disp: 90 tablet, Rfl: 3  •  escitalopram 5 MG Oral Tab, Take 1 tablet (5 mg total) by mouth daily. , Disp: 90 tablet, Rfl: 3  •  Lacosami GFRAA 120 03/22/2019    CA 8.5 03/22/2019    OSMOCALC 294 03/22/2019    ALKPHO 105 (H) 03/22/2019    AST 10 (L) 03/22/2019    ALT 12 (L) 03/22/2019    ALKPHOS 131 (H) 09/21/2016    BILT 0.2 03/22/2019    TP 7.9 03/22/2019    ALB 3.8 03/22/2019    GLOBULIN [place]. She significantly improved neurologically      –Continue next cycle nivolumab. She has had excellent radiographic response to nivolumab. Currently no evidence of disease. She is now receiving treatment every 4 weeks.   Next imaging June 2019

## 2019-03-22 NOTE — PROGRESS NOTES
Pt here for C30D1. OPDIVO  Arrives Ambulating independently   Patient states she is feeling well. Has no complaints today.     Modifications in dose or schedule: No  Patient arrived with port accessed - good blood return noted, flushes without difficulty

## 2019-04-05 ENCOUNTER — OFFICE VISIT (OUTPATIENT)
Dept: INTERNAL MEDICINE CLINIC | Facility: CLINIC | Age: 49
End: 2019-04-05
Payer: COMMERCIAL

## 2019-04-05 VITALS
TEMPERATURE: 99 F | SYSTOLIC BLOOD PRESSURE: 116 MMHG | HEIGHT: 65 IN | OXYGEN SATURATION: 97 % | HEART RATE: 84 BPM | WEIGHT: 188 LBS | BODY MASS INDEX: 31.32 KG/M2 | DIASTOLIC BLOOD PRESSURE: 86 MMHG

## 2019-04-05 DIAGNOSIS — R35.0 URINARY FREQUENCY: ICD-10-CM

## 2019-04-05 DIAGNOSIS — G40.911 INTRACTABLE EPILEPSY WITH STATUS EPILEPTICUS, UNSPECIFIED EPILEPSY TYPE (HCC): ICD-10-CM

## 2019-04-05 DIAGNOSIS — E03.9 HYPOTHYROIDISM, UNSPECIFIED TYPE: ICD-10-CM

## 2019-04-05 DIAGNOSIS — F60.3 BORDERLINE PERSONALITY DISORDER (HCC): ICD-10-CM

## 2019-04-05 DIAGNOSIS — Z00.00 ANNUAL PHYSICAL EXAM: Primary | ICD-10-CM

## 2019-04-05 DIAGNOSIS — F19.90 IV DRUG USER: ICD-10-CM

## 2019-04-05 DIAGNOSIS — C34.90 MALIGNANT NEOPLASM OF LUNG, UNSPECIFIED LATERALITY, UNSPECIFIED PART OF LUNG (HCC): ICD-10-CM

## 2019-04-05 PROCEDURE — 99396 PREV VISIT EST AGE 40-64: CPT | Performed by: INTERNAL MEDICINE

## 2019-04-05 NOTE — PROGRESS NOTES
Melyssa Kellogg is a 51year old female. Patient presents with:  Physical      HPI:   Melyssa Kellogg is a 51year old female who presents for a complete physical exam.      Melyssa Kellogg is a(n) 51year old female with a complicated medical history including stag currently smoking again 3-4 cigarettes per day    She still does not have full vision.  Feels like since her seizures, she has lost peripheral vision.                Wt Readings from Last 6 Encounters:  04/05/19 : 188 lb (85.3 kg)  03/22/19 : 185 lb 3 oz (8 SURGERY Left     LOBECTOMY   • BRAIN SURGERY  AUG  2016    REMOVAL OF DEAD TISSUE    • CHOLECYSTECTOMY  2009   • CYST REMOVAL      BREAST, BENIGN   • HEMORRHOIDECTOMY     • HYSTERECTOMY      heavy menstrual flow,    • LUMPECTOMY RIGHT  2012    FIBROADENOMA nourished, in no apparent distress  HEENT: normal oropharynx, normal TM's  EYES: PERRLA, EOMI, conjunctivae are pink  NECK: supple, no cervical or supraclavicular LAD, no carotic bruits, no thyromegaly  BREAST: no dominant or suspicious mass, no axillary L

## 2019-04-09 PROBLEM — R50.9 ACUTE FEBRILE ILLNESS: Status: RESOLVED | Noted: 2017-11-13 | Resolved: 2019-04-09

## 2019-04-09 PROBLEM — R41.82 ALTERED MENTAL STATUS, UNSPECIFIED ALTERED MENTAL STATUS TYPE: Status: RESOLVED | Noted: 2018-04-20 | Resolved: 2019-04-09

## 2019-04-09 PROBLEM — R56.9 SEIZURE (HCC): Status: RESOLVED | Noted: 2018-01-27 | Resolved: 2019-04-09

## 2019-04-09 PROBLEM — H53.9 VISION CHANGES: Status: RESOLVED | Noted: 2018-07-27 | Resolved: 2019-04-09

## 2019-04-09 PROBLEM — R53.1 WEAKNESS GENERALIZED: Status: RESOLVED | Noted: 2018-04-24 | Resolved: 2019-04-09

## 2019-04-09 PROBLEM — E87.2 METABOLIC ACIDOSIS: Status: RESOLVED | Noted: 2017-11-25 | Resolved: 2019-04-09

## 2019-04-09 PROBLEM — I95.9 HYPOTENSION: Status: RESOLVED | Noted: 2017-11-13 | Resolved: 2019-04-09

## 2019-04-09 PROBLEM — R73.9 HYPERGLYCEMIA: Status: RESOLVED | Noted: 2017-11-25 | Resolved: 2019-04-09

## 2019-04-09 PROBLEM — E87.6 HYPOKALEMIA: Status: RESOLVED | Noted: 2017-11-25 | Resolved: 2019-04-09

## 2019-04-09 PROBLEM — Z51.81 MEDICATION MONITORING ENCOUNTER: Status: RESOLVED | Noted: 2018-04-20 | Resolved: 2019-04-09

## 2019-04-09 PROBLEM — L03.116 CELLULITIS OF FOOT, LEFT: Status: RESOLVED | Noted: 2017-05-19 | Resolved: 2019-04-09

## 2019-04-09 PROBLEM — B37.81 ESOPHAGEAL CANDIDIASIS (HCC): Status: RESOLVED | Noted: 2017-11-13 | Resolved: 2019-04-09

## 2019-04-09 PROBLEM — R11.2 NAUSEA AND VOMITING, INTRACTABILITY OF VOMITING NOT SPECIFIED, UNSPECIFIED VOMITING TYPE: Status: RESOLVED | Noted: 2018-04-24 | Resolved: 2019-04-09

## 2019-04-09 PROBLEM — E87.1 HYPONATREMIA: Status: RESOLVED | Noted: 2017-11-13 | Resolved: 2019-04-09

## 2019-04-09 PROBLEM — E87.20 METABOLIC ACIDOSIS: Status: RESOLVED | Noted: 2017-11-25 | Resolved: 2019-04-09

## 2019-04-09 PROBLEM — B34.9 VIRAL SYNDROME: Status: RESOLVED | Noted: 2017-05-19 | Resolved: 2019-04-09

## 2019-04-09 PROBLEM — R11.2 NAUSEA AND VOMITING: Status: RESOLVED | Noted: 2018-04-23 | Resolved: 2019-04-09

## 2019-04-12 ENCOUNTER — TELEPHONE (OUTPATIENT)
Dept: ENDOCRINOLOGY CLINIC | Facility: CLINIC | Age: 49
End: 2019-04-12

## 2019-04-15 RX ORDER — LEVOTHYROXINE SODIUM 0.1 MG/1
TABLET ORAL
Qty: 30 TABLET | Refills: 0 | Status: SHIPPED | OUTPATIENT
Start: 2019-04-15 | End: 2019-04-24

## 2019-04-15 NOTE — TELEPHONE ENCOUNTER
LOV 9/4/18 - F/U 4/20/19    Pt has f/u Sat 4/20 - Labs not done as of yet - ordered in system    Med last ordered on 3/12/19 for 30 days    Pended med #30 for provider

## 2019-04-19 ENCOUNTER — TELEPHONE (OUTPATIENT)
Dept: HEMATOLOGY/ONCOLOGY | Facility: HOSPITAL | Age: 49
End: 2019-04-19

## 2019-04-19 ENCOUNTER — OFFICE VISIT (OUTPATIENT)
Dept: HEMATOLOGY/ONCOLOGY | Facility: HOSPITAL | Age: 49
End: 2019-04-19
Attending: INTERNAL MEDICINE
Payer: COMMERCIAL

## 2019-04-19 VITALS
BODY MASS INDEX: 32 KG/M2 | RESPIRATION RATE: 18 BRPM | WEIGHT: 190 LBS | DIASTOLIC BLOOD PRESSURE: 78 MMHG | HEART RATE: 87 BPM | TEMPERATURE: 98 F | SYSTOLIC BLOOD PRESSURE: 110 MMHG

## 2019-04-19 VITALS
BODY MASS INDEX: 31.65 KG/M2 | TEMPERATURE: 98 F | RESPIRATION RATE: 18 BRPM | HEART RATE: 87 BPM | WEIGHT: 190 LBS | SYSTOLIC BLOOD PRESSURE: 110 MMHG | DIASTOLIC BLOOD PRESSURE: 78 MMHG | HEIGHT: 65 IN

## 2019-04-19 DIAGNOSIS — Z51.11 CHEMOTHERAPY MANAGEMENT, ENCOUNTER FOR: ICD-10-CM

## 2019-04-19 DIAGNOSIS — Z45.2 ENCOUNTER FOR ADJUSTMENT OR MANAGEMENT OF VASCULAR ACCESS DEVICE: ICD-10-CM

## 2019-04-19 DIAGNOSIS — C34.92 CARCINOMA OF LUNG, LEFT (HCC): ICD-10-CM

## 2019-04-19 DIAGNOSIS — C79.31 BRAIN METASTASES (HCC): ICD-10-CM

## 2019-04-19 DIAGNOSIS — C34.90 MALIGNANT NEOPLASM OF LUNG, UNSPECIFIED LATERALITY, UNSPECIFIED PART OF LUNG (HCC): Primary | ICD-10-CM

## 2019-04-19 DIAGNOSIS — G40.909 SEIZURE DISORDER (HCC): ICD-10-CM

## 2019-04-19 DIAGNOSIS — C78.7 LIVER METASTASES (HCC): ICD-10-CM

## 2019-04-19 DIAGNOSIS — E03.9 HYPOTHYROIDISM (ACQUIRED): ICD-10-CM

## 2019-04-19 PROCEDURE — 96413 CHEMO IV INFUSION 1 HR: CPT

## 2019-04-19 PROCEDURE — 85025 COMPLETE CBC W/AUTO DIFF WBC: CPT

## 2019-04-19 PROCEDURE — 99215 OFFICE O/P EST HI 40 MIN: CPT | Performed by: INTERNAL MEDICINE

## 2019-04-19 PROCEDURE — 80053 COMPREHEN METABOLIC PANEL: CPT

## 2019-04-19 RX ORDER — RANITIDINE 25 MG/ML
50 INJECTION, SOLUTION INTRAMUSCULAR; INTRAVENOUS AS NEEDED
Status: CANCELLED | OUTPATIENT
Start: 2019-04-19

## 2019-04-19 RX ORDER — MEPERIDINE HYDROCHLORIDE 25 MG/ML
INJECTION INTRAMUSCULAR; INTRAVENOUS; SUBCUTANEOUS AS NEEDED
Status: CANCELLED | OUTPATIENT
Start: 2019-04-19

## 2019-04-19 RX ORDER — DIPHENHYDRAMINE HYDROCHLORIDE 50 MG/ML
INJECTION INTRAMUSCULAR; INTRAVENOUS EVERY 4 HOURS PRN
Status: CANCELLED | OUTPATIENT
Start: 2019-04-19

## 2019-04-19 RX ORDER — HEPARIN SODIUM (PORCINE) LOCK FLUSH IV SOLN 100 UNIT/ML 100 UNIT/ML
5 SOLUTION INTRAVENOUS ONCE
Status: COMPLETED | OUTPATIENT
Start: 2019-04-19 | End: 2019-04-19

## 2019-04-19 RX ORDER — SODIUM CHLORIDE 9 MG/ML
INJECTION, SOLUTION INTRAVENOUS
Status: DISCONTINUED
Start: 2019-04-19 | End: 2019-04-19

## 2019-04-19 RX ORDER — 0.9 % SODIUM CHLORIDE 0.9 %
VIAL (ML) INJECTION
Status: DISCONTINUED
Start: 2019-04-19 | End: 2019-04-19

## 2019-04-19 RX ORDER — HEPARIN SODIUM (PORCINE) LOCK FLUSH IV SOLN 100 UNIT/ML 100 UNIT/ML
SOLUTION INTRAVENOUS
Status: COMPLETED
Start: 2019-04-19 | End: 2019-04-19

## 2019-04-19 RX ORDER — HEPARIN SODIUM (PORCINE) LOCK FLUSH IV SOLN 100 UNIT/ML 100 UNIT/ML
5 SOLUTION INTRAVENOUS ONCE
Status: CANCELLED | OUTPATIENT
Start: 2019-04-19

## 2019-04-19 RX ORDER — ACETAMINOPHEN 325 MG/1
TABLET ORAL EVERY 6 HOURS PRN
Status: CANCELLED | OUTPATIENT
Start: 2019-04-19

## 2019-04-19 RX ORDER — ALBUTEROL SULFATE 90 UG/1
2 AEROSOL, METERED RESPIRATORY (INHALATION) AS NEEDED
Status: CANCELLED | OUTPATIENT
Start: 2019-04-19

## 2019-04-19 RX ORDER — 0.9 % SODIUM CHLORIDE 0.9 %
10 VIAL (ML) INJECTION ONCE
Status: CANCELLED | OUTPATIENT
Start: 2019-04-19

## 2019-04-19 RX ADMIN — HEPARIN SODIUM (PORCINE) LOCK FLUSH IV SOLN 100 UNIT/ML 500 UNITS: 100 SOLUTION INTRAVENOUS at 14:10:00

## 2019-04-19 NOTE — PROGRESS NOTES
Cancer Center Progress Note    Patient Name: Adriana Miranda   YOB: 1970   Medical Record Number: [id number]   Attending Physician: Raeann Salcido M.D. Chief Complaint:  Metastatic adenocarcinoma of the lung, brain metastasis.     History of in the setting of polysubstance abuse/dependence. She required mechanical ventilation for secondary respiratory failure. She was seen by neurology and critical care medicine consultation.   After long hospitalization she was eventually discharged to rehab APPENDECTOMY  1999   • BRAIN SURGERY  JULY 2015    TUMOR RESECTION   • BRAIN SURGERY Left     OCCIPITAL CRANIOTOMY   • BRAIN SURGERY Left     LOBECTOMY   • BRAIN SURGERY  AUG  2016    REMOVAL OF DEAD TISSUE    • CHOLECYSTECTOMY  2009   • CYST REMOVAL file        Attends Yazidi service: Not on file        Active member of club or organization: Not on file        Attends meetings of clubs or organizations: Not on file        Relationship status: Not on file      Intimate partner violence:        Fear arm, Patient Position: Sitting)   Pulse 87   Temp 98.2 °F (36.8 °C) (Oral)   Resp 18   Ht 1.651 m (5' 5\")   Wt 86.2 kg (190 lb)   BMI 31.62 kg/m²     Physical Examination:  General: Patient is alert and oriented x 3, not in acute distress.   Psych:  Mood a lymph node dissection on 8/25/2015 (pT1aN0).   She received adjuvant chemo originally with cisplatin and Navelbine being for 1 cycle however this was changed to carboplatin and paclitaxel for cycles 2 through 4 due to issues with febrile neutropenia and hos and resources were discussed. Appropriate resources were reviewed and discussed with the pateint and family.        Porsche Mcintyre, [address] Progress Note    Patient Name: Sparkle Dodd   YOB: 1970   Medical Record Number: [id number] seen by infectious disease and received IV antibiotics for roughly 48 hours    She was admitted to the hospital in December 2017 with seizures in the setting of polysubstance abuse/dependence.   She required mechanical ventilation for secondary respiratory Past Surgical History:  Past Surgical History:   Procedure Laterality Date   • APPENDECTOMY  1999   • BRAIN SURGERY  JULY 2015    TUMOR RESECTION   • BRAIN SURGERY Left     OCCIPITAL CRANIOTOMY   • BRAIN SURGERY Left     LOBECTOMY   • BRAIN SURGERY Social connections:        Talks on phone: Not on file        Gets together: Not on file        Attends Nondenominational service: Not on file        Active member of club or organization: Not on file        Attends meetings of clubs or organizations: Not on file other systems reviewed and negative x12    Vital Signs:  /78 (BP Location: Left arm, Patient Position: Sitting)   Pulse 87   Temp 98.2 °F (36.8 °C) (Oral)   Resp 18   Ht 1.651 m (5' 5\")   Wt 86.2 kg (190 lb)   BMI 31.62 kg/m²     Physical Examinatio August 2016. Her left upper lung primary lesion was treated with lobectomy and mediastinal lymph node dissection on 8/25/2015 (pT1aN0).   She received adjuvant chemo originally with cisplatin and Navelbine being for 1 cycle however this was changed to carb drugs at this time.   Neurology [facility]) is managing her antiepileptics    Risk assessment: High metastatic lung cancer with brain metastasis recurrent seizures undergoing active cancer directed therapy    The patient's emotional well being was assessed and

## 2019-04-19 NOTE — PROGRESS NOTES
Pt here for [address]. Arrives Ambulating independently     Modifications in dose or schedule: No    Patient reports she is feeling well denies any complaints. States yesterday she was diagnosed as legally blind.  Patient states she can only see facing

## 2019-04-20 ENCOUNTER — OFFICE VISIT (OUTPATIENT)
Dept: ENDOCRINOLOGY CLINIC | Facility: CLINIC | Age: 49
End: 2019-04-20
Payer: COMMERCIAL

## 2019-04-20 VITALS
BODY MASS INDEX: 30.12 KG/M2 | DIASTOLIC BLOOD PRESSURE: 80 MMHG | SYSTOLIC BLOOD PRESSURE: 111 MMHG | HEIGHT: 65.5 IN | HEART RATE: 94 BPM | WEIGHT: 183 LBS

## 2019-04-20 DIAGNOSIS — E03.9 HYPOTHYROIDISM, UNSPECIFIED TYPE: Primary | ICD-10-CM

## 2019-04-20 DIAGNOSIS — T66.XXXA ADVERSE EFFECT OF RADIATION, INITIAL ENCOUNTER: ICD-10-CM

## 2019-04-20 DIAGNOSIS — Z13.1 DIABETES MELLITUS SCREENING: ICD-10-CM

## 2019-04-20 DIAGNOSIS — R73.03 PRE-DIABETES: ICD-10-CM

## 2019-04-20 PROCEDURE — 36416 COLLJ CAPILLARY BLOOD SPEC: CPT | Performed by: INTERNAL MEDICINE

## 2019-04-20 PROCEDURE — 99213 OFFICE O/P EST LOW 20 MIN: CPT | Performed by: INTERNAL MEDICINE

## 2019-04-20 PROCEDURE — 83036 HEMOGLOBIN GLYCOSYLATED A1C: CPT | Performed by: INTERNAL MEDICINE

## 2019-04-20 RX ORDER — LEVOTHYROXINE SODIUM 88 UG/1
88 TABLET ORAL
Qty: 90 TABLET | Refills: 0 | Status: SHIPPED | OUTPATIENT
Start: 2019-04-20 | End: 2019-04-24 | Stop reason: DRUGHIGH

## 2019-04-20 NOTE — PROGRESS NOTES
Return Office Visit     CHIEF COMPLAINT:    Hypothyroidism  Pre DM       HISTORY OF PRESENT ILLNESS:  Td Larson is a 51year old female who presents for follow up for for hypothyroidism.     She has Stage IV NSCLCA (poorly diff adeno) and is on chemother changes, proptosis, blurring  ENT: Negative for:  dysphagia, neck swelling, dysphonia  Respiratory: Negative for:  dyspnea, cough  Cardiovascular: Negative for:  chest pain, palpitations, orthopnea  GI: Negative for:  abdominal pain, nausea, vomiting,+ occ given   The patient is aware that she needs to take LT4 daily; every am one hour before breakfast and atleast four hours apart from other meds especially calcium, iron, multivitamins containing Ca/iron  Stressed the importance of compliance with meds  Side

## 2019-04-20 NOTE — PATIENT INSTRUCTIONS
Your thyroid medication dose is being decreased as follows: Your are currently on levothyroxine 100 mcg daily.  We will decrease to 88 mcg daily   Please repeat your thyroid labs ( TSH and Free T4 ) in 6 weeks after dose change    You had a diabetes check

## 2019-04-23 ENCOUNTER — TELEPHONE (OUTPATIENT)
Dept: ENDOCRINOLOGY CLINIC | Facility: CLINIC | Age: 49
End: 2019-04-23

## 2019-04-23 DIAGNOSIS — E03.9 HYPOTHYROIDISM, UNSPECIFIED TYPE: Primary | ICD-10-CM

## 2019-04-23 NOTE — TELEPHONE ENCOUNTER
Pt is requesting labs be drawn from port at infusion center at apt tomorrow @7:30am. Per infusion center - pls reorder lab to [facility] (not [facility]) so they can drawn per pt's request.    Labs reordered to [facility] per AM written.     Forwarded to AM - please

## 2019-04-24 ENCOUNTER — NURSE ONLY (OUTPATIENT)
Dept: HEMATOLOGY/ONCOLOGY | Facility: HOSPITAL | Age: 49
End: 2019-04-24
Attending: INTERNAL MEDICINE
Payer: COMMERCIAL

## 2019-04-24 ENCOUNTER — TELEPHONE (OUTPATIENT)
Dept: INTERNAL MEDICINE CLINIC | Facility: CLINIC | Age: 49
End: 2019-04-24

## 2019-04-24 ENCOUNTER — TELEPHONE (OUTPATIENT)
Dept: ENDOCRINOLOGY CLINIC | Facility: CLINIC | Age: 49
End: 2019-04-24

## 2019-04-24 DIAGNOSIS — E03.9 HYPOTHYROIDISM, UNSPECIFIED TYPE: Primary | ICD-10-CM

## 2019-04-24 DIAGNOSIS — R31.29 MICROSCOPIC HEMATURIA: Primary | ICD-10-CM

## 2019-04-24 DIAGNOSIS — Z45.2 ENCOUNTER FOR ADJUSTMENT OR MANAGEMENT OF VASCULAR ACCESS DEVICE: Primary | ICD-10-CM

## 2019-04-24 DIAGNOSIS — C34.92 CARCINOMA OF LUNG, LEFT (HCC): ICD-10-CM

## 2019-04-24 PROCEDURE — 82024 ASSAY OF ACTH: CPT | Performed by: INTERNAL MEDICINE

## 2019-04-24 PROCEDURE — 81001 URINALYSIS AUTO W/SCOPE: CPT | Performed by: INTERNAL MEDICINE

## 2019-04-24 PROCEDURE — 84443 ASSAY THYROID STIM HORMONE: CPT | Performed by: INTERNAL MEDICINE

## 2019-04-24 PROCEDURE — 36591 DRAW BLOOD OFF VENOUS DEVICE: CPT

## 2019-04-24 PROCEDURE — 84146 ASSAY OF PROLACTIN: CPT | Performed by: INTERNAL MEDICINE

## 2019-04-24 PROCEDURE — 84439 ASSAY OF FREE THYROXINE: CPT | Performed by: INTERNAL MEDICINE

## 2019-04-24 PROCEDURE — 80061 LIPID PANEL: CPT | Performed by: INTERNAL MEDICINE

## 2019-04-24 PROCEDURE — 82533 TOTAL CORTISOL: CPT | Performed by: INTERNAL MEDICINE

## 2019-04-24 RX ORDER — 0.9 % SODIUM CHLORIDE 0.9 %
10 VIAL (ML) INJECTION ONCE
Status: CANCELLED | OUTPATIENT
Start: 2019-04-24

## 2019-04-24 RX ORDER — 0.9 % SODIUM CHLORIDE 0.9 %
VIAL (ML) INJECTION
Status: DISCONTINUED
Start: 2019-04-24 | End: 2019-04-24

## 2019-04-24 RX ORDER — HEPARIN SODIUM (PORCINE) LOCK FLUSH IV SOLN 100 UNIT/ML 100 UNIT/ML
5 SOLUTION INTRAVENOUS ONCE
Status: COMPLETED | OUTPATIENT
Start: 2019-04-24 | End: 2019-04-24

## 2019-04-24 RX ORDER — HEPARIN SODIUM (PORCINE) LOCK FLUSH IV SOLN 100 UNIT/ML 100 UNIT/ML
5 SOLUTION INTRAVENOUS ONCE
Status: CANCELLED | OUTPATIENT
Start: 2019-04-24

## 2019-04-24 RX ORDER — LEVOTHYROXINE SODIUM 0.1 MG/1
TABLET ORAL
Qty: 30 TABLET | Refills: 3 | Status: SHIPPED | OUTPATIENT
Start: 2019-04-24 | End: 2019-05-03

## 2019-04-24 RX ADMIN — HEPARIN SODIUM (PORCINE) LOCK FLUSH IV SOLN 100 UNIT/ML 500 UNITS: 100 SOLUTION INTRAVENOUS at 08:09:00

## 2019-04-24 NOTE — TELEPHONE ENCOUNTER
Spoke with patient and discussed results. She is agreeable with plan to increase dose of LT4 back up to 100mcg daily and repeat labs in 6 weeks. Refills sent and lab orders replaced.

## 2019-04-24 NOTE — TELEPHONE ENCOUNTER
Cortisol PRL normal  TSH has been very low for the last many months. We had decreased her LT 4 dose on LOV.    Plan was to repeat TSH and Free t4 in 6 weeks  However, it was repeated with the other labs today and it is very high!!   Hence, I suggest that

## 2019-04-24 NOTE — PROGRESS NOTES
Standing labs drawn for  and Dr. Ludwig Hughes. Was fasting for. Refer to port access charting. Discharged stable from lab with standby assist of her friend.

## 2019-04-24 NOTE — TELEPHONE ENCOUNTER
Please notify patient that there was some blood in her urine; I would like her to repeat the urine test in the next 1-2 weeks when she is able to. Make sure she is hydrated well. I will mail her the order-she can go to any [facility] lab to have this done.

## 2019-04-24 NOTE — TELEPHONE ENCOUNTER
Pt notified that some blood was noted in urine test   Keep well hydrated and in 1-2 weeks repeat urine test   Pt states she goes [email or web address]  Re -entered UA    FYI to DR WEBER

## 2019-04-29 ENCOUNTER — TELEPHONE (OUTPATIENT)
Dept: ENDOCRINOLOGY CLINIC | Facility: CLINIC | Age: 49
End: 2019-04-29

## 2019-04-29 DIAGNOSIS — R79.89 HIGH SERUM ADRENOCORTICOTROPIC HORMONE (ACTH): Primary | ICD-10-CM

## 2019-04-29 DIAGNOSIS — E03.9 HYPOTHYROIDISM, UNSPECIFIED TYPE: ICD-10-CM

## 2019-04-29 NOTE — TELEPHONE ENCOUNTER
Reviewed labs for pituitary axis  ACTH levels are high. This is a hormone made by the pituitary gland.    This hormone controls the production of cortisol  Her cortisol levels are normal, but high normal  No intervention at this time, except repeating lab

## 2019-04-30 NOTE — TELEPHONE ENCOUNTER
Spoke with patient and discussed results. She is agreeable with plan to repeat labs in 5-6 weeks. She understands to have labs drawn between 7-8 am. Orders placed.

## 2019-05-02 NOTE — TELEPHONE ENCOUNTER
Dr Lindsey Preston,    Pt c/o \"extreme\" fatigue, joint pain, dizziness \"at times\". Pt asking if possible to  increase dose of LT4 due to her extreme symptoms which are affecting her ADL.     Pt states she is currently taking Levothyroxine 100 mcg @7am - takes

## 2019-05-02 NOTE — TELEPHONE ENCOUNTER
Pt requesting to speak with RN regarding cortisol lab order and  Levothyroxine Sodium. Pt states she has been very fatigue, dizzy and having muscle soreness.  [facility]. [phone]      Current Outpatient Medications:   •  Levothyroxine Sodium 100 MCG Oral Tab,

## 2019-05-03 ENCOUNTER — TELEPHONE (OUTPATIENT)
Dept: HEMATOLOGY/ONCOLOGY | Facility: HOSPITAL | Age: 49
End: 2019-05-03

## 2019-05-03 RX ORDER — LEVOTHYROXINE SODIUM 0.12 MG/1
125 TABLET ORAL
Qty: 30 TABLET | Refills: 2 | Status: SHIPPED | OUTPATIENT
Start: 2019-05-03 | End: 2020-01-30 | Stop reason: DRUGHIGH

## 2019-05-03 NOTE — TELEPHONE ENCOUNTER
Spoke with , Berto Doyle. He verbalizes understanding of message from provider to increase LT4 to 125 mcg PO daily and repeat labs in 6 weeks. He has no questions. Medication and labs ordered.

## 2019-05-17 ENCOUNTER — NURSE ONLY (OUTPATIENT)
Dept: HEMATOLOGY/ONCOLOGY | Facility: HOSPITAL | Age: 49
End: 2019-05-17
Attending: INTERNAL MEDICINE
Payer: COMMERCIAL

## 2019-05-17 VITALS
DIASTOLIC BLOOD PRESSURE: 56 MMHG | RESPIRATION RATE: 18 BRPM | SYSTOLIC BLOOD PRESSURE: 126 MMHG | TEMPERATURE: 98 F | HEIGHT: 65 IN | HEART RATE: 85 BPM | WEIGHT: 191 LBS | OXYGEN SATURATION: 99 % | BODY MASS INDEX: 31.82 KG/M2

## 2019-05-17 VITALS
SYSTOLIC BLOOD PRESSURE: 126 MMHG | DIASTOLIC BLOOD PRESSURE: 56 MMHG | TEMPERATURE: 98 F | HEIGHT: 65 IN | BODY MASS INDEX: 31.82 KG/M2 | RESPIRATION RATE: 18 BRPM | HEART RATE: 85 BPM | OXYGEN SATURATION: 99 % | WEIGHT: 191 LBS

## 2019-05-17 DIAGNOSIS — C34.90 MALIGNANT NEOPLASM OF LUNG, UNSPECIFIED LATERALITY, UNSPECIFIED PART OF LUNG (HCC): Primary | ICD-10-CM

## 2019-05-17 DIAGNOSIS — G40.909 SEIZURE DISORDER (HCC): ICD-10-CM

## 2019-05-17 DIAGNOSIS — C79.31 BRAIN METASTASES (HCC): ICD-10-CM

## 2019-05-17 DIAGNOSIS — C78.7 LIVER METASTASES (HCC): ICD-10-CM

## 2019-05-17 DIAGNOSIS — Z51.11 CHEMOTHERAPY MANAGEMENT, ENCOUNTER FOR: ICD-10-CM

## 2019-05-17 PROCEDURE — 85025 COMPLETE CBC W/AUTO DIFF WBC: CPT

## 2019-05-17 PROCEDURE — 99215 OFFICE O/P EST HI 40 MIN: CPT | Performed by: INTERNAL MEDICINE

## 2019-05-17 PROCEDURE — 80053 COMPREHEN METABOLIC PANEL: CPT

## 2019-05-17 PROCEDURE — 96413 CHEMO IV INFUSION 1 HR: CPT

## 2019-05-17 RX ORDER — DIPHENHYDRAMINE HYDROCHLORIDE 50 MG/ML
INJECTION INTRAMUSCULAR; INTRAVENOUS EVERY 4 HOURS PRN
Status: CANCELLED | OUTPATIENT
Start: 2019-05-17

## 2019-05-17 RX ORDER — HEPARIN SODIUM (PORCINE) LOCK FLUSH IV SOLN 100 UNIT/ML 100 UNIT/ML
SOLUTION INTRAVENOUS
Status: COMPLETED
Start: 2019-05-17 | End: 2019-05-17

## 2019-05-17 RX ORDER — ALBUTEROL SULFATE 90 UG/1
2 AEROSOL, METERED RESPIRATORY (INHALATION) AS NEEDED
Status: CANCELLED | OUTPATIENT
Start: 2019-05-17

## 2019-05-17 RX ORDER — 0.9 % SODIUM CHLORIDE 0.9 %
VIAL (ML) INJECTION
Status: DISPENSED
Start: 2019-05-17 | End: 2019-05-17

## 2019-05-17 RX ORDER — 0.9 % SODIUM CHLORIDE 0.9 %
VIAL (ML) INJECTION
Status: DISCONTINUED
Start: 2019-05-17 | End: 2019-05-17

## 2019-05-17 RX ORDER — SODIUM CHLORIDE 9 MG/ML
INJECTION, SOLUTION INTRAVENOUS
Status: DISCONTINUED
Start: 2019-05-17 | End: 2019-05-17

## 2019-05-17 RX ORDER — ACETAMINOPHEN 325 MG/1
TABLET ORAL EVERY 6 HOURS PRN
Status: CANCELLED | OUTPATIENT
Start: 2019-05-17

## 2019-05-17 RX ORDER — MEPERIDINE HYDROCHLORIDE 25 MG/ML
INJECTION INTRAMUSCULAR; INTRAVENOUS; SUBCUTANEOUS AS NEEDED
Status: CANCELLED | OUTPATIENT
Start: 2019-05-17

## 2019-05-17 RX ORDER — RANITIDINE 25 MG/ML
50 INJECTION, SOLUTION INTRAMUSCULAR; INTRAVENOUS AS NEEDED
Status: CANCELLED | OUTPATIENT
Start: 2019-05-17

## 2019-05-17 RX ADMIN — HEPARIN SODIUM (PORCINE) LOCK FLUSH IV SOLN 100 UNIT/ML: 100 SOLUTION INTRAVENOUS at 13:45:00

## 2019-05-17 NOTE — PROGRESS NOTES
Right chest port accessed for labs followed by [facility]. Labs collected as ordered then sent. Blue end cap applied, then port flushed with 20 ml ns. Alcohol caps applied as well. Escorted back to [place],  here today. Will see MD this morning.

## 2019-05-17 NOTE — PROGRESS NOTES
Cancer Center Progress Note    Patient Name: Adriana Miranda   YOB: 1970   Medical Record Number: [id number]   Attending Physician: Raeann Salcido M.D. Chief Complaint:  Metastatic adenocarcinoma of the lung, brain metastasis.     History of in the setting of polysubstance abuse/dependence. She required mechanical ventilation for secondary respiratory failure. She was seen by neurology and critical care medicine consultation.   After long hospitalization she was eventually discharged to rehab APPENDECTOMY  1999   • BRAIN SURGERY  JULY 2015    TUMOR RESECTION   • BRAIN SURGERY Left     OCCIPITAL CRANIOTOMY   • BRAIN SURGERY Left     LOBECTOMY   • BRAIN SURGERY  AUG  2016    REMOVAL OF DEAD TISSUE    • CHOLECYSTECTOMY  2009   • CYST REMOVAL file        Attends Worship service: Not on file        Active member of club or organization: Not on file        Attends meetings of clubs or organizations: Not on file        Relationship status: Not on file      Intimate partner violence:        Fear Location: Left arm, Patient Position: Sitting, Cuff Size: adult)   Pulse 85   Temp 98.1 °F (36.7 °C) (Oral)   Resp 18   Ht 1.651 m (5' 5\")   Wt 86.6 kg (191 lb)   SpO2 99%   BMI 31.78 kg/m²     Physical Examination:  General: Patient is alert and oriented treated with lobectomy and mediastinal lymph node dissection on 8/25/2015 (pT1aN0).   She received adjuvant chemo originally with cisplatin and Navelbine being for 1 cycle however this was changed to carboplatin and paclitaxel for cycles 2 through 4 due to patient's emotional well being was assessed and resources were discussed. Appropriate resources were reviewed and discussed with the pateint and family.        Mayela Vega, [address] Progress Note    Patient Name: Dayana Hall   Date of Bi AGAINST MEDICAL ADVICE when her fevers resolved seen by infectious disease and received IV antibiotics for roughly 48 hours    She was admitted to the hospital in December 2017 with seizures in the setting of polysubstance abuse/dependence.   She required m • Seizure disorder ([facility] [address]    • Wears glasses        Past Surgical History:  Past Surgical History:   Procedure Laterality Date   • APPENDECTOMY  1999   • BRAIN SURGERY  JULY 2015    TUMOR RESECTION   • BRAIN SURGERY Left     OCCIPITAL CRANIOTOMY   • BRAIN Stress: Not on file    Relationships      Social connections:        Talks on phone: Not on file        Gets together: Not on file        Attends Church service: Not on file        Active member of club or organization: Not on file        Attends meet Allergies:  No Known Allergies     Review of Systems:  All other systems reviewed and negative x12    Vital Signs:  /56 (BP Location: Left arm, Patient Position: Sitting, Cuff Size: adult)   Pulse 85   Temp 98.1 °F (36.7 °C) (Oral)   Resp 18   Ht resection with adjuvant radiation. She is also status post a right frontal lobe resection in August 2016. Her left upper lung primary lesion was treated with lobectomy and mediastinal lymph node dissection on 8/25/2015 (pT1aN0).   She received adjuvant ch managing this  –Polysubstance abuse/dependence with seizures. She is not using any alcohol or illicit drugs at this time.   Neurology [facility]) is managing her antiepileptics    Risk assessment: High metastatic lung cancer with brain metastasis recurrent se

## 2019-05-17 NOTE — PROGRESS NOTES
Pt here for [address] [facility]. Arrives Ambulating independently, accompanied by Spouse           Modifications in dose or schedule: No    Teri Marsh arrived after lab/MD appointment today.  Port accessed by lab was flushed several times and patient laid back for bl

## 2019-06-03 ENCOUNTER — TELEPHONE (OUTPATIENT)
Dept: INTERNAL MEDICINE CLINIC | Facility: CLINIC | Age: 49
End: 2019-06-03

## 2019-06-03 NOTE — TELEPHONE ENCOUNTER
Please call patient's  and ask him to have patient drop off a urine sample (last urine sample had a small amount of blood).      (I think she has forgotten to do this)

## 2019-06-05 NOTE — TELEPHONE ENCOUNTER
Routed to Dr. Tom Ventura on home machine. Since we have not heard back as of yet, I also sent a My Chart message to remind Oracio Butcher of UA.

## 2019-06-14 ENCOUNTER — TELEPHONE (OUTPATIENT)
Dept: ENDOCRINOLOGY CLINIC | Facility: CLINIC | Age: 49
End: 2019-06-14

## 2019-06-14 ENCOUNTER — OFFICE VISIT (OUTPATIENT)
Dept: HEMATOLOGY/ONCOLOGY | Facility: HOSPITAL | Age: 49
End: 2019-06-14
Attending: INTERNAL MEDICINE
Payer: COMMERCIAL

## 2019-06-14 VITALS
HEART RATE: 79 BPM | SYSTOLIC BLOOD PRESSURE: 107 MMHG | RESPIRATION RATE: 18 BRPM | OXYGEN SATURATION: 99 % | DIASTOLIC BLOOD PRESSURE: 81 MMHG | HEIGHT: 65 IN | TEMPERATURE: 99 F | BODY MASS INDEX: 32.44 KG/M2 | WEIGHT: 194.69 LBS

## 2019-06-14 VITALS
WEIGHT: 194.69 LBS | DIASTOLIC BLOOD PRESSURE: 81 MMHG | TEMPERATURE: 99 F | RESPIRATION RATE: 18 BRPM | BODY MASS INDEX: 32.44 KG/M2 | HEIGHT: 65 IN | HEART RATE: 79 BPM | SYSTOLIC BLOOD PRESSURE: 107 MMHG | OXYGEN SATURATION: 99 %

## 2019-06-14 DIAGNOSIS — Z51.11 CHEMOTHERAPY MANAGEMENT, ENCOUNTER FOR: ICD-10-CM

## 2019-06-14 DIAGNOSIS — E03.9 HYPOTHYROIDISM, UNSPECIFIED TYPE: ICD-10-CM

## 2019-06-14 DIAGNOSIS — C79.31 BRAIN METASTASES (HCC): ICD-10-CM

## 2019-06-14 DIAGNOSIS — C34.90 MALIGNANT NEOPLASM OF LUNG, UNSPECIFIED LATERALITY, UNSPECIFIED PART OF LUNG (HCC): Primary | ICD-10-CM

## 2019-06-14 DIAGNOSIS — C78.7 LIVER METASTASES (HCC): ICD-10-CM

## 2019-06-14 DIAGNOSIS — E03.9 HYPOTHYROIDISM, UNSPECIFIED TYPE: Primary | ICD-10-CM

## 2019-06-14 DIAGNOSIS — G40.909 SEIZURE DISORDER (HCC): ICD-10-CM

## 2019-06-14 PROCEDURE — 84443 ASSAY THYROID STIM HORMONE: CPT

## 2019-06-14 PROCEDURE — 99215 OFFICE O/P EST HI 40 MIN: CPT | Performed by: INTERNAL MEDICINE

## 2019-06-14 PROCEDURE — 85025 COMPLETE CBC W/AUTO DIFF WBC: CPT

## 2019-06-14 PROCEDURE — 80053 COMPREHEN METABOLIC PANEL: CPT

## 2019-06-14 PROCEDURE — 84439 ASSAY OF FREE THYROXINE: CPT

## 2019-06-14 PROCEDURE — 96413 CHEMO IV INFUSION 1 HR: CPT

## 2019-06-14 RX ORDER — HEPARIN SODIUM (PORCINE) LOCK FLUSH IV SOLN 100 UNIT/ML 100 UNIT/ML
SOLUTION INTRAVENOUS
Status: COMPLETED
Start: 2019-06-14 | End: 2019-06-14

## 2019-06-14 RX ORDER — DIPHENHYDRAMINE HYDROCHLORIDE 50 MG/ML
INJECTION INTRAMUSCULAR; INTRAVENOUS EVERY 4 HOURS PRN
Status: CANCELLED | OUTPATIENT
Start: 2019-06-14

## 2019-06-14 RX ORDER — 0.9 % SODIUM CHLORIDE 0.9 %
VIAL (ML) INJECTION
Status: DISCONTINUED
Start: 2019-06-14 | End: 2019-06-14

## 2019-06-14 RX ORDER — ALBUTEROL SULFATE 90 UG/1
2 AEROSOL, METERED RESPIRATORY (INHALATION) AS NEEDED
Status: CANCELLED | OUTPATIENT
Start: 2019-06-14

## 2019-06-14 RX ORDER — SODIUM CHLORIDE 9 MG/ML
INJECTION, SOLUTION INTRAVENOUS
Status: DISCONTINUED
Start: 2019-06-14 | End: 2019-06-14

## 2019-06-14 RX ORDER — RANITIDINE 25 MG/ML
50 INJECTION, SOLUTION INTRAMUSCULAR; INTRAVENOUS AS NEEDED
Status: CANCELLED | OUTPATIENT
Start: 2019-06-14

## 2019-06-14 RX ORDER — MEPERIDINE HYDROCHLORIDE 25 MG/ML
INJECTION INTRAMUSCULAR; INTRAVENOUS; SUBCUTANEOUS AS NEEDED
Status: CANCELLED | OUTPATIENT
Start: 2019-06-14

## 2019-06-14 RX ORDER — ACETAMINOPHEN 325 MG/1
TABLET ORAL EVERY 6 HOURS PRN
Status: CANCELLED | OUTPATIENT
Start: 2019-06-14

## 2019-06-14 RX ADMIN — HEPARIN SODIUM (PORCINE) LOCK FLUSH IV SOLN 100 UNIT/ML 500 UNITS: 100 SOLUTION INTRAVENOUS at 14:30:00

## 2019-06-14 NOTE — PROGRESS NOTES
Cancer Center Progress Note    Patient Name: Gamaliel Altman   YOB: 1970   Medical Record Number: [id number]   Attending Physician: Aron Lim M.D. Chief Complaint:  Metastatic adenocarcinoma of the lung, brain metastasis.     History of in the setting of polysubstance abuse/dependence. She required mechanical ventilation for secondary respiratory failure. She was seen by neurology and critical care medicine consultation.   After long hospitalization she was eventually discharged to rehab APPENDECTOMY  1999   • BRAIN SURGERY  JULY 2015    TUMOR RESECTION   • BRAIN SURGERY Left     OCCIPITAL CRANIOTOMY   • BRAIN SURGERY Left     LOBECTOMY   • BRAIN SURGERY  AUG  2016    REMOVAL OF DEAD TISSUE    • CHOLECYSTECTOMY  2009   • CYST REMOVAL file        Attends Worship service: Not on file        Active member of club or organization: Not on file        Attends meetings of clubs or organizations: Not on file        Relationship status: Not on file      Intimate partner violence:        Fear Location: Left arm, Patient Position: Sitting)   Pulse 79   Temp 98.5 °F (36.9 °C) (Oral)   Resp 18   Ht 1.651 m (5' 5\")   Wt 88.3 kg (194 lb 10.7 oz)   SpO2 99%   BMI 32.39 kg/m²     Physical Examination:  General: Patient is alert and oriented x 3, not lobectomy and mediastinal lymph node dissection on 8/25/2015 (pT1aN0).   She received adjuvant chemo originally with cisplatin and Navelbine being for 1 cycle however this was changed to carboplatin and paclitaxel for cycles 2 through 4 due to issues with f well being was assessed and resources were discussed. Appropriate resources were reviewed and discussed with the pateint and family.        [facility], [address] Progress Note    Patient Name: Agnes Johnson   YOB: 1970   Med when her fevers resolved seen by infectious disease and received IV antibiotics for roughly 48 hours    She was admitted to the hospital in December 2017 with seizures in the setting of polysubstance abuse/dependence.   She required mechanical ventilation f [facility])    • Wears glasses        Past Surgical History:  Past Surgical History:   Procedure Laterality Date   • APPENDECTOMY  1999   • BRAIN SURGERY  JULY 2015    TUMOR RESECTION   • BRAIN SURGERY Left     OCCIPITAL CRANIOTOMY   • BRAIN SURGERY Left     LOB file    Relationships      Social connections:        Talks on phone: Not on file        Gets together: Not on file        Attends Sabianism service: Not on file        Active member of club or organization: Not on file        Attends meetings of clubs or Allergies     Review of Systems:  All other systems reviewed and negative x12    Vital Signs:  /81 (BP Location: Left arm, Patient Position: Sitting)   Pulse 79   Temp 98.5 °F (36.9 °C) (Oral)   Resp 18   Ht 1.651 m (5' 5\")   Wt 88.3 kg (194 lb 10. 7 status post a right frontal lobe resection in August 2016. Her left upper lung primary lesion was treated with lobectomy and mediastinal lymph node dissection on 8/25/2015 (pT1aN0).   She received adjuvant chemo originally with cisplatin and Navelbine bein with seizures. She is not using any alcohol or illicit drugs at this time.   Neurology [facility]) is managing her antiepileptics    Risk assessment: High metastatic lung cancer with brain metastasis recurrent seizures undergoing active cancer directed therap

## 2019-06-14 NOTE — TELEPHONE ENCOUNTER
Called and left a VM. Please try calling again. She is on LT 4 125 , thyroid labs better, but still high. Increase LT 4 to 137 mcg daily  TSH and Free t4 in 6 weeks. Thanks.

## 2019-06-14 NOTE — PROGRESS NOTES
Pt here for [place]. Arrives Ambulating independently, accompanied by friend           Modifications in dose or schedule: Mallory Narayanan April arrived after lab/MD appointment today.  Port accessed by lab was flushed several times and patient laid back, noti

## 2019-06-17 RX ORDER — LEVOTHYROXINE SODIUM 137 UG/1
137 TABLET ORAL
Qty: 30 TABLET | Refills: 3 | Status: SHIPPED | OUTPATIENT
Start: 2019-06-17 | End: 2019-10-28

## 2019-06-17 NOTE — TELEPHONE ENCOUNTER
RN spoke with patient's  about note below from provider. Patient's  verbalized understanding to increase dose and check levels in 6 weeks.  Med and labs ordered per AM written

## 2019-07-02 ENCOUNTER — TELEPHONE (OUTPATIENT)
Dept: INTERNAL MEDICINE CLINIC | Facility: CLINIC | Age: 49
End: 2019-07-02

## 2019-07-02 DIAGNOSIS — R31.9 HEMATURIA, UNSPECIFIED TYPE: Primary | ICD-10-CM

## 2019-07-02 NOTE — TELEPHONE ENCOUNTER
Please call patient's  and ask him to bring pt for repeat urine test (had blood in urine previously and still has not had this repeated)

## 2019-07-02 NOTE — TELEPHONE ENCOUNTER
To Dr. Ellyn hanley - your message relayed to pt's  who verbalized understanding. He asked that order be reentered under [address] [place] [address] lab, which was done. States she will have lab at her next treatment Friday, 7/12/19.

## 2019-07-12 ENCOUNTER — OFFICE VISIT (OUTPATIENT)
Dept: HEMATOLOGY/ONCOLOGY | Facility: HOSPITAL | Age: 49
End: 2019-07-12
Attending: INTERNAL MEDICINE
Payer: COMMERCIAL

## 2019-07-12 VITALS
RESPIRATION RATE: 18 BRPM | HEIGHT: 65 IN | TEMPERATURE: 98 F | HEART RATE: 78 BPM | DIASTOLIC BLOOD PRESSURE: 81 MMHG | WEIGHT: 197.31 LBS | OXYGEN SATURATION: 98 % | SYSTOLIC BLOOD PRESSURE: 100 MMHG | BODY MASS INDEX: 32.87 KG/M2

## 2019-07-12 VITALS
HEART RATE: 78 BPM | TEMPERATURE: 98 F | OXYGEN SATURATION: 98 % | DIASTOLIC BLOOD PRESSURE: 81 MMHG | BODY MASS INDEX: 33 KG/M2 | SYSTOLIC BLOOD PRESSURE: 100 MMHG | RESPIRATION RATE: 18 BRPM | WEIGHT: 197.31 LBS

## 2019-07-12 DIAGNOSIS — C34.90 MALIGNANT NEOPLASM OF LUNG, UNSPECIFIED LATERALITY, UNSPECIFIED PART OF LUNG (HCC): Primary | ICD-10-CM

## 2019-07-12 DIAGNOSIS — Z45.2 ENCOUNTER FOR ADJUSTMENT OR MANAGEMENT OF VASCULAR ACCESS DEVICE: ICD-10-CM

## 2019-07-12 DIAGNOSIS — C34.92 CARCINOMA OF LUNG, LEFT (HCC): ICD-10-CM

## 2019-07-12 DIAGNOSIS — E03.9 HYPOTHYROIDISM, UNSPECIFIED TYPE: ICD-10-CM

## 2019-07-12 DIAGNOSIS — C79.31 BRAIN METASTASES (HCC): ICD-10-CM

## 2019-07-12 DIAGNOSIS — G40.909 SEIZURE DISORDER (HCC): ICD-10-CM

## 2019-07-12 DIAGNOSIS — C78.7 LIVER METASTASES (HCC): ICD-10-CM

## 2019-07-12 DIAGNOSIS — Z51.11 CHEMOTHERAPY MANAGEMENT, ENCOUNTER FOR: ICD-10-CM

## 2019-07-12 LAB
ALBUMIN SERPL-MCNC: 3.5 G/DL (ref 3.4–5)
ALBUMIN/GLOB SERPL: 0.8 {RATIO} (ref 1–2)
ALP LIVER SERPL-CCNC: 118 U/L (ref 39–100)
ALT SERPL-CCNC: 21 U/L (ref 13–56)
ANION GAP SERPL CALC-SCNC: 4 MMOL/L (ref 0–18)
AST SERPL-CCNC: 17 U/L (ref 15–37)
BASOPHILS # BLD AUTO: 0.05 X10(3) UL (ref 0–0.2)
BASOPHILS NFR BLD AUTO: 0.4 %
BILIRUB SERPL-MCNC: 0.2 MG/DL (ref 0.1–2)
BUN BLD-MCNC: 18 MG/DL (ref 7–18)
BUN/CREAT SERPL: 20.9 (ref 10–20)
CALCIUM BLD-MCNC: 8.9 MG/DL (ref 8.5–10.1)
CHLORIDE SERPL-SCNC: 109 MMOL/L (ref 98–112)
CO2 SERPL-SCNC: 27 MMOL/L (ref 21–32)
CREAT BLD-MCNC: 0.86 MG/DL (ref 0.55–1.02)
DEPRECATED RDW RBC AUTO: 46.6 FL (ref 35.1–46.3)
EOSINOPHIL # BLD AUTO: 0.09 X10(3) UL (ref 0–0.7)
EOSINOPHIL NFR BLD AUTO: 0.7 %
ERYTHROCYTE [DISTWIDTH] IN BLOOD BY AUTOMATED COUNT: 13.5 % (ref 11–15)
GLOBULIN PLAS-MCNC: 4.4 G/DL (ref 2.8–4.4)
GLUCOSE BLD-MCNC: 101 MG/DL (ref 70–99)
HCT VFR BLD AUTO: 34.9 % (ref 35–48)
HGB BLD-MCNC: 10.9 G/DL (ref 12–16)
IMM GRANULOCYTES # BLD AUTO: 0.08 X10(3) UL (ref 0–1)
IMM GRANULOCYTES NFR BLD: 0.6 %
LYMPHOCYTES # BLD AUTO: 3.49 X10(3) UL (ref 1–4)
LYMPHOCYTES NFR BLD AUTO: 27.5 %
M PROTEIN MFR SERPL ELPH: 7.9 G/DL (ref 6.4–8.2)
MCH RBC QN AUTO: 29.5 PG (ref 26–34)
MCHC RBC AUTO-ENTMCNC: 31.2 G/DL (ref 31–37)
MCV RBC AUTO: 94.6 FL (ref 80–100)
MONOCYTES # BLD AUTO: 0.98 X10(3) UL (ref 0.1–1)
MONOCYTES NFR BLD AUTO: 7.7 %
NEUTROPHILS # BLD AUTO: 7.98 X10 (3) UL (ref 1.5–7.7)
NEUTROPHILS # BLD AUTO: 7.98 X10(3) UL (ref 1.5–7.7)
NEUTROPHILS NFR BLD AUTO: 63.1 %
OSMOLALITY SERPL CALC.SUM OF ELEC: 292 MOSM/KG (ref 275–295)
PATIENT FASTING: NO
PLATELET # BLD AUTO: 400 10(3)UL (ref 150–450)
POTASSIUM SERPL-SCNC: 4 MMOL/L (ref 3.5–5.1)
RBC # BLD AUTO: 3.69 X10(6)UL (ref 3.8–5.3)
SODIUM SERPL-SCNC: 140 MMOL/L (ref 136–145)
WBC # BLD AUTO: 12.7 X10(3) UL (ref 4–11)

## 2019-07-12 PROCEDURE — 99215 OFFICE O/P EST HI 40 MIN: CPT | Performed by: INTERNAL MEDICINE

## 2019-07-12 PROCEDURE — 85025 COMPLETE CBC W/AUTO DIFF WBC: CPT

## 2019-07-12 PROCEDURE — 96413 CHEMO IV INFUSION 1 HR: CPT

## 2019-07-12 PROCEDURE — 80053 COMPREHEN METABOLIC PANEL: CPT

## 2019-07-12 RX ORDER — 0.9 % SODIUM CHLORIDE 0.9 %
10 VIAL (ML) INJECTION ONCE
Status: CANCELLED | OUTPATIENT
Start: 2019-07-12

## 2019-07-12 RX ORDER — RANITIDINE 25 MG/ML
50 INJECTION, SOLUTION INTRAMUSCULAR; INTRAVENOUS AS NEEDED
Status: CANCELLED | OUTPATIENT
Start: 2019-07-12

## 2019-07-12 RX ORDER — ALBUTEROL SULFATE 90 UG/1
2 AEROSOL, METERED RESPIRATORY (INHALATION) AS NEEDED
Status: CANCELLED | OUTPATIENT
Start: 2019-07-12

## 2019-07-12 RX ORDER — HEPARIN SODIUM (PORCINE) LOCK FLUSH IV SOLN 100 UNIT/ML 100 UNIT/ML
SOLUTION INTRAVENOUS
Status: COMPLETED
Start: 2019-07-12 | End: 2019-07-12

## 2019-07-12 RX ORDER — HEPARIN SODIUM (PORCINE) LOCK FLUSH IV SOLN 100 UNIT/ML 100 UNIT/ML
5 SOLUTION INTRAVENOUS ONCE
Status: CANCELLED | OUTPATIENT
Start: 2019-07-12

## 2019-07-12 RX ORDER — DIPHENHYDRAMINE HYDROCHLORIDE 50 MG/ML
INJECTION INTRAMUSCULAR; INTRAVENOUS EVERY 4 HOURS PRN
Status: CANCELLED | OUTPATIENT
Start: 2019-07-12

## 2019-07-12 RX ORDER — HEPARIN SODIUM (PORCINE) LOCK FLUSH IV SOLN 100 UNIT/ML 100 UNIT/ML
5 SOLUTION INTRAVENOUS ONCE
Status: COMPLETED | OUTPATIENT
Start: 2019-07-12 | End: 2019-07-12

## 2019-07-12 RX ORDER — 0.9 % SODIUM CHLORIDE 0.9 %
VIAL (ML) INJECTION
Status: DISCONTINUED
Start: 2019-07-12 | End: 2019-07-12

## 2019-07-12 RX ORDER — MEPERIDINE HYDROCHLORIDE 25 MG/ML
INJECTION INTRAMUSCULAR; INTRAVENOUS; SUBCUTANEOUS AS NEEDED
Status: CANCELLED | OUTPATIENT
Start: 2019-07-12

## 2019-07-12 RX ORDER — ACETAMINOPHEN 325 MG/1
TABLET ORAL EVERY 6 HOURS PRN
Status: CANCELLED | OUTPATIENT
Start: 2019-07-12

## 2019-07-12 RX ADMIN — HEPARIN SODIUM (PORCINE) LOCK FLUSH IV SOLN 100 UNIT/ML 500 UNITS: 100 SOLUTION INTRAVENOUS at 14:00:00

## 2019-07-12 NOTE — PROGRESS NOTES
Pt here for [address] [facility]. Arrives Ambulating independently, accompanied by Spouse           Modifications in dose or schedule: No    Adam Pedlar arrived after lab/MD appointment today. Port accessed by lab , port flushed with good blood return .      She re

## 2019-07-12 NOTE — PROGRESS NOTES
Cancer Center Progress Note    Patient Name: Meghna Garcia   YOB: 1970   Medical Record Number: [id number]   Attending Physician: Anatoliy Sharp M.D. Chief Complaint:  Metastatic adenocarcinoma of the lung, brain metastasis.     History of in the setting of polysubstance abuse/dependence. She required mechanical ventilation for secondary respiratory failure. She was seen by neurology and critical care medicine consultation.   After long hospitalization she was eventually discharged to rehab APPENDECTOMY  1999   • BRAIN SURGERY  JULY 2015    TUMOR RESECTION   • BRAIN SURGERY Left     OCCIPITAL CRANIOTOMY   • BRAIN SURGERY Left     LOBECTOMY   • BRAIN SURGERY  AUG  2016    REMOVAL OF DEAD TISSUE    • CHOLECYSTECTOMY  2009   • CYST REMOVAL file        Attends Anabaptist service: Not on file        Active member of club or organization: Not on file        Attends meetings of clubs or organizations: Not on file        Relationship status: Not on file      Intimate partner violence:        Fear Allergies     Review of Systems:  All other systems reviewed and negative x12    Vital Signs:  /81 (BP Location: Left arm, Patient Position: Sitting, Cuff Size: adult)   Pulse 78   Temp 98.3 °F (36.8 °C) (Oral)   Resp 18   Ht 1.651 m (5' 5\")   Wt 89 is also status post a right frontal lobe resection in August 2016. Her left upper lung primary lesion was treated with lobectomy and mediastinal lymph node dissection on 8/25/2015 (pT1aN0).   She received adjuvant chemo originally with cisplatin and Navelb lung cancer with brain metastasis recurrent seizures undergoing active cancer directed therapy    The patient's emotional well being was assessed and resources were discussed. Appropriate resources were reviewed and discussed with the pateint and family. rehab program.    She was admitted hospital November 14 2017 with fevers and thrush however left AGAINST MEDICAL ADVICE when her fevers resolved seen by infectious disease and received IV antibiotics for roughly 48 hours    She was admitted to the hospital antineoplastic chemotherapy     EVERY 2 WEEKS BEGINNING 2015    • Pneumonia, organism unspecified(486)    • Seizure disorder (HCC)    • Wears glasses        Past Surgical History:  Past Surgical History:   Procedure Laterality Date   • APPENDECTOMY  1999 Lifestyle      Physical activity:        Days per week: Not on file        Minutes per session: Not on file      Stress: Not on file    Relationships      Social connections:        Talks on phone: Not on file        Gets together: Not on file        Atten Disp: 60 tablet, Rfl: 2  •  Diclofenac Sodium 1 % Transdermal Gel, Apply 100 g topically 4 (four) times daily. , Disp: , Rfl:   •  Nutritional Supplements (ENSURE) Oral Liquid, Take by mouth., Disp: , Rfl:     Allergies:  No Known Allergies     Review of Sy (EGFR,ALK,ROS1 negative). She has liver and brain metastasis as well as a metastatic lesion to the pancreas. The patient was initially diagnosed in July 2015 with a left occipital brain lesion and is status post resection with adjuvant radiation.   She is We will continue to monitor this  –Previous hypokalemia --imporved  –Hypothyroidism secondary to nivolumab. She is on thyroid replacement--we will continue to follow her thyroid tests on treatment.  Endocrinology is managing this  –Polysubstance abuse/depe

## 2019-07-15 ENCOUNTER — HOSPITAL ENCOUNTER (EMERGENCY)
Facility: HOSPITAL | Age: 49
Discharge: HOME | End: 2019-07-16
Attending: EMERGENCY MEDICINE
Payer: COMMERCIAL

## 2019-07-15 ENCOUNTER — APPOINTMENT (OUTPATIENT)
Dept: MRI IMAGING | Facility: HOSPITAL | Age: 49
End: 2019-07-15
Attending: EMERGENCY MEDICINE
Payer: COMMERCIAL

## 2019-07-15 VITALS
RESPIRATION RATE: 18 BRPM | TEMPERATURE: 98 F | SYSTOLIC BLOOD PRESSURE: 122 MMHG | HEART RATE: 78 BPM | HEIGHT: 65 IN | BODY MASS INDEX: 32.99 KG/M2 | WEIGHT: 198 LBS | DIASTOLIC BLOOD PRESSURE: 85 MMHG | OXYGEN SATURATION: 100 %

## 2019-07-15 DIAGNOSIS — R51.9 NONINTRACTABLE EPISODIC HEADACHE, UNSPECIFIED HEADACHE TYPE: Primary | ICD-10-CM

## 2019-07-15 LAB
ANION GAP SERPL CALC-SCNC: 8 MMOL/L (ref 0–18)
BASOPHILS # BLD AUTO: 0.04 X10(3) UL (ref 0–0.2)
BASOPHILS NFR BLD AUTO: 0.4 %
BUN BLD-MCNC: 12 MG/DL (ref 7–18)
BUN/CREAT SERPL: 20.3 (ref 10–20)
CALCIUM BLD-MCNC: 9.2 MG/DL (ref 8.5–10.1)
CHLORIDE SERPL-SCNC: 106 MMOL/L (ref 98–112)
CO2 SERPL-SCNC: 25 MMOL/L (ref 21–32)
CREAT BLD-MCNC: 0.59 MG/DL (ref 0.55–1.02)
DEPRECATED RDW RBC AUTO: 44.8 FL (ref 35.1–46.3)
EOSINOPHIL # BLD AUTO: 0.02 X10(3) UL (ref 0–0.7)
EOSINOPHIL NFR BLD AUTO: 0.2 %
ERYTHROCYTE [DISTWIDTH] IN BLOOD BY AUTOMATED COUNT: 13.3 % (ref 11–15)
GLUCOSE BLD-MCNC: 103 MG/DL (ref 70–99)
HCT VFR BLD AUTO: 34.1 % (ref 35–48)
HGB BLD-MCNC: 11 G/DL (ref 12–16)
IMM GRANULOCYTES # BLD AUTO: 0.05 X10(3) UL (ref 0–1)
IMM GRANULOCYTES NFR BLD: 0.5 %
LYMPHOCYTES # BLD AUTO: 2.38 X10(3) UL (ref 1–4)
LYMPHOCYTES NFR BLD AUTO: 22.6 %
MCH RBC QN AUTO: 29.6 PG (ref 26–34)
MCHC RBC AUTO-ENTMCNC: 32.3 G/DL (ref 31–37)
MCV RBC AUTO: 91.9 FL (ref 80–100)
MONOCYTES # BLD AUTO: 0.77 X10(3) UL (ref 0.1–1)
MONOCYTES NFR BLD AUTO: 7.3 %
NEUTROPHILS # BLD AUTO: 7.25 X10 (3) UL (ref 1.5–7.7)
NEUTROPHILS # BLD AUTO: 7.25 X10(3) UL (ref 1.5–7.7)
NEUTROPHILS NFR BLD AUTO: 69 %
OSMOLALITY SERPL CALC.SUM OF ELEC: 288 MOSM/KG (ref 275–295)
PLATELET # BLD AUTO: 387 10(3)UL (ref 150–450)
POTASSIUM SERPL-SCNC: 3.8 MMOL/L (ref 3.5–5.1)
RBC # BLD AUTO: 3.71 X10(6)UL (ref 3.8–5.3)
SODIUM SERPL-SCNC: 139 MMOL/L (ref 136–145)
WBC # BLD AUTO: 10.5 X10(3) UL (ref 4–11)

## 2019-07-15 PROCEDURE — 80048 BASIC METABOLIC PNL TOTAL CA: CPT | Performed by: EMERGENCY MEDICINE

## 2019-07-15 PROCEDURE — 70551 MRI BRAIN STEM W/O DYE: CPT | Performed by: EMERGENCY MEDICINE

## 2019-07-15 PROCEDURE — 85025 COMPLETE CBC W/AUTO DIFF WBC: CPT | Performed by: EMERGENCY MEDICINE

## 2019-07-15 PROCEDURE — 99284 EMERGENCY DEPT VISIT MOD MDM: CPT

## 2019-07-15 PROCEDURE — 96374 THER/PROPH/DIAG INJ IV PUSH: CPT

## 2019-07-15 RX ORDER — LORAZEPAM 2 MG/ML
0.5 INJECTION INTRAMUSCULAR ONCE
Status: COMPLETED | OUTPATIENT
Start: 2019-07-15 | End: 2019-07-15

## 2019-07-16 RX ORDER — HEPARIN SODIUM (PORCINE) LOCK FLUSH IV SOLN 100 UNIT/ML 100 UNIT/ML
5 SOLUTION INTRAVENOUS ONCE
Status: COMPLETED | OUTPATIENT
Start: 2019-07-16 | End: 2019-07-16

## 2019-07-16 NOTE — ED NOTES
Right Chest power port accessed x1 attempt with sterile technique. 19G 0.75 power port needle inserted. Tegaderm dressing in place.

## 2019-07-16 NOTE — ED NOTES
Pt to ER with c/o \"flashes of light\" to her vision this am followed by a mild headache. Pt states she is legally blind with hx of peripheral vision loss. Pt also c/o of an intermittent \"ammonia\" smell and taste. Pt denies fall or head injury.  Pt marielle

## 2019-07-16 NOTE — ED INITIAL ASSESSMENT (HPI)
PT REPORT SEEING \"FLASHING LIGHTS\" SINCE THIS MORNING, PT ALSO REPORTS SMELLING AN ACETONE SMELL AND FEELS LIKE HER TEETH ARE SOFT OVER THE PAST 2 MONTHS .  PT REPORTS HX OF BRAIN TUMOR WITH MULTIPLE WALTER SURGERIES

## 2019-07-16 NOTE — ED PROVIDER NOTES
Patient Seen in: [facility] AND [facility] Emergency Department    History   Patient presents with:   Eye Visual Problem (opthalmic)    Stated Complaint: smells acetone and teeth feel soft    HPI    Patient is here with sensation for the past couple of weeks of w Gallbladder disease    • Hepatitis C    • High blood pressure    • Hypothyroid    • Lung cancer (HCC)    • Lung cancer (HCC)    • Migraines    • Osteoarthritis    • [facility] ([facility] JULY 2015   • Personal history of antineoplastic chemotherapy     E Apply 100 g topically 4 (four) times daily. Nutritional Supplements (ENSURE) Oral Liquid,  Take by mouth.          Review of Systems  Constitutional: no fever,  has otherwise been feeling well  Cardiovascular: no chest pain  Respiratory: no shortness of to check for possible brain tumor or metastasis    MRI did not show any acute process. I did discuss with her the results she was relieved about this of course.   I recommended calling her neurologist tomorrow I discussed with her episodes that she needs t neurologist tomorrow for close follow-up in the next 2 days.       Medications Prescribed:  Discharge Medication List as of 7/16/2019 12:41 AM

## 2019-07-19 DIAGNOSIS — E03.9 HYPOTHYROIDISM, UNSPECIFIED TYPE: ICD-10-CM

## 2019-07-19 RX ORDER — QUETIAPINE 25 MG/1
25 TABLET, FILM COATED ORAL NIGHTLY
Qty: 90 TABLET | Refills: 3 | Status: SHIPPED | OUTPATIENT
Start: 2019-07-19 | End: 2020-01-30

## 2019-07-25 RX ORDER — ESCITALOPRAM OXALATE 5 MG/1
TABLET ORAL
Qty: 90 TABLET | Refills: 3 | Status: SHIPPED | OUTPATIENT
Start: 2019-07-25 | End: 2020-06-29 | Stop reason: ALTCHOICE

## 2019-08-09 ENCOUNTER — OFFICE VISIT (OUTPATIENT)
Dept: HEMATOLOGY/ONCOLOGY | Facility: HOSPITAL | Age: 49
End: 2019-08-09
Attending: INTERNAL MEDICINE
Payer: COMMERCIAL

## 2019-08-09 VITALS
DIASTOLIC BLOOD PRESSURE: 70 MMHG | BODY MASS INDEX: 32.65 KG/M2 | WEIGHT: 196 LBS | SYSTOLIC BLOOD PRESSURE: 130 MMHG | TEMPERATURE: 98 F | HEIGHT: 65 IN | RESPIRATION RATE: 18 BRPM | HEART RATE: 75 BPM

## 2019-08-09 VITALS
BODY MASS INDEX: 32.69 KG/M2 | HEART RATE: 75 BPM | RESPIRATION RATE: 18 BRPM | TEMPERATURE: 98 F | SYSTOLIC BLOOD PRESSURE: 130 MMHG | HEIGHT: 65 IN | WEIGHT: 196.19 LBS | DIASTOLIC BLOOD PRESSURE: 70 MMHG

## 2019-08-09 DIAGNOSIS — C34.90 MALIGNANT NEOPLASM OF LUNG, UNSPECIFIED LATERALITY, UNSPECIFIED PART OF LUNG (HCC): Primary | ICD-10-CM

## 2019-08-09 DIAGNOSIS — C78.7 LIVER METASTASES (HCC): ICD-10-CM

## 2019-08-09 DIAGNOSIS — Z51.11 CHEMOTHERAPY MANAGEMENT, ENCOUNTER FOR: ICD-10-CM

## 2019-08-09 DIAGNOSIS — C34.92 CARCINOMA OF LUNG, LEFT (HCC): ICD-10-CM

## 2019-08-09 DIAGNOSIS — E03.9 HYPOTHYROIDISM, UNSPECIFIED TYPE: ICD-10-CM

## 2019-08-09 DIAGNOSIS — Z45.2 ENCOUNTER FOR ADJUSTMENT OR MANAGEMENT OF VASCULAR ACCESS DEVICE: ICD-10-CM

## 2019-08-09 DIAGNOSIS — G40.909 SEIZURE DISORDER (HCC): ICD-10-CM

## 2019-08-09 DIAGNOSIS — C79.31 BRAIN METASTASES (HCC): ICD-10-CM

## 2019-08-09 LAB
ALBUMIN SERPL-MCNC: 3.6 G/DL (ref 3.4–5)
ALBUMIN/GLOB SERPL: 0.9 {RATIO} (ref 1–2)
ALP LIVER SERPL-CCNC: 116 U/L (ref 39–100)
ALT SERPL-CCNC: 13 U/L (ref 13–56)
ANION GAP SERPL CALC-SCNC: 8 MMOL/L (ref 0–18)
AST SERPL-CCNC: 11 U/L (ref 15–37)
BASOPHILS # BLD AUTO: 0.04 X10(3) UL (ref 0–0.2)
BASOPHILS NFR BLD AUTO: 0.4 %
BILIRUB SERPL-MCNC: 0.2 MG/DL (ref 0.1–2)
BUN BLD-MCNC: 7 MG/DL (ref 7–18)
BUN/CREAT SERPL: 11.3 (ref 10–20)
CALCIUM BLD-MCNC: 8.8 MG/DL (ref 8.5–10.1)
CHLORIDE SERPL-SCNC: 98 MMOL/L (ref 98–112)
CO2 SERPL-SCNC: 25 MMOL/L (ref 21–32)
CREAT BLD-MCNC: 0.62 MG/DL (ref 0.55–1.02)
DEPRECATED RDW RBC AUTO: 45.2 FL (ref 35.1–46.3)
EOSINOPHIL # BLD AUTO: 0.06 X10(3) UL (ref 0–0.7)
EOSINOPHIL NFR BLD AUTO: 0.5 %
ERYTHROCYTE [DISTWIDTH] IN BLOOD BY AUTOMATED COUNT: 13.9 % (ref 11–15)
GLOBULIN PLAS-MCNC: 4.2 G/DL (ref 2.8–4.4)
GLUCOSE BLD-MCNC: 97 MG/DL (ref 70–99)
HCT VFR BLD AUTO: 32.2 % (ref 35–48)
HGB BLD-MCNC: 10.7 G/DL (ref 12–16)
IMM GRANULOCYTES # BLD AUTO: 0.07 X10(3) UL (ref 0–1)
IMM GRANULOCYTES NFR BLD: 0.6 %
LYMPHOCYTES # BLD AUTO: 2.41 X10(3) UL (ref 1–4)
LYMPHOCYTES NFR BLD AUTO: 21.3 %
M PROTEIN MFR SERPL ELPH: 7.8 G/DL (ref 6.4–8.2)
MCH RBC QN AUTO: 29.8 PG (ref 26–34)
MCHC RBC AUTO-ENTMCNC: 33.2 G/DL (ref 31–37)
MCV RBC AUTO: 89.7 FL (ref 80–100)
MONOCYTES # BLD AUTO: 0.96 X10(3) UL (ref 0.1–1)
MONOCYTES NFR BLD AUTO: 8.5 %
NEUTROPHILS # BLD AUTO: 7.78 X10 (3) UL (ref 1.5–7.7)
NEUTROPHILS # BLD AUTO: 7.78 X10(3) UL (ref 1.5–7.7)
NEUTROPHILS NFR BLD AUTO: 68.7 %
OSMOLALITY SERPL CALC.SUM OF ELEC: 270 MOSM/KG (ref 275–295)
PATIENT FASTING: NO
PLATELET # BLD AUTO: 385 10(3)UL (ref 150–450)
POTASSIUM SERPL-SCNC: 3.7 MMOL/L (ref 3.5–5.1)
RBC # BLD AUTO: 3.59 X10(6)UL (ref 3.8–5.3)
SODIUM SERPL-SCNC: 131 MMOL/L (ref 136–145)
WBC # BLD AUTO: 11.3 X10(3) UL (ref 4–11)

## 2019-08-09 PROCEDURE — 80053 COMPREHEN METABOLIC PANEL: CPT

## 2019-08-09 PROCEDURE — 99215 OFFICE O/P EST HI 40 MIN: CPT | Performed by: INTERNAL MEDICINE

## 2019-08-09 PROCEDURE — 85025 COMPLETE CBC W/AUTO DIFF WBC: CPT

## 2019-08-09 PROCEDURE — 96413 CHEMO IV INFUSION 1 HR: CPT

## 2019-08-09 RX ORDER — HEPARIN SODIUM (PORCINE) LOCK FLUSH IV SOLN 100 UNIT/ML 100 UNIT/ML
SOLUTION INTRAVENOUS
Status: COMPLETED
Start: 2019-08-09 | End: 2019-08-09

## 2019-08-09 RX ORDER — ACETAMINOPHEN 325 MG/1
TABLET ORAL EVERY 6 HOURS PRN
Status: CANCELLED | OUTPATIENT
Start: 2019-08-09

## 2019-08-09 RX ORDER — DIPHENHYDRAMINE HYDROCHLORIDE 50 MG/ML
INJECTION INTRAMUSCULAR; INTRAVENOUS EVERY 4 HOURS PRN
Status: CANCELLED | OUTPATIENT
Start: 2019-08-09

## 2019-08-09 RX ORDER — MEPERIDINE HYDROCHLORIDE 25 MG/ML
INJECTION INTRAMUSCULAR; INTRAVENOUS; SUBCUTANEOUS AS NEEDED
Status: CANCELLED | OUTPATIENT
Start: 2019-08-09

## 2019-08-09 RX ORDER — ALBUTEROL SULFATE 90 UG/1
2 AEROSOL, METERED RESPIRATORY (INHALATION) AS NEEDED
Status: CANCELLED | OUTPATIENT
Start: 2019-08-09

## 2019-08-09 RX ORDER — HEPARIN SODIUM (PORCINE) LOCK FLUSH IV SOLN 100 UNIT/ML 100 UNIT/ML
5 SOLUTION INTRAVENOUS ONCE
Status: COMPLETED | OUTPATIENT
Start: 2019-08-09 | End: 2019-08-09

## 2019-08-09 RX ORDER — 0.9 % SODIUM CHLORIDE 0.9 %
VIAL (ML) INJECTION
Status: DISCONTINUED
Start: 2019-08-09 | End: 2019-08-09

## 2019-08-09 RX ORDER — HEPARIN SODIUM (PORCINE) LOCK FLUSH IV SOLN 100 UNIT/ML 100 UNIT/ML
5 SOLUTION INTRAVENOUS ONCE
Status: CANCELLED | OUTPATIENT
Start: 2019-08-09

## 2019-08-09 RX ORDER — 0.9 % SODIUM CHLORIDE 0.9 %
10 VIAL (ML) INJECTION ONCE
Status: CANCELLED | OUTPATIENT
Start: 2019-08-09

## 2019-08-09 RX ORDER — RANITIDINE 25 MG/ML
50 INJECTION, SOLUTION INTRAMUSCULAR; INTRAVENOUS AS NEEDED
Status: CANCELLED | OUTPATIENT
Start: 2019-08-09

## 2019-08-09 RX ADMIN — HEPARIN SODIUM (PORCINE) LOCK FLUSH IV SOLN 100 UNIT/ML 500 UNITS: 100 SOLUTION INTRAVENOUS at 13:00:00

## 2019-08-09 NOTE — PROGRESS NOTES
Cancer Center Progress Note    Patient Name: Melinda Malhotra   YOB: 1970   Medical Record Number: [id number]   Attending Physician: Charline Vincent M.D. Chief Complaint:  Metastatic adenocarcinoma of the lung, brain metastasis.     History of in the setting of polysubstance abuse/dependence. She required mechanical ventilation for secondary respiratory failure. She was seen by neurology and critical care medicine consultation.   After long hospitalization she was eventually discharged to rehab APPENDECTOMY  1999   • BRAIN SURGERY  JULY 2015    TUMOR RESECTION   • BRAIN SURGERY Left     OCCIPITAL CRANIOTOMY   • BRAIN SURGERY Left     LOBECTOMY   • BRAIN SURGERY  AUG  2016    REMOVAL OF DEAD TISSUE    • CHOLECYSTECTOMY  2009   • CYST REMOVAL file        Attends Nondenominational service: Not on file        Active member of club or organization: Not on file        Attends meetings of clubs or organizations: Not on file        Relationship status: Not on file      Intimate partner violence:        Fear Systems:  All other systems reviewed and negative x12    Vital Signs:  /70 (BP Location: Left arm, Patient Position: Sitting, Cuff Size: adult)   Pulse 75   Temp 98.3 °F (36.8 °C) (Oral)   Resp 18   Ht 1.651 m (5' 5\")   Wt 89 kg (196 lb 3.4 oz)   BM frontal lobe resection in August 2016. Her left upper lung primary lesion was treated with lobectomy and mediastinal lymph node dissection on 8/25/2015 (pT1aN0).   She received adjuvant chemo originally with cisplatin and Navelbine being for 1 cycle howe metastasis recurrent seizures undergoing active cancer directed therapy    The patient's emotional well being was assessed and resources were discussed. Appropriate resources were reviewed and discussed with the pateint and family.        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 [address]    • Wears glasses        Past Surgical History:  Past Surgical History:   Procedure Laterality Date   • APPENDECTOMY  1999   • BRAIN SURGERY  JULY 2015 Days per week: Not on file        Minutes per session: Not on file      Stress: Not on file    Relationships      Social connections:        Talks on phone: Not on file        Gets together: Not on file        Attends Rastafari service: Not on file Transdermal Gel, Apply 100 g topically 4 (four) times daily. , Disp: , Rfl:   •  Nutritional Supplements (ENSURE) Oral Liquid, Take by mouth., Disp: , Rfl:     Allergies:  No Known Allergies     Review of Systems:  All other systems reviewed and negative x1 as a metastatic lesion to the pancreas. The patient was initially diagnosed in July 2015 with a left occipital brain lesion and is status post resection with adjuvant radiation. She is also status post a right frontal lobe resection in August 2016.   Her l Endocrinology is managing this  –Polysubstance abuse/dependence with seizures. She is not using any alcohol or illicit drugs at this time.   Neurology [facility]) is managing her antiepileptics    Risk assessment: High metastatic lung cancer with brain metast

## 2019-08-09 NOTE — PROGRESS NOTES
Pt here for [address]. Arrives Ambulating independently, accompanied by self. Using a driving service today.           Modifications in dose or schedule: No     Teri Marsh arrived after lab/MD appointment today.  Port accessed by lab was flushed and noted w

## 2019-09-06 ENCOUNTER — APPOINTMENT (OUTPATIENT)
Dept: HEMATOLOGY/ONCOLOGY | Facility: HOSPITAL | Age: 49
End: 2019-09-06
Attending: INTERNAL MEDICINE
Payer: COMMERCIAL

## 2019-09-13 ENCOUNTER — OFFICE VISIT (OUTPATIENT)
Dept: HEMATOLOGY/ONCOLOGY | Facility: HOSPITAL | Age: 49
End: 2019-09-13
Attending: INTERNAL MEDICINE
Payer: COMMERCIAL

## 2019-09-13 VITALS
HEART RATE: 85 BPM | OXYGEN SATURATION: 97 % | RESPIRATION RATE: 18 BRPM | SYSTOLIC BLOOD PRESSURE: 127 MMHG | HEIGHT: 65 IN | TEMPERATURE: 98 F | BODY MASS INDEX: 33.79 KG/M2 | WEIGHT: 202.81 LBS | DIASTOLIC BLOOD PRESSURE: 107 MMHG

## 2019-09-13 VITALS
DIASTOLIC BLOOD PRESSURE: 107 MMHG | BODY MASS INDEX: 33.79 KG/M2 | WEIGHT: 202.81 LBS | OXYGEN SATURATION: 97 % | SYSTOLIC BLOOD PRESSURE: 127 MMHG | HEIGHT: 65 IN | RESPIRATION RATE: 18 BRPM | TEMPERATURE: 98 F | HEART RATE: 85 BPM

## 2019-09-13 DIAGNOSIS — Z51.11 CHEMOTHERAPY MANAGEMENT, ENCOUNTER FOR: Primary | ICD-10-CM

## 2019-09-13 DIAGNOSIS — C34.90 MALIGNANT NEOPLASM OF LUNG, UNSPECIFIED LATERALITY, UNSPECIFIED PART OF LUNG (HCC): Primary | ICD-10-CM

## 2019-09-13 DIAGNOSIS — E87.1 HYPONATREMIA: ICD-10-CM

## 2019-09-13 DIAGNOSIS — C34.92 CARCINOMA OF LUNG, LEFT (HCC): ICD-10-CM

## 2019-09-13 DIAGNOSIS — Z45.2 ENCOUNTER FOR ADJUSTMENT OR MANAGEMENT OF VASCULAR ACCESS DEVICE: ICD-10-CM

## 2019-09-13 DIAGNOSIS — C79.31 BRAIN METASTASES (HCC): ICD-10-CM

## 2019-09-13 DIAGNOSIS — G40.909 SEIZURE DISORDER (HCC): ICD-10-CM

## 2019-09-13 DIAGNOSIS — C78.7 LIVER METASTASES (HCC): ICD-10-CM

## 2019-09-13 LAB
ALBUMIN SERPL-MCNC: 3.6 G/DL (ref 3.4–5)
ALBUMIN/GLOB SERPL: 0.8 {RATIO} (ref 1–2)
ALP LIVER SERPL-CCNC: 147 U/L (ref 39–100)
ALT SERPL-CCNC: 11 U/L (ref 13–56)
ANION GAP SERPL CALC-SCNC: 9 MMOL/L (ref 0–18)
AST SERPL-CCNC: 8 U/L (ref 15–37)
BASOPHILS # BLD AUTO: 0.04 X10(3) UL (ref 0–0.2)
BASOPHILS NFR BLD AUTO: 0.3 %
BILIRUB SERPL-MCNC: 0.3 MG/DL (ref 0.1–2)
BUN BLD-MCNC: 4 MG/DL (ref 7–18)
BUN/CREAT SERPL: 6.3 (ref 10–20)
CALCIUM BLD-MCNC: 8.4 MG/DL (ref 8.5–10.1)
CHLORIDE SERPL-SCNC: 92 MMOL/L (ref 98–112)
CO2 SERPL-SCNC: 24 MMOL/L (ref 21–32)
CREAT BLD-MCNC: 0.64 MG/DL (ref 0.55–1.02)
DEPRECATED RDW RBC AUTO: 44.6 FL (ref 35.1–46.3)
EOSINOPHIL # BLD AUTO: 0.04 X10(3) UL (ref 0–0.7)
EOSINOPHIL NFR BLD AUTO: 0.3 %
ERYTHROCYTE [DISTWIDTH] IN BLOOD BY AUTOMATED COUNT: 14 % (ref 11–15)
GLOBULIN PLAS-MCNC: 4.3 G/DL (ref 2.8–4.4)
GLUCOSE BLD-MCNC: 86 MG/DL (ref 70–99)
HCT VFR BLD AUTO: 32.3 % (ref 35–48)
HGB BLD-MCNC: 11.1 G/DL (ref 12–16)
IMM GRANULOCYTES # BLD AUTO: 0.09 X10(3) UL (ref 0–1)
IMM GRANULOCYTES NFR BLD: 0.7 %
LYMPHOCYTES # BLD AUTO: 1.44 X10(3) UL (ref 1–4)
LYMPHOCYTES NFR BLD AUTO: 11.5 %
M PROTEIN MFR SERPL ELPH: 7.9 G/DL (ref 6.4–8.2)
MCH RBC QN AUTO: 29.9 PG (ref 26–34)
MCHC RBC AUTO-ENTMCNC: 34.4 G/DL (ref 31–37)
MCV RBC AUTO: 87.1 FL (ref 80–100)
MONOCYTES # BLD AUTO: 0.9 X10(3) UL (ref 0.1–1)
MONOCYTES NFR BLD AUTO: 7.2 %
NEUTROPHILS # BLD AUTO: 9.99 X10 (3) UL (ref 1.5–7.7)
NEUTROPHILS # BLD AUTO: 9.99 X10(3) UL (ref 1.5–7.7)
NEUTROPHILS NFR BLD AUTO: 80 %
OSMOLALITY SERPL CALC.SUM OF ELEC: 256 MOSM/KG (ref 275–295)
PATIENT FASTING: NO
PLATELET # BLD AUTO: 481 10(3)UL (ref 150–450)
POTASSIUM SERPL-SCNC: 3.8 MMOL/L (ref 3.5–5.1)
RBC # BLD AUTO: 3.71 X10(6)UL (ref 3.8–5.3)
SODIUM SERPL-SCNC: 125 MMOL/L (ref 136–145)
WBC # BLD AUTO: 12.5 X10(3) UL (ref 4–11)

## 2019-09-13 PROCEDURE — 99215 OFFICE O/P EST HI 40 MIN: CPT | Performed by: INTERNAL MEDICINE

## 2019-09-13 PROCEDURE — 96413 CHEMO IV INFUSION 1 HR: CPT

## 2019-09-13 PROCEDURE — 80053 COMPREHEN METABOLIC PANEL: CPT

## 2019-09-13 PROCEDURE — 85025 COMPLETE CBC W/AUTO DIFF WBC: CPT

## 2019-09-13 RX ORDER — DIPHENHYDRAMINE HYDROCHLORIDE 50 MG/ML
INJECTION INTRAMUSCULAR; INTRAVENOUS EVERY 4 HOURS PRN
Status: CANCELLED | OUTPATIENT
Start: 2019-09-13

## 2019-09-13 RX ORDER — HEPARIN SODIUM (PORCINE) LOCK FLUSH IV SOLN 100 UNIT/ML 100 UNIT/ML
5 SOLUTION INTRAVENOUS ONCE
Status: COMPLETED | OUTPATIENT
Start: 2019-09-13 | End: 2019-09-13

## 2019-09-13 RX ORDER — 0.9 % SODIUM CHLORIDE 0.9 %
10 VIAL (ML) INJECTION ONCE
Status: CANCELLED | OUTPATIENT
Start: 2019-09-13

## 2019-09-13 RX ORDER — HEPARIN SODIUM (PORCINE) LOCK FLUSH IV SOLN 100 UNIT/ML 100 UNIT/ML
SOLUTION INTRAVENOUS
Status: DISCONTINUED
Start: 2019-09-13 | End: 2019-09-13

## 2019-09-13 RX ORDER — HEPARIN SODIUM (PORCINE) LOCK FLUSH IV SOLN 100 UNIT/ML 100 UNIT/ML
5 SOLUTION INTRAVENOUS ONCE
Status: CANCELLED | OUTPATIENT
Start: 2019-09-13

## 2019-09-13 RX ORDER — RANITIDINE 25 MG/ML
50 INJECTION, SOLUTION INTRAMUSCULAR; INTRAVENOUS AS NEEDED
Status: CANCELLED | OUTPATIENT
Start: 2019-09-13

## 2019-09-13 RX ORDER — ACETAMINOPHEN 325 MG/1
TABLET ORAL EVERY 6 HOURS PRN
Status: CANCELLED | OUTPATIENT
Start: 2019-09-13

## 2019-09-13 RX ORDER — 0.9 % SODIUM CHLORIDE 0.9 %
VIAL (ML) INJECTION
Status: DISCONTINUED
Start: 2019-09-13 | End: 2019-09-13

## 2019-09-13 RX ORDER — ALBUTEROL SULFATE 90 UG/1
2 AEROSOL, METERED RESPIRATORY (INHALATION) AS NEEDED
Status: CANCELLED | OUTPATIENT
Start: 2019-09-13

## 2019-09-13 RX ORDER — MEPERIDINE HYDROCHLORIDE 25 MG/ML
INJECTION INTRAMUSCULAR; INTRAVENOUS; SUBCUTANEOUS AS NEEDED
Status: CANCELLED | OUTPATIENT
Start: 2019-09-13

## 2019-09-13 RX ADMIN — HEPARIN SODIUM (PORCINE) LOCK FLUSH IV SOLN 100 UNIT/ML 500 UNITS: 100 SOLUTION INTRAVENOUS at 13:28:00

## 2019-09-13 NOTE — PROGRESS NOTES
Cancer Center Progress Note    Patient Name: Jerome Briggs   YOB: 1970   Medical Record Number: [id number]   Attending Physician: Tula Severs, M.D. Chief Complaint:  Metastatic adenocarcinoma of the lung, brain metastasis.     History of in the setting of polysubstance abuse/dependence. She required mechanical ventilation for secondary respiratory failure. She was seen by neurology and critical care medicine consultation.   After long hospitalization she was eventually discharged to rehab APPENDECTOMY  1999   • BRAIN SURGERY  JULY 2015    TUMOR RESECTION   • BRAIN SURGERY Left     OCCIPITAL CRANIOTOMY   • BRAIN SURGERY Left     LOBECTOMY   • BRAIN SURGERY  AUG  2016    REMOVAL OF DEAD TISSUE    • CHOLECYSTECTOMY  2009   • CYST REMOVAL file        Attends Zoroastrianism service: Not on file        Active member of club or organization: Not on file        Attends meetings of clubs or organizations: Not on file        Relationship status: Not on file      Intimate partner violence:        Fear Systems:  All other systems reviewed and negative x12    Vital Signs:  BP (!) 127/107 (BP Location: Left arm, Patient Position: Sitting)   Pulse 85   Temp 98.3 °F (36.8 °C) (Oral)   Resp 18   Ht 1.651 m (5' 5\")   Wt 92 kg (202 lb 13.2 oz)   SpO2 97%   BMI post a right frontal lobe resection in August 2016. Her left upper lung primary lesion was treated with lobectomy and mediastinal lymph node dissection on 8/25/2015 (pT1aN0).   She received adjuvant chemo originally with cisplatin and Navelbine being for 1 brain metastasis recurrent seizures undergoing active cancer directed therapy    The patient's emotional well being was assessed and resources were discussed. Appropriate resources were reviewed and discussed with the pateint and family.        [facility] program.    She was admitted hospital November 14 2017 with fevers and thrush however left AGAINST MEDICAL ADVICE when her fevers resolved seen by infectious disease and received IV antibiotics for roughly 48 hours    She was admitted to the hospital in [place] antineoplastic chemotherapy     EVERY 2 WEEKS BEGINNING 2015    • Pneumonia, organism unspecified(486)    • Seizure disorder (HCC)    • Wears glasses        Past Surgical History:  Past Surgical History:   Procedure Laterality Date   • APPENDECTOMY  1999 Lifestyle      Physical activity:        Days per week: Not on file        Minutes per session: Not on file      Stress: Not on file    Relationships      Social connections:        Talks on phone: Not on file        Gets together: Not on file        Atten Rfl: 2  •  Diclofenac Sodium 1 % Transdermal Gel, Apply 100 g topically 4 (four) times daily. , Disp: , Rfl:   •  Nutritional Supplements (ENSURE) Oral Liquid, Take by mouth., Disp: , Rfl:     Allergies:  No Known Allergies     Review of Systems:  All other brain metastasis as well as a metastatic lesion to the pancreas. The patient was initially diagnosed in July 2015 with a left occipital brain lesion and is status post resection with adjuvant radiation.   She is also status post a right frontal lobe resecti --imporved  –Hypothyroidism secondary to nivolumab. She is on thyroid replacement--we will continue to follow her thyroid tests on treatment. Endocrinology is managing this  –Polysubstance abuse/dependence with seizures.   She is not using any alcohol or i

## 2019-09-14 ENCOUNTER — HOSPITAL ENCOUNTER (EMERGENCY)
Facility: HOSPITAL | Age: 49
Discharge: HOME | End: 2019-09-14
Payer: COMMERCIAL

## 2019-09-14 VITALS
RESPIRATION RATE: 16 BRPM | SYSTOLIC BLOOD PRESSURE: 129 MMHG | TEMPERATURE: 99 F | DIASTOLIC BLOOD PRESSURE: 81 MMHG | OXYGEN SATURATION: 99 % | HEART RATE: 89 BPM

## 2019-09-14 DIAGNOSIS — H72.91 PERFORATION OF RIGHT TYMPANIC MEMBRANE: ICD-10-CM

## 2019-09-14 DIAGNOSIS — H60.501 ACUTE OTITIS EXTERNA OF RIGHT EAR, UNSPECIFIED TYPE: Primary | ICD-10-CM

## 2019-09-14 PROCEDURE — 99283 EMERGENCY DEPT VISIT LOW MDM: CPT

## 2019-09-14 RX ORDER — HYDROCODONE BITARTRATE AND ACETAMINOPHEN 5; 325 MG/1; MG/1
1-2 TABLET ORAL EVERY 6 HOURS PRN
Qty: 10 TABLET | Refills: 0 | Status: SHIPPED | OUTPATIENT
Start: 2019-09-14 | End: 2019-09-21

## 2019-09-14 RX ORDER — HYDROCODONE BITARTRATE AND ACETAMINOPHEN 5; 325 MG/1; MG/1
1 TABLET ORAL ONCE
Status: COMPLETED | OUTPATIENT
Start: 2019-09-14 | End: 2019-09-14

## 2019-09-14 RX ORDER — NEOMYCIN SULFATE, POLYMYXIN B SULFATE AND HYDROCORTISONE 10; 3.5; 1 MG/ML; MG/ML; [USP'U]/ML
4 SUSPENSION/ DROPS AURICULAR (OTIC) 4 TIMES DAILY
Qty: 10 ML | Refills: 0 | Status: SHIPPED | OUTPATIENT
Start: 2019-09-14 | End: 2019-09-21

## 2019-09-14 RX ORDER — AMOXICILLIN AND CLAVULANATE POTASSIUM 875; 125 MG/1; MG/1
1 TABLET, FILM COATED ORAL 2 TIMES DAILY
Qty: 14 TABLET | Refills: 0 | Status: SHIPPED | OUTPATIENT
Start: 2019-09-14 | End: 2019-09-21

## 2019-09-14 NOTE — ED NOTES
R ear pain/drainage/swelling since Thursday after sticking the wooden side of a match stick in ear. Pt states she was trying to remove wax from ear.  +Drainage noted

## 2019-09-14 NOTE — ED PROVIDER NOTES
Patient Seen in: [facility] AND [facility] Emergency Department    History   Patient presents with:  Ear Problem Pain (neurosensory)    Stated Complaint: ear injury     HPI    19-year-old female, with metastatic adenocarcinoma of the left upper lobe of the lung HEMORRHOIDECTOMY     • HYSTERECTOMY      heavy menstrual flow,    • LUMPECTOMY RIGHT  2012    FIBROADENOMA   • OTHER  8-7-15    CYBERKNIFE   • OTHER SURGICAL HISTORY     • XS PORT-A-CATH INSERTION/EXCH/CHK                      Social History    Tobacco Use ED Course   Labs Reviewed - No data to display  Case discussed with Dr. Davonna Schirmer ER attending  There is no fever although patient did receive chemo yesterday concern for immunosuppression patient will be treated with antibiotics as well as antibioti

## 2019-09-14 NOTE — ED INITIAL ASSESSMENT (HPI)
Pt stuck matchstick in right ear on Thursday and has been pain, swelling a drainage since. On chemo for liver CA.

## 2019-09-27 ENCOUNTER — HOSPITAL ENCOUNTER (OUTPATIENT)
Dept: CT IMAGING | Facility: HOSPITAL | Age: 49
Discharge: HOME | End: 2019-09-27
Attending: INTERNAL MEDICINE
Payer: COMMERCIAL

## 2019-09-27 DIAGNOSIS — C78.7 LIVER METASTASES (HCC): ICD-10-CM

## 2019-09-27 DIAGNOSIS — C34.90 MALIGNANT NEOPLASM OF LUNG, UNSPECIFIED LATERALITY, UNSPECIFIED PART OF LUNG (HCC): ICD-10-CM

## 2019-09-27 PROCEDURE — 74177 CT ABD & PELVIS W/CONTRAST: CPT | Performed by: INTERNAL MEDICINE

## 2019-09-27 PROCEDURE — 71260 CT THORAX DX C+: CPT | Performed by: INTERNAL MEDICINE

## 2019-09-30 ENCOUNTER — TELEPHONE (OUTPATIENT)
Dept: INTERNAL MEDICINE CLINIC | Facility: CLINIC | Age: 49
End: 2019-09-30

## 2019-09-30 NOTE — TELEPHONE ENCOUNTER
LMTCB. If  Stan Nickerson calls back, please schedule 6 month follow up appointment for Agnes Johnson.

## 2019-10-04 NOTE — TELEPHONE ENCOUNTER
Routed to [address] [place] [address] to assist in reaching pts ; Narayanan April needs OV scheduled with Dr. Yimi Jordan (May release health information to:  Laron Pain Spouse [phone])

## 2019-10-11 ENCOUNTER — NURSE ONLY (OUTPATIENT)
Dept: HEMATOLOGY/ONCOLOGY | Facility: HOSPITAL | Age: 49
End: 2019-10-11
Attending: INTERNAL MEDICINE
Payer: COMMERCIAL

## 2019-10-11 VITALS
DIASTOLIC BLOOD PRESSURE: 68 MMHG | SYSTOLIC BLOOD PRESSURE: 102 MMHG | RESPIRATION RATE: 18 BRPM | OXYGEN SATURATION: 90 % | HEART RATE: 106 BPM | BODY MASS INDEX: 33 KG/M2 | TEMPERATURE: 99 F | WEIGHT: 200.38 LBS

## 2019-10-11 VITALS
WEIGHT: 200.38 LBS | OXYGEN SATURATION: 90 % | BODY MASS INDEX: 33.38 KG/M2 | RESPIRATION RATE: 18 BRPM | HEIGHT: 65 IN | SYSTOLIC BLOOD PRESSURE: 102 MMHG | HEART RATE: 106 BPM | TEMPERATURE: 99 F | DIASTOLIC BLOOD PRESSURE: 68 MMHG

## 2019-10-11 DIAGNOSIS — C34.90 MALIGNANT NEOPLASM OF LUNG, UNSPECIFIED LATERALITY, UNSPECIFIED PART OF LUNG (HCC): Primary | ICD-10-CM

## 2019-10-11 DIAGNOSIS — G40.909 SEIZURE DISORDER (HCC): ICD-10-CM

## 2019-10-11 DIAGNOSIS — C34.92 CARCINOMA OF LUNG, LEFT (HCC): ICD-10-CM

## 2019-10-11 DIAGNOSIS — Z51.11 CHEMOTHERAPY MANAGEMENT, ENCOUNTER FOR: Primary | ICD-10-CM

## 2019-10-11 DIAGNOSIS — C78.7 LIVER METASTASES (HCC): ICD-10-CM

## 2019-10-11 DIAGNOSIS — Z45.2 ENCOUNTER FOR ADJUSTMENT OR MANAGEMENT OF VASCULAR ACCESS DEVICE: ICD-10-CM

## 2019-10-11 DIAGNOSIS — C79.31 BRAIN METASTASES (HCC): ICD-10-CM

## 2019-10-11 PROCEDURE — 80053 COMPREHEN METABOLIC PANEL: CPT

## 2019-10-11 PROCEDURE — 96413 CHEMO IV INFUSION 1 HR: CPT

## 2019-10-11 PROCEDURE — 85025 COMPLETE CBC W/AUTO DIFF WBC: CPT

## 2019-10-11 PROCEDURE — 99215 OFFICE O/P EST HI 40 MIN: CPT | Performed by: INTERNAL MEDICINE

## 2019-10-11 RX ORDER — 0.9 % SODIUM CHLORIDE 0.9 %
10 VIAL (ML) INJECTION ONCE
Status: CANCELLED | OUTPATIENT
Start: 2019-10-11

## 2019-10-11 RX ORDER — ALBUTEROL SULFATE 90 UG/1
2 AEROSOL, METERED RESPIRATORY (INHALATION) AS NEEDED
Status: CANCELLED | OUTPATIENT
Start: 2019-10-11

## 2019-10-11 RX ORDER — DIPHENHYDRAMINE HYDROCHLORIDE 50 MG/ML
INJECTION INTRAMUSCULAR; INTRAVENOUS EVERY 4 HOURS PRN
Status: CANCELLED | OUTPATIENT
Start: 2019-10-11

## 2019-10-11 RX ORDER — HEPARIN SODIUM (PORCINE) LOCK FLUSH IV SOLN 100 UNIT/ML 100 UNIT/ML
SOLUTION INTRAVENOUS
Status: COMPLETED
Start: 2019-10-11 | End: 2019-10-11

## 2019-10-11 RX ORDER — RANITIDINE 25 MG/ML
50 INJECTION, SOLUTION INTRAMUSCULAR; INTRAVENOUS AS NEEDED
Status: CANCELLED | OUTPATIENT
Start: 2019-10-11

## 2019-10-11 RX ORDER — HEPARIN SODIUM (PORCINE) LOCK FLUSH IV SOLN 100 UNIT/ML 100 UNIT/ML
5 SOLUTION INTRAVENOUS ONCE
Status: CANCELLED | OUTPATIENT
Start: 2019-10-11

## 2019-10-11 RX ORDER — HEPARIN SODIUM (PORCINE) LOCK FLUSH IV SOLN 100 UNIT/ML 100 UNIT/ML
5 SOLUTION INTRAVENOUS ONCE
Status: DISCONTINUED | OUTPATIENT
Start: 2019-10-11 | End: 2019-10-11

## 2019-10-11 RX ORDER — 0.9 % SODIUM CHLORIDE 0.9 %
VIAL (ML) INJECTION
Status: DISCONTINUED
Start: 2019-10-11 | End: 2019-10-11

## 2019-10-11 RX ORDER — 0.9 % SODIUM CHLORIDE 0.9 %
10 VIAL (ML) INJECTION ONCE
Status: DISCONTINUED | OUTPATIENT
Start: 2019-10-11 | End: 2019-10-11

## 2019-10-11 RX ORDER — ACETAMINOPHEN 325 MG/1
TABLET ORAL EVERY 6 HOURS PRN
Status: CANCELLED | OUTPATIENT
Start: 2019-10-11

## 2019-10-11 RX ORDER — MEPERIDINE HYDROCHLORIDE 25 MG/ML
INJECTION INTRAMUSCULAR; INTRAVENOUS; SUBCUTANEOUS AS NEEDED
Status: CANCELLED | OUTPATIENT
Start: 2019-10-11

## 2019-10-11 RX ADMIN — HEPARIN SODIUM (PORCINE) LOCK FLUSH IV SOLN 100 UNIT/ML 500 UNITS: 100 SOLUTION INTRAVENOUS at 14:36:00

## 2019-10-11 NOTE — PROGRESS NOTES
Cancer Center Progress Note    Patient Name: Adriana Miranda   YOB: 1970   Medical Record Number: [id number]   Attending Physician: Raeann Salcido M.D. Chief Complaint:  Metastatic adenocarcinoma of the lung, brain metastasis.     History of in the setting of polysubstance abuse/dependence. She required mechanical ventilation for secondary respiratory failure. She was seen by neurology and critical care medicine consultation.   After long hospitalization she was eventually discharged to rehab APPENDECTOMY  1999   • BRAIN SURGERY  JULY 2015    TUMOR RESECTION   • BRAIN SURGERY Left     OCCIPITAL CRANIOTOMY   • BRAIN SURGERY Left     LOBECTOMY   • BRAIN SURGERY  AUG  2016    REMOVAL OF DEAD TISSUE    • CHOLECYSTECTOMY  2009   • CYST REMOVAL file        Attends Bahai service: Not on file        Active member of club or organization: Not on file        Attends meetings of clubs or organizations: Not on file        Relationship status: Not on file      Intimate partner violence:        Fear Systems:  All other systems reviewed and negative x12    Vital Signs:  /68 (BP Location: Left arm, Patient Position: Sitting)   Pulse 106   Temp 98.6 °F (37 °C) (Oral)   Resp 18   Ht 1.651 m (5' 5\")   Wt 90.9 kg (200 lb 6.4 oz)   SpO2 90%   BMI 33. 3 frontal lobe resection in August 2016. Her left upper lung primary lesion was treated with lobectomy and mediastinal lymph node dissection on 8/25/2015 (pT1aN0).   She received adjuvant chemo originally with cisplatin and Navelbine being for 1 cycle howe metastasis recurrent seizures undergoing active cancer directed therapy    The patient's emotional well being was assessed and resources were discussed. Appropriate resources were reviewed and discussed with the pateint and family.        Samantha Wise MD was admitted hospital November 14 2017 with fevers and thrush however left AGAINST MEDICAL ADVICE when her fevers resolved seen by infectious disease and received IV antibiotics for roughly 48 hours    She was admitted to the hospital in December 2017 with EVERY 2 WEEKS BEGINNING 2015    • Pneumonia, organism unspecified(486)    • Seizure disorder ([facility] [address]    • Wears glasses        Past Surgical History:  Past Surgical History:   Procedure Laterality Date   • APPENDECTOMY  1999   • BRAIN SURGERY  JULY 2015 Days per week: Not on file        Minutes per session: Not on file      Stress: Not on file    Relationships      Social connections:        Talks on phone: Not on file        Gets together: Not on file        Attends Jain service: Not on file Transdermal Gel, Apply 100 g topically 4 (four) times daily. , Disp: , Rfl:   •  Nutritional Supplements (ENSURE) Oral Liquid, Take by mouth., Disp: , Rfl:     Allergies:  No Known Allergies     Review of Systems:  All other systems reviewed and negative x1 the pancreas. The patient was initially diagnosed in July 2015 with a left occipital brain lesion and is status post resection with adjuvant radiation. She is also status post a right frontal lobe resection in August 2016.   Her left upper lung primary les managing this  –Polysubstance abuse/dependence with seizures. She is not using any alcohol or illicit drugs at this time.   Neurology [facility]) is managing her antiepileptics    Risk assessment: High metastatic lung cancer with brain metastasis recurrent se

## 2019-10-11 NOTE — PROGRESS NOTES
Pt here for C 36 D1 Opdivo. Arrives Ambulating independently, accompanied by Spouse           Modifications in dose or schedule: no    Lu Ariel arrived after lab/MD appointment today. Port accessed by lab , port flushed with good blood return .      She

## 2019-10-28 ENCOUNTER — TELEPHONE (OUTPATIENT)
Dept: ENDOCRINOLOGY CLINIC | Facility: CLINIC | Age: 49
End: 2019-10-28

## 2019-10-28 DIAGNOSIS — E03.9 HYPOTHYROIDISM, UNSPECIFIED TYPE: Primary | ICD-10-CM

## 2019-10-28 RX ORDER — LEVOTHYROXINE SODIUM 137 UG/1
137 TABLET ORAL
Qty: 30 TABLET | Refills: 0 | Status: SHIPPED | OUTPATIENT
Start: 2019-10-28 | End: 2019-11-21

## 2019-10-28 NOTE — TELEPHONE ENCOUNTER
PT booked apt 11/20/19. Per , Shahla Alcantara will be done 11/8 with other labs from other providers.

## 2019-10-28 NOTE — TELEPHONE ENCOUNTER
Please call  as listed below  Please book FU   Also needs labs TSH and Free T4 before FU  Okay to given time convenient to them  Patient has a lot of other medical conditions that we are dealing with hence we can be flexible  Thanks

## 2019-10-28 NOTE — TELEPHONE ENCOUNTER
Pts  called for refill:       Current Outpatient Medications:     •  Levothyroxine Sodium 137 MCG Oral Tab, Take 137 mcg by mouth before breakfast., Disp: 30 tablet, Rfl: 3    Please call.

## 2019-10-28 NOTE — TELEPHONE ENCOUNTER
LOV 4/20/2019. RTC in 6 months. Sent reminder to patient regarding scheduling 6 month follow up appointment.

## 2019-11-08 ENCOUNTER — NURSE ONLY (OUTPATIENT)
Dept: HEMATOLOGY/ONCOLOGY | Facility: HOSPITAL | Age: 49
End: 2019-11-08
Attending: INTERNAL MEDICINE
Payer: COMMERCIAL

## 2019-11-08 ENCOUNTER — TELEPHONE (OUTPATIENT)
Dept: ENDOCRINOLOGY CLINIC | Facility: CLINIC | Age: 49
End: 2019-11-08

## 2019-11-08 VITALS
WEIGHT: 202 LBS | BODY MASS INDEX: 33.66 KG/M2 | HEIGHT: 65 IN | SYSTOLIC BLOOD PRESSURE: 115 MMHG | TEMPERATURE: 98 F | RESPIRATION RATE: 18 BRPM | HEART RATE: 75 BPM | DIASTOLIC BLOOD PRESSURE: 68 MMHG | OXYGEN SATURATION: 96 %

## 2019-11-08 DIAGNOSIS — C34.90 MALIGNANT NEOPLASM OF LUNG, UNSPECIFIED LATERALITY, UNSPECIFIED PART OF LUNG (HCC): Primary | ICD-10-CM

## 2019-11-08 DIAGNOSIS — G40.909 SEIZURE DISORDER (HCC): ICD-10-CM

## 2019-11-08 DIAGNOSIS — Z51.11 CHEMOTHERAPY MANAGEMENT, ENCOUNTER FOR: ICD-10-CM

## 2019-11-08 DIAGNOSIS — E03.9 HYPOTHYROIDISM (ACQUIRED): ICD-10-CM

## 2019-11-08 DIAGNOSIS — C78.7 LIVER METASTASES (HCC): ICD-10-CM

## 2019-11-08 DIAGNOSIS — E03.9 HYPOTHYROIDISM, UNSPECIFIED TYPE: ICD-10-CM

## 2019-11-08 DIAGNOSIS — C79.31 BRAIN METASTASES (HCC): ICD-10-CM

## 2019-11-08 PROCEDURE — 80053 COMPREHEN METABOLIC PANEL: CPT

## 2019-11-08 PROCEDURE — 84439 ASSAY OF FREE THYROXINE: CPT

## 2019-11-08 PROCEDURE — 85025 COMPLETE CBC W/AUTO DIFF WBC: CPT

## 2019-11-08 PROCEDURE — 84443 ASSAY THYROID STIM HORMONE: CPT

## 2019-11-08 PROCEDURE — 96413 CHEMO IV INFUSION 1 HR: CPT

## 2019-11-08 PROCEDURE — 99215 OFFICE O/P EST HI 40 MIN: CPT | Performed by: INTERNAL MEDICINE

## 2019-11-08 RX ORDER — DIPHENHYDRAMINE HYDROCHLORIDE 50 MG/ML
INJECTION INTRAMUSCULAR; INTRAVENOUS EVERY 4 HOURS PRN
Status: CANCELLED | OUTPATIENT
Start: 2019-11-08

## 2019-11-08 RX ORDER — MEPERIDINE HYDROCHLORIDE 25 MG/ML
INJECTION INTRAMUSCULAR; INTRAVENOUS; SUBCUTANEOUS AS NEEDED
Status: CANCELLED | OUTPATIENT
Start: 2019-11-08

## 2019-11-08 RX ORDER — ALBUTEROL SULFATE 90 UG/1
2 AEROSOL, METERED RESPIRATORY (INHALATION) AS NEEDED
Status: CANCELLED | OUTPATIENT
Start: 2019-11-08

## 2019-11-08 RX ORDER — 0.9 % SODIUM CHLORIDE 0.9 %
VIAL (ML) INJECTION
Status: DISCONTINUED
Start: 2019-11-08 | End: 2019-11-08

## 2019-11-08 RX ORDER — HEPARIN SODIUM (PORCINE) LOCK FLUSH IV SOLN 100 UNIT/ML 100 UNIT/ML
SOLUTION INTRAVENOUS
Status: DISPENSED
Start: 2019-11-08 | End: 2019-11-09

## 2019-11-08 RX ORDER — RANITIDINE 25 MG/ML
50 INJECTION, SOLUTION INTRAMUSCULAR; INTRAVENOUS AS NEEDED
Status: CANCELLED | OUTPATIENT
Start: 2019-11-08

## 2019-11-08 RX ORDER — ACETAMINOPHEN 325 MG/1
TABLET ORAL EVERY 6 HOURS PRN
Status: CANCELLED | OUTPATIENT
Start: 2019-11-08

## 2019-11-08 NOTE — PROGRESS NOTES
Pt here for C 38 D1 Opdivo. Arrives Ambulating independently, accompanied by Spouse           Modifications in dose or schedule: no    Audria Mems arrived after lab/MD appointment today. Port accessed by lab , port flushed with good blood return .      She

## 2019-11-08 NOTE — TELEPHONE ENCOUNTER
Spoke with , Rodger Perez. He verbalizes understanding of message from AM and would prefer to reschedule appointment. Discussed that Joanne Sorto should be seen yearly. RN cancelled 11/20 appointment.

## 2019-11-08 NOTE — TELEPHONE ENCOUNTER
Thyroid patient  SHe is on cancer Rx  Her labs were abnormal, but normal on repeat testing  She has an apt cpming up, if she is not comfortable due to other apts/ is tired okay to refill medication and repeat TSH and Free T4 in 2 months and call for result

## 2019-11-08 NOTE — PROGRESS NOTES
Cancer Center Progress Note    Patient Name: Musa Kellogg   YOB: 1970   Medical Record Number: [id number]   Attending Physician: Kyra Barrientos M.D. Chief Complaint:  Metastatic adenocarcinoma of the lung, brain metastasis.     History of in the setting of polysubstance abuse/dependence. She required mechanical ventilation for secondary respiratory failure. She was seen by neurology and critical care medicine consultation.   After long hospitalization she was eventually discharged to rehab APPENDECTOMY  1999   • BRAIN SURGERY  JULY 2015    TUMOR RESECTION   • BRAIN SURGERY Left     OCCIPITAL CRANIOTOMY   • BRAIN SURGERY Left     LOBECTOMY   • BRAIN SURGERY  AUG  2016    REMOVAL OF DEAD TISSUE    • CHOLECYSTECTOMY  2009   • CYST REMOVAL file        Attends Pentecostalism service: Not on file        Active member of club or organization: Not on file        Attends meetings of clubs or organizations: Not on file        Relationship status: Not on file      Intimate partner violence:        Fear Liquid, Take by mouth., Disp: , Rfl:     Allergies:  No Known Allergies     Review of Systems:  All other systems reviewed and negative x12    Vital Signs:  /68 (BP Location: Left arm, Patient Position: Sitting, Cuff Size: adult)   Pulse 75   Temp 98 lesion and is status post resection with adjuvant radiation. She is also status post a right frontal lobe resection in August 2016. Her left upper lung primary lesion was treated with lobectomy and mediastinal lymph node dissection on 8/25/2015 (pT1aN0). managing her antiepileptics    Risk assessment: High metastatic lung cancer with brain metastasis recurrent seizures undergoing active cancer directed therapy    The patient's emotional well being was assessed and resources were discussed.   Appropriate res polysubstance abuse/opioid dependence.   She has been enrolled in a rehab program.    She was admitted hospital November 14 2017 with fevers and thrush however left AGAINST MEDICAL ADVICE when her fevers resolved seen by infectious disease and received IV a • Pancreatic cancer [facility]) JULY 2015   • Personal history of antineoplastic chemotherapy     EVERY 2 WEEKS BEGINNING 2015    • Pneumonia, organism unspecified(486)    • Seizure disorder ([facility] [address]    • Wears glasses        Past Surgical History:  Past Surgical use: No      Sexual activity: Yes        Partners: Male    Lifestyle      Physical activity:        Days per week: Not on file        Minutes per session: Not on file      Stress: Not on file    Relationships      Social connections:        Talks on phone: Oral Tab, , Disp: , Rfl: 0  •  Lacosamide (VIMPAT) 200 MG Oral Tab, Take 1 tablet (200 mg total) by mouth 2 (two) times daily. , Disp: 60 tablet, Rfl: 2  •  Diclofenac Sodium 1 % Transdermal Gel, Apply 100 g topically 4 (four) times daily. , Disp: , Rfl:   • Plan:  25-year-old female with metastatic adenocarcinoma of the left upper lobe of the lung (EGFR,ALK,ROS1 negative). She has liver and brain metastasis as well as a metastatic lesion to the pancreas.  The patient was initially diagnosed in July 2015 with hypokalemia --imporved  –Hypothyroidism secondary to nivolumab. She is on thyroid replacement--we will continue to follow her thyroid tests on treatment. Endocrinology is managing this  –Polysubstance abuse/dependence with seizures.   She is not using any

## 2019-11-21 RX ORDER — LEVOTHYROXINE SODIUM 137 UG/1
TABLET ORAL
Qty: 30 TABLET | Refills: 2 | Status: SHIPPED | OUTPATIENT
Start: 2019-11-21 | End: 2020-02-07

## 2019-11-21 NOTE — TELEPHONE ENCOUNTER
Patients /Jose Alejandro calling for patient to advise patient almost out of medication, requesting script for refill for rx:Levothyroxine,thanks.     Current Outpatient Medications:   •  Levothyroxine Sodium 137 MCG Oral Tab, Take 137 mcg by mouth before den

## 2019-11-27 ENCOUNTER — APPOINTMENT (OUTPATIENT)
Dept: HEMATOLOGY/ONCOLOGY | Facility: HOSPITAL | Age: 49
End: 2019-11-27
Attending: INTERNAL MEDICINE
Payer: COMMERCIAL

## 2019-11-29 ENCOUNTER — APPOINTMENT (OUTPATIENT)
Dept: HEMATOLOGY/ONCOLOGY | Facility: HOSPITAL | Age: 49
End: 2019-11-29
Attending: INTERNAL MEDICINE
Payer: COMMERCIAL

## 2019-12-06 ENCOUNTER — NURSE ONLY (OUTPATIENT)
Dept: HEMATOLOGY/ONCOLOGY | Facility: HOSPITAL | Age: 49
End: 2019-12-06
Attending: INTERNAL MEDICINE
Payer: COMMERCIAL

## 2019-12-06 VITALS
BODY MASS INDEX: 35.16 KG/M2 | SYSTOLIC BLOOD PRESSURE: 120 MMHG | HEART RATE: 72 BPM | RESPIRATION RATE: 18 BRPM | TEMPERATURE: 98 F | DIASTOLIC BLOOD PRESSURE: 53 MMHG | HEIGHT: 65 IN | OXYGEN SATURATION: 98 % | WEIGHT: 211 LBS

## 2019-12-06 DIAGNOSIS — E03.9 HYPOTHYROIDISM (ACQUIRED): ICD-10-CM

## 2019-12-06 DIAGNOSIS — C34.92 CARCINOMA OF LUNG, LEFT (HCC): ICD-10-CM

## 2019-12-06 DIAGNOSIS — Z51.11 CHEMOTHERAPY MANAGEMENT, ENCOUNTER FOR: ICD-10-CM

## 2019-12-06 DIAGNOSIS — C78.7 LIVER METASTASES (HCC): ICD-10-CM

## 2019-12-06 DIAGNOSIS — C34.90 MALIGNANT NEOPLASM OF LUNG, UNSPECIFIED LATERALITY, UNSPECIFIED PART OF LUNG (HCC): Primary | ICD-10-CM

## 2019-12-06 DIAGNOSIS — G40.909 SEIZURE DISORDER (HCC): ICD-10-CM

## 2019-12-06 DIAGNOSIS — Z45.2 ENCOUNTER FOR ADJUSTMENT OR MANAGEMENT OF VASCULAR ACCESS DEVICE: ICD-10-CM

## 2019-12-06 DIAGNOSIS — C79.31 BRAIN METASTASES (HCC): ICD-10-CM

## 2019-12-06 PROCEDURE — 96413 CHEMO IV INFUSION 1 HR: CPT

## 2019-12-06 PROCEDURE — 85025 COMPLETE CBC W/AUTO DIFF WBC: CPT

## 2019-12-06 PROCEDURE — 99215 OFFICE O/P EST HI 40 MIN: CPT | Performed by: INTERNAL MEDICINE

## 2019-12-06 PROCEDURE — 80053 COMPREHEN METABOLIC PANEL: CPT

## 2019-12-06 RX ORDER — 0.9 % SODIUM CHLORIDE 0.9 %
10 VIAL (ML) INJECTION ONCE
Status: CANCELLED | OUTPATIENT
Start: 2019-12-06

## 2019-12-06 RX ORDER — RANITIDINE 25 MG/ML
50 INJECTION, SOLUTION INTRAMUSCULAR; INTRAVENOUS AS NEEDED
Status: CANCELLED | OUTPATIENT
Start: 2019-12-06

## 2019-12-06 RX ORDER — HEPARIN SODIUM (PORCINE) LOCK FLUSH IV SOLN 100 UNIT/ML 100 UNIT/ML
5 SOLUTION INTRAVENOUS ONCE
Status: CANCELLED | OUTPATIENT
Start: 2019-12-06

## 2019-12-06 RX ORDER — MEPERIDINE HYDROCHLORIDE 25 MG/ML
INJECTION INTRAMUSCULAR; INTRAVENOUS; SUBCUTANEOUS AS NEEDED
Status: CANCELLED | OUTPATIENT
Start: 2019-12-06

## 2019-12-06 RX ORDER — ACETAMINOPHEN 325 MG/1
TABLET ORAL EVERY 6 HOURS PRN
Status: CANCELLED | OUTPATIENT
Start: 2019-12-06

## 2019-12-06 RX ORDER — HEPARIN SODIUM (PORCINE) LOCK FLUSH IV SOLN 100 UNIT/ML 100 UNIT/ML
5 SOLUTION INTRAVENOUS ONCE
Status: COMPLETED | OUTPATIENT
Start: 2019-12-06 | End: 2019-12-06

## 2019-12-06 RX ORDER — DIPHENHYDRAMINE HYDROCHLORIDE 50 MG/ML
INJECTION INTRAMUSCULAR; INTRAVENOUS EVERY 4 HOURS PRN
Status: CANCELLED | OUTPATIENT
Start: 2019-12-06

## 2019-12-06 RX ORDER — ALBUTEROL SULFATE 90 UG/1
2 AEROSOL, METERED RESPIRATORY (INHALATION) AS NEEDED
Status: CANCELLED | OUTPATIENT
Start: 2019-12-06

## 2019-12-06 RX ADMIN — HEPARIN SODIUM (PORCINE) LOCK FLUSH IV SOLN 100 UNIT/ML 500 UNITS: 100 SOLUTION INTRAVENOUS at 17:08:00

## 2019-12-06 NOTE — PROGRESS NOTES
Cancer Center Progress Note    Patient Name: Grace Tristan   YOB: 1970   Medical Record Number: [id number]   Attending Physician: Murtaza Artis M.D. Chief Complaint:  Metastatic adenocarcinoma of the lung, brain metastasis.     History of in the setting of polysubstance abuse/dependence. She required mechanical ventilation for secondary respiratory failure. She was seen by neurology and critical care medicine consultation.   After long hospitalization she was eventually discharged to rehab APPENDECTOMY  1999   • BRAIN SURGERY  JULY 2015    TUMOR RESECTION   • BRAIN SURGERY Left     OCCIPITAL CRANIOTOMY   • BRAIN SURGERY Left     LOBECTOMY   • BRAIN SURGERY  AUG  2016    REMOVAL OF DEAD TISSUE    • CHOLECYSTECTOMY  2009   • CYST REMOVAL file        Attends Pentecostalism service: Not on file        Active member of club or organization: Not on file        Attends meetings of clubs or organizations: Not on file        Relationship status: Not on file      Intimate partner violence:        Fear Liquid, Take by mouth., Disp: , Rfl:     Allergies:  No Known Allergies     Review of Systems:  All other systems reviewed and negative x12    Vital Signs:  /53 (BP Location: Left arm, Patient Position: Sitting, Cuff Size: large)   Pulse 72   Temp 98 status post resection with adjuvant radiation. She is also status post a right frontal lobe resection in August 2016. Her left upper lung primary lesion was treated with lobectomy and mediastinal lymph node dissection on 8/25/2015 (pT1aN0).   She received antiepileptics    Risk assessment: High metastatic lung cancer with brain metastasis recurrent seizures undergoing active cancer directed therapy    The patient's emotional well being was assessed and resources were discussed.   Appropriate resources were r abuse/opioid dependence.   She has been enrolled in a rehab program.    She was admitted hospital November 14 2017 with fevers and thrush however left AGAINST MEDICAL ADVICE when her fevers resolved seen by infectious disease and received IV antibiotics for cancer [facility]) JULY 2015   • Personal history of antineoplastic chemotherapy     EVERY 2 WEEKS BEGINNING 2015    • Pneumonia, organism unspecified(486)    • Seizure disorder ([facility] [address]    • Wears glasses        Past Surgical History:  Past Surgical History:   Pro Sexual activity: Yes        Partners: Male    Lifestyle      Physical activity:        Days per week: Not on file        Minutes per session: Not on file      Stress: Not on file    Relationships      Social connections:        Talks on phone: Not on file Disp: , Rfl: 0  •  Lacosamide (VIMPAT) 200 MG Oral Tab, Take 1 tablet (200 mg total) by mouth 2 (two) times daily. , Disp: 60 tablet, Rfl: 2  •  Diclofenac Sodium 1 % Transdermal Gel, Apply 100 g topically 4 (four) times daily. , Disp: , Rfl:   •  Nutritiona with metastatic adenocarcinoma of the left upper lobe of the lung (EGFR,ALK,ROS1 negative). She has liver and brain metastasis as well as a metastatic lesion to the pancreas.  The patient was initially diagnosed in July 2015 with a left occipital brain les --imporved  –Hypothyroidism secondary to nivolumab. She is on thyroid replacement--we will continue to follow her thyroid tests on treatment. Endocrinology is managing this  –Polysubstance abuse/dependence with seizures.   She is not using any alcohol or i

## 2020-01-03 ENCOUNTER — OFFICE VISIT (OUTPATIENT)
Dept: HEMATOLOGY/ONCOLOGY | Facility: HOSPITAL | Age: 50
End: 2020-01-03
Attending: INTERNAL MEDICINE
Payer: COMMERCIAL

## 2020-01-03 VITALS
WEIGHT: 205 LBS | DIASTOLIC BLOOD PRESSURE: 73 MMHG | TEMPERATURE: 98 F | HEIGHT: 65 IN | BODY MASS INDEX: 34.16 KG/M2 | HEART RATE: 83 BPM | RESPIRATION RATE: 16 BRPM | OXYGEN SATURATION: 99 % | SYSTOLIC BLOOD PRESSURE: 144 MMHG

## 2020-01-03 VITALS
SYSTOLIC BLOOD PRESSURE: 144 MMHG | RESPIRATION RATE: 16 BRPM | HEART RATE: 83 BPM | TEMPERATURE: 98 F | OXYGEN SATURATION: 99 % | DIASTOLIC BLOOD PRESSURE: 73 MMHG

## 2020-01-03 DIAGNOSIS — C79.31 BRAIN METASTASES (HCC): ICD-10-CM

## 2020-01-03 DIAGNOSIS — C34.90 PRIMARY MALIGNANT NEOPLASM OF LUNG METASTATIC TO OTHER SITE, UNSPECIFIED LATERALITY (HCC): Primary | ICD-10-CM

## 2020-01-03 DIAGNOSIS — Z51.81 ENCOUNTER FOR MEDICATION MONITORING: ICD-10-CM

## 2020-01-03 DIAGNOSIS — E03.9 HYPOTHYROIDISM, UNSPECIFIED TYPE: ICD-10-CM

## 2020-01-03 DIAGNOSIS — C34.92 CARCINOMA OF LUNG, LEFT (HCC): ICD-10-CM

## 2020-01-03 DIAGNOSIS — C34.90 MALIGNANT NEOPLASM OF LUNG, UNSPECIFIED LATERALITY, UNSPECIFIED PART OF LUNG (HCC): Primary | ICD-10-CM

## 2020-01-03 DIAGNOSIS — Z45.2 ENCOUNTER FOR ADJUSTMENT OR MANAGEMENT OF VASCULAR ACCESS DEVICE: ICD-10-CM

## 2020-01-03 LAB
ALBUMIN SERPL-MCNC: 3.5 G/DL (ref 3.4–5)
ALBUMIN/GLOB SERPL: 0.8 {RATIO} (ref 1–2)
ALP LIVER SERPL-CCNC: 150 U/L (ref 39–100)
ALT SERPL-CCNC: 13 U/L (ref 13–56)
ANION GAP SERPL CALC-SCNC: 6 MMOL/L (ref 0–18)
AST SERPL-CCNC: 15 U/L (ref 15–37)
BASOPHILS # BLD AUTO: 0.04 X10(3) UL (ref 0–0.2)
BASOPHILS NFR BLD AUTO: 0.4 %
BILIRUB SERPL-MCNC: 0.2 MG/DL (ref 0.1–2)
BUN BLD-MCNC: 8 MG/DL (ref 7–18)
BUN/CREAT SERPL: 9.2 (ref 10–20)
CALCIUM BLD-MCNC: 8.9 MG/DL (ref 8.5–10.1)
CHLORIDE SERPL-SCNC: 108 MMOL/L (ref 98–112)
CO2 SERPL-SCNC: 25 MMOL/L (ref 21–32)
CREAT BLD-MCNC: 0.87 MG/DL (ref 0.55–1.02)
DEPRECATED RDW RBC AUTO: 50.9 FL (ref 35.1–46.3)
EOSINOPHIL # BLD AUTO: 0.05 X10(3) UL (ref 0–0.7)
EOSINOPHIL NFR BLD AUTO: 0.5 %
ERYTHROCYTE [DISTWIDTH] IN BLOOD BY AUTOMATED COUNT: 15.4 % (ref 11–15)
GLOBULIN PLAS-MCNC: 4.6 G/DL (ref 2.8–4.4)
GLUCOSE BLD-MCNC: 120 MG/DL (ref 70–99)
HCT VFR BLD AUTO: 34.7 % (ref 35–48)
HGB BLD-MCNC: 11.1 G/DL (ref 12–16)
IMM GRANULOCYTES # BLD AUTO: 0.04 X10(3) UL (ref 0–1)
IMM GRANULOCYTES NFR BLD: 0.4 %
LYMPHOCYTES # BLD AUTO: 2.55 X10(3) UL (ref 1–4)
LYMPHOCYTES NFR BLD AUTO: 24.6 %
M PROTEIN MFR SERPL ELPH: 8.1 G/DL (ref 6.4–8.2)
MCH RBC QN AUTO: 28.8 PG (ref 26–34)
MCHC RBC AUTO-ENTMCNC: 32 G/DL (ref 31–37)
MCV RBC AUTO: 90.1 FL (ref 80–100)
MONOCYTES # BLD AUTO: 0.85 X10(3) UL (ref 0.1–1)
MONOCYTES NFR BLD AUTO: 8.2 %
NEUTROPHILS # BLD AUTO: 6.84 X10 (3) UL (ref 1.5–7.7)
NEUTROPHILS # BLD AUTO: 6.84 X10(3) UL (ref 1.5–7.7)
NEUTROPHILS NFR BLD AUTO: 65.9 %
OSMOLALITY SERPL CALC.SUM OF ELEC: 288 MOSM/KG (ref 275–295)
PLATELET # BLD AUTO: 467 10(3)UL (ref 150–450)
POTASSIUM SERPL-SCNC: 3.8 MMOL/L (ref 3.5–5.1)
RBC # BLD AUTO: 3.85 X10(6)UL (ref 3.8–5.3)
SODIUM SERPL-SCNC: 139 MMOL/L (ref 136–145)
WBC # BLD AUTO: 10.4 X10(3) UL (ref 4–11)

## 2020-01-03 PROCEDURE — 85025 COMPLETE CBC W/AUTO DIFF WBC: CPT

## 2020-01-03 PROCEDURE — 99215 OFFICE O/P EST HI 40 MIN: CPT | Performed by: PHYSICIAN ASSISTANT

## 2020-01-03 PROCEDURE — 96413 CHEMO IV INFUSION 1 HR: CPT

## 2020-01-03 PROCEDURE — 80053 COMPREHEN METABOLIC PANEL: CPT

## 2020-01-03 RX ORDER — ALBUTEROL SULFATE 90 UG/1
2 AEROSOL, METERED RESPIRATORY (INHALATION) AS NEEDED
Status: CANCELLED | OUTPATIENT
Start: 2020-01-03

## 2020-01-03 RX ORDER — HEPARIN SODIUM (PORCINE) LOCK FLUSH IV SOLN 100 UNIT/ML 100 UNIT/ML
5 SOLUTION INTRAVENOUS ONCE
Status: CANCELLED | OUTPATIENT
Start: 2020-01-03

## 2020-01-03 RX ORDER — ACETAMINOPHEN 325 MG/1
TABLET ORAL EVERY 6 HOURS PRN
Status: CANCELLED | OUTPATIENT
Start: 2020-01-03

## 2020-01-03 RX ORDER — HEPARIN SODIUM (PORCINE) LOCK FLUSH IV SOLN 100 UNIT/ML 100 UNIT/ML
5 SOLUTION INTRAVENOUS ONCE
Status: COMPLETED | OUTPATIENT
Start: 2020-01-03 | End: 2020-01-03

## 2020-01-03 RX ORDER — RANITIDINE 25 MG/ML
50 INJECTION, SOLUTION INTRAMUSCULAR; INTRAVENOUS AS NEEDED
Status: CANCELLED | OUTPATIENT
Start: 2020-01-03

## 2020-01-03 RX ORDER — 0.9 % SODIUM CHLORIDE 0.9 %
10 VIAL (ML) INJECTION ONCE
Status: CANCELLED | OUTPATIENT
Start: 2020-01-03

## 2020-01-03 RX ORDER — 0.9 % SODIUM CHLORIDE 0.9 %
VIAL (ML) INJECTION
Status: DISCONTINUED
Start: 2020-01-03 | End: 2020-01-03

## 2020-01-03 RX ORDER — DIPHENHYDRAMINE HYDROCHLORIDE 50 MG/ML
INJECTION INTRAMUSCULAR; INTRAVENOUS EVERY 4 HOURS PRN
Status: CANCELLED | OUTPATIENT
Start: 2020-01-03

## 2020-01-03 RX ORDER — MEPERIDINE HYDROCHLORIDE 25 MG/ML
INJECTION INTRAMUSCULAR; INTRAVENOUS; SUBCUTANEOUS AS NEEDED
Status: CANCELLED | OUTPATIENT
Start: 2020-01-03

## 2020-01-03 RX ORDER — HEPARIN SODIUM (PORCINE) LOCK FLUSH IV SOLN 100 UNIT/ML 100 UNIT/ML
SOLUTION INTRAVENOUS
Status: COMPLETED
Start: 2020-01-03 | End: 2020-01-03

## 2020-01-03 RX ADMIN — HEPARIN SODIUM (PORCINE) LOCK FLUSH IV SOLN 100 UNIT/ML 500 UNITS: 100 SOLUTION INTRAVENOUS at 15:45:00

## 2020-01-03 NOTE — PROGRESS NOTES
Cancer Center Progress Note    Patient Name: Dasia Arreaga   YOB: 1970   Medical Record Number: [id number]   Attending Physician: Gray Trotter M.D. Chief Complaint:  Metastatic adenocarcinoma of the lung, brain metastasis.     History of in the setting of polysubstance abuse/dependence. She required mechanical ventilation for secondary respiratory failure. She was seen by neurology and critical care medicine consultation.   After long hospitalization she was eventually discharged to rehab APPENDECTOMY  1999   • BRAIN SURGERY  JULY 2015    TUMOR RESECTION   • BRAIN SURGERY Left     OCCIPITAL CRANIOTOMY   • BRAIN SURGERY Left     LOBECTOMY   • BRAIN SURGERY  AUG  2016    REMOVAL OF DEAD TISSUE    • CHOLECYSTECTOMY  2009   • CYST REMOVAL file        Attends Buddhism service: Not on file        Active member of club or organization: Not on file        Attends meetings of clubs or organizations: Not on file        Relationship status: Not on file      Intimate partner violence:        Fear Liquid, Take by mouth., Disp: , Rfl:     Allergies:  No Known Allergies     Review of Systems:  All other systems reviewed and negative x12    Vital Signs:  /73 (BP Location: Left arm, Patient Position: Sitting)   Pulse 83   Temp 98.3 °F (36.8 °C) (O resection with adjuvant radiation. She is also status post a right frontal lobe resection in August 2016. Her left upper lung primary lesion was treated with lobectomy and mediastinal lymph node dissection on 8/25/2015 (pT1aN0).   She received adjuvant ch antiepileptics    Risk assessment: High metastatic lung cancer with brain metastasis recurrent seizures undergoing active cancer directed therapy    The patient's emotional well being was assessed and resources were discussed.   Appropriate resources were r abuse/opioid dependence.   She has been enrolled in a rehab program.    She was admitted hospital November 14 2017 with fevers and thrush however left AGAINST MEDICAL ADVICE when her fevers resolved seen by infectious disease and received IV antibiotics for radiation     DISCONTINUED AUG 2015    • Gallbladder disease    • Hepatitis C    • High blood pressure    • Hypothyroid    • Lung cancer (HCC)    • Lung cancer (HCC)    • Migraines    • Osteoarthritis    • Pancreatic cancer ([facility] [address] JULY 2015   • Personal his Smokeless tobacco: Never Used      Tobacco comment: Current some day smoker 04/2017    Substance and Sexual Activity      Alcohol use: Yes        Comment: occassionally.  1-2 glasses wine per month      Drug use: No      Sexual activity: Yes        Partners Levothyroxine Sodium 125 MCG Oral Tab, Take 1 tablet (125 mcg total) by mouth before breakfast., Disp: 30 tablet, Rfl: 2  •  gabapentin 300 MG Oral Cap, Take 300 mg by mouth., Disp: , Rfl:   •  levETIRAcetam 500 MG Oral Tab, , Disp: , Rfl: 0  •  Lacosamide GLOBULIN 4.6 (H) 01/03/2020    AGRATIO 0.9 (L) 09/21/2016     01/03/2020    K 3.8 01/03/2020     01/03/2020    CO2 25.0 01/03/2020     Radiology:  none    Impression and Plan:  50-year-old female with metastatic adenocarcinoma of the left upper resolved  –She has anemia with severe iron deficiency. She has received IV iron. We will continue to monitor this  –Previous hypokalemia:  resolved  –Hypothyroidism secondary to nivolumab. Controlled.   She is on thyroid replacement--we will continue to

## 2020-01-03 NOTE — PROGRESS NOTES
Pt here for C40 D1 Opdivo. Arrives Ambulating independently, accompanied by self. Using a driving service today.           Modifications in dose or schedule: No     Cole Hinkle arrived after lab/MD appointment today.  Port accessed by lab was flushed and noted

## 2020-01-03 NOTE — PATIENT INSTRUCTIONS
1. Proceed with cycle 40 nivolumab    2. Call as needed    3.   Follow-up in 4 weeks with Dr. Jose Mcconnell

## 2020-01-30 ENCOUNTER — OFFICE VISIT (OUTPATIENT)
Dept: HEMATOLOGY/ONCOLOGY | Facility: HOSPITAL | Age: 50
End: 2020-01-30
Attending: INTERNAL MEDICINE
Payer: COMMERCIAL

## 2020-01-30 VITALS
SYSTOLIC BLOOD PRESSURE: 123 MMHG | BODY MASS INDEX: 35 KG/M2 | DIASTOLIC BLOOD PRESSURE: 97 MMHG | WEIGHT: 211 LBS | HEART RATE: 101 BPM | RESPIRATION RATE: 16 BRPM | OXYGEN SATURATION: 95 % | TEMPERATURE: 98 F

## 2020-01-30 DIAGNOSIS — E03.2 HYPOTHYROIDISM DUE TO MEDICATION: ICD-10-CM

## 2020-01-30 DIAGNOSIS — C78.7 LIVER METASTASES (HCC): ICD-10-CM

## 2020-01-30 DIAGNOSIS — Z45.2 ENCOUNTER FOR ADJUSTMENT OR MANAGEMENT OF VASCULAR ACCESS DEVICE: ICD-10-CM

## 2020-01-30 DIAGNOSIS — C79.31 BRAIN METASTASES (HCC): Primary | ICD-10-CM

## 2020-01-30 DIAGNOSIS — C34.92 CARCINOMA OF LUNG, LEFT (HCC): ICD-10-CM

## 2020-01-30 DIAGNOSIS — Z29.8 ENCOUNTER FOR IMMUNOTHERAPY: ICD-10-CM

## 2020-01-30 DIAGNOSIS — C34.90 MALIGNANT NEOPLASM OF LUNG, UNSPECIFIED LATERALITY, UNSPECIFIED PART OF LUNG (HCC): Primary | ICD-10-CM

## 2020-01-30 DIAGNOSIS — C34.90 MALIGNANT NEOPLASM OF LUNG, UNSPECIFIED LATERALITY, UNSPECIFIED PART OF LUNG (HCC): ICD-10-CM

## 2020-01-30 LAB
ALBUMIN SERPL-MCNC: 3.1 G/DL (ref 3.4–5)
ALBUMIN/GLOB SERPL: 0.6 {RATIO} (ref 1–2)
ALP LIVER SERPL-CCNC: 189 U/L (ref 39–100)
ALT SERPL-CCNC: 37 U/L (ref 13–56)
ANION GAP SERPL CALC-SCNC: 4 MMOL/L (ref 0–18)
AST SERPL-CCNC: 33 U/L (ref 15–37)
BASOPHILS # BLD AUTO: 0.06 X10(3) UL (ref 0–0.2)
BASOPHILS NFR BLD AUTO: 0.5 %
BILIRUB SERPL-MCNC: 0.1 MG/DL (ref 0.1–2)
BUN BLD-MCNC: 7 MG/DL (ref 7–18)
BUN/CREAT SERPL: 8.3 (ref 10–20)
CALCIUM BLD-MCNC: 8.9 MG/DL (ref 8.5–10.1)
CHLORIDE SERPL-SCNC: 106 MMOL/L (ref 98–112)
CO2 SERPL-SCNC: 28 MMOL/L (ref 21–32)
CREAT BLD-MCNC: 0.84 MG/DL (ref 0.55–1.02)
DEPRECATED RDW RBC AUTO: 48.9 FL (ref 35.1–46.3)
EOSINOPHIL # BLD AUTO: 0.1 X10(3) UL (ref 0–0.7)
EOSINOPHIL NFR BLD AUTO: 0.8 %
ERYTHROCYTE [DISTWIDTH] IN BLOOD BY AUTOMATED COUNT: 14.5 % (ref 11–15)
GLOBULIN PLAS-MCNC: 4.9 G/DL (ref 2.8–4.4)
GLUCOSE BLD-MCNC: 107 MG/DL (ref 70–99)
HCT VFR BLD AUTO: 33.8 % (ref 35–48)
HGB BLD-MCNC: 10.9 G/DL (ref 12–16)
IMM GRANULOCYTES # BLD AUTO: 0.11 X10(3) UL (ref 0–1)
IMM GRANULOCYTES NFR BLD: 0.9 %
LYMPHOCYTES # BLD AUTO: 2.85 X10(3) UL (ref 1–4)
LYMPHOCYTES NFR BLD AUTO: 22.5 %
M PROTEIN MFR SERPL ELPH: 8 G/DL (ref 6.4–8.2)
MCH RBC QN AUTO: 29.5 PG (ref 26–34)
MCHC RBC AUTO-ENTMCNC: 32.2 G/DL (ref 31–37)
MCV RBC AUTO: 91.6 FL (ref 80–100)
MONOCYTES # BLD AUTO: 0.91 X10(3) UL (ref 0.1–1)
MONOCYTES NFR BLD AUTO: 7.2 %
NEUTROPHILS # BLD AUTO: 8.63 X10 (3) UL (ref 1.5–7.7)
NEUTROPHILS # BLD AUTO: 8.63 X10(3) UL (ref 1.5–7.7)
NEUTROPHILS NFR BLD AUTO: 68.1 %
OSMOLALITY SERPL CALC.SUM OF ELEC: 284 MOSM/KG (ref 275–295)
PATIENT FASTING Y/N/NP: NO
PLATELET # BLD AUTO: 450 10(3)UL (ref 150–450)
POTASSIUM SERPL-SCNC: 4 MMOL/L (ref 3.5–5.1)
RBC # BLD AUTO: 3.69 X10(6)UL (ref 3.8–5.3)
SODIUM SERPL-SCNC: 138 MMOL/L (ref 136–145)
TSI SER-ACNC: 0.53 MIU/ML (ref 0.36–3.74)
WBC # BLD AUTO: 12.7 X10(3) UL (ref 4–11)

## 2020-01-30 PROCEDURE — 36591 DRAW BLOOD OFF VENOUS DEVICE: CPT

## 2020-01-30 PROCEDURE — 99215 OFFICE O/P EST HI 40 MIN: CPT | Performed by: INTERNAL MEDICINE

## 2020-01-30 PROCEDURE — 84443 ASSAY THYROID STIM HORMONE: CPT

## 2020-01-30 PROCEDURE — 85025 COMPLETE CBC W/AUTO DIFF WBC: CPT

## 2020-01-30 PROCEDURE — 80053 COMPREHEN METABOLIC PANEL: CPT

## 2020-01-30 RX ORDER — DIPHENHYDRAMINE HYDROCHLORIDE 50 MG/ML
INJECTION INTRAMUSCULAR; INTRAVENOUS EVERY 4 HOURS PRN
Status: CANCELLED | OUTPATIENT
Start: 2020-01-31

## 2020-01-30 RX ORDER — HEPARIN SODIUM (PORCINE) LOCK FLUSH IV SOLN 100 UNIT/ML 100 UNIT/ML
5 SOLUTION INTRAVENOUS ONCE
Status: CANCELLED | OUTPATIENT
Start: 2020-01-30

## 2020-01-30 RX ORDER — 0.9 % SODIUM CHLORIDE 0.9 %
10 VIAL (ML) INJECTION ONCE
Status: CANCELLED | OUTPATIENT
Start: 2020-01-30

## 2020-01-30 RX ORDER — ACETAMINOPHEN 325 MG/1
TABLET ORAL EVERY 6 HOURS PRN
Status: CANCELLED | OUTPATIENT
Start: 2020-01-31

## 2020-01-30 RX ORDER — ALBUTEROL SULFATE 90 UG/1
2 AEROSOL, METERED RESPIRATORY (INHALATION) AS NEEDED
Status: CANCELLED | OUTPATIENT
Start: 2020-01-31

## 2020-01-30 RX ORDER — QUETIAPINE 100 MG/1
100 TABLET, FILM COATED ORAL NIGHTLY
COMMUNITY
End: 2020-10-13 | Stop reason: ALTCHOICE

## 2020-01-30 RX ORDER — RANITIDINE 25 MG/ML
50 INJECTION, SOLUTION INTRAMUSCULAR; INTRAVENOUS AS NEEDED
Status: CANCELLED | OUTPATIENT
Start: 2020-01-31

## 2020-01-30 RX ORDER — HEPARIN SODIUM (PORCINE) LOCK FLUSH IV SOLN 100 UNIT/ML 100 UNIT/ML
5 SOLUTION INTRAVENOUS ONCE
Status: COMPLETED | OUTPATIENT
Start: 2020-01-30 | End: 2020-01-30

## 2020-01-30 RX ORDER — 0.9 % SODIUM CHLORIDE 0.9 %
VIAL (ML) INJECTION
Status: DISCONTINUED
Start: 2020-01-30 | End: 2020-01-30

## 2020-01-30 RX ADMIN — HEPARIN SODIUM (PORCINE) LOCK FLUSH IV SOLN 100 UNIT/ML 500 UNITS: 100 SOLUTION INTRAVENOUS at 10:10:00

## 2020-01-30 NOTE — PROGRESS NOTES
Cancer Center Progress Note    Patient Name: Blanca May   YOB: 1970   Medical Record Number: [id number]   Attending Physician: Armando Vera M.D. Chief Complaint:  Metastatic adenocarcinoma of the lung, brain metastasis.     History of in the setting of polysubstance abuse/dependence. She required mechanical ventilation for secondary respiratory failure. She was seen by neurology and critical care medicine consultation.   After long hospitalization she was eventually discharged to rehab OCCIPITAL CRANIOTOMY   • BRAIN SURGERY Left     LOBECTOMY   • BRAIN SURGERY  AUG  2016    REMOVAL OF DEAD TISSUE    • CHOLECYSTECTOMY  2009   • CYST REMOVAL      BREAST, BENIGN   • HEMORRHOIDECTOMY     • HYSTERECTOMY      heavy menstrual flow,    • LUM organization: Not on file        Attends meetings of clubs or organizations: Not on file        Relationship status: Not on file      Intimate partner violence:        Fear of current or ex partner: Not on file        Emotionally abused: Not on file 35.11 kg/m²     Physical Examination:  General: Patient is alert and oriented x 3, not in acute distress. Psych:  Mood and affect appropriate  HEENT: EOMs intact. PERRL. Oropharynx is clear. Neck: No JVD. No palpable lymphadenopathy. Neck is supple.   Judy Pritchett was changed to carboplatin and paclitaxel for cycles 2 through 4 due to issues with febrile neutropenia and hospitalization following cycle 1. She completed adjuvant chemotherapy in December 2015.  In March 2016 she had biopsy-proven metastatic disease to MD          Cancer Center Progress Note    Patient Name: Keiko Salter   YOB: 1970   Medical Record Number: [id number]   Attending Physician: Reynold Blount M.D. Chief Complaint:  Metastatic adenocarcinoma of the lung, brain metastasis. with seizures in the setting of polysubstance abuse/dependence. She required mechanical ventilation for secondary respiratory failure. She was seen by neurology and critical care medicine consultation.   After long hospitalization she was eventually disch 2015    TUMOR RESECTION   • BRAIN SURGERY Left     OCCIPITAL CRANIOTOMY   • BRAIN SURGERY Left     LOBECTOMY   • BRAIN SURGERY  AUG  2016    REMOVAL OF DEAD TISSUE    • CHOLECYSTECTOMY  2009   • CYST REMOVAL      BREAST, BENIGN   • HEMORRHOIDECTOMY     • H file        Active member of club or organization: Not on file        Attends meetings of clubs or organizations: Not on file        Relationship status: Not on file      Intimate partner violence:        Fear of current or ex partner: Not on file        E 95.7 kg (211 lb)   SpO2 95%   BMI 35.11 kg/m²     Physical Examination:  General: Patient is alert and oriented x 3, not in acute distress. Psych:  Mood and affect appropriate  HEENT: EOMs intact. PERRL. Oropharynx is clear. Neck: No JVD.  No palpable ly cycle however this was changed to carboplatin and paclitaxel for cycles 2 through 4 due to issues with febrile neutropenia and hospitalization following cycle 1. She completed adjuvant chemotherapy in December 2015.  In March 2016 she had biopsy-proven met pateint and family.        Tawny Abrams MD

## 2020-01-31 ENCOUNTER — OFFICE VISIT (OUTPATIENT)
Dept: HEMATOLOGY/ONCOLOGY | Facility: HOSPITAL | Age: 50
End: 2020-01-31
Attending: INTERNAL MEDICINE
Payer: COMMERCIAL

## 2020-01-31 VITALS
TEMPERATURE: 99 F | DIASTOLIC BLOOD PRESSURE: 69 MMHG | SYSTOLIC BLOOD PRESSURE: 106 MMHG | OXYGEN SATURATION: 96 % | RESPIRATION RATE: 16 BRPM | HEART RATE: 104 BPM

## 2020-01-31 DIAGNOSIS — C34.90 MALIGNANT NEOPLASM OF LUNG, UNSPECIFIED LATERALITY, UNSPECIFIED PART OF LUNG (HCC): Primary | ICD-10-CM

## 2020-01-31 DIAGNOSIS — Z45.2 ENCOUNTER FOR ADJUSTMENT OR MANAGEMENT OF VASCULAR ACCESS DEVICE: ICD-10-CM

## 2020-01-31 DIAGNOSIS — C34.92 CARCINOMA OF LUNG, LEFT (HCC): ICD-10-CM

## 2020-01-31 PROCEDURE — 96413 CHEMO IV INFUSION 1 HR: CPT

## 2020-01-31 PROCEDURE — 96409 CHEMO IV PUSH SNGL DRUG: CPT

## 2020-01-31 RX ORDER — HEPARIN SODIUM (PORCINE) LOCK FLUSH IV SOLN 100 UNIT/ML 100 UNIT/ML
5 SOLUTION INTRAVENOUS ONCE
Status: CANCELLED | OUTPATIENT
Start: 2020-01-31

## 2020-01-31 RX ORDER — 0.9 % SODIUM CHLORIDE 0.9 %
10 VIAL (ML) INJECTION ONCE
Status: CANCELLED | OUTPATIENT
Start: 2020-01-31

## 2020-01-31 RX ORDER — HEPARIN SODIUM (PORCINE) LOCK FLUSH IV SOLN 100 UNIT/ML 100 UNIT/ML
SOLUTION INTRAVENOUS
Status: COMPLETED
Start: 2020-01-31 | End: 2020-01-31

## 2020-01-31 RX ORDER — 0.9 % SODIUM CHLORIDE 0.9 %
VIAL (ML) INJECTION
Status: DISCONTINUED
Start: 2020-01-31 | End: 2020-01-31

## 2020-01-31 RX ORDER — HEPARIN SODIUM (PORCINE) LOCK FLUSH IV SOLN 100 UNIT/ML 100 UNIT/ML
5 SOLUTION INTRAVENOUS ONCE
Status: COMPLETED | OUTPATIENT
Start: 2020-01-31 | End: 2020-01-31

## 2020-01-31 RX ADMIN — HEPARIN SODIUM (PORCINE) LOCK FLUSH IV SOLN 100 UNIT/ML 500 UNITS: 100 SOLUTION INTRAVENOUS at 15:00:00

## 2020-01-31 NOTE — PROGRESS NOTES
Pt here for C41 D1 Opdivo. Arrives Ambulating independently, accompanied by self. Using a driving service today.           Modifications in dose or schedule:  No     Port accessed per protocol,  flushed and noted with blood return.      She reported feelin

## 2020-02-07 ENCOUNTER — TELEPHONE (OUTPATIENT)
Dept: ENDOCRINOLOGY CLINIC | Facility: CLINIC | Age: 50
End: 2020-02-07

## 2020-02-07 RX ORDER — LEVOTHYROXINE SODIUM 137 UG/1
TABLET ORAL
Qty: 30 TABLET | Refills: 0 | Status: SHIPPED | OUTPATIENT
Start: 2020-02-07 | End: 2020-03-23

## 2020-02-07 NOTE — TELEPHONE ENCOUNTER
LOV: 04/20/19 (RTC 5-6 months). Per TE 11/08/19 pt can be seen yearly. LR: 11/21/19    Called patient and asked if she wanted to make an appointment now for a yearly follow up and states she does not drive and would have to check with her ; they will call back to schedule. Dr. Vinny Villarreal -    Pt states she had her thyroid lab ordered by Dr. Anshu Cruz and would like to know result. She states she has been gaining weight lately, no other symptoms of hypothyroid.      Component      Latest Ref Rng & Units 1/30/2020 11/8/2019   TSH      0.358 - 3.740 mIU/mL 0.533 2.080

## 2020-02-28 ENCOUNTER — OFFICE VISIT (OUTPATIENT)
Dept: HEMATOLOGY/ONCOLOGY | Facility: HOSPITAL | Age: 50
End: 2020-02-28
Attending: INTERNAL MEDICINE
Payer: COMMERCIAL

## 2020-02-28 ENCOUNTER — OFFICE VISIT (OUTPATIENT)
Dept: HEMATOLOGY/ONCOLOGY | Facility: HOSPITAL | Age: 50
End: 2020-02-28
Attending: PHYSICIAN ASSISTANT
Payer: COMMERCIAL

## 2020-02-28 VITALS
RESPIRATION RATE: 16 BRPM | HEART RATE: 80 BPM | TEMPERATURE: 98 F | SYSTOLIC BLOOD PRESSURE: 145 MMHG | OXYGEN SATURATION: 100 % | DIASTOLIC BLOOD PRESSURE: 74 MMHG

## 2020-02-28 VITALS
OXYGEN SATURATION: 100 % | RESPIRATION RATE: 16 BRPM | HEART RATE: 80 BPM | HEIGHT: 65 IN | TEMPERATURE: 98 F | SYSTOLIC BLOOD PRESSURE: 145 MMHG | WEIGHT: 210 LBS | DIASTOLIC BLOOD PRESSURE: 74 MMHG | BODY MASS INDEX: 34.99 KG/M2

## 2020-02-28 DIAGNOSIS — Z45.2 ENCOUNTER FOR ADJUSTMENT OR MANAGEMENT OF VASCULAR ACCESS DEVICE: ICD-10-CM

## 2020-02-28 DIAGNOSIS — C34.90 PRIMARY MALIGNANT NEOPLASM OF LUNG METASTATIC TO OTHER SITE, UNSPECIFIED LATERALITY (HCC): Primary | ICD-10-CM

## 2020-02-28 DIAGNOSIS — L30.1 DYSHIDROTIC ECZEMA: ICD-10-CM

## 2020-02-28 DIAGNOSIS — Z29.8 ENCOUNTER FOR IMMUNOTHERAPY: ICD-10-CM

## 2020-02-28 DIAGNOSIS — C34.90 MALIGNANT NEOPLASM OF LUNG, UNSPECIFIED LATERALITY, UNSPECIFIED PART OF LUNG (HCC): Primary | ICD-10-CM

## 2020-02-28 DIAGNOSIS — C34.92 CARCINOMA OF LUNG, LEFT (HCC): ICD-10-CM

## 2020-02-28 DIAGNOSIS — C79.31 BRAIN METASTASES (HCC): ICD-10-CM

## 2020-02-28 PROBLEM — Z29.89 ENCOUNTER FOR IMMUNOTHERAPY: Status: ACTIVE | Noted: 2020-02-28

## 2020-02-28 LAB
ALBUMIN SERPL-MCNC: 3.3 G/DL (ref 3.4–5)
ALBUMIN/GLOB SERPL: 0.7 {RATIO} (ref 1–2)
ALP LIVER SERPL-CCNC: 163 U/L (ref 39–100)
ALT SERPL-CCNC: 23 U/L (ref 13–56)
ANION GAP SERPL CALC-SCNC: 6 MMOL/L (ref 0–18)
AST SERPL-CCNC: 20 U/L (ref 15–37)
BASOPHILS # BLD AUTO: 0.05 X10(3) UL (ref 0–0.2)
BASOPHILS NFR BLD AUTO: 0.5 %
BILIRUB SERPL-MCNC: 0.1 MG/DL (ref 0.1–2)
BUN BLD-MCNC: 6 MG/DL (ref 7–18)
BUN/CREAT SERPL: 8 (ref 10–20)
CALCIUM BLD-MCNC: 8.8 MG/DL (ref 8.5–10.1)
CHLORIDE SERPL-SCNC: 107 MMOL/L (ref 98–112)
CO2 SERPL-SCNC: 25 MMOL/L (ref 21–32)
CREAT BLD-MCNC: 0.75 MG/DL (ref 0.55–1.02)
DEPRECATED RDW RBC AUTO: 48 FL (ref 35.1–46.3)
EOSINOPHIL # BLD AUTO: 0.07 X10(3) UL (ref 0–0.7)
EOSINOPHIL NFR BLD AUTO: 0.7 %
ERYTHROCYTE [DISTWIDTH] IN BLOOD BY AUTOMATED COUNT: 14.6 % (ref 11–15)
GLOBULIN PLAS-MCNC: 4.6 G/DL (ref 2.8–4.4)
GLUCOSE BLD-MCNC: 91 MG/DL (ref 70–99)
HCT VFR BLD AUTO: 33.4 % (ref 35–48)
HGB BLD-MCNC: 10.9 G/DL (ref 12–16)
IMM GRANULOCYTES # BLD AUTO: 0.17 X10(3) UL (ref 0–1)
IMM GRANULOCYTES NFR BLD: 1.7 %
LYMPHOCYTES # BLD AUTO: 2.42 X10(3) UL (ref 1–4)
LYMPHOCYTES NFR BLD AUTO: 24.8 %
M PROTEIN MFR SERPL ELPH: 7.9 G/DL (ref 6.4–8.2)
MCH RBC QN AUTO: 29.1 PG (ref 26–34)
MCHC RBC AUTO-ENTMCNC: 32.6 G/DL (ref 31–37)
MCV RBC AUTO: 89.1 FL (ref 80–100)
MONOCYTES # BLD AUTO: 0.68 X10(3) UL (ref 0.1–1)
MONOCYTES NFR BLD AUTO: 7 %
NEUTROPHILS # BLD AUTO: 6.36 X10 (3) UL (ref 1.5–7.7)
NEUTROPHILS # BLD AUTO: 6.36 X10(3) UL (ref 1.5–7.7)
NEUTROPHILS NFR BLD AUTO: 65.3 %
OSMOLALITY SERPL CALC.SUM OF ELEC: 283 MOSM/KG (ref 275–295)
PATIENT FASTING Y/N/NP: NO
PLATELET # BLD AUTO: 422 10(3)UL (ref 150–450)
POTASSIUM SERPL-SCNC: 4.2 MMOL/L (ref 3.5–5.1)
RBC # BLD AUTO: 3.75 X10(6)UL (ref 3.8–5.3)
SODIUM SERPL-SCNC: 138 MMOL/L (ref 136–145)
WBC # BLD AUTO: 9.8 X10(3) UL (ref 4–11)

## 2020-02-28 PROCEDURE — 80053 COMPREHEN METABOLIC PANEL: CPT

## 2020-02-28 PROCEDURE — 85025 COMPLETE CBC W/AUTO DIFF WBC: CPT

## 2020-02-28 PROCEDURE — 99215 OFFICE O/P EST HI 40 MIN: CPT | Performed by: PHYSICIAN ASSISTANT

## 2020-02-28 PROCEDURE — 96413 CHEMO IV INFUSION 1 HR: CPT

## 2020-02-28 RX ORDER — ACETAMINOPHEN 325 MG/1
TABLET ORAL EVERY 6 HOURS PRN
Status: CANCELLED | OUTPATIENT
Start: 2020-02-28

## 2020-02-28 RX ORDER — HEPARIN SODIUM (PORCINE) LOCK FLUSH IV SOLN 100 UNIT/ML 100 UNIT/ML
5 SOLUTION INTRAVENOUS ONCE
Status: COMPLETED | OUTPATIENT
Start: 2020-02-28 | End: 2020-02-28

## 2020-02-28 RX ORDER — RANITIDINE 25 MG/ML
50 INJECTION, SOLUTION INTRAMUSCULAR; INTRAVENOUS AS NEEDED
Status: CANCELLED | OUTPATIENT
Start: 2020-02-28

## 2020-02-28 RX ORDER — DIPHENHYDRAMINE HYDROCHLORIDE 50 MG/ML
INJECTION INTRAMUSCULAR; INTRAVENOUS EVERY 4 HOURS PRN
Status: CANCELLED | OUTPATIENT
Start: 2020-02-28

## 2020-02-28 RX ORDER — HEPARIN SODIUM (PORCINE) LOCK FLUSH IV SOLN 100 UNIT/ML 100 UNIT/ML
5 SOLUTION INTRAVENOUS ONCE
Status: CANCELLED | OUTPATIENT
Start: 2020-02-28

## 2020-02-28 RX ORDER — ALBUTEROL SULFATE 90 UG/1
2 AEROSOL, METERED RESPIRATORY (INHALATION) AS NEEDED
Status: CANCELLED | OUTPATIENT
Start: 2020-02-28

## 2020-02-28 RX ORDER — 0.9 % SODIUM CHLORIDE 0.9 %
VIAL (ML) INJECTION
Status: DISCONTINUED
Start: 2020-02-28 | End: 2020-02-28

## 2020-02-28 RX ORDER — 0.9 % SODIUM CHLORIDE 0.9 %
10 VIAL (ML) INJECTION ONCE
Status: CANCELLED | OUTPATIENT
Start: 2020-02-28

## 2020-02-28 RX ORDER — HEPARIN SODIUM (PORCINE) LOCK FLUSH IV SOLN 100 UNIT/ML 100 UNIT/ML
SOLUTION INTRAVENOUS
Status: DISCONTINUED
Start: 2020-02-28 | End: 2020-02-28

## 2020-02-28 RX ADMIN — HEPARIN SODIUM (PORCINE) LOCK FLUSH IV SOLN 100 UNIT/ML 500 UNITS: 100 SOLUTION INTRAVENOUS at 14:17:00

## 2020-02-28 NOTE — PATIENT INSTRUCTIONS
Please call to schedule a CT scan at [phone]. They have the order in the system already. Call as soon as possible to schedule the imaging a few days prior to your next infusion so that the doctor can review the results with you.

## 2020-02-28 NOTE — PATIENT INSTRUCTIONS
1. Proceed with cycle 42    2. Will discuss restaging with Dr. Dutch Keller    3. Call as needed    4.   Follow-up in 1 month

## 2020-02-28 NOTE — PROGRESS NOTES
Pt here for C42 D1 Opdivo. Arrives Ambulating independently, accompanied by self. Using a driving service today from [facility] [employer].           Modifications in dose or schedule: No     She reported feeling well.  No adverse effects of therapy note

## 2020-02-28 NOTE — PROGRESS NOTES
Cancer Center Progress Note    Patient Name: Grace Tristan   YOB: 1970   Medical Record Number: [id number]   Attending Physician: Murtaza Artis M.D. Chief Complaint:  Metastatic adenocarcinoma of the lung, brain metastasis.     History of in the setting of polysubstance abuse/dependence. She required mechanical ventilation for secondary respiratory failure. She was seen by neurology and critical care medicine consultation.   After long hospitalization she was eventually discharged to rehab • BRAIN SURGERY  JULY 2015    TUMOR RESECTION   • BRAIN SURGERY Left     OCCIPITAL CRANIOTOMY   • BRAIN SURGERY Left     LOBECTOMY   • BRAIN SURGERY  AUG  2016    REMOVAL OF DEAD TISSUE    • CHOLECYSTECTOMY  2009   • CYST REMOVAL      BREAST, BENIGN   • Jainism service: Not on file        Active member of club or organization: Not on file        Attends meetings of clubs or organizations: Not on file        Relationship status: Not on file      Intimate partner violence:        Fear of current or ex par Cuff Size: large)   Pulse 80   Temp 98.1 °F (36.7 °C) (Oral)   Resp 16   Ht 1.651 m (5' 5\")   Wt 95.3 kg (210 lb)   SpO2 100%   BMI 34.95 kg/m²     Physical Examination:  General: Patient is alert and oriented x 3, not in acute distress.   Psych:  Mood and lobectomy and mediastinal lymph node dissection on 8/25/2015 (pT1aN0).   She received adjuvant chemo originally with cisplatin and Navelbine being for 1 cycle however this was changed to carboplatin and paclitaxel for cycles 2 through 4 due to issues with f assessment: High metastatic lung cancer with brain metastasis recurrent seizures undergoing active cancer directed therapy    The patient's emotional well being was assessed and resources were discussed.   Appropriate resources were reviewed and discussed w

## 2020-03-04 ENCOUNTER — TELEPHONE (OUTPATIENT)
Dept: HEMATOLOGY/ONCOLOGY | Facility: HOSPITAL | Age: 50
End: 2020-03-04

## 2020-03-04 DIAGNOSIS — L30.1 DYSHIDROTIC ECZEMA: Primary | ICD-10-CM

## 2020-03-04 NOTE — TELEPHONE ENCOUNTER
I called Gaby Williamson to let her know that [id number] is putting in a referral for dermatology. She recommends Dr Franco Thompson or Omar Dia. I gave her the address and phone numbers for both doctors.

## 2020-03-04 NOTE — TELEPHONE ENCOUNTER
Toxicities:  C42 D1 Nivolumab on 2/28/2020     Condition Update: Eczema    Eczema: Shea Germain reports she still has the itchy rash on her right hand. She also now has it on her left forearm & the big toe on her right foot.  She is applying hydrocortisone Valer

## 2020-03-12 ENCOUNTER — HOSPITAL ENCOUNTER (OUTPATIENT)
Dept: CT IMAGING | Facility: HOSPITAL | Age: 50
Discharge: HOME | End: 2020-03-12
Attending: PHYSICIAN ASSISTANT
Payer: COMMERCIAL

## 2020-03-12 DIAGNOSIS — C34.90 PRIMARY MALIGNANT NEOPLASM OF LUNG METASTATIC TO OTHER SITE, UNSPECIFIED LATERALITY (HCC): ICD-10-CM

## 2020-03-12 DIAGNOSIS — C79.31 BRAIN METASTASES (HCC): ICD-10-CM

## 2020-03-12 DIAGNOSIS — Z29.8 ENCOUNTER FOR IMMUNOTHERAPY: ICD-10-CM

## 2020-03-12 PROCEDURE — 74177 CT ABD & PELVIS W/CONTRAST: CPT | Performed by: PHYSICIAN ASSISTANT

## 2020-03-12 PROCEDURE — 71260 CT THORAX DX C+: CPT | Performed by: PHYSICIAN ASSISTANT

## 2020-03-12 RX ORDER — HEPARIN SODIUM (PORCINE) LOCK FLUSH IV SOLN 100 UNIT/ML 100 UNIT/ML
SOLUTION INTRAVENOUS
Status: DISPENSED
Start: 2020-03-12 | End: 2020-03-12

## 2020-03-12 RX ORDER — HEPARIN SODIUM (PORCINE) LOCK FLUSH IV SOLN 100 UNIT/ML 100 UNIT/ML
500 SOLUTION INTRAVENOUS ONCE
Status: COMPLETED | OUTPATIENT
Start: 2020-03-12 | End: 2020-03-12

## 2020-03-12 RX ADMIN — HEPARIN SODIUM (PORCINE) LOCK FLUSH IV SOLN 100 UNIT/ML 500 UNITS: 100 SOLUTION INTRAVENOUS at 09:38:00

## 2020-03-23 RX ORDER — LEVOTHYROXINE SODIUM 137 UG/1
TABLET ORAL
Qty: 90 TABLET | Refills: 0 | Status: SHIPPED | OUTPATIENT
Start: 2020-03-23 | End: 2020-06-05 | Stop reason: DRUGHIGH

## 2020-03-23 NOTE — TELEPHONE ENCOUNTER
LOV 4/20/19. RTC 5-6 months. No F/u. Called to schedule apt with provider. Scheduled apt 6/05/20. Pended 2 month supply.

## 2020-03-27 ENCOUNTER — TELEPHONE (OUTPATIENT)
Dept: HEMATOLOGY/ONCOLOGY | Facility: HOSPITAL | Age: 50
End: 2020-03-27

## 2020-03-27 ENCOUNTER — APPOINTMENT (OUTPATIENT)
Dept: HEMATOLOGY/ONCOLOGY | Facility: HOSPITAL | Age: 50
End: 2020-03-27
Attending: INTERNAL MEDICINE
Payer: COMMERCIAL

## 2020-03-27 ENCOUNTER — APPOINTMENT (OUTPATIENT)
Dept: HEMATOLOGY/ONCOLOGY | Facility: HOSPITAL | Age: 50
End: 2020-03-27
Attending: PHYSICIAN ASSISTANT
Payer: COMMERCIAL

## 2020-03-27 NOTE — TELEPHONE ENCOUNTER
Rachael Alfred let me know this patient wants to reschedule to next week, when you call her to speak with her about the situation can you let her know I put her on for next week hopefully those times work.  I did cancel her appointment today

## 2020-03-27 NOTE — TELEPHONE ENCOUNTER
Patient is cancelling her 3 appointments today 3/27/20, which are Labs, Pre Chemo Visit and her Chemo Treatment because she is afraid of the Covid-19 and she stated the [address] cancelled her ride as well and she would like to know if she can res

## 2020-04-03 ENCOUNTER — APPOINTMENT (OUTPATIENT)
Dept: HEMATOLOGY/ONCOLOGY | Facility: HOSPITAL | Age: 50
End: 2020-04-03
Attending: PHYSICIAN ASSISTANT
Payer: COMMERCIAL

## 2020-04-03 ENCOUNTER — APPOINTMENT (OUTPATIENT)
Dept: HEMATOLOGY/ONCOLOGY | Facility: HOSPITAL | Age: 50
End: 2020-04-03
Attending: INTERNAL MEDICINE
Payer: COMMERCIAL

## 2020-04-17 ENCOUNTER — NURSE ONLY (OUTPATIENT)
Dept: HEMATOLOGY/ONCOLOGY | Facility: HOSPITAL | Age: 50
End: 2020-04-17
Attending: INTERNAL MEDICINE
Payer: COMMERCIAL

## 2020-04-17 ENCOUNTER — OFFICE VISIT (OUTPATIENT)
Dept: HEMATOLOGY/ONCOLOGY | Facility: HOSPITAL | Age: 50
End: 2020-04-17
Attending: NURSE PRACTITIONER
Payer: COMMERCIAL

## 2020-04-17 ENCOUNTER — TELEPHONE (OUTPATIENT)
Dept: HEMATOLOGY/ONCOLOGY | Facility: HOSPITAL | Age: 50
End: 2020-04-17

## 2020-04-17 VITALS
HEIGHT: 65 IN | RESPIRATION RATE: 18 BRPM | OXYGEN SATURATION: 99 % | TEMPERATURE: 99 F | SYSTOLIC BLOOD PRESSURE: 128 MMHG | HEART RATE: 92 BPM | BODY MASS INDEX: 36.49 KG/M2 | DIASTOLIC BLOOD PRESSURE: 90 MMHG | WEIGHT: 219 LBS

## 2020-04-17 VITALS
SYSTOLIC BLOOD PRESSURE: 128 MMHG | HEART RATE: 92 BPM | RESPIRATION RATE: 18 BRPM | TEMPERATURE: 99 F | DIASTOLIC BLOOD PRESSURE: 90 MMHG | OXYGEN SATURATION: 99 %

## 2020-04-17 DIAGNOSIS — C34.92 CARCINOMA OF LUNG, LEFT (HCC): ICD-10-CM

## 2020-04-17 DIAGNOSIS — Z45.2 ENCOUNTER FOR ADJUSTMENT OR MANAGEMENT OF VASCULAR ACCESS DEVICE: Primary | ICD-10-CM

## 2020-04-17 DIAGNOSIS — C34.90 MALIGNANT NEOPLASM OF LUNG, UNSPECIFIED LATERALITY, UNSPECIFIED PART OF LUNG (HCC): ICD-10-CM

## 2020-04-17 DIAGNOSIS — C79.31 BRAIN METASTASES (HCC): ICD-10-CM

## 2020-04-17 DIAGNOSIS — Z29.8 ENCOUNTER FOR IMMUNOTHERAPY: ICD-10-CM

## 2020-04-17 DIAGNOSIS — M25.542 ARTHRALGIA OF BOTH HANDS: ICD-10-CM

## 2020-04-17 DIAGNOSIS — L30.1 DYSHIDROTIC ECZEMA: ICD-10-CM

## 2020-04-17 DIAGNOSIS — Z92.89 HISTORY OF IMMUNOTHERAPY: ICD-10-CM

## 2020-04-17 DIAGNOSIS — M25.541 ARTHRALGIA OF BOTH HANDS: ICD-10-CM

## 2020-04-17 DIAGNOSIS — C34.90 MALIGNANT NEOPLASM OF LUNG, UNSPECIFIED LATERALITY, UNSPECIFIED PART OF LUNG (HCC): Primary | ICD-10-CM

## 2020-04-17 DIAGNOSIS — L29.9 ITCHING: ICD-10-CM

## 2020-04-17 DIAGNOSIS — C34.92 CARCINOMA OF LUNG, LEFT (HCC): Primary | ICD-10-CM

## 2020-04-17 DIAGNOSIS — C78.7 LIVER METASTASES (HCC): ICD-10-CM

## 2020-04-17 PROCEDURE — 85025 COMPLETE CBC W/AUTO DIFF WBC: CPT

## 2020-04-17 PROCEDURE — 84443 ASSAY THYROID STIM HORMONE: CPT

## 2020-04-17 PROCEDURE — 96413 CHEMO IV INFUSION 1 HR: CPT

## 2020-04-17 PROCEDURE — 80053 COMPREHEN METABOLIC PANEL: CPT

## 2020-04-17 PROCEDURE — 99215 OFFICE O/P EST HI 40 MIN: CPT | Performed by: NURSE PRACTITIONER

## 2020-04-17 RX ORDER — 0.9 % SODIUM CHLORIDE 0.9 %
10 VIAL (ML) INJECTION ONCE
Status: CANCELLED | OUTPATIENT
Start: 2020-04-17

## 2020-04-17 RX ORDER — PREDNISONE 20 MG/1
20 TABLET ORAL DAILY
Qty: 5 TABLET | Refills: 0 | Status: SHIPPED | OUTPATIENT
Start: 2020-04-17 | End: 2020-04-17

## 2020-04-17 RX ORDER — HEPARIN SODIUM (PORCINE) LOCK FLUSH IV SOLN 100 UNIT/ML 100 UNIT/ML
5 SOLUTION INTRAVENOUS ONCE
Status: CANCELLED | OUTPATIENT
Start: 2020-04-17

## 2020-04-17 RX ORDER — HEPARIN SODIUM (PORCINE) LOCK FLUSH IV SOLN 100 UNIT/ML 100 UNIT/ML
5 SOLUTION INTRAVENOUS ONCE
Status: COMPLETED | OUTPATIENT
Start: 2020-04-17 | End: 2020-04-17

## 2020-04-17 RX ORDER — DIPHENHYDRAMINE HYDROCHLORIDE 50 MG/ML
INJECTION INTRAMUSCULAR; INTRAVENOUS EVERY 4 HOURS PRN
Status: CANCELLED | OUTPATIENT
Start: 2020-04-17

## 2020-04-17 RX ORDER — 0.9 % SODIUM CHLORIDE 0.9 %
VIAL (ML) INJECTION
Status: DISCONTINUED
Start: 2020-04-17 | End: 2020-04-17

## 2020-04-17 RX ORDER — ALBUTEROL SULFATE 90 UG/1
2 AEROSOL, METERED RESPIRATORY (INHALATION) AS NEEDED
Status: CANCELLED | OUTPATIENT
Start: 2020-04-17

## 2020-04-17 RX ORDER — ACETAMINOPHEN 325 MG/1
TABLET ORAL EVERY 6 HOURS PRN
Status: CANCELLED | OUTPATIENT
Start: 2020-04-17

## 2020-04-17 RX ORDER — HEPARIN SODIUM (PORCINE) LOCK FLUSH IV SOLN 100 UNIT/ML 100 UNIT/ML
SOLUTION INTRAVENOUS
Status: COMPLETED
Start: 2020-04-17 | End: 2020-04-17

## 2020-04-17 RX ORDER — RANITIDINE 25 MG/ML
50 INJECTION, SOLUTION INTRAMUSCULAR; INTRAVENOUS AS NEEDED
Status: CANCELLED | OUTPATIENT
Start: 2020-04-17

## 2020-04-17 RX ADMIN — HEPARIN SODIUM (PORCINE) LOCK FLUSH IV SOLN 100 UNIT/ML 500 UNITS: 100 SOLUTION INTRAVENOUS at 15:58:00

## 2020-04-17 NOTE — PROGRESS NOTES
Cancer Center Progress Note    Patient Name: Lilli Bocanegra   YOB: 1970   Medical Record Number: [id number]   Attending Physician: Mary Cardoso M.D. Chief Complaint:  Metastatic adenocarcinoma of the lung, brain metastasis.     History of in the setting of polysubstance abuse/dependence. She required mechanical ventilation for secondary respiratory failure. She was seen by neurology and critical care medicine consultation.   After long hospitalization she was eventually discharged to rehab 2015    • Pneumonia, organism unspecified(486)    • Seizure disorder ([facility] [address]    • Wears glasses        Past Surgical History:  Past Surgical History:   Procedure Laterality Date   • APPENDECTOMY  1999   • BRAIN SURGERY  JULY 2015    TUMOR RESECTION   • BRAIN file        Minutes per session: Not on file      Stress: Not on file    Relationships      Social connections:        Talks on phone: Not on file        Gets together: Not on file        Attends [employer] service: Not on file        Active member of club daily., Disp: , Rfl:     Allergies:  No Known Allergies     Review of Systems:  All other systems reviewed and negative x12    Vital Signs:  /90 (BP Location: Left arm, Patient Position: Sitting, Cuff Size: large)   Pulse 92   Temp 98.8 °F (37.1 °C) adenocarcinoma of the left upper lobe of the lung (EGFR,ALK,ROS1 negative). She has liver and brain metastasis as well as a metastatic lesion to the pancreas.  The patient was initially diagnosed in July 2015 with a left occipital brain lesion and is statu monitor this  –Hypothyroidism secondary to nivolumab. She is on thyroid replacement--we will continue to follow her thyroid tests on treatment. Endocrinology is managing this. –Polysubstance abuse/dependence with seizures.   She is not using any alcohol o

## 2020-04-17 NOTE — PROGRESS NOTES
Marianna Jefe to infusion today for C43 D1 Opdivo. Arrives Ambulating independently. She is feeling well, no complaints.     Lab Results   Component Value Date    WBC 13.1 (H) 04/17/2020    HGB 10.3 (L) 04/17/2020    HCT 32.3 (L) 04/17/2020    .0 04/17/202

## 2020-04-17 NOTE — PATIENT INSTRUCTIONS
Matty Zamudio MD for [facility]--- establish with derm for exzema. Trial Triamcinolone cream twice daily to affected areas.  Use moisturizing lotions---Eucerin, Cerave, Aquaphor    START prednisone 20mg x 5 days for arthralgias/skin changes    Estab

## 2020-04-17 NOTE — TELEPHONE ENCOUNTER
Argelia Gold from [facility] called saying the patient's prednisone 20mg's qty doesn't match the rx directions.  Please call

## 2020-05-15 ENCOUNTER — TELEPHONE (OUTPATIENT)
Dept: HEMATOLOGY/ONCOLOGY | Facility: HOSPITAL | Age: 50
End: 2020-05-15

## 2020-05-15 ENCOUNTER — APPOINTMENT (OUTPATIENT)
Dept: HEMATOLOGY/ONCOLOGY | Facility: HOSPITAL | Age: 50
End: 2020-05-15
Attending: INTERNAL MEDICINE
Payer: COMMERCIAL

## 2020-05-15 ENCOUNTER — APPOINTMENT (OUTPATIENT)
Dept: HEMATOLOGY/ONCOLOGY | Facility: HOSPITAL | Age: 50
End: 2020-05-15
Attending: NURSE PRACTITIONER
Payer: COMMERCIAL

## 2020-05-15 NOTE — TELEPHONE ENCOUNTER
Are you calling to check on her. You can let her know next week. All we have  Is Tuesday 5/19 130 lab 230 MD and 4pm infusion.  I can move her next cycle back to fridays but that is it for next week

## 2020-05-15 NOTE — TELEPHONE ENCOUNTER
Soledad Singh called to say Kristen Lois is not feeling up to coming in for her appointments today. They are looking to get all 3 apts for today rescheduled and asked that they get a call back.

## 2020-05-19 ENCOUNTER — NURSE ONLY (OUTPATIENT)
Dept: HEMATOLOGY/ONCOLOGY | Facility: HOSPITAL | Age: 50
End: 2020-05-19
Attending: INTERNAL MEDICINE
Payer: COMMERCIAL

## 2020-05-19 ENCOUNTER — OFFICE VISIT (OUTPATIENT)
Dept: HEMATOLOGY/ONCOLOGY | Facility: HOSPITAL | Age: 50
End: 2020-05-19
Attending: NURSE PRACTITIONER
Payer: COMMERCIAL

## 2020-05-19 VITALS
WEIGHT: 211 LBS | DIASTOLIC BLOOD PRESSURE: 68 MMHG | HEIGHT: 65 IN | BODY MASS INDEX: 35.16 KG/M2 | SYSTOLIC BLOOD PRESSURE: 113 MMHG | OXYGEN SATURATION: 98 % | RESPIRATION RATE: 18 BRPM | TEMPERATURE: 97 F | HEART RATE: 79 BPM

## 2020-05-19 VITALS
SYSTOLIC BLOOD PRESSURE: 113 MMHG | BODY MASS INDEX: 35 KG/M2 | HEART RATE: 79 BPM | RESPIRATION RATE: 18 BRPM | TEMPERATURE: 98 F | DIASTOLIC BLOOD PRESSURE: 68 MMHG | OXYGEN SATURATION: 98 % | WEIGHT: 211 LBS

## 2020-05-19 DIAGNOSIS — E03.2 HYPOTHYROIDISM DUE TO MEDICATION: ICD-10-CM

## 2020-05-19 DIAGNOSIS — M12.9 ARTHROPATHY: ICD-10-CM

## 2020-05-19 DIAGNOSIS — C34.90 MALIGNANT NEOPLASM OF LUNG, UNSPECIFIED LATERALITY, UNSPECIFIED PART OF LUNG (HCC): Primary | ICD-10-CM

## 2020-05-19 DIAGNOSIS — C79.31 BRAIN METASTASES (HCC): Primary | ICD-10-CM

## 2020-05-19 DIAGNOSIS — Z45.2 ENCOUNTER FOR ADJUSTMENT OR MANAGEMENT OF VASCULAR ACCESS DEVICE: Primary | ICD-10-CM

## 2020-05-19 DIAGNOSIS — C34.90 MALIGNANT NEOPLASM OF LUNG, UNSPECIFIED LATERALITY, UNSPECIFIED PART OF LUNG (HCC): ICD-10-CM

## 2020-05-19 DIAGNOSIS — Z29.8 ENCOUNTER FOR IMMUNOTHERAPY: ICD-10-CM

## 2020-05-19 DIAGNOSIS — M25.541 ARTHRALGIA OF BOTH HANDS: ICD-10-CM

## 2020-05-19 DIAGNOSIS — C34.92 CARCINOMA OF LUNG, LEFT (HCC): ICD-10-CM

## 2020-05-19 DIAGNOSIS — Z92.89 HISTORY OF IMMUNOTHERAPY: ICD-10-CM

## 2020-05-19 DIAGNOSIS — M25.542 ARTHRALGIA OF BOTH HANDS: ICD-10-CM

## 2020-05-19 DIAGNOSIS — C78.7 LIVER METASTASES (HCC): ICD-10-CM

## 2020-05-19 PROCEDURE — 84436 ASSAY OF TOTAL THYROXINE: CPT

## 2020-05-19 PROCEDURE — 80053 COMPREHEN METABOLIC PANEL: CPT

## 2020-05-19 PROCEDURE — 36591 DRAW BLOOD OFF VENOUS DEVICE: CPT

## 2020-05-19 PROCEDURE — 85025 COMPLETE CBC W/AUTO DIFF WBC: CPT

## 2020-05-19 PROCEDURE — 99214 OFFICE O/P EST MOD 30 MIN: CPT | Performed by: INTERNAL MEDICINE

## 2020-05-19 PROCEDURE — 84443 ASSAY THYROID STIM HORMONE: CPT

## 2020-05-19 RX ORDER — HEPARIN SODIUM (PORCINE) LOCK FLUSH IV SOLN 100 UNIT/ML 100 UNIT/ML
5 SOLUTION INTRAVENOUS ONCE
Status: CANCELLED | OUTPATIENT
Start: 2020-05-19

## 2020-05-19 RX ORDER — PREDNISONE 10 MG/1
TABLET ORAL
Qty: 48 TABLET | Refills: 0 | Status: SHIPPED | OUTPATIENT
Start: 2020-05-19 | End: 2020-06-03

## 2020-05-19 RX ORDER — HEPARIN SODIUM (PORCINE) LOCK FLUSH IV SOLN 100 UNIT/ML 100 UNIT/ML
SOLUTION INTRAVENOUS
Status: COMPLETED
Start: 2020-05-19 | End: 2020-05-19

## 2020-05-19 RX ORDER — HEPARIN SODIUM (PORCINE) LOCK FLUSH IV SOLN 100 UNIT/ML 100 UNIT/ML
5 SOLUTION INTRAVENOUS ONCE
Status: COMPLETED | OUTPATIENT
Start: 2020-05-19 | End: 2020-05-19

## 2020-05-19 RX ORDER — 0.9 % SODIUM CHLORIDE 0.9 %
VIAL (ML) INJECTION
Status: DISCONTINUED
Start: 2020-05-19 | End: 2020-05-19

## 2020-05-19 RX ORDER — LAMOTRIGINE 100 MG/1
TABLET ORAL 2 TIMES DAILY
COMMUNITY
Start: 2020-05-17

## 2020-05-19 RX ORDER — 0.9 % SODIUM CHLORIDE 0.9 %
10 VIAL (ML) INJECTION ONCE
Status: CANCELLED | OUTPATIENT
Start: 2020-05-19

## 2020-05-19 RX ADMIN — HEPARIN SODIUM (PORCINE) LOCK FLUSH IV SOLN 100 UNIT/ML 500 UNITS: 100 SOLUTION INTRAVENOUS at 15:40:00

## 2020-05-19 NOTE — PATIENT INSTRUCTIONS
Will HOLD treatment today and reassess how you feel in 2 weeks when you return to clinic; can reconsider treatment at that time.     Take Prednisone 60mg daily x 3 days, then 40mg daily x 3 days, then 30mg daily x 3 days, then 20mg daily x 3 days, then 10mg

## 2020-05-19 NOTE — PROGRESS NOTES
Cancer Center Progress Note    Patient Name: Dasia Arreaga   YOB: 1970   Medical Record Number: [id number]   Attending Physician: Gray Trotter M.D. Chief Complaint:  Metastatic adenocarcinoma of the lung, brain metastasis.     History of in the setting of polysubstance abuse/dependence. She required mechanical ventilation for secondary respiratory failure. She was seen by neurology and critical care medicine consultation.   After long hospitalization she was eventually discharged to rehab to radiation     DISCONTINUED AUG 2015    • Gallbladder disease    • Hepatitis C    • High blood pressure    • Hypothyroid    • Lung cancer (HCC)    • Lung cancer (HCC)    • Migraines    • Osteoarthritis    • [facility] JULY 2015   • Personal Smokeless tobacco: Never Used      Tobacco comment: Current some day smoker 04/2017    Substance and Sexual Activity      Alcohol use: Yes        Comment: occassionally.  1-2 glasses wine per month      Drug use: No      Sexual activity: Yes        Partners gabapentin 300 MG Oral Cap, Take 800 mg by mouth 2 (two) times daily. , Disp: , Rfl:   •  levETIRAcetam 500 MG Oral Tab, 1,500 mg 2 (two) times daily.   , Disp: , Rfl: 0  •  triamcinolone acetonide 0.1 % External Ointment, Apply 1 Application topically 2 02/28/2020     Lab Results   Component Value Date    HGB 11.2 (L) 05/19/2020    HGB 10.3 (L) 04/17/2020    HGB 10.9 (L) 02/28/2020      Lab Results   Component Value Date    .0 (H) 05/19/2020    .0 04/17/2020    .0 02/28/2020         L later having upper extremity with weakness requiring multiple antiepileptics. She is following closely with neurology  –Finished dexamethasone taper for right-sided parenchymal inflammatory changes on Brain.   Otherwise brain imaging does not show any new Appropriate resources were reviewed and discussed with the pateint and family.      RTC 2 weeks    Evaluated with LOGAN dAam

## 2020-05-19 NOTE — PROGRESS NOTES
Seen and examined with MENDOZA Chaudhari. Metastatic adenocarcinoma of the left lung with brain metastasis with excellent long-term disease control on nivolumab. She was originally diagnosed in 2015.   She is now having significant arthropathy likely from nivolum

## 2020-06-05 ENCOUNTER — OFFICE VISIT (OUTPATIENT)
Dept: ENDOCRINOLOGY CLINIC | Facility: CLINIC | Age: 50
End: 2020-06-05
Payer: COMMERCIAL

## 2020-06-05 ENCOUNTER — OFFICE VISIT (OUTPATIENT)
Dept: HEMATOLOGY/ONCOLOGY | Facility: HOSPITAL | Age: 50
End: 2020-06-05
Attending: INTERNAL MEDICINE
Payer: COMMERCIAL

## 2020-06-05 ENCOUNTER — TELEPHONE (OUTPATIENT)
Dept: ENDOCRINOLOGY CLINIC | Facility: CLINIC | Age: 50
End: 2020-06-05

## 2020-06-05 ENCOUNTER — OFFICE VISIT (OUTPATIENT)
Dept: HEMATOLOGY/ONCOLOGY | Facility: HOSPITAL | Age: 50
End: 2020-06-05
Attending: NURSE PRACTITIONER
Payer: COMMERCIAL

## 2020-06-05 VITALS
TEMPERATURE: 99 F | BODY MASS INDEX: 35.32 KG/M2 | SYSTOLIC BLOOD PRESSURE: 138 MMHG | RESPIRATION RATE: 18 BRPM | DIASTOLIC BLOOD PRESSURE: 58 MMHG | HEIGHT: 65 IN | OXYGEN SATURATION: 96 % | HEART RATE: 111 BPM | WEIGHT: 212 LBS

## 2020-06-05 VITALS
BODY MASS INDEX: 35 KG/M2 | WEIGHT: 210 LBS | HEART RATE: 90 BPM | SYSTOLIC BLOOD PRESSURE: 124 MMHG | DIASTOLIC BLOOD PRESSURE: 88 MMHG

## 2020-06-05 VITALS
RESPIRATION RATE: 18 BRPM | HEART RATE: 111 BPM | TEMPERATURE: 99 F | DIASTOLIC BLOOD PRESSURE: 58 MMHG | OXYGEN SATURATION: 96 % | BODY MASS INDEX: 35 KG/M2 | SYSTOLIC BLOOD PRESSURE: 138 MMHG | WEIGHT: 212 LBS

## 2020-06-05 DIAGNOSIS — Z45.2 ENCOUNTER FOR ADJUSTMENT OR MANAGEMENT OF VASCULAR ACCESS DEVICE: ICD-10-CM

## 2020-06-05 DIAGNOSIS — R73.03 PRE-DIABETES: ICD-10-CM

## 2020-06-05 DIAGNOSIS — C34.90 MALIGNANT NEOPLASM OF LUNG, UNSPECIFIED LATERALITY, UNSPECIFIED PART OF LUNG (HCC): ICD-10-CM

## 2020-06-05 DIAGNOSIS — E03.2 HYPOTHYROIDISM DUE TO MEDICATION: ICD-10-CM

## 2020-06-05 DIAGNOSIS — Z29.8 ENCOUNTER FOR IMMUNOTHERAPY: ICD-10-CM

## 2020-06-05 DIAGNOSIS — G62.0 PERIPHERAL NEUROPATHY DUE TO CHEMOTHERAPY (HCC): ICD-10-CM

## 2020-06-05 DIAGNOSIS — C34.92 CARCINOMA OF LUNG, LEFT (HCC): ICD-10-CM

## 2020-06-05 DIAGNOSIS — E03.2 HYPOTHYROIDISM DUE TO MEDICATION: Primary | ICD-10-CM

## 2020-06-05 DIAGNOSIS — E03.9 HYPOTHYROIDISM, UNSPECIFIED TYPE: Primary | ICD-10-CM

## 2020-06-05 DIAGNOSIS — C34.90 MALIGNANT NEOPLASM OF LUNG, UNSPECIFIED LATERALITY, UNSPECIFIED PART OF LUNG (HCC): Primary | ICD-10-CM

## 2020-06-05 DIAGNOSIS — E87.6 HYPOKALEMIA: ICD-10-CM

## 2020-06-05 DIAGNOSIS — T45.1X5A PERIPHERAL NEUROPATHY DUE TO CHEMOTHERAPY (HCC): ICD-10-CM

## 2020-06-05 DIAGNOSIS — C78.7 LIVER METASTASES (HCC): ICD-10-CM

## 2020-06-05 DIAGNOSIS — C79.31 BRAIN METASTASES (HCC): ICD-10-CM

## 2020-06-05 DIAGNOSIS — Z13.9 SCREENING FOR CONDITION: ICD-10-CM

## 2020-06-05 PROCEDURE — 99213 OFFICE O/P EST LOW 20 MIN: CPT | Performed by: INTERNAL MEDICINE

## 2020-06-05 PROCEDURE — 84436 ASSAY OF TOTAL THYROXINE: CPT

## 2020-06-05 PROCEDURE — 84439 ASSAY OF FREE THYROXINE: CPT

## 2020-06-05 PROCEDURE — 85025 COMPLETE CBC W/AUTO DIFF WBC: CPT

## 2020-06-05 PROCEDURE — 84443 ASSAY THYROID STIM HORMONE: CPT

## 2020-06-05 PROCEDURE — 80053 COMPREHEN METABOLIC PANEL: CPT

## 2020-06-05 PROCEDURE — 99215 OFFICE O/P EST HI 40 MIN: CPT | Performed by: INTERNAL MEDICINE

## 2020-06-05 PROCEDURE — 96413 CHEMO IV INFUSION 1 HR: CPT

## 2020-06-05 PROCEDURE — 85060 BLOOD SMEAR INTERPRETATION: CPT

## 2020-06-05 RX ORDER — 0.9 % SODIUM CHLORIDE 0.9 %
10 VIAL (ML) INJECTION ONCE
Status: CANCELLED | OUTPATIENT
Start: 2020-06-05

## 2020-06-05 RX ORDER — POTASSIUM CHLORIDE 20 MEQ/1
20 TABLET, EXTENDED RELEASE ORAL DAILY
Qty: 7 TABLET | Refills: 0 | Status: SHIPPED | OUTPATIENT
Start: 2020-06-05 | End: 2020-06-12

## 2020-06-05 RX ORDER — 0.9 % SODIUM CHLORIDE 0.9 %
VIAL (ML) INJECTION
Status: DISCONTINUED
Start: 2020-06-05 | End: 2020-06-05

## 2020-06-05 RX ORDER — GABAPENTIN 300 MG/1
CAPSULE ORAL
Qty: 120 CAPSULE | Refills: 1 | Status: SHIPPED | OUTPATIENT
Start: 2020-06-05 | End: 2020-06-29 | Stop reason: ALTCHOICE

## 2020-06-05 RX ORDER — DIPHENHYDRAMINE HYDROCHLORIDE 50 MG/ML
INJECTION INTRAMUSCULAR; INTRAVENOUS EVERY 4 HOURS PRN
Status: CANCELLED | OUTPATIENT
Start: 2020-06-05

## 2020-06-05 RX ORDER — HEPARIN SODIUM (PORCINE) LOCK FLUSH IV SOLN 100 UNIT/ML 100 UNIT/ML
5 SOLUTION INTRAVENOUS ONCE
Status: CANCELLED | OUTPATIENT
Start: 2020-06-05

## 2020-06-05 RX ORDER — GABAPENTIN 300 MG/1
CAPSULE ORAL
Qty: 120 CAPSULE | Refills: 1 | Status: SHIPPED | OUTPATIENT
Start: 2020-06-05 | End: 2020-06-05

## 2020-06-05 RX ORDER — RANITIDINE 25 MG/ML
50 INJECTION, SOLUTION INTRAMUSCULAR; INTRAVENOUS AS NEEDED
Status: CANCELLED | OUTPATIENT
Start: 2020-06-05

## 2020-06-05 RX ORDER — SERTRALINE HYDROCHLORIDE 100 MG/1
100 TABLET, FILM COATED ORAL DAILY
COMMUNITY
Start: 2020-06-04 | End: 2020-10-13 | Stop reason: ALTCHOICE

## 2020-06-05 RX ORDER — ALBUTEROL SULFATE 90 UG/1
2 AEROSOL, METERED RESPIRATORY (INHALATION) AS NEEDED
Status: CANCELLED | OUTPATIENT
Start: 2020-06-05

## 2020-06-05 RX ORDER — HEPARIN SODIUM (PORCINE) LOCK FLUSH IV SOLN 100 UNIT/ML 100 UNIT/ML
5 SOLUTION INTRAVENOUS ONCE
Status: COMPLETED | OUTPATIENT
Start: 2020-06-05 | End: 2020-06-05

## 2020-06-05 RX ORDER — LEVOTHYROXINE SODIUM 0.15 MG/1
150 TABLET ORAL
Qty: 90 TABLET | Refills: 0 | Status: SHIPPED | OUTPATIENT
Start: 2020-06-05 | End: 2020-09-10

## 2020-06-05 RX ORDER — ACETAMINOPHEN 325 MG/1
TABLET ORAL EVERY 6 HOURS PRN
Status: CANCELLED | OUTPATIENT
Start: 2020-06-05

## 2020-06-05 RX ADMIN — HEPARIN SODIUM (PORCINE) LOCK FLUSH IV SOLN 100 UNIT/ML 500 UNITS: 100 SOLUTION INTRAVENOUS at 16:28:00

## 2020-06-05 NOTE — TELEPHONE ENCOUNTER
TSH is higher than before at 27  Hence, let us go up on LT4 dose as discussed  She is on 137 mcg daily  Let us increase to 150 mcg daily  TSH and Free T4 in 4-6 weeks  Call for results  Thanks

## 2020-06-05 NOTE — PROGRESS NOTES
Pt here for [address] Arrives Ambulating independently     Modifications in dose or schedule: Yes, patient was delayed two weeks due to swelling in her hands. Patient reports it is much better today.      Potassium low today (k 3.0), prescription sent

## 2020-06-05 NOTE — TELEPHONE ENCOUNTER
Spoke with pt, notified of dosage adjustment. Also notified pt to get labs drawn between 7-8am, pt agreeable with plan, will send prescription to pharmacy per protocol.

## 2020-06-05 NOTE — PROGRESS NOTES
Return Office Visit     CHIEF COMPLAINT:    Hypothyroidism  Pre DM       HISTORY OF PRESENT ILLNESS:  Miller Calderon is a 47year old female who presents for follow up for for hypothyroidism and pre DM.     She has Stage IV NSCLCA (poorly diff adeno) and is o marked as reviewed. See past surgical history marked as reviewed. See past family history marked as reviewed. See past social history marked as reviewed.     ASSESSMENTS:     REVIEW OF SYSTEMS:  Constitutional: Negative for:  fever, + fatigue, cold/heat type I diabetes mellitus can occur with OPDIVO treatment    1. She has hypothyroidism. She is on LT 4 137 mcg daily.   TSH high, higher on repeat today  Clinically okay, unclear if finger swelling is related to this, But she also reports weight gain  Will

## 2020-06-05 NOTE — PROGRESS NOTES
Cancer Center Progress Note    Patient Name: Delano Jordan   YOB: 1970   Medical Record Number: [id number]   Attending Physician: Urmila Ronquillo M.D. Chief Complaint:  Metastatic adenocarcinoma of the lung, brain metastasis.     History of in the setting of polysubstance abuse/dependence. She required mechanical ventilation for secondary respiratory failure. She was seen by neurology and critical care medicine consultation.   After long hospitalization she was eventually discharged to rehab • Gallbladder disease    • Hepatitis C    • High blood pressure    • Hypothyroid    • Lung cancer (HCC)    • Lung cancer (HCC)    • Migraines    • Osteoarthritis    • [facility]   • Personal history of antineoplastic chemotherapy comment: Current some day smoker 04/2017    Substance and Sexual Activity      Alcohol use: Yes        Comment: occassionally.  1-2 glasses wine per month      Drug use: No      Sexual activity: Yes        Partners: Male    Lifestyle      Physical activity: twice daily until resolved., Disp: 45 g, Rfl: 0  •  QUEtiapine Fumarate 100 MG Oral Tab, Take 100 mg by mouth nightly., Disp: , Rfl:   •  ESCITALOPRAM 5 MG Oral Tab, TAKE 1 TABLET(5 MG) BY MOUTH DAILY, Disp: 90 tablet, Rfl: 3  •  gabapentin 300 MG Oral Cap (H) 06/05/2020    BUN 14 06/05/2020    BUNCREA 14.3 06/05/2020    CREATSERUM 0.98 06/05/2020    ANIONGAP 6 06/05/2020    GFRNAA 67 06/05/2020    GFRAA 78 06/05/2020    CA 9.1 06/05/2020    OSMOCALC 290 06/05/2020    ALKPHO 118 (H) 06/05/2020    AST 14 (L) 2/9/2018 [facility] that showed no new lesion. Seizures are now being managed by neurology at [facility]. She significantly improved neurologically. She has had excellent radiographic response to nivolumab.   Currently no evidence of

## 2020-06-05 NOTE — PATIENT INSTRUCTIONS
Discharge Instructions: Eating a High-Potassium Diet  Your healthcare provider has told you to eat a high-potassium diet. This may be because you have low levels of potassium in your blood. Or it may be because you have high blood pressure.  Potassium is When to call your healthcare provider  Call your healthcare provider right away or go to the ER if you have any of the following:  · Vomiting  · Extreme tiredness (fatigue)  · Diarrhea  · Rapid, irregular heartbeat  · Shortness of breath  · Chest pain  · M

## 2020-06-12 ENCOUNTER — APPOINTMENT (OUTPATIENT)
Dept: HEMATOLOGY/ONCOLOGY | Facility: HOSPITAL | Age: 50
End: 2020-06-12
Attending: INTERNAL MEDICINE
Payer: COMMERCIAL

## 2020-06-12 ENCOUNTER — APPOINTMENT (OUTPATIENT)
Dept: HEMATOLOGY/ONCOLOGY | Facility: HOSPITAL | Age: 50
End: 2020-06-12
Attending: NURSE PRACTITIONER
Payer: COMMERCIAL

## 2020-06-23 ENCOUNTER — TELEPHONE (OUTPATIENT)
Dept: HEMATOLOGY/ONCOLOGY | Facility: HOSPITAL | Age: 50
End: 2020-06-23

## 2020-06-23 RX ORDER — PREDNISONE 20 MG/1
TABLET ORAL
Qty: 20 TABLET | Refills: 0 | Status: SHIPPED | OUTPATIENT
Start: 2020-06-23 | End: 2020-07-01

## 2020-06-23 NOTE — TELEPHONE ENCOUNTER
I called Kyra Garcia and updated her that Pennie Liu is sending a prescription for prednisone taper. She will take prednisone 40 mg daily with food x 5 days, prednisone 20mg daily with food x 5 days then prednisone 10 mg daily with food x 5 days.  She will stop ta

## 2020-06-23 NOTE — TELEPHONE ENCOUNTER
Toxicities: C44 D1 Nivolumab on 6/5/2020    I attempted to return Virginia's call. No answer. I left a voice mail message asking her to please return my call at her earliest convenience.

## 2020-06-23 NOTE — TELEPHONE ENCOUNTER
Toxicities: C44 D1 Nivolumab on 6/5/2020    Condition Update: Edema    Edema: (Patient reports her right hand is swollen again from the finger tips stopping before the wrist. The joints feel stiff/tight. She is rating her pain \"4/10 - 5/10. \" She has slig

## 2020-06-23 NOTE — TELEPHONE ENCOUNTER
Deshaun Speaks at [phone] is calling because her right hand is swollen, and a little swelling in her face and was on prednisone. Dr. Nicholas Doll.

## 2020-06-24 ENCOUNTER — TELEPHONE (OUTPATIENT)
Dept: HEMATOLOGY/ONCOLOGY | Facility: HOSPITAL | Age: 50
End: 2020-06-24

## 2020-06-24 NOTE — TELEPHONE ENCOUNTER
Lalitha Part called and was looking to speak with a Dr or isela about getting a new PCP recommendation. She asked that she please get a call back.

## 2020-06-24 NOTE — TELEPHONE ENCOUNTER
Isabella Beatty called and was looking to speak with a Dr or isela about getting a new PCP recommendation. She asked that she please get a call back.

## 2020-06-29 ENCOUNTER — VIRTUAL PHONE E/M (OUTPATIENT)
Dept: FAMILY MEDICINE CLINIC | Facility: CLINIC | Age: 50
End: 2020-06-29
Payer: COMMERCIAL

## 2020-06-29 ENCOUNTER — TELEPHONE (OUTPATIENT)
Dept: FAMILY MEDICINE CLINIC | Facility: CLINIC | Age: 50
End: 2020-06-29

## 2020-06-29 DIAGNOSIS — M25.561 CHRONIC PAIN OF RIGHT KNEE: ICD-10-CM

## 2020-06-29 DIAGNOSIS — C34.90 MALIGNANT NEOPLASM OF LUNG, UNSPECIFIED LATERALITY, UNSPECIFIED PART OF LUNG (HCC): Primary | ICD-10-CM

## 2020-06-29 DIAGNOSIS — G40.901 STATUS EPILEPTICUS (HCC): ICD-10-CM

## 2020-06-29 DIAGNOSIS — G89.29 CHRONIC PAIN OF RIGHT KNEE: ICD-10-CM

## 2020-06-29 DIAGNOSIS — M25.549 ARTHRALGIA OF HAND, UNSPECIFIED LATERALITY: ICD-10-CM

## 2020-06-29 PROCEDURE — 99202 OFFICE O/P NEW SF 15 MIN: CPT | Performed by: FAMILY MEDICINE

## 2020-06-29 RX ORDER — GABAPENTIN 800 MG/1
800 TABLET ORAL 2 TIMES DAILY
COMMUNITY
End: 2020-10-13

## 2020-06-29 RX ORDER — ACETAMINOPHEN AND CODEINE PHOSPHATE 300; 30 MG/1; MG/1
1 TABLET ORAL EVERY 12 HOURS PRN
Qty: 40 TABLET | Refills: 0 | Status: SHIPPED | OUTPATIENT
Start: 2020-06-29 | End: 2020-08-11

## 2020-06-29 RX ORDER — TRAMADOL HYDROCHLORIDE 50 MG/1
50 TABLET ORAL EVERY 8 HOURS PRN
Qty: 30 TABLET | Refills: 0 | Status: SHIPPED | OUTPATIENT
Start: 2020-06-29 | End: 2020-06-29

## 2020-06-29 RX ORDER — TRAZODONE HYDROCHLORIDE 50 MG/1
50 TABLET ORAL NIGHTLY
COMMUNITY
End: 2021-09-22 | Stop reason: ALTCHOICE

## 2020-06-29 RX ORDER — DOXYCYCLINE HYCLATE 100 MG/1
100 CAPSULE ORAL 2 TIMES DAILY
COMMUNITY
End: 2020-09-25 | Stop reason: ALTCHOICE

## 2020-06-29 NOTE — PROGRESS NOTES
Virtual Telephone Check-In    Samyxavier Blackavan verbally consents to a Virtual/Telephone Check-In visit on 06/29/20. Patient has been referred to the [facility] website at [email or web address] to review the yearly Consent to Treat document.     Patient understand Medical History:   Diagnosis Date   • Anemia    • Brain cancer [facility])    • Brain cancer ([facility] [address]    • Depression    • Disorder of thyroid    • Exposure to radiation     DISCONTINUED AUG 2015    • Gallbladder disease    • Hepatitis C    • High blood pressure 46y/o man with no significant PMH admitted on 11/29/18 for CP for 2-3 days prior to admission,   found with STEMI. Urgent Cath, pt found to have multiple occlusions; VF arrest, shock x 1, return to NSR.  Upon transfer to OR for Urgent CABG, pt again VF arrest, chest opened intra-op with CPR in progress.  s/p ECMO and Impella; 12/3 ECMO explanted and Impella placed via right groin sheath.    Has been on antibiotics :  Vancomycin 11/29 to 12/20  Cipro 11/29 to 12/4  Zosyn 12/4 to 12/12 and 12/15 to 12/16  Meropenem 12/16 to 12/28    Micafungin 12/18 to present     s/p CABG   S/P tracheostomy     currently afebrile  - Blood cultures with Candida Parapsilosis 12/18 and 12/20   - Repeat blood cultures no growth 12/21 and there after  - Continue Micafungin 100mg daily    per Dr. Santizo, Recommend DARION to rule out endocarditis if no vegetations can stop micafungin (completed 2 weeks yesterday)  - Ophthalmology evaluation for candida enophthalmias, no blurred vision      Consider Removal of NG tube    - follow up all outstanding cultures  - trend temperature and WBC curve  - repeat cultures from blood and all sources if febrile.

## 2020-06-29 NOTE — TELEPHONE ENCOUNTER
[address] states Tramadol interacts with 3 other medications  1.lexapro  2. Trazodone   3. Sertraline     Please advise.      traMADol HCl 50 MG Oral Tab 30 tablet 0 6/29/2020    Sig:   Take 1 tablet (50 mg total) by mouth every 8 (eight) hours as ne

## 2020-07-01 ENCOUNTER — TELEPHONE (OUTPATIENT)
Dept: HEMATOLOGY/ONCOLOGY | Facility: HOSPITAL | Age: 50
End: 2020-07-01

## 2020-07-01 DIAGNOSIS — M25.542 ARTHRALGIA OF BOTH HANDS: Primary | ICD-10-CM

## 2020-07-01 DIAGNOSIS — M25.541 ARTHRALGIA OF BOTH HANDS: Primary | ICD-10-CM

## 2020-07-01 DIAGNOSIS — Z29.8 ENCOUNTER FOR IMMUNOTHERAPY: ICD-10-CM

## 2020-07-01 DIAGNOSIS — C34.90 MALIGNANT NEOPLASM OF LUNG, UNSPECIFIED LATERALITY, UNSPECIFIED PART OF LUNG (HCC): ICD-10-CM

## 2020-07-01 RX ORDER — PREDNISONE 20 MG/1
TABLET ORAL
Qty: 20 TABLET | Refills: 0 | Status: SHIPPED | OUTPATIENT
Start: 2020-07-01 | End: 2020-10-13 | Stop reason: ALTCHOICE

## 2020-07-01 NOTE — TELEPHONE ENCOUNTER
Iraida Zendejas has called to confirm if she should still attend her treatment on July 2nd. She mentioned she has missed two dosages of her medication now resulting in her hands to become swollen.  Swollen like when she fist arrived at Dr. Dany Ken office and he could

## 2020-07-01 NOTE — TELEPHONE ENCOUNTER
Jenniffer Flies called to report that her arthralgia in bilateral hands are worsened. Likely reoccurring immune-mediated arthralgias from last Nivo cycle.  She started a low-dose Prednisone course last week for symptoms on 6/23 from [place], but has missed Keith post [employer]

## 2020-07-02 ENCOUNTER — APPOINTMENT (OUTPATIENT)
Dept: HEMATOLOGY/ONCOLOGY | Facility: HOSPITAL | Age: 50
End: 2020-07-02
Attending: NURSE PRACTITIONER
Payer: COMMERCIAL

## 2020-07-02 ENCOUNTER — APPOINTMENT (OUTPATIENT)
Dept: HEMATOLOGY/ONCOLOGY | Facility: HOSPITAL | Age: 50
End: 2020-07-02
Attending: INTERNAL MEDICINE
Payer: COMMERCIAL

## 2020-07-10 ENCOUNTER — NURSE ONLY (OUTPATIENT)
Dept: HEMATOLOGY/ONCOLOGY | Facility: HOSPITAL | Age: 50
End: 2020-07-10
Attending: INTERNAL MEDICINE
Payer: COMMERCIAL

## 2020-07-10 VITALS
WEIGHT: 219.38 LBS | HEART RATE: 75 BPM | SYSTOLIC BLOOD PRESSURE: 116 MMHG | HEIGHT: 65 IN | TEMPERATURE: 98 F | DIASTOLIC BLOOD PRESSURE: 78 MMHG | RESPIRATION RATE: 18 BRPM | OXYGEN SATURATION: 98 % | BODY MASS INDEX: 36.55 KG/M2

## 2020-07-10 VITALS
HEART RATE: 75 BPM | OXYGEN SATURATION: 98 % | WEIGHT: 219.38 LBS | DIASTOLIC BLOOD PRESSURE: 78 MMHG | SYSTOLIC BLOOD PRESSURE: 116 MMHG | BODY MASS INDEX: 37 KG/M2 | TEMPERATURE: 98 F | RESPIRATION RATE: 18 BRPM

## 2020-07-10 DIAGNOSIS — C79.31 BRAIN METASTASES (HCC): ICD-10-CM

## 2020-07-10 DIAGNOSIS — Z29.8 ENCOUNTER FOR IMMUNOTHERAPY: ICD-10-CM

## 2020-07-10 DIAGNOSIS — C78.7 LIVER METASTASES (HCC): ICD-10-CM

## 2020-07-10 DIAGNOSIS — C34.90 MALIGNANT NEOPLASM OF LUNG, UNSPECIFIED LATERALITY, UNSPECIFIED PART OF LUNG (HCC): Primary | ICD-10-CM

## 2020-07-10 DIAGNOSIS — M12.9 ARTHROPATHY: ICD-10-CM

## 2020-07-10 LAB
ALBUMIN SERPL-MCNC: 3.1 G/DL (ref 3.4–5)
ALBUMIN/GLOB SERPL: 0.7 {RATIO} (ref 1–2)
ALP LIVER SERPL-CCNC: 148 U/L (ref 39–100)
ALT SERPL-CCNC: 19 U/L (ref 13–56)
ANION GAP SERPL CALC-SCNC: 6 MMOL/L (ref 0–18)
AST SERPL-CCNC: 11 U/L (ref 15–37)
BASOPHILS # BLD AUTO: 0.06 X10(3) UL (ref 0–0.2)
BASOPHILS NFR BLD AUTO: 0.3 %
BILIRUB SERPL-MCNC: 0.2 MG/DL (ref 0.1–2)
BUN BLD-MCNC: 13 MG/DL (ref 7–18)
BUN/CREAT SERPL: 16.3 (ref 10–20)
CALCIUM BLD-MCNC: 8.4 MG/DL (ref 8.5–10.1)
CHLORIDE SERPL-SCNC: 108 MMOL/L (ref 98–112)
CO2 SERPL-SCNC: 24 MMOL/L (ref 21–32)
CREAT BLD-MCNC: 0.8 MG/DL (ref 0.55–1.02)
DEPRECATED RDW RBC AUTO: 60.9 FL (ref 35.1–46.3)
EOSINOPHIL # BLD AUTO: 0.06 X10(3) UL (ref 0–0.7)
EOSINOPHIL NFR BLD AUTO: 0.3 %
ERYTHROCYTE [DISTWIDTH] IN BLOOD BY AUTOMATED COUNT: 18.7 % (ref 11–15)
GLOBULIN PLAS-MCNC: 4.4 G/DL (ref 2.8–4.4)
GLUCOSE BLD-MCNC: 86 MG/DL (ref 70–99)
HCT VFR BLD AUTO: 32.3 % (ref 35–48)
HGB BLD-MCNC: 10.8 G/DL (ref 12–16)
IMM GRANULOCYTES # BLD AUTO: 0.35 X10(3) UL (ref 0–1)
IMM GRANULOCYTES NFR BLD: 1.9 %
LYMPHOCYTES # BLD AUTO: 4.13 X10(3) UL (ref 1–4)
LYMPHOCYTES NFR BLD AUTO: 22.4 %
M PROTEIN MFR SERPL ELPH: 7.5 G/DL (ref 6.4–8.2)
MCH RBC QN AUTO: 29.9 PG (ref 26–34)
MCHC RBC AUTO-ENTMCNC: 33.4 G/DL (ref 31–37)
MCV RBC AUTO: 89.5 FL (ref 80–100)
MONOCYTES # BLD AUTO: 1.01 X10(3) UL (ref 0.1–1)
MONOCYTES NFR BLD AUTO: 5.5 %
NEUTROPHILS # BLD AUTO: 12.79 X10 (3) UL (ref 1.5–7.7)
NEUTROPHILS # BLD AUTO: 12.79 X10(3) UL (ref 1.5–7.7)
NEUTROPHILS NFR BLD AUTO: 69.6 %
OSMOLALITY SERPL CALC.SUM OF ELEC: 285 MOSM/KG (ref 275–295)
PLATELET # BLD AUTO: 370 10(3)UL (ref 150–450)
POTASSIUM SERPL-SCNC: 3.9 MMOL/L (ref 3.5–5.1)
RBC # BLD AUTO: 3.61 X10(6)UL (ref 3.8–5.3)
SODIUM SERPL-SCNC: 138 MMOL/L (ref 136–145)
TSI SER-ACNC: 12.6 MIU/ML (ref 0.36–3.74)
WBC # BLD AUTO: 18.4 X10(3) UL (ref 4–11)

## 2020-07-10 PROCEDURE — 36591 DRAW BLOOD OFF VENOUS DEVICE: CPT

## 2020-07-10 PROCEDURE — 85025 COMPLETE CBC W/AUTO DIFF WBC: CPT

## 2020-07-10 PROCEDURE — 80053 COMPREHEN METABOLIC PANEL: CPT

## 2020-07-10 PROCEDURE — 99215 OFFICE O/P EST HI 40 MIN: CPT | Performed by: INTERNAL MEDICINE

## 2020-07-10 PROCEDURE — 84443 ASSAY THYROID STIM HORMONE: CPT

## 2020-07-10 RX ORDER — HEPARIN SODIUM (PORCINE) LOCK FLUSH IV SOLN 100 UNIT/ML 100 UNIT/ML
SOLUTION INTRAVENOUS
Status: DISPENSED
Start: 2020-07-10 | End: 2020-07-11

## 2020-07-10 RX ORDER — 0.9 % SODIUM CHLORIDE 0.9 %
VIAL (ML) INJECTION
Status: DISCONTINUED
Start: 2020-07-10 | End: 2020-07-10

## 2020-07-10 NOTE — PROGRESS NOTES
Cancer Center Progress Note    Patient Name: Td Larson   YOB: 1970   Medical Record Number: [id number]   Attending Physician: Olivia Dozier M.D. Chief Complaint:  Metastatic adenocarcinoma of the lung, brain metastasis.     History of in the setting of polysubstance abuse/dependence. She required mechanical ventilation for secondary respiratory failure. She was seen by neurology and critical care medicine consultation.   After long hospitalization she was eventually discharged to rehab Osteoarthritis    • Pancreatic cancer [facility]) JULY 2015   • Personal history of antineoplastic chemotherapy     EVERY 2 WEEKS BEGINNING 2015    • Pneumonia, organism unspecified(486)    • Seizure disorder ([facility] [address]    • Wears glasses        Past Surgical History Minutes per session: Not on file      Stress: Not on file    Relationships      Social connections:        Talks on phone: Not on file        Gets together: Not on file        Attends Mor[employer] service: Not on file        Active member of club or [place] (150 mcg total) by mouth before breakfast., Disp: 90 tablet, Rfl: 0  •  lamoTRIgine 100 MG Oral Tab, Take by mouth 2 (two) times daily. , Disp: , Rfl:   •  QUEtiapine Fumarate 100 MG Oral Tab, Take 100 mg by mouth nightly., Disp: , Rfl:   •  levETIRAcetam 5 285 07/10/2020    ALKPHO 148 (H) 07/10/2020    AST 11 (L) 07/10/2020    ALT 19 07/10/2020    ALKPHOS 131 (H) 09/21/2016    BILT 0.2 07/10/2020    TP 7.5 07/10/2020    ALB 3.1 (L) 07/10/2020    GLOBULIN 4.4 07/10/2020    AGRATIO 0.9 (L) 09/21/2016     radiographic response to nivolumab. Currently no evidence of disease, last image on 3/13/20. She is now receiving treatment every 4 weeks. Hold  nivolumab with severe, recurrent arthropathies to hands.   Continue to hold immunotherapy now she has had r

## 2020-07-10 NOTE — PROGRESS NOTES
Patient not treated today due to worsening arthralgias. Escorted to infusion and port de accessed per protocol. Site covered with gauze and paper tape. Patient provided with michael and appointments for lab/MD in one month per [facility].  Patient Adonica Phlegm

## 2020-07-20 ENCOUNTER — TELEPHONE (OUTPATIENT)
Dept: FAMILY MEDICINE CLINIC | Facility: CLINIC | Age: 50
End: 2020-07-20

## 2020-07-20 RX ORDER — ACETAMINOPHEN AND CODEINE PHOSPHATE 300; 30 MG/1; MG/1
TABLET ORAL
Qty: 40 TABLET | Refills: 0 | OUTPATIENT
Start: 2020-07-20

## 2020-07-20 NOTE — TELEPHONE ENCOUNTER
Please call patient's ---I received a refill request however now Dr. Starla Carranza is listed as PCP--if I am still her PCP, would like to see her for a physical as it has been >1 year since our last visit. I did not prescribe this for her.

## 2020-07-20 NOTE — TELEPHONE ENCOUNTER
Patient calling to check the status  Of the medication she states she is out of medication     Please advise   [phone]

## 2020-07-21 RX ORDER — TRAMADOL HYDROCHLORIDE 50 MG/1
50 TABLET ORAL EVERY 8 HOURS PRN
Qty: 30 TABLET | Refills: 0 | Status: SHIPPED | OUTPATIENT
Start: 2020-07-21 | End: 2020-08-02

## 2020-07-21 NOTE — TELEPHONE ENCOUNTER
Reviewed 's message with pt. She will f/u with the specialists for long term  management but pt is asking for a refill of tramadol now  at the current dose because she is completely out of meds as of today.

## 2020-07-21 NOTE — TELEPHONE ENCOUNTER
That would be too high with her history of seizures - can provoke a seizure. Needs to discuss with her oncologist and neurologist long term pain control.

## 2020-07-29 ENCOUNTER — NURSE ONLY (OUTPATIENT)
Dept: HEMATOLOGY/ONCOLOGY | Facility: HOSPITAL | Age: 50
End: 2020-07-29
Attending: INTERNAL MEDICINE
Payer: COMMERCIAL

## 2020-07-29 ENCOUNTER — HOSPITAL ENCOUNTER (OUTPATIENT)
Dept: CT IMAGING | Facility: HOSPITAL | Age: 50
Discharge: HOME | End: 2020-07-29
Attending: INTERNAL MEDICINE
Payer: COMMERCIAL

## 2020-07-29 DIAGNOSIS — C79.31 BRAIN METASTASES (HCC): ICD-10-CM

## 2020-07-29 DIAGNOSIS — C34.90 MALIGNANT NEOPLASM OF LUNG, UNSPECIFIED LATERALITY, UNSPECIFIED PART OF LUNG (HCC): ICD-10-CM

## 2020-07-29 DIAGNOSIS — C34.92 CARCINOMA OF LUNG, LEFT (HCC): ICD-10-CM

## 2020-07-29 DIAGNOSIS — Z45.2 ENCOUNTER FOR ADJUSTMENT OR MANAGEMENT OF VASCULAR ACCESS DEVICE: Primary | ICD-10-CM

## 2020-07-29 DIAGNOSIS — C78.7 LIVER METASTASES (HCC): ICD-10-CM

## 2020-07-29 PROCEDURE — 74177 CT ABD & PELVIS W/CONTRAST: CPT | Performed by: INTERNAL MEDICINE

## 2020-07-29 PROCEDURE — 96523 IRRIG DRUG DELIVERY DEVICE: CPT

## 2020-07-29 PROCEDURE — 71260 CT THORAX DX C+: CPT | Performed by: INTERNAL MEDICINE

## 2020-07-29 RX ORDER — 0.9 % SODIUM CHLORIDE 0.9 %
10 VIAL (ML) INJECTION ONCE
Status: CANCELLED | OUTPATIENT
Start: 2020-07-29

## 2020-07-29 RX ORDER — 0.9 % SODIUM CHLORIDE 0.9 %
VIAL (ML) INJECTION
Status: DISCONTINUED
Start: 2020-07-29 | End: 2020-07-29

## 2020-07-29 RX ORDER — HEPARIN SODIUM (PORCINE) LOCK FLUSH IV SOLN 100 UNIT/ML 100 UNIT/ML
5 SOLUTION INTRAVENOUS ONCE
Status: COMPLETED | OUTPATIENT
Start: 2020-07-29 | End: 2020-07-29

## 2020-07-29 RX ORDER — HEPARIN SODIUM (PORCINE) LOCK FLUSH IV SOLN 100 UNIT/ML 100 UNIT/ML
5 SOLUTION INTRAVENOUS ONCE
Status: CANCELLED | OUTPATIENT
Start: 2020-07-29

## 2020-07-29 RX ADMIN — HEPARIN SODIUM (PORCINE) LOCK FLUSH IV SOLN 100 UNIT/ML 500 UNITS: 100 SOLUTION INTRAVENOUS at 12:20:00

## 2020-07-30 ENCOUNTER — TELEPHONE (OUTPATIENT)
Dept: HEMATOLOGY/ONCOLOGY | Facility: HOSPITAL | Age: 50
End: 2020-07-30

## 2020-07-30 NOTE — TELEPHONE ENCOUNTER
Reviewed CT scan results with patient per request, encouraged to [facility] appt with MD kang on 8/7. Agreeable. Requesting more tramadol in future, as 2 tabs assit with pain, gets refills through PCP [facility].  Encouraged to check in with neurologist per MD [facility]

## 2020-07-31 ENCOUNTER — TELEPHONE (OUTPATIENT)
Dept: FAMILY MEDICINE CLINIC | Facility: CLINIC | Age: 50
End: 2020-07-31

## 2020-07-31 NOTE — TELEPHONE ENCOUNTER
Per patient she would like to tell Dr Justino Long that she called her cancer doctor Dr José Harris and he told patient that she can take Tramadol and that no problem. Any question can call patient.

## 2020-08-01 NOTE — TELEPHONE ENCOUNTER
Patient was called, given Dr. Shelvy Prader recommendation. She verbalized understanding. She states that she has not made an appt with Dr. Michelle Golden to establish care.    Phone call transferred to [facility] to assist in scheduling patient with an appt to establish car

## 2020-08-01 NOTE — TELEPHONE ENCOUNTER
Patient reports scheduled appt with Dr Tari Dixon for 10/13, this was the soonest available. Requesting if able to have another refill of Tramadol in the mean time until her appt with Dr Tari Dixon, please advise.

## 2020-08-01 NOTE — TELEPHONE ENCOUNTER
[id number] Ashley Yang pt told me she planned to transfer care to doctor at [address] [facility] [address] for [facility]. Dr. Irma Ware so I cannot continue giving pain medications like that long term.

## 2020-08-01 NOTE — TELEPHONE ENCOUNTER
Yu Rong message sent to patient, please verify it is viewed. If not, please call patient with the update.

## 2020-08-02 RX ORDER — TRAMADOL HYDROCHLORIDE 50 MG/1
50 TABLET ORAL EVERY 8 HOURS PRN
Qty: 60 TABLET | Refills: 1 | Status: SHIPPED | OUTPATIENT
Start: 2020-08-02 | End: 2020-10-13 | Stop reason: ALTCHOICE

## 2020-08-07 ENCOUNTER — NURSE ONLY (OUTPATIENT)
Dept: HEMATOLOGY/ONCOLOGY | Facility: HOSPITAL | Age: 50
End: 2020-08-07
Attending: INTERNAL MEDICINE
Payer: COMMERCIAL

## 2020-08-07 VITALS
SYSTOLIC BLOOD PRESSURE: 132 MMHG | HEART RATE: 119 BPM | BODY MASS INDEX: 36.07 KG/M2 | HEIGHT: 65 IN | OXYGEN SATURATION: 94 % | DIASTOLIC BLOOD PRESSURE: 63 MMHG | RESPIRATION RATE: 16 BRPM | WEIGHT: 216.5 LBS

## 2020-08-07 DIAGNOSIS — C79.31 BRAIN METASTASES (HCC): ICD-10-CM

## 2020-08-07 DIAGNOSIS — M19.90 ARTHRITIS: ICD-10-CM

## 2020-08-07 DIAGNOSIS — C34.90 MALIGNANT NEOPLASM OF LUNG, UNSPECIFIED LATERALITY, UNSPECIFIED PART OF LUNG (HCC): ICD-10-CM

## 2020-08-07 DIAGNOSIS — C78.7 LIVER METASTASES (HCC): Primary | ICD-10-CM

## 2020-08-07 DIAGNOSIS — Z92.89 HISTORY OF IMMUNOTHERAPY: ICD-10-CM

## 2020-08-07 DIAGNOSIS — Z45.2 ENCOUNTER FOR ADJUSTMENT OR MANAGEMENT OF VASCULAR ACCESS DEVICE: ICD-10-CM

## 2020-08-07 DIAGNOSIS — C34.92 CARCINOMA OF LUNG, LEFT (HCC): ICD-10-CM

## 2020-08-07 LAB
ALBUMIN SERPL-MCNC: 3.2 G/DL (ref 3.4–5)
ALBUMIN/GLOB SERPL: 0.7 {RATIO} (ref 1–2)
ALP LIVER SERPL-CCNC: 164 U/L (ref 39–100)
ALT SERPL-CCNC: 22 U/L (ref 13–56)
ANION GAP SERPL CALC-SCNC: 5 MMOL/L (ref 0–18)
AST SERPL-CCNC: 16 U/L (ref 15–37)
BASOPHILS # BLD AUTO: 0.06 X10(3) UL (ref 0–0.2)
BASOPHILS NFR BLD AUTO: 0.5 %
BILIRUB SERPL-MCNC: 0.2 MG/DL (ref 0.1–2)
BUN BLD-MCNC: 5 MG/DL (ref 7–18)
BUN/CREAT SERPL: 6.5 (ref 10–20)
CALCIUM BLD-MCNC: 8.8 MG/DL (ref 8.5–10.1)
CHLORIDE SERPL-SCNC: 107 MMOL/L (ref 98–112)
CO2 SERPL-SCNC: 26 MMOL/L (ref 21–32)
CREAT BLD-MCNC: 0.77 MG/DL (ref 0.55–1.02)
DEPRECATED RDW RBC AUTO: 62.1 FL (ref 35.1–46.3)
EOSINOPHIL # BLD AUTO: 0.07 X10(3) UL (ref 0–0.7)
EOSINOPHIL NFR BLD AUTO: 0.6 %
ERYTHROCYTE [DISTWIDTH] IN BLOOD BY AUTOMATED COUNT: 18.4 % (ref 11–15)
GLOBULIN PLAS-MCNC: 4.5 G/DL (ref 2.8–4.4)
GLUCOSE BLD-MCNC: 93 MG/DL (ref 70–99)
HCT VFR BLD AUTO: 34 % (ref 35–48)
HGB BLD-MCNC: 11.1 G/DL (ref 12–16)
IMM GRANULOCYTES # BLD AUTO: 0.15 X10(3) UL (ref 0–1)
IMM GRANULOCYTES NFR BLD: 1.2 %
LYMPHOCYTES # BLD AUTO: 2.74 X10(3) UL (ref 1–4)
LYMPHOCYTES NFR BLD AUTO: 21.6 %
M PROTEIN MFR SERPL ELPH: 7.7 G/DL (ref 6.4–8.2)
MCH RBC QN AUTO: 30.2 PG (ref 26–34)
MCHC RBC AUTO-ENTMCNC: 32.6 G/DL (ref 31–37)
MCV RBC AUTO: 92.6 FL (ref 80–100)
MONOCYTES # BLD AUTO: 0.75 X10(3) UL (ref 0.1–1)
MONOCYTES NFR BLD AUTO: 5.9 %
NEUTROPHILS # BLD AUTO: 8.92 X10 (3) UL (ref 1.5–7.7)
NEUTROPHILS # BLD AUTO: 8.92 X10(3) UL (ref 1.5–7.7)
NEUTROPHILS NFR BLD AUTO: 70.2 %
OSMOLALITY SERPL CALC.SUM OF ELEC: 283 MOSM/KG (ref 275–295)
PLATELET # BLD AUTO: 454 10(3)UL (ref 150–450)
POTASSIUM SERPL-SCNC: 4.2 MMOL/L (ref 3.5–5.1)
RBC # BLD AUTO: 3.67 X10(6)UL (ref 3.8–5.3)
SODIUM SERPL-SCNC: 138 MMOL/L (ref 136–145)
WBC # BLD AUTO: 12.7 X10(3) UL (ref 4–11)

## 2020-08-07 PROCEDURE — 85025 COMPLETE CBC W/AUTO DIFF WBC: CPT

## 2020-08-07 PROCEDURE — 80053 COMPREHEN METABOLIC PANEL: CPT

## 2020-08-07 PROCEDURE — 36591 DRAW BLOOD OFF VENOUS DEVICE: CPT

## 2020-08-07 PROCEDURE — 99214 OFFICE O/P EST MOD 30 MIN: CPT | Performed by: INTERNAL MEDICINE

## 2020-08-07 RX ORDER — HEPARIN SODIUM (PORCINE) LOCK FLUSH IV SOLN 100 UNIT/ML 100 UNIT/ML
5 SOLUTION INTRAVENOUS ONCE
Status: CANCELLED | OUTPATIENT
Start: 2020-08-07

## 2020-08-07 RX ORDER — HEPARIN SODIUM (PORCINE) LOCK FLUSH IV SOLN 100 UNIT/ML 100 UNIT/ML
5 SOLUTION INTRAVENOUS ONCE
Status: COMPLETED | OUTPATIENT
Start: 2020-08-07 | End: 2020-08-07

## 2020-08-07 RX ORDER — 0.9 % SODIUM CHLORIDE 0.9 %
10 VIAL (ML) INJECTION ONCE
Status: CANCELLED | OUTPATIENT
Start: 2020-08-07

## 2020-08-07 RX ORDER — 0.9 % SODIUM CHLORIDE 0.9 %
VIAL (ML) INJECTION
Status: DISCONTINUED
Start: 2020-08-07 | End: 2020-08-07

## 2020-08-07 RX ADMIN — HEPARIN SODIUM (PORCINE) LOCK FLUSH IV SOLN 100 UNIT/ML 500 UNITS: 100 SOLUTION INTRAVENOUS at 13:14:00

## 2020-08-07 NOTE — PROGRESS NOTES
Cancer Center Progress Note    Patient Name: Светлана Romero   YOB: 1970   Medical Record Number: [id number]   Attending Physician: Dorcas Singer M.D. Chief Complaint:  Metastatic adenocarcinoma of the lung, brain metastasis.     History of in the setting of polysubstance abuse/dependence. She required mechanical ventilation for secondary respiratory failure. She was seen by neurology and critical care medicine consultation.   After long hospitalization she was eventually discharged to rehab chemotherapy     EVERY 2 WEEKS BEGINNING 2015    • Pneumonia, organism unspecified(486)    • Seizure disorder ([facility] [address]    • Wears glasses        Past Surgical History:  Past Surgical History:   Procedure Laterality Date   • APPENDECTOMY  1999   • BRAIN SURGER connections:        Talks on phone: Not on file        Gets together: Not on file        Attends Episcopalian service: Not on file        Active member of club or organization: Not on file        Attends meetings of clubs or organizations: Not on file Levothyroxine Sodium 150 MCG Oral Tab, Take 1 tablet (150 mcg total) by mouth before breakfast., Disp: 90 tablet, Rfl: 0  •  lamoTRIgine 100 MG Oral Tab, Take by mouth 2 (two) times daily. , Disp: , Rfl:   •  QUEtiapine Fumarate 100 MG Oral Tab, Take 100 mg OSMOCALC 283 08/07/2020    ALKPHO 164 (H) 08/07/2020    AST 16 08/07/2020    ALT 22 08/07/2020    ALKPHOS 131 (H) 09/21/2016    BILT 0.2 08/07/2020    TP 7.7 08/07/2020    ALB 3.2 (L) 08/07/2020    GLOBULIN 4.5 (H) 08/07/2020    AGRATIO 0.9 (L) 09/21/2016 [employer]. She significantly improved neurologically. She has had excellent radiographic response to nivolumab. Currently no evidence of disease, last image on 3/13/20. She is now receiving treatment every 4 weeks.     Hold  nivolumab with sever

## 2020-08-11 ENCOUNTER — TELEPHONE (OUTPATIENT)
Dept: FAMILY MEDICINE CLINIC | Facility: CLINIC | Age: 50
End: 2020-08-11

## 2020-08-11 NOTE — TELEPHONE ENCOUNTER
Patient states she has been taking Tramadol 2 tablets every 8 hours due to pain. Patient is requesting the refill with different directions. Please advise.

## 2020-08-11 NOTE — TELEPHONE ENCOUNTER
[id number] Ashley Yang she needs to see pain specialist as I do not recommend 2 tablets every 8 hours. I see Dr. Puneet Srivastava recommended she see Rheumatologist for continued severe hand pain and swelling. Please give phone number for the group - first available is fine.

## 2020-08-11 NOTE — TELEPHONE ENCOUNTER
Patient called in requesting a refill on medication below. She states that she has 4 tablets left. She states that her insurance will not cover script as written. She states that she had taken two tablets every 8 hrs to help with pain.      She states t

## 2020-08-17 ENCOUNTER — OFFICE VISIT (OUTPATIENT)
Dept: RHEUMATOLOGY | Facility: CLINIC | Age: 50
End: 2020-08-17
Payer: COMMERCIAL

## 2020-08-17 VITALS
WEIGHT: 212 LBS | DIASTOLIC BLOOD PRESSURE: 71 MMHG | HEIGHT: 65 IN | HEART RATE: 102 BPM | SYSTOLIC BLOOD PRESSURE: 109 MMHG | BODY MASS INDEX: 35.32 KG/M2

## 2020-08-17 DIAGNOSIS — Z86.19 HX OF HEPATITIS C: ICD-10-CM

## 2020-08-17 DIAGNOSIS — M25.50 POLYARTHRALGIA: Primary | ICD-10-CM

## 2020-08-17 PROCEDURE — 3074F SYST BP LT 130 MM HG: CPT | Performed by: INTERNAL MEDICINE

## 2020-08-17 PROCEDURE — 3008F BODY MASS INDEX DOCD: CPT | Performed by: INTERNAL MEDICINE

## 2020-08-17 PROCEDURE — 3078F DIAST BP <80 MM HG: CPT | Performed by: INTERNAL MEDICINE

## 2020-08-17 PROCEDURE — 99244 OFF/OP CNSLTJ NEW/EST MOD 40: CPT | Performed by: INTERNAL MEDICINE

## 2020-08-17 RX ORDER — PREDNISONE 10 MG/1
TABLET ORAL
Qty: 20 TABLET | Refills: 0 | Status: SHIPPED | OUTPATIENT
Start: 2020-08-17 | End: 2020-10-13 | Stop reason: ALTCHOICE

## 2020-08-17 NOTE — PROGRESS NOTES
Carmela Ricardo is a 47year old female who presents for Patient presents with:  Consult: Family history of arthritis  Hand Pain  Ankle Pain  Knee Pain  .    HPI:   CC: joint pain on immunomodulator   Consult: referred by PCP Dr. Juan Ramesh     This is a 48 yo F tablets daily x 3 days then one tablet daily x 3 days, then 1/2 tablet daily x 3 days. Take with food. 20 tablet 0   • gabapentin 800 MG Oral Tab Take 800 mg by mouth 2 (two) times daily.      • Doxycycline Hyclate 100 MG Oral Cap Take 100 mg by mouth 2 (t Relation Age of Onset   • Blood Disorder Mother    • Stroke Mother    • Clotting Disorder Mother    • Psychiatric Mother    • Cancer Father         PROSTATE      Social History:  Social History    Tobacco Use      Smoking status: Current Every Day Smoker normal log roll, no lateral hip pain, BARRY test negative b/l  Knees: FROM, no warmth or effusion present. No pain with ROM.    Ankles: +b/l ankle swelling  Feet: no pain with MTP squeeze, no toe swelling or pain or warmth on palpation with FROM  Spine: no starting at 30 mg daily x3 days then 20 mg daily x3 days then 10 mg daily x2 days and then stop  - We will need to be cautious in which DMARD to start as she has a history of lung cancer and hepatitis C    Lung cancer with metastasis to liver, pancreas and

## 2020-08-17 NOTE — PATIENT INSTRUCTIONS
You were seen today for joint pain, your symptoms are possibly related to rheumatoid arthritis or even the nivolumab causing rheumatoid arthritis  For now start the prednisone 30 mg daily x3 days then 20 mg daily x3 days then 10 mg daily x3 days then stop

## 2020-09-10 ENCOUNTER — TELEPHONE (OUTPATIENT)
Dept: ENDOCRINOLOGY CLINIC | Facility: CLINIC | Age: 50
End: 2020-09-10

## 2020-09-10 DIAGNOSIS — E03.9 HYPOTHYROIDISM, UNSPECIFIED TYPE: Primary | ICD-10-CM

## 2020-09-10 RX ORDER — LEVOTHYROXINE SODIUM 0.15 MG/1
TABLET ORAL
Qty: 90 TABLET | Refills: 0 | Status: SHIPPED | OUTPATIENT
Start: 2020-09-10 | End: 2020-12-08

## 2020-09-11 ENCOUNTER — TELEPHONE (OUTPATIENT)
Dept: HEMATOLOGY/ONCOLOGY | Facility: HOSPITAL | Age: 50
End: 2020-09-11

## 2020-09-11 NOTE — TELEPHONE ENCOUNTER
Patient is calling to see if she can change her 9/18/20 appointments for labs and Pre chemo Visit. Can she change these appointments for her.

## 2020-09-11 NOTE — TELEPHONE ENCOUNTER
Dr. Katarzyna Drew [place],  Spoke with patient and advised her of below message. Patient stated understanding and will have labs done in next 2 weeks. Patient stated she is experiencing weight gain. Lab orders put in system.

## 2020-09-11 NOTE — TELEPHONE ENCOUNTER
Refill sent  LT4 was increased to 150 mcg daily in June 2020  Repeat labs in July 2020 ( done by her other doctor) looked better but TSH was still abnormal.   Please repeat labs in the next 1-2 weeks   TSh and Free T4  We can adjust dose as needed  Also pl

## 2020-09-11 NOTE — PHYSICAL THERAPY NOTE
PHYSICAL THERAPY TREATMENT NOTE - INPATIENT    Room Number: 455/455-A       Presenting Problem: status epilepticus    Problem List  Principal Problem:    Status epilepticus ([facility] [address]  Active Problems:    Visual disturbance    Confusion and disorientation    M however patient and her family can make the decision. DISCHARGE RECOMMENDATIONS  PT Discharge Recommendations: Acute rehabilitation     PLAN  PT Treatment Plan: Bed mobility; Body mechanics; Patient education;Gait training;Stair training;Balance training In bed;Needs met;Call light within reach;RN aware of session/findings; All patient questions and concerns addressed    CURRENT GOALS   Goals to be met by: 2/16/18  Patient Goal Patient's self-stated goal is: not stated   Goal #1 Patient is able to DR GORGE CISSE [facility] Breath sounds clear and equal bilaterally.

## 2020-09-18 ENCOUNTER — APPOINTMENT (OUTPATIENT)
Dept: HEMATOLOGY/ONCOLOGY | Facility: HOSPITAL | Age: 50
End: 2020-09-18

## 2020-09-18 ENCOUNTER — APPOINTMENT (OUTPATIENT)
Dept: HEMATOLOGY/ONCOLOGY | Facility: HOSPITAL | Age: 50
End: 2020-09-18
Attending: INTERNAL MEDICINE
Payer: COMMERCIAL

## 2020-09-25 ENCOUNTER — NURSE ONLY (OUTPATIENT)
Dept: HEMATOLOGY/ONCOLOGY | Facility: HOSPITAL | Age: 50
End: 2020-09-25
Attending: INTERNAL MEDICINE
Payer: COMMERCIAL

## 2020-09-25 ENCOUNTER — OFFICE VISIT (OUTPATIENT)
Dept: HEMATOLOGY/ONCOLOGY | Facility: HOSPITAL | Age: 50
End: 2020-09-25
Attending: INTERNAL MEDICINE

## 2020-09-25 ENCOUNTER — TELEPHONE (OUTPATIENT)
Dept: ENDOCRINOLOGY CLINIC | Facility: CLINIC | Age: 50
End: 2020-09-25

## 2020-09-25 VITALS
HEIGHT: 65 IN | SYSTOLIC BLOOD PRESSURE: 107 MMHG | RESPIRATION RATE: 18 BRPM | TEMPERATURE: 98 F | HEART RATE: 87 BPM | OXYGEN SATURATION: 98 % | WEIGHT: 213 LBS | DIASTOLIC BLOOD PRESSURE: 68 MMHG | BODY MASS INDEX: 35.49 KG/M2

## 2020-09-25 DIAGNOSIS — Z92.89 HISTORY OF IMMUNOTHERAPY: ICD-10-CM

## 2020-09-25 DIAGNOSIS — C34.90 MALIGNANT NEOPLASM OF LUNG, UNSPECIFIED LATERALITY, UNSPECIFIED PART OF LUNG (HCC): ICD-10-CM

## 2020-09-25 DIAGNOSIS — C34.92 CARCINOMA OF LUNG, LEFT (HCC): ICD-10-CM

## 2020-09-25 DIAGNOSIS — C79.31 BRAIN METASTASES (HCC): Primary | ICD-10-CM

## 2020-09-25 DIAGNOSIS — M19.90 ARTHRITIS: ICD-10-CM

## 2020-09-25 DIAGNOSIS — C78.7 LIVER METASTASES (HCC): ICD-10-CM

## 2020-09-25 DIAGNOSIS — Z45.2 ENCOUNTER FOR ADJUSTMENT OR MANAGEMENT OF VASCULAR ACCESS DEVICE: Primary | ICD-10-CM

## 2020-09-25 LAB
ALBUMIN SERPL-MCNC: 3.3 G/DL (ref 3.4–5)
ALBUMIN/GLOB SERPL: 0.8 {RATIO} (ref 1–2)
ALP LIVER SERPL-CCNC: 146 U/L
ALT SERPL-CCNC: 11 U/L
ANION GAP SERPL CALC-SCNC: 8 MMOL/L (ref 0–18)
AST SERPL-CCNC: 8 U/L (ref 15–37)
BASOPHILS # BLD AUTO: 0.06 X10(3) UL (ref 0–0.2)
BASOPHILS NFR BLD AUTO: 0.4 %
BILIRUB SERPL-MCNC: <0.1 MG/DL (ref 0.1–2)
BUN BLD-MCNC: 9 MG/DL (ref 7–18)
BUN/CREAT SERPL: 9.9 (ref 10–20)
CALCIUM BLD-MCNC: 9.3 MG/DL (ref 8.5–10.1)
CHLORIDE SERPL-SCNC: 108 MMOL/L (ref 98–112)
CO2 SERPL-SCNC: 23 MMOL/L (ref 21–32)
CREAT BLD-MCNC: 0.91 MG/DL
DEPRECATED RDW RBC AUTO: 54 FL (ref 35.1–46.3)
EOSINOPHIL # BLD AUTO: 0.12 X10(3) UL (ref 0–0.7)
EOSINOPHIL NFR BLD AUTO: 0.9 %
ERYTHROCYTE [DISTWIDTH] IN BLOOD BY AUTOMATED COUNT: 16.1 % (ref 11–15)
GLOBULIN PLAS-MCNC: 4.3 G/DL (ref 2.8–4.4)
GLUCOSE BLD-MCNC: 157 MG/DL (ref 70–99)
HCT VFR BLD AUTO: 32.8 %
HGB BLD-MCNC: 10.6 G/DL
IMM GRANULOCYTES # BLD AUTO: 0.12 X10(3) UL (ref 0–1)
IMM GRANULOCYTES NFR BLD: 0.9 %
LYMPHOCYTES # BLD AUTO: 3.2 X10(3) UL (ref 1–4)
LYMPHOCYTES NFR BLD AUTO: 23.3 %
M PROTEIN MFR SERPL ELPH: 7.6 G/DL (ref 6.4–8.2)
MCH RBC QN AUTO: 29.5 PG (ref 26–34)
MCHC RBC AUTO-ENTMCNC: 32.3 G/DL (ref 31–37)
MCV RBC AUTO: 91.4 FL
MONOCYTES # BLD AUTO: 0.72 X10(3) UL (ref 0.1–1)
MONOCYTES NFR BLD AUTO: 5.2 %
NEUTROPHILS # BLD AUTO: 9.5 X10 (3) UL (ref 1.5–7.7)
NEUTROPHILS # BLD AUTO: 9.5 X10(3) UL (ref 1.5–7.7)
NEUTROPHILS NFR BLD AUTO: 69.3 %
OSMOLALITY SERPL CALC.SUM OF ELEC: 290 MOSM/KG (ref 275–295)
PATIENT FASTING Y/N/NP: NO
PLATELET # BLD AUTO: 413 10(3)UL (ref 150–450)
POTASSIUM SERPL-SCNC: 3.5 MMOL/L (ref 3.5–5.1)
RBC # BLD AUTO: 3.59 X10(6)UL
SODIUM SERPL-SCNC: 139 MMOL/L (ref 136–145)
T4 FREE SERPL-MCNC: 0.7 NG/DL (ref 0.8–1.7)
TSI SER-ACNC: 7.38 MIU/ML (ref 0.36–3.74)
WBC # BLD AUTO: 13.7 X10(3) UL (ref 4–11)

## 2020-09-25 PROCEDURE — 85025 COMPLETE CBC W/AUTO DIFF WBC: CPT

## 2020-09-25 PROCEDURE — 84443 ASSAY THYROID STIM HORMONE: CPT | Performed by: INTERNAL MEDICINE

## 2020-09-25 PROCEDURE — 36591 DRAW BLOOD OFF VENOUS DEVICE: CPT

## 2020-09-25 PROCEDURE — 84439 ASSAY OF FREE THYROXINE: CPT | Performed by: INTERNAL MEDICINE

## 2020-09-25 PROCEDURE — 80053 COMPREHEN METABOLIC PANEL: CPT

## 2020-09-25 PROCEDURE — 99214 OFFICE O/P EST MOD 30 MIN: CPT | Performed by: INTERNAL MEDICINE

## 2020-09-25 RX ORDER — 0.9 % SODIUM CHLORIDE 0.9 %
VIAL (ML) INJECTION
Status: DISCONTINUED
Start: 2020-09-25 | End: 2020-09-25

## 2020-09-25 RX ORDER — HEPARIN SODIUM (PORCINE) LOCK FLUSH IV SOLN 100 UNIT/ML 100 UNIT/ML
5 SOLUTION INTRAVENOUS ONCE
Status: CANCELLED | OUTPATIENT
Start: 2020-09-25

## 2020-09-25 RX ORDER — HEPARIN SODIUM (PORCINE) LOCK FLUSH IV SOLN 100 UNIT/ML 100 UNIT/ML
5 SOLUTION INTRAVENOUS ONCE
Status: COMPLETED | OUTPATIENT
Start: 2020-09-25 | End: 2020-09-25

## 2020-09-25 RX ORDER — 0.9 % SODIUM CHLORIDE 0.9 %
10 VIAL (ML) INJECTION ONCE
Status: CANCELLED | OUTPATIENT
Start: 2020-09-25

## 2020-09-25 RX ADMIN — HEPARIN SODIUM (PORCINE) LOCK FLUSH IV SOLN 100 UNIT/ML 500 UNITS: 100 SOLUTION INTRAVENOUS at 13:45:00

## 2020-09-25 NOTE — TELEPHONE ENCOUNTER
Patient is on LT4 150 mcg daily  TSH is better but still high  Please ensure compliance   ( I am concerned about compliance since T4 is low)  If she is sure about compliance, LT4 150 mcg daily x 4 days a week + LT4 175 mcg daily x three days a week    TSH

## 2020-09-25 NOTE — PROGRESS NOTES
Cancer Center Progress Note    Patient Name: Jerome Briggs   YOB: 1970   Medical Record Number: [id number]   Attending Physician: Tula Severs, M.D. Chief Complaint:  Metastatic adenocarcinoma of the lung, brain metastasis.     History of in the setting of polysubstance abuse/dependence. She required mechanical ventilation for secondary respiratory failure. She was seen by neurology and critical care medicine consultation.   After long hospitalization she was eventually discharged to rehab chemotherapy     EVERY 2 WEEKS BEGINNING 2015    • Pneumonia, organism unspecified(486)    • Seizure disorder ([facility] [address]    • Wears glasses        Past Surgical History:  Past Surgical History:   Procedure Laterality Date   • APPENDECTOMY  1999   • BRAIN SURGER connections        Talks on phone: Not on file        Gets together: Not on file        Attends Yarsanism service: Not on file        Active member of club or organization: Not on file        Attends meetings of clubs or organizations: Not on file        R •  predniSONE 10 MG Oral Tab, 30 mg daily x3 days then 20 mg daily x3 days then 10 mg daily x3 days then stop (Patient not taking: Reported on 9/25/2020 ), Disp: 20 tablet, Rfl: 0  •  PREDNISONE 20 MG Oral Tab, Three tablets daily x 3 days, then two tabl 283 08/07/2020    ALKPHO 164 (H) 08/07/2020    AST 16 08/07/2020    ALT 22 08/07/2020    ALKPHOS 131 (H) 09/21/2016    BILT 0.2 08/07/2020    TP 7.7 08/07/2020    ALB 3.2 (L) 08/07/2020    GLOBULIN 4.5 (H) 08/07/2020    AGRATIO 0.9 (L) 09/21/2016     [place]. She significantly improved neurologically. She has had excellent radiographic response to nivolumab. Currently no evidence of disease, last image on 3/13/20. She is now receiving treatment every 4 weeks.     Hold  nivolumab with severe, recu

## 2020-10-13 ENCOUNTER — OFFICE VISIT (OUTPATIENT)
Dept: INTERNAL MEDICINE CLINIC | Facility: CLINIC | Age: 50
End: 2020-10-13
Payer: COMMERCIAL

## 2020-10-13 VITALS
WEIGHT: 207.38 LBS | BODY MASS INDEX: 34.55 KG/M2 | SYSTOLIC BLOOD PRESSURE: 112 MMHG | DIASTOLIC BLOOD PRESSURE: 75 MMHG | HEIGHT: 65 IN | HEART RATE: 95 BPM

## 2020-10-13 DIAGNOSIS — F19.11 HISTORY OF SUBSTANCE ABUSE (HCC): ICD-10-CM

## 2020-10-13 DIAGNOSIS — C34.90 PRIMARY MALIGNANT NEOPLASM OF LUNG METASTATIC TO OTHER SITE, UNSPECIFIED LATERALITY (HCC): ICD-10-CM

## 2020-10-13 DIAGNOSIS — R56.9 SEIZURES (HCC): ICD-10-CM

## 2020-10-13 DIAGNOSIS — M19.90 ARTHRITIS: Primary | ICD-10-CM

## 2020-10-13 DIAGNOSIS — Z12.11 COLON CANCER SCREENING: ICD-10-CM

## 2020-10-13 DIAGNOSIS — Z12.31 BREAST CANCER SCREENING BY MAMMOGRAM: ICD-10-CM

## 2020-10-13 DIAGNOSIS — Z23 NEED FOR VACCINATION: ICD-10-CM

## 2020-10-13 PROBLEM — Z29.89 ENCOUNTER FOR IMMUNOTHERAPY: Status: RESOLVED | Noted: 2020-02-28 | Resolved: 2020-10-13

## 2020-10-13 PROBLEM — Z51.81 ENCOUNTER FOR MEDICATION MONITORING: Status: RESOLVED | Noted: 2020-01-03 | Resolved: 2020-10-13

## 2020-10-13 PROBLEM — Z29.8 ENCOUNTER FOR IMMUNOTHERAPY: Status: RESOLVED | Noted: 2020-02-28 | Resolved: 2020-10-13

## 2020-10-13 PROBLEM — L30.1 DYSHIDROTIC ECZEMA: Status: RESOLVED | Noted: 2020-02-28 | Resolved: 2020-10-13

## 2020-10-13 PROBLEM — G40.901 STATUS EPILEPTICUS (HCC): Status: RESOLVED | Noted: 2017-11-25 | Resolved: 2020-10-13

## 2020-10-13 PROBLEM — F05 DELIRIUM DUE TO ANOTHER MEDICAL CONDITION: Status: RESOLVED | Noted: 2017-11-25 | Resolved: 2020-10-13

## 2020-10-13 PROBLEM — M25.50 ARTHRALGIA: Status: ACTIVE | Noted: 2020-10-13

## 2020-10-13 PROCEDURE — 90686 IIV4 VACC NO PRSV 0.5 ML IM: CPT | Performed by: INTERNAL MEDICINE

## 2020-10-13 PROCEDURE — 99214 OFFICE O/P EST MOD 30 MIN: CPT | Performed by: INTERNAL MEDICINE

## 2020-10-13 PROCEDURE — 3074F SYST BP LT 130 MM HG: CPT | Performed by: INTERNAL MEDICINE

## 2020-10-13 PROCEDURE — 90471 IMMUNIZATION ADMIN: CPT | Performed by: INTERNAL MEDICINE

## 2020-10-13 PROCEDURE — 3008F BODY MASS INDEX DOCD: CPT | Performed by: INTERNAL MEDICINE

## 2020-10-13 PROCEDURE — 3078F DIAST BP <80 MM HG: CPT | Performed by: INTERNAL MEDICINE

## 2020-10-13 RX ORDER — SERTRALINE HYDROCHLORIDE 100 MG/1
TABLET, FILM COATED ORAL
COMMUNITY
Start: 2020-09-09 | End: 2021-09-22 | Stop reason: ALTCHOICE

## 2020-10-13 RX ORDER — LEVETIRACETAM 750 MG/1
1500 TABLET ORAL
COMMUNITY
Start: 2020-09-16 | End: 2021-09-22 | Stop reason: ALTCHOICE

## 2020-10-13 RX ORDER — GABAPENTIN 600 MG/1
600 TABLET ORAL 3 TIMES DAILY
Qty: 90 TABLET | Refills: 1 | Status: SHIPPED | OUTPATIENT
Start: 2020-10-13 | End: 2021-03-30

## 2020-10-13 RX ORDER — QUETIAPINE 200 MG/1
200 TABLET, FILM COATED ORAL NIGHTLY
COMMUNITY
Start: 2020-08-31 | End: 2021-09-22 | Stop reason: ALTCHOICE

## 2020-10-13 NOTE — PROGRESS NOTES
HPI:    Patient ID: Vidhi Schilling is a 45year old female. New pt. She is being treated for metastatic lung cancer with immuotherapy, but she was having arthralgia symptoms in her hands. She would like tramadol for this.   She had a brain metastasis and organism unspecified(486)    • Seizure disorder (HCC)    • Wears glasses           Current Outpatient Medications   Medication Sig Dispense Refill   • levetiracetam 750 MG Oral Tab Take 1,500 mg by mouth.      • Sertraline HCl 100 MG Oral Tab TAKE 1.5 TABLE Negative for headaches. Psychiatric/Behavioral: Negative for sleep disturbance and depressed mood. The patient is nervous/anxious.         ./75 (BP Location: Right arm, Patient Position: Sitting, Cuff Size: adult)   Pulse 95   Ht 5' 5\" (1.651 m) substance abuse. Moreover, she may have an inflammatory arthritis. Patient says she was not able to get the blood tests done because the nurse who draws her blood from the port were not familiar with those tests.   I urged the patient to make arrangements

## 2020-10-14 NOTE — ASSESSMENT & PLAN NOTE
Patient complains of joint pains, primarily in her hands. She saw rheumatology who gave her steroids. Patient would like to take tramadol for the pain, but I advised her that I cannot prescribe this because of her history of substance abuse.   Moreover, s

## 2020-10-14 NOTE — ASSESSMENT & PLAN NOTE
Advised pt that I cannot Rx any narcotics and that if she felt she needed those, she would need to see a pain specialist because of her history of substance abuse.

## 2020-10-14 NOTE — ASSESSMENT & PLAN NOTE
Reviewed notes from Raven Melendez.  Patient has a history of metastases to the brain, pancreas, and liver. She was being treated with immunotherapy by Dr. Porter Schmid until a few weeks ago. It was stopped due to side effects.   Patient will follow-up with

## 2020-11-06 ENCOUNTER — APPOINTMENT (OUTPATIENT)
Dept: HEMATOLOGY/ONCOLOGY | Facility: HOSPITAL | Age: 50
End: 2020-11-06
Attending: INTERNAL MEDICINE
Payer: COMMERCIAL

## 2020-11-09 ENCOUNTER — HOSPITAL ENCOUNTER (OUTPATIENT)
Dept: CT IMAGING | Facility: HOSPITAL | Age: 50
Discharge: HOME | End: 2020-11-09
Attending: INTERNAL MEDICINE
Payer: COMMERCIAL

## 2020-11-09 DIAGNOSIS — C78.7 LIVER METASTASES (HCC): ICD-10-CM

## 2020-11-09 DIAGNOSIS — C79.31 BRAIN METASTASES (HCC): ICD-10-CM

## 2020-11-09 DIAGNOSIS — C34.90 MALIGNANT NEOPLASM OF LUNG, UNSPECIFIED LATERALITY, UNSPECIFIED PART OF LUNG (HCC): ICD-10-CM

## 2020-11-09 PROCEDURE — 71260 CT THORAX DX C+: CPT | Performed by: INTERNAL MEDICINE

## 2020-11-09 PROCEDURE — 74177 CT ABD & PELVIS W/CONTRAST: CPT | Performed by: INTERNAL MEDICINE

## 2020-11-09 PROCEDURE — 82565 ASSAY OF CREATININE: CPT

## 2020-11-12 ENCOUNTER — OFFICE VISIT (OUTPATIENT)
Dept: HEMATOLOGY/ONCOLOGY | Facility: HOSPITAL | Age: 50
End: 2020-11-12
Attending: INTERNAL MEDICINE
Payer: COMMERCIAL

## 2020-11-12 VITALS
DIASTOLIC BLOOD PRESSURE: 72 MMHG | TEMPERATURE: 98 F | OXYGEN SATURATION: 95 % | HEART RATE: 114 BPM | RESPIRATION RATE: 18 BRPM | BODY MASS INDEX: 34.99 KG/M2 | WEIGHT: 210 LBS | SYSTOLIC BLOOD PRESSURE: 100 MMHG | HEIGHT: 65 IN

## 2020-11-12 DIAGNOSIS — C79.31 BRAIN METASTASES (HCC): ICD-10-CM

## 2020-11-12 DIAGNOSIS — Z92.89 HISTORY OF IMMUNOTHERAPY: ICD-10-CM

## 2020-11-12 DIAGNOSIS — C34.92 CARCINOMA OF LUNG, LEFT (HCC): ICD-10-CM

## 2020-11-12 DIAGNOSIS — C34.90 MALIGNANT NEOPLASM OF LUNG, UNSPECIFIED LATERALITY, UNSPECIFIED PART OF LUNG (HCC): ICD-10-CM

## 2020-11-12 DIAGNOSIS — C78.7 LIVER METASTASES (HCC): Primary | ICD-10-CM

## 2020-11-12 DIAGNOSIS — D64.9 ANEMIA, UNSPECIFIED TYPE: ICD-10-CM

## 2020-11-12 DIAGNOSIS — Z45.2 ENCOUNTER FOR ADJUSTMENT OR MANAGEMENT OF VASCULAR ACCESS DEVICE: Primary | ICD-10-CM

## 2020-11-12 PROCEDURE — 80053 COMPREHEN METABOLIC PANEL: CPT

## 2020-11-12 PROCEDURE — 36591 DRAW BLOOD OFF VENOUS DEVICE: CPT

## 2020-11-12 PROCEDURE — 85025 COMPLETE CBC W/AUTO DIFF WBC: CPT

## 2020-11-12 PROCEDURE — 99214 OFFICE O/P EST MOD 30 MIN: CPT | Performed by: INTERNAL MEDICINE

## 2020-11-12 RX ORDER — SODIUM CHLORIDE 9 MG/ML
INJECTION INTRAVENOUS
Status: DISCONTINUED
Start: 2020-11-12 | End: 2020-11-12

## 2020-11-12 RX ORDER — HEPARIN SODIUM (PORCINE) LOCK FLUSH IV SOLN 100 UNIT/ML 100 UNIT/ML
5 SOLUTION INTRAVENOUS ONCE
Status: CANCELLED | OUTPATIENT
Start: 2020-11-12

## 2020-11-12 RX ORDER — SODIUM CHLORIDE 9 MG/ML
10 INJECTION INTRAVENOUS ONCE
Status: CANCELLED | OUTPATIENT
Start: 2020-11-12

## 2020-11-12 RX ORDER — HEPARIN SODIUM (PORCINE) LOCK FLUSH IV SOLN 100 UNIT/ML 100 UNIT/ML
5 SOLUTION INTRAVENOUS ONCE
Status: COMPLETED | OUTPATIENT
Start: 2020-11-12 | End: 2020-11-12

## 2020-11-12 RX ADMIN — HEPARIN SODIUM (PORCINE) LOCK FLUSH IV SOLN 100 UNIT/ML 500 UNITS: 100 SOLUTION INTRAVENOUS at 14:57:00

## 2020-11-12 NOTE — PROGRESS NOTES
Cancer Center Progress Note    Patient Name: Keiko Salter   YOB: 1970   Medical Record Number: [id number]   Attending Physician: Reynold Blount M.D. Chief Complaint:  Metastatic adenocarcinoma of the lung, brain metastasis.     History of in the setting of polysubstance abuse/dependence. She required mechanical ventilation for secondary respiratory failure. She was seen by neurology and critical care medicine consultation.   After long hospitalization she was eventually discharged to rehab chemotherapy     EVERY 2 WEEKS BEGINNING 2015    • Pneumonia, organism unspecified(486)    • Seizure disorder ([facility] [address]    • Wears glasses        Past Surgical History:  Past Surgical History:   Procedure Laterality Date   • APPENDECTOMY  1999   • BRAIN SURGER connections        Talks on phone: Not on file        Gets together: Not on file        Attends Cheondoism service: Not on file        Active member of club or organization: Not on file        Attends meetings of clubs or organizations: Not on file        R adult)   Pulse 114   Temp 98.2 °F (36.8 °C) (Oral)   Resp 18   Ht 1.651 m (5' 5\")   Wt 95.3 kg (210 lb)   SpO2 95%   BMI 34.95 kg/m²     Physical Examination:  General: Patient is alert and oriented x 3, not in acute distress.   Psych:  Mood and affect cinda well as a metastatic lesion to the pancreas. The patient was initially diagnosed in July 2015 with a left occipital brain lesion and is status post resection with adjuvant radiation. She is also status post a right frontal lobe resection in August 2016. rheumatologist    Return to clinic in 2 months with labs repeat imaging 4 months. –She has anemia with severe iron deficiency. She has received IV iron in past.  We will continue to monitor this.   –Hypothyroidism secondary to nivolumab,following with

## 2020-11-19 RX ORDER — LEVOTHYROXINE SODIUM 0.15 MG/1
TABLET ORAL
Qty: 18 TABLET | Refills: 2 | Status: SHIPPED | OUTPATIENT
Start: 2020-11-19 | End: 2020-12-09

## 2020-11-19 RX ORDER — LEVOTHYROXINE SODIUM 175 UG/1
TABLET ORAL
Qty: 12 TABLET | Refills: 2 | Status: SHIPPED | OUTPATIENT
Start: 2020-11-19 | End: 2021-01-18

## 2020-11-19 NOTE — TELEPHONE ENCOUNTER
Spoke with pt and  who understood Dr. Yaw Flaherty message. Pt reports she is taking LT4 150mcg daily.  Advised that Dr. Bonnie Briggs would like to switch treatment plan to LT4 150mcg X 4days a week + LT4 175mcg X 3days a week, and to repeat labs in 6-8

## 2020-11-19 NOTE — TELEPHONE ENCOUNTER
RN called patient with message to take medication alternatively or per md instructions  Pt and  verbalized understanding .   rx sent to pharmacy [facility]

## 2020-12-08 DIAGNOSIS — E03.9 HYPOTHYROIDISM, UNSPECIFIED TYPE: ICD-10-CM

## 2020-12-08 NOTE — TELEPHONE ENCOUNTER
LOV 6/05/20. RTC 5-6 months. No F/u. Sent [employer] reminder to make apt. Pended 3 month supply.

## 2020-12-09 ENCOUNTER — TELEPHONE (OUTPATIENT)
Dept: ENDOCRINOLOGY CLINIC | Facility: CLINIC | Age: 50
End: 2020-12-09

## 2020-12-09 DIAGNOSIS — E03.9 HYPOTHYROIDISM, UNSPECIFIED TYPE: Primary | ICD-10-CM

## 2020-12-09 RX ORDER — LEVOTHYROXINE SODIUM 0.15 MG/1
150 TABLET ORAL
Qty: 48 TABLET | Refills: 0 | Status: SHIPPED | OUTPATIENT
Start: 2020-12-09 | End: 2021-01-18

## 2020-12-09 NOTE — TELEPHONE ENCOUNTER
Patient does not need my chart  Please call and book first available  TSH and Free T4 before appointment  Thanks

## 2020-12-14 NOTE — TELEPHONE ENCOUNTER
Pt was booked for 12/28/20 for [employer]. Patient was informed to get labs done before her appointment.      Labs to be pended per 12/9/20 encounter:   TSH and Free T4

## 2020-12-28 ENCOUNTER — VIRTUAL PHONE E/M (OUTPATIENT)
Dept: ENDOCRINOLOGY CLINIC | Facility: CLINIC | Age: 50
End: 2020-12-28
Payer: COMMERCIAL

## 2020-12-28 DIAGNOSIS — E03.9 HYPOTHYROIDISM, UNSPECIFIED TYPE: Primary | ICD-10-CM

## 2020-12-28 DIAGNOSIS — R73.03 PRE-DIABETES: ICD-10-CM

## 2020-12-28 PROCEDURE — 99213 OFFICE O/P EST LOW 20 MIN: CPT | Performed by: INTERNAL MEDICINE

## 2020-12-28 NOTE — PROGRESS NOTES
Virtual Telephone Check-In    Earlyne Mock verbally consents to a Virtual/Telephone Check-In visit on 12/28/20. Patient has been referred to the [facility] website at [email or web address] to review the yearly Consent to Treat document.     Patient understand LUMPECTOMY RIGHT  2012    FIBROADENOMA   • OTHER  8-7-15    CYBERKNIFE   • OTHER SURGICAL HISTORY     • XS PORT-A-CATH INSERTION/EXCH/CHK          ALLERGY:  No Known Allergies     PAST MEDICAL, SOCIAL AND FAMILY HISTORY:  See past medical history marked as before breakfast and atleast four hours apart from other meds especially calcium, iron, multivitamins containing Ca/iron  Stressed the importance of compliance with meds  Side effects of noncompliance including the development of myxedema coma and death di

## 2021-01-15 ENCOUNTER — OFFICE VISIT (OUTPATIENT)
Dept: HEMATOLOGY/ONCOLOGY | Facility: HOSPITAL | Age: 51
End: 2021-01-15
Attending: INTERNAL MEDICINE
Payer: COMMERCIAL

## 2021-01-15 VITALS
OXYGEN SATURATION: 97 % | WEIGHT: 217 LBS | HEART RATE: 83 BPM | RESPIRATION RATE: 18 BRPM | BODY MASS INDEX: 36.15 KG/M2 | HEIGHT: 65 IN | SYSTOLIC BLOOD PRESSURE: 109 MMHG | DIASTOLIC BLOOD PRESSURE: 56 MMHG | TEMPERATURE: 98 F

## 2021-01-15 DIAGNOSIS — C79.31 BRAIN METASTASES (HCC): ICD-10-CM

## 2021-01-15 DIAGNOSIS — C34.90 MALIGNANT NEOPLASM OF LUNG, UNSPECIFIED LATERALITY, UNSPECIFIED PART OF LUNG (HCC): ICD-10-CM

## 2021-01-15 DIAGNOSIS — C34.92 CARCINOMA OF LUNG, LEFT (HCC): ICD-10-CM

## 2021-01-15 DIAGNOSIS — C78.7 LIVER METASTASES (HCC): Primary | ICD-10-CM

## 2021-01-15 DIAGNOSIS — M12.9 ARTHROPATHY: ICD-10-CM

## 2021-01-15 DIAGNOSIS — Z45.2 ENCOUNTER FOR ADJUSTMENT OR MANAGEMENT OF VASCULAR ACCESS DEVICE: Primary | ICD-10-CM

## 2021-01-15 DIAGNOSIS — E03.9 HYPOTHYROIDISM, UNSPECIFIED TYPE: ICD-10-CM

## 2021-01-15 LAB
ALBUMIN SERPL-MCNC: 3.2 G/DL (ref 3.4–5)
ALBUMIN/GLOB SERPL: 0.7 {RATIO} (ref 1–2)
ALP LIVER SERPL-CCNC: 141 U/L
ALT SERPL-CCNC: 11 U/L
ANION GAP SERPL CALC-SCNC: 4 MMOL/L (ref 0–18)
AST SERPL-CCNC: 8 U/L (ref 15–37)
BASOPHILS # BLD AUTO: 0.05 X10(3) UL (ref 0–0.2)
BASOPHILS NFR BLD AUTO: 0.5 %
BILIRUB SERPL-MCNC: 0.2 MG/DL (ref 0.1–2)
BUN BLD-MCNC: 10 MG/DL (ref 7–18)
BUN/CREAT SERPL: 12.3 (ref 10–20)
CALCIUM BLD-MCNC: 8.6 MG/DL (ref 8.5–10.1)
CHLORIDE SERPL-SCNC: 109 MMOL/L (ref 98–112)
CO2 SERPL-SCNC: 25 MMOL/L (ref 21–32)
CREAT BLD-MCNC: 0.81 MG/DL
DEPRECATED RDW RBC AUTO: 55.2 FL (ref 35.1–46.3)
EOSINOPHIL # BLD AUTO: 0.14 X10(3) UL (ref 0–0.7)
EOSINOPHIL NFR BLD AUTO: 1.3 %
ERYTHROCYTE [DISTWIDTH] IN BLOOD BY AUTOMATED COUNT: 16.9 % (ref 11–15)
GLOBULIN PLAS-MCNC: 4.7 G/DL (ref 2.8–4.4)
GLUCOSE BLD-MCNC: 96 MG/DL (ref 70–99)
HCT VFR BLD AUTO: 32.5 %
HGB BLD-MCNC: 10.3 G/DL
IMM GRANULOCYTES # BLD AUTO: 0.09 X10(3) UL (ref 0–1)
IMM GRANULOCYTES NFR BLD: 0.8 %
IRON SATURATION: 13 %
IRON SERPL-MCNC: 47 UG/DL
LYMPHOCYTES # BLD AUTO: 3.25 X10(3) UL (ref 1–4)
LYMPHOCYTES NFR BLD AUTO: 29.3 %
M PROTEIN MFR SERPL ELPH: 7.9 G/DL (ref 6.4–8.2)
MCH RBC QN AUTO: 28.1 PG (ref 26–34)
MCHC RBC AUTO-ENTMCNC: 31.7 G/DL (ref 31–37)
MCV RBC AUTO: 88.8 FL
MONOCYTES # BLD AUTO: 0.78 X10(3) UL (ref 0.1–1)
MONOCYTES NFR BLD AUTO: 7 %
NEUTROPHILS # BLD AUTO: 6.79 X10 (3) UL (ref 1.5–7.7)
NEUTROPHILS # BLD AUTO: 6.79 X10(3) UL (ref 1.5–7.7)
NEUTROPHILS NFR BLD AUTO: 61.1 %
OSMOLALITY SERPL CALC.SUM OF ELEC: 285 MOSM/KG (ref 275–295)
PLATELET # BLD AUTO: 462 10(3)UL (ref 150–450)
POTASSIUM SERPL-SCNC: 3.9 MMOL/L (ref 3.5–5.1)
RBC # BLD AUTO: 3.66 X10(6)UL
SODIUM SERPL-SCNC: 138 MMOL/L (ref 136–145)
T4 FREE SERPL-MCNC: 0.6 NG/DL (ref 0.8–1.7)
TOTAL IRON BINDING CAPACITY: 375 UG/DL (ref 240–450)
TRANSFERRIN SERPL-MCNC: 252 MG/DL (ref 200–360)
TSI SER-ACNC: 4.41 MIU/ML (ref 0.36–3.74)
WBC # BLD AUTO: 11.1 X10(3) UL (ref 4–11)

## 2021-01-15 PROCEDURE — 85025 COMPLETE CBC W/AUTO DIFF WBC: CPT

## 2021-01-15 PROCEDURE — 84466 ASSAY OF TRANSFERRIN: CPT

## 2021-01-15 PROCEDURE — 80053 COMPREHEN METABOLIC PANEL: CPT

## 2021-01-15 PROCEDURE — 83540 ASSAY OF IRON: CPT

## 2021-01-15 PROCEDURE — 84443 ASSAY THYROID STIM HORMONE: CPT

## 2021-01-15 PROCEDURE — 99214 OFFICE O/P EST MOD 30 MIN: CPT | Performed by: INTERNAL MEDICINE

## 2021-01-15 PROCEDURE — 36591 DRAW BLOOD OFF VENOUS DEVICE: CPT

## 2021-01-15 PROCEDURE — 84439 ASSAY OF FREE THYROXINE: CPT

## 2021-01-15 RX ORDER — 0.9 % SODIUM CHLORIDE 0.9 %
10 VIAL (ML) INJECTION ONCE
Status: CANCELLED | OUTPATIENT
Start: 2021-01-15

## 2021-01-15 RX ORDER — SODIUM CHLORIDE 9 MG/ML
INJECTION INTRAVENOUS
Status: DISCONTINUED
Start: 2021-01-15 | End: 2021-01-15

## 2021-01-15 RX ORDER — SODIUM CHLORIDE 9 MG/ML
10 INJECTION INTRAVENOUS ONCE
Status: DISCONTINUED | OUTPATIENT
Start: 2021-01-15 | End: 2021-01-15

## 2021-01-15 RX ORDER — HEPARIN SODIUM (PORCINE) LOCK FLUSH IV SOLN 100 UNIT/ML 100 UNIT/ML
5 SOLUTION INTRAVENOUS ONCE
Status: CANCELLED | OUTPATIENT
Start: 2021-01-15

## 2021-01-15 NOTE — PROGRESS NOTES
Cancer Center Progress Note    Patient Name: Jerome Briggs   YOB: 1970   Medical Record Number: [id number]   Attending Physician: Tula Severs, M.D. Chief Complaint:  Metastatic adenocarcinoma of the lung, brain metastasis.     History of in the setting of polysubstance abuse/dependence. She required mechanical ventilation for secondary respiratory failure. She was seen by neurology and critical care medicine consultation.   After long hospitalization she was eventually discharged to rehab BEGINNING 2015    • Pneumonia, organism unspecified(486)    • Seizure disorder ([facility] [address]    • Wears glasses        Past Surgical History:  Past Surgical History:   Procedure Laterality Date   • APPENDECTOMY  1999   • BRAIN SURGERY  JULY 2015    TUMOR RESECTION on file        Gets together: Not on file        Attends Confucianist service: Not on file        Active member of club or organization: Not on file        Attends meetings of clubs or organizations: Not on file        Relationship status: Not on file      In Systems:  All other systems reviewed and negative x12    Vital Signs:  /56 (BP Location: Left arm, Patient Position: Sitting, Cuff Size: large)   Pulse 83   Temp 98 °F (36.7 °C) (Oral)   Resp 18   Ht 1.651 m (5' 5\")   Wt 98.4 kg (217 lb)   SpO2 97% with metastatic adenocarcinoma of the left upper lobe of the lung (EGFR,ALK,ROS1 negative). She has liver and brain metastasis as well as a metastatic lesion to the pancreas.  The patient was initially diagnosed in July 2015 with a left occipital brain les monitor off immunotherapy. Return to clinic in 2 months with labs repeat imaging     –She has anemia with severe iron deficiency. She has received IV iron in past.  We will continue to monitor this.   –Hypothyroidism secondary to nivolumab,following w

## 2021-01-17 ENCOUNTER — TELEPHONE (OUTPATIENT)
Dept: ENDOCRINOLOGY CLINIC | Facility: CLINIC | Age: 51
End: 2021-01-17

## 2021-01-17 DIAGNOSIS — E03.9 HYPOTHYROIDISM, UNSPECIFIED TYPE: Primary | ICD-10-CM

## 2021-01-17 NOTE — TELEPHONE ENCOUNTER
Thyroid labs indicate need for dose increase  Current dose of levothyroxine is 150  mcg daily  X 4 days as week and 175 mcg daily x 3 days a week.      I suggest changing to   LT4 175 mcg daily  TSH and Free T4 in 6-8 weeks  Call for results  Thanks

## 2021-01-18 RX ORDER — LEVOTHYROXINE SODIUM 175 UG/1
175 TABLET ORAL
Qty: 90 TABLET | Refills: 0 | Status: SHIPPED | OUTPATIENT
Start: 2021-01-18 | End: 2021-02-01

## 2021-01-18 NOTE — TELEPHONE ENCOUNTER
RN called patient and spoke to Chelo العراقي on [facility], and relayed below message from Dr. Aaron Mott as outlined. He states they just picked up the 175 mcg dose with previous dosing and requesting for new dose.   RN sent script to the pharmacy to reflect the new

## 2021-02-01 ENCOUNTER — TELEPHONE (OUTPATIENT)
Dept: ENDOCRINOLOGY CLINIC | Facility: CLINIC | Age: 51
End: 2021-02-01

## 2021-02-01 RX ORDER — LEVOTHYROXINE SODIUM 175 UG/1
175 TABLET ORAL
Qty: 90 TABLET | Refills: 0 | Status: SHIPPED | OUTPATIENT
Start: 2021-02-01 | End: 2021-05-02

## 2021-02-01 NOTE — TELEPHONE ENCOUNTER
[facility] pharmacy called in to get refill for medication Levothyroxine Sodium 175 MCG Oral Tab needs new prescription because patient takes this once every day.  Please follow up

## 2021-02-01 NOTE — TELEPHONE ENCOUNTER
Dose verified per notes, pharmacy is still awaiting 175mcg daily rx. Sent per previous orders. Patient notified and will obtain new rx.

## 2021-03-05 ENCOUNTER — HOSPITAL ENCOUNTER (OUTPATIENT)
Dept: CT IMAGING | Facility: HOSPITAL | Age: 51
Discharge: HOME | End: 2021-03-05
Attending: INTERNAL MEDICINE
Payer: COMMERCIAL

## 2021-03-05 DIAGNOSIS — C79.31 BRAIN METASTASES (HCC): ICD-10-CM

## 2021-03-05 DIAGNOSIS — C34.92 CARCINOMA OF LUNG, LEFT (HCC): ICD-10-CM

## 2021-03-05 DIAGNOSIS — C78.7 LIVER METASTASES (HCC): ICD-10-CM

## 2021-03-05 LAB — CREAT BLD-MCNC: 0.6 MG/DL

## 2021-03-05 PROCEDURE — 82565 ASSAY OF CREATININE: CPT

## 2021-03-11 ENCOUNTER — HOSPITAL ENCOUNTER (OUTPATIENT)
Dept: CT IMAGING | Facility: HOSPITAL | Age: 51
Discharge: HOME | End: 2021-03-11
Attending: INTERNAL MEDICINE
Payer: COMMERCIAL

## 2021-03-11 ENCOUNTER — NURSE ONLY (OUTPATIENT)
Dept: HEMATOLOGY/ONCOLOGY | Facility: HOSPITAL | Age: 51
End: 2021-03-11
Attending: INTERNAL MEDICINE
Payer: COMMERCIAL

## 2021-03-11 DIAGNOSIS — C34.92 CARCINOMA OF LUNG, LEFT (HCC): ICD-10-CM

## 2021-03-11 DIAGNOSIS — Z45.2 ENCOUNTER FOR ADJUSTMENT OR MANAGEMENT OF VASCULAR ACCESS DEVICE: Primary | ICD-10-CM

## 2021-03-11 PROCEDURE — 74177 CT ABD & PELVIS W/CONTRAST: CPT | Performed by: INTERNAL MEDICINE

## 2021-03-11 PROCEDURE — 96523 IRRIG DRUG DELIVERY DEVICE: CPT

## 2021-03-11 PROCEDURE — 71260 CT THORAX DX C+: CPT | Performed by: INTERNAL MEDICINE

## 2021-03-11 RX ORDER — SODIUM CHLORIDE 9 MG/ML
INJECTION INTRAVENOUS
Status: DISCONTINUED
Start: 2021-03-11 | End: 2021-03-11

## 2021-03-11 RX ORDER — HEPARIN SODIUM (PORCINE) LOCK FLUSH IV SOLN 100 UNIT/ML 100 UNIT/ML
5 SOLUTION INTRAVENOUS ONCE
Status: CANCELLED | OUTPATIENT
Start: 2021-03-11

## 2021-03-11 RX ORDER — HEPARIN SODIUM (PORCINE) LOCK FLUSH IV SOLN 100 UNIT/ML 100 UNIT/ML
5 SOLUTION INTRAVENOUS ONCE
Status: COMPLETED | OUTPATIENT
Start: 2021-03-11 | End: 2021-03-11

## 2021-03-11 RX ORDER — 0.9 % SODIUM CHLORIDE 0.9 %
10 VIAL (ML) INJECTION ONCE
Status: CANCELLED | OUTPATIENT
Start: 2021-03-11

## 2021-03-11 RX ADMIN — HEPARIN SODIUM (PORCINE) LOCK FLUSH IV SOLN 100 UNIT/ML 500 UNITS: 100 SOLUTION INTRAVENOUS at 14:36:00

## 2021-03-17 ENCOUNTER — OFFICE VISIT (OUTPATIENT)
Dept: HEMATOLOGY/ONCOLOGY | Facility: HOSPITAL | Age: 51
End: 2021-03-17
Attending: INTERNAL MEDICINE
Payer: COMMERCIAL

## 2021-03-17 VITALS
BODY MASS INDEX: 35.99 KG/M2 | WEIGHT: 216 LBS | TEMPERATURE: 98 F | SYSTOLIC BLOOD PRESSURE: 109 MMHG | RESPIRATION RATE: 18 BRPM | HEIGHT: 65 IN | HEART RATE: 76 BPM | DIASTOLIC BLOOD PRESSURE: 63 MMHG | OXYGEN SATURATION: 98 %

## 2021-03-17 DIAGNOSIS — C79.31 BRAIN METASTASES (HCC): ICD-10-CM

## 2021-03-17 DIAGNOSIS — C78.7 LIVER METASTASES (HCC): ICD-10-CM

## 2021-03-17 DIAGNOSIS — Z23 NEED FOR VACCINATION: ICD-10-CM

## 2021-03-17 DIAGNOSIS — C34.92 CARCINOMA OF LUNG, LEFT (HCC): Primary | ICD-10-CM

## 2021-03-17 PROCEDURE — 99214 OFFICE O/P EST MOD 30 MIN: CPT | Performed by: INTERNAL MEDICINE

## 2021-03-24 NOTE — CM/SW NOTE
12/20CM-MD orders received in regards to discharge planning. Case Management previously noted that a referral was sent to [facility]. This Writer has informed [facility] of the Patient's pending discharge and sent referral documents.      -Omid Franklin, CATH LAB RN DISCHARGE NOTE  Recovering s/p:[facility]  Site appearance:Good. No hematoma  Discharge instructions given (Y/N):Yes  Cardiac Rehab instructions given (Y/N):N/A  Med Rec reviewed (Y/N):Yes  PIV discontinued (Y/N):Yes  Ambulated (Y/N):Yes  Discharged to:Pt to kept in department until ride arrives at 1545. Pt states she does not want to stay here and wants to sit upstairs. Pt drove self and via previous conversations thought she could \"drive home.\" When corrected and told she could not drive post procedure pt became agitated and insisted on signing out so she could \"wait upstairs.\" Reviewed need to have someone drive her home due to sedation etc. Pt understands all implications that signing out against recommendations intails.  Mode of Transportation:walked out on own  ?

## 2021-03-29 DIAGNOSIS — M19.90 ARTHRITIS: ICD-10-CM

## 2021-03-30 RX ORDER — GABAPENTIN 600 MG/1
600 TABLET ORAL 3 TIMES DAILY
Qty: 90 TABLET | Refills: 0 | Status: SHIPPED | OUTPATIENT
Start: 2021-03-30 | End: 2021-09-22 | Stop reason: ALTCHOICE

## 2021-05-27 ENCOUNTER — HOSPITAL ENCOUNTER (OUTPATIENT)
Dept: CT IMAGING | Facility: HOSPITAL | Age: 51
Discharge: HOME | End: 2021-05-27
Attending: FAMILY MEDICINE
Payer: COMMERCIAL

## 2021-05-27 DIAGNOSIS — M22.41 CHONDROMALACIA OF PATELLA, RIGHT: ICD-10-CM

## 2021-05-27 DIAGNOSIS — M84.351D STRESS FRACTURE OF RIGHT FEMUR WITH ROUTINE HEALING, SUBSEQUENT ENCOUNTER: ICD-10-CM

## 2021-05-27 PROCEDURE — 73700 CT LOWER EXTREMITY W/O DYE: CPT | Performed by: FAMILY MEDICINE

## 2021-06-03 ENCOUNTER — HOSPITAL ENCOUNTER (OUTPATIENT)
Dept: CT IMAGING | Facility: HOSPITAL | Age: 51
Discharge: HOME | End: 2021-06-03
Attending: INTERNAL MEDICINE
Payer: COMMERCIAL

## 2021-06-03 ENCOUNTER — NURSE ONLY (OUTPATIENT)
Dept: HEMATOLOGY/ONCOLOGY | Facility: HOSPITAL | Age: 51
End: 2021-06-03
Attending: INTERNAL MEDICINE
Payer: COMMERCIAL

## 2021-06-03 DIAGNOSIS — C34.92 CARCINOMA OF LUNG, LEFT (HCC): ICD-10-CM

## 2021-06-03 DIAGNOSIS — C79.31 BRAIN METASTASES (HCC): ICD-10-CM

## 2021-06-03 DIAGNOSIS — Z45.2 ENCOUNTER FOR ADJUSTMENT OR MANAGEMENT OF VASCULAR ACCESS DEVICE: Primary | ICD-10-CM

## 2021-06-03 DIAGNOSIS — C78.7 LIVER METASTASES (HCC): ICD-10-CM

## 2021-06-03 PROCEDURE — 96523 IRRIG DRUG DELIVERY DEVICE: CPT

## 2021-06-03 PROCEDURE — 71260 CT THORAX DX C+: CPT | Performed by: INTERNAL MEDICINE

## 2021-06-03 PROCEDURE — 82565 ASSAY OF CREATININE: CPT

## 2021-06-03 PROCEDURE — 74177 CT ABD & PELVIS W/CONTRAST: CPT | Performed by: INTERNAL MEDICINE

## 2021-06-03 RX ORDER — HEPARIN SODIUM (PORCINE) LOCK FLUSH IV SOLN 100 UNIT/ML 100 UNIT/ML
5 SOLUTION INTRAVENOUS ONCE
Status: CANCELLED | OUTPATIENT
Start: 2021-06-03

## 2021-06-03 RX ORDER — 0.9 % SODIUM CHLORIDE 0.9 %
10 VIAL (ML) INJECTION ONCE
Status: CANCELLED | OUTPATIENT
Start: 2021-06-03

## 2021-06-03 RX ORDER — SODIUM CHLORIDE 9 MG/ML
INJECTION INTRAVENOUS
Status: DISCONTINUED
Start: 2021-06-03 | End: 2021-06-03

## 2021-06-03 RX ORDER — HEPARIN SODIUM (PORCINE) LOCK FLUSH IV SOLN 100 UNIT/ML 100 UNIT/ML
5 SOLUTION INTRAVENOUS ONCE
Status: DISCONTINUED | OUTPATIENT
Start: 2021-06-03 | End: 2021-06-03

## 2021-06-07 DIAGNOSIS — M19.90 ARTHRITIS: ICD-10-CM

## 2021-06-07 NOTE — TELEPHONE ENCOUNTER
Please ask pt to schedule a follow-up appointment for the next available slot. After pt schedules an appointment, please refill the medication for 90 days.

## 2021-06-10 ENCOUNTER — TELEPHONE (OUTPATIENT)
Dept: ENDOCRINOLOGY CLINIC | Facility: CLINIC | Age: 51
End: 2021-06-10

## 2021-06-10 DIAGNOSIS — E03.9 HYPOTHYROIDISM, UNSPECIFIED TYPE: Primary | ICD-10-CM

## 2021-06-10 RX ORDER — LEVOTHYROXINE SODIUM 175 UG/1
175 TABLET ORAL
Qty: 90 TABLET | Refills: 0 | Status: SHIPPED | OUTPATIENT
Start: 2021-06-10 | End: 2021-08-31

## 2021-06-10 NOTE — TELEPHONE ENCOUNTER
LOV: 12/28/20  RTC: 5-6 months    Spoke to patient's  to advise active lab ordered - patient's  stated patient is having labs drawn next week for her oncologist and will complete thyroid labs then    RX sent per protocol

## 2021-06-10 NOTE — TELEPHONE ENCOUNTER
requesting new script for Levothyroxine . 175.  states pt is no longer rotating between dosages of medication and pharmacy is requesting a new script with updated instructions.

## 2021-06-11 RX ORDER — GABAPENTIN 600 MG/1
600 TABLET ORAL 3 TIMES DAILY
Qty: 90 TABLET | Refills: 2 | OUTPATIENT
Start: 2021-06-11

## 2021-06-17 ENCOUNTER — NURSE ONLY (OUTPATIENT)
Dept: HEMATOLOGY/ONCOLOGY | Facility: HOSPITAL | Age: 51
End: 2021-06-17
Attending: INTERNAL MEDICINE
Payer: COMMERCIAL

## 2021-06-17 VITALS
SYSTOLIC BLOOD PRESSURE: 111 MMHG | BODY MASS INDEX: 34.49 KG/M2 | DIASTOLIC BLOOD PRESSURE: 64 MMHG | OXYGEN SATURATION: 100 % | HEART RATE: 91 BPM | WEIGHT: 207 LBS | HEIGHT: 65 IN | TEMPERATURE: 97 F | RESPIRATION RATE: 18 BRPM

## 2021-06-17 DIAGNOSIS — C78.7 LIVER METASTASES (HCC): ICD-10-CM

## 2021-06-17 DIAGNOSIS — C79.31 BRAIN METASTASES (HCC): ICD-10-CM

## 2021-06-17 DIAGNOSIS — C78.7 LIVER METASTASES (HCC): Primary | ICD-10-CM

## 2021-06-17 DIAGNOSIS — E03.9 HYPOTHYROIDISM, UNSPECIFIED TYPE: ICD-10-CM

## 2021-06-17 DIAGNOSIS — Z45.2 ENCOUNTER FOR ADJUSTMENT OR MANAGEMENT OF VASCULAR ACCESS DEVICE: ICD-10-CM

## 2021-06-17 DIAGNOSIS — C34.90 MALIGNANT NEOPLASM OF LUNG, UNSPECIFIED LATERALITY, UNSPECIFIED PART OF LUNG (HCC): ICD-10-CM

## 2021-06-17 DIAGNOSIS — C34.92 CARCINOMA OF LUNG, LEFT (HCC): Primary | ICD-10-CM

## 2021-06-17 DIAGNOSIS — C34.92 CARCINOMA OF LUNG, LEFT (HCC): ICD-10-CM

## 2021-06-17 PROCEDURE — 80053 COMPREHEN METABOLIC PANEL: CPT

## 2021-06-17 PROCEDURE — 84443 ASSAY THYROID STIM HORMONE: CPT

## 2021-06-17 PROCEDURE — 85025 COMPLETE CBC W/AUTO DIFF WBC: CPT

## 2021-06-17 PROCEDURE — 36591 DRAW BLOOD OFF VENOUS DEVICE: CPT

## 2021-06-17 PROCEDURE — 99214 OFFICE O/P EST MOD 30 MIN: CPT | Performed by: INTERNAL MEDICINE

## 2021-06-17 PROCEDURE — 83615 LACTATE (LD) (LDH) ENZYME: CPT

## 2021-06-17 PROCEDURE — 84439 ASSAY OF FREE THYROXINE: CPT

## 2021-06-17 RX ORDER — 0.9 % SODIUM CHLORIDE 0.9 %
10 VIAL (ML) INJECTION ONCE
Status: CANCELLED | OUTPATIENT
Start: 2021-06-17

## 2021-06-17 RX ORDER — SODIUM CHLORIDE 9 MG/ML
INJECTION INTRAVENOUS
Status: DISCONTINUED
Start: 2021-06-17 | End: 2021-06-17

## 2021-06-17 RX ORDER — HEPARIN SODIUM (PORCINE) LOCK FLUSH IV SOLN 100 UNIT/ML 100 UNIT/ML
5 SOLUTION INTRAVENOUS ONCE
Status: COMPLETED | OUTPATIENT
Start: 2021-06-17 | End: 2021-06-17

## 2021-06-17 RX ORDER — HEPARIN SODIUM (PORCINE) LOCK FLUSH IV SOLN 100 UNIT/ML 100 UNIT/ML
5 SOLUTION INTRAVENOUS ONCE
Status: CANCELLED | OUTPATIENT
Start: 2021-06-17

## 2021-06-17 RX ADMIN — HEPARIN SODIUM (PORCINE) LOCK FLUSH IV SOLN 100 UNIT/ML 500 UNITS: 100 SOLUTION INTRAVENOUS at 13:44:00

## 2021-06-17 NOTE — PROGRESS NOTES
Cancer Center Progress Note    Patient Name: Keiko Salter   YOB: 1970   Medical Record Number: [id number]   Attending Physician: Reynold Blount M.D. Chief Complaint:  Metastatic adenocarcinoma of the lung, brain metastasis.     History of in the setting of polysubstance abuse/dependence. She required mechanical ventilation for secondary respiratory failure. She was seen by neurology and critical care medicine consultation.   After long hospitalization she was eventually discharged to rehab EVERY 2 WEEKS BEGINNING 2015    • Pneumonia, organism unspecified(486)    • Seizure disorder ([facility] [address]    • Wears glasses        Past Surgical History:  Past Surgical History:   Procedure Laterality Date   • APPENDECTOMY  1999   • BRAIN SURGERY  JULY 2015 Asked        Back Care: Not Asked        Exercise: Not Asked        Bike Helmet: Not Asked        Seat Belt: Not Asked        Self-Exams: Not Asked    Social History Narrative      Not on file    Social Determinants of Health  Financial Resource Strain: arm, Patient Position: Sitting, Cuff Size: large)   Pulse 91   Temp 97.4 °F (36.3 °C) (Oral)   Resp 18   Ht 1.651 m (5' 5\")   Wt 93.9 kg (207 lb)   SpO2 100%   BMI 34.45 kg/m²     Physical Examination:  General: Patient is alert and oriented x 3, not in a She has liver and brain metastasis as well as a metastatic lesion to the pancreas. The patient was initially diagnosed in July 2015 with a left occipital brain lesion and is status post resection with adjuvant radiation.   She is also status post a right fr We will continue to monitor this. --Elevated WBCs unclear etiology. No S&S infection, no fevers, chills or breathing changes, opacity on recent CT imaging that could suggest infection.  Pt would like to hold off on any abx course and reassess on next CT i

## 2021-06-18 ENCOUNTER — TELEPHONE (OUTPATIENT)
Dept: ENDOCRINOLOGY CLINIC | Facility: CLINIC | Age: 51
End: 2021-06-18

## 2021-06-18 DIAGNOSIS — E03.9 HYPOTHYROIDISM, UNSPECIFIED TYPE: Primary | ICD-10-CM

## 2021-06-18 NOTE — TELEPHONE ENCOUNTER
TSH is normal. T4 is slightly low  Since TSH is normal, I recommend CPM for now  Please book FU in the next two months  Please repeat TSH and Free T4 before FU  FU can be VV or in person visit  Thanks

## 2021-06-24 NOTE — TELEPHONE ENCOUNTER
rn called patient with message by dr Nela Baldwin, verbalized understanding and booked cinda 8/31   Labs ordered and mailed to her to remind to do before cinda

## 2021-06-24 NOTE — TELEPHONE ENCOUNTER
Please call again x two times 3-4 days apart  If no answer please send my chart and no response letter  Thanks

## 2021-08-31 ENCOUNTER — TELEMEDICINE (OUTPATIENT)
Dept: ENDOCRINOLOGY CLINIC | Facility: CLINIC | Age: 51
End: 2021-08-31

## 2021-08-31 DIAGNOSIS — E03.9 HYPOTHYROIDISM, UNSPECIFIED TYPE: Primary | ICD-10-CM

## 2021-08-31 DIAGNOSIS — Z13.1 DIABETES MELLITUS SCREENING: ICD-10-CM

## 2021-08-31 PROCEDURE — 99213 OFFICE O/P EST LOW 20 MIN: CPT | Performed by: INTERNAL MEDICINE

## 2021-08-31 RX ORDER — LEVOTHYROXINE SODIUM 175 UG/1
175 TABLET ORAL
Qty: 90 TABLET | Refills: 0 | Status: SHIPPED | OUTPATIENT
Start: 2021-08-31

## 2021-08-31 NOTE — PROGRESS NOTES
Caitlin Garrett verbally consents to a video visit on 8/31/2021    Patient understands and accepts financial responsibility for any deductible, co-insurance and/or co-pays associated with this service.   Patient has been referred to the [facility] website at www.[facility] FIBROADENOMA   • OTHER  8-7-15    CYBERKNIFE   • OTHER SURGICAL HISTORY     • XS PORT-A-CATH INSERTION/EXCH/CHK          ALLERGY:  No Known Allergies     PAST MEDICAL, SOCIAL AND FAMILY HISTORY:  See past medical history marked as reviewed.   See past surgi treatment ( brain) and  Opdivo. Not on immunotherapy at this time     1. She has hypothyroidism.   Clinically euthyroid  Will repeat labs    The patient is aware that she needs to take LT4 daily; every am one hour before breakfast and atleast four hours ap

## 2021-09-03 ENCOUNTER — HOSPITAL ENCOUNTER (OUTPATIENT)
Dept: CT IMAGING | Facility: HOSPITAL | Age: 51
Discharge: HOME | End: 2021-09-03
Attending: NURSE PRACTITIONER
Payer: COMMERCIAL

## 2021-09-03 DIAGNOSIS — C78.7 LIVER METASTASES (HCC): ICD-10-CM

## 2021-09-03 DIAGNOSIS — C34.90 MALIGNANT NEOPLASM OF LUNG, UNSPECIFIED LATERALITY, UNSPECIFIED PART OF LUNG (HCC): ICD-10-CM

## 2021-09-03 DIAGNOSIS — C34.92 CARCINOMA OF LUNG, LEFT (HCC): ICD-10-CM

## 2021-09-03 DIAGNOSIS — C79.31 BRAIN METASTASES (HCC): ICD-10-CM

## 2021-09-03 LAB — CREAT BLD-MCNC: 0.5 MG/DL

## 2021-09-03 PROCEDURE — 74177 CT ABD & PELVIS W/CONTRAST: CPT | Performed by: NURSE PRACTITIONER

## 2021-09-03 PROCEDURE — 82565 ASSAY OF CREATININE: CPT

## 2021-09-03 PROCEDURE — 71260 CT THORAX DX C+: CPT | Performed by: NURSE PRACTITIONER

## 2021-09-03 RX ORDER — SODIUM CHLORIDE 9 MG/ML
INJECTION INTRAVENOUS
Status: DISPENSED
Start: 2021-09-03 | End: 2021-09-03

## 2021-09-03 RX ORDER — HEPARIN SODIUM (PORCINE) LOCK FLUSH IV SOLN 100 UNIT/ML 100 UNIT/ML
SOLUTION INTRAVENOUS
Status: COMPLETED
Start: 2021-09-03 | End: 2021-09-03

## 2021-09-03 RX ORDER — HEPARIN SODIUM (PORCINE) LOCK FLUSH IV SOLN 100 UNIT/ML 100 UNIT/ML
500 SOLUTION INTRAVENOUS AS NEEDED
Status: DISCONTINUED | OUTPATIENT
Start: 2021-09-03 | End: 2021-09-05

## 2021-09-03 RX ADMIN — HEPARIN SODIUM (PORCINE) LOCK FLUSH IV SOLN 100 UNIT/ML 500 UNITS: 100 SOLUTION INTRAVENOUS at 10:25:00

## 2021-09-16 ENCOUNTER — APPOINTMENT (OUTPATIENT)
Dept: HEMATOLOGY/ONCOLOGY | Facility: HOSPITAL | Age: 51
End: 2021-09-16
Attending: INTERNAL MEDICINE
Payer: COMMERCIAL

## 2021-09-21 RX ORDER — HEPARIN SODIUM (PORCINE) LOCK FLUSH IV SOLN 100 UNIT/ML 100 UNIT/ML
5 SOLUTION INTRAVENOUS ONCE
Status: CANCELLED | OUTPATIENT
Start: 2021-09-22

## 2021-09-21 RX ORDER — SODIUM CHLORIDE 9 MG/ML
10 INJECTION INTRAVENOUS ONCE
Status: CANCELLED | OUTPATIENT
Start: 2021-09-22

## 2021-09-22 ENCOUNTER — OFFICE VISIT (OUTPATIENT)
Dept: HEMATOLOGY/ONCOLOGY | Facility: HOSPITAL | Age: 51
End: 2021-09-22
Attending: INTERNAL MEDICINE
Payer: COMMERCIAL

## 2021-09-22 VITALS
HEART RATE: 81 BPM | SYSTOLIC BLOOD PRESSURE: 112 MMHG | OXYGEN SATURATION: 99 % | DIASTOLIC BLOOD PRESSURE: 81 MMHG | RESPIRATION RATE: 18 BRPM | TEMPERATURE: 98 F

## 2021-09-22 DIAGNOSIS — C78.7 LIVER METASTASES (HCC): Primary | ICD-10-CM

## 2021-09-22 DIAGNOSIS — C78.7 LIVER METASTASES (HCC): ICD-10-CM

## 2021-09-22 DIAGNOSIS — D50.8 IRON DEFICIENCY ANEMIA SECONDARY TO INADEQUATE DIETARY IRON INTAKE: ICD-10-CM

## 2021-09-22 DIAGNOSIS — Z45.2 ENCOUNTER FOR ADJUSTMENT OR MANAGEMENT OF VASCULAR ACCESS DEVICE: ICD-10-CM

## 2021-09-22 DIAGNOSIS — Z51.11 CHEMOTHERAPY MANAGEMENT, ENCOUNTER FOR: Primary | ICD-10-CM

## 2021-09-22 DIAGNOSIS — C79.31 BRAIN METASTASES (HCC): ICD-10-CM

## 2021-09-22 DIAGNOSIS — C34.90 MALIGNANT NEOPLASM OF LUNG, UNSPECIFIED LATERALITY, UNSPECIFIED PART OF LUNG (HCC): ICD-10-CM

## 2021-09-22 DIAGNOSIS — C34.92 CARCINOMA OF LUNG, LEFT (HCC): ICD-10-CM

## 2021-09-22 DIAGNOSIS — M19.90 ARTHRITIS: ICD-10-CM

## 2021-09-22 LAB
ALBUMIN SERPL-MCNC: 3 G/DL (ref 3.4–5)
ALBUMIN/GLOB SERPL: 0.6 {RATIO} (ref 1–2)
ALP LIVER SERPL-CCNC: 120 U/L
ALT SERPL-CCNC: 9 U/L
ANION GAP SERPL CALC-SCNC: 9 MMOL/L (ref 0–18)
AST SERPL-CCNC: 5 U/L (ref 15–37)
BASOPHILS # BLD AUTO: 0.04 X10(3) UL (ref 0–0.2)
BASOPHILS NFR BLD AUTO: 0.3 %
BILIRUB SERPL-MCNC: 0.2 MG/DL (ref 0.1–2)
BUN BLD-MCNC: 9 MG/DL (ref 7–18)
BUN/CREAT SERPL: 14.5 (ref 10–20)
CALCIUM BLD-MCNC: 8.7 MG/DL (ref 8.5–10.1)
CHLORIDE SERPL-SCNC: 107 MMOL/L (ref 98–112)
CO2 SERPL-SCNC: 23 MMOL/L (ref 21–32)
CREAT BLD-MCNC: 0.62 MG/DL
DEPRECATED RDW RBC AUTO: 53 FL (ref 35.1–46.3)
EOSINOPHIL # BLD AUTO: 0.1 X10(3) UL (ref 0–0.7)
EOSINOPHIL NFR BLD AUTO: 0.8 %
ERYTHROCYTE [DISTWIDTH] IN BLOOD BY AUTOMATED COUNT: 16.2 % (ref 11–15)
EST. AVERAGE GLUCOSE BLD GHB EST-MCNC: 108 MG/DL (ref 68–126)
GLOBULIN PLAS-MCNC: 4.7 G/DL (ref 2.8–4.4)
GLUCOSE BLD-MCNC: 111 MG/DL (ref 70–99)
HBA1C MFR BLD HPLC: 5.4 % (ref ?–5.7)
HCT VFR BLD AUTO: 29.8 %
HGB BLD-MCNC: 9.3 G/DL
IMM GRANULOCYTES # BLD AUTO: 0.06 X10(3) UL (ref 0–1)
IMM GRANULOCYTES NFR BLD: 0.5 %
LYMPHOCYTES # BLD AUTO: 3.96 X10(3) UL (ref 1–4)
LYMPHOCYTES NFR BLD AUTO: 32.1 %
MCH RBC QN AUTO: 27.6 PG (ref 26–34)
MCHC RBC AUTO-ENTMCNC: 31.2 G/DL (ref 31–37)
MCV RBC AUTO: 88.4 FL
MONOCYTES # BLD AUTO: 0.85 X10(3) UL (ref 0.1–1)
MONOCYTES NFR BLD AUTO: 6.9 %
NEUTROPHILS # BLD AUTO: 7.34 X10 (3) UL (ref 1.5–7.7)
NEUTROPHILS # BLD AUTO: 7.34 X10(3) UL (ref 1.5–7.7)
NEUTROPHILS NFR BLD AUTO: 59.4 %
OSMOLALITY SERPL CALC.SUM OF ELEC: 287 MOSM/KG (ref 275–295)
PATIENT FASTING Y/N/NP: NO
PLATELET # BLD AUTO: 570 10(3)UL (ref 150–450)
POTASSIUM SERPL-SCNC: 3.2 MMOL/L (ref 3.5–5.1)
PROT SERPL-MCNC: 7.7 G/DL (ref 6.4–8.2)
RBC # BLD AUTO: 3.37 X10(6)UL
SODIUM SERPL-SCNC: 139 MMOL/L (ref 136–145)
T4 FREE SERPL-MCNC: 0.8 NG/DL (ref 0.8–1.7)
TSI SER-ACNC: 0.26 MIU/ML (ref 0.36–3.74)
WBC # BLD AUTO: 12.4 X10(3) UL (ref 4–11)

## 2021-09-22 PROCEDURE — 84443 ASSAY THYROID STIM HORMONE: CPT | Performed by: INTERNAL MEDICINE

## 2021-09-22 PROCEDURE — 83036 HEMOGLOBIN GLYCOSYLATED A1C: CPT | Performed by: INTERNAL MEDICINE

## 2021-09-22 PROCEDURE — 36591 DRAW BLOOD OFF VENOUS DEVICE: CPT

## 2021-09-22 PROCEDURE — 99215 OFFICE O/P EST HI 40 MIN: CPT | Performed by: INTERNAL MEDICINE

## 2021-09-22 PROCEDURE — 84439 ASSAY OF FREE THYROXINE: CPT | Performed by: INTERNAL MEDICINE

## 2021-09-22 PROCEDURE — 80053 COMPREHEN METABOLIC PANEL: CPT

## 2021-09-22 PROCEDURE — 85025 COMPLETE CBC W/AUTO DIFF WBC: CPT

## 2021-09-22 RX ORDER — SODIUM CHLORIDE 9 MG/ML
10 INJECTION INTRAVENOUS ONCE
OUTPATIENT
Start: 2021-09-22

## 2021-09-22 RX ORDER — SODIUM CHLORIDE 9 MG/ML
INJECTION INTRAVENOUS
Status: DISCONTINUED
Start: 2021-09-22 | End: 2021-09-22

## 2021-09-22 RX ORDER — HEPARIN SODIUM (PORCINE) LOCK FLUSH IV SOLN 100 UNIT/ML 100 UNIT/ML
5 SOLUTION INTRAVENOUS ONCE
OUTPATIENT
Start: 2021-09-22

## 2021-09-22 RX ORDER — HEPARIN SODIUM (PORCINE) LOCK FLUSH IV SOLN 100 UNIT/ML 100 UNIT/ML
5 SOLUTION INTRAVENOUS ONCE
Status: COMPLETED | OUTPATIENT
Start: 2021-09-22 | End: 2021-09-22

## 2021-09-22 RX ADMIN — HEPARIN SODIUM (PORCINE) LOCK FLUSH IV SOLN 100 UNIT/ML 500 UNITS: 100 SOLUTION INTRAVENOUS at 13:08:00

## 2021-09-22 NOTE — PROGRESS NOTES
Cancer Center Progress Note    Patient Name: Keiko Salter   YOB: 1970   Medical Record Number: [id number]   Attending Physician: Reynold Blount M.D. Chief Complaint:  Metastatic adenocarcinoma of the lung, brain metastasis.     History of in the setting of polysubstance abuse/dependence. She required mechanical ventilation for secondary respiratory failure. She was seen by neurology and critical care medicine consultation.   After long hospitalization she was eventually discharged to rehab Lung cancer ([facility]    • Lung cancer ([facility]    • Migraines    • Osteoarthritis    • Pancreatic cancer ([place] [address].) JULY 2015   • Personal history of antineoplastic chemotherapy     EVERY 2 WEEKS BEGINNING 2015    • Pneumonia, organism unspecified(486)    • Seizure d Not Asked        Hobby Hazards: Not Asked        Sleep Concern: Not Asked        Stress Concern: Not Asked        Weight Concern: Not Asked        Special Diet: Not Asked        Back Care: Not Asked        Exercise: Not Asked        Bike Helmet: Not Asked Systems:  All other systems reviewed and negative x12    Vital Signs:  /81 (BP Location: Left arm, Patient Position: Sitting, Cuff Size: large)   Pulse 81   Temp 98 °F (36.7 °C) (Oral)   Resp 18   SpO2 99%     Physical Examination:  General: Patient (EGFR,ALK,ROS1 negative). She has liver and brain metastasis as well as a metastatic lesion to the pancreas. The patient was initially diagnosed in July 2015 with a left occipital brain lesion and is status post resection with adjuvant radiation.   She is deficiency. She has received IV iron in past.  We will continue to monitor this post-surgery scheduled tomororw. Check iron at next visit. --Elevated WBCs likely releated to knee replacement complication, following at [facility] with emergent surgery tomorrow.

## 2021-09-23 ENCOUNTER — TELEPHONE (OUTPATIENT)
Dept: ENDOCRINOLOGY CLINIC | Facility: CLINIC | Age: 51
End: 2021-09-23

## 2021-09-23 DIAGNOSIS — E03.9 HYPOTHYROIDISM, UNSPECIFIED TYPE: Primary | ICD-10-CM

## 2021-09-23 NOTE — TELEPHONE ENCOUNTER
Spoke to patient and patient's  to relay message below - both patient and  read back dose decrease of levothyroxine 175mcg  Patient and  stated understanding to repeat labs in 2 months - labs ordered

## 2021-09-23 NOTE — TELEPHONE ENCOUNTER
a1c is normal, no DM  Thyroid las indicate need for very slight dose decrease in LT4  Please confirm that she is on LT4 175 mcg daily  Recommend:   Half tablet of LT4 175 mcg x 1 day a week  Full tab of LT4 175 mcg x 6 days a week  TSH and Free T4 in 2 mon

## 2021-10-02 ENCOUNTER — HOSPITAL ENCOUNTER (EMERGENCY)
Facility: HOSPITAL | Age: 51
Discharge: LEFT BEFORE DISCHARGE | End: 2021-10-02
Attending: EMERGENCY MEDICINE
Payer: COMMERCIAL

## 2021-10-02 VITALS
DIASTOLIC BLOOD PRESSURE: 77 MMHG | OXYGEN SATURATION: 96 % | BODY MASS INDEX: 30.82 KG/M2 | TEMPERATURE: 98 F | RESPIRATION RATE: 28 BRPM | HEIGHT: 65 IN | WEIGHT: 185 LBS | HEART RATE: 89 BPM | SYSTOLIC BLOOD PRESSURE: 120 MMHG

## 2021-10-02 DIAGNOSIS — E86.0 DEHYDRATION: Primary | ICD-10-CM

## 2021-10-02 DIAGNOSIS — N28.9 RENAL INSUFFICIENCY: ICD-10-CM

## 2021-10-02 PROCEDURE — 83690 ASSAY OF LIPASE: CPT | Performed by: EMERGENCY MEDICINE

## 2021-10-02 PROCEDURE — 80076 HEPATIC FUNCTION PANEL: CPT | Performed by: EMERGENCY MEDICINE

## 2021-10-02 PROCEDURE — 83605 ASSAY OF LACTIC ACID: CPT | Performed by: EMERGENCY MEDICINE

## 2021-10-02 PROCEDURE — 85025 COMPLETE CBC W/AUTO DIFF WBC: CPT | Performed by: EMERGENCY MEDICINE

## 2021-10-02 PROCEDURE — 96361 HYDRATE IV INFUSION ADD-ON: CPT

## 2021-10-02 PROCEDURE — 80048 BASIC METABOLIC PNL TOTAL CA: CPT | Performed by: EMERGENCY MEDICINE

## 2021-10-02 PROCEDURE — 96374 THER/PROPH/DIAG INJ IV PUSH: CPT

## 2021-10-02 PROCEDURE — 87040 BLOOD CULTURE FOR BACTERIA: CPT | Performed by: EMERGENCY MEDICINE

## 2021-10-02 PROCEDURE — 99285 EMERGENCY DEPT VISIT HI MDM: CPT

## 2021-10-02 PROCEDURE — 36415 COLL VENOUS BLD VENIPUNCTURE: CPT

## 2021-10-02 RX ORDER — ONDANSETRON 2 MG/ML
4 INJECTION INTRAMUSCULAR; INTRAVENOUS EVERY 6 HOURS PRN
Status: CANCELLED | OUTPATIENT
Start: 2021-10-02

## 2021-10-02 RX ORDER — HYDROCODONE BITARTRATE AND ACETAMINOPHEN 5; 325 MG/1; MG/1
2 TABLET ORAL EVERY 4 HOURS PRN
Status: CANCELLED | OUTPATIENT
Start: 2021-10-02

## 2021-10-02 RX ORDER — SODIUM CHLORIDE 9 MG/ML
INJECTION, SOLUTION INTRAVENOUS CONTINUOUS
Status: CANCELLED | OUTPATIENT
Start: 2021-10-02

## 2021-10-02 RX ORDER — HYDROCODONE BITARTRATE AND ACETAMINOPHEN 5; 325 MG/1; MG/1
1 TABLET ORAL EVERY 4 HOURS PRN
Status: CANCELLED | OUTPATIENT
Start: 2021-10-02

## 2021-10-02 RX ORDER — PROCHLORPERAZINE EDISYLATE 5 MG/ML
5 INJECTION INTRAMUSCULAR; INTRAVENOUS EVERY 8 HOURS PRN
Status: CANCELLED | OUTPATIENT
Start: 2021-10-02

## 2021-10-02 RX ORDER — LAMOTRIGINE 100 MG/1
100 TABLET ORAL 2 TIMES DAILY
Status: CANCELLED | OUTPATIENT
Start: 2021-10-02

## 2021-10-02 RX ORDER — HEPARIN SODIUM 5000 [USP'U]/ML
5000 INJECTION, SOLUTION INTRAVENOUS; SUBCUTANEOUS EVERY 12 HOURS SCHEDULED
Status: DISCONTINUED | OUTPATIENT
Start: 2021-10-02 | End: 2021-10-02

## 2021-10-02 RX ORDER — HEPARIN SODIUM (PORCINE) LOCK FLUSH IV SOLN 100 UNIT/ML 100 UNIT/ML
SOLUTION INTRAVENOUS
Status: COMPLETED
Start: 2021-10-02 | End: 2021-10-02

## 2021-10-02 RX ORDER — ACETAMINOPHEN 325 MG/1
650 TABLET ORAL EVERY 4 HOURS PRN
Status: CANCELLED | OUTPATIENT
Start: 2021-10-02

## 2021-10-02 RX ORDER — LEVOTHYROXINE SODIUM 175 UG/1
175 TABLET ORAL
Status: CANCELLED | OUTPATIENT
Start: 2021-10-02

## 2021-10-02 RX ORDER — ONDANSETRON 2 MG/ML
4 INJECTION INTRAMUSCULAR; INTRAVENOUS ONCE
Status: COMPLETED | OUTPATIENT
Start: 2021-10-02 | End: 2021-10-02

## 2021-10-02 RX ORDER — HEPARIN SODIUM (PORCINE) LOCK FLUSH IV SOLN 100 UNIT/ML 100 UNIT/ML
5 SOLUTION INTRAVENOUS ONCE
Status: COMPLETED | OUTPATIENT
Start: 2021-10-02 | End: 2021-10-02

## 2021-10-02 NOTE — ED PROVIDER NOTES
Case was received in signout from Dr. Yoselyn Stanton. Her blood pressures have remained stable after fluid resuscitation. Patient is demanding to leave saying she does not want to stay in the hospital under any circumstances.   I have encouraged her to follow-up wi

## 2021-10-02 NOTE — ED QUICK NOTES
Pt able to dress self. Port hep locked and decannulated. Signed AMA form. Ambulatory with slow gait to exit.

## 2021-10-02 NOTE — ED QUICK NOTES
Pt upset about being admitted. Becoming verbally aggressive with staff and . Case discussed with Estelle Loya MD that took sign out from Beth Finch MD. Per Estelle Loya MD pt not able to sign out AMA due to mental status.  Pt's  at bedside wants her to stay

## 2021-10-02 NOTE — CM/SW NOTE
Contacted [facility] [place] for transfer of pt to [facility], Face sheet faxed to [address] [facility]    They do not have medical beds at this time, she will call [facility] back.

## 2021-10-02 NOTE — ED PROVIDER NOTES
Patient Seen in: [facility] AND [facility]    History   Patient presents with:  Nausea/vomiting  Hypotension    Stated Complaint: low BP/  dehydrated    HPI    Patient presents to the emergency department with complaint of low blood pressure • XS PORT-A-CATH INSERTION/EXCH/CHK         Social History    Tobacco Use      Smoking status: Current Every Day Smoker        Packs/day: 1.50        Years: 40.00        Pack years: 61        Types: Cigarettes        Start date: 8/5/1985      Smokeless t Patient's right leg was examined the wound was intact there was a small amount of minimal drainage but appeared dry at the middle portion of the wound but no dehiscence no crepitus  Skin:  Warm, dry, well perfused. Good skin turgor. No rashes seen.   Carlita Azevedo 16.5 (*)     RBC 3.03 (*)     HGB 8.4 (*)     HCT 27.5 (*)     MCHC 30.5 (*)     RDW-SD 54.9 (*)     RDW 16.2 (*)     .0 (*)     Neutrophil Absolute Prelim 11.66 (*)     Neutrophil Absolute 11.66 (*)     Monocyte Absolute 1.23 (*)     All other comp

## 2021-10-02 NOTE — ED INITIAL ASSESSMENT (HPI)
Nausea and vomiting x3 days. Knee replacement sx at [address] [place] [address] 9/23/21 discharged on 9/27/21 on vanco.  Denies fevers, diarrhea, or abdominal pain. Home health nurse came today and patient was hypotensive.    On oxycodone for post-op knee pain

## 2021-10-02 NOTE — CM/SW NOTE
[facility] does not have any medical beds for transfer per Jennifer Bell at [facility] transfer center. Once the pt is admitted to [address] [place] [address] will initiate the transfer as inpt to inpt transfer. She cannot be on a list right now since she is in the ED.

## 2021-10-02 NOTE — ED QUICK NOTES
Pt arrived with per port accessed \"since dc'd from hospital\" for home antibiotics. Last dressing change/needle change unknown. Per  it was heparin flushed last night.  Old needle removed, site cleaned and complete new set up done for re-access with

## 2021-10-11 ENCOUNTER — TELEPHONE (OUTPATIENT)
Dept: INTERNAL MEDICINE CLINIC | Facility: CLINIC | Age: 51
End: 2021-10-11

## 2021-10-11 NOTE — TELEPHONE ENCOUNTER
Nidhi Hawkins with [facility] calling to follow up on fax sent 2 weeks ago for admission orders and plan of care after patient was discharged. Advised patient would need a follow up visit after her discharge, last visit with provider was 1 yr ago.  Per

## 2021-10-21 ENCOUNTER — TELEPHONE (OUTPATIENT)
Dept: INTERNAL MEDICINE CLINIC | Facility: CLINIC | Age: 51
End: 2021-10-21

## 2021-10-21 DIAGNOSIS — E87.6 HYPOKALEMIA: Primary | ICD-10-CM

## 2021-10-21 RX ORDER — POTASSIUM CHLORIDE 750 MG/1
TABLET, EXTENDED RELEASE ORAL
Qty: 30 TABLET | Refills: 2 | Status: SHIPPED | OUTPATIENT
Start: 2021-10-21

## 2021-10-21 NOTE — TELEPHONE ENCOUNTER
I received a fax from \"[facility] preferred home health. \"  Please call patient's nurse. Patient's potassium is low at 2.7. I will send a prescription for potassium for her to take for 1 week and then I would like to have her BMP rechecked next Thursday.

## 2021-10-21 NOTE — TELEPHONE ENCOUNTER
Informed Grace Vlaladares from [facility] that Dr. Kelly Moncada ordered Potassium tabs 10 meq 1 per day. Grace Valladares states understanding. Informed of repeat BMP. States RN will be out to patient on Tuesday.

## 2021-10-21 NOTE — TELEPHONE ENCOUNTER
Left message with Codi Almeida at [facility] [address] [facility] [phone] 11:12am.  Alex eRynolds /intake  To call back regarding patient.     We need to provide Dr Juan Pyle orders to [place] and confirm patient is receiving home health services, (who is her [place]

## 2021-11-02 ENCOUNTER — TELEPHONE (OUTPATIENT)
Dept: INTERNAL MEDICINE CLINIC | Facility: CLINIC | Age: 51
End: 2021-11-02

## 2021-11-02 NOTE — TELEPHONE ENCOUNTER
[facility] called to asked if patient has seen PCP yet. No record of OV recently since her last hospitalization.

## 2021-12-01 ENCOUNTER — TELEPHONE (OUTPATIENT)
Dept: INTERNAL MEDICINE CLINIC | Facility: CLINIC | Age: 51
End: 2021-12-01

## 2021-12-01 NOTE — TELEPHONE ENCOUNTER
I cannot sign any more orders, since I have not seen the patient in over a year. She was admitted to another hospital and was seen by the doctors there. Please advise home health to contact the patient to find out who her new primary care doctor is and to update their records.

## 2021-12-01 NOTE — TELEPHONE ENCOUNTER
Loretta Schulte from provider [facility] will be faxing and order for physical therapy today 12/1 to Dr. Cyndee Lara office.      Fax [phone]

## 2021-12-01 NOTE — TELEPHONE ENCOUNTER
Called and spoke to Franky and asked to speak to Jose Angel,    Per Jc Bah only works 2 times a week and it not going to return till Monday   Informed that it was regarding orders,per Franky it can wait till Monday

## 2021-12-14 ENCOUNTER — TELEPHONE (OUTPATIENT)
Dept: INTERNAL MEDICINE CLINIC | Facility: CLINIC | Age: 51
End: 2021-12-14

## 2021-12-14 NOTE — TELEPHONE ENCOUNTER
Render Blood from [facility] notified Rosario Savers will not sign the order. Patient has not been seen since over a Year and has not seen the patient Face to Face. Render Blood verbalized understood

## 2021-12-14 NOTE — TELEPHONE ENCOUNTER
Ernesto Cavazos/PT; she faxed over a plan of care for physical therapist in [facility] last 12/01/2021 and would like to know if received.

## 2021-12-14 NOTE — TELEPHONE ENCOUNTER
Please call the home health company and reiterate what I said in the past. I did not and cannot sign any orders. I have not seen pt in over a year. I have not seen pt face to face. Please do not keep calling.   They need to call the hospitalist who order

## 2021-12-15 ENCOUNTER — TELEPHONE (OUTPATIENT)
Dept: HEMATOLOGY/ONCOLOGY | Facility: HOSPITAL | Age: 51
End: 2021-12-15

## 2021-12-15 ENCOUNTER — HOSPITAL ENCOUNTER (OUTPATIENT)
Dept: CT IMAGING | Facility: HOSPITAL | Age: 51
Discharge: HOME | End: 2021-12-15
Attending: NURSE PRACTITIONER
Payer: COMMERCIAL

## 2021-12-15 DIAGNOSIS — C34.92 CARCINOMA OF LUNG, LEFT (HCC): ICD-10-CM

## 2021-12-15 DIAGNOSIS — C78.7 LIVER METASTASES (HCC): ICD-10-CM

## 2021-12-15 DIAGNOSIS — C79.31 BRAIN METASTASES (HCC): ICD-10-CM

## 2021-12-15 DIAGNOSIS — Z51.11 CHEMOTHERAPY MANAGEMENT, ENCOUNTER FOR: ICD-10-CM

## 2021-12-15 PROCEDURE — 82565 ASSAY OF CREATININE: CPT

## 2021-12-15 PROCEDURE — 74177 CT ABD & PELVIS W/CONTRAST: CPT | Performed by: NURSE PRACTITIONER

## 2021-12-15 PROCEDURE — 71260 CT THORAX DX C+: CPT | Performed by: NURSE PRACTITIONER

## 2021-12-15 RX ORDER — HEPARIN SODIUM (PORCINE) LOCK FLUSH IV SOLN 100 UNIT/ML 100 UNIT/ML
SOLUTION INTRAVENOUS
Status: COMPLETED
Start: 2021-12-15 | End: 2021-12-15

## 2021-12-15 RX ORDER — HEPARIN SODIUM (PORCINE) LOCK FLUSH IV SOLN 100 UNIT/ML 100 UNIT/ML
500 SOLUTION INTRAVENOUS AS NEEDED
Status: DISCONTINUED | OUTPATIENT
Start: 2021-12-15 | End: 2021-12-17

## 2021-12-15 RX ADMIN — HEPARIN SODIUM (PORCINE) LOCK FLUSH IV SOLN 100 UNIT/ML 500 UNITS: 100 SOLUTION INTRAVENOUS at 15:01:00

## 2021-12-20 ENCOUNTER — TELEPHONE (OUTPATIENT)
Dept: HEMATOLOGY/ONCOLOGY | Facility: HOSPITAL | Age: 51
End: 2021-12-20

## 2021-12-28 ENCOUNTER — APPOINTMENT (OUTPATIENT)
Dept: HEMATOLOGY/ONCOLOGY | Facility: HOSPITAL | Age: 51
End: 2021-12-28
Attending: INTERNAL MEDICINE
Payer: COMMERCIAL

## 2021-12-28 VITALS
DIASTOLIC BLOOD PRESSURE: 73 MMHG | OXYGEN SATURATION: 99 % | SYSTOLIC BLOOD PRESSURE: 131 MMHG | BODY MASS INDEX: 27.49 KG/M2 | WEIGHT: 165 LBS | HEIGHT: 65 IN | RESPIRATION RATE: 18 BRPM | TEMPERATURE: 99 F | HEART RATE: 82 BPM

## 2021-12-28 DIAGNOSIS — C34.90 MALIGNANT NEOPLASM OF LUNG, UNSPECIFIED LATERALITY, UNSPECIFIED PART OF LUNG (HCC): ICD-10-CM

## 2021-12-28 DIAGNOSIS — D64.9 ANEMIA, UNSPECIFIED TYPE: ICD-10-CM

## 2021-12-28 DIAGNOSIS — C79.31 BRAIN METASTASES (HCC): ICD-10-CM

## 2021-12-28 DIAGNOSIS — D64.9 ANEMIA, UNSPECIFIED TYPE: Primary | ICD-10-CM

## 2021-12-28 DIAGNOSIS — D50.8 IRON DEFICIENCY ANEMIA SECONDARY TO INADEQUATE DIETARY IRON INTAKE: Primary | ICD-10-CM

## 2021-12-28 DIAGNOSIS — Z45.2 ENCOUNTER FOR ADJUSTMENT OR MANAGEMENT OF VASCULAR ACCESS DEVICE: ICD-10-CM

## 2021-12-28 DIAGNOSIS — C78.7 LIVER METASTASES (HCC): ICD-10-CM

## 2021-12-28 PROCEDURE — 84466 ASSAY OF TRANSFERRIN: CPT

## 2021-12-28 PROCEDURE — 85025 COMPLETE CBC W/AUTO DIFF WBC: CPT

## 2021-12-28 PROCEDURE — 80053 COMPREHEN METABOLIC PANEL: CPT

## 2021-12-28 PROCEDURE — 99214 OFFICE O/P EST MOD 30 MIN: CPT | Performed by: INTERNAL MEDICINE

## 2021-12-28 PROCEDURE — 36415 COLL VENOUS BLD VENIPUNCTURE: CPT

## 2021-12-28 PROCEDURE — 83540 ASSAY OF IRON: CPT

## 2021-12-28 PROCEDURE — 36593 DECLOT VASCULAR DEVICE: CPT

## 2021-12-28 RX ORDER — SODIUM CHLORIDE 9 MG/ML
10 INJECTION INTRAVENOUS ONCE
OUTPATIENT
Start: 2021-12-28

## 2021-12-28 RX ORDER — HEPARIN SODIUM (PORCINE) LOCK FLUSH IV SOLN 100 UNIT/ML 100 UNIT/ML
5 SOLUTION INTRAVENOUS ONCE
OUTPATIENT
Start: 2021-12-28

## 2021-12-28 NOTE — PROGRESS NOTES
Cancer Center Progress Note    Patient Name: Grace Tristan   YOB: 1970   Medical Record Number: [id number]   Attending Physician: Murtaza Artis M.D. Chief Complaint:  Metastatic adenocarcinoma of the lung, brain metastasis.     History of in the setting of polysubstance abuse/dependence. She required mechanical ventilation for secondary respiratory failure. She was seen by neurology and critical care medicine consultation.   After long hospitalization she was eventually discharged to rehab LOBECTOMY   • BRAIN SURGERY  AUG  2016    REMOVAL OF DEAD TISSUE    • CHOLECYSTECTOMY  2009   • CYST REMOVAL      BREAST, BENIGN   • HEMORRHOIDECTOMY     • HYSTERECTOMY      heavy menstrual flow,    • LUMPECTOMY RIGHT  2012    FIBROADENOMA   • OTHER  8-7-1 file  Transportation Needs: Not on file  Physical Activity: Not on file  Stress: Not on file  Social Connections: Not on file  Intimate Partner Violence: Not on file  Housing Stability: Not on file      Current Medications:    Current Outpatient Medication BUNCREA 4.6 (L) 10/02/2021    CREATSERUM 2.18 (H) 10/02/2021    ANIONGAP 8 10/02/2021    GFRNAA 105 12/15/2021    GFRAA 121 12/15/2021    CA 8.4 (L) 10/02/2021    OSMOCALC 285 10/02/2021    ALKPHO 103 10/02/2021    AST 12 (L) 10/02/2021    ALT 7 (L) 10/ not show any new discrete lesions. She had a repeat brain MRI 2/9/2018 [facility] that showed no new lesion. Seizures are now being managed by neurology at [facility]. She significantly improved neurologically.    She has had excellent

## 2021-12-29 ENCOUNTER — TELEPHONE (OUTPATIENT)
Dept: HEMATOLOGY/ONCOLOGY | Facility: HOSPITAL | Age: 51
End: 2021-12-29

## 2022-01-14 ENCOUNTER — APPOINTMENT (OUTPATIENT)
Dept: HEMATOLOGY/ONCOLOGY | Facility: HOSPITAL | Age: 52
End: 2022-01-14
Attending: INTERNAL MEDICINE
Payer: COMMERCIAL

## 2022-01-21 ENCOUNTER — TELEPHONE (OUTPATIENT)
Dept: HEMATOLOGY/ONCOLOGY | Facility: HOSPITAL | Age: 52
End: 2022-01-21

## 2022-01-26 ENCOUNTER — APPOINTMENT (OUTPATIENT)
Dept: HEMATOLOGY/ONCOLOGY | Facility: HOSPITAL | Age: 52
End: 2022-01-26
Attending: INTERNAL MEDICINE
Payer: COMMERCIAL

## 2022-01-26 ENCOUNTER — TELEPHONE (OUTPATIENT)
Dept: HEMATOLOGY/ONCOLOGY | Facility: HOSPITAL | Age: 52
End: 2022-01-26

## 2022-01-26 NOTE — TELEPHONE ENCOUNTER
Patient's  called to cancel today's appointment. Spoke with Juana Bazan and said she will inform nurse.

## 2022-02-28 ENCOUNTER — APPOINTMENT (OUTPATIENT)
Dept: ULTRASOUND IMAGING | Facility: HOSPITAL | Age: 52
End: 2022-02-28
Payer: COMMERCIAL

## 2022-02-28 ENCOUNTER — HOSPITAL ENCOUNTER (EMERGENCY)
Facility: HOSPITAL | Age: 52
Discharge: HOME | End: 2022-02-28
Payer: COMMERCIAL

## 2022-02-28 ENCOUNTER — APPOINTMENT (OUTPATIENT)
Dept: ULTRASOUND IMAGING | Facility: HOSPITAL | Age: 52
End: 2022-02-28
Attending: EMERGENCY MEDICINE
Payer: COMMERCIAL

## 2022-02-28 VITALS
TEMPERATURE: 98 F | HEART RATE: 74 BPM | OXYGEN SATURATION: 97 % | BODY MASS INDEX: 29.82 KG/M2 | HEIGHT: 65 IN | DIASTOLIC BLOOD PRESSURE: 85 MMHG | SYSTOLIC BLOOD PRESSURE: 142 MMHG | RESPIRATION RATE: 17 BRPM | WEIGHT: 179 LBS

## 2022-02-28 DIAGNOSIS — M71.20 SYNOVIAL CYST OF POPLITEAL SPACE, UNSPECIFIED LATERALITY: ICD-10-CM

## 2022-02-28 DIAGNOSIS — G62.9 PERIPHERAL POLYNEUROPATHY: Primary | ICD-10-CM

## 2022-02-28 LAB
ANION GAP SERPL CALC-SCNC: 8 MMOL/L (ref 0–18)
BASOPHILS # BLD AUTO: 0.06 X10(3) UL (ref 0–0.2)
BASOPHILS NFR BLD AUTO: 0.4 %
BUN BLD-MCNC: 19 MG/DL (ref 7–18)
BUN/CREAT SERPL: 25.7 (ref 10–20)
CALCIUM BLD-MCNC: 9.1 MG/DL (ref 8.5–10.1)
CHLORIDE SERPL-SCNC: 104 MMOL/L (ref 98–112)
CO2 SERPL-SCNC: 23 MMOL/L (ref 21–32)
CREAT BLD-MCNC: 0.74 MG/DL
DEPRECATED RDW RBC AUTO: 69.5 FL (ref 35.1–46.3)
EOSINOPHIL # BLD AUTO: 0.09 X10(3) UL (ref 0–0.7)
EOSINOPHIL NFR BLD AUTO: 0.7 %
ERYTHROCYTE [DISTWIDTH] IN BLOOD BY AUTOMATED COUNT: 18.4 % (ref 11–15)
ETHANOL SERPL-MCNC: <3 MG/DL (ref ?–3)
GLUCOSE BLD-MCNC: 105 MG/DL (ref 70–99)
HCT VFR BLD AUTO: 39.4 %
HGB BLD-MCNC: 11.5 G/DL
IMM GRANULOCYTES # BLD AUTO: 0.07 X10(3) UL (ref 0–1)
IMM GRANULOCYTES NFR BLD: 0.5 %
LYMPHOCYTES # BLD AUTO: 3.31 X10(3) UL (ref 1–4)
LYMPHOCYTES NFR BLD AUTO: 24.6 %
MCH RBC QN AUTO: 30.4 PG (ref 26–34)
MCHC RBC AUTO-ENTMCNC: 29.2 G/DL (ref 31–37)
MCV RBC AUTO: 104.2 FL
MONOCYTES # BLD AUTO: 0.88 X10(3) UL (ref 0.1–1)
MONOCYTES NFR BLD AUTO: 6.5 %
NEUTROPHILS # BLD AUTO: 9.07 X10 (3) UL (ref 1.5–7.7)
NEUTROPHILS # BLD AUTO: 9.07 X10(3) UL (ref 1.5–7.7)
NEUTROPHILS NFR BLD AUTO: 67.3 %
OSMOLALITY SERPL CALC.SUM OF ELEC: 283 MOSM/KG (ref 275–295)
PLATELET # BLD AUTO: 443 10(3)UL (ref 150–450)
PLATELET MORPHOLOGY: NORMAL
POTASSIUM SERPL-SCNC: 3.5 MMOL/L (ref 3.5–5.1)
RBC # BLD AUTO: 3.78 X10(6)UL
SODIUM SERPL-SCNC: 135 MMOL/L (ref 136–145)
WBC # BLD AUTO: 13.5 X10(3) UL (ref 4–11)

## 2022-02-28 PROCEDURE — 80048 BASIC METABOLIC PNL TOTAL CA: CPT

## 2022-02-28 PROCEDURE — 93970 EXTREMITY STUDY: CPT | Performed by: EMERGENCY MEDICINE

## 2022-02-28 PROCEDURE — 99284 EMERGENCY DEPT VISIT MOD MDM: CPT

## 2022-02-28 PROCEDURE — 36415 COLL VENOUS BLD VENIPUNCTURE: CPT

## 2022-02-28 PROCEDURE — 82077 ASSAY SPEC XCP UR&BREATH IA: CPT | Performed by: EMERGENCY MEDICINE

## 2022-02-28 PROCEDURE — 85025 COMPLETE CBC W/AUTO DIFF WBC: CPT

## 2022-02-28 RX ORDER — GABAPENTIN 100 MG/1
100 CAPSULE ORAL 3 TIMES DAILY
Qty: 30 CAPSULE | Refills: 0 | Status: SHIPPED | OUTPATIENT
Start: 2022-02-28 | End: 2022-02-28

## 2022-02-28 RX ORDER — GABAPENTIN 100 MG/1
100 CAPSULE ORAL ONCE
Status: COMPLETED | OUTPATIENT
Start: 2022-02-28 | End: 2022-02-28

## 2022-03-01 NOTE — ED INITIAL ASSESSMENT (HPI)
Pt states that she is concerned for blood clots in BLE. Pt c/o lumps in her R thigh and severe L thigh pain. Pt also notes that she is \"disorientated\" and further explained that she has been stuttering.

## 2022-03-01 NOTE — ED QUICK NOTES
Discharge instructions given to pt and . Pt and  verbalized understanding of home care, medication use, and to follow up with PCP and neurologist. Pt and  denied further questions or concerns. Pt ambulatory out of ED, discharged in stable condition with .

## 2022-03-18 RX ORDER — LEVOTHYROXINE SODIUM 175 UG/1
TABLET ORAL
Qty: 90 TABLET | Refills: 0 | Status: SHIPPED | OUTPATIENT
Start: 2022-03-18

## 2022-03-18 NOTE — TELEPHONE ENCOUNTER
LOV 8/31/2021    RTC in 7-9 months. F/U not scheduled at this time; [employer] message sent. Orders pending.

## 2022-04-01 ENCOUNTER — HOSPITAL ENCOUNTER (OUTPATIENT)
Dept: CT IMAGING | Facility: HOSPITAL | Age: 52
Discharge: HOME | End: 2022-04-01
Attending: INTERNAL MEDICINE
Payer: COMMERCIAL

## 2022-04-01 ENCOUNTER — NURSE ONLY (OUTPATIENT)
Dept: HEMATOLOGY/ONCOLOGY | Facility: HOSPITAL | Age: 52
End: 2022-04-01
Attending: INTERNAL MEDICINE
Payer: COMMERCIAL

## 2022-04-01 DIAGNOSIS — C34.90 MALIGNANT NEOPLASM OF LUNG, UNSPECIFIED LATERALITY, UNSPECIFIED PART OF LUNG (HCC): ICD-10-CM

## 2022-04-01 DIAGNOSIS — D50.8 IRON DEFICIENCY ANEMIA SECONDARY TO INADEQUATE DIETARY IRON INTAKE: Primary | ICD-10-CM

## 2022-04-01 DIAGNOSIS — Z45.2 ENCOUNTER FOR ADJUSTMENT OR MANAGEMENT OF VASCULAR ACCESS DEVICE: ICD-10-CM

## 2022-04-01 DIAGNOSIS — C79.31 BRAIN METASTASES (HCC): ICD-10-CM

## 2022-04-01 DIAGNOSIS — C78.7 LIVER METASTASES (HCC): ICD-10-CM

## 2022-04-01 LAB — CREAT BLD-MCNC: 0.7 MG/DL

## 2022-04-01 PROCEDURE — 96523 IRRIG DRUG DELIVERY DEVICE: CPT

## 2022-04-01 PROCEDURE — 74177 CT ABD & PELVIS W/CONTRAST: CPT | Performed by: INTERNAL MEDICINE

## 2022-04-01 PROCEDURE — 82565 ASSAY OF CREATININE: CPT

## 2022-04-01 PROCEDURE — 71260 CT THORAX DX C+: CPT | Performed by: INTERNAL MEDICINE

## 2022-04-01 RX ORDER — HEPARIN SODIUM (PORCINE) LOCK FLUSH IV SOLN 100 UNIT/ML 100 UNIT/ML
5 SOLUTION INTRAVENOUS ONCE
OUTPATIENT
Start: 2022-04-01

## 2022-04-01 RX ORDER — HEPARIN SODIUM (PORCINE) LOCK FLUSH IV SOLN 100 UNIT/ML 100 UNIT/ML
5 SOLUTION INTRAVENOUS ONCE
Status: COMPLETED | OUTPATIENT
Start: 2022-04-01 | End: 2022-04-01

## 2022-04-01 RX ORDER — SODIUM CHLORIDE 9 MG/ML
10 INJECTION INTRAVENOUS ONCE
OUTPATIENT
Start: 2022-04-01

## 2022-04-01 RX ADMIN — HEPARIN SODIUM (PORCINE) LOCK FLUSH IV SOLN 100 UNIT/ML 500 UNITS: 100 SOLUTION INTRAVENOUS at 13:26:00

## 2022-04-15 ENCOUNTER — TELEPHONE (OUTPATIENT)
Dept: HEMATOLOGY/ONCOLOGY | Facility: HOSPITAL | Age: 52
End: 2022-04-15

## 2022-04-15 ENCOUNTER — NURSE ONLY (OUTPATIENT)
Dept: HEMATOLOGY/ONCOLOGY | Facility: HOSPITAL | Age: 52
End: 2022-04-15
Attending: INTERNAL MEDICINE
Payer: COMMERCIAL

## 2022-04-15 VITALS
BODY MASS INDEX: 28.66 KG/M2 | OXYGEN SATURATION: 98 % | WEIGHT: 172 LBS | RESPIRATION RATE: 18 BRPM | HEIGHT: 65 IN | HEART RATE: 81 BPM | DIASTOLIC BLOOD PRESSURE: 79 MMHG | SYSTOLIC BLOOD PRESSURE: 116 MMHG | TEMPERATURE: 99 F

## 2022-04-15 DIAGNOSIS — Z45.2 ENCOUNTER FOR ADJUSTMENT OR MANAGEMENT OF VASCULAR ACCESS DEVICE: ICD-10-CM

## 2022-04-15 DIAGNOSIS — C78.7 LIVER METASTASES (HCC): ICD-10-CM

## 2022-04-15 DIAGNOSIS — C34.90 MALIGNANT NEOPLASM OF LUNG, UNSPECIFIED LATERALITY, UNSPECIFIED PART OF LUNG (HCC): ICD-10-CM

## 2022-04-15 DIAGNOSIS — D64.9 ANEMIA, UNSPECIFIED TYPE: ICD-10-CM

## 2022-04-15 DIAGNOSIS — D64.9 ANEMIA, UNSPECIFIED TYPE: Primary | ICD-10-CM

## 2022-04-15 DIAGNOSIS — D50.8 IRON DEFICIENCY ANEMIA SECONDARY TO INADEQUATE DIETARY IRON INTAKE: Primary | ICD-10-CM

## 2022-04-15 DIAGNOSIS — C79.31 BRAIN METASTASES (HCC): ICD-10-CM

## 2022-04-15 LAB
ALBUMIN SERPL-MCNC: 3.3 G/DL (ref 3.4–5)
ALBUMIN/GLOB SERPL: 0.7 {RATIO} (ref 1–2)
ALP LIVER SERPL-CCNC: 147 U/L
ALT SERPL-CCNC: 11 U/L
ANION GAP SERPL CALC-SCNC: 6 MMOL/L (ref 0–18)
AST SERPL-CCNC: 6 U/L (ref 15–37)
BASOPHILS # BLD AUTO: 0.04 X10(3) UL (ref 0–0.2)
BASOPHILS NFR BLD AUTO: 0.3 %
BILIRUB SERPL-MCNC: 0.2 MG/DL (ref 0.1–2)
BUN BLD-MCNC: 11 MG/DL (ref 7–18)
BUN/CREAT SERPL: 18.3 (ref 10–20)
CALCIUM BLD-MCNC: 8.6 MG/DL (ref 8.5–10.1)
CHLORIDE SERPL-SCNC: 108 MMOL/L (ref 98–112)
CO2 SERPL-SCNC: 25 MMOL/L (ref 21–32)
CREAT BLD-MCNC: 0.6 MG/DL
DEPRECATED RDW RBC AUTO: 61 FL (ref 35.1–46.3)
EOSINOPHIL # BLD AUTO: 0.03 X10(3) UL (ref 0–0.7)
EOSINOPHIL NFR BLD AUTO: 0.2 %
ERYTHROCYTE [DISTWIDTH] IN BLOOD BY AUTOMATED COUNT: 17.3 % (ref 11–15)
GLOBULIN PLAS-MCNC: 5 G/DL (ref 2.8–4.4)
GLUCOSE BLD-MCNC: 90 MG/DL (ref 70–99)
HCT VFR BLD AUTO: 32.2 %
HGB BLD-MCNC: 10.3 G/DL
IMM GRANULOCYTES # BLD AUTO: 0.09 X10(3) UL (ref 0–1)
IMM GRANULOCYTES NFR BLD: 0.7 %
LYMPHOCYTES # BLD AUTO: 2.47 X10(3) UL (ref 1–4)
LYMPHOCYTES NFR BLD AUTO: 18.7 %
MCH RBC QN AUTO: 30.8 PG (ref 26–34)
MCHC RBC AUTO-ENTMCNC: 32 G/DL (ref 31–37)
MCV RBC AUTO: 96.4 FL
MONOCYTES # BLD AUTO: 0.87 X10(3) UL (ref 0.1–1)
MONOCYTES NFR BLD AUTO: 6.6 %
NEUTROPHILS # BLD AUTO: 9.73 X10 (3) UL (ref 1.5–7.7)
NEUTROPHILS # BLD AUTO: 9.73 X10(3) UL (ref 1.5–7.7)
NEUTROPHILS NFR BLD AUTO: 73.5 %
OSMOLALITY SERPL CALC.SUM OF ELEC: 287 MOSM/KG (ref 275–295)
PLATELET # BLD AUTO: 444 10(3)UL (ref 150–450)
POTASSIUM SERPL-SCNC: 3.3 MMOL/L (ref 3.5–5.1)
PROT SERPL-MCNC: 8.3 G/DL (ref 6.4–8.2)
RBC # BLD AUTO: 3.34 X10(6)UL
SODIUM SERPL-SCNC: 139 MMOL/L (ref 136–145)
WBC # BLD AUTO: 13.2 X10(3) UL (ref 4–11)

## 2022-04-15 PROCEDURE — 80053 COMPREHEN METABOLIC PANEL: CPT

## 2022-04-15 PROCEDURE — 99214 OFFICE O/P EST MOD 30 MIN: CPT | Performed by: INTERNAL MEDICINE

## 2022-04-15 PROCEDURE — 85025 COMPLETE CBC W/AUTO DIFF WBC: CPT

## 2022-04-15 PROCEDURE — 36591 DRAW BLOOD OFF VENOUS DEVICE: CPT

## 2022-04-15 RX ORDER — SODIUM CHLORIDE 9 MG/ML
10 INJECTION INTRAVENOUS ONCE
OUTPATIENT
Start: 2022-04-15

## 2022-04-15 RX ORDER — HEPARIN SODIUM (PORCINE) LOCK FLUSH IV SOLN 100 UNIT/ML 100 UNIT/ML
5 SOLUTION INTRAVENOUS ONCE
Status: COMPLETED | OUTPATIENT
Start: 2022-04-15 | End: 2022-04-15

## 2022-04-15 RX ORDER — SERTRALINE HYDROCHLORIDE 100 MG/1
200 TABLET, FILM COATED ORAL EVERY MORNING
COMMUNITY
Start: 2022-02-10

## 2022-04-15 RX ORDER — HEPARIN SODIUM (PORCINE) LOCK FLUSH IV SOLN 100 UNIT/ML 100 UNIT/ML
5 SOLUTION INTRAVENOUS ONCE
OUTPATIENT
Start: 2022-04-15

## 2022-04-15 RX ADMIN — HEPARIN SODIUM (PORCINE) LOCK FLUSH IV SOLN 100 UNIT/ML 500 UNITS: 100 SOLUTION INTRAVENOUS at 14:51:00

## 2022-04-15 NOTE — TELEPHONE ENCOUNTER
Patients  calling. Patient has F/U with Dr Luci Solorio today. There are no labs schedule.  is checking to make sure that is correct.     States she has labs at every visit    Please return call to

## 2022-06-20 ENCOUNTER — TELEPHONE (OUTPATIENT)
Dept: ENDOCRINOLOGY CLINIC | Facility: CLINIC | Age: 52
End: 2022-06-20

## 2022-06-20 DIAGNOSIS — E03.9 HYPOTHYROIDISM, UNSPECIFIED TYPE: ICD-10-CM

## 2022-06-20 RX ORDER — LEVOTHYROXINE SODIUM 175 UG/1
TABLET ORAL
Qty: 90 TABLET | Refills: 0 | Status: SHIPPED | OUTPATIENT
Start: 2022-06-20

## 2022-06-20 NOTE — TELEPHONE ENCOUNTER
Please call and book apt in the next one month  Can add on   VV or in person  TSH and Joel T4 before apt  Thanks

## 2022-07-07 ENCOUNTER — TELEPHONE (OUTPATIENT)
Dept: INTERNAL MEDICINE CLINIC | Facility: CLINIC | Age: 52
End: 2022-07-07

## 2022-07-07 NOTE — TELEPHONE ENCOUNTER
Patient scheduled with Dr. Deloris Parisi for a physical on 9/1. States swelling in hands and feet with itching. Attempted to schedule earlier visit, but patient declined and wanting to keep 9/1 appt.

## 2022-09-01 ENCOUNTER — OFFICE VISIT (OUTPATIENT)
Dept: INTERNAL MEDICINE CLINIC | Facility: CLINIC | Age: 52
End: 2022-09-01
Payer: COMMERCIAL

## 2022-09-01 VITALS
DIASTOLIC BLOOD PRESSURE: 61 MMHG | WEIGHT: 163 LBS | SYSTOLIC BLOOD PRESSURE: 99 MMHG | HEART RATE: 72 BPM | OXYGEN SATURATION: 98 % | BODY MASS INDEX: 27.16 KG/M2 | RESPIRATION RATE: 18 BRPM | HEIGHT: 65 IN

## 2022-09-01 DIAGNOSIS — C34.90 PRIMARY MALIGNANT NEOPLASM OF LUNG METASTATIC TO OTHER SITE, UNSPECIFIED LATERALITY (HCC): ICD-10-CM

## 2022-09-01 DIAGNOSIS — Z12.31 BREAST CANCER SCREENING BY MAMMOGRAM: ICD-10-CM

## 2022-09-01 DIAGNOSIS — E03.9 HYPOTHYROIDISM, UNSPECIFIED TYPE: ICD-10-CM

## 2022-09-01 DIAGNOSIS — F39 EPISODIC MOOD DISORDER (HCC): ICD-10-CM

## 2022-09-01 DIAGNOSIS — C79.31 BRAIN METASTASIS (HCC): ICD-10-CM

## 2022-09-01 DIAGNOSIS — Z00.00 ROUTINE HEALTH MAINTENANCE: Primary | ICD-10-CM

## 2022-09-01 DIAGNOSIS — M19.90 ARTHRITIS: ICD-10-CM

## 2022-09-01 PROBLEM — E86.0 DEHYDRATION: Status: RESOLVED | Noted: 2021-10-02 | Resolved: 2022-09-01

## 2022-09-01 PROCEDURE — 3078F DIAST BP <80 MM HG: CPT | Performed by: INTERNAL MEDICINE

## 2022-09-01 PROCEDURE — 99214 OFFICE O/P EST MOD 30 MIN: CPT | Performed by: INTERNAL MEDICINE

## 2022-09-01 PROCEDURE — 99396 PREV VISIT EST AGE 40-64: CPT | Performed by: INTERNAL MEDICINE

## 2022-09-01 PROCEDURE — 3008F BODY MASS INDEX DOCD: CPT | Performed by: INTERNAL MEDICINE

## 2022-09-01 PROCEDURE — 3074F SYST BP LT 130 MM HG: CPT | Performed by: INTERNAL MEDICINE

## 2022-09-01 RX ORDER — CLONAZEPAM 0.5 MG/1
0.5 TABLET ORAL 2 TIMES DAILY PRN
Refills: 0 | COMMUNITY
Start: 2022-09-01

## 2022-09-01 RX ORDER — GABAPENTIN 800 MG/1
800 TABLET ORAL DAILY
COMMUNITY

## 2022-09-01 RX ORDER — LEVOTHYROXINE SODIUM 175 UG/1
175 TABLET ORAL
Qty: 90 TABLET | Refills: 0 | COMMUNITY
Start: 2022-09-01

## 2022-09-01 RX ORDER — ZOLPIDEM TARTRATE 5 MG/1
5 TABLET ORAL NIGHTLY PRN
Qty: 20 TABLET | Refills: 0 | COMMUNITY
Start: 2022-09-01

## 2022-09-01 NOTE — PATIENT INSTRUCTIONS
Please check dose of clonazepam and ambien and call back and leave a message or send a MyChart message.

## 2022-09-02 NOTE — ASSESSMENT & PLAN NOTE
Reviewed oncologist note. Reviewed labs and CT scan done earlier this year which were ordered by oncology. Patient currently has no active disease. Follow-up with oncology.

## 2022-09-02 NOTE — ASSESSMENT & PLAN NOTE
Unremarkable exam.  Pt's colon cancer screening is due. She does not want a colonoscopy. She will do a stool test.  Immunizations were reviewed. Counseled pt regarding diet and exercise. Reviewed labs with pt.

## 2022-09-12 ENCOUNTER — TELEPHONE (OUTPATIENT)
Dept: HEMATOLOGY/ONCOLOGY | Facility: HOSPITAL | Age: 52
End: 2022-09-12

## 2022-09-12 NOTE — TELEPHONE ENCOUNTER
Patients  calling to reschedule missed appointment and labs from July. Before scheduling, wanted to know if Ct  Needs to be done. Does not have an order.   Please advise

## 2022-09-16 ENCOUNTER — TELEPHONE (OUTPATIENT)
Dept: ENDOCRINOLOGY CLINIC | Facility: CLINIC | Age: 52
End: 2022-09-16

## 2022-09-16 ENCOUNTER — NURSE ONLY (OUTPATIENT)
Dept: HEMATOLOGY/ONCOLOGY | Facility: HOSPITAL | Age: 52
End: 2022-09-16
Attending: INTERNAL MEDICINE
Payer: COMMERCIAL

## 2022-09-16 VITALS
TEMPERATURE: 98 F | WEIGHT: 159.63 LBS | BODY MASS INDEX: 26.6 KG/M2 | HEIGHT: 65 IN | OXYGEN SATURATION: 98 % | RESPIRATION RATE: 18 BRPM | HEART RATE: 96 BPM | SYSTOLIC BLOOD PRESSURE: 124 MMHG | DIASTOLIC BLOOD PRESSURE: 72 MMHG

## 2022-09-16 DIAGNOSIS — D50.8 IRON DEFICIENCY ANEMIA SECONDARY TO INADEQUATE DIETARY IRON INTAKE: Primary | ICD-10-CM

## 2022-09-16 DIAGNOSIS — Z00.00 ROUTINE HEALTH MAINTENANCE: ICD-10-CM

## 2022-09-16 DIAGNOSIS — C34.90 MALIGNANT NEOPLASM OF LUNG, UNSPECIFIED LATERALITY, UNSPECIFIED PART OF LUNG (HCC): ICD-10-CM

## 2022-09-16 DIAGNOSIS — C78.7 LIVER METASTASES (HCC): ICD-10-CM

## 2022-09-16 DIAGNOSIS — E03.9 HYPOTHYROIDISM, UNSPECIFIED TYPE: ICD-10-CM

## 2022-09-16 DIAGNOSIS — C79.31 BRAIN METASTASES (HCC): ICD-10-CM

## 2022-09-16 DIAGNOSIS — C34.90 MALIGNANT NEOPLASM OF LUNG, UNSPECIFIED LATERALITY, UNSPECIFIED PART OF LUNG (HCC): Primary | ICD-10-CM

## 2022-09-16 LAB
ALBUMIN SERPL-MCNC: 2.9 G/DL (ref 3.4–5)
ALBUMIN/GLOB SERPL: 0.6 {RATIO} (ref 1–2)
ALP LIVER SERPL-CCNC: 137 U/L
ALT SERPL-CCNC: 11 U/L
ANION GAP SERPL CALC-SCNC: 6 MMOL/L (ref 0–18)
AST SERPL-CCNC: 9 U/L (ref 15–37)
BASOPHILS # BLD AUTO: 0.05 X10(3) UL (ref 0–0.2)
BASOPHILS NFR BLD AUTO: 0.5 %
BILIRUB SERPL-MCNC: 0.2 MG/DL (ref 0.1–2)
BUN BLD-MCNC: 13 MG/DL (ref 7–18)
BUN/CREAT SERPL: 18.3 (ref 10–20)
CALCIUM BLD-MCNC: 9 MG/DL (ref 8.5–10.1)
CHLORIDE SERPL-SCNC: 108 MMOL/L (ref 98–112)
CO2 SERPL-SCNC: 25 MMOL/L (ref 21–32)
CREAT BLD-MCNC: 0.71 MG/DL
DEPRECATED RDW RBC AUTO: 53.2 FL (ref 35.1–46.3)
EOSINOPHIL # BLD AUTO: 0.04 X10(3) UL (ref 0–0.7)
EOSINOPHIL NFR BLD AUTO: 0.4 %
ERYTHROCYTE [DISTWIDTH] IN BLOOD BY AUTOMATED COUNT: 15.4 % (ref 11–15)
GFR SERPLBLD BASED ON 1.73 SQ M-ARVRAT: 102 ML/MIN/1.73M2 (ref 60–?)
GLOBULIN PLAS-MCNC: 5.1 G/DL (ref 2.8–4.4)
GLUCOSE BLD-MCNC: 112 MG/DL (ref 70–99)
HCT VFR BLD AUTO: 36.9 %
HGB BLD-MCNC: 12 G/DL
IMM GRANULOCYTES # BLD AUTO: 0.07 X10(3) UL (ref 0–1)
IMM GRANULOCYTES NFR BLD: 0.7 %
LYMPHOCYTES # BLD AUTO: 2.11 X10(3) UL (ref 1–4)
LYMPHOCYTES NFR BLD AUTO: 20.1 %
MCH RBC QN AUTO: 30.5 PG (ref 26–34)
MCHC RBC AUTO-ENTMCNC: 32.5 G/DL (ref 31–37)
MCV RBC AUTO: 93.9 FL
MONOCYTES # BLD AUTO: 0.99 X10(3) UL (ref 0.1–1)
MONOCYTES NFR BLD AUTO: 9.4 %
NEUTROPHILS # BLD AUTO: 7.26 X10 (3) UL (ref 1.5–7.7)
NEUTROPHILS # BLD AUTO: 7.26 X10(3) UL (ref 1.5–7.7)
NEUTROPHILS NFR BLD AUTO: 68.9 %
OSMOLALITY SERPL CALC.SUM OF ELEC: 289 MOSM/KG (ref 275–295)
PLATELET # BLD AUTO: 500 10(3)UL (ref 150–450)
POTASSIUM SERPL-SCNC: 3.7 MMOL/L (ref 3.5–5.1)
PROT SERPL-MCNC: 8 G/DL (ref 6.4–8.2)
RBC # BLD AUTO: 3.93 X10(6)UL
SODIUM SERPL-SCNC: 139 MMOL/L (ref 136–145)
T3FREE SERPL-MCNC: 1.97 PG/ML (ref 2.4–4.2)
T4 FREE SERPL-MCNC: 1 NG/DL (ref 0.8–1.7)
TSI SER-ACNC: 0.03 MIU/ML (ref 0.36–3.74)
WBC # BLD AUTO: 10.5 X10(3) UL (ref 4–11)

## 2022-09-16 PROCEDURE — 85025 COMPLETE CBC W/AUTO DIFF WBC: CPT

## 2022-09-16 PROCEDURE — 84481 FREE ASSAY (FT-3): CPT

## 2022-09-16 PROCEDURE — 36591 DRAW BLOOD OFF VENOUS DEVICE: CPT

## 2022-09-16 PROCEDURE — 84439 ASSAY OF FREE THYROXINE: CPT

## 2022-09-16 PROCEDURE — 99214 OFFICE O/P EST MOD 30 MIN: CPT | Performed by: INTERNAL MEDICINE

## 2022-09-16 PROCEDURE — 80053 COMPREHEN METABOLIC PANEL: CPT

## 2022-09-16 PROCEDURE — 84443 ASSAY THYROID STIM HORMONE: CPT

## 2022-09-16 RX ORDER — HEPARIN SODIUM (PORCINE) LOCK FLUSH IV SOLN 100 UNIT/ML 100 UNIT/ML
5 SOLUTION INTRAVENOUS ONCE
Status: CANCELLED | OUTPATIENT
Start: 2022-09-16

## 2022-09-16 RX ORDER — SODIUM CHLORIDE 9 MG/ML
10 INJECTION INTRAVENOUS ONCE
OUTPATIENT
Start: 2022-09-16

## 2022-09-16 RX ORDER — HEPARIN SODIUM (PORCINE) LOCK FLUSH IV SOLN 100 UNIT/ML 100 UNIT/ML
5 SOLUTION INTRAVENOUS ONCE
OUTPATIENT
Start: 2022-09-16

## 2022-09-16 RX ORDER — HEPARIN SODIUM (PORCINE) LOCK FLUSH IV SOLN 100 UNIT/ML 100 UNIT/ML
5 SOLUTION INTRAVENOUS ONCE
Status: COMPLETED | OUTPATIENT
Start: 2022-09-16 | End: 2022-09-16

## 2022-09-16 RX ADMIN — HEPARIN SODIUM (PORCINE) LOCK FLUSH IV SOLN 100 UNIT/ML 500 UNITS: 100 SOLUTION INTRAVENOUS at 09:29:00

## 2022-09-16 NOTE — TELEPHONE ENCOUNTER
received abnormal labs  Patient has not been seen in over a year  Please call and book in person or VV Monday m can add on before 1 pm   Take half tablet of LT4 till Monday  Thanks

## 2022-09-24 DIAGNOSIS — E03.9 HYPOTHYROIDISM, UNSPECIFIED TYPE: ICD-10-CM

## 2022-09-25 NOTE — TELEPHONE ENCOUNTER
received abnormal labs and refill request  Patient has not been seen in over a year  Please call and book in person or VV Monday m can add on before 1 pm     Thanks

## 2022-09-26 NOTE — TELEPHONE ENCOUNTER
Noted we have called patient before, TE 9/16/22. Called and LM on home phone number.  Tried mobile number as well and got busy tone/

## 2022-09-29 ENCOUNTER — TELEPHONE (OUTPATIENT)
Dept: ENDOCRINOLOGY CLINIC | Facility: CLINIC | Age: 52
End: 2022-09-29

## 2022-09-29 NOTE — TELEPHONE ENCOUNTER
Patient has VV tmrw  It says \" send link for [employer]ximity\"  We are only doing VV via my chart   Please explain to the patient   Thanks

## 2022-09-29 NOTE — TELEPHONE ENCOUNTER
Spoke with patient and she had originally had said she can't access [employer], but now she does have access. She says her  will be able to help her. Sent [employer] message with instructions. Patient says she can't come in person.

## 2022-09-29 NOTE — TELEPHONE ENCOUNTER
Spoke with patient and scheduled video visit in open spot for tomorrow. She will take half tablet of levothyroxine tomorrow as directed in TE 9/16/22.

## 2022-09-30 ENCOUNTER — TELEMEDICINE (OUTPATIENT)
Dept: ENDOCRINOLOGY CLINIC | Facility: CLINIC | Age: 52
End: 2022-09-30

## 2022-09-30 ENCOUNTER — TELEPHONE (OUTPATIENT)
Dept: ENDOCRINOLOGY CLINIC | Facility: CLINIC | Age: 52
End: 2022-09-30

## 2022-09-30 DIAGNOSIS — E03.9 HYPOTHYROIDISM, UNSPECIFIED TYPE: Primary | ICD-10-CM

## 2022-09-30 RX ORDER — LEVOTHYROXINE SODIUM 0.15 MG/1
150 TABLET ORAL
Qty: 90 TABLET | Refills: 0 | Status: SHIPPED | OUTPATIENT
Start: 2022-09-30

## 2022-09-30 NOTE — TELEPHONE ENCOUNTER
Called patient and per patient, the link got sent to his 's number. File got fixed and provider completed visit.

## 2022-09-30 NOTE — TELEPHONE ENCOUNTER
Video visit ;ink was sent to  patient  Patient logged in around 1:41 pm and then logged out  Please call to facilitate visit    Thanks

## 2022-10-01 RX ORDER — LEVOTHYROXINE SODIUM 175 UG/1
TABLET ORAL
Qty: 90 TABLET | Refills: 0 | OUTPATIENT
Start: 2022-10-01

## 2022-10-31 NOTE — PLAN OF CARE
Problem: NEUROLOGICAL - ADULT  Goal: Achieves stable or improved neurological status  INTERVENTIONS  - Assess for and report changes in neurological status  - Initiate measures to prevent increased intracranial pressure  - Maintain blood pressure and fluid electrolyte replacement as ordered  - Monitor response to electrolyte replacements, including rhythm and repeat lab results as appropriate  - Fluid restriction as ordered  - Instruct patient on fluid and nutrition restrictions as appropriate  Outcome: Not You were seen in the Emergency Department for: viral upper respiratory illness    For pain/fever, you can continue to take Children's Tylenol (acetaminophen) AND/OR Children's Advil as instructed on the container.    Please follow up with your primary physician as discussed. If you do not have a primary physician or specialist of your needs, please call [phone] to find one convenient for you. At this number you will be able to locate a provider who accepts your insurance, as well as locate the right specialist for your needs.    You should return to the Emergency Department if you feel any new/worsening/persistent symptoms including but not limited to: chest pain, difficulty breathing, loss of consciousness, bleeding, uncontrolled pain, numbness/weakness of a body part.

## 2022-12-13 ENCOUNTER — TELEPHONE (OUTPATIENT)
Dept: HEMATOLOGY/ONCOLOGY | Facility: HOSPITAL | Age: 52
End: 2022-12-13

## 2022-12-13 NOTE — TELEPHONE ENCOUNTER
----- Message from Blanca Lieberman RN sent at 12/13/2022  3:08 PM CST -----  Regarding: FW: reschedule labs (and CT scan)  Eliu Huffman,     Can you call Fanny Culp and help her out? She will proably need to reschedule 12/15.    TY!!  Linda Sanchez   ----- Message -----  From: Deleta Kanner, RN  Sent: 12/13/2022   3:03 PM CST  To: Blanca Lieberman RN, #  Subject: reschedule labs (and CT scan)                    Hi :)    Schedulers - can you reach out to Fanny Culp to reschedule her port flush and labs? Gabriela Perla - may need to help her or let her know to reschedule her CT scan. She missed her 2:15pm lab/ port and 2:30pm CT today. Has follow up with Dr Gómez Augustine 12/15.     Thanks,  [employer] English

## 2022-12-14 ENCOUNTER — TELEPHONE (OUTPATIENT)
Dept: HEMATOLOGY/ONCOLOGY | Facility: HOSPITAL | Age: 52
End: 2022-12-14

## 2022-12-14 NOTE — TELEPHONE ENCOUNTER
Called patient with schedule for 12/21/22 at the [facility] for port access and labs at 1045 am prior to CT scan scheduled at 100 pm.  Also rescheduled MD shaw to 12/23/22 at 1245 pm.  She verbalizes understanding.

## 2022-12-15 ENCOUNTER — APPOINTMENT (OUTPATIENT)
Dept: HEMATOLOGY/ONCOLOGY | Facility: HOSPITAL | Age: 52
End: 2022-12-15
Attending: INTERNAL MEDICINE
Payer: COMMERCIAL

## 2022-12-21 ENCOUNTER — NURSE ONLY (OUTPATIENT)
Dept: HEMATOLOGY/ONCOLOGY | Facility: HOSPITAL | Age: 52
End: 2022-12-21
Attending: INTERNAL MEDICINE
Payer: COMMERCIAL

## 2022-12-21 ENCOUNTER — HOSPITAL ENCOUNTER (OUTPATIENT)
Dept: CT IMAGING | Facility: HOSPITAL | Age: 52
Discharge: HOME | End: 2022-12-21
Attending: INTERNAL MEDICINE
Payer: COMMERCIAL

## 2022-12-21 DIAGNOSIS — C34.90 MALIGNANT NEOPLASM OF LUNG, UNSPECIFIED LATERALITY, UNSPECIFIED PART OF LUNG (HCC): ICD-10-CM

## 2022-12-21 DIAGNOSIS — C78.7 LIVER METASTASES (HCC): ICD-10-CM

## 2022-12-21 DIAGNOSIS — C79.31 BRAIN METASTASES (HCC): ICD-10-CM

## 2022-12-21 DIAGNOSIS — D50.8 IRON DEFICIENCY ANEMIA SECONDARY TO INADEQUATE DIETARY IRON INTAKE: Primary | ICD-10-CM

## 2022-12-21 DIAGNOSIS — Z00.00 ROUTINE HEALTH MAINTENANCE: ICD-10-CM

## 2022-12-21 LAB
ALBUMIN SERPL-MCNC: 2.8 G/DL (ref 3.4–5)
ALBUMIN/GLOB SERPL: 0.6 {RATIO} (ref 1–2)
ALP LIVER SERPL-CCNC: 132 U/L
ALT SERPL-CCNC: 10 U/L
ANION GAP SERPL CALC-SCNC: 6 MMOL/L (ref 0–18)
AST SERPL-CCNC: 9 U/L (ref 15–37)
BASOPHILS # BLD AUTO: 0.05 X10(3) UL (ref 0–0.2)
BASOPHILS NFR BLD AUTO: 0.4 %
BILIRUB SERPL-MCNC: 0.2 MG/DL (ref 0.1–2)
BUN BLD-MCNC: 10 MG/DL (ref 7–18)
BUN/CREAT SERPL: 16.7 (ref 10–20)
CALCIUM BLD-MCNC: 8.5 MG/DL (ref 8.5–10.1)
CHLORIDE SERPL-SCNC: 111 MMOL/L (ref 98–112)
CO2 SERPL-SCNC: 23 MMOL/L (ref 21–32)
CREAT BLD-MCNC: 0.6 MG/DL
DEPRECATED RDW RBC AUTO: 59.9 FL (ref 35.1–46.3)
EOSINOPHIL # BLD AUTO: 0.06 X10(3) UL (ref 0–0.7)
EOSINOPHIL NFR BLD AUTO: 0.5 %
ERYTHROCYTE [DISTWIDTH] IN BLOOD BY AUTOMATED COUNT: 18 % (ref 11–15)
GFR SERPLBLD BASED ON 1.73 SQ M-ARVRAT: 107 ML/MIN/1.73M2 (ref 60–?)
GLOBULIN PLAS-MCNC: 4.8 G/DL (ref 2.8–4.4)
GLUCOSE BLD-MCNC: 100 MG/DL (ref 70–99)
HCT VFR BLD AUTO: 30 %
HGB BLD-MCNC: 9.5 G/DL
IMM GRANULOCYTES # BLD AUTO: 0.13 X10(3) UL (ref 0–1)
IMM GRANULOCYTES NFR BLD: 1.2 %
LYMPHOCYTES # BLD AUTO: 2.99 X10(3) UL (ref 1–4)
LYMPHOCYTES NFR BLD AUTO: 26.8 %
MCH RBC QN AUTO: 30.3 PG (ref 26–34)
MCHC RBC AUTO-ENTMCNC: 31.7 G/DL (ref 31–37)
MCV RBC AUTO: 95.5 FL
MONOCYTES # BLD AUTO: 0.87 X10(3) UL (ref 0.1–1)
MONOCYTES NFR BLD AUTO: 7.8 %
NEUTROPHILS # BLD AUTO: 7.04 X10 (3) UL (ref 1.5–7.7)
NEUTROPHILS # BLD AUTO: 7.04 X10(3) UL (ref 1.5–7.7)
NEUTROPHILS NFR BLD AUTO: 63.3 %
OSMOLALITY SERPL CALC.SUM OF ELEC: 289 MOSM/KG (ref 275–295)
PLATELET # BLD AUTO: 717 10(3)UL (ref 150–450)
POTASSIUM SERPL-SCNC: 3.6 MMOL/L (ref 3.5–5.1)
PROT SERPL-MCNC: 7.6 G/DL (ref 6.4–8.2)
RBC # BLD AUTO: 3.14 X10(6)UL
SODIUM SERPL-SCNC: 140 MMOL/L (ref 136–145)
WBC # BLD AUTO: 11.1 X10(3) UL (ref 4–11)

## 2022-12-21 PROCEDURE — 74177 CT ABD & PELVIS W/CONTRAST: CPT | Performed by: INTERNAL MEDICINE

## 2022-12-21 PROCEDURE — 36591 DRAW BLOOD OFF VENOUS DEVICE: CPT

## 2022-12-21 PROCEDURE — 80053 COMPREHEN METABOLIC PANEL: CPT

## 2022-12-21 PROCEDURE — 71260 CT THORAX DX C+: CPT | Performed by: INTERNAL MEDICINE

## 2022-12-21 PROCEDURE — 85025 COMPLETE CBC W/AUTO DIFF WBC: CPT

## 2022-12-21 RX ORDER — SODIUM CHLORIDE 9 MG/ML
10 INJECTION INTRAVENOUS ONCE
OUTPATIENT
Start: 2022-12-21

## 2022-12-21 RX ORDER — HEPARIN SODIUM (PORCINE) LOCK FLUSH IV SOLN 100 UNIT/ML 100 UNIT/ML
5 SOLUTION INTRAVENOUS ONCE
OUTPATIENT
Start: 2022-12-21

## 2022-12-21 RX ORDER — HEPARIN SODIUM (PORCINE) LOCK FLUSH IV SOLN 100 UNIT/ML 100 UNIT/ML
5 SOLUTION INTRAVENOUS ONCE
Status: COMPLETED | OUTPATIENT
Start: 2022-12-21 | End: 2022-12-21

## 2022-12-21 RX ADMIN — HEPARIN SODIUM (PORCINE) LOCK FLUSH IV SOLN 100 UNIT/ML 500 UNITS: 100 SOLUTION INTRAVENOUS at 13:19:00

## 2022-12-22 ENCOUNTER — TELEPHONE (OUTPATIENT)
Dept: HEMATOLOGY/ONCOLOGY | Facility: HOSPITAL | Age: 52
End: 2022-12-22

## 2022-12-22 NOTE — TELEPHONE ENCOUNTER
Called to review CT imaging concern for PE. PAtint feels well, asymptomatic, no acute respiratory distress. Unable to follow-up otmorrow for fruther assessment and review. Able to  anticoagulation today. Reviewed Rivaroxaban use and to start BID dosing today, AEs reviewed. Will RTC 12/27 for further follow-up. Reviewed when to report to ER if concerning new symptoms. Agreeable to plan.

## 2022-12-23 ENCOUNTER — APPOINTMENT (OUTPATIENT)
Dept: HEMATOLOGY/ONCOLOGY | Facility: HOSPITAL | Age: 52
End: 2022-12-23
Attending: INTERNAL MEDICINE
Payer: COMMERCIAL

## 2022-12-27 ENCOUNTER — NURSE ONLY (OUTPATIENT)
Dept: HEMATOLOGY/ONCOLOGY | Facility: HOSPITAL | Age: 52
End: 2022-12-27
Attending: INTERNAL MEDICINE
Payer: COMMERCIAL

## 2022-12-27 ENCOUNTER — TELEPHONE (OUTPATIENT)
Dept: HEMATOLOGY/ONCOLOGY | Facility: HOSPITAL | Age: 52
End: 2022-12-27

## 2022-12-27 VITALS
OXYGEN SATURATION: 98 % | TEMPERATURE: 99 F | SYSTOLIC BLOOD PRESSURE: 115 MMHG | BODY MASS INDEX: 27.99 KG/M2 | DIASTOLIC BLOOD PRESSURE: 69 MMHG | WEIGHT: 168 LBS | RESPIRATION RATE: 18 BRPM | HEIGHT: 65 IN | HEART RATE: 68 BPM

## 2022-12-27 DIAGNOSIS — Z51.81 ANTICOAGULATION MANAGEMENT ENCOUNTER: ICD-10-CM

## 2022-12-27 DIAGNOSIS — D50.9 IRON DEFICIENCY ANEMIA, UNSPECIFIED IRON DEFICIENCY ANEMIA TYPE: ICD-10-CM

## 2022-12-27 DIAGNOSIS — C34.90 MALIGNANT NEOPLASM OF LUNG, UNSPECIFIED LATERALITY, UNSPECIFIED PART OF LUNG (HCC): Primary | ICD-10-CM

## 2022-12-27 DIAGNOSIS — I26.99 PULMONARY EMBOLISM, OTHER, UNSPECIFIED CHRONICITY, UNSPECIFIED WHETHER ACUTE COR PULMONALE PRESENT (HCC): ICD-10-CM

## 2022-12-27 DIAGNOSIS — D50.8 IRON DEFICIENCY ANEMIA SECONDARY TO INADEQUATE DIETARY IRON INTAKE: Primary | ICD-10-CM

## 2022-12-27 DIAGNOSIS — Z00.00 ROUTINE HEALTH MAINTENANCE: ICD-10-CM

## 2022-12-27 DIAGNOSIS — C78.7 LIVER METASTASES (HCC): ICD-10-CM

## 2022-12-27 DIAGNOSIS — Z79.01 ANTICOAGULATION MANAGEMENT ENCOUNTER: ICD-10-CM

## 2022-12-27 DIAGNOSIS — C34.90 MALIGNANT NEOPLASM OF LUNG, UNSPECIFIED LATERALITY, UNSPECIFIED PART OF LUNG (HCC): ICD-10-CM

## 2022-12-27 LAB
IRON SATN MFR SERPL: 12 %
IRON SERPL-MCNC: 35 UG/DL
TIBC SERPL-MCNC: 304 UG/DL (ref 240–450)
TRANSFERRIN SERPL-MCNC: 204 MG/DL (ref 200–360)

## 2022-12-27 PROCEDURE — 36591 DRAW BLOOD OFF VENOUS DEVICE: CPT

## 2022-12-27 PROCEDURE — 99215 OFFICE O/P EST HI 40 MIN: CPT | Performed by: INTERNAL MEDICINE

## 2022-12-27 RX ORDER — SODIUM CHLORIDE 9 MG/ML
10 INJECTION INTRAVENOUS ONCE
OUTPATIENT
Start: 2022-12-27

## 2022-12-27 RX ORDER — HEPARIN SODIUM (PORCINE) LOCK FLUSH IV SOLN 100 UNIT/ML 100 UNIT/ML
5 SOLUTION INTRAVENOUS ONCE
Status: COMPLETED | OUTPATIENT
Start: 2022-12-27 | End: 2022-12-27

## 2022-12-27 RX ORDER — HEPARIN SODIUM (PORCINE) LOCK FLUSH IV SOLN 100 UNIT/ML 100 UNIT/ML
5 SOLUTION INTRAVENOUS ONCE
OUTPATIENT
Start: 2022-12-27

## 2022-12-27 RX ADMIN — HEPARIN SODIUM (PORCINE) LOCK FLUSH IV SOLN 100 UNIT/ML 500 UNITS: 100 SOLUTION INTRAVENOUS at 13:40:00

## 2022-12-27 NOTE — PROGRESS NOTES
Pt with port / iron lab per office  Went to see pt in exam room 5, lab drawn via port, tolerated well  Flushed with NS and 500 units/ 5ml heparin, then de-accessed port needle  2x2 gauze/ paper tape to site    Discharged stable to lobby - await ride.   Pt requires assist x1 to walk w/ her d/t vision and post op knee surg  Has AVS - CMA provided to her

## 2022-12-28 RX ORDER — LEVOTHYROXINE SODIUM 0.15 MG/1
TABLET ORAL
Qty: 90 TABLET | Refills: 1 | Status: SHIPPED | OUTPATIENT
Start: 2022-12-28

## 2023-03-27 ENCOUNTER — OFFICE VISIT (OUTPATIENT)
Dept: HEMATOLOGY/ONCOLOGY | Facility: HOSPITAL | Age: 53
End: 2023-03-27
Attending: NURSE PRACTITIONER
Payer: COMMERCIAL

## 2023-03-27 DIAGNOSIS — C34.90 MALIGNANT NEOPLASM OF LUNG, UNSPECIFIED LATERALITY, UNSPECIFIED PART OF LUNG (HCC): Primary | ICD-10-CM

## 2023-05-02 NOTE — TELEPHONE ENCOUNTER
Patient would like to speak with Dr Sunil Hou about Dr contacting her  [Negative] : Respiratory [Fever] : no fever [Chills] : no chills [Chest Pain] : no chest pain [Palpitations] : no palpitations

## 2023-06-21 ENCOUNTER — APPOINTMENT (OUTPATIENT)
Dept: HEMATOLOGY/ONCOLOGY | Facility: HOSPITAL | Age: 53
End: 2023-06-21
Attending: INTERNAL MEDICINE
Payer: COMMERCIAL

## 2023-06-22 ENCOUNTER — OFFICE VISIT (OUTPATIENT)
Dept: HEMATOLOGY/ONCOLOGY | Facility: HOSPITAL | Age: 53
End: 2023-06-22
Attending: INTERNAL MEDICINE
Payer: COMMERCIAL

## 2023-06-22 VITALS
OXYGEN SATURATION: 96 % | HEART RATE: 110 BPM | HEIGHT: 65 IN | WEIGHT: 157 LBS | RESPIRATION RATE: 18 BRPM | BODY MASS INDEX: 26.16 KG/M2 | DIASTOLIC BLOOD PRESSURE: 97 MMHG | TEMPERATURE: 100 F | SYSTOLIC BLOOD PRESSURE: 120 MMHG

## 2023-06-22 DIAGNOSIS — C34.90 MALIGNANT NEOPLASM OF LUNG, UNSPECIFIED LATERALITY, UNSPECIFIED PART OF LUNG (HCC): Primary | ICD-10-CM

## 2023-06-22 DIAGNOSIS — D50.9 IRON DEFICIENCY ANEMIA, UNSPECIFIED IRON DEFICIENCY ANEMIA TYPE: ICD-10-CM

## 2023-06-22 DIAGNOSIS — I26.99 PULMONARY EMBOLISM, OTHER, UNSPECIFIED CHRONICITY, UNSPECIFIED WHETHER ACUTE COR PULMONALE PRESENT (HCC): ICD-10-CM

## 2023-06-22 DIAGNOSIS — D50.8 IRON DEFICIENCY ANEMIA SECONDARY TO INADEQUATE DIETARY IRON INTAKE: ICD-10-CM

## 2023-06-22 DIAGNOSIS — C79.31 MALIGNANT NEOPLASM METASTATIC TO BRAIN (HCC): ICD-10-CM

## 2023-06-22 DIAGNOSIS — Z79.01 ANTICOAGULATION MANAGEMENT ENCOUNTER: ICD-10-CM

## 2023-06-22 DIAGNOSIS — Z51.81 ANTICOAGULATION MANAGEMENT ENCOUNTER: ICD-10-CM

## 2023-06-22 PROCEDURE — 99214 OFFICE O/P EST MOD 30 MIN: CPT | Performed by: INTERNAL MEDICINE

## 2023-06-27 ENCOUNTER — TELEPHONE (OUTPATIENT)
Dept: ENDOCRINOLOGY CLINIC | Facility: CLINIC | Age: 53
End: 2023-06-27

## 2023-06-27 RX ORDER — LEVOTHYROXINE SODIUM 0.15 MG/1
150 TABLET ORAL
Qty: 90 TABLET | Refills: 0 | Status: SHIPPED | OUTPATIENT
Start: 2023-06-27

## 2023-07-07 ENCOUNTER — HOSPITAL ENCOUNTER (OUTPATIENT)
Dept: CT IMAGING | Facility: HOSPITAL | Age: 53
Discharge: HOME | End: 2023-07-07
Attending: INTERNAL MEDICINE
Payer: COMMERCIAL

## 2023-07-07 ENCOUNTER — NURSE ONLY (OUTPATIENT)
Dept: HEMATOLOGY/ONCOLOGY | Facility: HOSPITAL | Age: 53
End: 2023-07-07
Attending: INTERNAL MEDICINE
Payer: COMMERCIAL

## 2023-07-07 DIAGNOSIS — Z00.00 ROUTINE HEALTH MAINTENANCE: Primary | ICD-10-CM

## 2023-07-07 DIAGNOSIS — C34.90 MALIGNANT NEOPLASM OF LUNG, UNSPECIFIED LATERALITY, UNSPECIFIED PART OF LUNG (HCC): ICD-10-CM

## 2023-07-07 DIAGNOSIS — D50.8 IRON DEFICIENCY ANEMIA SECONDARY TO INADEQUATE DIETARY IRON INTAKE: ICD-10-CM

## 2023-07-07 DIAGNOSIS — E03.9 HYPOTHYROIDISM, UNSPECIFIED TYPE: ICD-10-CM

## 2023-07-07 DIAGNOSIS — D50.9 IRON DEFICIENCY ANEMIA, UNSPECIFIED IRON DEFICIENCY ANEMIA TYPE: ICD-10-CM

## 2023-07-07 LAB
ALBUMIN SERPL-MCNC: 3 G/DL (ref 3.4–5)
ALBUMIN/GLOB SERPL: 0.6 {RATIO} (ref 1–2)
ALP LIVER SERPL-CCNC: 164 U/L
ALT SERPL-CCNC: 18 U/L
ANION GAP SERPL CALC-SCNC: 11 MMOL/L (ref 0–18)
AST SERPL-CCNC: 22 U/L (ref 15–37)
BASOPHILS # BLD AUTO: 0.04 X10(3) UL (ref 0–0.2)
BASOPHILS NFR BLD AUTO: 0.4 %
BILIRUB SERPL-MCNC: 0.3 MG/DL (ref 0.1–2)
BUN BLD-MCNC: 10 MG/DL (ref 7–18)
BUN/CREAT SERPL: 15.4 (ref 10–20)
CALCIUM BLD-MCNC: 8.4 MG/DL (ref 8.5–10.1)
CHLORIDE SERPL-SCNC: 105 MMOL/L (ref 98–112)
CO2 SERPL-SCNC: 19 MMOL/L (ref 21–32)
CREAT BLD-MCNC: 0.6 MG/DL
CREAT BLD-MCNC: 0.65 MG/DL
DEPRECATED HBV CORE AB SER IA-ACNC: 33.8 NG/ML
DEPRECATED RDW RBC AUTO: 62.4 FL (ref 35.1–46.3)
EOSINOPHIL # BLD AUTO: 0.02 X10(3) UL (ref 0–0.7)
EOSINOPHIL NFR BLD AUTO: 0.2 %
ERYTHROCYTE [DISTWIDTH] IN BLOOD BY AUTOMATED COUNT: 18 % (ref 11–15)
GFR SERPLBLD BASED ON 1.73 SQ M-ARVRAT: 105 ML/MIN/1.73M2 (ref 60–?)
GFR SERPLBLD BASED ON 1.73 SQ M-ARVRAT: 107 ML/MIN/1.73M2 (ref 60–?)
GLOBULIN PLAS-MCNC: 5.2 G/DL (ref 2.8–4.4)
GLUCOSE BLD-MCNC: 98 MG/DL (ref 70–99)
HCT VFR BLD AUTO: 34.9 %
HGB BLD-MCNC: 11.6 G/DL
IMM GRANULOCYTES # BLD AUTO: 0.04 X10(3) UL (ref 0–1)
IMM GRANULOCYTES NFR BLD: 0.4 %
IRON SATN MFR SERPL: 31 %
IRON SERPL-MCNC: 111 UG/DL
LYMPHOCYTES # BLD AUTO: 1.9 X10(3) UL (ref 1–4)
LYMPHOCYTES NFR BLD AUTO: 18.5 %
MCH RBC QN AUTO: 31.4 PG (ref 26–34)
MCHC RBC AUTO-ENTMCNC: 33.2 G/DL (ref 31–37)
MCV RBC AUTO: 94.6 FL
MONOCYTES # BLD AUTO: 0.96 X10(3) UL (ref 0.1–1)
MONOCYTES NFR BLD AUTO: 9.3 %
NEUTROPHILS # BLD AUTO: 7.33 X10 (3) UL (ref 1.5–7.7)
NEUTROPHILS # BLD AUTO: 7.33 X10(3) UL (ref 1.5–7.7)
NEUTROPHILS NFR BLD AUTO: 71.2 %
OSMOLALITY SERPL CALC.SUM OF ELEC: 279 MOSM/KG (ref 275–295)
PLATELET # BLD AUTO: 403 10(3)UL (ref 150–450)
POTASSIUM SERPL-SCNC: 3.6 MMOL/L (ref 3.5–5.1)
PROT SERPL-MCNC: 8.2 G/DL (ref 6.4–8.2)
RBC # BLD AUTO: 3.69 X10(6)UL
SODIUM SERPL-SCNC: 135 MMOL/L (ref 136–145)
T4 FREE SERPL-MCNC: 0.6 NG/DL (ref 0.8–1.7)
TIBC SERPL-MCNC: 356 UG/DL (ref 240–450)
TRANSFERRIN SERPL-MCNC: 239 MG/DL (ref 200–360)
TSI SER-ACNC: 0.5 MIU/ML (ref 0.36–3.74)
WBC # BLD AUTO: 10.3 X10(3) UL (ref 4–11)

## 2023-07-07 PROCEDURE — 80053 COMPREHEN METABOLIC PANEL: CPT

## 2023-07-07 PROCEDURE — 71260 CT THORAX DX C+: CPT | Performed by: INTERNAL MEDICINE

## 2023-07-07 PROCEDURE — 84443 ASSAY THYROID STIM HORMONE: CPT

## 2023-07-07 PROCEDURE — 82728 ASSAY OF FERRITIN: CPT

## 2023-07-07 PROCEDURE — 84439 ASSAY OF FREE THYROXINE: CPT

## 2023-07-07 PROCEDURE — 36591 DRAW BLOOD OFF VENOUS DEVICE: CPT

## 2023-07-07 PROCEDURE — 74177 CT ABD & PELVIS W/CONTRAST: CPT | Performed by: INTERNAL MEDICINE

## 2023-07-07 PROCEDURE — 85025 COMPLETE CBC W/AUTO DIFF WBC: CPT

## 2023-07-07 PROCEDURE — 84466 ASSAY OF TRANSFERRIN: CPT

## 2023-07-07 PROCEDURE — 83540 ASSAY OF IRON: CPT

## 2023-07-07 PROCEDURE — 82565 ASSAY OF CREATININE: CPT

## 2023-07-07 RX ORDER — HEPARIN SODIUM (PORCINE) LOCK FLUSH IV SOLN 100 UNIT/ML 100 UNIT/ML
500 SOLUTION INTRAVENOUS ONCE
Status: COMPLETED | OUTPATIENT
Start: 2023-07-07 | End: 2023-07-07

## 2023-07-07 RX ORDER — SODIUM CHLORIDE 9 MG/ML
10 INJECTION INTRAVENOUS ONCE
OUTPATIENT
Start: 2023-07-07

## 2023-07-07 RX ORDER — HEPARIN SODIUM (PORCINE) LOCK FLUSH IV SOLN 100 UNIT/ML 100 UNIT/ML
5 SOLUTION INTRAVENOUS ONCE
Status: DISCONTINUED | OUTPATIENT
Start: 2023-07-07 | End: 2023-07-10

## 2023-07-07 RX ORDER — HEPARIN SODIUM (PORCINE) LOCK FLUSH IV SOLN 100 UNIT/ML 100 UNIT/ML
5 SOLUTION INTRAVENOUS ONCE
OUTPATIENT
Start: 2023-07-07

## 2023-07-07 RX ORDER — HEPARIN SODIUM (PORCINE) LOCK FLUSH IV SOLN 100 UNIT/ML 100 UNIT/ML
SOLUTION INTRAVENOUS
Status: DISPENSED
Start: 2023-07-07 | End: 2023-07-08

## 2023-07-07 RX ADMIN — HEPARIN SODIUM (PORCINE) LOCK FLUSH IV SOLN 100 UNIT/ML 500 UNITS: 100 SOLUTION INTRAVENOUS at 15:05:00

## 2023-07-12 ENCOUNTER — APPOINTMENT (OUTPATIENT)
Dept: HEMATOLOGY/ONCOLOGY | Facility: HOSPITAL | Age: 53
End: 2023-07-12
Attending: INTERNAL MEDICINE
Payer: COMMERCIAL

## 2023-07-12 ENCOUNTER — TELEPHONE (OUTPATIENT)
Dept: HEMATOLOGY/ONCOLOGY | Facility: HOSPITAL | Age: 53
End: 2023-07-12

## 2023-07-12 VITALS
RESPIRATION RATE: 18 BRPM | SYSTOLIC BLOOD PRESSURE: 121 MMHG | TEMPERATURE: 98 F | DIASTOLIC BLOOD PRESSURE: 81 MMHG | HEIGHT: 65 IN | OXYGEN SATURATION: 98 % | WEIGHT: 157 LBS | BODY MASS INDEX: 26.16 KG/M2 | HEART RATE: 74 BPM

## 2023-07-12 DIAGNOSIS — E03.9 HYPOTHYROIDISM, UNSPECIFIED TYPE: ICD-10-CM

## 2023-07-12 DIAGNOSIS — D50.9 IRON DEFICIENCY ANEMIA, UNSPECIFIED IRON DEFICIENCY ANEMIA TYPE: ICD-10-CM

## 2023-07-12 DIAGNOSIS — C34.90 MALIGNANT NEOPLASM OF LUNG, UNSPECIFIED LATERALITY, UNSPECIFIED PART OF LUNG (HCC): Primary | ICD-10-CM

## 2023-07-12 PROCEDURE — 99215 OFFICE O/P EST HI 40 MIN: CPT | Performed by: INTERNAL MEDICINE

## 2023-07-12 NOTE — TELEPHONE ENCOUNTER
Left a message asking Humberto Paulina to call back and cancel lab appointment for today it is not needed. I asked her to call back to confirm incase she wanted blood work from another physician.

## 2023-07-14 ENCOUNTER — APPOINTMENT (OUTPATIENT)
Dept: HEMATOLOGY/ONCOLOGY | Facility: HOSPITAL | Age: 53
End: 2023-07-14
Attending: INTERNAL MEDICINE
Payer: COMMERCIAL

## 2023-07-19 NOTE — TELEPHONE ENCOUNTER
Closing encounter since patient has read [employer] message    [id number] Read On   Don Yan 7/7/2023  4:08 PM

## 2023-08-03 ENCOUNTER — TELEPHONE (OUTPATIENT)
Dept: ENDOCRINOLOGY CLINIC | Facility: CLINIC | Age: 53
End: 2023-08-03

## 2023-08-03 NOTE — TELEPHONE ENCOUNTER
Dr. Vinny Villarreal, [facility])    F/U scheduled 8/5/23 - location reviewed with patient  Component      Latest Ref Rng 7/7/2023   TSH      0.358 - 3.740 mIU/mL 0.501    T4,Free (Direct)      0.8 - 1.7 ng/dL 0.6 (L)    Thanks

## 2023-08-03 NOTE — TELEPHONE ENCOUNTER
Labs were done recently n arthur for labs before this apt I will discuss further at upcoming apt Thanks

## 2023-08-05 ENCOUNTER — OFFICE VISIT (OUTPATIENT)
Dept: ENDOCRINOLOGY CLINIC | Facility: CLINIC | Age: 53
End: 2023-08-05

## 2023-08-05 VITALS
HEIGHT: 65 IN | BODY MASS INDEX: 25.63 KG/M2 | WEIGHT: 153.81 LBS | DIASTOLIC BLOOD PRESSURE: 71 MMHG | HEART RATE: 72 BPM | SYSTOLIC BLOOD PRESSURE: 111 MMHG

## 2023-08-05 DIAGNOSIS — Z13.1 DIABETES MELLITUS SCREENING: ICD-10-CM

## 2023-08-05 DIAGNOSIS — E03.9 HYPOTHYROIDISM, UNSPECIFIED TYPE: Primary | ICD-10-CM

## 2023-08-05 PROCEDURE — 99213 OFFICE O/P EST LOW 20 MIN: CPT | Performed by: INTERNAL MEDICINE

## 2023-08-05 PROCEDURE — 3008F BODY MASS INDEX DOCD: CPT | Performed by: INTERNAL MEDICINE

## 2023-08-05 PROCEDURE — 3074F SYST BP LT 130 MM HG: CPT | Performed by: INTERNAL MEDICINE

## 2023-08-05 PROCEDURE — 3078F DIAST BP <80 MM HG: CPT | Performed by: INTERNAL MEDICINE

## 2023-08-05 NOTE — PROGRESS NOTES
FU VISIT:     Patient follows with me for hypothyroidism      She has Stage IV NSCLCA (poorly diff adeno) and is on chemotherapy with Opdivo. She also has a h/o radiation treatment ( brain metastases). She was noted to have a low FT 4 and elevated TSH levels ( 6/23/2016) and started on LT 4. Current dose is 150 mcg daily   Weight gain: no   Constipation: no   Skin changes: no   Fatigue: yes  Muscle aches: yes  Diarrhea: no         FH of thyroid disorders: denies     ROS as below  No tremors, no diarrhea,  No heart racing       Past Medical History:   Diagnosis Date    Anemia     Brain cancer ([address]     B[facility] [address]     Depression     Disorder of thyroid     Exposure to radiation     DISCONTINUED AUG 2015     Gallbladder disease     Hepatitis C     High blood pressure     Hypothyroid     Lung cancer ([facility]     Lung cancer ([facility])     Migraines     Osteoarthritis     Pancreatic cancer ([facility] [address] [facility] JULY 2015    Personal history of antineoplastic chemotherapy     EVERY 2 WEEKS BEGINNING 2015     Pneumonia, organism unspecified(486)     Seizure disorder ([facility] [facility]     Wears glasses      Past Surgical History:   Procedure Laterality Date    [address] [facility] 2015    TUMOR RESECTION    BRAIN SURGERY Left     OCCIPITAL CRANIOTOMY    BRAIN SURGERY Left     LOBECTOMY    BRAIN SURGERY  AUG  2016    REMOVAL OF DEAD TISSUE     CHOLECYSTECTOMY  2009    CYST REMOVAL      BREAST, BENIGN    HEMORRHOIDECTOMY      HYSTERECTOMY      heavy menstrual flow,     LUMPECTOMY RIGHT  2012    FIBROADENOMA    OTHER  8-7-15    CYBERKNIFE    OTHER SURGICAL HISTORY      XS PORT-A-CATH INSERTION/EXCH/CHK          ALLERGY:  No Known Allergies     PAST MEDICAL, SOCIAL AND FAMILY HISTORY:  See past medical history marked as reviewed. See past surgical history marked as reviewed. See past family history marked as reviewed. See past social history marked as reviewed.      ASSESSMENTS:      REVIEW OF SYSTEMS:  Constitutional: Negative for:  fever, + fatigue, + hot flashes,  + weight loss  Eyes: Negative for:  Visual changes, proptosis, blurring  ENT: Negative for:  dysphagia, neck swelling, dysphonia  Respiratory: Negative for:  dyspnea, cough  Cardiovascular: Negative for:  chest pain, palpitations, orthopnea  GI: Negative for:  abdominal pain, nausea, vomiting,  diarrhea, constipation, bleeding  Neurology: Negative for: headache, numbness, weakness  Genito-Urinary: Negative for: dysuria, frequency  Psychiatric: Negative for:  depression, anxiety  Hematology/Lymphatics: Negative for: bruising, lower extremity edema  Endocrine: Negative for: polyuria, polydypsia  Skin: Negative for: rash, blister, cellulitis          PHYSICAL EXAM:     Vitals reviewed    General Appearance:  alert, well developed, in no acute distress  Head: Atraumatic  Eyes:  normal conjunctivae, sclera. , normal sclera and normal pupils  Throat/Neck: normal sound to voice. Normal hearing, normal speech, no thyroid enlargement palpated  Respiratory:  Speaking in full sentences, non-labored. no increased work of breathing, no audible wheezing    Neuro: motor grossly intact, moving all extremities without difficulty  Psychiatric:  oriented to time, self, and place  Extremities: no obvious extremity swelling, no lesions    DATA:         Pertinent data reviewed. ASSESSMENT AND PLAN:     Patient is a 47 year old female with Stage IV NSCLCA (poorly diff adeno) s/p radiation treatment ( brain) and  Opdivo. Not on immunotherapy at this time     1. She has hypothyroidism.   TSH is normal   T4 is slightly low  Clinically no specific symptoms of hypothyroidism  LT4 150 mcg daily   Repeat labs in 4-5 weeks    Discussed to FU with PCP for hot flashes ( only at night), if T4 is normal on next check and she is still symptomatic, we can try going down on the dose of LT4    The patient is aware that she needs to take LT4 daily; every am one hour before breakfast and atleast four hours apart from other meds especially calcium, iron, multivitamins containing Ca/iron  Stressed the importance of compliance with meds  Side effects of noncompliance including the development of myxedema coma and death discussed     H/o pre DM   Last A1c was normal in Sep 2021  Will check an A1c      RTC in 10  months/ sooner depending on results       Patient verbalized a complete  understanding of all of the above and did not have any further questions.              La Nena Rice MD

## 2023-08-17 ENCOUNTER — TELEPHONE (OUTPATIENT)
Dept: FAMILY MEDICINE CLINIC | Facility: CLINIC | Age: 53
End: 2023-08-17

## 2023-08-17 DIAGNOSIS — R73.01 ELEVATED FASTING BLOOD SUGAR: Primary | ICD-10-CM

## 2023-08-17 NOTE — TELEPHONE ENCOUNTER
Surgery on 08/30/23, Left knee revision with Dr. Carine Pinedo @ [address] [place] [address]    H&P- completed 08/18/23  Labs- RDW-SD (55.9), RDW (15.7), FBS (118), A1C (5.1 WNL), MRSA neg, BUN/Creat ratio (9.6), Alk Phos (171), albumin (2.8), globulin (5.1), A/G ratio (0.5), sed rate (91), CRP (0.51), all other labs WNL  EKG- normal sinus rhythm, possible left atrial enlargement. Previous EKG's in Cardio tab.   X-ray- WNL

## 2023-08-18 ENCOUNTER — HOSPITAL ENCOUNTER (OUTPATIENT)
Dept: GENERAL RADIOLOGY | Facility: HOSPITAL | Age: 53
Discharge: HOME | End: 2023-08-18
Attending: FAMILY MEDICINE
Payer: COMMERCIAL

## 2023-08-18 ENCOUNTER — LAB ENCOUNTER (OUTPATIENT)
Dept: LAB | Facility: HOSPITAL | Age: 53
End: 2023-08-18
Attending: FAMILY MEDICINE
Payer: COMMERCIAL

## 2023-08-18 ENCOUNTER — OFFICE VISIT (OUTPATIENT)
Dept: FAMILY MEDICINE CLINIC | Facility: CLINIC | Age: 53
End: 2023-08-18
Payer: COMMERCIAL

## 2023-08-18 VITALS
RESPIRATION RATE: 16 BRPM | SYSTOLIC BLOOD PRESSURE: 130 MMHG | OXYGEN SATURATION: 95 % | BODY MASS INDEX: 27.49 KG/M2 | HEART RATE: 75 BPM | DIASTOLIC BLOOD PRESSURE: 74 MMHG | HEIGHT: 65 IN | TEMPERATURE: 97 F | WEIGHT: 165 LBS

## 2023-08-18 DIAGNOSIS — Z01.818 PREOPERATIVE EXAMINATION, UNSPECIFIED: ICD-10-CM

## 2023-08-18 DIAGNOSIS — Z01.812 PRE-OPERATIVE LABORATORY EXAMINATION: ICD-10-CM

## 2023-08-18 DIAGNOSIS — T84.59XA INFECTION OF PROSTHETIC KNEE JOINT, INITIAL ENCOUNTER: Primary | ICD-10-CM

## 2023-08-18 DIAGNOSIS — R73.01 ELEVATED FASTING BLOOD SUGAR: ICD-10-CM

## 2023-08-18 DIAGNOSIS — F31.9 BIPOLAR 1 DISORDER (HCC): ICD-10-CM

## 2023-08-18 DIAGNOSIS — C34.90 MALIGNANT NEOPLASM OF LUNG, UNSPECIFIED LATERALITY, UNSPECIFIED PART OF LUNG (HCC): ICD-10-CM

## 2023-08-18 DIAGNOSIS — J32.4 CHRONIC PANSINUSITIS: ICD-10-CM

## 2023-08-18 DIAGNOSIS — F39 EPISODIC MOOD DISORDER (HCC): ICD-10-CM

## 2023-08-18 DIAGNOSIS — E03.9 ACQUIRED HYPOTHYROIDISM: ICD-10-CM

## 2023-08-18 DIAGNOSIS — J42 CHRONIC BRONCHITIS WITH WHEEZING (HCC): ICD-10-CM

## 2023-08-18 DIAGNOSIS — F17.200 TOBACCO DEPENDENCE: ICD-10-CM

## 2023-08-18 DIAGNOSIS — F60.3 BORDERLINE PERSONALITY DISORDER (HCC): ICD-10-CM

## 2023-08-18 DIAGNOSIS — C79.31 MALIGNANT NEOPLASM METASTATIC TO BRAIN (HCC): ICD-10-CM

## 2023-08-18 DIAGNOSIS — R56.9 SEIZURES (HCC): ICD-10-CM

## 2023-08-18 DIAGNOSIS — F19.11 HISTORY OF SUBSTANCE ABUSE (HCC): ICD-10-CM

## 2023-08-18 DIAGNOSIS — D50.8 IRON DEFICIENCY ANEMIA SECONDARY TO INADEQUATE DIETARY IRON INTAKE: ICD-10-CM

## 2023-08-18 DIAGNOSIS — C34.90 PRIMARY MALIGNANT NEOPLASM OF LUNG METASTATIC TO OTHER SITE, UNSPECIFIED LATERALITY (HCC): ICD-10-CM

## 2023-08-18 DIAGNOSIS — Z96.659 INFECTION OF PROSTHETIC KNEE JOINT, INITIAL ENCOUNTER: Primary | ICD-10-CM

## 2023-08-18 LAB
ALBUMIN SERPL-MCNC: 2.8 G/DL (ref 3.4–5)
ALBUMIN/GLOB SERPL: 0.5 {RATIO} (ref 1–2)
ALP LIVER SERPL-CCNC: 171 U/L
ALT SERPL-CCNC: 17 U/L
ANION GAP SERPL CALC-SCNC: 7 MMOL/L (ref 0–18)
ANTIBODY SCREEN: NEGATIVE
APTT PPP: 26.7 SECONDS (ref 23.3–35.6)
AST SERPL-CCNC: 24 U/L (ref 15–37)
BASOPHILS # BLD AUTO: 0.03 X10(3) UL (ref 0–0.2)
BASOPHILS NFR BLD AUTO: 0.4 %
BILIRUB SERPL-MCNC: 0.5 MG/DL (ref 0.1–2)
BILIRUB UR QL: NEGATIVE
BUN BLD-MCNC: 7 MG/DL (ref 7–18)
BUN/CREAT SERPL: 9.6 (ref 10–20)
CALCIUM BLD-MCNC: 8.9 MG/DL (ref 8.5–10.1)
CHLORIDE SERPL-SCNC: 108 MMOL/L (ref 98–112)
CO2 SERPL-SCNC: 22 MMOL/L (ref 21–32)
COLOR UR: YELLOW
CREAT BLD-MCNC: 0.73 MG/DL
CRP SERPL-MCNC: 0.51 MG/DL (ref ?–0.3)
DEPRECATED RDW RBC AUTO: 55.9 FL (ref 35.1–46.3)
EGFRCR SERPLBLD CKD-EPI 2021: 98 ML/MIN/1.73M2 (ref 60–?)
EOSINOPHIL # BLD AUTO: 0.02 X10(3) UL (ref 0–0.7)
EOSINOPHIL NFR BLD AUTO: 0.2 %
ERYTHROCYTE [DISTWIDTH] IN BLOOD BY AUTOMATED COUNT: 15.7 % (ref 11–15)
ERYTHROCYTE [SEDIMENTATION RATE] IN BLOOD: 91 MM/HR
EST. AVERAGE GLUCOSE BLD GHB EST-MCNC: 100 MG/DL (ref 68–126)
FASTING STATUS PATIENT QL REPORTED: NO
GLOBULIN PLAS-MCNC: 5.1 G/DL (ref 2.8–4.4)
GLUCOSE BLD-MCNC: 118 MG/DL (ref 70–99)
GLUCOSE UR-MCNC: NORMAL MG/DL
HBA1C MFR BLD: 5.1 % (ref ?–5.7)
HCT VFR BLD AUTO: 38.7 %
HGB BLD-MCNC: 12.5 G/DL
HYALINE CASTS #/AREA URNS AUTO: PRESENT /LPF
IMM GRANULOCYTES # BLD AUTO: 0.03 X10(3) UL (ref 0–1)
IMM GRANULOCYTES NFR BLD: 0.4 %
INR BLD: 1.04 (ref 0.85–1.16)
KETONES UR-MCNC: NEGATIVE MG/DL
LEUKOCYTE ESTERASE UR QL STRIP.AUTO: NEGATIVE
LYMPHOCYTES # BLD AUTO: 1.75 X10(3) UL (ref 1–4)
LYMPHOCYTES NFR BLD AUTO: 21.7 %
MCH RBC QN AUTO: 31.5 PG (ref 26–34)
MCHC RBC AUTO-ENTMCNC: 32.3 G/DL (ref 31–37)
MCV RBC AUTO: 97.5 FL
MONOCYTES # BLD AUTO: 0.94 X10(3) UL (ref 0.1–1)
MONOCYTES NFR BLD AUTO: 11.7 %
NEUTROPHILS # BLD AUTO: 5.29 X10 (3) UL (ref 1.5–7.7)
NEUTROPHILS # BLD AUTO: 5.29 X10(3) UL (ref 1.5–7.7)
NEUTROPHILS NFR BLD AUTO: 65.6 %
NITRITE UR QL STRIP.AUTO: NEGATIVE
OSMOLALITY SERPL CALC.SUM OF ELEC: 283 MOSM/KG (ref 275–295)
PH UR: 6 [PH] (ref 5–8)
PLATELET # BLD AUTO: 393 10(3)UL (ref 150–450)
POTASSIUM SERPL-SCNC: 3.9 MMOL/L (ref 3.5–5.1)
PROT SERPL-MCNC: 7.9 G/DL (ref 6.4–8.2)
PROT UR-MCNC: 50 MG/DL
PROTHROMBIN TIME: 13.5 SECONDS (ref 11.6–14.8)
RBC # BLD AUTO: 3.97 X10(6)UL
RBC #/AREA URNS AUTO: >10 /HPF
RH BLOOD TYPE: POSITIVE
RH BLOOD TYPE: POSITIVE
SODIUM SERPL-SCNC: 137 MMOL/L (ref 136–145)
SP GR UR STRIP: 1.03 (ref 1–1.03)
UROBILINOGEN UR STRIP-ACNC: NORMAL
WBC # BLD AUTO: 8.1 X10(3) UL (ref 4–11)

## 2023-08-18 PROCEDURE — 86850 RBC ANTIBODY SCREEN: CPT | Performed by: FAMILY MEDICINE

## 2023-08-18 PROCEDURE — 87081 CULTURE SCREEN ONLY: CPT | Performed by: FAMILY MEDICINE

## 2023-08-18 PROCEDURE — 81001 URINALYSIS AUTO W/SCOPE: CPT | Performed by: FAMILY MEDICINE

## 2023-08-18 PROCEDURE — 80053 COMPREHEN METABOLIC PANEL: CPT | Performed by: FAMILY MEDICINE

## 2023-08-18 PROCEDURE — 86901 BLOOD TYPING SEROLOGIC RH(D): CPT | Performed by: FAMILY MEDICINE

## 2023-08-18 PROCEDURE — 85610 PROTHROMBIN TIME: CPT | Performed by: FAMILY MEDICINE

## 2023-08-18 PROCEDURE — 83036 HEMOGLOBIN GLYCOSYLATED A1C: CPT | Performed by: FAMILY MEDICINE

## 2023-08-18 PROCEDURE — 85025 COMPLETE CBC W/AUTO DIFF WBC: CPT | Performed by: FAMILY MEDICINE

## 2023-08-18 PROCEDURE — 85730 THROMBOPLASTIN TIME PARTIAL: CPT | Performed by: FAMILY MEDICINE

## 2023-08-18 PROCEDURE — 71046 X-RAY EXAM CHEST 2 VIEWS: CPT | Performed by: FAMILY MEDICINE

## 2023-08-18 PROCEDURE — 86900 BLOOD TYPING SEROLOGIC ABO: CPT | Performed by: FAMILY MEDICINE

## 2023-08-18 PROCEDURE — 85652 RBC SED RATE AUTOMATED: CPT | Performed by: FAMILY MEDICINE

## 2023-08-18 PROCEDURE — 86140 C-REACTIVE PROTEIN: CPT | Performed by: FAMILY MEDICINE

## 2023-08-18 RX ORDER — LEVETIRACETAM 750 MG/1
1500 TABLET ORAL 2 TIMES DAILY
COMMUNITY

## 2023-08-18 RX ORDER — TRAMADOL HYDROCHLORIDE 50 MG/1
50 TABLET ORAL 3 TIMES DAILY
COMMUNITY

## 2023-08-18 NOTE — TELEPHONE ENCOUNTER
Dr. Tayler Bhatt, please review:    Labs- RDW-SD (55.9), RDW (15.7), FBS (118), A1C (5.1 WNL), MRSA neg, BUN/Creat ratio (9.6), Alk Phos (171), albumin (2.8), globulin (5.1), A/G ratio (0.5), sed rate (91), CRP (0.51), all other labs WNL    EKG- normal sinus rhythm, possible left atrial enlargement. Previous EKG's in Cardio tab. X-ray- WNL      OK for surgery?

## 2023-08-19 LAB
ATRIAL RATE: 72 BPM
P AXIS: 80 DEGREES
P-R INTERVAL: 174 MS
Q-T INTERVAL: 416 MS
QRS DURATION: 78 MS
QTC CALCULATION (BEZET): 455 MS
R AXIS: -11 DEGREES
T AXIS: 6 DEGREES
VENTRICULAR RATE: 72 BPM

## 2023-08-21 NOTE — TELEPHONE ENCOUNTER
Spoke to patient, went over test results and let her know she is clear for surgery per Dr. Juan Moran.

## 2023-08-28 PROBLEM — D64.9 CHRONIC ANEMIA: Status: ACTIVE | Noted: 2017-12-22

## 2023-08-28 PROBLEM — Z85.118 HISTORY OF LUNG CANCER: Status: ACTIVE | Noted: 2023-08-28

## 2023-08-30 ENCOUNTER — APPOINTMENT (OUTPATIENT)
Dept: GENERAL RADIOLOGY | Facility: HOSPITAL | Age: 53
End: 2023-08-30
Attending: STUDENT IN AN ORGANIZED HEALTH CARE EDUCATION/TRAINING PROGRAM
Payer: COMMERCIAL

## 2023-08-30 ENCOUNTER — ANESTHESIA EVENT (OUTPATIENT)
Dept: SURGERY | Facility: HOSPITAL | Age: 53
End: 2023-08-30
Payer: COMMERCIAL

## 2023-08-30 ENCOUNTER — ANESTHESIA (OUTPATIENT)
Dept: SURGERY | Facility: HOSPITAL | Age: 53
End: 2023-08-30
Payer: COMMERCIAL

## 2023-08-30 ENCOUNTER — HOSPITAL ENCOUNTER (INPATIENT)
Facility: HOSPITAL | Age: 53
LOS: 2 days | Discharge: HOME HEALTH | End: 2023-09-01
Attending: STUDENT IN AN ORGANIZED HEALTH CARE EDUCATION/TRAINING PROGRAM | Admitting: STUDENT IN AN ORGANIZED HEALTH CARE EDUCATION/TRAINING PROGRAM
Payer: COMMERCIAL

## 2023-08-30 ENCOUNTER — HOSPITAL ENCOUNTER (INPATIENT)
Facility: HOSPITAL | Age: 53
LOS: 2 days | Discharge: HOME | End: 2023-09-01
Attending: STUDENT IN AN ORGANIZED HEALTH CARE EDUCATION/TRAINING PROGRAM | Admitting: STUDENT IN AN ORGANIZED HEALTH CARE EDUCATION/TRAINING PROGRAM
Payer: COMMERCIAL

## 2023-08-30 PROBLEM — T84.54XA INFECTION OF PROSTHETIC LEFT KNEE JOINT: Status: ACTIVE | Noted: 2023-08-30

## 2023-08-30 PROBLEM — T84.54XA INFECTION OF PROSTHETIC LEFT KNEE JOINT (HCC): Status: ACTIVE | Noted: 2023-08-30

## 2023-08-30 LAB
ANION GAP SERPL CALC-SCNC: 6 MMOL/L (ref 0–18)
BASOPHILS NFR SNV: 1 %
BUN BLD-MCNC: 5 MG/DL (ref 7–18)
BUN/CREAT SERPL: 6.5 (ref 10–20)
CALCIUM BLD-MCNC: 8.2 MG/DL (ref 8.5–10.1)
CHLORIDE SERPL-SCNC: 108 MMOL/L (ref 98–112)
CO2 SERPL-SCNC: 23 MMOL/L (ref 21–32)
CREAT BLD-MCNC: 0.77 MG/DL
DEPRECATED RDW RBC AUTO: 54.6 FL (ref 35.1–46.3)
EGFRCR SERPLBLD CKD-EPI 2021: 92 ML/MIN/1.73M2 (ref 60–?)
EOSINOPHIL NFR SNV: 0 %
ERYTHROCYTE [DISTWIDTH] IN BLOOD BY AUTOMATED COUNT: 15.5 % (ref 11–15)
GLUCOSE BLD-MCNC: 136 MG/DL (ref 70–99)
HCT VFR BLD AUTO: 37.6 %
HGB BLD-MCNC: 12.2 G/DL
LYMPHOCYTES NFR SNV: 12 %
MCH RBC QN AUTO: 31.5 PG (ref 26–34)
MCHC RBC AUTO-ENTMCNC: 32.4 G/DL (ref 31–37)
MCV RBC AUTO: 97.2 FL
MONOS+MACROS NFR SNV: 10 %
NEUTROPHILS NFR FLD: 77 %
OSMOLALITY SERPL CALC.SUM OF ELEC: 283 MOSM/KG (ref 275–295)
PLATELET # BLD AUTO: 290 10(3)UL (ref 150–450)
POTASSIUM SERPL-SCNC: 4.6 MMOL/L (ref 3.5–5.1)
RBC # BLD AUTO: 3.87 X10(6)UL
RBC # FLD AUTO: ABNORMAL /CUMM (ref ?–1)
SODIUM SERPL-SCNC: 137 MMOL/L (ref 136–145)
TOTAL CELLS COUNTED FLD: 100
TOTAL CELLS COUNTED SNV: 2567 /CUMM (ref 0–200)
WBC # BLD AUTO: 7.2 X10(3) UL (ref 4–11)
WBC # SNV: 2567 /CUMM

## 2023-08-30 PROCEDURE — 0SRD0EZ REPLACEMENT OF LEFT KNEE JOINT WITH ARTICULATING SPACER, OPEN APPROACH: ICD-10-PCS | Performed by: STUDENT IN AN ORGANIZED HEALTH CARE EDUCATION/TRAINING PROGRAM

## 2023-08-30 PROCEDURE — 73560 X-RAY EXAM OF KNEE 1 OR 2: CPT | Performed by: STUDENT IN AN ORGANIZED HEALTH CARE EDUCATION/TRAINING PROGRAM

## 2023-08-30 PROCEDURE — 0SPD0JZ REMOVAL OF SYNTHETIC SUBSTITUTE FROM LEFT KNEE JOINT, OPEN APPROACH: ICD-10-PCS | Performed by: STUDENT IN AN ORGANIZED HEALTH CARE EDUCATION/TRAINING PROGRAM

## 2023-08-30 DEVICE — BIOMET BC R 1X40 US: Type: IMPLANTABLE DEVICE | Site: KNEE | Status: FUNCTIONAL

## 2023-08-30 DEVICE — IMPLANTABLE DEVICE: Type: IMPLANTABLE DEVICE | Site: KNEE | Status: FUNCTIONAL

## 2023-08-30 RX ORDER — SERTRALINE HYDROCHLORIDE 100 MG/1
200 TABLET, FILM COATED ORAL EVERY MORNING
Status: CANCELLED | OUTPATIENT
Start: 2023-08-31

## 2023-08-30 RX ORDER — GLYCOPYRROLATE 0.2 MG/ML
INJECTION, SOLUTION INTRAMUSCULAR; INTRAVENOUS AS NEEDED
Status: DISCONTINUED | OUTPATIENT
Start: 2023-08-30 | End: 2023-08-30 | Stop reason: SURG

## 2023-08-30 RX ORDER — TRANEXAMIC ACID 100 MG/ML
INJECTION, SOLUTION INTRAVENOUS AS NEEDED
Status: DISCONTINUED | OUTPATIENT
Start: 2023-08-30 | End: 2023-08-30 | Stop reason: SURG

## 2023-08-30 RX ORDER — VANCOMYCIN HYDROCHLORIDE
1250 ONCE
Status: DISCONTINUED | OUTPATIENT
Start: 2023-08-30 | End: 2023-08-30 | Stop reason: HOSPADM

## 2023-08-30 RX ORDER — PROCHLORPERAZINE EDISYLATE 5 MG/ML
5 INJECTION INTRAMUSCULAR; INTRAVENOUS EVERY 8 HOURS PRN
Status: DISCONTINUED | OUTPATIENT
Start: 2023-08-30 | End: 2023-08-30 | Stop reason: HOSPADM

## 2023-08-30 RX ORDER — GABAPENTIN 800 MG/1
400 TABLET ORAL 2 TIMES DAILY
Status: CANCELLED | OUTPATIENT
Start: 2023-08-30

## 2023-08-30 RX ORDER — MIDAZOLAM HYDROCHLORIDE 1 MG/ML
INJECTION INTRAMUSCULAR; INTRAVENOUS AS NEEDED
Status: DISCONTINUED | OUTPATIENT
Start: 2023-08-30 | End: 2023-08-30 | Stop reason: SURG

## 2023-08-30 RX ORDER — MORPHINE SULFATE 10 MG/ML
6 INJECTION, SOLUTION INTRAMUSCULAR; INTRAVENOUS EVERY 10 MIN PRN
Status: DISCONTINUED | OUTPATIENT
Start: 2023-08-30 | End: 2023-08-30 | Stop reason: HOSPADM

## 2023-08-30 RX ORDER — LAMOTRIGINE 100 MG/1
100 TABLET ORAL 2 TIMES DAILY
Status: CANCELLED | OUTPATIENT
Start: 2023-08-30

## 2023-08-30 RX ORDER — SODIUM CHLORIDE, SODIUM LACTATE, POTASSIUM CHLORIDE, CALCIUM CHLORIDE 600; 310; 30; 20 MG/100ML; MG/100ML; MG/100ML; MG/100ML
INJECTION, SOLUTION INTRAVENOUS CONTINUOUS
Status: DISCONTINUED | OUTPATIENT
Start: 2023-08-30 | End: 2023-09-01

## 2023-08-30 RX ORDER — ZOLPIDEM TARTRATE 5 MG/1
5 TABLET ORAL NIGHTLY PRN
Status: DISCONTINUED | OUTPATIENT
Start: 2023-08-30 | End: 2023-09-01

## 2023-08-30 RX ORDER — ACETAMINOPHEN 500 MG
1000 TABLET ORAL ONCE
Status: COMPLETED | OUTPATIENT
Start: 2023-08-30 | End: 2023-08-30

## 2023-08-30 RX ORDER — VANCOMYCIN HYDROCHLORIDE 1 G/20ML
INJECTION, POWDER, LYOPHILIZED, FOR SOLUTION INTRAVENOUS AS NEEDED
Status: DISCONTINUED | OUTPATIENT
Start: 2023-08-30 | End: 2023-08-30 | Stop reason: HOSPADM

## 2023-08-30 RX ORDER — HYDROMORPHONE HYDROCHLORIDE 1 MG/ML
0.4 INJECTION, SOLUTION INTRAMUSCULAR; INTRAVENOUS; SUBCUTANEOUS EVERY 2 HOUR PRN
Status: DISCONTINUED | OUTPATIENT
Start: 2023-08-30 | End: 2023-09-01

## 2023-08-30 RX ORDER — LEVETIRACETAM 500 MG/1
1500 TABLET ORAL 2 TIMES DAILY
Status: CANCELLED | OUTPATIENT
Start: 2023-08-30

## 2023-08-30 RX ORDER — VANCOMYCIN HYDROCHLORIDE
1250 EVERY 12 HOURS
Status: DISCONTINUED | OUTPATIENT
Start: 2023-08-30 | End: 2023-09-01

## 2023-08-30 RX ORDER — DIPHENHYDRAMINE HCL 25 MG
25 CAPSULE ORAL EVERY 4 HOURS PRN
Status: DISCONTINUED | OUTPATIENT
Start: 2023-08-30 | End: 2023-09-01

## 2023-08-30 RX ORDER — CELECOXIB 200 MG/1
200 CAPSULE ORAL ONCE
Status: COMPLETED | OUTPATIENT
Start: 2023-08-30 | End: 2023-08-30

## 2023-08-30 RX ORDER — ENEMA 19; 7 G/133ML; G/133ML
1 ENEMA RECTAL ONCE AS NEEDED
Status: DISCONTINUED | OUTPATIENT
Start: 2023-08-30 | End: 2023-09-01

## 2023-08-30 RX ORDER — LABETALOL HYDROCHLORIDE 5 MG/ML
5 INJECTION, SOLUTION INTRAVENOUS EVERY 5 MIN PRN
Status: DISCONTINUED | OUTPATIENT
Start: 2023-08-30 | End: 2023-08-30 | Stop reason: HOSPADM

## 2023-08-30 RX ORDER — ACETAMINOPHEN 500 MG
1000 TABLET ORAL ONCE
Status: DISCONTINUED | OUTPATIENT
Start: 2023-08-30 | End: 2023-08-30

## 2023-08-30 RX ORDER — NALOXONE HYDROCHLORIDE 0.4 MG/ML
0.08 INJECTION, SOLUTION INTRAMUSCULAR; INTRAVENOUS; SUBCUTANEOUS AS NEEDED
Status: DISCONTINUED | OUTPATIENT
Start: 2023-08-30 | End: 2023-08-30 | Stop reason: HOSPADM

## 2023-08-30 RX ORDER — KETOROLAC TROMETHAMINE 15 MG/ML
15 INJECTION, SOLUTION INTRAMUSCULAR; INTRAVENOUS EVERY 6 HOURS
Status: COMPLETED | OUTPATIENT
Start: 2023-08-30 | End: 2023-09-01

## 2023-08-30 RX ORDER — SODIUM CHLORIDE, SODIUM LACTATE, POTASSIUM CHLORIDE, CALCIUM CHLORIDE 600; 310; 30; 20 MG/100ML; MG/100ML; MG/100ML; MG/100ML
INJECTION, SOLUTION INTRAVENOUS CONTINUOUS
Status: DISCONTINUED | OUTPATIENT
Start: 2023-08-30 | End: 2023-08-30 | Stop reason: HOSPADM

## 2023-08-30 RX ORDER — LAMOTRIGINE 100 MG/1
100 TABLET ORAL 2 TIMES DAILY
Status: DISCONTINUED | OUTPATIENT
Start: 2023-08-30 | End: 2023-09-01

## 2023-08-30 RX ORDER — DIPHENHYDRAMINE HYDROCHLORIDE 50 MG/ML
25 INJECTION INTRAMUSCULAR; INTRAVENOUS ONCE AS NEEDED
Status: ACTIVE | OUTPATIENT
Start: 2023-08-30 | End: 2023-08-30

## 2023-08-30 RX ORDER — ONDANSETRON 2 MG/ML
4 INJECTION INTRAMUSCULAR; INTRAVENOUS EVERY 6 HOURS PRN
Status: DISCONTINUED | OUTPATIENT
Start: 2023-08-30 | End: 2023-08-30 | Stop reason: HOSPADM

## 2023-08-30 RX ORDER — ACETAMINOPHEN 500 MG
1000 TABLET ORAL EVERY 6 HOURS
Status: DISCONTINUED | OUTPATIENT
Start: 2023-08-30 | End: 2023-09-01

## 2023-08-30 RX ORDER — OXYCODONE HYDROCHLORIDE 5 MG/1
5 TABLET ORAL EVERY 4 HOURS PRN
Status: DISCONTINUED | OUTPATIENT
Start: 2023-08-30 | End: 2023-09-01

## 2023-08-30 RX ORDER — HYDROMORPHONE HYDROCHLORIDE 1 MG/ML
0.6 INJECTION, SOLUTION INTRAMUSCULAR; INTRAVENOUS; SUBCUTANEOUS EVERY 5 MIN PRN
Status: DISCONTINUED | OUTPATIENT
Start: 2023-08-30 | End: 2023-08-30 | Stop reason: HOSPADM

## 2023-08-30 RX ORDER — HYDROMORPHONE HYDROCHLORIDE 1 MG/ML
INJECTION, SOLUTION INTRAMUSCULAR; INTRAVENOUS; SUBCUTANEOUS AS NEEDED
Status: DISCONTINUED | OUTPATIENT
Start: 2023-08-30 | End: 2023-08-30 | Stop reason: SURG

## 2023-08-30 RX ORDER — SERTRALINE HYDROCHLORIDE 100 MG/1
200 TABLET, FILM COATED ORAL EVERY MORNING
Status: DISCONTINUED | OUTPATIENT
Start: 2023-08-31 | End: 2023-09-01

## 2023-08-30 RX ORDER — POLYETHYLENE GLYCOL 3350 17 G/17G
17 POWDER, FOR SOLUTION ORAL DAILY PRN
Status: DISCONTINUED | OUTPATIENT
Start: 2023-08-30 | End: 2023-09-01

## 2023-08-30 RX ORDER — MORPHINE SULFATE 4 MG/ML
2 INJECTION, SOLUTION INTRAMUSCULAR; INTRAVENOUS EVERY 10 MIN PRN
Status: DISCONTINUED | OUTPATIENT
Start: 2023-08-30 | End: 2023-08-30 | Stop reason: HOSPADM

## 2023-08-30 RX ORDER — LIDOCAINE HYDROCHLORIDE 10 MG/ML
INJECTION, SOLUTION EPIDURAL; INFILTRATION; INTRACAUDAL; PERINEURAL AS NEEDED
Status: DISCONTINUED | OUTPATIENT
Start: 2023-08-30 | End: 2023-08-30 | Stop reason: SURG

## 2023-08-30 RX ORDER — SODIUM CHLORIDE 9 MG/ML
INJECTION, SOLUTION INTRAVENOUS CONTINUOUS
Status: DISCONTINUED | OUTPATIENT
Start: 2023-08-30 | End: 2023-09-01

## 2023-08-30 RX ORDER — PHENYLEPHRINE HCL 10 MG/ML
VIAL (ML) INJECTION AS NEEDED
Status: DISCONTINUED | OUTPATIENT
Start: 2023-08-30 | End: 2023-08-30 | Stop reason: SURG

## 2023-08-30 RX ORDER — LEVOTHYROXINE SODIUM 0.15 MG/1
150 TABLET ORAL
Status: DISCONTINUED | OUTPATIENT
Start: 2023-08-31 | End: 2023-09-01

## 2023-08-30 RX ORDER — HYDROMORPHONE HYDROCHLORIDE 1 MG/ML
0.4 INJECTION, SOLUTION INTRAMUSCULAR; INTRAVENOUS; SUBCUTANEOUS EVERY 5 MIN PRN
Status: DISCONTINUED | OUTPATIENT
Start: 2023-08-30 | End: 2023-08-30 | Stop reason: HOSPADM

## 2023-08-30 RX ORDER — DIPHENHYDRAMINE HYDROCHLORIDE 50 MG/ML
12.5 INJECTION INTRAMUSCULAR; INTRAVENOUS EVERY 4 HOURS PRN
Status: DISCONTINUED | OUTPATIENT
Start: 2023-08-30 | End: 2023-09-01

## 2023-08-30 RX ORDER — SENNOSIDES 8.6 MG
17.2 TABLET ORAL NIGHTLY
Status: DISCONTINUED | OUTPATIENT
Start: 2023-08-30 | End: 2023-09-01

## 2023-08-30 RX ORDER — ONDANSETRON 2 MG/ML
INJECTION INTRAMUSCULAR; INTRAVENOUS AS NEEDED
Status: DISCONTINUED | OUTPATIENT
Start: 2023-08-30 | End: 2023-08-30 | Stop reason: SURG

## 2023-08-30 RX ORDER — DOCUSATE SODIUM 100 MG/1
100 CAPSULE, LIQUID FILLED ORAL 2 TIMES DAILY
Status: DISCONTINUED | OUTPATIENT
Start: 2023-08-30 | End: 2023-09-01

## 2023-08-30 RX ORDER — PROCHLORPERAZINE EDISYLATE 5 MG/ML
5 INJECTION INTRAMUSCULAR; INTRAVENOUS EVERY 8 HOURS PRN
Status: DISCONTINUED | OUTPATIENT
Start: 2023-08-30 | End: 2023-09-01

## 2023-08-30 RX ORDER — GABAPENTIN 400 MG/1
400 CAPSULE ORAL 2 TIMES DAILY
Status: DISCONTINUED | OUTPATIENT
Start: 2023-08-30 | End: 2023-08-31

## 2023-08-30 RX ORDER — HYDROMORPHONE HYDROCHLORIDE 1 MG/ML
0.2 INJECTION, SOLUTION INTRAMUSCULAR; INTRAVENOUS; SUBCUTANEOUS EVERY 5 MIN PRN
Status: DISCONTINUED | OUTPATIENT
Start: 2023-08-30 | End: 2023-08-30 | Stop reason: HOSPADM

## 2023-08-30 RX ORDER — HYDROMORPHONE HYDROCHLORIDE 1 MG/ML
0.8 INJECTION, SOLUTION INTRAMUSCULAR; INTRAVENOUS; SUBCUTANEOUS EVERY 2 HOUR PRN
Status: DISCONTINUED | OUTPATIENT
Start: 2023-08-30 | End: 2023-09-01

## 2023-08-30 RX ORDER — OXYCODONE HYDROCHLORIDE 5 MG/1
10 TABLET ORAL EVERY 4 HOURS PRN
Status: DISCONTINUED | OUTPATIENT
Start: 2023-08-30 | End: 2023-09-01

## 2023-08-30 RX ORDER — LEVETIRACETAM 500 MG/1
1500 TABLET ORAL 2 TIMES DAILY
Status: DISCONTINUED | OUTPATIENT
Start: 2023-08-30 | End: 2023-09-01

## 2023-08-30 RX ORDER — ROCURONIUM BROMIDE 10 MG/ML
INJECTION, SOLUTION INTRAVENOUS AS NEEDED
Status: DISCONTINUED | OUTPATIENT
Start: 2023-08-30 | End: 2023-08-30 | Stop reason: SURG

## 2023-08-30 RX ORDER — MORPHINE SULFATE 4 MG/ML
4 INJECTION, SOLUTION INTRAMUSCULAR; INTRAVENOUS EVERY 10 MIN PRN
Status: DISCONTINUED | OUTPATIENT
Start: 2023-08-30 | End: 2023-08-30 | Stop reason: HOSPADM

## 2023-08-30 RX ORDER — ZOLPIDEM TARTRATE 5 MG/1
5 TABLET ORAL NIGHTLY PRN
Status: CANCELLED | OUTPATIENT
Start: 2023-08-30

## 2023-08-30 RX ORDER — EPHEDRINE SULFATE 50 MG/ML
INJECTION, SOLUTION INTRAVENOUS AS NEEDED
Status: DISCONTINUED | OUTPATIENT
Start: 2023-08-30 | End: 2023-08-30 | Stop reason: SURG

## 2023-08-30 RX ORDER — TOBRAMYCIN 1.2 G/30ML
INJECTION, POWDER, LYOPHILIZED, FOR SOLUTION INTRAVENOUS AS NEEDED
Status: DISCONTINUED | OUTPATIENT
Start: 2023-08-30 | End: 2023-08-30 | Stop reason: HOSPADM

## 2023-08-30 RX ORDER — ONDANSETRON 2 MG/ML
4 INJECTION INTRAMUSCULAR; INTRAVENOUS EVERY 6 HOURS PRN
Status: DISCONTINUED | OUTPATIENT
Start: 2023-08-30 | End: 2023-09-01

## 2023-08-30 RX ORDER — DEXAMETHASONE SODIUM PHOSPHATE 4 MG/ML
VIAL (ML) INJECTION AS NEEDED
Status: DISCONTINUED | OUTPATIENT
Start: 2023-08-30 | End: 2023-08-30 | Stop reason: SURG

## 2023-08-30 RX ORDER — LEVOTHYROXINE SODIUM 0.15 MG/1
150 TABLET ORAL
Status: CANCELLED | OUTPATIENT
Start: 2023-08-31

## 2023-08-30 RX ORDER — BISACODYL 10 MG
10 SUPPOSITORY, RECTAL RECTAL
Status: DISCONTINUED | OUTPATIENT
Start: 2023-08-30 | End: 2023-09-01

## 2023-08-30 RX ADMIN — EPHEDRINE SULFATE 5 MG: 50 INJECTION, SOLUTION INTRAVENOUS at 12:09:00

## 2023-08-30 RX ADMIN — MIDAZOLAM HYDROCHLORIDE 1 MG: 1 INJECTION INTRAMUSCULAR; INTRAVENOUS at 10:18:00

## 2023-08-30 RX ADMIN — ONDANSETRON 4 MG: 2 INJECTION INTRAMUSCULAR; INTRAVENOUS at 12:38:00

## 2023-08-30 RX ADMIN — GLYCOPYRROLATE 0.1 MG: 0.2 INJECTION, SOLUTION INTRAMUSCULAR; INTRAVENOUS at 10:18:00

## 2023-08-30 RX ADMIN — SODIUM CHLORIDE, SODIUM LACTATE, POTASSIUM CHLORIDE, CALCIUM CHLORIDE: 600; 310; 30; 20 INJECTION, SOLUTION INTRAVENOUS at 12:51:00

## 2023-08-30 RX ADMIN — PHENYLEPHRINE HCL 50 MCG: 10 MG/ML VIAL (ML) INJECTION at 10:46:00

## 2023-08-30 RX ADMIN — PHENYLEPHRINE HCL 100 MCG: 10 MG/ML VIAL (ML) INJECTION at 12:09:00

## 2023-08-30 RX ADMIN — SODIUM CHLORIDE, SODIUM LACTATE, POTASSIUM CHLORIDE, CALCIUM CHLORIDE: 600; 310; 30; 20 INJECTION, SOLUTION INTRAVENOUS at 10:18:00

## 2023-08-30 RX ADMIN — PHENYLEPHRINE HCL 100 MCG: 10 MG/ML VIAL (ML) INJECTION at 11:00:00

## 2023-08-30 RX ADMIN — HYDROMORPHONE HYDROCHLORIDE 0.2 MG: 1 INJECTION, SOLUTION INTRAMUSCULAR; INTRAVENOUS; SUBCUTANEOUS at 11:48:00

## 2023-08-30 RX ADMIN — SODIUM CHLORIDE, SODIUM LACTATE, POTASSIUM CHLORIDE, CALCIUM CHLORIDE: 600; 310; 30; 20 INJECTION, SOLUTION INTRAVENOUS at 12:35:00

## 2023-08-30 RX ADMIN — TRANEXAMIC ACID 1000 MG: 100 INJECTION, SOLUTION INTRAVENOUS at 12:15:00

## 2023-08-30 RX ADMIN — LIDOCAINE HYDROCHLORIDE 50 MG: 10 INJECTION, SOLUTION EPIDURAL; INFILTRATION; INTRACAUDAL; PERINEURAL at 10:28:00

## 2023-08-30 RX ADMIN — HYDROMORPHONE HYDROCHLORIDE 0.1 MG: 1 INJECTION, SOLUTION INTRAMUSCULAR; INTRAVENOUS; SUBCUTANEOUS at 11:30:00

## 2023-08-30 RX ADMIN — SODIUM CHLORIDE, SODIUM LACTATE, POTASSIUM CHLORIDE, CALCIUM CHLORIDE: 600; 310; 30; 20 INJECTION, SOLUTION INTRAVENOUS at 11:18:00

## 2023-08-30 RX ADMIN — PHENYLEPHRINE HCL 100 MCG: 10 MG/ML VIAL (ML) INJECTION at 10:28:00

## 2023-08-30 RX ADMIN — EPHEDRINE SULFATE 5 MG: 50 INJECTION, SOLUTION INTRAVENOUS at 11:08:00

## 2023-08-30 RX ADMIN — ROCURONIUM BROMIDE 25 MG: 10 INJECTION, SOLUTION INTRAVENOUS at 10:29:00

## 2023-08-30 RX ADMIN — ROCURONIUM BROMIDE 10 MG: 10 INJECTION, SOLUTION INTRAVENOUS at 11:00:00

## 2023-08-30 RX ADMIN — ROCURONIUM BROMIDE 10 MG: 10 INJECTION, SOLUTION INTRAVENOUS at 11:32:00

## 2023-08-30 RX ADMIN — PHENYLEPHRINE HCL 100 MCG: 10 MG/ML VIAL (ML) INJECTION at 11:08:00

## 2023-08-30 RX ADMIN — HYDROMORPHONE HYDROCHLORIDE 0.1 MG: 1 INJECTION, SOLUTION INTRAMUSCULAR; INTRAVENOUS; SUBCUTANEOUS at 12:12:00

## 2023-08-30 RX ADMIN — PHENYLEPHRINE HCL 100 MCG: 10 MG/ML VIAL (ML) INJECTION at 10:40:00

## 2023-08-30 RX ADMIN — SODIUM CHLORIDE, SODIUM LACTATE, POTASSIUM CHLORIDE, CALCIUM CHLORIDE: 600; 310; 30; 20 INJECTION, SOLUTION INTRAVENOUS at 11:00:00

## 2023-08-30 RX ADMIN — ROCURONIUM BROMIDE 5 MG: 10 INJECTION, SOLUTION INTRAVENOUS at 10:28:00

## 2023-08-30 RX ADMIN — TRANEXAMIC ACID 1000 MG: 100 INJECTION, SOLUTION INTRAVENOUS at 10:37:00

## 2023-08-30 RX ADMIN — DEXAMETHASONE SODIUM PHOSPHATE 8 MG: 4 MG/ML VIAL (ML) INJECTION at 10:37:00

## 2023-08-30 RX ADMIN — GLYCOPYRROLATE 0.1 MG: 0.2 INJECTION, SOLUTION INTRAMUSCULAR; INTRAVENOUS at 10:46:00

## 2023-08-30 RX ADMIN — PHENYLEPHRINE HCL 100 MCG: 10 MG/ML VIAL (ML) INJECTION at 10:31:00

## 2023-08-30 RX ADMIN — HYDROMORPHONE HYDROCHLORIDE 0.1 MG: 1 INJECTION, SOLUTION INTRAMUSCULAR; INTRAVENOUS; SUBCUTANEOUS at 11:38:00

## 2023-08-30 NOTE — DISCHARGE INSTRUCTIONS
DISCHARGE INSTRUCTIONS:  PLACE ICE TO SURGICAL AREA 20-30 MINUTES ON/ 20-30 MINUTES OFF. THIS IS TO HELP WITH PAIN & SWELLING. TAKE YOUR MEDICATIONS BELOW AS PRESCRIBED BY YOUR DOCTOR. THESE HAVE BEEN SENT TO YOUR PHARMACY. IT IS OK TO BEAR WEIGHT AS TOLERATED ON THE OPERATIVE EXTREMITY. WEAR THE KNEE BRACE WHEN OUT OF BED AT ALL TIMES UNTIL YOUR FOLLOW UP APPOINTMENT    CALL TO CONFIRM YOUR FOLLOW UP APPOINTMENT WITH DR. Tsering Swain TO BE SEEN IN 2-3 WEEKS POSTOP. [phone]    MEDICATIONS:    POST OP MEDICATION REGIMEN              MULTI-MODAL   PAIN REGIMEN       DRUG   FOR   FREQUENCY & DURATION   QTY   NOTES     WEANING  TIPS       Gabapentin 100mg   Nerve pain   Take 2 tabs every 8 hours for 14 days    90   No refills You may discontinue this medication prior to 14 days if you do not tolerate it. Tylenol 500mg     Mild pain   Take 2 tabs every 6 hours     90   Can purchase over-the-counter    Stop using medication if no longer having pain. Tramadol 50mg     Moderate pain   Take 1-2 tabs every 6 hours only as needed   45 May prescribe a refill, but ideally, this should be tapered off after surgery Stop using this medication 2nd   As pain decreases over time, increase the interval between doses (6 hours to 8, then 12, then 24) to taper off the medication. Meloxicam 15mg     Inflammation   Take 1 tab daily for 30 days     30   May refill if needed beyond 30 days   Recommend completing the 30 day supply. BREAKTHROUGH PAIN         Oxycodone 5mg       Severe pain     Take 1-2 tabs every 4-6 hours only as needed for severe pain       40   Take at least 1 hr apart from Tramadol. Ideally, no refill. The goal is to taper off this medication as soon as possible, as it can be addictive and have side effects. Stop using this medication 1st if no longer having severe pain.          BLOOD THINNER     Eliquis 2.5mg   Blood clot prevention   Take 1 tab twice daily for 30 days     60 No refills   Complete the entire course of your blood thinner. ANTIBIOTIC     Take as prescribed by your infectious disease doctor              STOOL SOFTENER     Sennokot 8.6/50mg   Constipation   Take 1 tab twice daily  while on opioids     60 Refer to discharge instructions if constipation persists even after taking this medication   Stop taking if having diarrhea or loose stools. ANTI-NAUSEA   Ondansetron 4mg   Nausea   Take 1 tab every 8 hours as needed if nauseous     20   No Refill      ANTI-ACID REFLUX / GASTRITIS   Pantoprazole 40mg   Acid reflux, gastric ulcer prophylaxis   Take 1 tab every day along with Meloxicam     60   May refill if Meloxicam refilled          DRESSING:    YOU HAVE A SPECIAL DRESSING CALLED A PREVENA DRESSING, OVER YOUR INCISION. THIS IS A TYPE OF NEGATIVE PRESSURE DRESSING THAT KEEPS THE WOUND DRY. IT IS CONNECTED TO A CANNISTER. MAKE SURE THAT THE CANNISTER IS POWERED ON AND NOT OUT OF BATTERY. THE DRESSING STAYS FOR 7 DAYS FROM SURGERY. THIS DRESSING SHOULD BE CHANGED TO AN AQUACEL AT 7 DAYS POST-OPERATIVELY. IF THE PREVENA DRESSING HAS CONTINUOUS AND PERSISTENT DRAINAGE, CALL YOUR SURGEON FIRST BEFORE CHANGING THE DRESSING TO AN AQUACEL DRESSING. YOU MAY SHOWER AS SOON AS THE AQUACEL DRESSING IS PLACED INSTEAD OF THE PREVENA DRESSING OVER YOUR INCISION AREA. IF THE DRESSING LEAKS OR COMES LOOSE BEFORE THE 7 DAY PERIOD CONTACT YOUR DOCTOR / SURGEON. AFTER   14 DAYS POST OPERATIVELY, THE AQUACEL DRESSING IS REMOVED BY PULLING ON THE EDGE OF THE DRESSING AND GENTLY STRETCHING IT, THEN PEEL IT OFF SLOWLY LIKE A BANDAID. IF YOU HAVE ANY QUESTIONS OR CONCERNS ABOUT THE DRESSING CONTACT YOUR DOCTOR.      Theodore Tovar MD MPH  Orthopaedic Surgeon, Adult Hip and Knee Reconstruction  [facility] [address] [facility] [address], [address] [place] [address]  Office: (W) [phone] (Z) [phone]  Adminstrative Assistant: Светлана Huizar       Sometimes managing your health at home requires assistance. The [facility] team has recognized your preference to use [facility]. They can be reached by phone at [phone]. The fax number for your reference is [phone]. A representative from the home health agency will contact you or your family to schedule your first visit.

## 2023-08-30 NOTE — INTERVAL H&P NOTE
Pre-op Diagnosis: Left knee prosthetic infection    The above referenced H&P was reviewed by Gabriela Navarro PA-C on 8/30/2023, the patient was examined and no significant changes have occurred in the patient's condition since the H&P was performed. I discussed with the patient and/or legal representative the potential benefits, risks and side effects of this procedure; the likelihood of the patient achieving goals; and potential problems that might occur during recuperation. I discussed reasonable alternatives to the procedure, including risks, benefits and side effects related to the alternatives and risks related to not receiving this procedure. We will proceed with procedure as planned.

## 2023-08-30 NOTE — INTERVAL H&P NOTE
Pre-op Diagnosis: Left knee prosthetic infection    The above referenced H&P was reviewed by Sesar Costa PA-C on 8/30/2023, the patient was examined and no significant changes have occurred in the patient's condition since the H&P was performed. I discussed with the patient and/or legal representative the potential benefits, risks and side effects of this procedure; the likelihood of the patient achieving goals; and potential problems that might occur during recuperation. I discussed reasonable alternatives to the procedure, including risks, benefits and side effects related to the alternatives and risks related to not receiving this procedure. We will proceed with procedure as planned.

## 2023-08-30 NOTE — OPERATIVE REPORT
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  Left total knee tibial component loosening    PROCEDURE:  Left total knee irrigation and debridement, component resection and femoral/tibial revision to antibiotic coated total knee arthroplasty femoral/tibial components as an articulating antibiotic cement spacer  Application of negative pressure incisional dressing    Location: [facility] AND [facility]    SURGEON: Chela Sotelo MD  Assistant(s): Pk Kirk MD, Floyd SAUNDERS    Anesthesia type: General  Estimated Blood Loss: 300cc    Drains: None    Complications: None immediately apparent    Explants:   S&N Legion    Implants:  Samson Triathlon CR Femoral Component Size 4  Samson Triathlon All-Polyethylene CS Tibial Component Size 3, 11mm  Two fully threaded Steinmann pins with 3 batches of [place] plain cement mixed with 2g vancomycin / 2.4g tobramycin per batch. Findings:   Femoral Bone loss:, likely to be reconstructed with augments/short cemented stem. Tibial Bone loss:, likely to be reconstructed with short cemented stem. Extensor mechanism: intact   Cell count 2.5K (77%PMNs)  Frozen section with acute inflammation >5 neutrophils per HPF in 5 HPF. Indication: This is a 47year old lady, history of a primary TKA, who presented with chronic knee pain. Pre-operative workup demonstrated elevated inflammatory markers and a joint fluid aspirate consistent with prosthetic joint infection. The patient was indicated for two-stage exchange revision of the total knee arthroplasty.  Surgical versus non-surgical options were discussed with the patient, and together we decided it is best to proceed with a revision total knee arthroplasty, component resection and placement of an antibiotic spacer, with the attempted goal of infectious source control. All risks and benefits of surgery including bleeding, persistent infection, joint instability, gabe-prosthetic fracture, extensor mechanism injury, neurovascular injury, as well as medical and anesthesia-related complications were discussed with the patient / family, who elected to proceed with surgery. Procedure in detail:    The patient was taken to the operating room where appropriate anesthesia was provided by the Anesthesia staff. Intravenous antibiotics were administered. The patient was positioned supine, and all bony prominences were well-padded. The knee was then prepped and draped in sterile fashion. A pre-surgical pause was performed to correctly identify the patient, procedure, and extremity, including verification of the signed surgical site. The extremity was exsanguinated by gravity elevation and the tourniquet was raised to 300mmHg. The prior scar was incised and extended proximally and distally. Medial and lateral sub-fascial flaps were created. 1cc of turbid fluid were aspirated from the joint prior to arthrotomy and sent for cell count. A medial parapatellar arthrotomy was performed, and a complete debridement synovectomy was performed medially and laterally. Synovial tissue was sent for culture. A medial proximal tibia subperiosteal elevation was performed for exposure. The extensor mechanism was examined and noted to be intact. After achieving exposure, the polyethylene liner for was removed using an osteotome. The femoral component / bone interface was exposed of any overlaying soft tissue. A microsagittal saw, followed by a single sided reciprocating saw were used to clear the undersurface of the anterior flange, anterior chamfer, and distal aspects of the femoral implant. A 1/4 inch curved osteotome was used to free up the posterior condyles medially and laterally.  The Femoral/tibial extractor tamp was used to extract the femoral component. The femoral bony surface was curetted of any loose soft tissue, and a cement splitter was used to remove any cement from the surface. The tibial tray was then circumferentially exposed. The extractor tamp was used to remove the tibial component without difficulty. The tibial surface was debrided and curetted, and any canal cement was removed using the cement splitting osteotome. Cultures were taken from the femoral and tibial surfaces / canals. Two jagruti clamps were used to stabilize the patella, and an oscillating microsagittal saw blade was used to cut the patellar button. A pencil tip jean paul was used to clear the pegs and any remaining cement. Both canals were hand reamed gently to 14mm and the knee was then thoroughly irrigated (3L NS, dilute betadine solution, followed by 6L NS). The flexion and extension gaps were assessed, and a determination was made on the size of the femoral and tibial components and the need for any cement augmentation on either component to balance the gaps. The tourniquet was deflated, and meticulous hemostasis was obtained. A size 3, 11mm All-Poly CS tibial component was used. A 2.8mm Steinmann pin was drilled into the undersurface of the component. The distal end was cut. This pin was used as a cement dowel using 1 pack of antibiotic cement total (mixed as above). A separate 2.8mm pin was used to fashion a dowel using the same pack of cement, for the femoral side. Both dowels were sized to 12mm in diameter using a hole gauge. A size 4 CR femoral component was cemented first using 1 batch of antibiotic cement (as above), after insertion of the femoral dowel. The tibial component was then cemented in a separate mix, using 1 additional batch of antibiotic cement. The extremity was held in neutral alignment. The cement was allowed to cure, the joint was irrigated once again.     The arthrotomy was then closed with #1 PDS interrupted suture and #2 running Quill. The subcutaneous tissues were closed with 0 PDS and 2-0 PDS. The skin was closed with 2-0 nylon suture. A sterile negative pressure incisional dressing was then applied followed by a compressive bandage and a knee immobilizer. The patient tolerated the procedure well, and there were no known immediate complications. Post-operative Plan:    The patient will receive broach spectrum antibiotics until cultures speciate. Infectious disease team will be consulted for co-management. VTE prophylaxis will consist of early mobilization, mechanical compressive devices, and eliquis 2.5 BID if not medically contra-indicated. The negative pressure dressing will be changed to a dry dressing at 1 week post-op. The patient will be weight bearing as tolerated on the operative extremity and allowed to mobilize with physical therapy. The knee immobilizer will be kept in place for likely 2 weeks, for additional support and wound healing. The patient will follow up in clinic in 2 weeks for a wound check, suture removal and radiographic evaluation.

## 2023-08-30 NOTE — PLAN OF CARE
POD 0. Patient is A/Ox4. Prevena wound vac clean/dry /intact. Gel ice pack to knee prn. Voiding without difficulty via purewick . Vital signs monitored. Tolerating diet. Cough/deep breathing. Safety precautions in place- bed in lowest position, call light and personal belongings within reach. Frequent rounding by nursing staff. Plan is home pending medical clearance/final antibiotics. Oxycontin and toradol per [facility] for pain. Problem: Patient Centered Care  Goal: Patient preferences are identified and integrated in the patient's plan of care  Description: Interventions:  - What would you like us to know as we care for you?  \"I've been through a lot of hospital stuff in my life\"  - Provide timely, complete, and accurate information to patient/family  - Incorporate patient and family knowledge, values, beliefs, and cultural backgrounds into the planning and delivery of care  - Encourage patient/family to participate in care and decision-making at the level they choose  - Honor patient and family perspectives and choices  Outcome: Progressing     Problem: PAIN - ADULT  Goal: Verbalizes/displays adequate comfort level or patient's stated pain goal  Description: INTERVENTIONS:  - Encourage pt to monitor pain and request assistance  - Assess pain using appropriate pain scale  - Administer analgesics based on type and severity of pain and evaluate response  - Implement non-pharmacological measures as appropriate and evaluate response  - Consider cultural and social influences on pain and pain management  - Manage/alleviate anxiety  - Utilize distraction and/or relaxation techniques  - Monitor for opioid side effects  - Notify MD/LIP if interventions unsuccessful or patient reports new pain  - Anticipate increased pain with activity and pre-medicate as appropriate  Outcome: Progressing     Problem: SAFETY ADULT - FALL  Goal: Free from fall injury  Description: INTERVENTIONS:  - Assess pt frequently for physical needs  - Identify cognitive and physical deficits and behaviors that affect risk of falls.   - [place] fall precautions as indicated by assessment.  - Educate pt/family on patient safety including physical limitations  - Instruct pt to call for assistance with activity based on assessment  - Modify environment to reduce risk of injury  - Provide assistive devices as appropriate  - Consider OT/PT consult to assist with strengthening/mobility  - Encourage toileting schedule  Outcome: Progressing     Problem: DISCHARGE PLANNING  Goal: Discharge to home or other facility with appropriate resources  Description: INTERVENTIONS:  - Identify barriers to discharge w/pt and caregiver  - Include patient/family/discharge partner in discharge planning  - Arrange for needed discharge resources and transportation as appropriate  - Identify discharge learning needs (meds, wound care, etc)  - Arrange for interpreters to assist at discharge as needed  - Consider post-discharge preferences of patient/family/discharge partner  - Complete POLST form as appropriate  - Assess patient's ability to be responsible for managing their own health  - Refer to Case Management Department for coordinating discharge planning if the patient needs post-hospital services based on physician/LIP order or complex needs related to functional status, cognitive ability or social support system  Outcome: Progressing

## 2023-08-30 NOTE — INTERVAL H&P NOTE
Pre-op Diagnosis: Left knee prosthetic infection    The above referenced H&P was reviewed by Manuela Doan PA-C on 8/30/2023, the patient was examined and no significant changes have occurred in the patient's condition since the H&P was performed. I discussed with the patient and/or legal representative the potential benefits, risks and side effects of this procedure; the likelihood of the patient achieving goals; and potential problems that might occur during recuperation. I discussed reasonable alternatives to the procedure, including risks, benefits and side effects related to the alternatives and risks related to not receiving this procedure. We will proceed with procedure as planned.

## 2023-08-31 PROBLEM — T84.59XA INFECTED PROSTHETIC KNEE JOINT: Status: ACTIVE | Noted: 2023-08-31

## 2023-08-31 PROBLEM — Z96.659 INFECTED PROSTHETIC KNEE JOINT  (HCC): Status: ACTIVE | Noted: 2023-08-31

## 2023-08-31 PROBLEM — T84.59XA INFECTED PROSTHETIC KNEE JOINT  (HCC): Status: ACTIVE | Noted: 2023-08-31

## 2023-08-31 PROBLEM — Z96.659 INFECTED PROSTHETIC KNEE JOINT (HCC): Status: ACTIVE | Noted: 2023-08-31

## 2023-08-31 PROBLEM — T84.59XA INFECTED PROSTHETIC KNEE JOINT (HCC): Status: ACTIVE | Noted: 2023-08-31

## 2023-08-31 PROBLEM — Z96.659 INFECTED PROSTHETIC KNEE JOINT: Status: ACTIVE | Noted: 2023-08-31

## 2023-08-31 PROCEDURE — 99233 SBSQ HOSP IP/OBS HIGH 50: CPT | Performed by: HOSPITALIST

## 2023-08-31 RX ORDER — ACETAMINOPHEN 500 MG
1000 TABLET ORAL EVERY 6 HOURS
Refills: 0 | Status: SHIPPED | COMMUNITY
Start: 2023-08-31

## 2023-08-31 RX ORDER — IBUPROFEN 400 MG/1
400 TABLET ORAL 3 TIMES DAILY
Refills: 0 | Status: SHIPPED | COMMUNITY
Start: 2023-09-01

## 2023-08-31 RX ORDER — OXYCODONE HYDROCHLORIDE 10 MG/1
10 TABLET ORAL EVERY 4 HOURS PRN
Refills: 0 | Status: SHIPPED | COMMUNITY
Start: 2023-08-31

## 2023-08-31 RX ORDER — TRAMADOL HYDROCHLORIDE 50 MG/1
50 TABLET ORAL EVERY 8 HOURS PRN
Status: DISCONTINUED | OUTPATIENT
Start: 2023-08-31 | End: 2023-09-01

## 2023-08-31 RX ORDER — IBUPROFEN 400 MG/1
400 TABLET ORAL 3 TIMES DAILY
Status: DISCONTINUED | OUTPATIENT
Start: 2023-09-01 | End: 2023-09-01

## 2023-08-31 RX ORDER — GABAPENTIN 400 MG/1
400 CAPSULE ORAL 3 TIMES DAILY
Status: DISCONTINUED | OUTPATIENT
Start: 2023-08-31 | End: 2023-09-01

## 2023-08-31 RX ORDER — PSEUDOEPHEDRINE HCL 30 MG
100 TABLET ORAL 2 TIMES DAILY
Refills: 0 | Status: SHIPPED | COMMUNITY
Start: 2023-08-31

## 2023-08-31 NOTE — PHYSICAL THERAPY NOTE
PHYSICAL THERAPY Treatment- INPATIENT     Room Number: 402/402-A  Evaluation Date: 8/31/2023  Type of Evaluation: Initial   Physician Order: PT Eval and Treat    Presenting Problem:  (L TKA revision)  Reason for Therapy: Mobility Dysfunction and Discharge Planning    PHYSICAL THERAPY ASSESSMENT   Orders received and chart reviewed. RN approved participation in physical therapy. PPE worn by therapist: mask, gloves, and goggles. Patient was wearing a mask during session. Patient is a 47year old female admitted 8/30/2023 for Presenting Problem:  (L TKA revision). Patient presented in bed with 6/10 pain. Education provided on Physical therapy plan of care and physiological benefits of out of bed mobility. Patient with fair carryover. Bed mobility: Supervision  Transfers: Supervision  Gait Assistance: Contact guard assist  Distance (ft):  (25x2)  Assistive Device: Rolling walker  Pattern:  (decreased tania, self limits distance due to pain and letharg)          Patient was left in bed at end of session with all needs in reach. Patient's current functional deficits include ambulation. The patient's Approx Degree of Impairment: 46.58% has been calculated based on documentation in the [facility] '6 clicks' Inpatient Basic Mobility Short Form. Research supports that patients with this level of impairment [facility]. RN aware of patient status post session. Patient will benefit from continued IP PT services to address these deficits in preparation for discharge. DISCHARGE RECOMMENDATIONS  PT Discharge Recommendations: Home with home health PT    PLAN  PT Treatment Plan: Bed mobility; Patient education; Family education;Gait training;Transfer training  Rehab Potential : Fair  Frequency (Obs): Daily       PHYSICAL THERAPY MEDICAL/SOCIAL HISTORY     History related to current admission:      Problem List  Principal Problem:    Infection of prosthetic left knee joint ([address]  Active Problems:    Hypothyroidism Iron deficiency anemia secondary to inadequate dietary iron intake    History of substance abuse (HCC)    Bipolar 1 disorder (HCC)    Seizures (HCC)    Chronic anemia    History of lung cancer      Past Medical History  Past Medical History:   Diagnosis Date    Anemia     Brain cancer ([facility]     Depression     Disorder of thyroid     Exposure to radiation     DISCONTINUED AUG 2015     Gallbladder disease     Hepatitis C     High blood pressure     History of blood transfusion     after childbirth    Hypothyroid     Lung cancer ([facility]     Migraines     Osteoarthritis     Pancreatic cancer ([facility] [address] 2015    Personal history of antineoplastic chemotherapy     EVERY 2 WEEKS BEGINNING      Pneumonia, organism unspecified(486)     Seizure disorder (HCC)     Visual impairment     tunnel vision    Wears glasses        Past Surgical History  Past Surgical History:   Procedure Laterality Date    APPENDECTOMY  1999    BRAIN SURGERY  2015    TUMOR RESECTION    BRAIN SURGERY Left     OCCIPITAL CRANIOTOMY    BRAIN SURGERY Left     LOBECTOMY    BRAIN SURGERY  2016    REMOVAL OF DEAD TISSUE     CHOLECYSTECTOMY  2009    CYST REMOVAL      BREAST, BENIGN    HEMORRHOIDECTOMY      HYSTERECTOMY      heavy menstrual flow,     KNEE REPLACEMENT SURGERY Left 2022    @ [facility]    LUMPECTOMY RIGHT  2012    FIBROADENOMA    OTHER  2015    CYBERKNIFE    OTHER SURGICAL HISTORY      XS PORT-A-CATH INSERTION/EXCH/CHK         HOME SITUATION  Type of Home: House   Home Layout: Able to live on main level  Stairs to Enter : 3  Railing: Yes        Lives With: Family  Drives: No          Prior Level of [place]: ind    SUBJECTIVE  \"I need sleep & a pain pill\"    PHYSICAL THERAPY EXAMINATION     OBJECTIVE  Precautions: Low vision  Fall Risk: High fall risk    WEIGHT BEARING RESTRICTION  Weight Bearing Restriction: L lower extremity WBAT KI OOB               PAIN ASSESSMENT  Ratin  Location: L knee COGNITION  Overall Cognitive Status:  WFL - within functional limits    RANGE OF MOTION AND STRENGTH ASSESSMENT  Upper extremity ROM and strength are within functional limits     Lower extremity ROM is within functional limits     Lower extremity strength is within functional limits     BALANCE  Static Sitting: Fair  Dynamic Sitting: Fair -  Static Standing: Fair -  Dynamic Standin Brady Narciso Melendez 2 '6-Clicks' INPATIENT SHORT FORM - BASIC MOBILITY  How much difficulty does the patient currently have. .. Patient Difficulty: Turning over in bed (including adjusting bedclothes, sheets and blankets)?: A Little   Patient Difficulty: Sitting down on and standing up from a chair with arms (e.g., wheelchair, bedside commode, etc.): A Little   Patient Difficulty: Moving from lying on back to sitting on the side of the bed?: A Little   How much help from another person does the patient currently need. ..    Help from Another: Moving to and from a bed to a chair (including a wheelchair)?: A Little   Help from Another: Need to walk in hospital room?: A Little   Help from Another: Climbing 3-5 steps with a railing?: A Little     AM-PAC Score:  Raw Score: 18   Approx Degree of Impairment: 46.58%   Standardized Score (AM-PAC Scale): 43.63   CMS Modifier (G-Code): CK      Exercise/Education Provided:  Bed mobility  Gait training  Transfer training    Patient End of Session: Up in chair    Goals to be met by:   Patient Goal Patient's self-stated goal is: home   Goal #1 Patient is able to demonstrate supine - sit EOB @ level: modified independent     Goal #1   Current Status Not met   Goal #2 Patient is able to demonstrate transfers Sit to/from Stand at assistance level: modified independent     Goal #2  Current Status Not met   Goal #3 Patient is able to ambulate 300 feet with assistive device at assistance level: modified independent    Goal #3   Current Status Not met   Goal #4 Patient will negotiate 3 stairs/one curb w/ assistive device and supervision   Goal #4   Current Status NT   Goal #5     Goal #5   Current Status NT   Goal #6 Patient independently performs home exercise program for ROM/strengthening per the instructions provided in preparation for discharge.    Goal #6  Current Status        Gait Training: 10 minutes  Therapeutic Activity: 13 minutes

## 2023-08-31 NOTE — PLAN OF CARE
Patient alert and orientated x4. Post-op day #1. Dressing in place to left knee- prevena in place. VSS. Saline locked. On IV cefepime and vanco. Tolerating general diet- pt reports having poor appetite. Voiding freely. Patient is on room air. SCDs and eliquis for DVT prophylaxis. PRN oxycodone and dilaudid given for Pain. Up with x1 assist and a walker. Encouraged frequent ambulation and use of incentive spirometer. Fall precautions maintained- bed alarm on, bed locked in lowest position, call light and personal belongings within reach, non-skid socks in place to bilateral feet. Frequent rounding by nursing staff. Plan to discharge on long term antibiotics when medically cleared. Problem: Patient Centered Care  Goal: Patient preferences are identified and integrated in the patient's plan of care  Description: Interventions:  - What would you like us to know as we care for you?  \"I've been through a lot of hospital stuff in my life\"  - Provide timely, complete, and accurate information to patient/family  - Incorporate patient and family knowledge, values, beliefs, and cultural backgrounds into the planning and delivery of care  - Encourage patient/family to participate in care and decision-making at the level they choose  - Honor patient and family perspectives and choices  Outcome: Progressing     Problem: PAIN - ADULT  Goal: Verbalizes/displays adequate comfort level or patient's stated pain goal  Description: INTERVENTIONS:  - Encourage pt to monitor pain and request assistance  - Assess pain using appropriate pain scale  - Administer analgesics based on type and severity of pain and evaluate response  - Implement non-pharmacological measures as appropriate and evaluate response  - Consider cultural and social influences on pain and pain management  - Manage/alleviate anxiety  - Utilize distraction and/or relaxation techniques  - Monitor for opioid side effects  - Notify MD/LIP if interventions unsuccessful or patient reports new pain  - Anticipate increased pain with activity and pre-medicate as appropriate  Outcome: Progressing     Problem: SAFETY ADULT - FALL  Goal: Free from fall injury  Description: INTERVENTIONS:  - Assess pt frequently for physical needs  - Identify cognitive and physical deficits and behaviors that affect risk of falls.   - [place] fall precautions as indicated by assessment.  - Educate pt/family on patient safety including physical limitations  - Instruct pt to call for assistance with activity based on assessment  - Modify environment to reduce risk of injury  - Provide assistive devices as appropriate  - Consider OT/PT consult to assist with strengthening/mobility  - Encourage toileting schedule  Outcome: Progressing     Problem: DISCHARGE PLANNING  Goal: Discharge to home or other facility with appropriate resources  Description: INTERVENTIONS:  - Identify barriers to discharge w/pt and caregiver  - Include patient/family/discharge partner in discharge planning  - Arrange for needed discharge resources and transportation as appropriate  - Identify discharge learning needs (meds, wound care, etc)  - Arrange for interpreters to assist at discharge as needed  - Consider post-discharge preferences of patient/family/discharge partner  - Complete POLST form as appropriate  - Assess patient's ability to be responsible for managing their own health  - Refer to Case Management Department for coordinating discharge planning if the patient needs post-hospital services based on physician/LIP order or complex needs related to functional status, cognitive ability or social support system  Outcome: Progressing

## 2023-08-31 NOTE — CM/SW NOTE
Department  notified of request for [facility] and Infusion , aidin referrals started. Assigned CM/SW to follow up with pt/family on further discharge planning.      Efren Neville   August 31, 2023   10:58

## 2023-08-31 NOTE — PLAN OF CARE
Patient is POD 1. Dressing - CDI. Prevena wound vac in place. Aox4 on RA. Eliquis and SCDs. General diet. Voiding via the purewick. Tylenol, Toradol, Oxy and Dilaudid given for pain management. Plan pending. Problem: Patient Centered Care  Goal: Patient preferences are identified and integrated in the patient's plan of care  Description: Interventions:  - What would you like us to know as we care for you? \"I've been through a lot of hospital stuff in my life\"  - Provide timely, complete, and accurate information to patient/family  - Incorporate patient and family knowledge, values, beliefs, and cultural backgrounds into the planning and delivery of care  - Encourage patient/family to participate in care and decision-making at the level they choose  - Honor patient and family perspectives and choices  Outcome: Progressing     Problem: PAIN - ADULT  Goal: Verbalizes/displays adequate comfort level or patient's stated pain goal  Description: INTERVENTIONS:  - Encourage pt to monitor pain and request assistance  - Assess pain using appropriate pain scale  - Administer analgesics based on type and severity of pain and evaluate response  - Implement non-pharmacological measures as appropriate and evaluate response  - Consider cultural and social influences on pain and pain management  - Manage/alleviate anxiety  - Utilize distraction and/or relaxation techniques  - Monitor for opioid side effects  - Notify MD/LIP if interventions unsuccessful or patient reports new pain  - Anticipate increased pain with activity and pre-medicate as appropriate  Outcome: Progressing     Problem: SAFETY ADULT - FALL  Goal: Free from fall injury  Description: INTERVENTIONS:  - Assess pt frequently for physical needs  - Identify cognitive and physical deficits and behaviors that affect risk of falls.   - [place] fall precautions as indicated by assessment.  - Educate pt/family on patient safety including physical limitations  - Instruct pt to call for assistance with activity based on assessment  - Modify environment to reduce risk of injury  - Provide assistive devices as appropriate  - Consider OT/PT consult to assist with strengthening/mobility  - Encourage toileting schedule  Outcome: Progressing     Problem: DISCHARGE PLANNING  Goal: Discharge to home or other facility with appropriate resources  Description: INTERVENTIONS:  - Identify barriers to discharge w/pt and caregiver  - Include patient/family/discharge partner in discharge planning  - Arrange for needed discharge resources and transportation as appropriate  - Identify discharge learning needs (meds, wound care, etc)  - Arrange for interpreters to assist at discharge as needed  - Consider post-discharge preferences of patient/family/discharge partner  - Complete POLST form as appropriate  - Assess patient's ability to be responsible for managing their own health  - Refer to Case Management Department for coordinating discharge planning if the patient needs post-hospital services based on physician/LIP order or complex needs related to functional status, cognitive ability or social support system  Outcome: Progressing

## 2023-08-31 NOTE — PHYSICAL THERAPY NOTE
PHYSICAL THERAPY KNEE EVALUATION - INPATIENT       Room Number: 402/402-A  Evaluation Date: 8/31/2023  Type of Evaluation: Initial  Physician Order: PT Eval and Treat    Presenting Problem: L TKA     Reason for Therapy: Mobility Dysfunction and Discharge Planning    PHYSICAL THERAPY ASSESSMENT     Patient is a 47year old female admitted 8/30/2023 for L TKA revision. Patient's current functional deficits include ambulation & transfers, which are below the patient's pre-admission status. Patient presented in bed with 6/10 pain. Education provided on Physical therapy plan of care and physiological benefits of out of bed mobility. Patient with fair carryover. Bed mobility: Supervision  Transfers: Supervision  Gait Assistance: Minimum assistance  Distance (ft): 15x2  Assistive Device: Rolling walker  Pattern:  (decreased tania, slow, lethargic)    The patient's Approx Degree of Impairment: 46.58% has been calculated based on documentation in the [facility] '6 clicks' Inpatient Basic Mobility Short Form. Research supports that patients with this level of impairment may benefit from [facility]    Patient will benefit from continued IP PT services to address these deficits in preparation for discharge. DISCHARGE RECOMMENDATIONS  PT Discharge Recommendations: Home with home health PT    PLAN  PT Treatment Plan: Bed mobility; Patient education; Family education;Transfer training  Rehab Potential : Fair  Frequency (Obs): Daily       PHYSICAL THERAPY MEDICAL/SOCIAL HISTORY     History related to current admission:     Problem List  Principal Problem:    Infection of prosthetic left knee joint (HCC)  Active Problems:    Hypothyroidism    Iron deficiency anemia secondary to inadequate dietary iron intake    History of substance abuse ([address]    Bipolar 1 disorder (HCC)    Seizures (HCC)    Chronic anemia    History of lung cancer      HOME SITUATION  Home Situation  Type of Home: House  Home Layout: Able to live on main level  Stairs to Enter : 3  Railing: Yes  Lives With: Family  Drives: No     Prior Level of [place]: mod ind    SUBJECTIVE  \"I need to walk\"    PHYSICAL THERAPY EXAMINATION     OBJECTIVE  Precautions: Low vision  Fall Risk: High fall risk    WEIGHT BEARING RESTRICTION           LLE WBAT, KI on OOB    PAIN ASSESSMENT  Ratin  Location: L knee       COGNITION  Overall Cognitive Status:  WFL - within functional limits    RANGE OF MOTION AND STRENGTH ASSESSMENT  Upper extremity ROM and strength are within functional limits   Lower extremity ROM is within functional limits   Lower extremity strength is within functional limits     BALANCE  Static Sitting: Fair -  Dynamic Sitting: Fair -  Static Standing: Fair -  Dynamic Standing: Fair -                                                                       ADDITIONAL TESTS                                    ACTIVITY TOLERANCE                         O2 WALK       AM-PAC '6-Clicks' INPATIENT SHORT FORM - BASIC MOBILITY  How much difficulty does the patient currently have. .. Patient Difficulty: Turning over in bed (including adjusting bedclothes, sheets and blankets)?: A Little   Patient Difficulty: Sitting down on and standing up from a chair with arms (e.g., wheelchair, bedside commode, etc.): A Little   Patient Difficulty: Moving from lying on back to sitting on the side of the bed?: A Little   How much help from another person does the patient currently need. ..    Help from Another: Moving to and from a bed to a chair (including a wheelchair)?: A Little   Help from Another: Need to walk in hospital room?: A Little   Help from Another: Climbing 3-5 steps with a railing?: A Little     AM-PAC Score:  Raw Score: 18   Approx Degree of Impairment: 46.58%   Standardized Score (AM-PAC Scale): 43.63   CMS Modifier (G-Code): CK    FUNCTIONAL ABILITY STATUS  Functional Mobility/Gait Assessment  Gait Assistance: Minimum assistance  Distance (ft): 15x2  Assistive Device: Rolling walker  Pattern:  (decreased tania, slow, lethargic)    Bed Mobility: min    Transfers: min    Exercise/Education Provided:  Bed mobility  Gait training  Transfer training    Patient End of Session: Up in chair;Needs met;Call light within reach;RN aware of session/findings; All patient questions and concerns addressed; Alarm set; Other (Comment) (ice to left knee)    CURRENT GOALS    Goals to be met by:   Patient Goal Patient's self-stated goal is: home   Goal #1 Patient is able to demonstrate supine - sit EOB @ level: modified independent     Goal #1   Current Status    Goal #2 Patient is able to demonstrate transfers Sit to/from Stand at assistance level: modified independent     Goal #2  Current Status    Goal #3 Patient is able to ambulate 300 feet with assistive device at assistance level: modified independent    Goal #3   Current Status    Goal #4 Patient will negotiate 3 stairs/one curb w/ assistive device and supervision   Goal #4   Current Status    Goal #5  AROM 0 degrees extension to 95 degrees flexion     Goal #5   Current Status    Goal #6 Patient independently performs home exercise program for ROM/strengthening per the instructions provided in preparation for discharge.    Goal #6  Current Status        Patient Evaluation Complexity Level:  History Low - no personal factors and/or co-morbidities   Examination of body systems Low - addressing 1-2 elements   Clinical Presentation Low - Stable   Clinical Decision Making Low Complexity       Gait Trainin minutes  Therapeutic Activity: 13 minutes

## 2023-09-01 VITALS
DIASTOLIC BLOOD PRESSURE: 69 MMHG | WEIGHT: 150 LBS | HEART RATE: 82 BPM | OXYGEN SATURATION: 95 % | TEMPERATURE: 97 F | RESPIRATION RATE: 16 BRPM | SYSTOLIC BLOOD PRESSURE: 106 MMHG | HEIGHT: 65 IN | BODY MASS INDEX: 24.99 KG/M2

## 2023-09-01 LAB
ANION GAP SERPL CALC-SCNC: 4 MMOL/L (ref 0–18)
BUN BLD-MCNC: 5 MG/DL (ref 7–18)
BUN/CREAT SERPL: 10 (ref 10–20)
CALCIUM BLD-MCNC: 7.5 MG/DL (ref 8.5–10.1)
CHLORIDE SERPL-SCNC: 109 MMOL/L (ref 98–112)
CK SERPL-CCNC: 100 U/L
CO2 SERPL-SCNC: 23 MMOL/L (ref 21–32)
CREAT BLD-MCNC: 0.5 MG/DL
EGFRCR SERPLBLD CKD-EPI 2021: 112 ML/MIN/1.73M2 (ref 60–?)
GLUCOSE BLD-MCNC: 96 MG/DL (ref 70–99)
HCT VFR BLD AUTO: 28.9 %
HGB BLD-MCNC: 9.6 G/DL
OSMOLALITY SERPL CALC.SUM OF ELEC: 279 MOSM/KG (ref 275–295)
POTASSIUM SERPL-SCNC: 3.1 MMOL/L (ref 3.5–5.1)
SODIUM SERPL-SCNC: 136 MMOL/L (ref 136–145)

## 2023-09-01 RX ORDER — CEFTRIAXONE SODIUM 2 G/50ML
2 INJECTION, SOLUTION INTRAVENOUS EVERY 24 HOURS
Refills: 0 | Status: SHIPPED | COMMUNITY
Start: 2023-09-01

## 2023-09-01 NOTE — PLAN OF CARE
Mary Dickerson has been alert and oriented x 3-4. She at times became forgetful. Receiving dilaudid ivp, oxycontin and toradol for pain management. Voiding freely. Up with 1 assist and the walker. Bed is low and locked. Call light within reach. Plan is to discharge home when medically cleared.

## 2023-09-01 NOTE — PHYSICAL THERAPY NOTE
PHYSICAL THERAPY TREATMENT NOTE - INPATIENT     Room Number: 215/363-B       Presenting Problem:  (L TKA revision)    Problem List  Principal Problem:    Infection of prosthetic left knee joint ([facility] [address]  Active Problems:    Hypothyroidism    Iron deficiency anemia secondary to inadequate dietary iron intake    History of substance abuse ([facility] [address]    Bipolar 1 disorder (HCC)    Seizures (HCC)    Chronic anemia    History of lung cancer    Infected prosthetic knee joint       PHYSICAL THERAPY ASSESSMENT   Chart reviewed. RN  approved participation in physical therapy. PPE worn by therapist: mask, gloves, and goggles. Patient was wearing a mask during session. Patient presented in bed with 6/10 pain. Patient with good  progress towards goals during this session. Education provided on Weight bearing status, Physical therapy plan of care, and physiological benefits of out of bed mobility. Patient with good carryover. Bed mobility: Min a  Transfers: Min assist  Gait Assistance: Contact guard assist  Distance (ft): 2 x 100  Assistive Device: Rolling walker  Pattern: Shuffle          Pt seen daily. Min a for bed mobility and transfer. Pt amb 2 x 100 ft with RW and CGA. Navigated 4 stairs with CGA. There ex, Patient was left in bedside chair at end of session with all needs in reach. The patient's Approx Degree of Impairment: 46.58% has been calculated based on documentation in the [facility] '6 clicks' Inpatient Basic Mobility Short Form. Research supports that patients with this level of impairment may benefit from Home with home health PT. . RN aware of patient status post session. DISCHARGE RECOMMENDATIONS  PT Discharge Recommendations: Home with home health PT     PLAN  PT Treatment Plan: Bed mobility; Body mechanics; Endurance;Gait training    SUBJECTIVE  Pt reports being ready for PT RX    OBJECTIVE  Precautions: Low vision    WEIGHT BEARING RESTRICTION  Weight Bearing Restriction: L lower extremity           L Lower Extremity: Weight Bearing as Tolerated    PAIN ASSESSMENT   Ratin  Location: L knee       BALANCE                                                                                                                       Static Sitting: Fair  Dynamic Sitting: Fair -           Static Standing: Fair -  Dynamic Standing: Fair -    ACTIVITY TOLERANCE                         O2 WALK       AM-PAC '6-Clicks' INPATIENT SHORT FORM - BASIC MOBILITY  How much difficulty does the patient currently have. .. Patient Difficulty: Turning over in bed (including adjusting bedclothes, sheets and blankets)?: A Little   Patient Difficulty: Sitting down on and standing up from a chair with arms (e.g., wheelchair, bedside commode, etc.): A Little   Patient Difficulty: Moving from lying on back to sitting on the side of the bed?: A Little   How much help from another person does the patient currently need. .. Help from Another: Moving to and from a bed to a chair (including a wheelchair)?: A Little   Help from Another: Need to walk in hospital room?: A Little   Help from Another: Climbing 3-5 steps with a railing?: A Little     AM-PAC Score:  Raw Score: 18   Approx Degree of Impairment: 46.58%   Standardized Score (AM-PAC Scale): 43.63   CMS Modifier (G-Code): CK      Additional information:     THERAPEUTIC EXERCISES  Lower Extremity Ankle pumps  Glut sets  Quad sets     Position Supine       Patient End of Session: Up in chair;Call light within reach;RN aware of session/findings; All patient questions and concerns addressed    CURRENT GOALS     Patient Goal Patient's self-stated goal is: home   Goal #1 Patient is able to demonstrate supine - sit EOB @ level: modified independent     Goal #1   Current Status Min a   Goal #2 Patient is able to demonstrate transfers Sit to/from Stand at assistance level: modified independent     Goal #2  Current Status Min a   Goal #3 Patient is able to ambulate 300 feet with assistive device at assistance level: modified independent    Goal #3   Current Status Pt amb 2 x 100 ft with RW and CGA   Goal #4 Patient will negotiate 3 stairs/one curb w/ assistive device and supervision   Goal #4   Current Status Navigated 4 stairs with CGA   Goal #5     Goal #5   Current Status NT   Goal #6 Patient independently performs home exercise program for ROM/strengthening per the instructions provided in preparation for discharge.    Goal #6  Current Status In progress       Gait Training: 10 minutes  Therapeutic Activity: 13 minutes

## 2023-09-01 NOTE — CM/SW NOTE
ERMIAS followed up on DC planning. SW spoke with pt at bedside and confirmed that she plans to go home with [facility] and IV ABX. Pt's  is willing and able to assist pt at home. Pt is being serviced by [facility] at 100% at home. After providing choice for [facility] - pt agreeable to [facility] for the [facility] RN teach and train     Pt will use the Port she has from chemo and will need to follow up with her MD or cancer center to get her port deaccessed. [facility] [phone]    PLAN: Home with [facility] and [address], SEANW, MSW ext.  [phone]

## 2023-09-01 NOTE — HOME CARE LIAISON
[facility] Spoke w/ patient who is agreeable with [facility].  Contact information placed on AVS.

## 2023-09-01 NOTE — PLAN OF CARE
Patient alert and orientated x4. Post-op day #2. Dressing in place to left knee- prevena in place. VSS. Saline locked. On IV cefepime and vanco. Tolerating general diet- pt reports having poor appetite. Voiding freely. Patient is on room air. SCDs and eliquis for DVT prophylaxis. PRN oxycodone and dilaudid given for Pain. Up with x1 assist and a walker. Encouraged frequent ambulation and use of incentive spirometer. Fall precautions maintained- bed alarm on, bed locked in lowest position, call light and personal belongings within reach, non-skid socks in place to bilateral feet. Frequent rounding by nursing staff. Plan to discharge tomorrow. Port accessed for antibiotic use at home. Home health to teach pt and family how to administer. Patient cleared by internal medicine, ortho surgery, PT/OT, and social work. Going. Verified that pain medications are at patient's pharmacy. IV removed, discharge education provided, patient sent home with all personal belongings, medications, scripts, and discharge instructions. Addressed additional questions. Problem: Patient Centered Care  Goal: Patient preferences are identified and integrated in the patient's plan of care  Description: Interventions:  - What would you like us to know as we care for you?  \"I've been through a lot of hospital stuff in my life\"  - Provide timely, complete, and accurate information to patient/family  - Incorporate patient and family knowledge, values, beliefs, and cultural backgrounds into the planning and delivery of care  - Encourage patient/family to participate in care and decision-making at the level they choose  - Honor patient and family perspectives and choices  Outcome: Adequate for Discharge     Problem: PAIN - ADULT  Goal: Verbalizes/displays adequate comfort level or patient's stated pain goal  Description: INTERVENTIONS:  - Encourage pt to monitor pain and request assistance  - Assess pain using appropriate pain scale  - Administer analgesics based on type and severity of pain and evaluate response  - Implement non-pharmacological measures as appropriate and evaluate response  - Consider cultural and social influences on pain and pain management  - Manage/alleviate anxiety  - Utilize distraction and/or relaxation techniques  - Monitor for opioid side effects  - Notify MD/LIP if interventions unsuccessful or patient reports new pain  - Anticipate increased pain with activity and pre-medicate as appropriate  Outcome: Adequate for Discharge     Problem: SAFETY ADULT - FALL  Goal: Free from fall injury  Description: INTERVENTIONS:  - Assess pt frequently for physical needs  - Identify cognitive and physical deficits and behaviors that affect risk of falls.   - [place] fall precautions as indicated by assessment.  - Educate pt/family on patient safety including physical limitations  - Instruct pt to call for assistance with activity based on assessment  - Modify environment to reduce risk of injury  - Provide assistive devices as appropriate  - Consider OT/PT consult to assist with strengthening/mobility  - Encourage toileting schedule  Outcome: Adequate for Discharge     Problem: DISCHARGE PLANNING  Goal: Discharge to home or other facility with appropriate resources  Description: INTERVENTIONS:  - Identify barriers to discharge w/pt and caregiver  - Include patient/family/discharge partner in discharge planning  - Arrange for needed discharge resources and transportation as appropriate  - Identify discharge learning needs (meds, wound care, etc)  - Arrange for interpreters to assist at discharge as needed  - Consider post-discharge preferences of patient/family/discharge partner  - Complete POLST form as appropriate  - Assess patient's ability to be responsible for managing their own health  - Refer to Case Management Department for coordinating discharge planning if the patient needs post-hospital services based on physician/LIP order or complex needs related to functional status, cognitive ability or social support system  Outcome: Adequate for Discharge

## 2023-09-05 ENCOUNTER — PATIENT OUTREACH (OUTPATIENT)
Dept: CASE MANAGEMENT | Age: 53
End: 2023-09-05

## 2023-09-05 NOTE — PROGRESS NOTES
Initial Post Discharge Follow Up   Discharge Date: 9/1/23  Contact Date: 9/5/2023    Consent Verification:  Assessment Completed With: Patient  HIPAA Verified? Yes    Discharge Dx: Infection of prosthetic left knee joint  S/P resection with antibiotic spacer  Hypothyroidism  Iron deficiency anemia secondary to inadequate dietary iron intake  History of substance abuse   Bipolar 1 disorder   Seizures   Chronic anemia  History of lung cancer    General:   How have you been since your discharge from the hospital? The patient reported, \"doing well so far\" but was sleeping prior to the NCM call. The patient requested the NCM call back at a different time to complete a TCM call. NCM will call back as requested.

## 2023-09-06 ENCOUNTER — LAB REQUISITION (OUTPATIENT)
Dept: LAB | Facility: HOSPITAL | Age: 53
End: 2023-09-06
Payer: COMMERCIAL

## 2023-09-06 DIAGNOSIS — T84.54XA INFECTION AND INFLAMMATORY REACTION DUE TO INTERNAL LEFT KNEE PROSTHESIS, INITIAL ENCOUNTER (HCC): ICD-10-CM

## 2023-09-06 LAB
ALBUMIN SERPL-MCNC: 2 G/DL (ref 3.4–5)
ALBUMIN/GLOB SERPL: 0.4 {RATIO} (ref 1–2)
ALP LIVER SERPL-CCNC: 149 U/L
ALT SERPL-CCNC: 19 U/L
ANION GAP SERPL CALC-SCNC: 9 MMOL/L (ref 0–18)
AST SERPL-CCNC: 31 U/L (ref 15–37)
BASOPHILS # BLD AUTO: 0.02 X10(3) UL (ref 0–0.2)
BASOPHILS NFR BLD AUTO: 0.3 %
BILIRUB SERPL-MCNC: 0.3 MG/DL (ref 0.1–2)
BUN BLD-MCNC: 8 MG/DL (ref 7–18)
BUN/CREAT SERPL: 15.1 (ref 10–20)
CALCIUM BLD-MCNC: 8.2 MG/DL (ref 8.5–10.1)
CHLORIDE SERPL-SCNC: 106 MMOL/L (ref 98–112)
CK SERPL-CCNC: 196 U/L
CO2 SERPL-SCNC: 23 MMOL/L (ref 21–32)
CREAT BLD-MCNC: 0.53 MG/DL
CRP SERPL-MCNC: 5.15 MG/DL (ref ?–0.3)
DEPRECATED RDW RBC AUTO: 51.6 FL (ref 35.1–46.3)
EGFRCR SERPLBLD CKD-EPI 2021: 111 ML/MIN/1.73M2 (ref 60–?)
EOSINOPHIL # BLD AUTO: 0.06 X10(3) UL (ref 0–0.7)
EOSINOPHIL NFR BLD AUTO: 0.8 %
ERYTHROCYTE [DISTWIDTH] IN BLOOD BY AUTOMATED COUNT: 15 % (ref 11–15)
ERYTHROCYTE [SEDIMENTATION RATE] IN BLOOD: >130 MM/HR
GLOBULIN PLAS-MCNC: 5 G/DL (ref 2.8–4.4)
GLUCOSE BLD-MCNC: 87 MG/DL (ref 70–99)
HCT VFR BLD AUTO: 28.3 %
HGB BLD-MCNC: 9.6 G/DL
IMM GRANULOCYTES # BLD AUTO: 0.06 X10(3) UL (ref 0–1)
IMM GRANULOCYTES NFR BLD: 0.8 %
LYMPHOCYTES # BLD AUTO: 1.37 X10(3) UL (ref 1–4)
LYMPHOCYTES NFR BLD AUTO: 18.4 %
MCH RBC QN AUTO: 31.5 PG (ref 26–34)
MCHC RBC AUTO-ENTMCNC: 33.9 G/DL (ref 31–37)
MCV RBC AUTO: 92.8 FL
MONOCYTES # BLD AUTO: 0.61 X10(3) UL (ref 0.1–1)
MONOCYTES NFR BLD AUTO: 8.2 %
NEUTROPHILS # BLD AUTO: 5.34 X10 (3) UL (ref 1.5–7.7)
NEUTROPHILS # BLD AUTO: 5.34 X10(3) UL (ref 1.5–7.7)
NEUTROPHILS NFR BLD AUTO: 71.5 %
OSMOLALITY SERPL CALC.SUM OF ELEC: 284 MOSM/KG (ref 275–295)
PLATELET # BLD AUTO: 495 10(3)UL (ref 150–450)
POTASSIUM SERPL-SCNC: 2.6 MMOL/L (ref 3.5–5.1)
PROT SERPL-MCNC: 7 G/DL (ref 6.4–8.2)
RBC # BLD AUTO: 3.05 X10(6)UL
SODIUM SERPL-SCNC: 138 MMOL/L (ref 136–145)
WBC # BLD AUTO: 7.5 X10(3) UL (ref 4–11)

## 2023-09-06 PROCEDURE — 85652 RBC SED RATE AUTOMATED: CPT | Performed by: INTERNAL MEDICINE

## 2023-09-06 PROCEDURE — 82550 ASSAY OF CK (CPK): CPT | Performed by: INTERNAL MEDICINE

## 2023-09-06 PROCEDURE — 80053 COMPREHEN METABOLIC PANEL: CPT | Performed by: INTERNAL MEDICINE

## 2023-09-06 PROCEDURE — 85025 COMPLETE CBC W/AUTO DIFF WBC: CPT | Performed by: INTERNAL MEDICINE

## 2023-09-06 PROCEDURE — 86140 C-REACTIVE PROTEIN: CPT | Performed by: INTERNAL MEDICINE

## 2023-09-06 NOTE — PROGRESS NOTES
NCM attempted to contact patient for a TCM HFU call. The patient answered and stated now is not a good time for her. NCM provided her direct contact info and patient stated she will call back when she is able to.

## 2023-09-12 ENCOUNTER — TELEPHONE (OUTPATIENT)
Facility: CLINIC | Age: 53
End: 2023-09-12

## 2023-09-12 DIAGNOSIS — E87.6 HYPOKALEMIA: Primary | ICD-10-CM

## 2023-09-15 ENCOUNTER — LAB ENCOUNTER (OUTPATIENT)
Dept: LAB | Facility: HOSPITAL | Age: 53
End: 2023-09-15
Attending: INTERNAL MEDICINE
Payer: COMMERCIAL

## 2023-09-15 DIAGNOSIS — E87.6 HYPOKALEMIA: ICD-10-CM

## 2023-09-15 LAB
ANION GAP SERPL CALC-SCNC: 10 MMOL/L (ref 0–18)
BUN BLD-MCNC: 6 MG/DL (ref 7–18)
BUN/CREAT SERPL: 9.4 (ref 10–20)
CALCIUM BLD-MCNC: 8.7 MG/DL (ref 8.5–10.1)
CHLORIDE SERPL-SCNC: 105 MMOL/L (ref 98–112)
CO2 SERPL-SCNC: 22 MMOL/L (ref 21–32)
CREAT BLD-MCNC: 0.64 MG/DL
EGFRCR SERPLBLD CKD-EPI 2021: 106 ML/MIN/1.73M2 (ref 60–?)
FASTING STATUS PATIENT QL REPORTED: NO
GLUCOSE BLD-MCNC: 84 MG/DL (ref 70–99)
OSMOLALITY SERPL CALC.SUM OF ELEC: 281 MOSM/KG (ref 275–295)
POTASSIUM SERPL-SCNC: 3.2 MMOL/L (ref 3.5–5.1)
SODIUM SERPL-SCNC: 137 MMOL/L (ref 136–145)

## 2023-09-15 PROCEDURE — 80048 BASIC METABOLIC PNL TOTAL CA: CPT

## 2023-09-15 PROCEDURE — 36415 COLL VENOUS BLD VENIPUNCTURE: CPT

## 2023-09-19 ENCOUNTER — OFFICE VISIT (OUTPATIENT)
Facility: CLINIC | Age: 53
End: 2023-09-19

## 2023-09-19 VITALS
TEMPERATURE: 98 F | WEIGHT: 153 LBS | HEART RATE: 78 BPM | OXYGEN SATURATION: 98 % | BODY MASS INDEX: 25.49 KG/M2 | HEIGHT: 65 IN | SYSTOLIC BLOOD PRESSURE: 108 MMHG | DIASTOLIC BLOOD PRESSURE: 56 MMHG | RESPIRATION RATE: 20 BRPM

## 2023-09-19 DIAGNOSIS — Z12.31 BREAST CANCER SCREENING BY MAMMOGRAM: ICD-10-CM

## 2023-09-19 DIAGNOSIS — T84.59XD INFECTION OF PROSTHETIC KNEE JOINT, SUBSEQUENT ENCOUNTER: Primary | ICD-10-CM

## 2023-09-19 DIAGNOSIS — C34.92: ICD-10-CM

## 2023-09-19 DIAGNOSIS — Z96.659 INFECTION OF PROSTHETIC KNEE JOINT, SUBSEQUENT ENCOUNTER: Primary | ICD-10-CM

## 2023-09-19 DIAGNOSIS — R56.9 SEIZURES (HCC): ICD-10-CM

## 2023-09-19 DIAGNOSIS — Z12.11 COLON CANCER SCREENING: ICD-10-CM

## 2023-09-19 DIAGNOSIS — F12.90 MARIJUANA SMOKER: ICD-10-CM

## 2023-09-19 DIAGNOSIS — E87.6 HYPOKALEMIA: ICD-10-CM

## 2023-09-19 DIAGNOSIS — F17.200 SMOKER: ICD-10-CM

## 2023-09-19 DIAGNOSIS — F31.9 BIPOLAR 1 DISORDER (HCC): ICD-10-CM

## 2023-09-19 DIAGNOSIS — Z78.9 ALCOHOL USE: ICD-10-CM

## 2023-09-19 PROBLEM — F10.90 ALCOHOL USE: Status: ACTIVE | Noted: 2023-09-19

## 2023-09-19 PROCEDURE — 3078F DIAST BP <80 MM HG: CPT | Performed by: INTERNAL MEDICINE

## 2023-09-19 PROCEDURE — 99215 OFFICE O/P EST HI 40 MIN: CPT | Performed by: INTERNAL MEDICINE

## 2023-09-19 PROCEDURE — 90471 IMMUNIZATION ADMIN: CPT | Performed by: INTERNAL MEDICINE

## 2023-09-19 PROCEDURE — 3008F BODY MASS INDEX DOCD: CPT | Performed by: INTERNAL MEDICINE

## 2023-09-19 PROCEDURE — 90750 HZV VACC RECOMBINANT IM: CPT | Performed by: INTERNAL MEDICINE

## 2023-09-19 PROCEDURE — 3074F SYST BP LT 130 MM HG: CPT | Performed by: INTERNAL MEDICINE

## 2023-09-19 RX ORDER — MELOXICAM 15 MG/1
15 TABLET ORAL EVERY MORNING
COMMUNITY
Start: 2023-08-22

## 2023-09-19 RX ORDER — PANTOPRAZOLE SODIUM 40 MG/1
TABLET, DELAYED RELEASE ORAL
COMMUNITY
Start: 2023-08-22

## 2023-09-19 RX ORDER — POTASSIUM CHLORIDE 750 MG/1
10 TABLET, EXTENDED RELEASE ORAL DAILY
Qty: 14 TABLET | Refills: 0 | Status: SHIPPED | OUTPATIENT
Start: 2023-09-19

## 2023-09-19 NOTE — PATIENT INSTRUCTIONS
Dried fruits (raisins, apricots)  Beans, lentils. Potatoes. Winter squash (acorn, butternut)  Spinach, broccoli. Beet greens. Avocado. Bananas. Do a non-fasting blood test in 2 weeks.

## 2023-09-26 ENCOUNTER — LAB REQUISITION (OUTPATIENT)
Dept: LAB | Facility: HOSPITAL | Age: 53
End: 2023-09-26
Payer: COMMERCIAL

## 2023-09-26 DIAGNOSIS — T84.54XA INFECTION AND INFLAMMATORY REACTION DUE TO INTERNAL LEFT KNEE PROSTHESIS, INITIAL ENCOUNTER (HCC): ICD-10-CM

## 2023-09-26 LAB
BASOPHILS # BLD AUTO: 0.07 X10(3) UL (ref 0–0.2)
BASOPHILS NFR BLD AUTO: 0.7 %
DEPRECATED RDW RBC AUTO: 59.1 FL (ref 35.1–46.3)
EOSINOPHIL # BLD AUTO: 0.1 X10(3) UL (ref 0–0.7)
EOSINOPHIL NFR BLD AUTO: 1 %
ERYTHROCYTE [DISTWIDTH] IN BLOOD BY AUTOMATED COUNT: 17.4 % (ref 11–15)
ERYTHROCYTE [SEDIMENTATION RATE] IN BLOOD: 118 MM/HR
HCT VFR BLD AUTO: 29.1 %
HGB BLD-MCNC: 9.4 G/DL
IMM GRANULOCYTES # BLD AUTO: 0.08 X10(3) UL (ref 0–1)
IMM GRANULOCYTES NFR BLD: 0.8 %
LYMPHOCYTES # BLD AUTO: 2.34 X10(3) UL (ref 1–4)
LYMPHOCYTES NFR BLD AUTO: 22.6 %
MCH RBC QN AUTO: 31 PG (ref 26–34)
MCHC RBC AUTO-ENTMCNC: 32.3 G/DL (ref 31–37)
MCV RBC AUTO: 96 FL
MONOCYTES # BLD AUTO: 0.85 X10(3) UL (ref 0.1–1)
MONOCYTES NFR BLD AUTO: 8.2 %
NEUTROPHILS # BLD AUTO: 6.9 X10 (3) UL (ref 1.5–7.7)
NEUTROPHILS # BLD AUTO: 6.9 X10(3) UL (ref 1.5–7.7)
NEUTROPHILS NFR BLD AUTO: 66.7 %
PLATELET # BLD AUTO: 406 10(3)UL (ref 150–450)
RBC # BLD AUTO: 3.03 X10(6)UL
WBC # BLD AUTO: 10.3 X10(3) UL (ref 4–11)

## 2023-09-26 PROCEDURE — 85025 COMPLETE CBC W/AUTO DIFF WBC: CPT | Performed by: INTERNAL MEDICINE

## 2023-09-26 PROCEDURE — 85652 RBC SED RATE AUTOMATED: CPT | Performed by: INTERNAL MEDICINE

## 2023-10-01 RX ORDER — LEVOTHYROXINE SODIUM 0.15 MG/1
150 TABLET ORAL
Qty: 90 TABLET | Refills: 0 | Status: SHIPPED | OUTPATIENT
Start: 2023-10-01

## 2023-10-02 ENCOUNTER — LAB REQUISITION (OUTPATIENT)
Dept: LAB | Facility: HOSPITAL | Age: 53
End: 2023-10-02
Payer: COMMERCIAL

## 2023-10-02 DIAGNOSIS — T84.54XA INFECTION AND INFLAMMATORY REACTION DUE TO INTERNAL LEFT KNEE PROSTHESIS, INITIAL ENCOUNTER (HCC): ICD-10-CM

## 2023-10-02 LAB
ALBUMIN SERPL-MCNC: 2.5 G/DL (ref 3.4–5)
ALBUMIN/GLOB SERPL: 0.4 {RATIO} (ref 1–2)
ALP LIVER SERPL-CCNC: 174 U/L
ALT SERPL-CCNC: 12 U/L
ANION GAP SERPL CALC-SCNC: 9 MMOL/L (ref 0–18)
AST SERPL-CCNC: 15 U/L (ref 15–37)
BASOPHILS # BLD AUTO: 0.05 X10(3) UL (ref 0–0.2)
BASOPHILS NFR BLD AUTO: 0.4 %
BILIRUB SERPL-MCNC: 0.2 MG/DL (ref 0.1–2)
BUN BLD-MCNC: 15 MG/DL (ref 7–18)
BUN/CREAT SERPL: 20.5 (ref 10–20)
CALCIUM BLD-MCNC: 8.5 MG/DL (ref 8.5–10.1)
CHLORIDE SERPL-SCNC: 108 MMOL/L (ref 98–112)
CK SERPL-CCNC: 28 U/L
CO2 SERPL-SCNC: 18 MMOL/L (ref 21–32)
CREAT BLD-MCNC: 0.73 MG/DL
CRP SERPL-MCNC: 0.57 MG/DL (ref ?–0.3)
DEPRECATED RDW RBC AUTO: 62.7 FL (ref 35.1–46.3)
EGFRCR SERPLBLD CKD-EPI 2021: 98 ML/MIN/1.73M2 (ref 60–?)
EOSINOPHIL # BLD AUTO: 0.06 X10(3) UL (ref 0–0.7)
EOSINOPHIL NFR BLD AUTO: 0.5 %
ERYTHROCYTE [DISTWIDTH] IN BLOOD BY AUTOMATED COUNT: 18.2 % (ref 11–15)
ERYTHROCYTE [SEDIMENTATION RATE] IN BLOOD: >130 MM/HR
GLOBULIN PLAS-MCNC: 5.6 G/DL (ref 2.8–4.4)
GLUCOSE BLD-MCNC: 105 MG/DL (ref 70–99)
HCT VFR BLD AUTO: 29.6 %
HGB BLD-MCNC: 9.9 G/DL
IMM GRANULOCYTES # BLD AUTO: 0.08 X10(3) UL (ref 0–1)
IMM GRANULOCYTES NFR BLD: 0.7 %
LYMPHOCYTES # BLD AUTO: 1.74 X10(3) UL (ref 1–4)
LYMPHOCYTES NFR BLD AUTO: 15.4 %
MCH RBC QN AUTO: 32 PG (ref 26–34)
MCHC RBC AUTO-ENTMCNC: 33.4 G/DL (ref 31–37)
MCV RBC AUTO: 95.8 FL
MONOCYTES # BLD AUTO: 1.08 X10(3) UL (ref 0.1–1)
MONOCYTES NFR BLD AUTO: 9.6 %
NEUTROPHILS # BLD AUTO: 8.26 X10 (3) UL (ref 1.5–7.7)
NEUTROPHILS # BLD AUTO: 8.26 X10(3) UL (ref 1.5–7.7)
NEUTROPHILS NFR BLD AUTO: 73.4 %
OSMOLALITY SERPL CALC.SUM OF ELEC: 281 MOSM/KG (ref 275–295)
PLATELET # BLD AUTO: 342 10(3)UL (ref 150–450)
POTASSIUM SERPL-SCNC: 4.1 MMOL/L (ref 3.5–5.1)
PROT SERPL-MCNC: 8.1 G/DL (ref 6.4–8.2)
RBC # BLD AUTO: 3.09 X10(6)UL
SODIUM SERPL-SCNC: 135 MMOL/L (ref 136–145)
WBC # BLD AUTO: 11.3 X10(3) UL (ref 4–11)

## 2023-10-02 PROCEDURE — 86140 C-REACTIVE PROTEIN: CPT | Performed by: INTERNAL MEDICINE

## 2023-10-02 PROCEDURE — 85652 RBC SED RATE AUTOMATED: CPT | Performed by: INTERNAL MEDICINE

## 2023-10-02 PROCEDURE — 85025 COMPLETE CBC W/AUTO DIFF WBC: CPT | Performed by: INTERNAL MEDICINE

## 2023-10-02 PROCEDURE — 82550 ASSAY OF CK (CPK): CPT | Performed by: INTERNAL MEDICINE

## 2023-10-02 PROCEDURE — 80053 COMPREHEN METABOLIC PANEL: CPT | Performed by: INTERNAL MEDICINE

## 2023-10-11 ENCOUNTER — LAB REQUISITION (OUTPATIENT)
Dept: LAB | Facility: HOSPITAL | Age: 53
End: 2023-10-11
Payer: COMMERCIAL

## 2023-10-11 DIAGNOSIS — T84.54XA INFECTION AND INFLAMMATORY REACTION DUE TO INTERNAL LEFT KNEE PROSTHESIS, INITIAL ENCOUNTER (HCC): ICD-10-CM

## 2023-10-11 LAB
ALBUMIN SERPL-MCNC: 2.8 G/DL (ref 3.4–5)
ALBUMIN/GLOB SERPL: 0.5 {RATIO} (ref 1–2)
ALP LIVER SERPL-CCNC: 161 U/L
ALT SERPL-CCNC: 15 U/L
ANION GAP SERPL CALC-SCNC: 12 MMOL/L (ref 0–18)
AST SERPL-CCNC: 15 U/L (ref 15–37)
BASOPHILS # BLD AUTO: 0.05 X10(3) UL (ref 0–0.2)
BASOPHILS NFR BLD AUTO: 0.5 %
BILIRUB SERPL-MCNC: 0.2 MG/DL (ref 0.1–2)
BUN BLD-MCNC: 17 MG/DL (ref 7–18)
BUN/CREAT SERPL: 13.5 (ref 10–20)
CALCIUM BLD-MCNC: 8.8 MG/DL (ref 8.5–10.1)
CHLORIDE SERPL-SCNC: 114 MMOL/L (ref 98–112)
CK SERPL-CCNC: 49 U/L
CO2 SERPL-SCNC: 14 MMOL/L (ref 21–32)
CREAT BLD-MCNC: 1.26 MG/DL
CRP SERPL-MCNC: 1.25 MG/DL (ref ?–0.3)
DEPRECATED RDW RBC AUTO: 66 FL (ref 35.1–46.3)
EGFRCR SERPLBLD CKD-EPI 2021: 51 ML/MIN/1.73M2 (ref 60–?)
EOSINOPHIL # BLD AUTO: 0.07 X10(3) UL (ref 0–0.7)
EOSINOPHIL NFR BLD AUTO: 0.7 %
ERYTHROCYTE [DISTWIDTH] IN BLOOD BY AUTOMATED COUNT: 18.6 % (ref 11–15)
ERYTHROCYTE [SEDIMENTATION RATE] IN BLOOD: 130 MM/HR
GLOBULIN PLAS-MCNC: 5.3 G/DL (ref 2.8–4.4)
GLUCOSE BLD-MCNC: 125 MG/DL (ref 70–99)
HCT VFR BLD AUTO: 28.9 %
HGB BLD-MCNC: 9.1 G/DL
IMM GRANULOCYTES # BLD AUTO: 0.06 X10(3) UL (ref 0–1)
IMM GRANULOCYTES NFR BLD: 0.6 %
LYMPHOCYTES # BLD AUTO: 1.4 X10(3) UL (ref 1–4)
LYMPHOCYTES NFR BLD AUTO: 13.7 %
MCH RBC QN AUTO: 31.7 PG (ref 26–34)
MCHC RBC AUTO-ENTMCNC: 31.5 G/DL (ref 31–37)
MCV RBC AUTO: 100.7 FL
MONOCYTES # BLD AUTO: 0.82 X10(3) UL (ref 0.1–1)
MONOCYTES NFR BLD AUTO: 8 %
NEUTROPHILS # BLD AUTO: 7.79 X10 (3) UL (ref 1.5–7.7)
NEUTROPHILS # BLD AUTO: 7.79 X10(3) UL (ref 1.5–7.7)
NEUTROPHILS NFR BLD AUTO: 76.5 %
OSMOLALITY SERPL CALC.SUM OF ELEC: 293 MOSM/KG (ref 275–295)
PAPPENHEIMER BOD BLD QL SMEAR: PRESENT
PLATELET # BLD AUTO: 387 10(3)UL (ref 150–450)
PLATELET MORPHOLOGY: NORMAL
POTASSIUM SERPL-SCNC: 3.6 MMOL/L (ref 3.5–5.1)
PROT SERPL-MCNC: 8.1 G/DL (ref 6.4–8.2)
RBC # BLD AUTO: 2.87 X10(6)UL
SODIUM SERPL-SCNC: 140 MMOL/L (ref 136–145)
WBC # BLD AUTO: 10.2 X10(3) UL (ref 4–11)

## 2023-10-11 PROCEDURE — 85652 RBC SED RATE AUTOMATED: CPT | Performed by: INTERNAL MEDICINE

## 2023-10-11 PROCEDURE — 86140 C-REACTIVE PROTEIN: CPT | Performed by: INTERNAL MEDICINE

## 2023-10-11 PROCEDURE — 82550 ASSAY OF CK (CPK): CPT | Performed by: INTERNAL MEDICINE

## 2023-10-11 PROCEDURE — 85025 COMPLETE CBC W/AUTO DIFF WBC: CPT | Performed by: INTERNAL MEDICINE

## 2023-10-11 PROCEDURE — 80053 COMPREHEN METABOLIC PANEL: CPT | Performed by: INTERNAL MEDICINE

## 2023-10-16 ENCOUNTER — LAB REQUISITION (OUTPATIENT)
Dept: LAB | Facility: HOSPITAL | Age: 53
End: 2023-10-16
Payer: COMMERCIAL

## 2023-10-16 DIAGNOSIS — T84.54XA INFECTION AND INFLAMMATORY REACTION DUE TO INTERNAL LEFT KNEE PROSTHESIS, INITIAL ENCOUNTER (HCC): ICD-10-CM

## 2023-10-16 LAB
ANION GAP SERPL CALC-SCNC: 9 MMOL/L (ref 0–18)
BUN BLD-MCNC: 10 MG/DL (ref 7–18)
BUN/CREAT SERPL: 9.8 (ref 10–20)
CALCIUM BLD-MCNC: 8.9 MG/DL (ref 8.5–10.1)
CHLORIDE SERPL-SCNC: 113 MMOL/L (ref 98–112)
CO2 SERPL-SCNC: 18 MMOL/L (ref 21–32)
CREAT BLD-MCNC: 1.02 MG/DL
EGFRCR SERPLBLD CKD-EPI 2021: 65 ML/MIN/1.73M2 (ref 60–?)
GLUCOSE BLD-MCNC: 110 MG/DL (ref 70–99)
OSMOLALITY SERPL CALC.SUM OF ELEC: 290 MOSM/KG (ref 275–295)
POTASSIUM SERPL-SCNC: 3.4 MMOL/L (ref 3.5–5.1)
SODIUM SERPL-SCNC: 140 MMOL/L (ref 136–145)

## 2023-10-16 PROCEDURE — 80048 BASIC METABOLIC PNL TOTAL CA: CPT | Performed by: INTERNAL MEDICINE

## 2023-10-25 RX ORDER — APIXABAN 2.5 MG/1
2.5 TABLET, FILM COATED ORAL 2 TIMES DAILY
Qty: 60 TABLET | Refills: 0 | Status: SHIPPED | OUTPATIENT
Start: 2023-10-25

## 2023-11-06 NOTE — TELEPHONE ENCOUNTER
Generated \"no response\" letter, placed in outgoing mail. Electrodesiccation And Curettage Text: The wound bed was treated with electrodesiccation and curettage after the biopsy was performed.

## 2023-11-16 ENCOUNTER — NURSE ONLY (OUTPATIENT)
Dept: HEMATOLOGY/ONCOLOGY | Facility: HOSPITAL | Age: 53
End: 2023-11-16
Attending: INTERNAL MEDICINE
Payer: COMMERCIAL

## 2023-11-16 DIAGNOSIS — Z96.659 HISTORY OF TOTAL KNEE ARTHROPLASTY: Primary | ICD-10-CM

## 2023-11-16 LAB
CRP SERPL-MCNC: 1 MG/DL (ref ?–1)
ERYTHROCYTE [SEDIMENTATION RATE] IN BLOOD: >130 MM/HR

## 2023-11-16 PROCEDURE — 86140 C-REACTIVE PROTEIN: CPT

## 2023-11-16 PROCEDURE — 36591 DRAW BLOOD OFF VENOUS DEVICE: CPT

## 2023-11-16 PROCEDURE — 85652 RBC SED RATE AUTOMATED: CPT

## 2023-11-19 ENCOUNTER — TELEPHONE (OUTPATIENT)
Dept: FAMILY MEDICINE CLINIC | Facility: CLINIC | Age: 53
End: 2023-11-19

## 2023-11-19 DIAGNOSIS — R73.01 ELEVATED FASTING BLOOD SUGAR: Primary | ICD-10-CM

## 2023-11-19 NOTE — TELEPHONE ENCOUNTER
Rubina Rizo on 11/29/23 with Dr. Williams Vera @ [facility]    H&P- completed 11/21/23  Labs- sed rate (>130), CRP (1.00), RDW-SD (63.2), RDW (17.3), FBS (102), A1C (5.0 WNL), MRSA neg, Urine Culture neg, sodium (135), CO2 (18), BUN (7), BUN/Creat ratio (8.3), ALT (7), Alk Phos (174), total bilirubin (<0.8), PT (15.9), all other labs WNL  EKG- done 08/18/23 NSR, possible left atrial enlargement  X-ray- done 08/18/23 WNL    Last seen by John Salinas 08/18/23- Left total knee resection with abx spacer on 08/30/23    Per JR hold Eliquis 48 hours prior to surgery

## 2023-11-21 ENCOUNTER — OFFICE VISIT (OUTPATIENT)
Dept: FAMILY MEDICINE CLINIC | Facility: CLINIC | Age: 53
End: 2023-11-21
Payer: COMMERCIAL

## 2023-11-21 ENCOUNTER — LAB ENCOUNTER (OUTPATIENT)
Dept: LAB | Facility: HOSPITAL | Age: 53
End: 2023-11-21
Attending: FAMILY MEDICINE
Payer: COMMERCIAL

## 2023-11-21 VITALS
DIASTOLIC BLOOD PRESSURE: 80 MMHG | SYSTOLIC BLOOD PRESSURE: 116 MMHG | OXYGEN SATURATION: 99 % | TEMPERATURE: 97 F | HEART RATE: 80 BPM | WEIGHT: 165 LBS | BODY MASS INDEX: 27.49 KG/M2 | HEIGHT: 65 IN | RESPIRATION RATE: 16 BRPM

## 2023-11-21 DIAGNOSIS — Z01.818 PREOPERATIVE EXAMINATION, UNSPECIFIED: ICD-10-CM

## 2023-11-21 DIAGNOSIS — F39 EPISODIC MOOD DISORDER (HCC): ICD-10-CM

## 2023-11-21 DIAGNOSIS — R73.01 ELEVATED FASTING BLOOD SUGAR: ICD-10-CM

## 2023-11-21 DIAGNOSIS — D50.8 IRON DEFICIENCY ANEMIA SECONDARY TO INADEQUATE DIETARY IRON INTAKE: ICD-10-CM

## 2023-11-21 DIAGNOSIS — C34.92: ICD-10-CM

## 2023-11-21 DIAGNOSIS — Z01.812 PRE-OPERATIVE LABORATORY EXAMINATION: ICD-10-CM

## 2023-11-21 DIAGNOSIS — C34.90 PRIMARY MALIGNANT NEOPLASM OF LUNG METASTATIC TO OTHER SITE, UNSPECIFIED LATERALITY (HCC): ICD-10-CM

## 2023-11-21 DIAGNOSIS — T84.54XS INFECTION ASSOCIATED WITH INTERNAL LEFT KNEE PROSTHESIS, SEQUELA: Primary | ICD-10-CM

## 2023-11-21 DIAGNOSIS — E03.9 ACQUIRED HYPOTHYROIDISM: ICD-10-CM

## 2023-11-21 DIAGNOSIS — R56.9 SEIZURES (HCC): ICD-10-CM

## 2023-11-21 DIAGNOSIS — C34.90 MALIGNANT NEOPLASM OF LUNG, UNSPECIFIED LATERALITY, UNSPECIFIED PART OF LUNG (HCC): ICD-10-CM

## 2023-11-21 DIAGNOSIS — F19.11 HISTORY OF SUBSTANCE ABUSE (HCC): ICD-10-CM

## 2023-11-21 DIAGNOSIS — F31.9 BIPOLAR 1 DISORDER (HCC): ICD-10-CM

## 2023-11-21 DIAGNOSIS — F60.3 BORDERLINE PERSONALITY DISORDER (HCC): ICD-10-CM

## 2023-11-21 DIAGNOSIS — F17.200 TOBACCO DEPENDENCE: ICD-10-CM

## 2023-11-21 PROBLEM — Z87.891 HISTORY OF TOBACCO USE: Status: ACTIVE | Noted: 2023-11-21

## 2023-11-21 LAB
ALBUMIN SERPL-MCNC: 3.7 G/DL (ref 3.2–4.8)
ALBUMIN/GLOB SERPL: 0.8 {RATIO} (ref 1–2)
ALP LIVER SERPL-CCNC: 174 U/L
ALT SERPL-CCNC: 7 U/L
ANION GAP SERPL CALC-SCNC: 8 MMOL/L (ref 0–18)
ANTIBODY SCREEN: NEGATIVE
APTT PPP: 29.2 SECONDS (ref 23.3–35.6)
AST SERPL-CCNC: 17 U/L (ref ?–34)
BASOPHILS # BLD AUTO: 0.03 X10(3) UL (ref 0–0.2)
BASOPHILS NFR BLD AUTO: 0.5 %
BILIRUB SERPL-MCNC: <0.2 MG/DL (ref 0.3–1.2)
BILIRUB UR QL: NEGATIVE
BUN BLD-MCNC: 7 MG/DL (ref 9–23)
BUN/CREAT SERPL: 8.3 (ref 10–20)
CALCIUM BLD-MCNC: 8.9 MG/DL (ref 8.7–10.4)
CHLORIDE SERPL-SCNC: 109 MMOL/L (ref 98–112)
CO2 SERPL-SCNC: 18 MMOL/L (ref 21–32)
COLOR UR: YELLOW
CREAT BLD-MCNC: 0.84 MG/DL
DEPRECATED RDW RBC AUTO: 63.2 FL (ref 35.1–46.3)
EGFRCR SERPLBLD CKD-EPI 2021: 83 ML/MIN/1.73M2 (ref 60–?)
EOSINOPHIL # BLD AUTO: 0.01 X10(3) UL (ref 0–0.7)
EOSINOPHIL NFR BLD AUTO: 0.2 %
ERYTHROCYTE [DISTWIDTH] IN BLOOD BY AUTOMATED COUNT: 17.3 % (ref 11–15)
EST. AVERAGE GLUCOSE BLD GHB EST-MCNC: 97 MG/DL (ref 68–126)
FASTING STATUS PATIENT QL REPORTED: YES
GLOBULIN PLAS-MCNC: 4.4 G/DL (ref 2.8–4.4)
GLUCOSE BLD-MCNC: 102 MG/DL (ref 70–99)
GLUCOSE UR-MCNC: NORMAL MG/DL
HBA1C MFR BLD: 5 % (ref ?–5.7)
HCT VFR BLD AUTO: 37.2 %
HGB BLD-MCNC: 12.1 G/DL
HYALINE CASTS #/AREA URNS AUTO: PRESENT /LPF
IMM GRANULOCYTES # BLD AUTO: 0.03 X10(3) UL (ref 0–1)
IMM GRANULOCYTES NFR BLD: 0.5 %
INR BLD: 1.19 (ref 0.8–1.2)
KETONES UR-MCNC: NEGATIVE MG/DL
LEUKOCYTE ESTERASE UR QL STRIP.AUTO: 25
LYMPHOCYTES # BLD AUTO: 1.05 X10(3) UL (ref 1–4)
LYMPHOCYTES NFR BLD AUTO: 16.3 %
MCH RBC QN AUTO: 32.2 PG (ref 26–34)
MCHC RBC AUTO-ENTMCNC: 32.5 G/DL (ref 31–37)
MCV RBC AUTO: 98.9 FL
MONOCYTES # BLD AUTO: 0.72 X10(3) UL (ref 0.1–1)
MONOCYTES NFR BLD AUTO: 11.2 %
NEUTROPHILS # BLD AUTO: 4.61 X10 (3) UL (ref 1.5–7.7)
NEUTROPHILS # BLD AUTO: 4.61 X10(3) UL (ref 1.5–7.7)
NEUTROPHILS NFR BLD AUTO: 71.3 %
NITRITE UR QL STRIP.AUTO: NEGATIVE
OSMOLALITY SERPL CALC.SUM OF ELEC: 278 MOSM/KG (ref 275–295)
PH UR: 6 [PH] (ref 5–8)
PLATELET # BLD AUTO: 432 10(3)UL (ref 150–450)
POTASSIUM SERPL-SCNC: 4.2 MMOL/L (ref 3.5–5.1)
PROT SERPL-MCNC: 8.1 G/DL (ref 5.7–8.2)
PROT UR-MCNC: 30 MG/DL
PROTHROMBIN TIME: 15.9 SECONDS (ref 11.6–14.8)
RBC # BLD AUTO: 3.76 X10(6)UL
RH BLOOD TYPE: POSITIVE
SODIUM SERPL-SCNC: 135 MMOL/L (ref 136–145)
SP GR UR STRIP: 1.02 (ref 1–1.03)
UROBILINOGEN UR STRIP-ACNC: NORMAL
WBC # BLD AUTO: 6.5 X10(3) UL (ref 4–11)

## 2023-11-21 PROCEDURE — 3074F SYST BP LT 130 MM HG: CPT | Performed by: FAMILY MEDICINE

## 2023-11-21 PROCEDURE — 83036 HEMOGLOBIN GLYCOSYLATED A1C: CPT | Performed by: FAMILY MEDICINE

## 2023-11-21 PROCEDURE — 85610 PROTHROMBIN TIME: CPT | Performed by: FAMILY MEDICINE

## 2023-11-21 PROCEDURE — 86901 BLOOD TYPING SEROLOGIC RH(D): CPT | Performed by: FAMILY MEDICINE

## 2023-11-21 PROCEDURE — 85730 THROMBOPLASTIN TIME PARTIAL: CPT | Performed by: FAMILY MEDICINE

## 2023-11-21 PROCEDURE — 85025 COMPLETE CBC W/AUTO DIFF WBC: CPT | Performed by: FAMILY MEDICINE

## 2023-11-21 PROCEDURE — 99244 OFF/OP CNSLTJ NEW/EST MOD 40: CPT | Performed by: FAMILY MEDICINE

## 2023-11-21 PROCEDURE — 3079F DIAST BP 80-89 MM HG: CPT | Performed by: FAMILY MEDICINE

## 2023-11-21 PROCEDURE — 80053 COMPREHEN METABOLIC PANEL: CPT | Performed by: FAMILY MEDICINE

## 2023-11-21 PROCEDURE — 3008F BODY MASS INDEX DOCD: CPT | Performed by: FAMILY MEDICINE

## 2023-11-21 PROCEDURE — 81001 URINALYSIS AUTO W/SCOPE: CPT | Performed by: FAMILY MEDICINE

## 2023-11-21 PROCEDURE — 86850 RBC ANTIBODY SCREEN: CPT | Performed by: FAMILY MEDICINE

## 2023-11-21 PROCEDURE — 86900 BLOOD TYPING SEROLOGIC ABO: CPT | Performed by: FAMILY MEDICINE

## 2023-11-21 PROCEDURE — 87086 URINE CULTURE/COLONY COUNT: CPT | Performed by: FAMILY MEDICINE

## 2023-11-21 PROCEDURE — 87081 CULTURE SCREEN ONLY: CPT | Performed by: FAMILY MEDICINE

## 2023-11-22 NOTE — TELEPHONE ENCOUNTER
Spoke to patient, went over test results and let her know she is clear for surgery per Dr. Constanza Carbajal.

## 2023-11-22 NOTE — TELEPHONE ENCOUNTER
Dr. Carnes Citizen, please review:    Labs- sed rate (>130), CRP (1.00), RDW-SD (63.2), RDW (17.3), FBS (102), A1C (5.0 WNL), MRSA neg, Urine Culture neg, sodium (135), CO2 (18), BUN (7), BUN/Creat ratio (8.3), ALT (7), Alk Phos (174), total bilirubin (<0.8), PT (15.9), all other labs WNL  EKG- done 08/18/23 NSR, possible left atrial enlargement  X-ray- done 08/18/23 WNL    Last seen by Elaine Chung 08/18/23- Left total knee resection with abx spacer on 08/30/23    Per JR hold Eliquis 48 hours prior to surgery    OK for surgery?

## 2023-11-29 ENCOUNTER — HOSPITAL ENCOUNTER (INPATIENT)
Facility: HOSPITAL | Age: 53
LOS: 2 days | Discharge: HOME HEALTH | DRG: 468 | End: 2023-12-01
Attending: STUDENT IN AN ORGANIZED HEALTH CARE EDUCATION/TRAINING PROGRAM | Admitting: STUDENT IN AN ORGANIZED HEALTH CARE EDUCATION/TRAINING PROGRAM
Payer: COMMERCIAL

## 2023-11-29 ENCOUNTER — APPOINTMENT (OUTPATIENT)
Dept: GENERAL RADIOLOGY | Facility: HOSPITAL | Age: 53
DRG: 468 | End: 2023-11-29
Attending: STUDENT IN AN ORGANIZED HEALTH CARE EDUCATION/TRAINING PROGRAM
Payer: COMMERCIAL

## 2023-11-29 ENCOUNTER — ANESTHESIA (OUTPATIENT)
Dept: SURGERY | Facility: HOSPITAL | Age: 53
DRG: 468 | End: 2023-11-29
Payer: COMMERCIAL

## 2023-11-29 ENCOUNTER — ANESTHESIA EVENT (OUTPATIENT)
Dept: SURGERY | Facility: HOSPITAL | Age: 53
DRG: 468 | End: 2023-11-29
Payer: COMMERCIAL

## 2023-11-29 DIAGNOSIS — Z47.33: ICD-10-CM

## 2023-11-29 PROBLEM — Z01.818 PREOP TESTING: Status: ACTIVE | Noted: 2023-11-29

## 2023-11-29 LAB
BASOPHILS NFR SNV: 0 %
EOSINOPHIL NFR SNV: 0 %
LYMPHOCYTES NFR SNV: 19 %
MONOS+MACROS NFR SNV: 23 %
NEUTROPHILS NFR FLD: 58 %
RBC # FLD AUTO: ABNORMAL /CUMM (ref ?–1)
TOTAL CELLS COUNTED FLD: 100
TOTAL CELLS COUNTED SNV: 2174 /CUMM (ref 0–200)
WBC # SNV: 2174 /CUMM

## 2023-11-29 PROCEDURE — 73560 X-RAY EXAM OF KNEE 1 OR 2: CPT | Performed by: STUDENT IN AN ORGANIZED HEALTH CARE EDUCATION/TRAINING PROGRAM

## 2023-11-29 PROCEDURE — 89050 BODY FLUID CELL COUNT: CPT | Performed by: STUDENT IN AN ORGANIZED HEALTH CARE EDUCATION/TRAINING PROGRAM

## 2023-11-29 PROCEDURE — 88311 DECALCIFY TISSUE: CPT | Performed by: STUDENT IN AN ORGANIZED HEALTH CARE EDUCATION/TRAINING PROGRAM

## 2023-11-29 PROCEDURE — 3E0T3BZ INTRODUCTION OF ANESTHETIC AGENT INTO PERIPHERAL NERVES AND PLEXI, PERCUTANEOUS APPROACH: ICD-10-PCS | Performed by: ANESTHESIOLOGY

## 2023-11-29 PROCEDURE — 0SPD08Z REMOVAL OF SPACER FROM LEFT KNEE JOINT, OPEN APPROACH: ICD-10-PCS | Performed by: STUDENT IN AN ORGANIZED HEALTH CARE EDUCATION/TRAINING PROGRAM

## 2023-11-29 PROCEDURE — 88300 SURGICAL PATH GROSS: CPT | Performed by: STUDENT IN AN ORGANIZED HEALTH CARE EDUCATION/TRAINING PROGRAM

## 2023-11-29 PROCEDURE — 89051 BODY FLUID CELL COUNT: CPT | Performed by: STUDENT IN AN ORGANIZED HEALTH CARE EDUCATION/TRAINING PROGRAM

## 2023-11-29 PROCEDURE — 88305 TISSUE EXAM BY PATHOLOGIST: CPT | Performed by: STUDENT IN AN ORGANIZED HEALTH CARE EDUCATION/TRAINING PROGRAM

## 2023-11-29 PROCEDURE — 0SRD0J9 REPLACEMENT OF LEFT KNEE JOINT WITH SYNTHETIC SUBSTITUTE, CEMENTED, OPEN APPROACH: ICD-10-PCS | Performed by: STUDENT IN AN ORGANIZED HEALTH CARE EDUCATION/TRAINING PROGRAM

## 2023-11-29 PROCEDURE — 88331 PATH CONSLTJ SURG 1 BLK 1SPC: CPT | Performed by: STUDENT IN AN ORGANIZED HEALTH CARE EDUCATION/TRAINING PROGRAM

## 2023-11-29 PROCEDURE — 87070 CULTURE OTHR SPECIMN AEROBIC: CPT | Performed by: STUDENT IN AN ORGANIZED HEALTH CARE EDUCATION/TRAINING PROGRAM

## 2023-11-29 PROCEDURE — 87176 TISSUE HOMOGENIZATION CULTR: CPT | Performed by: STUDENT IN AN ORGANIZED HEALTH CARE EDUCATION/TRAINING PROGRAM

## 2023-11-29 PROCEDURE — 87205 SMEAR GRAM STAIN: CPT | Performed by: STUDENT IN AN ORGANIZED HEALTH CARE EDUCATION/TRAINING PROGRAM

## 2023-11-29 PROCEDURE — 87075 CULTR BACTERIA EXCEPT BLOOD: CPT | Performed by: STUDENT IN AN ORGANIZED HEALTH CARE EDUCATION/TRAINING PROGRAM

## 2023-11-29 RX ORDER — OXYCODONE HYDROCHLORIDE 5 MG/1
10 TABLET ORAL EVERY 4 HOURS PRN
Status: DISCONTINUED | OUTPATIENT
Start: 2023-11-29 | End: 2023-12-01

## 2023-11-29 RX ORDER — DOCUSATE SODIUM 100 MG/1
100 CAPSULE, LIQUID FILLED ORAL 2 TIMES DAILY
Status: DISCONTINUED | OUTPATIENT
Start: 2023-11-29 | End: 2023-12-01

## 2023-11-29 RX ORDER — LIDOCAINE HYDROCHLORIDE 10 MG/ML
INJECTION, SOLUTION EPIDURAL; INFILTRATION; INTRACAUDAL; PERINEURAL AS NEEDED
Status: DISCONTINUED | OUTPATIENT
Start: 2023-11-29 | End: 2023-11-29 | Stop reason: SURG

## 2023-11-29 RX ORDER — BISACODYL 10 MG
10 SUPPOSITORY, RECTAL RECTAL
Status: DISCONTINUED | OUTPATIENT
Start: 2023-11-29 | End: 2023-12-01

## 2023-11-29 RX ORDER — TRANEXAMIC ACID 10 MG/ML
INJECTION, SOLUTION INTRAVENOUS AS NEEDED
Status: DISCONTINUED | OUTPATIENT
Start: 2023-11-29 | End: 2023-11-29 | Stop reason: SURG

## 2023-11-29 RX ORDER — SERTRALINE HYDROCHLORIDE 100 MG/1
200 TABLET, FILM COATED ORAL EVERY MORNING
Status: DISCONTINUED | OUTPATIENT
Start: 2023-11-30 | End: 2023-12-01

## 2023-11-29 RX ORDER — ONDANSETRON 2 MG/ML
4 INJECTION INTRAMUSCULAR; INTRAVENOUS EVERY 6 HOURS PRN
Status: DISCONTINUED | OUTPATIENT
Start: 2023-11-29 | End: 2023-12-01

## 2023-11-29 RX ORDER — ONDANSETRON 2 MG/ML
4 INJECTION INTRAMUSCULAR; INTRAVENOUS EVERY 6 HOURS PRN
Status: DISCONTINUED | OUTPATIENT
Start: 2023-11-29 | End: 2023-11-29 | Stop reason: HOSPADM

## 2023-11-29 RX ORDER — LIDOCAINE HYDROCHLORIDE 10 MG/ML
INJECTION, SOLUTION INFILTRATION; PERINEURAL
Status: COMPLETED | OUTPATIENT
Start: 2023-11-29 | End: 2023-11-29

## 2023-11-29 RX ORDER — SODIUM CHLORIDE 9 MG/ML
INJECTION, SOLUTION INTRAVENOUS CONTINUOUS
Status: DISCONTINUED | OUTPATIENT
Start: 2023-11-29 | End: 2023-12-01

## 2023-11-29 RX ORDER — BUPIVACAINE HYDROCHLORIDE 7.5 MG/ML
INJECTION, SOLUTION INTRASPINAL
Status: COMPLETED | OUTPATIENT
Start: 2023-11-29 | End: 2023-11-29

## 2023-11-29 RX ORDER — SENNOSIDES 8.6 MG
17.2 TABLET ORAL NIGHTLY
Status: DISCONTINUED | OUTPATIENT
Start: 2023-11-29 | End: 2023-12-01

## 2023-11-29 RX ORDER — SODIUM CHLORIDE, SODIUM LACTATE, POTASSIUM CHLORIDE, CALCIUM CHLORIDE 600; 310; 30; 20 MG/100ML; MG/100ML; MG/100ML; MG/100ML
INJECTION, SOLUTION INTRAVENOUS CONTINUOUS
Status: DISCONTINUED | OUTPATIENT
Start: 2023-11-29 | End: 2023-12-01

## 2023-11-29 RX ORDER — ACETAMINOPHEN 500 MG
1000 TABLET ORAL ONCE
Status: DISCONTINUED | OUTPATIENT
Start: 2023-11-29 | End: 2023-11-29

## 2023-11-29 RX ORDER — ACETAMINOPHEN 500 MG
1000 TABLET ORAL ONCE
Status: COMPLETED | OUTPATIENT
Start: 2023-11-29 | End: 2023-11-29

## 2023-11-29 RX ORDER — PROCHLORPERAZINE EDISYLATE 5 MG/ML
5 INJECTION INTRAMUSCULAR; INTRAVENOUS EVERY 8 HOURS PRN
Status: DISCONTINUED | OUTPATIENT
Start: 2023-11-29 | End: 2023-11-29 | Stop reason: HOSPADM

## 2023-11-29 RX ORDER — VANCOMYCIN HYDROCHLORIDE 1 G/20ML
INJECTION, POWDER, LYOPHILIZED, FOR SOLUTION INTRAVENOUS AS NEEDED
Status: DISCONTINUED | OUTPATIENT
Start: 2023-11-29 | End: 2023-11-29 | Stop reason: HOSPADM

## 2023-11-29 RX ORDER — HYDROMORPHONE HYDROCHLORIDE 1 MG/ML
0.8 INJECTION, SOLUTION INTRAMUSCULAR; INTRAVENOUS; SUBCUTANEOUS EVERY 2 HOUR PRN
Status: DISCONTINUED | OUTPATIENT
Start: 2023-11-29 | End: 2023-12-01

## 2023-11-29 RX ORDER — MORPHINE SULFATE 4 MG/ML
4 INJECTION, SOLUTION INTRAMUSCULAR; INTRAVENOUS EVERY 10 MIN PRN
Status: DISCONTINUED | OUTPATIENT
Start: 2023-11-29 | End: 2023-11-29 | Stop reason: HOSPADM

## 2023-11-29 RX ORDER — LEVOTHYROXINE SODIUM 0.15 MG/1
150 TABLET ORAL
Status: DISCONTINUED | OUTPATIENT
Start: 2023-11-30 | End: 2023-12-01

## 2023-11-29 RX ORDER — OXYCODONE HYDROCHLORIDE 5 MG/1
5 TABLET ORAL EVERY 4 HOURS PRN
Status: DISCONTINUED | OUTPATIENT
Start: 2023-11-29 | End: 2023-12-01

## 2023-11-29 RX ORDER — DIPHENHYDRAMINE HCL 25 MG
25 CAPSULE ORAL EVERY 4 HOURS PRN
Status: DISCONTINUED | OUTPATIENT
Start: 2023-11-29 | End: 2023-12-01

## 2023-11-29 RX ORDER — PROCHLORPERAZINE EDISYLATE 5 MG/ML
5 INJECTION INTRAMUSCULAR; INTRAVENOUS EVERY 8 HOURS PRN
Status: DISCONTINUED | OUTPATIENT
Start: 2023-11-29 | End: 2023-12-01

## 2023-11-29 RX ORDER — NALOXONE HYDROCHLORIDE 0.4 MG/ML
0.08 INJECTION, SOLUTION INTRAMUSCULAR; INTRAVENOUS; SUBCUTANEOUS AS NEEDED
Status: DISCONTINUED | OUTPATIENT
Start: 2023-11-29 | End: 2023-11-29 | Stop reason: HOSPADM

## 2023-11-29 RX ORDER — DEXAMETHASONE SODIUM PHOSPHATE 10 MG/ML
INJECTION, SOLUTION INTRAMUSCULAR; INTRAVENOUS
Status: COMPLETED | OUTPATIENT
Start: 2023-11-29 | End: 2023-11-29

## 2023-11-29 RX ORDER — CELECOXIB 200 MG/1
400 CAPSULE ORAL ONCE
Status: COMPLETED | OUTPATIENT
Start: 2023-11-29 | End: 2023-11-29

## 2023-11-29 RX ORDER — LEVETIRACETAM 500 MG/1
1500 TABLET ORAL 2 TIMES DAILY
Status: DISCONTINUED | OUTPATIENT
Start: 2023-11-29 | End: 2023-12-01

## 2023-11-29 RX ORDER — ENEMA 19; 7 G/133ML; G/133ML
1 ENEMA RECTAL ONCE AS NEEDED
Status: DISCONTINUED | OUTPATIENT
Start: 2023-11-29 | End: 2023-12-01

## 2023-11-29 RX ORDER — HYDROMORPHONE HYDROCHLORIDE 1 MG/ML
0.6 INJECTION, SOLUTION INTRAMUSCULAR; INTRAVENOUS; SUBCUTANEOUS EVERY 5 MIN PRN
Status: DISCONTINUED | OUTPATIENT
Start: 2023-11-29 | End: 2023-11-29 | Stop reason: HOSPADM

## 2023-11-29 RX ORDER — CEFAZOLIN SODIUM/WATER 2 G/20 ML
2 SYRINGE (ML) INTRAVENOUS EVERY 8 HOURS
Qty: 120 ML | Refills: 0 | Status: DISCONTINUED | OUTPATIENT
Start: 2023-11-30 | End: 2023-11-30

## 2023-11-29 RX ORDER — TOBRAMYCIN 1.2 G/30ML
INJECTION, POWDER, LYOPHILIZED, FOR SOLUTION INTRAVENOUS AS NEEDED
Status: DISCONTINUED | OUTPATIENT
Start: 2023-11-29 | End: 2023-11-29 | Stop reason: HOSPADM

## 2023-11-29 RX ORDER — HYDROMORPHONE HYDROCHLORIDE 1 MG/ML
0.4 INJECTION, SOLUTION INTRAMUSCULAR; INTRAVENOUS; SUBCUTANEOUS EVERY 5 MIN PRN
Status: DISCONTINUED | OUTPATIENT
Start: 2023-11-29 | End: 2023-11-29 | Stop reason: HOSPADM

## 2023-11-29 RX ORDER — DIPHENHYDRAMINE HYDROCHLORIDE 50 MG/ML
25 INJECTION INTRAMUSCULAR; INTRAVENOUS ONCE AS NEEDED
Status: ACTIVE | OUTPATIENT
Start: 2023-11-29 | End: 2023-11-29

## 2023-11-29 RX ORDER — ACETAMINOPHEN 500 MG
1000 TABLET ORAL EVERY 6 HOURS
Status: DISCONTINUED | OUTPATIENT
Start: 2023-11-29 | End: 2023-12-01

## 2023-11-29 RX ORDER — DIPHENHYDRAMINE HYDROCHLORIDE 50 MG/ML
12.5 INJECTION INTRAMUSCULAR; INTRAVENOUS EVERY 4 HOURS PRN
Status: DISCONTINUED | OUTPATIENT
Start: 2023-11-29 | End: 2023-12-01

## 2023-11-29 RX ORDER — CEFAZOLIN SODIUM/WATER 2 G/20 ML
SYRINGE (ML) INTRAVENOUS AS NEEDED
Status: DISCONTINUED | OUTPATIENT
Start: 2023-11-29 | End: 2023-11-29 | Stop reason: SURG

## 2023-11-29 RX ORDER — MIDAZOLAM HYDROCHLORIDE 1 MG/ML
INJECTION INTRAMUSCULAR; INTRAVENOUS
Status: COMPLETED | OUTPATIENT
Start: 2023-11-29 | End: 2023-11-29

## 2023-11-29 RX ORDER — GABAPENTIN 400 MG/1
400 CAPSULE ORAL 2 TIMES DAILY
Status: DISCONTINUED | OUTPATIENT
Start: 2023-11-29 | End: 2023-12-01

## 2023-11-29 RX ORDER — HYDROMORPHONE HYDROCHLORIDE 1 MG/ML
0.4 INJECTION, SOLUTION INTRAMUSCULAR; INTRAVENOUS; SUBCUTANEOUS EVERY 2 HOUR PRN
Status: DISCONTINUED | OUTPATIENT
Start: 2023-11-29 | End: 2023-12-01

## 2023-11-29 RX ORDER — POLYETHYLENE GLYCOL 3350 17 G/17G
17 POWDER, FOR SOLUTION ORAL DAILY PRN
Status: DISCONTINUED | OUTPATIENT
Start: 2023-11-29 | End: 2023-12-01

## 2023-11-29 RX ORDER — MORPHINE SULFATE 10 MG/ML
6 INJECTION, SOLUTION INTRAMUSCULAR; INTRAVENOUS EVERY 10 MIN PRN
Status: DISCONTINUED | OUTPATIENT
Start: 2023-11-29 | End: 2023-11-29 | Stop reason: HOSPADM

## 2023-11-29 RX ORDER — LAMOTRIGINE 100 MG/1
100 TABLET ORAL 2 TIMES DAILY
Status: DISCONTINUED | OUTPATIENT
Start: 2023-11-29 | End: 2023-12-01

## 2023-11-29 RX ORDER — KETOROLAC TROMETHAMINE 30 MG/ML
30 INJECTION, SOLUTION INTRAMUSCULAR; INTRAVENOUS EVERY 6 HOURS
Status: DISCONTINUED | OUTPATIENT
Start: 2023-11-29 | End: 2023-12-01

## 2023-11-29 RX ORDER — HYDROMORPHONE HYDROCHLORIDE 1 MG/ML
0.2 INJECTION, SOLUTION INTRAMUSCULAR; INTRAVENOUS; SUBCUTANEOUS EVERY 5 MIN PRN
Status: DISCONTINUED | OUTPATIENT
Start: 2023-11-29 | End: 2023-11-29 | Stop reason: HOSPADM

## 2023-11-29 RX ORDER — ROPIVACAINE HYDROCHLORIDE 5 MG/ML
INJECTION, SOLUTION EPIDURAL; INFILTRATION; PERINEURAL
Status: COMPLETED | OUTPATIENT
Start: 2023-11-29 | End: 2023-11-29

## 2023-11-29 RX ORDER — CEFAZOLIN SODIUM/WATER 2 G/20 ML
2 SYRINGE (ML) INTRAVENOUS ONCE
Status: DISCONTINUED | OUTPATIENT
Start: 2023-11-29 | End: 2023-11-29 | Stop reason: HOSPADM

## 2023-11-29 RX ORDER — SODIUM CHLORIDE, SODIUM LACTATE, POTASSIUM CHLORIDE, CALCIUM CHLORIDE 600; 310; 30; 20 MG/100ML; MG/100ML; MG/100ML; MG/100ML
INJECTION, SOLUTION INTRAVENOUS CONTINUOUS
Status: DISCONTINUED | OUTPATIENT
Start: 2023-11-29 | End: 2023-11-29 | Stop reason: HOSPADM

## 2023-11-29 RX ORDER — MORPHINE SULFATE 4 MG/ML
2 INJECTION, SOLUTION INTRAMUSCULAR; INTRAVENOUS EVERY 10 MIN PRN
Status: DISCONTINUED | OUTPATIENT
Start: 2023-11-29 | End: 2023-11-29 | Stop reason: HOSPADM

## 2023-11-29 RX ADMIN — SODIUM CHLORIDE, SODIUM LACTATE, POTASSIUM CHLORIDE, CALCIUM CHLORIDE: 600; 310; 30; 20 INJECTION, SOLUTION INTRAVENOUS at 17:51:00

## 2023-11-29 RX ADMIN — LIDOCAINE HYDROCHLORIDE 25 MG: 10 INJECTION, SOLUTION EPIDURAL; INFILTRATION; INTRACAUDAL; PERINEURAL at 16:52:00

## 2023-11-29 RX ADMIN — SODIUM CHLORIDE, SODIUM LACTATE, POTASSIUM CHLORIDE, CALCIUM CHLORIDE: 600; 310; 30; 20 INJECTION, SOLUTION INTRAVENOUS at 19:31:00

## 2023-11-29 RX ADMIN — TRANEXAMIC ACID 1000 MG: 10 INJECTION, SOLUTION INTRAVENOUS at 19:39:00

## 2023-11-29 RX ADMIN — DEXAMETHASONE SODIUM PHOSPHATE 10 MG: 10 INJECTION, SOLUTION INTRAMUSCULAR; INTRAVENOUS at 16:15:00

## 2023-11-29 RX ADMIN — MIDAZOLAM HYDROCHLORIDE 1 MG: 1 INJECTION INTRAMUSCULAR; INTRAVENOUS at 16:15:00

## 2023-11-29 RX ADMIN — LIDOCAINE HYDROCHLORIDE 5 ML: 10 INJECTION, SOLUTION INFILTRATION; PERINEURAL at 16:15:00

## 2023-11-29 RX ADMIN — LIDOCAINE HYDROCHLORIDE 5 ML: 10 INJECTION, SOLUTION INFILTRATION; PERINEURAL at 16:45:00

## 2023-11-29 RX ADMIN — CEFAZOLIN SODIUM/WATER 2 G: 2 G/20 ML SYRINGE (ML) INTRAVENOUS at 16:39:00

## 2023-11-29 RX ADMIN — TRANEXAMIC ACID 1000 MG: 10 INJECTION, SOLUTION INTRAVENOUS at 16:50:00

## 2023-11-29 RX ADMIN — BUPIVACAINE HYDROCHLORIDE 1.6 ML: 7.5 INJECTION, SOLUTION INTRASPINAL at 16:45:00

## 2023-11-29 RX ADMIN — ROPIVACAINE HYDROCHLORIDE 30 ML: 5 INJECTION, SOLUTION EPIDURAL; INFILTRATION; PERINEURAL at 16:15:00

## 2023-11-29 NOTE — ANESTHESIA PROCEDURE NOTES
Spinal Block    Date/Time: 11/29/2023 4:45 PM    Performed by: Walker Butler CRNA  Authorized by: Jefe Singletary MD      General Information and Staff    Start Time:  11/29/2023 4:45 PM  End Time:  11/29/2023 4:48 PM  Anesthesiologist:  Jefe Singletary MD  CRNA:  Walker Butler CRNA  Performed by:  CRNA  Site identification: surface landmarks    Preanesthetic Checklist: patient identified, IV checked, risks and benefits discussed, monitors and equipment checked, pre-op evaluation, timeout performed, anesthesia consent and sterile technique used      Procedure Details    Patient Position:  Sitting  Prep: ChloraPrep    Monitoring:  Heart rate and continuous pulse ox  Approach:  Midline  Location:  L3-4  Injection Technique:  Single-shot    Needle    Needle Type:   Needle Gauge:  24 G  Needle Length:  4 in    Assessment    Sensory Level:  T10  Events: clear CSF, well tolerated, sensory level and blood negative      Additional Comments

## 2023-11-29 NOTE — DISCHARGE INSTRUCTIONS
DISCHARGE INSTRUCTIONS:  PLACE ICE TO SURGICAL AREA 20-30 MINUTES ON/ 20-30 MINUTES OFF. THIS IS TO HELP WITH PAIN & SWELLING. TAKE YOUR MEDICATIONS BELOW AS PRESCRIBED BY YOUR DOCTOR. THESE HAVE BEEN SENT TO YOUR PHARMACY. IT IS OK TO BEAR WEIGHT AS TOLERATED ON THE OPERATIVE EXTREMITY. WEAR THE KNEE IMMOBILIZER AT ALL TIMES FOR 2 WEEKS. CALL TO CONFIRM YOUR FOLLOW UP APPOINTMENT WITH DR. Jason Sequeira TO BE SEEN IN 2 WEEKS POSTOP. [phone]    MEDICATIONS:    POST OP MEDICATION REGIMEN              MULTI-MODAL   PAIN REGIMEN       DRUG   FOR   FREQUENCY & DURATION   QTY   NOTES     WEANING  TIPS       Gabapentin 100mg   Nerve pain   Take 2 tabs every 8 hours for 14 days    90   No refills You may discontinue this medication prior to 14 days if you do not tolerate it. Tylenol 500mg     Mild pain   Take 2 tabs every 6 hours     90   Can purchase over-the-counter    Stop using medication if no longer having pain. Tramadol 50mg     Moderate pain   Take 1-2 tabs every 6 hours only as needed   45 May prescribe a refill, but ideally, this should be tapered off after surgery Stop using this medication 2nd   As pain decreases over time, increase the interval between doses (6 hours to 8, then 12, then 24) to taper off the medication. Meloxicam 15mg     Inflammation   Take 1 tab daily for 30 days     30   May refill if needed beyond 30 days   Recommend completing the 30 day supply. BREAKTHROUGH PAIN         Oxycodone 5mg       Severe pain     Take 1-2 tabs every 4-6 hours only as needed for severe pain       40   Take at least 1 hr apart from Tramadol. Ideally, no refill. The goal is to taper off this medication as soon as possible, as it can be addictive and have side effects. Stop using this medication 1st if no longer having severe pain.          BLOOD THINNER     Eliquis 2.5mg   Blood clot prevention   Take 1 tab twice daily for 30 days     60     No refills   Complete the entire course of your blood thinner. ANTIBIOTIC     Take as prescribed by your infectious disease doctor              STOOL SOFTENER     Sennokot 8.6/50mg   Constipation   Take 1 tab twice daily  while on opioids     60 Refer to discharge instructions if constipation persists even after taking this medication   Stop taking if having diarrhea or loose stools. ANTI-NAUSEA   Ondansetron 4mg   Nausea   Take 1 tab every 8 hours as needed if nauseous     20   No Refill      ANTI-ACID REFLUX / GASTRITIS   Pantoprazole 40mg   Acid reflux, gastric ulcer prophylaxis   Take 1 tab every day along with Meloxicam     60   May refill if Meloxicam refilled          DRESSING:    YOU HAVE A SPECIAL DRESSING CALLED A PREVENA DRESSING, OVER YOUR INCISION. THIS IS A TYPE OF NEGATIVE PRESSURE DRESSING THAT KEEPS THE WOUND DRY. IT IS CONNECTED TO A CANNISTER. MAKE SURE THAT THE CANNISTER IS POWERED ON AND NOT OUT OF BATTERY. THE DRESSING STAYS FOR 7 DAYS FROM SURGERY. THIS DRESSING SHOULD BE CHANGED TO AN AQUACEL AT 7 DAYS POST-OPERATIVELY. IF THE PREVENA DRESSING HAS CONTINUOUS AND PERSISTENT DRAINAGE, CALL YOUR SURGEON FIRST BEFORE CHANGING THE DRESSING TO AN AQUACEL DRESSING. YOU MAY SHOWER AS SOON AS THE AQUACEL DRESSING IS PLACED INSTEAD OF THE PREVENA DRESSING OVER YOUR INCISION AREA. IF THE DRESSING LEAKS OR COMES LOOSE BEFORE THE 7 DAY PERIOD CONTACT YOUR DOCTOR / SURGEON. AFTER   14 DAYS POST OPERATIVELY, THE AQUACEL DRESSING IS REMOVED BY PULLING ON THE EDGE OF THE DRESSING AND GENTLY STRETCHING IT, THEN PEEL IT OFF SLOWLY LIKE A BANDAID. IF YOU HAVE ANY QUESTIONS OR CONCERNS ABOUT THE DRESSING CONTACT YOUR DOCTOR. REASONS TO CALL YOUR DOCTOR:  TEMPERATURE .0 OR MORE  PERSISTANT NAUSEA OR VOMITTING - ESPECIALLY IF YOU ARE UNABLE TO EAT OR DRINK. INCREASED LEVEL OF PAIN OR PAIN WHICH IS NOT CONTROLLED BY YOUR PAIN MEDICINE.   PERSISTENT DRAINAGE AT ANYTIME FROM YOUR SURGICAL AREA / INCISION. PAIN, TENDERNESS OR SUDDEN SWELLING IN YOUR CALF REGION OF THE LOWER EXTREMITIES - THIS IS A POSSIBLE SIGN OF A BLOOD CLOT. ANY CHANGES IN SENSATION IN YOUR BODY IN THE AREA OF YOUR SURGERY = NUMBNESS OR TINGLING. ANY CHANGES IN CIRCULATION IN YOUR BODY IN THE AREA OF YOUR SURGERY = CHANGES  IN COLOR EITHER DARKER OR VERY PALE; OR  IF AREA FEELS COLD TO THE TOUCH AS COMPARED TO OTHER AREAS. ANY CHANGES IN FUNCTION IN YOUR BODY IN THE AREA OF YOUR SURGERY = CHANGE IN MOVEMENT - UNABLE TO MOVE OR WIGGLE FINGERS, TOES OR FOOT OR ANY OTHER CHANGES IN NORMAL BODY FUNCTIONING. FOR ANY OF THE ABOVE OR ANY OTHER QUESTIONS OR CONCERNS CALL YOUR DOCTOR/ SURGEON AS SOON AS POSSIBLE.     Mahsa Rust MD MPH  Orthopaedic Surgeon, Adult Hip and Knee Reconstruction  [facility] at [facility] [address]  [address]  Office: C) [phone] (X) [phone]  Adminstrative Assistant: Delma Kim  Main Phone: [phone]  Luke Suazo Ke: [phone]

## 2023-11-29 NOTE — OPERATIVE REPORT
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 Left knee patellar maltracking/instability    PROCEDURE:  Left Revision Total Knee Arthroplasty (Both Components)  Left knee deep tissue biopsy for frozen section  VMO advancement, lateral release, lateral patellar facetectomy  Application of negative pressure incisional dressing    Location: [facility]    SURGEON: Gege aMrtínez MD  Assistant(s): Ten Garza MD, Sha Valerio CSA, Lambert SAUNDERS    Anesthesia type: Spinal  Estimated Blood Loss: 100cc    Drains: None    Complications: None immediately apparent    Implants:  [place] Triathlon - Size 5 PS Femoral Component. 12mm x 75mm cemented stem  Distal augments: 5mm medial, 5mm lateral   Posterior augments : 5mm medial, 10mm lateral  [place] Triathlon - Size 4 Universal Tibial Baseplate   Size A TS Tibial Cone   12mm x 50mm cemented stem   13mm [place] Triathlon constrained stabilized insert  5 batches of refobacin plain cement were used, mixed with 5g of vancomycin, 6.0g of tobramycin total.   Findings:   Cell count 2.1K  Frozen section without acute inflammation    Specimen: Cell count, fluid culture, frozen section, 5 tissue cultures    Indication:    This is a 46year old lady, history of a primary TKA by my partner, complicated by loosening of the tibial component, who presented initially with chronic knee pain and workup had demonstrated elevated inflammatory markers and a joint fluid aspirate consistent with prosthetic joint infection. The patient was indicated for component resection, revision of the total knee arthroplasty. They underwent explantation and placement of an antibiotic cement coated femoral and tibial components, and a course of IV antibiotic treatment. She continued to have pain afterwards, and radiographs also demonstrated laterally subluxated patellar component. She was indicated for indicated for revision of her femoral/tibial components with lateral release, VMO advancement. .All risks and benefits of surgery including bleeding, persistent infection, joint instability, gabe-prosthetic fracture, extensor mechanism injury, neurovascular injury, as well as medical and anesthesia-related complications were discussed with the patient / family, who elected to proceed with surgery. Procedure in detail:    The patient was taken to the operating room where appropriate anesthesia was provided by the Anesthesia staff. Intravenous antibiotics were administered. The patient was positioned supine, and all bony prominences were well-padded. The knee was then prepped and draped in sterile fashion. A pre-surgical pause was performed to correctly identify the patient, procedure, and extremity, including verification of the signed surgical site. The extremity was exsanguinated by gravity elevation and the tourniquet was raised to 300mmHg. The prior scar was incised and extended proximally and distally. Medial and lateral sub-fascial flaps were created. 5cc of serosanguinous fluid were aspirated from the joint prior to arthrotomy and sent for culture and cell count. A synovial tissue biopsy sample was taken and sent for frozen section to guide management and revision implant choice. A medial parapatellar arthrotomy was performed, and a complete debridement synovectomy was performed medially and laterally. Synovial tissue was sent for culture.  A medial proximal tibia subperiosteal elevation was performed for exposure. The extensor mechanism was examined and noted to be intact. The femoral component / bone interface was exposed of any overlaying soft tissue. A [place] osteotome, were used to cut the cement spacer interface with the bone, around the entire component. The Femoral/tibial extractor tamp was used to extract the femoral component. The femoral bony surface was curetted of any loose soft tissue. The tibia was then exposed, and the cement bone interface cut with a microsagittal saw and single sided recip saw, and the component was extracted with the femoral/tibial extractor tamp. An extramedullary tibial guide was then used to resect 2mm off of the proximal tibial surface, in neutral mechanical alignment. At this point, the frozen section results returned and were not consistent with infection, and we elected to proceed with stemmed revision implants. The tibia was sized to a size 4, reamed to a diameter of 15mm and a cone reamer was used to prepare the metaphysis for a size A [place] TS cone to address the metaphyseal bone loss. The femur was then reamed sequentially to a diameter of 16mm. The intramedullary distal femoral guide was attached to the reamer, and a flat distal femoral cut was made in neutral mechanical axis. The posterior condyles were checked and noted to be deficient. Flexion and extension gaps were checked with a spacer block. The 4-in-1 cut guide was then attached to the reamer, and the anterior chamfer and posterior chamfer cuts were made, and a box cut was made using a box guide. A trial femur with distal and posterior augments was inserted. A trial cone and tibial component were placed. A 13mm PS poly achieved appropriate and symmetric flexion/extension space, but the gap was looser medially even after lateral release, and we elected to use a constrained bearing. We irrigated the knee with 3L of NS, then betadine, peroxide lavages, then 3L of NS. A gabe-articular pain cocktail injection was delivered to the soft tissues. The trials were removed and the tibia was exposed. An appropriate size cement restrictor was placed in the canal. The canal was irrigated and dried. The final tibial cone was impacted into place. Two batches of antibiotic cement were mixed as above and delivered into the tibial canal using a cement gun. The cement was pressurized and the tibial implant was impacted into place. Excess cement was removed. Once the tibial cement cured, the femur was exposed, irrigated and dried. An appropriate size cement restrictor was placed. Three batches of antibiotic cement were mixed as above and injected using a gun into the femoral canal and pressurized. The femoral component was impacted into place, excess cement was removed, a trial poly was placed, and the leg was placed in extension. The cement was allowed to cure. Excess cement was removed. The trial poly was removed, the tourniquet was deflated and meticulous hemostasis was achieved. The joint was irrigated before placement and impaction of the final size 13mm TS liner with its reinforcement pin. The knee was then thoroughly irrigated once again. A The patella as everted, and a partial lateral facetectomy performed. A lateral release was performed as well to improve tracking. The VMO was mobilized from adhesions both superficial and deep and advanced over the lateral flap with the patella, and secured in pants over vest fashion using #5 ethibond suture and #1 vicryl. The arthrotomy was then closed with #2 [place]. The subcutaneous tissues and skin were closed with 0 Vicryl, 2-0 vicryl and 3-0 nylon. A sterile negative pressure dressing was then applied followed by compressive bandage and a knee immobilizer. The patient tolerated the procedure well, and there were no known immediate complications.        Post-operative Plan:    The intra-operative cultures will be followed and the patient will likely be placed on PO antibiotic suppression therapy for an extended duration. VTE prophylaxis will consist of early mobilization, mechanical compressive devices, and  Eliquis 2.5 BID if not medically contra-indicated. The patient will be weight-bearing as tolerated and allowed to mobilize with physical therapy. The patient will wear a knee immobilizer for 2 weeks to protect the VMO advancement/lateral release  The patient will follow up in clinic in 2 weeks for a wound check, suture removal and radiographic evaluation.

## 2023-11-29 NOTE — INTERVAL H&P NOTE
Pre-op Diagnosis: Left Knee Osteoarthritis    The above referenced H&P was reviewed by Raya Dunham PA-C on 11/29/2023, the patient was examined and no significant changes have occurred in the patient's condition since the H&P was performed. I discussed with the patient and/or legal representative the potential benefits, risks and side effects of this procedure; the likelihood of the patient achieving goals; and potential problems that might occur during recuperation. I discussed reasonable alternatives to the procedure, including risks, benefits and side effects related to the alternatives and risks related to not receiving this procedure. We will proceed with procedure as planned.

## 2023-11-30 LAB
ANION GAP SERPL CALC-SCNC: 5 MMOL/L (ref 0–18)
BUN BLD-MCNC: 6 MG/DL (ref 9–23)
BUN/CREAT SERPL: 9 (ref 10–20)
CALCIUM BLD-MCNC: 7.9 MG/DL (ref 8.7–10.4)
CHLORIDE SERPL-SCNC: 114 MMOL/L (ref 98–112)
CO2 SERPL-SCNC: 17 MMOL/L (ref 21–32)
CREAT BLD-MCNC: 0.67 MG/DL
DEPRECATED RDW RBC AUTO: 63.1 FL (ref 35.1–46.3)
EGFRCR SERPLBLD CKD-EPI 2021: 104 ML/MIN/1.73M2 (ref 60–?)
ERYTHROCYTE [DISTWIDTH] IN BLOOD BY AUTOMATED COUNT: 17 % (ref 11–15)
GLUCOSE BLD-MCNC: 115 MG/DL (ref 70–99)
HCT VFR BLD AUTO: 28.2 %
HGB BLD-MCNC: 9.1 G/DL
MCH RBC QN AUTO: 32.5 PG (ref 26–34)
MCHC RBC AUTO-ENTMCNC: 32.3 G/DL (ref 31–37)
MCV RBC AUTO: 100.7 FL
OSMOLALITY SERPL CALC.SUM OF ELEC: 281 MOSM/KG (ref 275–295)
PLATELET # BLD AUTO: 383 10(3)UL (ref 150–450)
POTASSIUM SERPL-SCNC: 4.9 MMOL/L (ref 3.5–5.1)
RBC # BLD AUTO: 2.8 X10(6)UL
SODIUM SERPL-SCNC: 136 MMOL/L (ref 136–145)
WBC # BLD AUTO: 16 X10(3) UL (ref 4–11)

## 2023-11-30 PROCEDURE — 85027 COMPLETE CBC AUTOMATED: CPT | Performed by: STUDENT IN AN ORGANIZED HEALTH CARE EDUCATION/TRAINING PROGRAM

## 2023-11-30 PROCEDURE — 80048 BASIC METABOLIC PNL TOTAL CA: CPT | Performed by: STUDENT IN AN ORGANIZED HEALTH CARE EDUCATION/TRAINING PROGRAM

## 2023-11-30 PROCEDURE — 97165 OT EVAL LOW COMPLEX 30 MIN: CPT

## 2023-11-30 PROCEDURE — 97535 SELF CARE MNGMENT TRAINING: CPT

## 2023-11-30 PROCEDURE — 94799 UNLISTED PULMONARY SVC/PX: CPT

## 2023-11-30 PROCEDURE — 97116 GAIT TRAINING THERAPY: CPT

## 2023-11-30 PROCEDURE — 97161 PT EVAL LOW COMPLEX 20 MIN: CPT

## 2023-11-30 PROCEDURE — 97530 THERAPEUTIC ACTIVITIES: CPT

## 2023-11-30 RX ORDER — PSEUDOEPHEDRINE HCL 30 MG
100 TABLET ORAL 2 TIMES DAILY
Status: SHIPPED | COMMUNITY
Start: 2023-11-30

## 2023-11-30 NOTE — CONSULTS
Spiritual Care Visit Note    Visited With: Patient    Writer report consulting with RN. Patient is alert and decisional. Writer offered empathic listening and support. Patient spoke about her health and knee replacement. Patient in good spirit about her situation and life. Writer helped patient complete HCPoA. Patient named , Ceci Sequeira ([phone]) as her agent. A copy of HCPoA is placed on chart and copies given to patient. No other need at this time. Writer gave patient Spiritual Care card. Follow up as requested. Spiritual Care Taxonomy:    Intended Effects: Demonstrate caring and concern    Methods: Collaborate with care team member;Offer support    Interventions: Active listening; Ask guided questions;Assist someone with Advance Directives;Silent prayer     Shannon Ding, [address] [place] [address]   [id number]     On call  services [id number]

## 2023-11-30 NOTE — PHYSICAL THERAPY NOTE
PHYSICAL THERAPY KNEE EVALUATION - INPATIENT       Room Number: 407/407-A  Evaluation Date: 11/30/2023  Type of Evaluation: Initial  Physician Order: PT Eval and Treat    Presenting Problem: infected TKA  Co-Morbidities : seizures, bipolar, lung CA, brain surgery  Reason for Therapy: Mobility Dysfunction and Discharge Planning    PHYSICAL THERAPY ASSESSMENT     Patient is a 46year old female admitted 11/29/2023 for LTKA revision of both components d/t LTKA infection. Pt ok to be seen per RN. Pt agreeable to participate, motivated. Pt with L knee immobilizer donned. Pt has supportive spouse at home who performs cooking and cleaning at [address] [place] [address]. Pt amb with RW intermittently at baseline. Pt is limited by pain, which improves with medication; and by visual impairment as she is legally blind. Pt demo'd safety with cueing for transfers with RW, educ to keep RW close to her. Pt tended to leave RW off to the side with stand to sit transfers, pt also amb with body shifted to R side within RW, cued for midline positioning. Pt also occasionally bumped into wall or door with RW, but pt is legally blind. Pt responded to cueing for repositioning of RW. Pt cued to relax shoulders while amb. Pt had difficulty with maintaining a safe path of travel, but again responded to cueing. Pt demo'd safety on stairs with appropriate step to pattern and 1 hand on 1 rail ascending and 2 hands on 1 rail descending. Pt's spouse will be with her when she is performing stairs or ambulating when she d/c's home; he has 2 weeks off of work. Rec d/c home with HHPT and 24/7 as pt is performing mobility safely with assist and cueing that  can provide. Patient's current functional deficits include ambulation, stairs, transfers, which are below the patient's pre-admission status. Rec continued inpt PT to facil return to PLOF.     The patient's Approx Degree of Impairment: 41.77% has been calculated based on documentation in the [facility] '6 clicks' Inpatient Basic Mobility Short Form. Research supports that patients with this level of impairment may benefit from home with HHPT. Patient will benefit from continued IP PT services to address these deficits in preparation for discharge. DISCHARGE RECOMMENDATIONS  PT Discharge Recommendations: Home with home health PT;24 hour care/supervision    PLAN  PT Treatment Plan: Bed mobility; Body mechanics; Endurance; Energy conservation;Patient education; Family education;Gait training;Strengthening;Stair training;Transfer training;Balance training  Rehab Potential : Good  Frequency (Obs): BID       PHYSICAL THERAPY MEDICAL/SOCIAL HISTORY         Problem List  Principal Problem:    Infection of prosthetic left knee joint ([address]  Active Problems:    Large cell carcinoma of left lung, stage 2 (HCC)    Borderline personality disorder (HCC)    Bipolar 1 disorder (HCC)    Seizures (HCC)    History of lung cancer    History of tobacco use    Preop testing      HOME SITUATION  Home Situation  Type of Home: House  Home Layout: Multi-level  Stairs to Enter : 2  Railing: Yes  Stairs to Bedroom: 6  Railing: Yes  Drives: No  Patient Owned Equipment: Rolling walker     Prior Level of [place]: assist with cooking, cleaning; amb with RW; does not drive    SUBJECTIVE  Oh I don't cook. PHYSICAL THERAPY EXAMINATION     OBJECTIVE  Precautions: Low vision; Other (Comment) (KI, wound vac)  Fall Risk: Standard fall risk    WEIGHT BEARING RESTRICTION           L Lower Extremity: Weight Bearing as Tolerated    PAIN ASSESSMENT  Rating: 3  Location: L knee  Management Techniques: Activity promotion; Body mechanics;Repositioning    COGNITION  Overall Cognitive Status:  WFL - within functional limits    RANGE OF MOTION AND STRENGTH ASSESSMENT  Upper extremity ROM and strength are within functional limits   Lower extremity ROM is within functional limits on R; wearing KI on L  Lower extremity strength is within functional limits on R; did not test L    BALANCE  Static Sitting: Good  Dynamic Sitting: Fair +  Static Standing: Fair  Dynamic Standing: Fair -                                                                       ADDITIONAL TESTS                                    ACTIVITY TOLERANCE                         O2 WALK       AM-PAC '6-Clicks' INPATIENT SHORT FORM - BASIC MOBILITY  How much difficulty does the patient currently have. .. Patient Difficulty: Turning over in bed (including adjusting bedclothes, sheets and blankets)?: None   Patient Difficulty: Sitting down on and standing up from a chair with arms (e.g., wheelchair, bedside commode, etc.): A Little   Patient Difficulty: Moving from lying on back to sitting on the side of the bed?: A Little   How much help from another person does the patient currently need. .. Help from Another: Moving to and from a bed to a chair (including a wheelchair)?: A Little   Help from Another: Need to walk in hospital room?: A Little   Help from Another: Climbing 3-5 steps with a railing?: A Little     AM-PAC Score:  Raw Score: 19   Approx Degree of Impairment: 41.77%   Standardized Score (AM-PAC Scale): 45.44   CMS Modifier (G-Code): CK    FUNCTIONAL ABILITY STATUS  Functional Mobility/Gait Assessment  Gait Assistance: Contact guard assist;Minimum assistance  Distance (ft): 2x70  Assistive Device: Rolling walker  Pattern: L Decreased stance time  Stairs: Stairs  How Many Stairs: 3  Device: 1 Rail  Assist: Contact guard assist  Pattern: Ascend and Descend  Ascend and Descend : Step to    Bed Mobility: supv    Transfers: CGA with cueing for RW and hand placement    Exercise/Education Provided:  Bed mobility  Gait training  Transfer training  PT eval    Patient End of Session: Up in chair;Needs met;Call light within reach;RN aware of session/findings; All patient questions and concerns addressed    CURRENT GOALS    Goals to be met by: 12/7/23  Patient Goal Patient's self-stated goal is: return home   Goal #1 Patient is able to demonstrate supine - sit EOB @ level: modified independent     Goal #1   Current Status    Goal #2 Patient is able to demonstrate transfers Sit to/from Stand at assistance level: modified independent     Goal #2  Current Status    Goal #3 Patient is able to ambulate 300 feet with assistive device at assistance level: modified independent    Goal #3   Current Status    Goal #4 Patient will negotiate 6 stairs/one curb w/ assistive device and supervision   Goal #4   Current Status    Goal #5     Goal #5   Current Status    Goal #6 Patient independently performs home exercise program for ROM/strengthening per the instructions provided in preparation for discharge.    Goal #6  Current Status        Patient Evaluation Complexity Level:  History Moderate - 1 or 2 personal factors and/or co-morbidities   Examination of body systems Low - addressing 1-2 elements   Clinical Presentation Low - Stable   Clinical Decision Making Low Complexity       Gait Trainin minutes

## 2023-11-30 NOTE — PHYSICAL THERAPY NOTE
Attempted BID session this afternoon, RN approved participation. Pt in bed reporting severe pain, not due for pain meds for another 2 hours per RN, declining OOB mobility at this time. Pt ed provided on importance of mobility in acute setting to help with pain, pt still declining. Will follow up tomorrow.

## 2023-11-30 NOTE — PLAN OF CARE
POD:1. Patient is A&Ox4. Vanco administered. Ancef given. Provena intact. Saline locked. Scds & Eliquis for dvt prophylaxis. Removed dimas in AM, will be a check void. PRN Oxycdone & dilaudid for pain management. WB @ tolerated. Call light within reach, frequent rounding. Safety measures in place.  Plan for PT/OT eval in AM.     Problem: Patient Centered Care  Goal: Patient preferences are identified and integrated in the patient's plan of care  Description: Interventions:  - What would you like us to know as we care for you?   - Provide timely, complete, and accurate information to patient/family  - Incorporate patient and family knowledge, values, beliefs, and cultural backgrounds into the planning and delivery of care  - Encourage patient/family to participate in care and decision-making at the level they choose  - Honor patient and family perspectives and choices  Outcome: Progressing     Problem: Patient/Family Goals  Goal: Patient/Family Long Term Goal  Description: Patient's Long Term Goal:     Interventions:  -   - See additional Care Plan goals for specific interventions  Outcome: Progressing  Goal: Patient/Family Short Term Goal  Description: Patient's Short Term Goal:     Interventions:     - See additional Care Plan goals for specific interventions  Outcome: Progressing

## 2023-11-30 NOTE — PLAN OF CARE
Post-op day #1. Dressing in place to Left Knee. Immobilizer in place. Monitoring vital signs- stable at this time. No acute changes noted throughout shift. Receiving IV fluids/IV ABX per MD order. Tolerating diet. Patient is a check void. SCDs and Eliquis for DVT prophylaxis. Pain medication provided as needed. Up with standby assist and a walker. Encouraged frequent ambulation and use of incentive spirometer. Fall precautions maintained- bed alarm on, bed locked in lowest position, call light and personal belongings within reach, non-skid socks in place to bilateral feet. Frequent rounding by nursing staff. Plan to discharge home once medically cleared.       Problem: Patient Centered Care  Goal: Patient preferences are identified and integrated in the patient's plan of care  Description: Interventions:  - What would you like us to know as we care for you?   - Provide timely, complete, and accurate information to patient/family  - Incorporate patient and family knowledge, values, beliefs, and cultural backgrounds into the planning and delivery of care  - Encourage patient/family to participate in care and decision-making at the level they choose  - Honor patient and family perspectives and choices  Outcome: Progressing     Problem: Patient/Family Goals  Goal: Patient/Family Long Term Goal  Description: Patient's Long Term Goal:     Interventions:  - See additional Care Plan goals for specific interventions  Outcome: Progressing  Goal: Patient/Family Short Term Goal  Description: Patient's Short Term Goal:     Interventions:  - See additional Care Plan goals for specific interventions  Outcome: Progressing

## 2023-11-30 NOTE — ANESTHESIA PROCEDURE NOTES
Peripheral Block    Date/Time: 11/29/2023 4:15 PM    Performed by: Kory Morton MD  Authorized by: Breonna Flynn MD      General Information and Staff    Start Time:  11/29/2023 4:15 PM  End Time:  11/29/2023 4:20 PM  Anesthesiologist:  Kory Morton MD  Performed by: Anesthesiologist  Patient Location:  PACU    Block Placement: Pre Induction  Site Identification: real time ultrasound guided and image stored and retrievable    Block site/laterality marked before start: site marked  Reason for Block: at surgeon's request and post-op pain management    Preanesthetic Checklist: 2 patient identifers, IV checked, risks and benefits discussed, monitors and equipment checked, pre-op evaluation, timeout performed, anesthesia consent and sterile technique used      Procedure Details    Patient Position:  Supine  Prep: ChloraPrep    Monitoring:  Cardiac monitor  Block Type:  Saphenous  Laterality:  Left  Injection Technique:  Single-shot    Needle    Needle Type:   Needle Localization:  Ultrasound guidance  Reason for Ultrasound Use: appropriate spread of the medication was noted in real time and no ultrasound evidence of intravascular and/or intraneural injection            Assessment    Injection Assessment:  Good spread noted, incremental injection, low pressure, local visualized surrounding nerve on ultrasound, negative aspiration for heme and no pain on injection  Paresthesia Pain:  None  Heart Rate Change: No    - Patient tolerated block procedure well without evidence of immediate block related complications.      Medications  11/29/2023 4:15 PM  midazolam (VERSED)injection 2mg/2ml - Intravenous   1 mg - 11/29/2023 4:15:00 PM  lidocaine injection 1% - Intradermal   5 mL - 11/29/2023 4:15:00 PM  ropivacaine (NAROPIN) injection 0.5% - Infiltration   30 mL - 11/29/2023 4:15:00 PM  dexamethasone (DECADRON) PF injection 10 mg/ml - Injection   10 mg - 11/29/2023 4:15:00 PM    Additional Comments

## 2023-12-01 VITALS
HEART RATE: 87 BPM | BODY MASS INDEX: 23.66 KG/M2 | TEMPERATURE: 100 F | HEIGHT: 65 IN | RESPIRATION RATE: 18 BRPM | OXYGEN SATURATION: 100 % | WEIGHT: 142 LBS | SYSTOLIC BLOOD PRESSURE: 102 MMHG | DIASTOLIC BLOOD PRESSURE: 67 MMHG

## 2023-12-01 LAB
ANION GAP SERPL CALC-SCNC: 4 MMOL/L (ref 0–18)
BASOPHILS # BLD AUTO: 0.04 X10(3) UL (ref 0–0.2)
BASOPHILS NFR BLD AUTO: 0.4 %
BUN BLD-MCNC: 6 MG/DL (ref 9–23)
BUN/CREAT SERPL: 8.8 (ref 10–20)
CALCIUM BLD-MCNC: 8.4 MG/DL (ref 8.7–10.4)
CHLORIDE SERPL-SCNC: 116 MMOL/L (ref 98–112)
CO2 SERPL-SCNC: 18 MMOL/L (ref 21–32)
CREAT BLD-MCNC: 0.68 MG/DL
DEPRECATED RDW RBC AUTO: 63.4 FL (ref 35.1–46.3)
EGFRCR SERPLBLD CKD-EPI 2021: 103 ML/MIN/1.73M2 (ref 60–?)
EOSINOPHIL # BLD AUTO: 0.12 X10(3) UL (ref 0–0.7)
EOSINOPHIL NFR BLD AUTO: 1.2 %
ERYTHROCYTE [DISTWIDTH] IN BLOOD BY AUTOMATED COUNT: 16.7 % (ref 11–15)
GLUCOSE BLD-MCNC: 95 MG/DL (ref 70–99)
HCT VFR BLD AUTO: 28.3 %
HGB BLD-MCNC: 9 G/DL
IMM GRANULOCYTES # BLD AUTO: 0.05 X10(3) UL (ref 0–1)
IMM GRANULOCYTES NFR BLD: 0.5 %
LYMPHOCYTES # BLD AUTO: 1.72 X10(3) UL (ref 1–4)
LYMPHOCYTES NFR BLD AUTO: 17 %
MCH RBC QN AUTO: 33 PG (ref 26–34)
MCHC RBC AUTO-ENTMCNC: 31.8 G/DL (ref 31–37)
MCV RBC AUTO: 103.7 FL
MONOCYTES # BLD AUTO: 0.63 X10(3) UL (ref 0.1–1)
MONOCYTES NFR BLD AUTO: 6.2 %
NEUTROPHILS # BLD AUTO: 7.55 X10 (3) UL (ref 1.5–7.7)
NEUTROPHILS # BLD AUTO: 7.55 X10(3) UL (ref 1.5–7.7)
NEUTROPHILS NFR BLD AUTO: 74.7 %
OSMOLALITY SERPL CALC.SUM OF ELEC: 283 MOSM/KG (ref 275–295)
PLATELET # BLD AUTO: 320 10(3)UL (ref 150–450)
POTASSIUM SERPL-SCNC: 3.4 MMOL/L (ref 3.5–5.1)
RBC # BLD AUTO: 2.73 X10(6)UL
SODIUM SERPL-SCNC: 138 MMOL/L (ref 136–145)
WBC # BLD AUTO: 10.1 X10(3) UL (ref 4–11)

## 2023-12-01 PROCEDURE — 85025 COMPLETE CBC W/AUTO DIFF WBC: CPT | Performed by: INTERNAL MEDICINE

## 2023-12-01 PROCEDURE — 97116 GAIT TRAINING THERAPY: CPT

## 2023-12-01 PROCEDURE — 80048 BASIC METABOLIC PNL TOTAL CA: CPT | Performed by: NURSE PRACTITIONER

## 2023-12-01 PROCEDURE — 97530 THERAPEUTIC ACTIVITIES: CPT

## 2023-12-01 RX ORDER — CEFADROXIL 500 MG/1
1 CAPSULE ORAL 2 TIMES DAILY
Qty: 168 CAPSULE | Refills: 0 | Status: SHIPPED | OUTPATIENT
Start: 2023-12-01 | End: 2024-01-12

## 2023-12-01 RX ORDER — POTASSIUM CHLORIDE 20 MEQ/1
40 TABLET, EXTENDED RELEASE ORAL ONCE
Status: COMPLETED | OUTPATIENT
Start: 2023-12-01 | End: 2023-12-01

## 2023-12-01 RX ORDER — HYDROMORPHONE HYDROCHLORIDE 2 MG/1
TABLET ORAL EVERY 4 HOURS PRN
Status: DISCONTINUED | OUTPATIENT
Start: 2023-12-01 | End: 2023-12-01

## 2023-12-01 RX ORDER — HYDROMORPHONE HYDROCHLORIDE 2 MG/1
4 TABLET ORAL EVERY 4 HOURS PRN
Status: DISCONTINUED | OUTPATIENT
Start: 2023-12-01 | End: 2023-12-01

## 2023-12-01 RX ORDER — HYDROMORPHONE HYDROCHLORIDE 2 MG/1
2 TABLET ORAL EVERY 4 HOURS PRN
Status: DISCONTINUED | OUTPATIENT
Start: 2023-12-01 | End: 2023-12-01

## 2023-12-01 NOTE — PHYSICAL THERAPY NOTE
PHYSICAL THERAPY TREATMENT NOTE - INPATIENT     Room Number: 407/407-A       Presenting Problem: infected TKA    Problem List  Principal Problem:    Infection of prosthetic left knee joint ([facility] [address]  Active Problems:    Large cell carcinoma of left lung, stage 2 (HCC)    Borderline personality disorder ([facility] [address]    Bipolar 1 disorder (HCC)    Seizures (HCC)    History of lung cancer    History of tobacco use    Preop testing      PHYSICAL THERAPY ASSESSMENT   Chart reviewed. RN  approved participation in physical therapy. PPE worn by therapist: mask, gloves, and goggles. Patient was wearing a mask during session. Patient presented in bed with 8/10 pain. Patient with good  progress towards goals during this session. Education provided on Physical therapy plan of care and physiological benefits of out of bed mobility. Patient with good carryover. Bed mobility: Contact guard assist  Transfers: Contact guard assist  Gait Assistance: Contact guard assist  Distance (ft): 2 x 75  Assistive Device: Rolling walker  Pattern: L Decreased stance time          AM session. CGA for bed mobility and transfer. EOB sitting balance activity with emphasis on core stabilization. Pt educated on deep breathing and relaxation technique. Poor pain control is a limiting factor in pt progress. Pt amb 2 x 50 ft with RW and CGA;provided cuing for gait pattern as well as for  postural awareness. There ex. Patient was left in bedside chair at end of session with all needs in reach. The patient's Approx Degree of Impairment: 41.77% has been calculated based on documentation in the [facility] '6 clicks' Inpatient Basic Mobility Short Form. Research supports that patients with this level of impairment may benefit from Home with home health PT.  RN aware of patient status post session. DISCHARGE RECOMMENDATIONS  PT Discharge Recommendations: Home with home health PT     PLAN  PT Treatment Plan: Bed mobility; Body mechanics; Endurance; Patient education;Gait training    SUBJECTIVE  Pt reports being ready for PT RX    OBJECTIVE  Precautions: Low vision    WEIGHT BEARING RESTRICTION  Weight Bearing Restriction: L lower extremity           L Lower Extremity: Weight Bearing as Tolerated    PAIN ASSESSMENT   Ratin  Location: L knee  Management Techniques: Activity promotion; Body mechanics;Repositioning    BALANCE                                                                                                                       Static Sitting: Good  Dynamic Sitting: Fair +           Static Standing: Fair  Dynamic Standing: Fair -    ACTIVITY TOLERANCE           BP: 112/68  BP Location: Right arm  BP Method: Automatic  Patient Position: Semi-Garza    O2 WALK       AM-PAC '6-Clicks' INPATIENT SHORT FORM - BASIC MOBILITY  How much difficulty does the patient currently have. .. Patient Difficulty: Turning over in bed (including adjusting bedclothes, sheets and blankets)?: None   Patient Difficulty: Sitting down on and standing up from a chair with arms (e.g., wheelchair, bedside commode, etc.): A Little   Patient Difficulty: Moving from lying on back to sitting on the side of the bed?: A Little   How much help from another person does the patient currently need. .. Help from Another: Moving to and from a bed to a chair (including a wheelchair)?: A Little   Help from Another: Need to walk in hospital room?: A Little   Help from Another: Climbing 3-5 steps with a railing?: A Little     AM-PAC Score:  Raw Score: 19   Approx Degree of Impairment: 41.77%   Standardized Score (AM-PAC Scale): 45.44   CMS Modifier (G-Code): CK      Additional information:     THERAPEUTIC EXERCISES  Lower Extremity Ankle pumps  Glut sets  Quad sets     Position Supine       Patient End of Session: Up in chair;Call light within reach;RN aware of session/findings; All patient questions and concerns addressed    CURRENT GOALS   Patient Goal Patient's self-stated goal is: return home   Goal #1 Patient is able to demonstrate supine - sit EOB @ level: modified independent     Goal #1   Current Status CGA   Goal #2 Patient is able to demonstrate transfers Sit to/from Stand at assistance level: modified independent     Goal #2  Current Status CGA   Goal #3 Patient is able to ambulate 300 feet with assistive device at assistance level: modified independent    Goal #3   Current Status Pt amb 2 x 50 ft with RW and CGA   Goal #4 Patient will negotiate 6 stairs/one curb w/ assistive device and supervision   Goal #4   Current Status NT   Goal #5     Goal #5   Current Status    Goal #6 Patient independently performs home exercise program for ROM/strengthening per the instructions provided in preparation for discharge. Goal #6  Current Status In progress   Gait-15 min;   There activity-15 minutes

## 2023-12-01 NOTE — PLAN OF CARE
POD:2. Patient is A&Ox4. RA. ABTX continued. Accessed port. Eliquis & scds for dvt prophylaxis. Voiding freely. Scheduled tylenol & scheduled toradol. PRN Oxycodone for pain management. WB @ tolerated. Immobilizer in place. Up by 1 assist with a walker. Call light within reach, frequent rounding. Safety measures in place. Plan for home with [facility] when medically clear.      Problem: Patient Centered Care  Goal: Patient preferences are identified and integrated in the patient's plan of care  Description: Interventions:  - What would you like us to know as we care for you?   - Provide timely, complete, and accurate information to patient/family  - Incorporate patient and family knowledge, values, beliefs, and cultural backgrounds into the planning and delivery of care  - Encourage patient/family to participate in care and decision-making at the level they choose  - Honor patient and family perspectives and choices  Outcome: Progressing     Problem: Patient/Family Goals  Goal: Patient/Family Long Term Goal  Description: Patient's Long Term Goal:     Interventions:  -   - See additional Care Plan goals for specific interventions  Outcome: Progressing  Goal: Patient/Family Short Term Goal  Description: Patient's Short Term Goal:     Interventions:   -   - See additional Care Plan goals for specific interventions  Outcome: Progressing     Problem: PAIN - ADULT  Goal: Verbalizes/displays adequate comfort level or patient's stated pain goal  Description: INTERVENTIONS:  - Encourage pt to monitor pain and request assistance  - Assess pain using appropriate pain scale  - Administer analgesics based on type and severity of pain and evaluate response  - Implement non-pharmacological measures as appropriate and evaluate response  - Consider cultural and social influences on pain and pain management  - Manage/alleviate anxiety  - Utilize distraction and/or relaxation techniques  - Monitor for opioid side effects  - Notify MD/LIP if interventions unsuccessful or patient reports new pain  - Anticipate increased pain with activity and pre-medicate as appropriate  Outcome: Progressing     Problem: RISK FOR INFECTION - ADULT  Goal: Absence of fever/infection during anticipated neutropenic period  Description: INTERVENTIONS  - Monitor WBC  - Administer growth factors as ordered  - Implement neutropenic guidelines  Outcome: Progressing     Problem: SAFETY ADULT - FALL  Goal: Free from fall injury  Description: INTERVENTIONS:  - Assess pt frequently for physical needs  - Identify cognitive and physical deficits and behaviors that affect risk of falls.   - [place] fall precautions as indicated by assessment.  - Educate pt/family on patient safety including physical limitations  - Instruct pt to call for assistance with activity based on assessment  - Modify environment to reduce risk of injury  - Provide assistive devices as appropriate  - Consider OT/PT consult to assist with strengthening/mobility  - Encourage toileting schedule  Outcome: Progressing     Problem: DISCHARGE PLANNING  Goal: Discharge to home or other facility with appropriate resources  Description: INTERVENTIONS:  - Identify barriers to discharge w/pt and caregiver  - Include patient/family/discharge partner in discharge planning  - Arrange for needed discharge resources and transportation as appropriate  - Identify discharge learning needs (meds, wound care, etc)  - Arrange for interpreters to assist at discharge as needed  - Consider post-discharge preferences of patient/family/discharge partner  - Complete POLST form as appropriate  - Assess patient's ability to be responsible for managing their own health  - Refer to Case Management Department for coordinating discharge planning if the patient needs post-hospital services based on physician/LIP order or complex needs related to functional status, cognitive ability or social support system  Outcome: Progressing

## 2023-12-01 NOTE — PLAN OF CARE
Pt is A&Ox4. Clear for dc. Instructions given at bedside.     Problem: Patient Centered Care  Goal: Patient preferences are identified and integrated in the patient's plan of care  Description: Interventions:  - What would you like us to know as we care for you?   - Provide timely, complete, and accurate information to patient/family  - Incorporate patient and family knowledge, values, beliefs, and cultural backgrounds into the planning and delivery of care  - Encourage patient/family to participate in care and decision-making at the level they choose  - Honor patient and family perspectives and choices  Outcome: Adequate for Discharge     Problem: Patient/Family Goals  Goal: Patient/Family Long Term Goal  Description: Patient's Long Term Goal:     Interventions:  -   - See additional Care Plan goals for specific interventions  Outcome: Adequate for Discharge  Goal: Patient/Family Short Term Goal  Description: Patient's Short Term Goal:     Interventions:   -  - See additional Care Plan goals for specific interventions  Outcome: Adequate for Discharge     Problem: PAIN - ADULT  Goal: Verbalizes/displays adequate comfort level or patient's stated pain goal  Description: INTERVENTIONS:  - Encourage pt to monitor pain and request assistance  - Assess pain using appropriate pain scale  - Administer analgesics based on type and severity of pain and evaluate response  - Implement non-pharmacological measures as appropriate and evaluate response  - Consider cultural and social influences on pain and pain management  - Manage/alleviate anxiety  - Utilize distraction and/or relaxation techniques  - Monitor for opioid side effects  - Notify MD/LIP if interventions unsuccessful or patient reports new pain  - Anticipate increased pain with activity and pre-medicate as appropriate  Outcome: Adequate for Discharge     Problem: RISK FOR INFECTION - ADULT  Goal: Absence of fever/infection during anticipated neutropenic period  Description: INTERVENTIONS  - Monitor WBC  - Administer growth factors as ordered  - Implement neutropenic guidelines  Outcome: Adequate for Discharge     Problem: SAFETY ADULT - FALL  Goal: Free from fall injury  Description: INTERVENTIONS:  - Assess pt frequently for physical needs  - Identify cognitive and physical deficits and behaviors that affect risk of falls.   - [place] fall precautions as indicated by assessment.  - Educate pt/family on patient safety including physical limitations  - Instruct pt to call for assistance with activity based on assessment  - Modify environment to reduce risk of injury  - Provide assistive devices as appropriate  - Consider OT/PT consult to assist with strengthening/mobility  - Encourage toileting schedule  Outcome: Adequate for Discharge     Problem: DISCHARGE PLANNING  Goal: Discharge to home or other facility with appropriate resources  Description: INTERVENTIONS:  - Identify barriers to discharge w/pt and caregiver  - Include patient/family/discharge partner in discharge planning  - Arrange for needed discharge resources and transportation as appropriate  - Identify discharge learning needs (meds, wound care, etc)  - Arrange for interpreters to assist at discharge as needed  - Consider post-discharge preferences of patient/family/discharge partner  - Complete POLST form as appropriate  - Assess patient's ability to be responsible for managing their own health  - Refer to Case Management Department for coordinating discharge planning if the patient needs post-hospital services based on physician/LIP order or complex needs related to functional status, cognitive ability or social support system  Outcome: Adequate for Discharge

## 2023-12-01 NOTE — PHYSICAL THERAPY NOTE
PHYSICAL THERAPY TREATMENT NOTE - INPATIENT     Room Number: 407/407-A       Presenting Problem: infected TKA    Problem List  Principal Problem:    Infection of prosthetic left knee joint ([facility] [address]  Active Problems:    Large cell carcinoma of left lung, stage 2 (HCC)    Borderline personality disorder ([facility] [address]    Bipolar 1 disorder (HCC)    Seizures (HCC)    History of lung cancer    History of tobacco use    Preop testing      PHYSICAL THERAPY ASSESSMENT   Chart reviewed. RN  approved participation in physical therapy. PPE worn by therapist: mask, gloves, and goggles. Patient was wearing a mask during session. Patient presented in bed with 6/10 pain. Patient with good  progress towards goals during this session. Education provided on Physical therapy plan of care and physiological benefits of out of bed mobility. Patient with good carryover. Bed mobility: CGA  Transfers: CGA  Gait Assistance: Contact guard assist  Distance (ft): 2 x 75  Assistive Device: Rolling walker  Pattern: L Decreased stance time          Pm session. CGA for bed mobility and transfer. Knee immobilizer on EOB sitting balanc e activity with emphasis on core stabilization. HEP reviewed. Pt amb 2 x 75 ft with RW and CGa. Provided cuing for gait pattern as well as for postural awareness. Navigated 6 stairs with CGA. There ex. Patient was left in bedside chair at end of session with all needs in reach. The patient's Approx Degree of Impairment: 41.77% has been calculated based on documentation in the [facility] '6 clicks' Inpatient Basic Mobility Short Form. Research supports that patients with this level of impairment may benefit from Home with home health PT.  RN aware of patient status post session. DISCHARGE RECOMMENDATIONS  PT Discharge Recommendations: Home with home health PT     PLAN  PT Treatment Plan: Bed mobility; Body mechanics; Endurance; Patient education;Gait training    SUBJECTIVE  Pt reports being ready for PT RX    OBJECTIVE  Precautions: Low vision    WEIGHT BEARING RESTRICTION  Weight Bearing Restriction: L lower extremity           L Lower Extremity: Weight Bearing as Tolerated    PAIN ASSESSMENT   Ratin  Location: L knee  Management Techniques: Activity promotion; Body mechanics;Repositioning    BALANCE                                                                                                                       Static Sitting: Good  Dynamic Sitting: Fair +           Static Standing: Fair  Dynamic Standing: Fair -    ACTIVITY TOLERANCE                         O2 WALK       AM-PAC '6-Clicks' INPATIENT SHORT FORM - BASIC MOBILITY  How much difficulty does the patient currently have. .. Patient Difficulty: Turning over in bed (including adjusting bedclothes, sheets and blankets)?: None   Patient Difficulty: Sitting down on and standing up from a chair with arms (e.g., wheelchair, bedside commode, etc.): A Little   Patient Difficulty: Moving from lying on back to sitting on the side of the bed?: A Little   How much help from another person does the patient currently need. .. Help from Another: Moving to and from a bed to a chair (including a wheelchair)?: A Little   Help from Another: Need to walk in hospital room?: A Little   Help from Another: Climbing 3-5 steps with a railing?: A Little     AM-PAC Score:  Raw Score: 19   Approx Degree of Impairment: 41.77%   Standardized Score (AM-PAC Scale): 45.44   CMS Modifier (G-Code): CK      Additional information:     THERAPEUTIC EXERCISES  Lower Extremity Ankle pumps  Glut sets  Quad sets     Position Supine       Patient End of Session: Up in chair;Call light within reach;RN aware of session/findings; All patient questions and concerns addressed    CURRENT GOALS

## 2023-12-04 ENCOUNTER — TELEPHONE (OUTPATIENT)
Dept: INTERNAL MEDICINE CLINIC | Facility: CLINIC | Age: 53
End: 2023-12-04

## 2023-12-04 ENCOUNTER — PATIENT OUTREACH (OUTPATIENT)
Dept: CASE MANAGEMENT | Age: 53
End: 2023-12-04

## 2023-12-04 DIAGNOSIS — Z02.9 ENCOUNTERS FOR UNSPECIFIED ADMINISTRATIVE PURPOSE: ICD-10-CM

## 2023-12-04 DIAGNOSIS — T84.54XD INFECTION ASSOCIATED WITH INTERNAL LEFT KNEE PROSTHESIS, SUBSEQUENT ENCOUNTER: Primary | ICD-10-CM

## 2023-12-04 PROCEDURE — 1111F DSCHRG MED/CURRENT MED MERGE: CPT

## 2023-12-04 RX ORDER — ONDANSETRON 4 MG/1
4 TABLET, FILM COATED ORAL EVERY 6 HOURS PRN
COMMUNITY
Start: 2023-11-16

## 2023-12-04 RX ORDER — DOCUSATE SODIUM 50 MG AND SENNOSIDES 8.6 MG 8.6; 5 MG/1; MG/1
TABLET, FILM COATED ORAL
COMMUNITY
Start: 2023-11-16

## 2023-12-04 RX ORDER — OXYCODONE HYDROCHLORIDE 5 MG/1
5 TABLET ORAL EVERY 6 HOURS PRN
COMMUNITY
Start: 2023-11-30

## 2023-12-04 RX ORDER — TRAMADOL HYDROCHLORIDE 50 MG/1
50 TABLET ORAL EVERY 4 HOURS PRN
COMMUNITY
Start: 2023-11-29

## 2023-12-04 RX ORDER — MELOXICAM 15 MG/1
15 TABLET ORAL EVERY MORNING
COMMUNITY
Start: 2023-11-16

## 2023-12-04 NOTE — TELEPHONE ENCOUNTER
Sent as FYI/TCM protocol:    Spoke with patient for TCM today. Pt confirms One Jerry Rutledge RN is seeing patient now, for start of care. Pt confirms ortho f/u with Dr. Judith Chavez 12/14/2023. Offered TCM appt with Dr. Nesbitt Cost declines, prefers to f/u with specialist only, at this time . TCM appointment recommended by 12/15/2023, as patient is a Moderate risk for readmission.

## 2023-12-04 NOTE — PROGRESS NOTES
Initial Post Discharge Follow Up   Discharge Date: 12/1/23  Contact Date: 12/4/2023    Consent Verification:  Assessment Completed With: Patient  HIPAA Verified? 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 Left knee patellar maltracking/instability     11/29/2023 PROCEDURE:  Left Revision Total Knee Arthroplasty (Both Components)  Left knee deep tissue biopsy for frozen section  VMO advancement, lateral release, lateral patellar facetectomy  Application of negative pressure incisional dressing       General:   How have you been since your discharge from the hospital? Pt feeling better, since hospital discharge--WBAT with walker and knee immobilizer, knee dressing dry/intact--denies redness, drainage or bleeding. Pain managed well at home with Tramadol and Oxycodone--see AVS for multimodal pain regimen. Pt tolerating diet, staying hydrated, using IS, as directed. Patient denies fever, chills, nausea, vomiting, diarrhea, calf pain or swelling, chest pain or shortness of breath at this time. Do you have any pain since discharge? Yes  Where: left knee   Rating on pain scale 1-10, 10 being the worst pain you have ever experienced, what is current pain: 7  Alleviating factors: rest, ice, Tramadol, Oxycodone  Aggravating factors: positional  Is the pain manageable at home? Yes  How well was your pain managed while in the hospital?   On a scale of 1-5   1- Very Poor and 5- Very well   Very Well  When you were leaving the hospital were your discharge instructions reviewed with you? Yes  How well were your discharge instructions explained to you? On a scale of 1-5   1- Very Poor and 5- Very well   Very Well  Do you have any questions about your discharge instructions?   No  Before leaving the hospital was your diagnoses explained to you? Yes  Do you have any questions about your diagnoses? No  Are you able to perform normal daily activities of living as you have prior to your hospital stay (dressing, bathing, ambulating to the bathroom, etc)? yes--WBAT with walker  (NCM) Was patient given a different diet per AVS? no    Medications:   Current Outpatient Medications   Medication Sig Dispense Refill    cefadroxil 500 MG Oral Cap Take 2 capsules (1,000 mg total) by mouth 2 (two) times daily. 168 capsule 0    apixaban 2.5 MG Oral Tab Take 1 tablet (2.5 mg total) by mouth 2 (two) times daily. 60 tablet 0    docusate sodium 100 MG Oral Cap Take 100 mg by mouth 2 (two) times daily. levothyroxine 150 MCG Oral Tab Take 1 tablet (150 mcg total) by mouth before breakfast. 90 tablet 0    levetiracetam 750 MG Oral Tab Take 2 tablets (1,500 mg total) by mouth 2 (two) times daily. RISPERIDONE OR Take 2.5 mg by mouth nightly.      gabapentin 800 MG Oral Tab Take 0.5 tablets (400 mg total) by mouth 2 (two) times daily. sertraline 100 MG Oral Tab Take 2 tablets (200 mg total) by mouth every morning. lamoTRIgine 100 MG Oral Tab Take by mouth 2 (two) times daily. Were there any changes to your current medication(s) noted on the AVS? Yes  START taking:  cefadroxil (DURICEF)  docusate sodium (COLACE)  CHANGE how you take:  apixaban (Eliquis)  If so, were these medication changes discussed with you prior to leaving the hospital? Yes  If a new medication was prescribed:    Was the new medication's purpose & side effects reviewed? Yes  Do you have any questions about your new medication? No  Did you  your discharge medications when you left the hospital? Yes  Let's go over your medications together to make sure we are not missing anything. Medications Reviewed  Are there any reasons that keep you from taking your medication as prescribed? No  Are you having any concerns with constipation?  No  Did patient receive their flu shot (Sept-March)? No--pt declined 11/29/2023    Discharge medications reviewed/discussed/and reconciled against outpatient medications with patient. Any changes or updates to medications sent to PCP. Patient Acknowledged     Referrals/orders at D/C:  Referrals/orders placed at D/C? yes  What services:   Home health--RN, PT   (If HH was ordered) Has HH been set up? Yes    If Yes: With Whom:   [facility] [address] Phone: [phone]  GabrieleHiginios: [phone]  DME ordered at D/C? Yes  What? IS, BRENDA hose, knee immobilizer  From where? hospital  Have you received your (DME)? yes    Discharge orders, AVS reviewed and discussed with patient. Any changes or updates to orders sent to PCP.   Patient Acknowledged      SDOH:   Transportation:   Transportation Needs: No Transportation Needs (12/4/2023)    Transportation Needs     Lack of Transportation: No     Financial Strain:   Financial Resource Strain: Low Risk  (12/4/2023)    Financial Resource Strain     Difficulty of Paying Living Expenses: Not very hard     Med Affordability: No     Follow up appointments:    Cezar Murphy [place] [address] Shaye Glaser [address] [place] [address].  [phone]    Hermila Mendenhall MD MPH  Orthopaedic Surgeon, Adult Hip and Knee Reconstruction  [facility] at [address], [address]  [place], [address]  Office: H) [phone] (C) [phone]  Adminstrative Assistant: Fallon Mendoza appointments       Date & Time Appointment Department [facility])    Jan 18, 2024  1:00 PM CST Lab Visit with  [facility])        Jan 18, 2024  2:00 PM CST HEMATOLOGY ONCOLOGY FOLLOW UP with Donal Melendez MD [facility])              [facility]  [address] [address]  [place] [address] [facility]  [phone]            TCC  Was TCC ordered: No    PCP (If no TCC appointment)  Does patient already have a PCP appointment scheduled? No  NCM Attempted to schedule PCP office TCM appointment with patient   If no appointment scheduled: Explain: pt declines--prefers to f/u with ortho, at this time    Specialist    Does the patient have any other follow up appointment(s) needing to be scheduled? No--pt seeing Dr. Hugh العراقي 12/14/2023    Is there any reason as to why you cannot make your appointment(s)? No     Needs post D/C:   Now that you are home, are there any needs or concerns you need addressed before your next visit with your PCP?  (DME, meds, questions, etc.): No    Interventions by NCM:   Discussed diet, activity, medications and need for f/u visits. Pt confirms [facility] Jerry Rutledge RN is seeing patient now, for start of care. Pt confirms ortho f/u with Dr. Hugh العراقي 12/14/2023. Offered TCM appt with Dr. Camilo brooks, prefers to f/u with specialist only, at this time--sent TE to office staff as FYI/TCM protocol. Patient aware when to contact PCP/specialists and when to seek emergency care. No further questions/concerns at this time. Overall Rating:    How would you rate the care you received while in the hospital? excellent    CCM referral placed:    Not Applicable    BOOK BY DATE: 12/15/2023

## 2024-01-02 RX ORDER — LEVOTHYROXINE SODIUM 0.15 MG/1
150 TABLET ORAL
Qty: 90 TABLET | Refills: 0 | Status: SHIPPED | OUTPATIENT
Start: 2024-01-02

## 2024-01-18 ENCOUNTER — NURSE ONLY (OUTPATIENT)
Dept: HEMATOLOGY/ONCOLOGY | Facility: HOSPITAL | Age: 54
End: 2024-01-18
Attending: INTERNAL MEDICINE
Payer: COMMERCIAL

## 2024-01-18 VITALS
DIASTOLIC BLOOD PRESSURE: 74 MMHG | HEIGHT: 65 IN | HEART RATE: 92 BPM | RESPIRATION RATE: 16 BRPM | TEMPERATURE: 98 F | SYSTOLIC BLOOD PRESSURE: 118 MMHG | BODY MASS INDEX: 22.86 KG/M2 | WEIGHT: 137.19 LBS | OXYGEN SATURATION: 100 %

## 2024-01-18 DIAGNOSIS — D50.9 IRON DEFICIENCY ANEMIA, UNSPECIFIED IRON DEFICIENCY ANEMIA TYPE: ICD-10-CM

## 2024-01-18 DIAGNOSIS — C79.31 MALIGNANT NEOPLASM METASTATIC TO BRAIN (HCC): Primary | ICD-10-CM

## 2024-01-18 DIAGNOSIS — E03.9 HYPOTHYROIDISM, UNSPECIFIED TYPE: ICD-10-CM

## 2024-01-18 DIAGNOSIS — C34.90 MALIGNANT NEOPLASM OF LUNG, UNSPECIFIED LATERALITY, UNSPECIFIED PART OF LUNG (HCC): Primary | ICD-10-CM

## 2024-01-18 DIAGNOSIS — C34.90 PRIMARY MALIGNANT NEOPLASM OF LUNG METASTATIC TO OTHER SITE, UNSPECIFIED LATERALITY (HCC): ICD-10-CM

## 2024-01-18 DIAGNOSIS — Z51.81 MEDICATION MONITORING ENCOUNTER: ICD-10-CM

## 2024-01-18 LAB
ALBUMIN SERPL-MCNC: 3.3 G/DL (ref 3.2–4.8)
ALBUMIN/GLOB SERPL: 0.8 {RATIO} (ref 1–2)
ALP LIVER SERPL-CCNC: 173 U/L
ALT SERPL-CCNC: 8 U/L
ANION GAP SERPL CALC-SCNC: 3 MMOL/L (ref 0–18)
AST SERPL-CCNC: 12 U/L (ref ?–34)
BASOPHILS # BLD AUTO: 0.05 X10(3) UL (ref 0–0.2)
BASOPHILS NFR BLD AUTO: 0.5 %
BILIRUB SERPL-MCNC: 0.2 MG/DL (ref 0.3–1.2)
BUN BLD-MCNC: 8 MG/DL (ref 9–23)
BUN/CREAT SERPL: 12.1 (ref 10–20)
CALCIUM BLD-MCNC: 8.4 MG/DL (ref 8.7–10.4)
CHLORIDE SERPL-SCNC: 109 MMOL/L (ref 98–112)
CO2 SERPL-SCNC: 21 MMOL/L (ref 21–32)
CREAT BLD-MCNC: 0.66 MG/DL
DEPRECATED HBV CORE AB SER IA-ACNC: 29.8 NG/ML
DEPRECATED RDW RBC AUTO: 67.2 FL (ref 35.1–46.3)
EGFRCR SERPLBLD CKD-EPI 2021: 104 ML/MIN/1.73M2 (ref 60–?)
EOSINOPHIL # BLD AUTO: 0.02 X10(3) UL (ref 0–0.7)
EOSINOPHIL NFR BLD AUTO: 0.2 %
ERYTHROCYTE [DISTWIDTH] IN BLOOD BY AUTOMATED COUNT: 17.8 % (ref 11–15)
GLOBULIN PLAS-MCNC: 4.1 G/DL (ref 2.8–4.4)
GLUCOSE BLD-MCNC: 86 MG/DL (ref 70–99)
HCT VFR BLD AUTO: 28.4 %
HELMET CELLS BLD QL SMEAR: PRESENT
HGB BLD-MCNC: 9.7 G/DL
IMM GRANULOCYTES # BLD AUTO: 0.08 X10(3) UL (ref 0–1)
IMM GRANULOCYTES NFR BLD: 0.8 %
IRON SATN MFR SERPL: 12 %
IRON SERPL-MCNC: 33 UG/DL
LYMPHOCYTES # BLD AUTO: 1.97 X10(3) UL (ref 1–4)
LYMPHOCYTES NFR BLD AUTO: 19.6 %
MCH RBC QN AUTO: 34.8 PG (ref 26–34)
MCHC RBC AUTO-ENTMCNC: 34.2 G/DL (ref 31–37)
MCV RBC AUTO: 101.8 FL
MONOCYTES # BLD AUTO: 0.89 X10(3) UL (ref 0.1–1)
MONOCYTES NFR BLD AUTO: 8.9 %
NEUTROPHILS # BLD AUTO: 7.02 X10 (3) UL (ref 1.5–7.7)
NEUTROPHILS # BLD AUTO: 7.02 X10(3) UL (ref 1.5–7.7)
NEUTROPHILS NFR BLD AUTO: 70 %
OSMOLALITY SERPL CALC.SUM OF ELEC: 274 MOSM/KG (ref 275–295)
PLATELET # BLD AUTO: 489 10(3)UL (ref 150–450)
PLATELET MORPHOLOGY: NORMAL
POTASSIUM SERPL-SCNC: 4.2 MMOL/L (ref 3.5–5.1)
PROT SERPL-MCNC: 7.4 G/DL (ref 5.7–8.2)
RBC # BLD AUTO: 2.79 X10(6)UL
SODIUM SERPL-SCNC: 133 MMOL/L (ref 136–145)
TIBC SERPL-MCNC: 273 UG/DL (ref 250–425)
TRANSFERRIN SERPL-MCNC: 183 MG/DL (ref 250–380)
WBC # BLD AUTO: 10 X10(3) UL (ref 4–11)

## 2024-01-18 PROCEDURE — 80053 COMPREHEN METABOLIC PANEL: CPT

## 2024-01-18 PROCEDURE — 82728 ASSAY OF FERRITIN: CPT

## 2024-01-18 PROCEDURE — 85025 COMPLETE CBC W/AUTO DIFF WBC: CPT

## 2024-01-18 PROCEDURE — 83540 ASSAY OF IRON: CPT

## 2024-01-18 PROCEDURE — 99214 OFFICE O/P EST MOD 30 MIN: CPT | Performed by: INTERNAL MEDICINE

## 2024-01-18 PROCEDURE — 36591 DRAW BLOOD OFF VENOUS DEVICE: CPT

## 2024-01-18 PROCEDURE — 84466 ASSAY OF TRANSFERRIN: CPT

## 2024-01-18 RX ORDER — HEPARIN SODIUM (PORCINE) LOCK FLUSH IV SOLN 100 UNIT/ML 100 UNIT/ML
5 SOLUTION INTRAVENOUS ONCE
OUTPATIENT
Start: 2024-01-18

## 2024-01-18 RX ORDER — HEPARIN SODIUM (PORCINE) LOCK FLUSH IV SOLN 100 UNIT/ML 100 UNIT/ML
5 SOLUTION INTRAVENOUS ONCE
Status: COMPLETED | OUTPATIENT
Start: 2024-01-18 | End: 2024-01-18

## 2024-01-18 RX ADMIN — HEPARIN SODIUM (PORCINE) LOCK FLUSH IV SOLN 100 UNIT/ML 500 UNITS: 100 SOLUTION INTRAVENOUS at 12:58:00

## 2024-01-18 NOTE — PROGRESS NOTES
Cancer Center Progress Note    Patient Name: Virginia Sinclair   YOB: 1970   Medical Record Number: [id number]   Attending Physician: Dylon Hendrickson M.D.       Chief Complaint:  Metastatic adenocarcinoma of the lung, brain metastasis.    History of Present Illness:  Cancer hx:  53 year old female with metastatic adenocarcinoma of the left upper lobe of the lung (EGFR,ALK,ROS1 negative). She has liver and brain metastasis as well as a metastatic lesion to the pancreas.  The patient was initially diagnosed in July 2015 with a left occipital brain lesion and is status post resection with adjuvant radiation.  She is also status post a right frontal lobe resection in August 2016.  Her left upper lung primary lesion was treated with lobectomy and mediastinal lymph node dissection on 8/25/2015 (pT1aN0).  She received adjuvant chemo originally with cisplatin and Navelbine being for 1 cycle however this was changed to carboplatin and paclitaxel for cycles 2 through 4 due to issues with febrile neutropenia and hospitalization following cycle 1.  She completed adjuvant chemotherapy in December 2015. In March 2016 she had biopsy-proven metastatic disease to the pancreas. She is currently on treatment with nivolumab since April 2016.     She has had hypothyroidism and arthralgias secondary to nivolumab.    She has had anemia with labs showing severe iron deficiency in May 2017.  IV iron infusion has been ordered however the patient has declined scheduling this on multiple occasions    She is follow with the pain service for chronic pain/arthralgias.  She has had issues with polysubstance abuse/opioid dependence.  She has been enrolled in a rehab program.    She was admitted hospital November 14 2017 with fevers and thrush however left AGAINST MEDICAL ADVICE when her fevers resolved seen by infectious disease and received IV antibiotics for roughly 48 hours    She was admitted to the hospital in December 2017 with seizures  in the setting of polysubstance abuse/dependence.  She required mechanical ventilation for secondary respiratory failure.  She was seen by neurology and critical care medicine consultation.  After long hospitalization she was eventually discharged to rehab facility and has now returned home    She was hospitalized again in February 2018 with recurrent seizures with his second hospitalization at [facility].  She had partial seizures involving left upper extremity.  She then had residual left-sided weakness.  Her antiepileptics were titrated neurology.  She had some right-sided parenchymal laboratory chronic changes in MRI.  She was given a trial of dexamethasone.  There is no new obvious tumor growth, brain MRI    He subsequently transferred her neurologic care to [facility] who is now managing her seizures.    Get incidentally discovered pulmonary embolus December 2022 that occurred several weeks after knee surgery      Interval history:  She returns for routine follow-up she is in reasonable overall denies any fevers or chills denies any unintentional weight changes denies any hemoptysis/cough she is active up in her rectum today living denies any focal neurological deficits      Performance Status:  ECOG 0  Past Medical History:  Past Medical History:   Diagnosis Date    Anemia     Brain cancer (HCC)     Depression     Disorder of thyroid     Exposure to radiation     DISCONTINUED AUG 2015     Gallbladder disease     Hepatitis C     High blood pressure     History of blood transfusion 1991    after childbirth [facility]    Hypothyroid     Lung cancer (HCC)     Migraines     Osteoarthritis     Pancreatic cancer (HCC) 07/2015    Personal history of antineoplastic chemotherapy     EVERY 2 WEEKS BEGINNING 2015     Pneumonia, organism unspecified(486)     Pulmonary embolism (HCC)     Seizure disorder (HCC)     can't remeber last one    Visual impairment     tunnel vision    Wears glasses        Past  Surgical History:  Past Surgical History:   Procedure Laterality Date    APPENDECTOMY  1999    BRAIN SURGERY  2015    TUMOR RESECTION    BRAIN SURGERY Left     OCCIPITAL CRANIOTOMY    BRAIN SURGERY Left     LOBECTOMY    BRAIN SURGERY  2016    REMOVAL OF DEAD TISSUE     CHOLECYSTECTOMY  2009    CYST REMOVAL      BREAST, BENIGN    HEMORRHOIDECTOMY      HYSTERECTOMY      heavy menstrual flow,     KNEE REPLACEMENT SURGERY Left 2022    @ [facility]    KNEE SURGERY Left 2023    left knee resection with antibiotic spacer placement    LUMPECTOMY RIGHT  2012    FIBROADENOMA    OTHER  2015    CYBERKNIFE    XS PORT-A-CATH INSERTION/EXCH/CHK         Family History:  Family History   Problem Relation Age of Onset    Blood Disorder Mother     Stroke Mother     Clotting Disorder Mother     Psychiatric Mother     Cancer Father         PROSTATE       Social History:  Social History     Socioeconomic History    Marital status:      Spouse name: Not on file    Number of children: Not on file    Years of education: Not on file    Highest education level: Not on file   Occupational History    Occupation: SELF EMPLOYED   Tobacco Use    Smoking status: Former     Packs/day: 1.50     Years: 40.00     Additional pack years: 0.00     Total pack years: 60.00     Types: Cigarettes     Start date: 1985     Quit date: 10/31/2023     Years since quittin.2     Passive exposure: Past (15)    Smokeless tobacco: Never   Vaping Use    Vaping Use: Never used   Substance and Sexual Activity    Alcohol use: Not Currently    Drug use: No    Sexual activity: Yes     Partners: Male   Other Topics Concern     Service Not Asked    Blood Transfusions Not Asked    Caffeine Concern Yes     Comment: 2 or 1 cup of coffee daily    Occupational Exposure Not Asked    Hobby Hazards Not Asked    Sleep Concern Not Asked    Stress Concern Not Asked    Weight Concern Not Asked    Special Diet Not Asked     Back Care Not Asked    Exercise Not Asked    Bike Helmet Not Asked    Seat Belt Not Asked    Self-Exams Not Asked   Social History Narrative    Not on file     Social Determinants of Health     Financial Resource Strain: Low Risk  (12/4/2023)    Financial Resource Strain     Difficulty of Paying Living Expenses: Not very hard     Med Affordability: No   Food Insecurity: No Food Insecurity (11/29/2023)    Food Insecurity     Food Insecurity: Never true   Transportation Needs: No Transportation Needs (12/4/2023)    Transportation Needs     Lack of Transportation: No   Physical Activity: Not on file   Stress: Not on file   Social Connections: Not on file   Housing Stability: Low Risk  (11/29/2023)    Housing Stability     Housing Instability: No     Housing Instability Emergency: Not on file         Current Medications:    Current Outpatient Medications:     levothyroxine 150 MCG Oral Tab, Take 1 tablet (150 mcg total) by mouth before breakfast., Disp: 90 tablet, Rfl: 0    Meloxicam 15 MG Oral Tab, Take 1 tablet (15 mg total) by mouth every morning., Disp: , Rfl:     ondansetron (ZOFRAN) 4 mg tablet, Take 1 tablet (4 mg total) by mouth every 6 (six) hours as needed for Nausea., Disp: , Rfl:     oxyCODONE 5 MG Oral Tab, Take 1 tablet (5 mg total) by mouth every 6 (six) hours as needed., Disp: , Rfl:     traMADol 50 MG Oral Tab, Take 1 tablet (50 mg total) by mouth every 4 (four) hours as needed for Pain., Disp: , Rfl:     STIMULANT LAXATIVE 8.6-50 MG Oral Tab, , Disp: , Rfl:     apixaban 2.5 MG Oral Tab, Take 1 tablet (2.5 mg total) by mouth 2 (two) times daily., Disp: 60 tablet, Rfl: 0    docusate sodium 100 MG Oral Cap, Take 100 mg by mouth 2 (two) times daily., Disp: , Rfl:     levetiracetam 750 MG Oral Tab, Take 2 tablets (1,500 mg total) by mouth 2 (two) times daily., Disp: , Rfl:     RISPERIDONE OR, Take 2.5 mg by mouth nightly., Disp: , Rfl:     gabapentin 800 MG Oral Tab, Take 0.5 tablets (400 mg total) by mouth  2 (two) times daily., Disp: , Rfl:     sertraline 100 MG Oral Tab, Take 2 tablets (200 mg total) by mouth every morning., Disp: , Rfl:     lamoTRIgine 100 MG Oral Tab, Take by mouth 2 (two) times daily., Disp: , Rfl:     Allergies:  No Known Allergies     Review of Systems:  All other systems reviewed and negative x12    Vital Signs:  /74 (BP Location: Left arm, Patient Position: Sitting, Cuff Size: adult)   Pulse 92   Temp 98 °F (36.7 °C) (Oral)   Resp 16   Ht 1.651 m (5' 5\")   Wt 62.2 kg (137 lb 3.2 oz)   SpO2 100%   BMI 22.83 kg/m²     Physical Examination:  General: Patient is alert and oriented x 3, not in acute distress.  Psych:  Mood and affect appropriate  HEENT: EOMs intact. PERRL. Oropharynx is clear.   Neck: No JVD. No palpable lymphadenopathy. Neck is supple.  Lymphatics: There is no palpable peripheral lymphadenopathy   Chest: Clear to auscultation.  Cardiovascular: Regular rate and rhythm. Normal S1S2  Abdomen: Soft, non tender.   No hepatosplenomegaly.  No palpable mass.  Extremities: Right knee with 2+ swelling and warm to touch.  Neurological: 5/5 motor x4.        Laboratory:  CBC:    Lab Results   Component Value Date    WBC 10.0 01/18/2024    WBC 10.1 12/01/2023    WBC 16.0 (H) 11/30/2023     Lab Results   Component Value Date    HGB 9.7 (L) 01/18/2024    HGB 9.0 (L) 12/01/2023    HGB 9.1 (L) 11/30/2023      Lab Results   Component Value Date    .0 (H) 01/18/2024    .0 12/01/2023    .0 11/30/2023         Lab Results   Component Value Date    GLU 86 01/18/2024    BUN 8 (L) 01/18/2024    BUNCREA 12.1 01/18/2024    CREATSERUM 0.66 01/18/2024    ANIONGAP 3 01/18/2024    GFRNAA 105 04/15/2022    GFRAA 121 04/15/2022    CA 8.4 (L) 01/18/2024    OSMOCALC 274 (L) 01/18/2024    ALKPHO 173 (H) 01/18/2024    AST 12 01/18/2024    ALT 8 (L) 01/18/2024    ALKPHOS 131 (H) 09/21/2016    BILT 0.2 (L) 01/18/2024    TP 7.4 01/18/2024    ALB 3.3 01/18/2024    GLOBULIN 4.1 01/18/2024     AGRATIO 0.9 (L) 09/21/2016     (L) 01/18/2024    K 4.2 01/18/2024     01/18/2024    CO2 21.0 01/18/2024     Radiology:  CT chest abdomen pelvis extensively reviewed no evidence of recurrence/progression    Impression and Plan:  53 year old female with metastatic adenocarcinoma of the left upper lobe of the lung (EGFR,ALK,ROS1 negative).  She has liver and brain metastasis as well as a metastatic lesion to the pancreas. The patient was initially diagnosed in July 2015 with a left occipital brain lesion and is status post resection with adjuvant radiation.  She is also status post a right frontal lobe resection in August 2016.  Her left upper lung primary lesion was treated with lobectomy and mediastinal lymph node dissection on 8/25/2015 (pT1aN0).  She received adjuvant chemo originally with cisplatin and Navelbine being for 1 cycle however this was changed to carboplatin and paclitaxel for cycles 2 through 4 due to issues with febrile neutropenia and hospitalization following cycle 1.  She completed adjuvant chemotherapy in December 2015. In March 2016 she had biopsy-proven metastatic disease to the pancreas.She has been on treatment with nivolumab since April 2016.  She has had issues with recurrent seizures at one point thought to be due to polysubstance abuse and withdrawal later having upper extremity with weakness requiring multiple antiepileptics.  She is following closely with neurology  -Finished dexamethasone taper for right-sided parenchymal inflammatory changes on Brain.  Otherwise brain imaging does not show any new discrete lesions. She had a repeat brain MRI 2/9/2018 [facility] that showed no new lesion.  Seizures are now being managed by neurology at [facility]. She significantly improved neurologically.   She has had excellent radiographic response to nivolumab.  Currently no evidence of disease  Hold nivolumab with severe, recurrent arthropathies to hands.   Continue to hold immunotherapy now she has had recurrence arthropathy from her immunotherapy that is responsive to prednisone-now off.    No evidence of progression of her malignancy; we will continue to monitor off immunotherapy last dose May 2020.  CT as of July 2023 without recurrence.  Repeat labs in 6 months.  CT      -She has anemia with severe iron deficiency in the past she has received IV iron in past.  Repeat iv    -Hypothyroidism secondary to nivolumab,following with endocrinology for mgmt.    -Polysubstance abuse/dependence with seizures.  She is not using any alcohol or illicit drugs at this time.  Neurology ([facility]) is managing her antiepileptics.  --Ortho for right knee injury/replacement at [facility], s/p knee replacement 8/2021    Pulmonary embolus diagnosed incidentally at the end of December 2022.  She had extensive knee surgery roughly a month prior which was likely a provoked event ok now off anticoag.       Data: moderate cbc cmp iron ferritin

## 2024-02-01 ENCOUNTER — OFFICE VISIT (OUTPATIENT)
Dept: HEMATOLOGY/ONCOLOGY | Facility: HOSPITAL | Age: 54
End: 2024-02-01
Attending: INTERNAL MEDICINE
Payer: COMMERCIAL

## 2024-02-01 VITALS
RESPIRATION RATE: 16 BRPM | DIASTOLIC BLOOD PRESSURE: 74 MMHG | HEART RATE: 78 BPM | TEMPERATURE: 98 F | SYSTOLIC BLOOD PRESSURE: 119 MMHG | OXYGEN SATURATION: 99 %

## 2024-02-01 DIAGNOSIS — D50.8 IRON DEFICIENCY ANEMIA SECONDARY TO INADEQUATE DIETARY IRON INTAKE: ICD-10-CM

## 2024-02-01 DIAGNOSIS — Z00.00 ROUTINE HEALTH MAINTENANCE: ICD-10-CM

## 2024-02-01 DIAGNOSIS — C34.90 MALIGNANT NEOPLASM OF LUNG, UNSPECIFIED LATERALITY, UNSPECIFIED PART OF LUNG (HCC): Primary | ICD-10-CM

## 2024-02-01 PROCEDURE — 96365 THER/PROPH/DIAG IV INF INIT: CPT

## 2024-02-01 PROCEDURE — 96376 TX/PRO/DX INJ SAME DRUG ADON: CPT

## 2024-02-01 RX ORDER — HEPARIN SODIUM (PORCINE) LOCK FLUSH IV SOLN 100 UNIT/ML 100 UNIT/ML
5 SOLUTION INTRAVENOUS ONCE
OUTPATIENT
Start: 2024-02-01

## 2024-02-01 RX ORDER — HEPARIN SODIUM (PORCINE) LOCK FLUSH IV SOLN 100 UNIT/ML 100 UNIT/ML
5 SOLUTION INTRAVENOUS ONCE
Status: DISCONTINUED | OUTPATIENT
Start: 2024-02-01 | End: 2024-02-01

## 2024-02-01 NOTE — PROGRESS NOTES
Pt here for Iron Dextran with test dose . Pt denies any issues or concerns.      Ordering MD: Emeka       Pt tolerated infusion without difficulty or complaint. Reviewed next apt date/time: yes, she uses [facility] for her appointments      Education Record  Learner:  Patient  Disease / Diagnosis: Iron deficiency  Barriers / Limitations:  None  Method:  Brief focused and Discussion  General Topics:  Plan of care reviewed, reviewed signs/symptoms of reaction reviewed  Outcome:  Shows understanding

## 2024-04-01 ENCOUNTER — PATIENT MESSAGE (OUTPATIENT)
Dept: ENDOCRINOLOGY CLINIC | Facility: CLINIC | Age: 54
End: 2024-04-01

## 2024-04-01 ENCOUNTER — TELEPHONE (OUTPATIENT)
Dept: HEMATOLOGY/ONCOLOGY | Facility: HOSPITAL | Age: 54
End: 2024-04-01

## 2024-04-01 RX ORDER — LEVOTHYROXINE SODIUM 0.15 MG/1
150 TABLET ORAL
Qty: 90 TABLET | Refills: 0 | Status: SHIPPED | OUTPATIENT
Start: 2024-04-01

## 2024-04-01 NOTE — TELEPHONE ENCOUNTER
Virginia and  calling asking for information on the port. When it was placed and serial number. [email or web address]

## 2024-04-01 NOTE — TELEPHONE ENCOUNTER
Called Virginia back to give her the date the port was inserted per her request. Was unable to find the  date and lot number. She voiced understanding.

## 2024-04-10 ENCOUNTER — TELEPHONE (OUTPATIENT)
Dept: HEMATOLOGY/ONCOLOGY | Facility: HOSPITAL | Age: 54
End: 2024-04-10

## 2024-04-10 NOTE — TELEPHONE ENCOUNTER
Pt's spouse is calling to obtain some information on the pt's power port. Pt has an MRI and they are asking for the serial number, the model number, and when it was done.Spouse prefers a [employer] message-NL

## 2024-05-10 NOTE — TELEPHONE ENCOUNTER
Patient participating in research study as per specific study details below:   IRB#: [id number]  Time Point: Consent   Name of Study: EMPOWER Study  Visit Type: Enrollment    Yes ok to extend

## 2024-06-11 ENCOUNTER — TELEPHONE (OUTPATIENT)
Dept: HEMATOLOGY/ONCOLOGY | Facility: HOSPITAL | Age: 54
End: 2024-06-11

## 2024-06-27 NOTE — PROGRESS NOTES
[facility] dosing service    Initial Pharmacokinetic Consult for Vancomycin Dosing     Td Larson is a 49year old female admitted on 11/13 who is being treated for pneumonia.   Pharmacy has been asked to dose Vancomycin by Dr. Yimi Jordan    She has No Known Consultation - Infectious Disease   Rosemarie Arellano 77 y.o. female MRN: [id number]  Unit/Bed#: -01 Encounter: [id number]      Inpatient consult to Infectious Diseases  Consult performed by: Melissa Jarvis MD  Consult ordered by: Rachelle Shanks MD            VIRTUAL CARE DOCUMENTATION:     1. This service was provided via Telemedicine using [employer] TV Kit     2. Parties in the room with patient during teleconsult RN    3. Confidentiality My office door was closed     4. Participants No one else was in the room    5. Patient acknowledged consent and understanding of privacy and security of the  Telemedicine consult. I informed the patient that I have reviewed their record in Epic and presented the opportunity for them to ask any questions regarding the visit today.  The patient agreed to participate. Patient is confused . We are completing this consult at the request of the primary service.    6. Time spent 5 minutes       Assessment/Recommendations     Acute VZV meningo-encephalitis  Suspected with acute and progressive mental status changes over few weeks with abnormal MRI and positive VZV PCR in CSF. She has associated CSF mild pleiocytosis (CSF WBC 16 and elevated protein of 78). No meningeal signs, no seizures or focal neurologic deficits.  No preceding rash/zoster, no significant immunocompromising conditions     -Continue acyclovir 10 mg/kg/dose renally dosed every 12 hrs   -Anticipate 14 days of iv therapy  -Consult IR for venous access  -Recommend HIV testing  -Serial neuro exams and additional mngt per neurology    Visual loss, rule out retinal necrosis  Bilateral vision loss x 1 month, per primary team, ophthalmology is concerned for uveitis and retinitis, consider secondary to VZV     -Continue antivirals as above and recommend urgent ophthalmology evaluation    Polycythemia vera   On hydroxyurea    Severe protein calorie malnutrition   BMI 13.79    Discussed above plan in detail with the primary  "service who is in agreement.    History     Reason for Consult: Encephalitis  HPI: Rosemarie Arellano is a 77 y.o. year old female with polycythemia vera on hydroxyurea.  History is limited from the patient and is obtained mainly from the medical records.  Patient has had mild cognitive decline following a fall in July of last year but remained independent with her ADLs and ambulation.  However, shortly after Memorial Day, she became persistently confused, did not recognize her family members, had word finding difficulty and vision changes.  She was seen in the ER on 5/27.  Basic workup was unremarkable and follow-up with PCP was advised. An MRI was obtained with an ill defined area of T3/FLAIR hyperintensity in the left hippocampus.  She returned to the ER on 6/24 after her mental status acutely worsened with significant visual hallucinations,paranoia, agitation, word finding difficulty. She underwent an LP and VZV PCR is positive.  Infectious disease is being consulted for diagnostic work up and antibiotic management.    Review of Systems  Pertinent positives and negatives as noted in HPI. Rest ROS could not be completed due to confusion    PAST MEDICAL HISTORY:  Past Medical History:   Diagnosis Date    Cancer (HCC)     Pt stated that she has \"blood cancer\" but is unaware of type    Depression     Hypertension     Meniere disease     Migraine      Past Surgical History:   Procedure Laterality Date    IR LUMBAR PUNCTURE  6/26/2024    NO PAST SURGERIES         FAMILY HISTORY:  Non-contributory    SOCIAL HISTORY:  Social History   /Civil Union  Social History     Substance and Sexual Activity   Alcohol Use Not Currently     Social History     Substance and Sexual Activity   Drug Use Never     Social History     Tobacco Use   Smoking Status Never   Smokeless Tobacco Never       ALLERGIES:  Allergies   Allergen Reactions    Amoxil [Amoxicillin] GI Intolerance    Oxycodone Other (See Comments)     Hallucinations " [address]   Order Status: Resulted Lab Status: In process Updated: 11/13/17 1638   Specimen: Blood from Blood,peripheral    Respiratory Panel FLU expanded Once [[id number]] Collected: 11/13/17 2113   Order Status: Sent Lab Status:  In process Updated: 11/13/17 21       MEDICATIONS:  All current active medications have been reviewed.    Physical Exam     Temp:  [97.3 °F (36.3 °C)-98.1 °F (36.7 °C)] 97.3 °F (36.3 °C)  HR:  [73-76] 76  Resp:  [15-20] 15  BP: (117-158)/(68-95) 117/68  SpO2:  [93 %-97 %] 96 %  Temp (24hrs), Av.8 °F (36.6 °C), Min:97.3 °F (36.3 °C), Max:98.1 °F (36.7 °C)  Current: Temperature: (!) 97.3 °F (36.3 °C)    Intake/Output Summary (Last 24 hours) at 2024 0902  Last data filed at 2024 1303  Gross per 24 hour   Intake 120 ml   Output 200 ml   Net -80 ml         Physical exam findings reported by bedside and primary medical team staff    General Appearance:  Appearing thin and frail and chronically ill, nontoxic, and in no distress, appears stated age   Head:  Normocephalic, without obvious abnormality, atraumatic   Eyes:  PERRL, conjunctiva pink and sclera anicteric, both eyes, severely impaired vision per nursing staff   Nose: Nares normal, mucosa normal, no drainage   Throat: Oropharynx moist without lesions; lips, mucosa, and tongue normal; teeth and gums normal   Neck: Supple, symmetrical, trachea midline, no adenopathy, no tenderness/mass/nodules   Back:   Symmetric, no curvature, ROM normal, no CVA tenderness   Lungs:   Clear to auscultation bilaterally, no audible wheezes, rhonchi and rales, respirations unlabored   Chest Wall:  No tenderness or deformity   Heart:  Regular rate and rhythm, S1, S2 normal, no murmur, rub or gallop   Abdomen:   Soft, flat, non-tender, non-distended, positive bowel sounds, no masses, no organomegaly    No CVA tenderness   Extremities: Extremities normal, atraumatic, no cyanosis, clubbing or edema   Skin: Skin color, texture, turgor normal, no rashes or lesions. No draining wounds noted.   Lymph nodes: Cervical, supraclavicular, and axillary nodes normal   Neurologic: Alert , not oriented, impulsive, inattentive, not following commands       Invasive Devices:   Peripheral IV 24 Right;Ventral  (anterior) Forearm (Active)   Site Assessment WDL 06/26/24 2017   Dressing Type Transparent 06/26/24 2017   Line Status Flushed 06/26/24 2017   Dressing Status Clean;Dry;Intact 06/26/24 2017   Dressing Change Due 06/28/24 06/26/24 2017   Reason Not Rotated Not due 06/26/24 2017       Labs, Imaging, & Other Studies     Lab Results:    I have personally reviewed pertinent labs.    Results from last 7 days   Lab Units 06/26/24  0626 06/25/24  0514 06/24/24  1839   WBC Thousand/uL 6.04 4.99 7.07   HEMOGLOBIN g/dL 13.8 14.2 15.2   PLATELETS Thousands/uL 236 199 236     Results from last 7 days   Lab Units 06/27/24  0613 06/25/24  0514 06/24/24  1839   POTASSIUM mmol/L 3.3*   < > 4.2   CHLORIDE mmol/L 107   < > 102   CO2 mmol/L 28   < > 29   BUN mg/dL 22   < > 29*   CREATININE mg/dL 0.55*   < > 0.71   EGFR ml/min/1.73sq m 90   < > 81   CALCIUM mg/dL 9.9   < > 10.7*   AST U/L  --   --  27   ALT U/L  --   --  10   ALK PHOS U/L  --   --  68    < > = values in this interval not displayed.     Results from last 7 days   Lab Units 06/26/24  0952 06/24/24  1843 06/24/24  1839   BLOOD CULTURE   --  No Growth at 48 hrs. No Growth at 48 hrs.   GRAM STAIN RESULT  No No Polys or Bacteria seen  --   --        Imaging Studies:   I have personally reviewed pertinent imaging study reports and images in PACS.      EKG, Pathology, and Other Studies:   I have personally reviewed pertinent reports and reviewed external records.    Counseling/Coordination of care:       Total 90 minutes communication with the patient via telehealth.  Labs, medical tests and imaging studies were independently and extensively reviewed by me as noted above in HPI and old records were obtained and summarized as noted above in HPI.   My recommendations were discussed with the patient in detail who verbalized understanding.

## 2024-06-30 RX ORDER — LEVOTHYROXINE SODIUM 0.15 MG/1
150 TABLET ORAL
Qty: 90 TABLET | Refills: 0 | Status: SHIPPED | OUTPATIENT
Start: 2024-06-30

## 2024-07-15 DIAGNOSIS — D50.8 IRON DEFICIENCY ANEMIA SECONDARY TO INADEQUATE DIETARY IRON INTAKE: ICD-10-CM

## 2024-07-15 DIAGNOSIS — C34.90 MALIGNANT NEOPLASM OF LUNG, UNSPECIFIED LATERALITY, UNSPECIFIED PART OF LUNG (HCC): Primary | ICD-10-CM

## 2024-07-16 ENCOUNTER — HOSPITAL ENCOUNTER (OUTPATIENT)
Dept: CT IMAGING | Facility: HOSPITAL | Age: 54
Discharge: HOME | End: 2024-07-16
Attending: INTERNAL MEDICINE
Payer: COMMERCIAL

## 2024-07-16 ENCOUNTER — NURSE ONLY (OUTPATIENT)
Dept: HEMATOLOGY/ONCOLOGY | Facility: HOSPITAL | Age: 54
End: 2024-07-16
Attending: INTERNAL MEDICINE
Payer: COMMERCIAL

## 2024-07-16 DIAGNOSIS — C79.31 MALIGNANT NEOPLASM METASTATIC TO BRAIN (HCC): ICD-10-CM

## 2024-07-16 DIAGNOSIS — Z51.81 MEDICATION MONITORING ENCOUNTER: ICD-10-CM

## 2024-07-16 DIAGNOSIS — C34.90 MALIGNANT NEOPLASM OF LUNG, UNSPECIFIED LATERALITY, UNSPECIFIED PART OF LUNG (HCC): Primary | ICD-10-CM

## 2024-07-16 DIAGNOSIS — D50.9 IRON DEFICIENCY ANEMIA, UNSPECIFIED IRON DEFICIENCY ANEMIA TYPE: ICD-10-CM

## 2024-07-16 DIAGNOSIS — D50.8 IRON DEFICIENCY ANEMIA SECONDARY TO INADEQUATE DIETARY IRON INTAKE: ICD-10-CM

## 2024-07-16 DIAGNOSIS — C34.90 PRIMARY MALIGNANT NEOPLASM OF LUNG METASTATIC TO OTHER SITE, UNSPECIFIED LATERALITY (HCC): ICD-10-CM

## 2024-07-16 LAB
ALBUMIN SERPL-MCNC: 3.4 G/DL (ref 3.2–4.8)
ALBUMIN/GLOB SERPL: 1 {RATIO} (ref 1–2)
ALP LIVER SERPL-CCNC: 164 U/L
ALT SERPL-CCNC: 10 U/L
ANION GAP SERPL CALC-SCNC: 6 MMOL/L (ref 0–18)
AST SERPL-CCNC: 18 U/L (ref ?–34)
BASOPHILS # BLD AUTO: 0.04 X10(3) UL (ref 0–0.2)
BASOPHILS NFR BLD AUTO: 0.4 %
BILIRUB SERPL-MCNC: <0.2 MG/DL (ref 0.3–1.2)
BUN BLD-MCNC: 13 MG/DL (ref 9–23)
BUN/CREAT SERPL: 17.8 (ref 10–20)
CALCIUM BLD-MCNC: 8.1 MG/DL (ref 8.7–10.4)
CHLORIDE SERPL-SCNC: 116 MMOL/L (ref 98–112)
CO2 SERPL-SCNC: 17 MMOL/L (ref 21–32)
CREAT BLD-MCNC: 0.73 MG/DL
DEPRECATED HBV CORE AB SER IA-ACNC: 106.7 NG/ML
DEPRECATED RDW RBC AUTO: 52.5 FL (ref 35.1–46.3)
EGFRCR SERPLBLD CKD-EPI 2021: 98 ML/MIN/1.73M2 (ref 60–?)
EOSINOPHIL # BLD AUTO: 0.11 X10(3) UL (ref 0–0.7)
EOSINOPHIL NFR BLD AUTO: 1.1 %
ERYTHROCYTE [DISTWIDTH] IN BLOOD BY AUTOMATED COUNT: 14.5 % (ref 11–15)
FASTING STATUS PATIENT QL REPORTED: NO
GLOBULIN PLAS-MCNC: 3.3 G/DL (ref 2–3.5)
GLUCOSE BLD-MCNC: 95 MG/DL (ref 70–99)
HCT VFR BLD AUTO: 31.6 %
HGB BLD-MCNC: 10.8 G/DL
IMM GRANULOCYTES # BLD AUTO: 0.06 X10(3) UL (ref 0–1)
IMM GRANULOCYTES NFR BLD: 0.6 %
IRON SATN MFR SERPL: 27 %
IRON SERPL-MCNC: 45 UG/DL
LYMPHOCYTES # BLD AUTO: 3.71 X10(3) UL (ref 1–4)
LYMPHOCYTES NFR BLD AUTO: 36.9 %
MCH RBC QN AUTO: 34.6 PG (ref 26–34)
MCHC RBC AUTO-ENTMCNC: 34.2 G/DL (ref 31–37)
MCV RBC AUTO: 101.3 FL
MONOCYTES # BLD AUTO: 0.83 X10(3) UL (ref 0.1–1)
MONOCYTES NFR BLD AUTO: 8.3 %
NEUTROPHILS # BLD AUTO: 5.3 X10 (3) UL (ref 1.5–7.7)
NEUTROPHILS # BLD AUTO: 5.3 X10(3) UL (ref 1.5–7.7)
NEUTROPHILS NFR BLD AUTO: 52.7 %
OSMOLALITY SERPL CALC.SUM OF ELEC: 288 MOSM/KG (ref 275–295)
PLATELET # BLD AUTO: 361 10(3)UL (ref 150–450)
POTASSIUM SERPL-SCNC: 3.2 MMOL/L (ref 3.5–5.1)
PROT SERPL-MCNC: 6.7 G/DL (ref 5.7–8.2)
RBC # BLD AUTO: 3.12 X10(6)UL
SODIUM SERPL-SCNC: 139 MMOL/L (ref 136–145)
TIBC SERPL-MCNC: 165 UG/DL (ref 250–425)
TRANSFERRIN SERPL-MCNC: 111 MG/DL (ref 250–380)
WBC # BLD AUTO: 10.1 X10(3) UL (ref 4–11)

## 2024-07-16 PROCEDURE — 85025 COMPLETE CBC W/AUTO DIFF WBC: CPT

## 2024-07-16 PROCEDURE — 84466 ASSAY OF TRANSFERRIN: CPT

## 2024-07-16 PROCEDURE — 83540 ASSAY OF IRON: CPT

## 2024-07-16 PROCEDURE — 74177 CT ABD & PELVIS W/CONTRAST: CPT | Performed by: INTERNAL MEDICINE

## 2024-07-16 PROCEDURE — 71260 CT THORAX DX C+: CPT | Performed by: INTERNAL MEDICINE

## 2024-07-16 PROCEDURE — 80053 COMPREHEN METABOLIC PANEL: CPT

## 2024-07-16 PROCEDURE — 36591 DRAW BLOOD OFF VENOUS DEVICE: CPT

## 2024-07-16 PROCEDURE — 82728 ASSAY OF FERRITIN: CPT

## 2024-07-16 RX ORDER — SODIUM CHLORIDE 9 MG/ML
10 INJECTION, SOLUTION INTRAMUSCULAR; INTRAVENOUS; SUBCUTANEOUS ONCE
Status: CANCELLED | OUTPATIENT
Start: 2024-07-16

## 2024-07-16 RX ORDER — HEPARIN SODIUM (PORCINE) LOCK FLUSH IV SOLN 100 UNIT/ML 100 UNIT/ML
5 SOLUTION INTRAVENOUS ONCE
Status: CANCELLED | OUTPATIENT
Start: 2024-07-16

## 2024-07-16 RX ORDER — HEPARIN SODIUM (PORCINE) LOCK FLUSH IV SOLN 100 UNIT/ML 100 UNIT/ML
500 SOLUTION INTRAVENOUS ONCE
Status: COMPLETED | OUTPATIENT
Start: 2024-07-16 | End: 2024-07-16

## 2024-07-16 RX ORDER — SODIUM CHLORIDE 9 MG/ML
10 INJECTION, SOLUTION INTRAMUSCULAR; INTRAVENOUS; SUBCUTANEOUS ONCE
OUTPATIENT
Start: 2024-07-16

## 2024-07-16 RX ORDER — HEPARIN SODIUM (PORCINE) LOCK FLUSH IV SOLN 100 UNIT/ML 100 UNIT/ML
5 SOLUTION INTRAVENOUS ONCE
Status: DISCONTINUED | OUTPATIENT
Start: 2024-07-16 | End: 2024-07-16

## 2024-07-16 RX ADMIN — HEPARIN SODIUM (PORCINE) LOCK FLUSH IV SOLN 100 UNIT/ML 500 UNITS: 100 SOLUTION INTRAVENOUS at 13:29:00

## 2024-07-16 NOTE — PROGRESS NOTES
Patient arrived for labs and PORT access for CT.   Labs drawn. Port deaccessed in CT per patient.

## 2024-07-17 ENCOUNTER — TELEPHONE (OUTPATIENT)
Dept: HEMATOLOGY/ONCOLOGY | Facility: HOSPITAL | Age: 54
End: 2024-07-17

## 2024-07-17 NOTE — TELEPHONE ENCOUNTER
Per after hour message the patient's port ws deactivated in the CT SCAN room. FYI 7/16/24 at 439 pm message was taken. 7/17/24 gr

## 2024-07-18 ENCOUNTER — APPOINTMENT (OUTPATIENT)
Dept: HEMATOLOGY/ONCOLOGY | Facility: HOSPITAL | Age: 54
End: 2024-07-18
Attending: INTERNAL MEDICINE
Payer: COMMERCIAL

## 2024-07-21 NOTE — PROGRESS NOTES
[facility]ematology Oncology Group Progress Note      Patient Name: Virginia Sinclair   YOB: 1970  Medical Record Number: [id number]  Attending Physician: Moises Armstrong M.D.     The 21st Century Cures Act makes medical notes like these available to patients in the interest of transparency. Please be advised this is a medical document. Medical documents are intended to carry relevant information, facts as evident, and the clinical opinion of the practitioner. The medical note is intended as peer to peer communication and may appear blunt or direct. It is written in medical language and may contain abbreviations or verbiage that are unfamiliar.      Date of Visit: 7/22/2024      Chief Complaint  Adenocarcinoma of lung, stage IV - follow up.     Oncologic History  Virginia Sinclair is a 53 year old female who had a chest xray on 11/02/2014 after presenting to the ED with cough which showed a masslike density in the superior retrosternal region. Patient was advised to have a follow up CT scan but failed to do so.     She next presented to the ED on 07/15/2016 with headache and right visual field cut. Non-contrast head CT showed a 2.5 cm dominant mass in the left occipital lobe with associated edema and at least one other lesion. MRI brain w/wo contrast on the same day showed right frontal and left occipital masses with associated vasogenic edema and intralesional hemorrhage.     CT chest, abdomen, pelvis w contrast on 07/16/20215 showed a lobulated mass in the left upper lobe but no evidence of locoregional or distant metastasis.     On 07/20/2015 she underwent left occipital craniotomy. Pathology showed TTF-1 positive adenocarcinoma. The tumor was EGFR and ALK negative.     PET/CT scan on 08/01/2015 showed abnormal uptake in the left upper lobe but no other sites concerning for malignancy,.     In 08/2015 she underwent CyberKnife radiosurgery to both the right frontal and left occipital lesions.      On 08/28/2015 she underwent left upper lobectomy and mediastinal lymph node dissection. Pathology showed adenocarcinoma in the left upper lobe and no metastases to the resected lymph nodes.     Beginning on 10/12/2015 she received adjuvant cisplatin and vinorelbine for 1 cycle. On 11/09/2015 she switched to carboplatin and paclitaxel for treatment related toxicities. She received a total of 3 cycles.     PET/CT imaging on 01/18/2016 showed uptake in L4 and T10, in two foci in the pancreas, in a portocaval node. On 03/10/2016 she underwent EUS with biopsy of the pancreas.  Pathology showed metastatic poorly differentiated adenocarcinoma consistent with lung primary. Molecular testing showed no rearrangements of EGFR, ALK, or ROS-1.     On 04/07/2016 she began therapy with nivolumab. PET/CT scan on 06/07/2016 showed complete response with no abnormal SUV uptake. Treatment was complicated by hypothyroidism.     In 05/2017, she was diagnosed with iron deficiency anemia. In 11/2017 she was started on anti-epileptic therapy for seizures.      In 02/2018, she was admitted to the hospital with seizure despite anti-seizure therapy. Due to possible dural inflammation seen on MRI, patient was started on steroid therapy.     Nivolumab was restarted on 05/18/2018. She received her last dose on 06/05/2020. Therapy was discontinued due to recurrent arthritis requiring steroid therapy.     She was subsequently followed with active surveillance.     In 12/2022 she was incidentally found to have a PE one month after knee surgery. No lower extremity Dopplers were performed. She was treated with a limited course of anticoagulation.     History of Present Illness  Patient returns for scheduled follow up. She reports that recently she has had more frequent and prolonged seizures. She continues to follow with neurology at [facility]. She has no new pain. She has no new respiratory complaints. She denies unexplained weight loss.      Performance Status   Karnofsky 80% - Normal activity, but requires effort.    Past Medical History (historical data, reviewed by physician)   Past Medical History:    Anemia    Brain cancer (HCC)    Depression    Disorder of thyroid    Exposure to radiation    DISCONTINUED AUG 2015     Gallbladder disease    Hepatitis C    High blood pressure    History of blood transfusion    after childbirth [facility]    Hypothyroid    Lung cancer (HCC)    Migraines    Osteoarthritis    Pancreatic cancer (HCC)    Personal history of antineoplastic chemotherapy    EVERY 2 WEEKS BEGINNING 2015     Pneumonia, organism unspecified(486)    Pulmonary embolism (HCC)    Seizure disorder (HCC)    can't remeber last one    Visual impairment    tunnel vision    Wears glasses     Past Surgical History (historical data, reviewed by physician)   Past Surgical History:   Procedure Laterality Date    Appendectomy  01/01/1999    Brain surgery  07/01/2015    TUMOR RESECTION    Brain surgery Left     OCCIPITAL CRANIOTOMY    Brain surgery Left     LOBECTOMY    Brain surgery  08/01/2016    REMOVAL OF DEAD TISSUE     Cholecystectomy  01/01/2009    Cyst removal      BREAST, BENIGN    Hemorrhoidectomy      Hysterectomy      heavy menstrual flow,     Knee replacement surgery Left 12/08/2022    @ [facility]    Knee surgery Left 08/30/2023    left knee resection with antibiotic spacer placement    Lumpectomy right  01/01/2012    FIBROADENOMA    Other  08/07/2015    CYBERKNIFE    Xs port-a-cath insertion/exch/chk       Family History (historical data, reviewed by physician)   Family History   Problem Relation Age of Onset    Blood Disorder Mother     Stroke Mother     Clotting Disorder Mother     Psychiatric Mother     Cancer Father         PROSTATE     Social History (historical data, reviewed by physician)   Social History     Socioeconomic History    Marital status:    Occupational History    Occupation: SELF EMPLOYED   Tobacco Use    Smoking  status: Former     Current packs/day: 0.00     Average packs/day: 1.5 packs/day for 40.0 years (60.1 ttl pk-yrs)     Types: Cigarettes     Start date: 1985     Quit date: 10/31/2023     Years since quittin.7     Passive exposure: Past (15)    Smokeless tobacco: Never   Vaping Use    Vaping status: Never Used   Substance and Sexual Activity    Alcohol use: Not Currently    Drug use: No    Sexual activity: Yes     Partners: Male   Other Topics Concern    Caffeine Concern Yes     Comment: 2 or 1 cup of coffee daily     Current Medications    LEVOTHYROXINE 150 MCG Oral Tab TAKE 1 TABLET BY MOUTH BEFORE BREAKFAST. 90 tablet 0    traMADol 50 MG Oral Tab Take 1 tablet (50 mg total) by mouth every 4 (four) hours as needed for Pain.      levetiracetam 750 MG Oral Tab Take 2 tablets (1,500 mg total) by mouth 2 (two) times daily.      RISPERIDONE OR Take 2.5 mg by mouth nightly.      gabapentin 800 MG Oral Tab Take 0.5 tablets (400 mg total) by mouth 2 (two) times daily.      sertraline 100 MG Oral Tab Take 2 tablets (200 mg total) by mouth every morning.      lamoTRIgine 100 MG Oral Tab Take by mouth 2 (two) times daily.       Allergies   Ms. Sinclair has No Known Allergies.    Vital Signs   BP 90/67 (BP Location: Left arm, Patient Position: Sitting, Cuff Size: adult)   Pulse 88   Temp 98.6 °F (37 °C) (Oral)   Resp 16   Ht 1.651 m (5' 5\")   Wt 56.7 kg (125 lb)   SpO2 99%   BMI 20.80 kg/m²     Physical Examination   Constitutional      Thin; no apparent distress.   Head                   Normocephalic and atraumatic.  Eyes                   Conjunctiva clear; sclera anicteric.  ENMT                 External nose normal; external ears normal.  Neck                   Supple, without masses.  Respiratory          Normal effort; no respiratory distress; lungs clear to auscultation bilaterally.  Cardiovascular     Regular rate and rhythm; normal S1S2.  Abdomen            Non-tender; non-distended; no masses; no fluid  wave; no hepatosplenomegaly.  Extremities          No lower extremity edema.  Neurologic           Motor and sensory grossly intact.  Psychiatric          Mood and affect appropriate.    Laboratory   Recent Results (from the past 336 hour(s))   IRON AND TIBC [E]    Collection Time: 07/16/24  7:34 AM   Result Value Ref Range    Iron 45 (L) 50 - 170 ug/dL    Transferrin 111 (L) 250 - 380 mg/dL    Total Iron Binding Capacity 165 (L) 250 - 425 ug/dL    % Saturation 27 15 - 50 %   FERRITIN [E]    Collection Time: 07/16/24  7:34 AM   Result Value Ref Range    Ferritin 106.7 18.0 - 340.0 ng/mL   COMP METABOLIC PANEL [E]    Collection Time: 07/16/24  7:34 AM   Result Value Ref Range    Glucose 95 70 - 99 mg/dL    Sodium 139 136 - 145 mmol/L    Potassium 3.2 (L) 3.5 - 5.1 mmol/L    Chloride 116 (H) 98 - 112 mmol/L    CO2 17.0 (L) 21.0 - 32.0 mmol/L    Anion Gap 6 0 - 18 mmol/L    BUN 13 9 - 23 mg/dL    Creatinine 0.73 0.55 - 1.02 mg/dL    BUN/CREA Ratio 17.8 10.0 - 20.0    Calcium, Total 8.1 (L) 8.7 - 10.4 mg/dL    Calculated Osmolality 288 275 - 295 mOsm/kg    eGFR-Cr 98 >=60 mL/min/1.73m2    ALT 10 10 - 49 U/L    AST 18 <=34 U/L    Alkaline Phosphatase 164 (H) 41 - 108 U/L    Bilirubin, Total <0.2 (L) 0.3 - 1.2 mg/dL    Total Protein 6.7 5.7 - 8.2 g/dL    Albumin 3.4 3.2 - 4.8 g/dL    Globulin  3.3 2.0 - 3.5 g/dL    A/G Ratio 1.0 1.0 - 2.0    Patient Fasting for CMP? No    CBC W/ DIFFERENTIAL    Collection Time: 07/16/24  7:34 AM   Result Value Ref Range    WBC 10.1 4.0 - 11.0 x10(3) uL    RBC 3.12 (L) 3.80 - 5.30 x10(6)uL    HGB 10.8 (L) 12.0 - 16.0 g/dL    HCT 31.6 (L) 35.0 - 48.0 %    .3 (H) 80.0 - 100.0 fL    MCH 34.6 (H) 26.0 - 34.0 pg    MCHC 34.2 31.0 - 37.0 g/dL    RDW-SD 52.5 (H) 35.1 - 46.3 fL    RDW 14.5 11.0 - 15.0 %    .0 150.0 - 450.0 10(3)uL    Neutrophil Absolute Prelim 5.30 1.50 - 7.70 x10 (3) uL    Neutrophil Absolute 5.30 1.50 - 7.70 x10(3) uL    Lymphocyte Absolute 3.71 1.00 - 4.00 x10(3)  uL    Monocyte Absolute 0.83 0.10 - 1.00 x10(3) uL    Eosinophil Absolute 0.11 0.00 - 0.70 x10(3) uL    Basophil Absolute 0.04 0.00 - 0.20 x10(3) uL    Immature Granulocyte Absolute 0.06 0.00 - 1.00 x10(3) uL    Neutrophil % 52.7 %    Lymphocyte % 36.9 %    Monocyte % 8.3 %    Eosinophil % 1.1 %    Basophil % 0.4 %    Immature Granulocyte % 0.6 %     Radiology   [facility]  Department of Radiology  [address] [place] [address]  [phone]      Name: Yahir Virginia Casas Dr: Dylon Hendrickson MD  : 1970    Age/Sex: 53 year old Female  Pt. Phone: [phone]  Exam Date: 2024  Procedure: CT CHEST+ABDOMEN+PELVIS(ALL CNTRST ONLY)(PDK=03970/90221)   -----------------------------------------------------------------------------------------------------------------------------------------------                  Dylon Hendrickson MD  No address on file      Interpretation   PROCEDURE:CT CHEST ABDOMEN PELVIS (ALL CONTRAST ONLY) (CPT=71260/81313)     COMPARISON: NM PET CT HEAD TO THIGH, 2016, 9:09 AM.  NM PET CT HEAD TO THIGH, 2015, 10:48 AM.  NM PET CT HEAD TO THIGH PF, 8/15/2016, 9:34 AM.  NM PET CT HEAD TO THIGH PF, 2016, 9:38 AM.  NM PET CT HEAD TO THIGH PF, 3/28/2016, 8:34 AM.  NM   PF BONE SCAN PLANAR, 2016, 10:35 AM.  NM PF BONE SCAN PLANAR, 2015, 12:49 PM.  CT CHEST ABD PELV W CONTRAST, 2015, 8:36 AM.  CT CHEST PAIN/PE W CONTRAST, 10/27/2015, 11:05 PM.  CT ABD & PELVIS WWO CON, 2016, 2:46 PM.  CT ABDOMEN &   PELVIS W CON, 10/23/2015, 10:14 PM.  CT CHEST+ABDOMEN+PELVIS(ALL CNTRST ONLY)(CPT=71260/59682), 12/15/2021, 3:01 PM.  CT CHEST+ABDOMEN+PELVIS(ALL CNTRST ONLY)(CPT=71260/85741), 2021, 10:13 AM.  CT CHEST+ABDOMEN+PELVIS(ALL CNTRST   ONLY)(CPT=71260/15848), 2021, 3:08 PM.  CT CHEST+ABDOMEN+PELVIS(ALL CNTRST ONLY)(CPT=71260/07922), 3/11/2021, 2:14 PM.  CT CHEST+ABDOMEN+PELVIS(ALL CNTRST ONLY)(CPT=71260/72817), 2020, 2:56 PM.  CT  CHEST+ABDOMEN+PELVIS(ALL CNTRST   ONLY)(CPT=71260/61958), 7/29/2020, 11:44 AM.  CT CHEST+ABDOMEN+PELVIS(ALL CNTRST ONLY)(CPT=71260/15050), 3/12/2020, 9:33 AM.  CT CHEST+ABDOMEN+PELVIS(ALL CNTRST ONLY)(CPT=71260/84127), 9/27/2019, 9:14 AM.  CT CHEST (CPT=71250), 2/08/2019, 12:59 PM.  CT   CHEST+ABDOMEN+PELVIS(ALL CNTRST ONLY)(CPT=71260/77734), 10/17/2018, 11:45 AM.  CT CHEST+ABDOMEN+PELVIS(ALL CNTRST ONLY)(CPT=71260/51314), 3/25/2018, 9:26 AM.  US ABDOMEN LIMITED (CPT=76705), 12/18/2017, 10:16 AM.  CT ABDOMEN + PELVIS (CONTRAST ONLY)   (CPT=74177), 12/13/2017, 2:20 PM.  PET/CT STANDARD BODY SCAN (CPT=78815), 2/21/2017, 8:20 AM.  PET/CT STANDARD BODY SCAN (CPT=78815), 11/14/2016, 8:31 AM.  [facility], CT CHEST+ABDOMEN+PELVIS(ALL CNTRST ONLY)(CPT=71260/05048), 7/07/2023,   2:49 PM.  [facility], CT CHEST+ABDOMEN+PELVIS(ALL CNTRST ONLY)(CPT=71260/22934), 12/21/2022, 12:59 PM.  [facility], CT CHEST+ABDOMEN+PELVIS(ALL CNTRST ONLY)(CPT=71260/03112), 4/01/2022, 1:04 PM.  [facility], CT CHEST W CONTRAST, 10/18/2011, 9:50 PM.     INDICATIONS:Left upper lobe adenocarcinoma, status post left upper lobectomy, complicated by metastatic disease; iron deficiency anemia (D50.9, Z51.81, C34.90).     TECHNIQUE:    Multidetector CT images of the chest, abdomen and pelvis were obtained with intravenous contrast material. Automated exposure control for dose reduction was used. Adjustment of the mA and/or kV was done based on the patient's size. Iterative   reconstruction technique for dose reduction was employed. Dose information was transmitted to the ACR (American College of Radiology) NRDR (National Radiology Data Registry), which includes the Dose Index Registry.       FINDINGS:  DEVICES:There is an accessed right-sided Port-A-Cath with tip terminating near the cavoatrial junction.  CARDIAC:The heart is not enlarged. Aortic annular calcifications are  observed. There may be lipomatous hypertrophy of the interatrial septum.  VASCULATURE:            The thoracic aorta has unremarkable configuration without evidence of dissection. Trace atherosclerotic vascular calcifications are present.                 Postoperative truncation of the left upper lobar pulmonary artery is noted.  LUNGS/PLEURA:Postoperative changes of left upper lobectomy are demonstrated. There is left hemithoracic volume loss. Pleuroparenchymal scarring is re-identified at the inferolateral periphery of the left lung. There is dependent subsegmental atelectasis   bilaterally. There is redemonstration of a fat-containing posteromedial right-sided Bochdalek hernia.                No airspace consolidation, pleural effusion, or pneumothorax is detected.    AIRWAYS:Postoperative truncation of the left upper lobe bronchus is evident. The tracheobronchial tree is without central mass or obstructing lesion.  MEDIASTINUM/ROBINSON:    Slight leftward mediastinal shift is seen secondary to left hemithoracic volume loss. No mass or lymphadenopathy.    CHEST WALL:  No axillary mass or lymphadenopathy.       LIVER:  The liver is enlarged, measuring 21.5 cm craniocaudally. Nonspecific low density of the hepatic parenchyma may represent underlying hepatic steatosis. Isolated low density adjacent to the falciform ligament is characteristic for more pronounced   focal fatty change. Again seen are lobulated lesions of the left hepatic lobe with apparent discontinuous nodular enhancement. Grossly, these are not substantially changed in the interim allowing for differences in technical factors.  BILIARY:            The gallbladder is surgically absent with cholecystectomy clips in the gallbladder fossa. There is extrahepatic biliary dilatation measuring up to 1.3 cm, essentially unchanged.   PANCREAS:      No lesion, fluid collection, ductal dilatation, or atrophy.    SPLEEN:           No enlargement. There is a stable  1.4 x 1.7 x 1.7 cm splenic hypodensity.    ADRENALS:      No defined mass or abnormal enlargement.    KIDNEYS:          Symmetric enhancement is seen without evidence of hydronephrosis or underlying solid masses. There is a well circumscribed hypodense lesion at the upper pole of the right kidney which is too small to characterize, but is stable and   statistically likely represents a cyst.     GI/MESENTERY:           An intraduodenal lipoma measures 0.7 x 1.2 x 1.6 cm, essentially unchanged. A short segment of bowel partially protrudes through a small left lower quadrant ventral abdominal wall hernia just lateral to the left rectus sheath. There is no   evidence of bowel obstruction. The appendix is not seen, consistent with the provided history of appendectomy. Submucosal fat deposition is demonstrated throughout the colon, most pronounced in the cecum.  URINARY BLADDER:Incompletely distended without visible calculus. Mild circumferential bladder wall thickening may relate to underdistention.  PELVIC NODES:            Stable borderline bilateral external iliac chain lymph nodes are present, measuring 1.3 x 1.2 cm and 1.0 x 1.6 cm on the right and 1.1 x 2.6 cm on the left (series 2, images 175, 179, and 180).   PELVIC ORGANS:         No visible mass. Postoperative changes of hysterectomy are evident. Deep pelvic calcifications likely represent phleboliths.     VASCULATURE:            Scattered atherosclerotic vascular calcifications of the abdominal aorta are observed. No aneurysm is detected.  RETROPERITONEUM:  No mass or lymphadenopathy is apparent.    BONES:             There is bridging of the lateral left 4th and 5th ribs, and narrowing of the intervening intercostal space, compatible with sequela of previous thoracotomy. Mild multilevel degenerative changes of the spine are noted. Degenerative changes of the   shoulders are partially visualized. Serpiginous sclerosis of the right femoral head is seen; there  is no articular surface decompensation. Mild pubic symphyseal degeneration is suggested.  ABDOMINAL WALL:There is a minute fat-containing umbilical hernia. There is slight protrusion of a nonobstructed loop of bowel lateral to the left arcus semilunaris, compatible with a small spigelian hernia. Dependent subcutaneous edema is apparent.  OTHER:             No free air or fluid is seen in the abdomen or pelvis.                   =====  CONCLUSION:   1. Stable examination without evidence of new intrathoracic, intra-abdominal, or intrapelvic metastatic disease.     2. Borderline-sized external iliac chain lymph nodes are grossly similar to previous.     3. Status post left upper lobectomy with left hemithoracic volume loss and chronic pleuroparenchymal scarring.       4. Hepatomegaly with hepatic steatosis and stable lesions of the left hepatic lobe.     5. Avascular necrosis of the right femoral head without evidence of articular surface compromise.     6. A hypoattenuating splenic lesion is unchanged.     7. Intraduodenal lipoma, unchanged.     8. Status post cholecystectomy with stable morphology and caliber of the hepatobiliary tree.     9. Postoperative sequela of prior hysterectomy.     10. Lesser incidental findings as above.           Dictated by (CST): Aleks Hamilton MD on 7/17/2024 at 1:17 PM       Finalized by (CST): Aleks Hamilton MD on 7/17/2024 at 1:36 PM        Impression and Plan   1.   Metastatic adenocarcinoma, left lung: There is no radiographic evidence of recurrent disease. I recommend annual imaging with CT chest, abdomen, pelvis w contrast to complete 10 years post the first negative PET/CT scan - 07/2026. Thereafter, she will be followed with annual low dose non-contrast CT chest.     2.   Hypothyroidism: Developed during therapy with nivolumab. TFTs last checked in 07/2023. In light of macrocytic anemia, check TFTs today.    3.   Macrocytic anemia: Although patient has had a chronic  normocytic normochromic anemia for many years, the macrocytosis first developed in 11/2023 and persists. The following workup has been ordered: reticulocyte count, haptoglobin, vitamin B12, folic acid.     4.   Seizure disorder: Patient reports increased seizure activity recently. MRI brain w/wo contrast is ordered.     5.   Hepatomegaly: CT report describes hepatomegaly. CT one year ago describes the liver span to be normal. I reviewed both images and the liver size is not significantly different.     6.   Hepatitis C: Patient has a history of inactive disease and never required therapy. Last quantitative RNA was in 2021 and was undetectable. Quantitative RNA is ordered today.     7.   Hypocalcemia: Check vitamin D level.    8.   Port-a-cath in place: Patient has not regularly been having her port flushed. We will arrange for such flush every 3 months.    Patient will continue to receive longitudinal care by me for the complex care required for the cancer diagnosis.    Planned Follow Up   Patient will return in 3 months for port-a-cath flush and in 6 months for follow up.    Encounter Time  Pre-charting/reviewing medical records: 30 minutes.  In room with patient obtaining history, performing exam, counseling on diagnosis, and reviewing plan: 30 minutes.  Orders/notes: 5 minutes.  Total time: 65 minutes.    Electronically signed by:    Moises Armstrong M.D.  System Medical Director of Oncology Services  [facility]

## 2024-07-22 ENCOUNTER — NURSE ONLY (OUTPATIENT)
Dept: HEMATOLOGY/ONCOLOGY | Facility: HOSPITAL | Age: 54
End: 2024-07-22
Attending: INTERNAL MEDICINE
Payer: COMMERCIAL

## 2024-07-22 ENCOUNTER — OFFICE VISIT (OUTPATIENT)
Dept: HEMATOLOGY/ONCOLOGY | Facility: HOSPITAL | Age: 54
End: 2024-07-22
Attending: SPECIALIST
Payer: COMMERCIAL

## 2024-07-22 VITALS
TEMPERATURE: 99 F | WEIGHT: 125 LBS | BODY MASS INDEX: 20.83 KG/M2 | RESPIRATION RATE: 16 BRPM | DIASTOLIC BLOOD PRESSURE: 67 MMHG | HEIGHT: 65 IN | HEART RATE: 88 BPM | OXYGEN SATURATION: 99 % | SYSTOLIC BLOOD PRESSURE: 90 MMHG

## 2024-07-22 DIAGNOSIS — C34.90 MALIGNANT NEOPLASM OF LUNG, UNSPECIFIED LATERALITY, UNSPECIFIED PART OF LUNG (HCC): Primary | ICD-10-CM

## 2024-07-22 DIAGNOSIS — B18.2 HEPATITIS C VIRUS CARRIER STATE (HCC): ICD-10-CM

## 2024-07-22 DIAGNOSIS — E83.51 HYPOCALCEMIA: ICD-10-CM

## 2024-07-22 DIAGNOSIS — Z85.118 HISTORY OF LUNG CANCER: ICD-10-CM

## 2024-07-22 DIAGNOSIS — C79.31 METASTASIS TO BRAIN (HCC): Primary | ICD-10-CM

## 2024-07-22 DIAGNOSIS — E03.9 ACQUIRED HYPOTHYROIDISM: ICD-10-CM

## 2024-07-22 DIAGNOSIS — D53.9 MACROCYTIC ANEMIA: ICD-10-CM

## 2024-07-22 LAB
FOLATE SERPL-MCNC: 13.9 NG/ML (ref 5.4–?)
HGB RETIC QN AUTO: 37.9 PG (ref 28.2–36.6)
IMM RETICS NFR: 0.26 RATIO (ref 0.1–0.3)
RETICS # AUTO: 74.5 X10(3) UL (ref 22.5–147.5)
RETICS/RBC NFR AUTO: 2.5 %
T4 FREE SERPL-MCNC: 1.1 NG/DL (ref 0.8–1.7)
TSI SER-ACNC: 0.06 MIU/ML (ref 0.55–4.78)
VIT B12 SERPL-MCNC: 1119 PG/ML (ref 211–911)
VIT D+METAB SERPL-MCNC: 6.9 NG/ML (ref 30–100)

## 2024-07-22 PROCEDURE — 84439 ASSAY OF FREE THYROXINE: CPT | Performed by: SPECIALIST

## 2024-07-22 PROCEDURE — 82607 VITAMIN B-12: CPT | Performed by: SPECIALIST

## 2024-07-22 PROCEDURE — 87522 HEPATITIS C REVRS TRNSCRPJ: CPT | Performed by: SPECIALIST

## 2024-07-22 PROCEDURE — 82306 VITAMIN D 25 HYDROXY: CPT | Performed by: SPECIALIST

## 2024-07-22 PROCEDURE — 84443 ASSAY THYROID STIM HORMONE: CPT | Performed by: SPECIALIST

## 2024-07-22 PROCEDURE — 83010 ASSAY OF HAPTOGLOBIN QUANT: CPT | Performed by: SPECIALIST

## 2024-07-22 PROCEDURE — 85045 AUTOMATED RETICULOCYTE COUNT: CPT | Performed by: SPECIALIST

## 2024-07-22 PROCEDURE — 82746 ASSAY OF FOLIC ACID SERUM: CPT | Performed by: SPECIALIST

## 2024-07-22 RX ORDER — HEPARIN SODIUM (PORCINE) LOCK FLUSH IV SOLN 100 UNIT/ML 100 UNIT/ML
5 SOLUTION INTRAVENOUS ONCE
OUTPATIENT
Start: 2024-07-22

## 2024-07-22 RX ORDER — SODIUM CHLORIDE 9 MG/ML
10 INJECTION, SOLUTION INTRAMUSCULAR; INTRAVENOUS; SUBCUTANEOUS ONCE
OUTPATIENT
Start: 2024-07-22

## 2024-07-22 RX ORDER — HEPARIN SODIUM (PORCINE) LOCK FLUSH IV SOLN 100 UNIT/ML 100 UNIT/ML
5 SOLUTION INTRAVENOUS ONCE
Status: DISCONTINUED | OUTPATIENT
Start: 2024-07-22 | End: 2024-07-22

## 2024-07-23 ENCOUNTER — TELEPHONE (OUTPATIENT)
Dept: HEMATOLOGY/ONCOLOGY | Facility: HOSPITAL | Age: 54
End: 2024-07-23

## 2024-07-23 LAB — HAPTOGLOB SERPL-MCNC: 175 MG/DL (ref 30–200)

## 2024-07-23 NOTE — TELEPHONE ENCOUNTER
Spoke to patient, arranged for port access at 0730 am for MRI Brain arrival at 0800 am for test at 0830 am on 7/30/24.  She verbalizes understanding.

## 2024-07-23 NOTE — TELEPHONE ENCOUNTER
----- Message from Moises Armstrong sent at 7/23/2024  8:30 AM CDT -----  Please call patient.     Let her know that she is vitamin D deficient. I sent to her pharmacy vitamin D 50,000 units once per week x 8 weeks. She should start now. After she finishes, she should do over the counter 2000 units daily.     Also, tell her that her thyroid tests are a bit off. OK to stay on her current dose of levothyroxine but she needs to make an appointment to see her primary to discuss is any adjustment is needed.     I would suggest having her write all this down.     Patti.

## 2024-07-23 NOTE — TELEPHONE ENCOUNTER
Left message for call back for MRI of Brain scheduled for 7/30/24 at [facility] 0800 am with arrival at 0730 am to blue parking lot to [facility].  No eye makeup, confirming if any Implants.

## 2024-07-23 NOTE — TELEPHONE ENCOUNTER
Called patient to let her know per Dr. Armstrong.   Let her know that she is vitamin D deficient. I sent to her pharmacy vitamin D 50,000 units once per week x 8 weeks. She should start now. After she finishes, she should do over the counter 2000 units daily.     Also, tell her that her thyroid tests are a bit off. OK to stay on her current dose of levothyroxine but she needs to make an appointment to see her primary to discuss is any adjustment is needed.     Encouraged her to write information down, but she did not have a pen/paper with her so I will send her a my chart message with above information that she can refer to. She states she does look at her my chart messages.

## 2024-07-29 ENCOUNTER — NURSE ONLY (OUTPATIENT)
Dept: HEMATOLOGY/ONCOLOGY | Facility: HOSPITAL | Age: 54
End: 2024-07-29
Attending: INTERNAL MEDICINE
Payer: COMMERCIAL

## 2024-07-29 DIAGNOSIS — C34.90 MALIGNANT NEOPLASM OF LUNG, UNSPECIFIED LATERALITY, UNSPECIFIED PART OF LUNG (HCC): Primary | ICD-10-CM

## 2024-07-29 PROCEDURE — 96523 IRRIG DRUG DELIVERY DEVICE: CPT

## 2024-07-29 RX ORDER — HEPARIN SODIUM (PORCINE) LOCK FLUSH IV SOLN 100 UNIT/ML 100 UNIT/ML
5 SOLUTION INTRAVENOUS ONCE
Status: DISCONTINUED | OUTPATIENT
Start: 2024-07-29 | End: 2024-07-29

## 2024-07-29 RX ORDER — SODIUM CHLORIDE 9 MG/ML
10 INJECTION, SOLUTION INTRAMUSCULAR; INTRAVENOUS; SUBCUTANEOUS ONCE
OUTPATIENT
Start: 2024-07-29

## 2024-07-29 RX ORDER — HEPARIN SODIUM (PORCINE) LOCK FLUSH IV SOLN 100 UNIT/ML 100 UNIT/ML
5 SOLUTION INTRAVENOUS ONCE
OUTPATIENT
Start: 2024-07-29

## 2024-07-30 ENCOUNTER — HOSPITAL ENCOUNTER (OUTPATIENT)
Dept: MRI IMAGING | Facility: HOSPITAL | Age: 54
Discharge: HOME | End: 2024-07-30
Attending: SPECIALIST
Payer: COMMERCIAL

## 2024-07-30 ENCOUNTER — APPOINTMENT (OUTPATIENT)
Dept: HEMATOLOGY/ONCOLOGY | Facility: HOSPITAL | Age: 54
End: 2024-07-30
Attending: INTERNAL MEDICINE
Payer: COMMERCIAL

## 2024-07-30 DIAGNOSIS — B18.2 HEPATITIS C VIRUS CARRIER STATE (HCC): ICD-10-CM

## 2024-07-30 DIAGNOSIS — C79.31 METASTASIS TO BRAIN (HCC): ICD-10-CM

## 2024-07-30 DIAGNOSIS — E83.51 HYPOCALCEMIA: ICD-10-CM

## 2024-07-30 DIAGNOSIS — D53.9 MACROCYTIC ANEMIA: ICD-10-CM

## 2024-07-30 PROCEDURE — A9575 INJ GADOTERATE MEGLUMI 0.1ML: HCPCS | Performed by: SPECIALIST

## 2024-07-30 PROCEDURE — 70553 MRI BRAIN STEM W/O & W/DYE: CPT | Performed by: SPECIALIST

## 2024-07-30 RX ORDER — DIPHENHYDRAMINE HYDROCHLORIDE 50 MG/ML
10 INJECTION, SOLUTION INTRAMUSCULAR; INTRAVENOUS
Status: COMPLETED | OUTPATIENT
Start: 2024-07-30 | End: 2024-07-30

## 2024-07-30 RX ORDER — HEPARIN SODIUM (PORCINE) LOCK FLUSH IV SOLN 100 UNIT/ML 100 UNIT/ML
500 SOLUTION INTRAVENOUS ONCE
Status: COMPLETED | OUTPATIENT
Start: 2024-07-30 | End: 2024-07-30

## 2024-07-30 RX ADMIN — HEPARIN SODIUM (PORCINE) LOCK FLUSH IV SOLN 100 UNIT/ML 500 UNITS: 100 SOLUTION INTRAVENOUS at 09:46:00

## 2024-07-30 RX ADMIN — DIPHENHYDRAMINE HYDROCHLORIDE 10 ML: 50 INJECTION, SOLUTION INTRAMUSCULAR; INTRAVENOUS at 10:00:00

## 2024-07-31 ENCOUNTER — TELEPHONE (OUTPATIENT)
Dept: HEMATOLOGY/ONCOLOGY | Facility: HOSPITAL | Age: 54
End: 2024-07-31

## 2024-07-31 NOTE — TELEPHONE ENCOUNTER
This RN attempted to reach patient to pass along message from Dr. Armstrong but no answer and left message to call back.    Per Dr. Armstorng, the MRI of the brain, completed on 7/30/24, did not show any signs of metastatic disease. Patient should notify her neurologist that she had MRI completed so they can also review scan as well.

## 2024-09-17 ENCOUNTER — OFFICE VISIT (OUTPATIENT)
Dept: ENDOCRINOLOGY CLINIC | Facility: CLINIC | Age: 54
End: 2024-09-17
Payer: COMMERCIAL

## 2024-09-17 VITALS
HEART RATE: 93 BPM | WEIGHT: 130 LBS | BODY MASS INDEX: 22 KG/M2 | SYSTOLIC BLOOD PRESSURE: 107 MMHG | DIASTOLIC BLOOD PRESSURE: 68 MMHG

## 2024-09-17 DIAGNOSIS — E03.9 HYPOTHYROIDISM, UNSPECIFIED TYPE: Primary | ICD-10-CM

## 2024-09-17 DIAGNOSIS — Z13.1 DIABETES MELLITUS SCREENING: ICD-10-CM

## 2024-09-17 PROCEDURE — 99213 OFFICE O/P EST LOW 20 MIN: CPT | Performed by: INTERNAL MEDICINE

## 2024-09-17 PROCEDURE — 3074F SYST BP LT 130 MM HG: CPT | Performed by: INTERNAL MEDICINE

## 2024-09-17 PROCEDURE — 3078F DIAST BP <80 MM HG: CPT | Performed by: INTERNAL MEDICINE

## 2024-09-17 RX ORDER — LEVOTHYROXINE SODIUM 137 UG/1
137 TABLET ORAL
Qty: 90 TABLET | Refills: 0 | Status: SHIPPED | OUTPATIENT
Start: 2024-09-17

## 2024-09-17 RX ORDER — LEVOTHYROXINE SODIUM 137 UG/1
137 TABLET ORAL
Qty: 90 TABLET | Refills: 0 | Status: SHIPPED | OUTPATIENT
Start: 2024-09-17 | End: 2024-09-17

## 2024-09-17 NOTE — PROGRESS NOTES
FU VISIT:     Patient follows with me for hypothyroidism      She has Stage IV NSCLCA (poorly diff adeno) and is on chemotherapy with Opdivo. She also has a h/o radiation treatment ( brain metastases).    She was noted to have a low FT 4 and elevated TSH levels ( 6/23/2016) and started on LT 4.   Current dose is 150 mcg daily   Weight gain: no , has lost weight loss   Constipation: no   Skin changes: no   Fatigue: no   Muscle aches: no   Diarrhea: no         FH of thyroid disorders: denies     ROS as below  No tremors, no diarrhea,  No heart racing   + weight loss ( since last year has lost about 10-12 pounds)       Past Medical History:    Anemia    Brain cancer (HCC)    Depression    Disorder of thyroid    Exposure to radiation    DISCONTINUED AUG 2015     Gallbladder disease    Hepatitis C    High blood pressure    History of blood transfusion    after childbirth [facility]    Hypothyroid    Lung cancer (HCC)    Migraines    Osteoarthritis    Pancreatic cancer (HCC)    Personal history of antineoplastic chemotherapy    EVERY 2 WEEKS BEGINNING 2015     Pneumonia, organism unspecified(486)    Pulmonary embolism (HCC)    Seizure disorder (HCC)    can't remeber last one    Visual impairment    tunnel vision    Wears glasses     Past Surgical History:   Procedure Laterality Date    Appendectomy  01/01/1999    Brain surgery  07/01/2015    TUMOR RESECTION    Brain surgery Left     OCCIPITAL CRANIOTOMY    Brain surgery Left     LOBECTOMY    Brain surgery  08/01/2016    REMOVAL OF DEAD TISSUE     Cholecystectomy  01/01/2009    Cyst removal      BREAST, BENIGN    Hemorrhoidectomy      Hysterectomy      heavy menstrual flow,     Knee replacement surgery Left 12/08/2022    @ [facility]    Knee surgery Left 08/30/2023    left knee resection with antibiotic spacer placement    Lumpectomy right  01/01/2012    FIBROADENOMA    Other  08/07/2015    CYBERKNIFE    Xs port-a-cath insertion/exch/chk          ALLERGY:  No Known Allergies      PAST MEDICAL, SOCIAL AND FAMILY HISTORY:  See past medical history marked as reviewed.  See past surgical history marked as reviewed.  See past family history marked as reviewed.  See past social history marked as reviewed.     ASSESSMENTS:      REVIEW OF SYSTEMS:  Constitutional: Negative for:  fever, + fatigue,  + weight loss  Eyes: Negative for:  Visual changes, proptosis, blurring  ENT: Negative for:  dysphagia, neck swelling, dysphonia  Respiratory: Negative for:  dyspnea, cough  Cardiovascular: Negative for:  chest pain, palpitations, orthopnea  GI: Negative for:  abdominal pain, nausea, vomiting,  diarrhea, constipation, bleeding  Neurology: Negative for: headache, numbness, weakness  Genito-Urinary: Negative for: dysuria, frequency  Psychiatric: Negative for:  depression, anxiety  Hematology/Lymphatics: Negative for: bruising, lower extremity edema  Endocrine: Negative for: polyuria, polydypsia  Skin: Negative for: rash, blister, cellulitis          PHYSICAL EXAM:     Vitals reviewed    General Appearance:  alert, well developed, in no acute distress  Head: Atraumatic  Eyes:  normal conjunctivae, sclera., normal sclera and normal pupils  Throat/Neck: normal sound to voice. Normal hearing, normal speech, no thyroid enlargement palpated  Respiratory:  Speaking in full sentences, non-labored. no increased work of breathing, no audible wheezing    Neuro: motor grossly intact, moving all extremities without difficulty  Psychiatric:  oriented to time, self, and place  Extremities: no obvious extremity swelling    DATA:         Pertinent data reviewed.         ASSESSMENT AND PLAN:     Patient is a 53  year old female with Stage IV NSCLCA (poorly diff adeno) s/p radiation treatment ( brain) and  Opdivo.  Not on immunotherapy at this time     1. She has hypothyroidism.  TSH is low  Clinically noted weight loss   Clinically no specific symptoms of hypothyroidism  LT4 150 --> 137 mcg daily   Repeat labs in 6  weeks    The patient is aware that she needs to take LT4 daily; every am one hour before breakfast and atleast four hours apart from other meds especially calcium, iron, multivitamins containing Ca/iron  Stressed the importance of compliance with meds  Side effects of noncompliance including the development of myxedema coma and death discussed     H/o pre DM   Last A1c was normal in Sep 2021  Will check an A1c      RTC in 10  months/ sooner depending on results  Call for results       Patient verbalized a complete  understanding of all of the above and did not have any further questions.             Yary Tripathi MD

## 2024-09-27 RX ORDER — LEVOTHYROXINE SODIUM 150 UG/1
150 TABLET ORAL
Qty: 90 TABLET | Refills: 0 | OUTPATIENT
Start: 2024-09-27

## 2024-10-21 ENCOUNTER — APPOINTMENT (OUTPATIENT)
Dept: HEMATOLOGY/ONCOLOGY | Facility: HOSPITAL | Age: 54
End: 2024-10-21
Attending: INTERNAL MEDICINE
Payer: COMMERCIAL

## 2024-11-06 ENCOUNTER — NURSE ONLY (OUTPATIENT)
Dept: HEMATOLOGY/ONCOLOGY | Facility: HOSPITAL | Age: 54
End: 2024-11-06
Attending: INTERNAL MEDICINE
Payer: COMMERCIAL

## 2024-11-06 ENCOUNTER — TELEPHONE (OUTPATIENT)
Dept: ENDOCRINOLOGY CLINIC | Facility: CLINIC | Age: 54
End: 2024-11-06

## 2024-11-06 DIAGNOSIS — Z13.1 DIABETES MELLITUS SCREENING: ICD-10-CM

## 2024-11-06 DIAGNOSIS — E03.9 HYPOTHYROIDISM, UNSPECIFIED TYPE: Primary | ICD-10-CM

## 2024-11-06 DIAGNOSIS — E03.9 HYPOTHYROIDISM, UNSPECIFIED TYPE: ICD-10-CM

## 2024-11-06 LAB
EST. AVERAGE GLUCOSE BLD GHB EST-MCNC: 123 MG/DL (ref 68–126)
HBA1C MFR BLD: 5.9 % (ref ?–5.7)
T3FREE SERPL-MCNC: 2.13 PG/ML (ref 2.4–4.2)
T4 FREE SERPL-MCNC: 0.7 NG/DL (ref 0.8–1.7)
TSI SER-ACNC: 0.06 UIU/ML (ref 0.55–4.78)

## 2024-11-06 PROCEDURE — 84443 ASSAY THYROID STIM HORMONE: CPT

## 2024-11-06 PROCEDURE — 84439 ASSAY OF FREE THYROXINE: CPT

## 2024-11-06 PROCEDURE — 84481 FREE ASSAY (FT-3): CPT

## 2024-11-06 PROCEDURE — 83036 HEMOGLOBIN GLYCOSYLATED A1C: CPT

## 2024-11-06 PROCEDURE — 36591 DRAW BLOOD OFF VENOUS DEVICE: CPT

## 2024-11-06 NOTE — TELEPHONE ENCOUNTER
Thyroid labs indicate need for decrease in dose of LT4  based on TSH level  TSH is low  FT4 is also low--. Has she been missing medication recently?   She is on 137 mcg daily   Please decrease to 125 mcg daily and repeat TSH with reflex in 6 weeks  To call if she has any symptoms of hyperthyroidism please review    A1c is also elevated in the pre DM range  Please offer apt with CDE if she likes   Can also mail information for low carb diet   If she slikes, I can do a Vv to discuss further   If not, then please book apt with me in 4-5 months to FU on this  Thanks

## 2024-11-06 NOTE — TELEPHONE ENCOUNTER
Patient contacted (Name and  of pt verified). All results and recommendations reviewed. Virginia states that she has a history of brain surgery so requested that I review the results with her . All results reviewed with her . He helps her take her levothyroxine in the morning. Confirmed 137 mcg dose. He states she gets the medications pretty consistently but does miss 1-2 doses per week.     Given this information would you still like her to reduce the dose?    Discussed elevated A1C in the pre diabetic range. Offered CDE appt for VV with Dr. Tripathi but they declined at this time. Opted to book 4 month follow up and to work on diet at home. He states she drinks beer daily and eats mostly bagels with cream cheese and salmon. Giphy message sent with information on low carb diet. Discussed small changes she could make in the mean time would be to switch to lite beer or low carb seltzers and replacing bagel with a slice of whole grain toast.

## 2024-11-08 NOTE — TELEPHONE ENCOUNTER
Sorry to hear about the brain surgery feeling better now.  Please make the dose adjustment and repeat labs in 6 weeks.  Please take medication consistently every day thanks.

## 2024-11-11 NOTE — TELEPHONE ENCOUNTER
Left voice message for pt to call back.     Sent [facility] asking pt to confirm which pharmacy to send medication to.     Tsh w/ reflex lab order placed.

## 2024-11-13 RX ORDER — LEVOTHYROXINE SODIUM 125 UG/1
125 TABLET ORAL
Qty: 90 TABLET | Refills: 0 | Status: SHIPPED | OUTPATIENT
Start: 2024-11-13

## 2024-12-16 ENCOUNTER — APPOINTMENT (OUTPATIENT)
Dept: GENERAL RADIOLOGY | Facility: HOSPITAL | Age: 54
End: 2024-12-16
Attending: EMERGENCY MEDICINE
Payer: COMMERCIAL

## 2024-12-16 ENCOUNTER — HOSPITAL ENCOUNTER (INPATIENT)
Facility: HOSPITAL | Age: 54
LOS: 6 days | Discharge: LEFT BEFORE DISCHARGE | End: 2024-12-23
Attending: EMERGENCY MEDICINE | Admitting: INTERNAL MEDICINE
Payer: COMMERCIAL

## 2024-12-16 ENCOUNTER — APPOINTMENT (OUTPATIENT)
Dept: CT IMAGING | Facility: HOSPITAL | Age: 54
End: 2024-12-16
Attending: EMERGENCY MEDICINE
Payer: COMMERCIAL

## 2024-12-16 DIAGNOSIS — J96.01 ACUTE RESPIRATORY FAILURE WITH HYPOXIA (HCC): ICD-10-CM

## 2024-12-16 DIAGNOSIS — G40.901 STATUS EPILEPTICUS (HCC): Primary | ICD-10-CM

## 2024-12-16 LAB
ALBUMIN SERPL-MCNC: 4.7 G/DL (ref 3.2–4.8)
ALP LIVER SERPL-CCNC: 117 U/L
ALT SERPL-CCNC: <7 U/L
ANION GAP SERPL CALC-SCNC: 11 MMOL/L (ref 0–18)
APTT PPP: 24.1 SECONDS (ref 23–36)
AST SERPL-CCNC: 18 U/L (ref ?–34)
BILIRUB DIRECT SERPL-MCNC: <0.1 MG/DL (ref ?–0.3)
BILIRUB SERPL-MCNC: <0.2 MG/DL (ref 0.3–1.2)
BUN BLD-MCNC: 13 MG/DL (ref 9–23)
BUN/CREAT SERPL: 13.3 (ref 10–20)
CALCIUM BLD-MCNC: 9 MG/DL (ref 8.7–10.4)
CHLORIDE SERPL-SCNC: 112 MMOL/L (ref 98–112)
CO2 SERPL-SCNC: 17 MMOL/L (ref 21–32)
CREAT BLD-MCNC: 0.98 MG/DL
EGFRCR SERPLBLD CKD-EPI 2021: 68 ML/MIN/1.73M2 (ref 60–?)
GLUCOSE BLD-MCNC: 157 MG/DL (ref 70–99)
GLUCOSE BLDC GLUCOMTR-MCNC: 162 MG/DL (ref 70–99)
INR BLD: 1.02 (ref 0.8–1.2)
OSMOLALITY SERPL CALC.SUM OF ELEC: 293 MOSM/KG (ref 275–295)
POTASSIUM SERPL-SCNC: 3.9 MMOL/L (ref 3.5–5.1)
PROT SERPL-MCNC: 8.4 G/DL (ref 5.7–8.2)
PROTHROMBIN TIME: 14 SECONDS (ref 11.6–14.8)
SODIUM SERPL-SCNC: 140 MMOL/L (ref 136–145)

## 2024-12-16 PROCEDURE — 99223 1ST HOSP IP/OBS HIGH 75: CPT | Performed by: HOSPITALIST

## 2024-12-16 PROCEDURE — 5A1945Z RESPIRATORY VENTILATION, 24-96 CONSECUTIVE HOURS: ICD-10-PCS | Performed by: EMERGENCY MEDICINE

## 2024-12-16 PROCEDURE — 71045 X-RAY EXAM CHEST 1 VIEW: CPT | Performed by: EMERGENCY MEDICINE

## 2024-12-16 PROCEDURE — 70450 CT HEAD/BRAIN W/O DYE: CPT | Performed by: EMERGENCY MEDICINE

## 2024-12-16 PROCEDURE — 0BH17EZ INSERTION OF ENDOTRACHEAL AIRWAY INTO TRACHEA, VIA NATURAL OR ARTIFICIAL OPENING: ICD-10-PCS | Performed by: EMERGENCY MEDICINE

## 2024-12-16 RX ORDER — LACOSAMIDE 10 MG/ML
200 INJECTION, SOLUTION INTRAVENOUS ONCE
Status: COMPLETED | OUTPATIENT
Start: 2024-12-16 | End: 2024-12-17

## 2024-12-16 RX ORDER — LEVETIRACETAM 500 MG/5ML
1500 INJECTION, SOLUTION, CONCENTRATE INTRAVENOUS EVERY 12 HOURS
Status: DISCONTINUED | OUTPATIENT
Start: 2024-12-17 | End: 2024-12-20

## 2024-12-16 RX ORDER — LACOSAMIDE 10 MG/ML
100 INJECTION, SOLUTION INTRAVENOUS EVERY 12 HOURS
Status: DISCONTINUED | OUTPATIENT
Start: 2024-12-17 | End: 2024-12-20

## 2024-12-16 RX ORDER — ROCURONIUM BROMIDE 10 MG/ML
70 INJECTION, SOLUTION INTRAVENOUS ONCE
Status: COMPLETED | OUTPATIENT
Start: 2024-12-16 | End: 2024-12-16

## 2024-12-16 RX ORDER — ETOMIDATE 2 MG/ML
INJECTION INTRAVENOUS
Status: COMPLETED
Start: 2024-12-16 | End: 2024-12-16

## 2024-12-16 RX ORDER — ETOMIDATE 2 MG/ML
20 INJECTION INTRAVENOUS ONCE
Status: COMPLETED | OUTPATIENT
Start: 2024-12-16 | End: 2024-12-16

## 2024-12-17 PROBLEM — J96.01 ACUTE RESPIRATORY FAILURE WITH HYPOXIA (HCC): Status: ACTIVE | Noted: 2024-12-17

## 2024-12-17 PROBLEM — G40.919 BREAKTHROUGH SEIZURE (HCC): Status: ACTIVE | Noted: 2024-12-17

## 2024-12-17 LAB
AMPHET UR QL SCN: NEGATIVE
ATRIAL RATE: 91 BPM
BASE EXCESS BLD CALC-SCNC: -4.9 MMOL/L (ref ?–2)
BASOPHILS # BLD AUTO: 0.08 X10(3) UL (ref 0–0.2)
BASOPHILS NFR BLD AUTO: 0.6 %
COCAINE UR QL: NEGATIVE
CREAT UR-SCNC: 108.9 MG/DL
DEPRECATED RDW RBC AUTO: 57.2 FL (ref 35.1–46.3)
EOSINOPHIL # BLD AUTO: 0.03 X10(3) UL (ref 0–0.7)
EOSINOPHIL NFR BLD AUTO: 0.2 %
ERYTHROCYTE [DISTWIDTH] IN BLOOD BY AUTOMATED COUNT: 15.9 % (ref 11–15)
FENTANYL UR QL SCN: NEGATIVE
GLUCOSE BLDC GLUCOMTR-MCNC: 96 MG/DL (ref 70–99)
HCO3 BLDA-SCNC: 21.1 MEQ/L (ref 21–27)
HCT VFR BLD AUTO: 38.3 %
HGB BLD-MCNC: 12.5 G/DL
IMM GRANULOCYTES # BLD AUTO: 0.18 X10(3) UL (ref 0–1)
IMM GRANULOCYTES NFR BLD: 1.4 %
LYMPHOCYTES # BLD AUTO: 5.06 X10(3) UL (ref 1–4)
LYMPHOCYTES NFR BLD AUTO: 38.7 %
MCH RBC QN AUTO: 32.4 PG (ref 26–34)
MCHC RBC AUTO-ENTMCNC: 32.6 G/DL (ref 31–37)
MCV RBC AUTO: 99.2 FL
MDMA UR QL SCN: NEGATIVE
MODIFIED ALLEN TEST: POSITIVE
MONOCYTES # BLD AUTO: 0.93 X10(3) UL (ref 0.1–1)
MONOCYTES NFR BLD AUTO: 7.1 %
MRSA DNA SPEC QL NAA+PROBE: NEGATIVE
NEUTROPHILS # BLD AUTO: 6.81 X10 (3) UL (ref 1.5–7.7)
NEUTROPHILS # BLD AUTO: 6.81 X10(3) UL (ref 1.5–7.7)
NEUTROPHILS NFR BLD AUTO: 52 %
O2 CT BLD-SCNC: 14.7 VOL% (ref 15–23)
O2/TOTAL GAS SETTING VFR VENT: 35 %
OPIATES UR QL SCN: NEGATIVE
OXYCODONE UR QL SCN: NEGATIVE
P AXIS: 76 DEGREES
P-R INTERVAL: 184 MS
PCO2 BLDA: 33 MM HG (ref 35–45)
PEEP SETTING VENT: 5 CM H2O
PH BLDA: 7.38 [PH] (ref 7.35–7.45)
PLATELET # BLD AUTO: 312 10(3)UL (ref 150–450)
PO2 BLDA: 79 MM HG (ref 80–100)
PUNCTURE CHARGE: YES
Q-T INTERVAL: 400 MS
QRS DURATION: 80 MS
QTC CALCULATION (BEZET): 492 MS
R AXIS: -26 DEGREES
RBC # BLD AUTO: 3.86 X10(6)UL
RESP RATE: 16 BPM
SAO2 % BLDA: 94.8 % (ref 94–100)
SARS-COV-2 RNA RESP QL NAA+PROBE: NOT DETECTED
SPECIMEN VOL 24H UR: 500 ML
T AXIS: 21 DEGREES
TRIGL SERPL-MCNC: 165 MG/DL (ref 30–149)
VENTRICULAR RATE: 91 BPM
WBC # BLD AUTO: 13.1 X10(3) UL (ref 4–11)

## 2024-12-17 PROCEDURE — 99233 SBSQ HOSP IP/OBS HIGH 50: CPT | Performed by: HOSPITALIST

## 2024-12-17 RX ORDER — POLYETHYLENE GLYCOL 3350 17 G/17G
17 POWDER, FOR SOLUTION ORAL DAILY PRN
Status: DISCONTINUED | OUTPATIENT
Start: 2024-12-17 | End: 2024-12-19

## 2024-12-17 RX ORDER — FAMOTIDINE 10 MG/ML
20 INJECTION, SOLUTION INTRAVENOUS 2 TIMES DAILY
Status: DISCONTINUED | OUTPATIENT
Start: 2024-12-17 | End: 2024-12-23 | Stop reason: ALTCHOICE

## 2024-12-17 RX ORDER — ACETAMINOPHEN 10 MG/ML
1000 INJECTION, SOLUTION INTRAVENOUS EVERY 6 HOURS PRN
Status: DISCONTINUED | OUTPATIENT
Start: 2024-12-17 | End: 2024-12-23

## 2024-12-17 RX ORDER — LAMOTRIGINE 150 MG/1
150 TABLET ORAL 2 TIMES DAILY
Status: DISCONTINUED | OUTPATIENT
Start: 2024-12-17 | End: 2024-12-19

## 2024-12-17 RX ORDER — LORAZEPAM 2 MG/ML
1 INJECTION INTRAMUSCULAR EVERY 10 MIN PRN
Status: DISCONTINUED | OUTPATIENT
Start: 2024-12-17 | End: 2024-12-23

## 2024-12-17 RX ORDER — ACETAMINOPHEN 650 MG/1
650 SUPPOSITORY RECTAL EVERY 4 HOURS PRN
Status: DISCONTINUED | OUTPATIENT
Start: 2024-12-17 | End: 2024-12-23

## 2024-12-17 RX ORDER — ACETAMINOPHEN 160 MG/5ML
650 SOLUTION ORAL EVERY 4 HOURS PRN
Status: DISCONTINUED | OUTPATIENT
Start: 2024-12-17 | End: 2024-12-23

## 2024-12-17 RX ORDER — HEPARIN SODIUM 5000 [USP'U]/ML
5000 INJECTION, SOLUTION INTRAVENOUS; SUBCUTANEOUS EVERY 12 HOURS
Status: DISCONTINUED | OUTPATIENT
Start: 2024-12-17 | End: 2024-12-23

## 2024-12-17 RX ORDER — SODIUM CHLORIDE 9 MG/ML
INJECTION, SOLUTION INTRAVENOUS CONTINUOUS
Status: DISCONTINUED | OUTPATIENT
Start: 2024-12-17 | End: 2024-12-23

## 2024-12-17 RX ORDER — SENNOSIDES 8.8 MG/5ML
10 LIQUID ORAL 2 TIMES DAILY
Status: DISCONTINUED | OUTPATIENT
Start: 2024-12-17 | End: 2024-12-19

## 2024-12-17 RX ORDER — CHLORHEXIDINE GLUCONATE ORAL RINSE 1.2 MG/ML
15 SOLUTION DENTAL
Status: DISCONTINUED | OUTPATIENT
Start: 2024-12-17 | End: 2024-12-18

## 2024-12-17 RX ORDER — BISACODYL 10 MG
10 SUPPOSITORY, RECTAL RECTAL
Status: DISCONTINUED | OUTPATIENT
Start: 2024-12-17 | End: 2024-12-23

## 2024-12-17 RX ORDER — MINERAL OIL AND PETROLATUM 150; 830 MG/G; MG/G
1 OINTMENT OPHTHALMIC NIGHTLY
Status: DISCONTINUED | OUTPATIENT
Start: 2024-12-17 | End: 2024-12-18

## 2024-12-17 NOTE — PLAN OF CARE
Problem: Safety Risk - Non-Violent Restraints  Goal: Patient will remain free from self-harm  Description: INTERVENTIONS:  - Apply the least restrictive restraint to prevent harm  - Notify patient and family of reasons restraints applied  - Assess for any contributing factors to confusion (electrolyte disturbances, delirium, medications)  - Discontinue any unnecessary medical devices as soon as possible  - Assess the patient's physical comfort, circulation, skin condition, hydration, nutrition and elimination needs   - Reorient and redirection as needed  - Assess for the need to continue restraints  Outcome: Progressing     Problem: Patient Centered Care  Goal: Patient preferences are identified and integrated in the patient's plan of care  Description: Interventions:  - What would you like us to know as we care for you? BRENDA  - Provide timely, complete, and accurate information to patient/family  - Incorporate patient and family knowledge, values, beliefs, and cultural backgrounds into the planning and delivery of care  - Encourage patient/family to participate in care and decision-making at the level they choose  - Honor patient and family perspectives and choices  Outcome: Progressing     Problem: Patient/Family Goals  Goal: Patient/Family Long Term Goal  Description: Patient's Long Term Goal: BRENDA    Interventions:  - See additional Care Plan goals for specific interventions  Outcome: Progressing  Goal: Patient/Family Short Term Goal  Description: Patient's Short Term Goal: BRENDA    Interventions:   - See additional Care Plan goals for specific interventions  Outcome: Progressing     Problem: RESPIRATORY - ADULT  Goal: Achieves optimal ventilation and oxygenation  Description: INTERVENTIONS:  - Assess for changes in respiratory status  - Assess for changes in mentation and behavior  - Position to facilitate oxygenation and minimize respiratory effort  - Oxygen supplementation based on oxygen saturation or ABGs  -  Provide Smoking Cessation handout, if applicable  - Encourage broncho-pulmonary hygiene including cough, deep breathe, Incentive Spirometry  - Assess the need for suctioning and perform as needed  - Assess and instruct to report SOB or any respiratory difficulty  - Respiratory Therapy support as indicated  - Manage/alleviate anxiety  - Monitor for signs/symptoms of CO2 retention  Outcome: Progressing

## 2024-12-17 NOTE — PROGRESS NOTES
Writer contacted EEG on call tech about STAT EEG order. EEG tech told writer she would not be in until 6am this morning. Dr. Brandon notified.

## 2024-12-17 NOTE — SPIRITUAL CARE NOTE
Spiritual Care Visit Note    Patient Name: Virginia Sinclair Date of Spiritual Care Visit: 24   : 1970 Primary Dx: Status epilepticus (HCC)       Referred By: Referral From: Nurse, On Call phone    Spiritual Care Taxonomy:    Intended Effects: Aligning care plan with patient's values    Methods: Offer spiritual/Mormonism support    Interventions: Discuss plan of care;Acknowledge response to difficult experience    Visit Type/Summary:     - Spiritual Care: Responded to a request via the on call phone Consulted with RN prior to visit. Offered empathic listening and emotional support. Provided information regarding how to contact Spiritual Care and left a Spiritual Care information card. Provided support for Patient's spiritual/Mormonism requests. Offered prayer.  remains available for follow up.    Spiritual Care support can be requested via an Epic consult. For urgent/immediate needs, please contact the On Call  at: [place]: ext [phone]    Chaplain Sang.

## 2024-12-17 NOTE — PLAN OF CARE
Restraints in place for safety.     Problem: Safety Risk - Non-Violent Restraints  Goal: Patient will remain free from self-harm  Description: INTERVENTIONS:  - Apply the least restrictive restraint to prevent harm  - Notify patient and family of reasons restraints applied  - Assess for any contributing factors to confusion (electrolyte disturbances, delirium, medications)  - Discontinue any unnecessary medical devices as soon as possible  - Assess the patient's physical comfort, circulation, skin condition, hydration, nutrition and elimination needs   - Reorient and redirection as needed  - Assess for the need to continue restraints  Outcome: Progressing

## 2024-12-17 NOTE — PLAN OF CARE
MRSA (-), Covid-19 PCR (-), PRN ativan given, Versed off, Spiritual Services requested, MRI Brain eval ( r/o new brain mets ), EEG (-) for seizures     Problem: Safety Risk - Non-Violent Restraints  Goal: Patient will remain free from self-harm  Description: INTERVENTIONS:  - Apply the least restrictive restraint to prevent harm  - Notify patient and family of reasons restraints applied  - Assess for any contributing factors to confusion (electrolyte disturbances, delirium, medications)  - Discontinue any unnecessary medical devices as soon as possible  - Assess the patient's physical comfort, circulation, skin condition, hydration, nutrition and elimination needs   - Reorient and redirection as needed  - Assess for the need to continue restraints  Outcome: Progressing     Problem: Patient Centered Care  Goal: Patient preferences are identified and integrated in the patient's plan of care  Description: Interventions:  - What would you like us to know as we care for you? N/A  - Provide timely, complete, and accurate information to patient/family  - Incorporate patient and family knowledge, values, beliefs, and cultural backgrounds into the planning and delivery of care  - Encourage patient/family to participate in care and decision-making at the level they choose  - Honor patient and family perspectives and choices  Outcome: Progressing     Problem: RESPIRATORY - ADULT  Goal: Achieves optimal ventilation and oxygenation  Description: INTERVENTIONS:  - Assess for changes in respiratory status  - Assess for changes in mentation and behavior  - Position to facilitate oxygenation and minimize respiratory effort  - Oxygen supplementation based on oxygen saturation or ABGs  - Provide Smoking Cessation handout, if applicable  - Encourage broncho-pulmonary hygiene including cough, deep breathe, Incentive Spirometry  - Assess the need for suctioning and perform as needed  - Assess and instruct to report SOB or any respiratory  difficulty  - Respiratory Therapy support as indicated  - Manage/alleviate anxiety  - Monitor for signs/symptoms of CO2 retention  Outcome: Progressing

## 2024-12-17 NOTE — CONSULTS
[facility]  [address] [place] [address], [address]  [facility] [address]  [phone]          NEUROLOGY CONSULTATION NOTE    [facility]  part of [facility]    Report of Consultation  Virginia Sinclair Patient Status:  Inpatient     1970 MRN [id number]    Location [facility] 2W/SW Attending Brunilda Victoria MD    Hosp Day # 0 PCP No primary care provider on file.      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  HPI as per chart review, review of outside records, the patient's , and ER attending.  Patient is intubated and sedated and cannot provide history.    Her  reports that patient's been under an increased amount of stress.  Unfortunately, her oldest son was murdered approximately 2 months ago on his 24th birthday.  Their younger 19-year-old son is currently admitted to an inpatient psychiatric facility because of the death of his brother.  Per the patient's  she has had breakthrough seizures in the past.  Per chart review most recently had breakthrough seizures in the summer .  Her dose of lamotrigine was increased to 150 twice daily.  Yesterday, patient came out of the bathroom.  She called out for her  .  She reportedly was standing when she had versive head turning to the left.  Her eyes were \"rolling back,\" and the patient began convulsing.  She had a generalized tonic-clonic seizure that her  states lasted 7 minutes.  He states she has never had a GTC in the past.  Per the emergency department attending the patient's seizure was not broken by midazolam by EMS.  Patient was noted to be hypoxic in the emergency department.  She was intubated before she went to CT.  There was no acute process on her head CT.  Loaded the patient with IV Keppra, IV lacosamide, and IV Depakote.  Her video EEG thus far has been negative for any seizures.  There is been no ictal or interictal activities.  There was concern that approximately 653 this morning the patient was having a seizure.  However this appeared to be muscle artifact on EEG.    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 Admitted to [facility] in April, 2018 with frequent seizures. They start with an intense feeling of fear/doom -> acetone smell -> conscious staring, drooling then, nausea. Minimal impairment of consciousness. She was placed back on keppra 1500mg BID and vimpat 200mg BID with control of seizures. Psychiatry was consulted for her known history of bipolar disorder and auditory hallucinations. Stopped lacosamide (Vimpat) in late December, 2018.     Having more frequent seizures for the past 2 weeks. Gets a feeling of panic, hypersalivation, remains aware. May also have some visual symptomatology. No clear precipitating factors.   Feels fine on the current medication schedule.    Antiepileptic drugs:  levetiracetam (Keppra) 1500/1500  lamotrigine (Lamictal) 100/100  gabapentin (Neurontin) 400/400    Review of Systems: Neurological issues as above. No new cardiac, respiratory, GI,   complaints or musculoskeletal, endocrine or bleeding problems. No new visual problems or ENT problems. No skin rashes or other allergic problems.plan: Lamotrigine level. Increase lamotrigine to 100/150 ->150/150. Report to office in 3-4 weeks. RTC 6 mo months  \"  EEG from [facility]  \"EEG DESCRIPTION   1. Background:   This recording was obtained primarily during excessive drowsiness and   sleep.  Wakefulness was noted only with stimulation. The waking posterior   background activity was an intermittent rhythm of 15-25 microvolts and 8-9   Hertz which was significantly attenuated on the right.  Diffuse, symmetric   beta frequency activity was present.  No normal variants were identified.   The sleep recording included stages I and II.     2. Abnormalities:   Slow Wave Activity   Continuous (greater than 75%), irregular, Diffuse, 15-25 microvolt, 5-7 Hz   slow wave activity was noted..  Also, continuous (greater than 75%),   semi-rhythmical, Regional, 35-45 microvolt, 2.5-4 Hz slow wave activity   was noted  over the right posterior quadrant.     Epileptiform Activity   Abundant,  bursts of  microvolts spikes and spike-wave discharges   were noted.  This activity was seen focally over the right temporal region   and occipital region(maximum voltage at (T6-O2).  Seizures or episodes   recorded: none.     Other Abnormal Patterns   None      3. Activation:   Hyperventilation was not performed.   Photic stimulation was not performed.       IMPRESSION   This EEG is abnormal due to:   1) Continuous mild diffuse slowing   2) Continuous right posterior quadrant severe slowing   3) Abundant right temporal-occipital epileptiform activity     These findings are consistent with right temporal-occipital irritative   pathology superimposed on a mild diffuse encephalopathy. No seizures were   seen. \"       ROS:  Pertinent positive and negatives per HPI.  All others were reviewed and negative.    Past Medical History:    Anemia     Brain cancer (HCC)    Depression    Disorder of thyroid    Exposure to radiation    DISCONTINUED AUG 2015     Gallbladder disease    Hepatitis C    High blood pressure    History of blood transfusion    after childbirth [facility]    Hypothyroid    Lung cancer (HCC)    Migraines    Osteoarthritis    Pancreatic cancer (HCC)    Personal history of antineoplastic chemotherapy    EVERY 2 WEEKS BEGINNING 2015     Pneumonia, organism unspecified(486)    Pulmonary embolism (HCC)    Seizure disorder (HCC)    can't remeber last one    Visual impairment    tunnel vision    Wears glasses       Past Surgical History:   Procedure Laterality Date    Appendectomy  01/01/1999    Brain surgery  07/01/2015    TUMOR RESECTION    Brain surgery Left     OCCIPITAL CRANIOTOMY    Brain surgery Left     LOBECTOMY    Brain surgery  08/01/2016    REMOVAL OF DEAD TISSUE     Cholecystectomy  01/01/2009    Cyst removal      BREAST, BENIGN    Hemorrhoidectomy      Hysterectomy      heavy menstrual flow,     Knee replacement surgery Left 12/08/2022    @ [facility]    Knee surgery Left 08/30/2023    left knee resection with antibiotic spacer placement    Lumpectomy right  01/01/2012    FIBROADENOMA    Other  08/07/2015    CYBERKNIFE    Xs port-a-cath insertion/exch/chk         Family History   Problem Relation Age of Onset    Blood Disorder Mother     Stroke Mother     Clotting Disorder Mother     Psychiatric Mother     Cancer Father         PROSTATE       Social History     Socioeconomic History    Marital status:      Spouse name: Not on file    Number of children: Not on file    Years of education: Not on file    Highest education level: Not on file   Occupational History    Occupation: SELF EMPLOYED   Tobacco Use    Smoking status: Every Day     Current packs/day: 0.00     Average packs/day: 1.5 packs/day for 40.0 years (60.1 ttl pk-yrs)     Types: Cigarettes     Start date: 8/5/1985     Last attempt to quit: 10/31/2023     Years since  quittin.1     Passive exposure: Past (15)    Smokeless tobacco: Never   Vaping Use    Vaping status: Never Used   Substance and Sexual Activity    Alcohol use: Not Currently     Comment: daily- 8beer/whiskey    Drug use: Yes     Types: Cannabis     Comment: smoking    Sexual activity: Yes     Partners: Male   Other Topics Concern     Service Not Asked    Blood Transfusions Not Asked    Caffeine Concern Yes     Comment: 2 or 1 cup of coffee daily    Occupational Exposure Not Asked    Hobby Hazards Not Asked    Sleep Concern Not Asked    Stress Concern Not Asked    Weight Concern Not Asked    Special Diet Not Asked    Back Care Not Asked    Exercise Not Asked    Bike Helmet Not Asked    Seat Belt Not Asked    Self-Exams Not Asked   Social History Narrative    Not on file     Social Drivers of Health     Financial Resource Strain: Low Risk  (2023)    Financial Resource Strain     Difficulty of Paying Living Expenses: Not very hard     Med Affordability: No   Food Insecurity: No Food Insecurity (2024)    Food Insecurity     Food Insecurity: Never true   Transportation Needs: No Transportation Needs (2024)    Transportation Needs     Lack of Transportation: No     Car Seat: Not on file   Physical Activity: Not on file   Stress: Not on file   Social Connections: Unknown (2021)    [facility]    Social Connections     Conversations with friends/family/neighbors per week: Not on file   Housing Stability: Low Risk  (2024)    Housing Stability     Housing Instability: No     Housing Instability Emergency: Not on file     Crib or Bassinette: Not on file       Objective:    Last vitals and weight :  Vitals:    24 1500   BP: 98/66   Pulse: 65   Resp: 18   Temp:      @FLOWCHS(14)@    Exam:   - Mental status: does grimace to noxious stimuli.    does not resist passive eyelid opening.  does not follow commands. . .Right  gaze preference with possible left hemispatial neglect,  - Cranial nerves: Pupils are 4mm briskly constricting to 3mm and equally round and reactive to light  in a well lit room..  . Forced gaze deviation to the right. cannot be overcome by occulocephalics..Gaze is conjugate.. No roving eye movements. Occulocephalics: absent. Facial grimace to retromandibular pressure appears symmetrical.    (+) cough. (+) overbreathing the vent.  - Motor/sensory: Localizes with her right arm briskly with sternal rub.  When her left index finger is pinched the patient tears and grimaces.  She elevates her left shoulder.  Her  reports that her left side is chronically weaker.  She grimaces and tears with nailbed pressure applied to either lower extremity.  No triple flexion.         Inpatient Medications   heparin  5,000 Units Subcutaneous Q12H    ampicillin-sulbactam  3 g Intravenous q6h    senna  10 mL Per NG Tube BID    docusate  100 mg Per NG Tube BID    chlorhexidine gluconate  15 mL Mouth/Throat BID@0800,2000    mineral oil-white petrolatum  1 Application Both Eyes Nightly    famotidine  20 mg Intravenous BID    lacosamide  100 mg Intravenous Q12H    levETIRAcetam  60 mg/kg Intravenous Once    And    levETIRAcetam  1,500 mg Intravenous Q12H    valproate  500 mg Intravenous Q8H          LORazepam    acetaminophen **OR** acetaminophen **OR** acetaminophen    polyethylene glycol (PEG 3350)    bisacodyl      Home Medications  Current Outpatient Medications   Medication Instructions    ergocalciferol (VITAMIN D2) 50,000 Units, Oral, Weekly    gabapentin (NEURONTIN) 400 mg, 2 times daily    lamoTRIgine (LAMICTAL) 200 mg, Oral, 2 times daily    levetiracetam (KEPPRA) 1,500 mg, 2 times daily    levothyroxine (SYNTHROID) 125 mcg, Oral, Before breakfast    RISPERIDONE OR 2.5 mg, Nightly    sertraline (ZOLOFT) 200 mg, Every morning    traMADol (ULTRAM) 50 mg, Every 4 hours PRN        Data reviewed  Laboratory Data:  Lab Results    Component Value Date    WBC 13.1 (H) 12/16/2024    HGB 12.5 12/16/2024    HCT 38.3 12/16/2024    .0 12/16/2024    CREATSERUM 0.98 12/16/2024    BUN 13 12/16/2024     12/16/2024    K 3.9 12/16/2024     12/16/2024    CO2 17.0 (L) 12/16/2024     (H) 12/16/2024    CA 9.0 12/16/2024    ALB 4.7 12/16/2024    ALKPHO 117 (H) 12/16/2024    TP 8.4 (H) 12/16/2024    AST 18 12/16/2024    ALT <7 (L) 12/16/2024    PTT 24.1 12/16/2024    INR 1.02 12/16/2024    PTP 14.0 12/16/2024    T4F 0.7 (L) 11/06/2024    TSH 0.064 (L) 11/06/2024    LIP 25 (L) 10/02/2021     (H) 12/18/2017    ESRML >130 (H) 11/16/2023    CRP 1.00 (H) 11/16/2023    MG 1.8 04/25/2018    PHOS 2.9 12/13/2017    TROP 0.00 04/17/2018    CK 49 10/11/2023    B12 1,119 (H) 07/22/2024    ETOH <3 02/28/2022     Recent Results (from the past 72 hours)   POCT Glucose    Collection Time: 12/16/24  8:39 PM   Result Value Ref Range    POC Glucose  162 (H) 70 - 99 mg/dL   RAINBOW DRAW LAVENDER    Collection Time: 12/16/24  8:53 PM   Result Value Ref Range    Hold Lavender Auto Resulted    RAINBOW DRAW LIGHT GREEN    Collection Time: 12/16/24  8:53 PM   Result Value Ref Range    Hold Lt Green Auto Resulted    RAINBOW DRAW BLUE    Collection Time: 12/16/24  8:53 PM   Result Value Ref Range    Hold Blue Auto Resulted    RAINBOW DRAW GOLD    Collection Time: 12/16/24  8:53 PM   Result Value Ref Range    Hold Gold Auto Resulted    CBC With Differential With Platelet    Collection Time: 12/16/24  8:53 PM   Result Value Ref Range    WBC 13.1 (H) 4.0 - 11.0 x10(3) uL    RBC 3.86 3.80 - 5.30 x10(6)uL    HGB 12.5 12.0 - 16.0 g/dL    HCT 38.3 35.0 - 48.0 %    MCV 99.2 80.0 - 100.0 fL    MCH 32.4 26.0 - 34.0 pg    MCHC 32.6 31.0 - 37.0 g/dL    RDW-SD 57.2 (H) 35.1 - 46.3 fL    RDW 15.9 (H) 11.0 - 15.0 %    .0 150.0 - 450.0 10(3)uL    Neutrophil Absolute Prelim 6.81 1.50 - 7.70 x10 (3) uL    Neutrophil Absolute 6.81 1.50 - 7.70 x10(3) uL     Lymphocyte Absolute 5.06 (H) 1.00 - 4.00 x10(3) uL    Monocyte Absolute 0.93 0.10 - 1.00 x10(3) uL    Eosinophil Absolute 0.03 0.00 - 0.70 x10(3) uL    Basophil Absolute 0.08 0.00 - 0.20 x10(3) uL    Immature Granulocyte Absolute 0.18 0.00 - 1.00 x10(3) uL    Neutrophil % 52.0 %    Lymphocyte % 38.7 %    Monocyte % 7.1 %    Eosinophil % 0.2 %    Basophil % 0.6 %    Immature Granulocyte % 1.4 %   Basic Metabolic Panel (8)    Collection Time: 12/16/24  8:53 PM   Result Value Ref Range    Glucose 157 (H) 70 - 99 mg/dL    Sodium 140 136 - 145 mmol/L    Potassium 3.9 3.5 - 5.1 mmol/L    Chloride 112 98 - 112 mmol/L    CO2 17.0 (L) 21.0 - 32.0 mmol/L    Anion Gap 11 0 - 18 mmol/L    BUN 13 9 - 23 mg/dL    Creatinine 0.98 0.55 - 1.02 mg/dL    BUN/CREA Ratio 13.3 10.0 - 20.0    Calcium, Total 9.0 8.7 - 10.4 mg/dL    Calculated Osmolality 293 275 - 295 mOsm/kg    eGFR-Cr 68 >=60 mL/min/1.73m2   Hepatic Function Panel (7)    Collection Time: 12/16/24  8:53 PM   Result Value Ref Range    AST 18 <34 U/L    ALT <7 (L) 10 - 49 U/L    Alkaline Phosphatase 117 (H) 41 - 108 U/L    Bilirubin, Total <0.2 (L) 0.3 - 1.2 mg/dL    Bilirubin, Direct <0.1 <=0.3 mg/dL    Total Protein 8.4 (H) 5.7 - 8.2 g/dL    Albumin 4.7 3.2 - 4.8 g/dL   Prothrombin Time (PT)    Collection Time: 12/16/24  8:53 PM   Result Value Ref Range    PT 14.0 11.6 - 14.8 seconds    INR 1.02 0.80 - 1.20   PTT, Activated    Collection Time: 12/16/24  8:53 PM   Result Value Ref Range    PTT 24.1 23.0 - 36.0 seconds   MD BLOOD SMEAR CONSULT    Collection Time: 12/16/24  8:53 PM   Result Value Ref Range    MD Blood Smear Consult       Evaluation of the CBC with differential data and the peripheral blood smear demonstrates an absolute lymphocytosis. Lymphocytes are characterized by a polymorphous population comprised predominantly of enlarged, reactive-appearing forms. There are no significant morphologic abnormalities in the red blood cells, other white blood cell subsets,  or platelets.     The differential considerations for the finding of a lymphocytosis consisting of mature-appearing cells includes infection (most commonly viral), drug effects, transient stress and possibly manifestations of a chronic lymphoproliferative disorder (such as chronic lymphocytic leukemia, other).  Recommend clinical correlation and correlation with immunophenotyping of the peripheral blood by flow cytometry if the lymphocytosis has been persistent without apparent cause or if a possible lymphoproliferative disorder is suspected clinically.     Reviewed by Krystyna Rhodes M.D.     Triglycerides    Collection Time: 12/16/24  8:53 PM   Result Value Ref Range    Triglycerides 165 (H) 30 - 149 mg/dL   EKG 12 Lead    Collection Time: 12/16/24 11:20 PM   Result Value Ref Range    Ventricular rate 91 BPM    Atrial rate 91 BPM    P-R Interval 184 ms    QRS Duration 80 ms    Q-T Interval 400 ms    QTC Calculation (Bezet) 492 ms    P Axis 76 degrees    R Axis -26 degrees    T Axis 21 degrees   POCT Glucose    Collection Time: 12/17/24 12:59 AM   Result Value Ref Range    POC Glucose  96 70 - 99 mg/dL   MRSA Screen by PCR    Collection Time: 12/17/24  5:58 AM   Result Value Ref Range    MRSA Screen By PCR Negative Negative   Rapid SARS-CoV-2 by PCR    Collection Time: 12/17/24  9:13 AM    Specimen: Nares; Other   Result Value Ref Range    Rapid SARS-CoV-2 by PCR Not Detected Not Detected     Lab Results   Component Value Date    TRIG 165 12/16/2024     HGBA1C:   Lab Results   Component Value Date    A1C 5.9 (H) 11/06/2024    A1C 5.0 11/21/2023    A1C 5.1 08/18/2023     11/06/2024        Test results/Imaging:   XR CHEST AP PORTABLE  (CPT=71045)    Result Date: 12/16/2024  CONCLUSION:  1. ET tube in satisfactory position. 2. Nasogastric tube in gastric fundus. 3. Right basilar pleural effusion with atelectasis and or pneumonia in the right lung base.    Dictated by (CST): Colt Marin MD on 12/16/2024 at  9:43 PM     Finalized by (CST): Colt Marin MD on 12/16/2024 at 9:46 PM          CT BRAIN OR HEAD (CPT=70450)    Result Date: 12/16/2024  CONCLUSION:  1. No acute intracranial finding. 2. Stable encephalomalacia in right frontal lobe, right parietal occipital region and left occipital lobe. 3. Post right frontal and left occipital craniotomy.    Dictated by (CST): Colt Marin MD on 12/16/2024 at 9:20 PM     Finalized by (CST): Colt Marin MD on 12/16/2024 at 9:28 PM         EKG 12 Lead    Result Date: 12/17/2024  Normal sinus rhythm Normal ECG When compared with ECG of 18-AUG-2023 10:22, No significant change was found Confirmed by LISSETTE CHARLES ([id number]) on 12/17/2024 7:38:02 AM   CT BRAIN OR HEAD (CPT=70450)    Result Date: 12/16/2024  CONCLUSION:  1. No acute intracranial finding. 2. Stable encephalomalacia in right frontal lobe, right parietal occipital region and left occipital lobe. 3. Post right frontal and left occipital craniotomy.    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  She is currently not in status epilepticus, but was earlier.  Her EEG is been negative for any ictal or interictal discharges.  She withdraws to painful stimuli.  Recommend the patient go for MRI of the brain without contrast evaluate for any interval lesions that may be leading to her breakthrough seizure.  Particular given that she  is never had a generalized tonic-clonic seizure.  Will also need to restart her home dose of lamotrigine.  Recommend weaning sedation as quickly as possible to see if the patient is awake enough to be extubated.    Breakthrough seizure  Differential Diagnosis:  New intracranial lesion  Diagnostics:   Mri brain wwo   Video eeg  Lamotrigine level  Therapeutics:     Seizure Disorder/Status Epilepticus   - Etiology unnown    - cEEG ordered    - AEDs levels pending, home AEDs = keppra and lamotrigine    - Continue currents AEDs:     Keppra 1500mg bid    Vimpat 100mg bid    Depakote - will discontinue given interaction w/ lamotrigine   - Further workup includes:  MRI w/ and w/o contrast   - Given loss of consciousness, patient should not drive, swim alone, bathe alone, climb ladders, use bicycles or operate dangerous machinery for 6 months and until cleared by a physician as an outpatient                 This document is not intended to support charting by exception.  Sections left blank in a completed note should be presumed not to have been done.    Disclaimer:   This record was dictated using Dragon software. There may be errors due to voice recognition problems that were not realized and corrected during the completion of the note.        Total critical care time spent exceeds 35 minutes.      Thank you.  Jorge Brandon D.O.   Vascular & General Neurology  12/17/2024  3:59 PM

## 2024-12-17 NOTE — CM/SW NOTE
12/17/24 1600   CM/SW Referral Data   Referral Source    Reason for Referral Discharge planning   Informant Spouse/Significant Other   Medical Hx   Does patient have an established PCP? No   Patient Info   Patient's Home Environment House   Number of Levels in Home 3   Number of Stair in Home 5 steps between each level   Patient lives with Spouse/Significant other   Patient Status Prior to Admission   Independent with ADLs and Mobility Yes   Discharge Needs   Anticipated D/C needs To be determined     Not using any DME equipment.  Independent previously.    CM/SW will continue to follow to determine dc needs.     agreeable to PCP referral line information- to be placed on AVS.    Sandy Almanzar MBA BSN RN CRRN   RN Case Manager  [phone]

## 2024-12-17 NOTE — ED INITIAL ASSESSMENT (HPI)
Patient arrives via EMS with reports of seizure lasting approx 10mnins. Versed given by medics IM.  Per medics seizure stopped and now L sided facial droop noted. Upon arrival patient lethargic, unable to answer questions or follow commands.       MD Polanco at bedside.

## 2024-12-17 NOTE — RESPIRATORY THERAPY NOTE
Pt received on MV, settings follow. ET tube size 7.5 secured at 21cm at the lips. No acute events overnight. RT continue to monitor.      12/17/24 0044   Vent Information   Vent Mode VC/AC   Settings   FiO2 (%) 40 %   Resp Rate (Set) 16   Vt (Set, mL) 500 mL   Waveform Decelerating ramp   PEEP/CPAP (cm H2O) 5 cm H20

## 2024-12-17 NOTE — H&P
[facility]  part of [facility]    History & Physical    Virginia Sinclair Patient Status:  Emergency    1970 MRN [id number]   Location [facility] EMERGENCY DEPARTMENT Attending Jeb Taylor MD   Hosp Day # 0 PCP No primary care provider on file.     Date:  2024  Date of Admission:  2024    Chief Complaint:  Chief Complaint   Patient presents with    Seizure Disorder    Stroke       History of Present Illness:  Virginia Sinclair is a(n) 54 year old female, With a past medical history significant for metastatic lung CA with mets to the brain has been compliant with her medication including seizure medication of late have been feeling unwell, multiple stressors at home as well proceeded to have a full-blown tonic-clonic seizure at home lasting about 15 minutes until EMS came.  She was given benzos, procedures involved however patient was unresponsive agonal breathing in the ER prompting her intubation for airway protection and worsening hypoxia.    History:  Past Medical History:    Anemia    Brain cancer (HCC)    Depression    Disorder of thyroid    Exposure to radiation    DISCONTINUED AUG 2015     Gallbladder disease    Hepatitis C    High blood pressure    History of blood transfusion    after childbirth [facility]    Hypothyroid    Lung cancer (HCC)    Migraines    Osteoarthritis    Pancreatic cancer (HCC)    Personal history of antineoplastic chemotherapy    EVERY 2 WEEKS BEGINNING      Pneumonia, organism unspecified(486)    Pulmonary embolism (HCC)    Seizure disorder (HCC)    can't remeber last one    Visual impairment    tunnel vision    Wears glasses     Past Surgical History:   Procedure Laterality Date    Appendectomy  1999    Brain surgery  2015    TUMOR RESECTION    Brain surgery Left     OCCIPITAL CRANIOTOMY    Brain surgery Left     LOBECTOMY    Brain surgery  2016    REMOVAL OF DEAD TISSUE     Cholecystectomy  2009    Cyst removal       BREAST, BENIGN    Hemorrhoidectomy      Hysterectomy      heavy menstrual flow,     Knee replacement surgery Left 12/08/2022    @ [facility]    Knee surgery Left 08/30/2023    left knee resection with antibiotic spacer placement    Lumpectomy right  01/01/2012    FIBROADENOMA    Other  08/07/2015    CYBERKNIFE    Xs port-a-cath insertion/exch/chk       Family History   Problem Relation Age of Onset    Blood Disorder Mother     Stroke Mother     Clotting Disorder Mother     Psychiatric Mother     Cancer Father         PROSTATE      reports that she has been smoking cigarettes. She started smoking about 39 years ago. She has a 60.1 pack-year smoking history. She has been exposed to tobacco smoke. She has never used smokeless tobacco. She reports that she does not currently use alcohol. She reports current drug use. Drug: Cannabis.    Allergies:  Allergies[1]    Home Medications:  Prior to Admission Medications   Prescriptions Last Dose Informant Patient Reported? Taking?   RISPERIDONE OR   Yes No   Sig: Take 2.5 mg by mouth nightly.   ergocalciferol 1.25 MG (57552 UT) Oral Cap   No No   Sig: Take 1 capsule (50,000 Units total) by mouth once a week.   gabapentin 800 MG Oral Tab   Yes No   Sig: Take 0.5 tablets (400 mg total) by mouth 2 (two) times daily.   lamoTRIgine 100 MG Oral Tab   Yes No   Sig: Take by mouth 2 (two) times daily.   levetiracetam 750 MG Oral Tab   Yes No   Sig: Take 2 tablets (1,500 mg total) by mouth 2 (two) times daily.   levothyroxine 125 MCG Oral Tab   No No   Sig: Take 1 tablet (125 mcg total) by mouth before breakfast.   sertraline 100 MG Oral Tab   Yes No   Sig: Take 2 tablets (200 mg total) by mouth every morning.   traMADol 50 MG Oral Tab   Yes No   Sig: Take 1 tablet (50 mg total) by mouth every 4 (four) hours as needed for Pain.      Facility-Administered Medications: None       Review of Systems:  Unable to assess, patient currently sedated intubated    Physical Exam:  Temp:  [97.8 °F (36.6  °C)] 97.8 °F (36.6 °C)  Pulse:  [] 93  Resp:  [14-28] 19  BP: ()/() 115/83  SpO2:  [90 %-100 %] 100 %  FiO2 (%):  [100 %] 100 %    General: Sedated  Diffuse skin problem:  None.  Eye:  Pupils are equal, round and reactive to light, extraocular movements are intact, Normal conjunctiva.  HENT:  Normocephalic, oral mucosa is moist.  Head:  Normocephalic, atraumatic.  Neck:  Supple, non-tender, no carotid bruit, no jugular venous distention, no lymphadenopathy, no thyromegaly.  Respiratory:  Lungs are clear to auscultation, respirations are non-labored, breath sounds are equal, symmetrical chest wall expansion.  Cardiovascular:  Normal rate, regular rhythm, no murmur, no edema.  Gastrointestinal:  Soft, non-tender, non-distended, normal bowel sounds, no organomegaly.  Lymphatics:  No lymphadenopathy neck, axilla, groin.  Musculoskeletal: Normal range of motion.  normal strength.  Feet:  Normal pulses.  Neurologic: Sedated, no focal deficits, cranial nerves II-XII are grossly intact.  Cognition and Speech: Intubated  Psychiatric: Unable to assess      Laboratory Data:   Lab Results   Component Value Date    WBC 13.1 12/16/2024    HGB 12.5 12/16/2024    HCT 38.3 12/16/2024    .0 12/16/2024    CREATSERUM 0.98 12/16/2024    BUN 13 12/16/2024     12/16/2024    K 3.9 12/16/2024     12/16/2024    CO2 17.0 12/16/2024     12/16/2024    CA 9.0 12/16/2024    ALB 4.7 12/16/2024    ALKPHO 117 12/16/2024    BILT <0.2 12/16/2024    TP 8.4 12/16/2024    AST 18 12/16/2024    ALT <7 12/16/2024    PTT 24.1 12/16/2024    INR 1.02 12/16/2024       Imaging:  XR CHEST AP PORTABLE  (CPT=71045)    Result Date: 12/16/2024  CONCLUSION:  1. ET tube in satisfactory position. 2. Nasogastric tube in gastric fundus. 3. Right basilar pleural effusion with atelectasis and or pneumonia in the right lung base.    Dictated by (CST): Colt Marin MD on 12/16/2024 at 9:43 PM     Finalized by (CST): Colt Marin,  MD on 12/16/2024 at 9:46 PM          CT BRAIN OR HEAD (CPT=70450)    Result Date: 12/16/2024  CONCLUSION:  1. No acute intracranial finding. 2. Stable encephalomalacia in right frontal lobe, right parietal occipital region and left occipital lobe. 3. Post right frontal and left occipital craniotomy.    Dictated by (CST): Colt Marin MD on 12/16/2024 at 9:20 PM     Finalized by (CST): Colt Marin MD on 12/16/2024 at 9:28 PM            Assessment and Plan:    Acute respiratory failure with hypoxia  Currently intubated, continue full ventilatory support, pulmonary consulted.    Status epilepticus  Likely secondary to brain mets, continue Vimpat and Keppra to IV, will use Ativan for breakthrough seizures.  Check Keppra levels consider need for additional antiepileptics    Acute encephalopathy  Likely postictal in nature, will continue to monitor, reevaluate during sedation vacation    Metastatic lung CA with mets to brain  Continue supportive care.    Prophylaxis  SCDs, heparin on hold considering brain mets    CODE STATUS  Full    Primary care physician  No primary care provider on file.    MDM: High, acute illness/severe exacerbation of chronic illness posing threat to life.  IV medications requiring close inpatient monitoring  60 minutes spent on this admission - examining patient, obtaining history, reviewing previous medical records, going over test results/imaging and discussing plan of care. All questions answered.     Disposition  Clinical course will dictate outcome      AKASH RODRIGUEZ MD  12/16/2024  10:29 PM         [1] No Known Allergies

## 2024-12-17 NOTE — CONSULTS
[facility]  part of [facility]    Consult Note    Date:  12/17/2024  Date of Admission:  12/16/2024    Chief Complaint:   Virginia Sinclair is a(n) 54 year old female with seizure.    HPI:   The patient has a history of seizure with metastatic lung cancer with metastases to the brain who now presents with tonic-clonic seizure.  The patient was intubated in the emergency room, started on propofol and benzodiazepines and had further seizure activity at approximately 6:00 this morning.  She was started on Vimpat, Keppra and Depacon.  She had been on Keppra in the outpatient setting.  She can provide no detail.  I know this patient from prior evaluation in the history is obtained per discussion with staff and chart review.    History     Past Medical History:    Anemia    Brain cancer (HCC)    Depression    Disorder of thyroid    Exposure to radiation    DISCONTINUED AUG 2015     Gallbladder disease    Hepatitis C    High blood pressure    History of blood transfusion    after childbirth [facility]    Hypothyroid    Lung cancer (HCC)    Migraines    Osteoarthritis    Pancreatic cancer (HCC)    Personal history of antineoplastic chemotherapy    EVERY 2 WEEKS BEGINNING 2015     Pneumonia, organism unspecified(486)    Pulmonary embolism (HCC)    Seizure disorder (HCC)    can't remeber last one    Visual impairment    tunnel vision    Wears glasses     Past Surgical History:   Procedure Laterality Date    Appendectomy  01/01/1999    Brain surgery  07/01/2015    TUMOR RESECTION    Brain surgery Left     OCCIPITAL CRANIOTOMY    Brain surgery Left     LOBECTOMY    Brain surgery  08/01/2016    REMOVAL OF DEAD TISSUE     Cholecystectomy  01/01/2009    Cyst removal      BREAST, BENIGN    Hemorrhoidectomy      Hysterectomy      heavy menstrual flow,     Knee replacement surgery Left 12/08/2022    @ [facility]    Knee surgery Left 08/30/2023    left knee resection with antibiotic spacer placement    Lumpectomy right   2012    FIBROADENOMA    Other  2015    CYBERKNIFE    Xs port-a-cath insertion/exch/chk       Family History   Problem Relation Age of Onset    Blood Disorder Mother     Stroke Mother     Clotting Disorder Mother     Psychiatric Mother     Cancer Father         PROSTATE     Social History: , 6 children, occasional alcohol, previously was smoking 1 pack/day and had worked as a spiritual and as a medium.  Social History     Socioeconomic History    Marital status:    Occupational History    Occupation: SELF EMPLOYED   Tobacco Use    Smoking status: Every Day     Current packs/day: 0.00     Average packs/day: 1.5 packs/day for 40.0 years (60.1 ttl pk-yrs)     Types: Cigarettes     Start date: 1985     Last attempt to quit: 10/31/2023     Years since quittin.1     Passive exposure: Past (15)    Smokeless tobacco: Never   Vaping Use    Vaping status: Never Used   Substance and Sexual Activity    Alcohol use: Not Currently     Comment: daily- 8beer/whiskey    Drug use: Yes     Types: Cannabis     Comment: smoking    Sexual activity: Yes     Partners: Male   Other Topics Concern    Caffeine Concern Yes     Comment: 2 or 1 cup of coffee daily     Social Drivers of Health     Financial Resource Strain: Low Risk  (2023)    Financial Resource Strain     Difficulty of Paying Living Expenses: Not very hard     Med Affordability: No   Food Insecurity: No Food Insecurity (2024)    Food Insecurity     Food Insecurity: Never true   Transportation Needs: No Transportation Needs (2024)    Transportation Needs     Lack of Transportation: No    [facility]    Social Connections   Housing Stability: Low Risk  (2024)    Housing Stability     Housing Instability: No     Allergies/Medications:   Allergies: Allergies[1]  Medications Prior to Admission   Medication Sig    levothyroxine 125 MCG Oral Tab Take 1 tablet (125  mcg total) by mouth before breakfast.    traMADol 50 MG Oral Tab Take 1 tablet (50 mg total) by mouth every 4 (four) hours as needed for Pain.    levetiracetam 750 MG Oral Tab Take 2 tablets (1,500 mg total) by mouth 2 (two) times daily.    RISPERIDONE OR Take 2.5 mg by mouth nightly.    gabapentin 800 MG Oral Tab Take 0.5 tablets (400 mg total) by mouth 2 (two) times daily.    sertraline 100 MG Oral Tab Take 2 tablets (200 mg total) by mouth every morning.    ergocalciferol 1.25 MG (34344 UT) Oral Cap Take 1 capsule (50,000 Units total) by mouth once a week.    lamoTRIgine 100 MG Oral Tab Take 2 tablets (200 mg total) by mouth 2 (two) times daily.       Review of Systems:   Cannot obtain at this moment    Physical Exam:   Vital Signs:  Blood pressure 120/74, pulse 76, temperature 97.1 °F (36.2 °C), temperature source Temporal, resp. rate 17, height 5' 5\" (1.651 m), weight 143 lb 4.8 oz (65 kg), SpO2 97%, not currently breastfeeding.     Sedate black female  HEENT examination is unremarkable with pupils equal round and reactive to light and accommodation.   Neck without adenopathy, thyromegaly, JVD nor bruit.   Lungs diminished to auscultation and percussion.  Cardiac regular rate and rhythm no murmur.   Abdomen nontender, without hepatosplenomegaly and no mass appreciable.   Extremities without clubbing cyanosis nor edema.   Neurologic sedate on ventilator.  Skin without gross abnormality    Results:     Lab Results   Component Value Date    WBC 13.1 12/16/2024    HGB 12.5 12/16/2024    HCT 38.3 12/16/2024    .0 12/16/2024    CREATSERUM 0.98 12/16/2024    BUN 13 12/16/2024     12/16/2024    K 3.9 12/16/2024     12/16/2024    CO2 17.0 12/16/2024     12/16/2024    CA 9.0 12/16/2024    ALB 4.7 12/16/2024    ALKPHO 117 12/16/2024    BILT <0.2 12/16/2024    TP 8.4 12/16/2024    AST 18 12/16/2024    ALT <7 12/16/2024    PTT 24.1 12/16/2024    INR 1.02 12/16/2024     CT scan the brain-from July  2024 1. Postoperative changes of right frontal and left occipital craniotomy for pathologically proven intracranial metastasis resection are again identified.  Small foci of curvilinear non masslike enhancement along the margins of the right frontal and left   occipital resection cavities appear unchanged since prior contrast enhanced brain MR from 2018 and likely relate to postoperative granulation tissue.  Additional multifocal encephalomalacia is identified throughout the right frontal, both occipital, and   right parietal lobes.  These areas of encephalomalacia demonstrate no other suspicious nodular foci of enhancement to suggest residual/recurrent disease.   2. Punctate 1-2 mm focus of cortical enhancement at the left high frontal lobe, questionably seen on prior contrast enhanced brain MR from 2018 and therefore probably relating to an incidental vascular structure.  Could consider a 3-6 month follow-up MRI    of the brain without and with contrast to ensure stability and exclude the less likely possibility of a micrometastasis.   3. Partially confluent nonenhancing T2 bright white matter changes in the right greater than left cerebral hemispheres, which have increased in extent since 2018 brain MR. Findings likely relate to sequelae of prior radiotherapy.   4. Note that the multiple above described foci of encephalomalacia could certainly represent epileptogenic foci.    CT scan the brain-1. No acute intracranial finding.   2. Stable encephalomalacia in right frontal lobe, right parietal occipital region and left occipital lobe.   3. Post right frontal and left occipital craniotomy.    Chest x-ray-1. ET tube in satisfactory position.   2. Nasogastric tube in gastric fundus.   3. Right basilar pleural effusion with atelectasis and or pneumonia in the right lung base.     Assessment/Plan:   1.  Recurrent seizure-may have recurred again earlier this morning while on the ventilator and while on propofol and  benzodiazepines.    Recommendations:  1.  Antiepileptics as per neurology  2.  Will continue full ventilator since support today  3.  Continuous EEG  4.  Await neurology opinion  5.  Will follow clinically.    2.  Respiratory impairment-patient was intubated for airway protection.  Has a distant history of tobacco as well as lung cancer.  Has some haziness at the left lung base and I cannot rule out an aspiration phenomenon.  Will cover empirically with antibiotic.    Recommendations:  1.  Full ventilator support  2.  Unasyn  3.  Will follow clinically.    3.  DVT prophylaxis-subcutaneous heparin    4.  Metastatic lung cancer-no evidence of recurrent disease.  Patient follows with oncology.    5.  History of PE after knee surgery-as above, we will prophylax    6.  Hepatitis C-will check LFTs    I am delighted to assist with this patient's care.    Merritt Miller MD  Medical Director, Critical Care, [facility]  Medical Director, [facility]  Pager: [phone]  >35 min crit care               [1] No Known Allergies

## 2024-12-17 NOTE — ED PROVIDER NOTES
Patient Seen in: [facility] Emergency Department      History     Chief Complaint   Patient presents with    Seizure Disorder    Stroke     Stated Complaint: Seizure/stroke    Subjective:   HPI          Objective:     Past Medical History:    Anemia    Brain cancer (HCC)    Depression    Disorder of thyroid    Exposure to radiation    DISCONTINUED AUG 2015     Gallbladder disease    Hepatitis C    High blood pressure    History of blood transfusion    after childbirth [facility]    Hypothyroid    Lung cancer (HCC)    Migraines    Osteoarthritis    Pancreatic cancer (HCC)    Personal history of antineoplastic chemotherapy    EVERY 2 WEEKS BEGINNING      Pneumonia, organism unspecified(486)    Pulmonary embolism (HCC)    Seizure disorder (HCC)    can't remeber last one    Visual impairment    tunnel vision    Wears glasses              Past Surgical History:   Procedure Laterality Date    Appendectomy  1999    Brain surgery  2015    TUMOR RESECTION    Brain surgery Left     OCCIPITAL CRANIOTOMY    Brain surgery Left     LOBECTOMY    Brain surgery  2016    REMOVAL OF DEAD TISSUE     Cholecystectomy  2009    Cyst removal      BREAST, BENIGN    Hemorrhoidectomy      Hysterectomy      heavy menstrual flow,     Knee replacement surgery Left 2022    @ [facility]    Knee surgery Left 2023    left knee resection with antibiotic spacer placement    Lumpectomy right  2012    FIBROADENOMA    Other  2015    CYBERKNIFE    Xs port-a-cath insertion/exch/chk                  Social History     Socioeconomic History    Marital status:    Occupational History    Occupation: SELF EMPLOYED   Tobacco Use    Smoking status: Every Day     Current packs/day: 0.00     Average packs/day: 1.5 packs/day for 40.0 years (60.1 ttl pk-yrs)     Types: Cigarettes     Start date: 1985     Last attempt to quit: 10/31/2023     Years since quittin.1     Passive exposure: Past (15)     Smokeless tobacco: Never   Vaping Use    Vaping status: Never Used   Substance and Sexual Activity    Alcohol use: Not Currently     Comment: daily- 8beer/whiskey    Drug use: Yes     Types: Cannabis     Comment: smoking    Sexual activity: Yes     Partners: Male   Other Topics Concern    Caffeine Concern Yes     Comment: 2 or 1 cup of coffee daily     Social Drivers of Health     Financial Resource Strain: Low Risk  (12/4/2023)    Financial Resource Strain     Difficulty of Paying Living Expenses: Not very hard     Med Affordability: No   Food Insecurity: No Food Insecurity (11/29/2023)    Food Insecurity     Food Insecurity: Never true   Transportation Needs: No Transportation Needs (12/4/2023)    Transportation Needs     Lack of Transportation: No    Received from [facility], [facility]    Social Connections   Housing Stability: Low Risk  (11/29/2023)    Housing Stability     Housing Instability: No                  Physical Exam     ED Triage Vitals [12/16/24 2040]   /88   Pulse 115   Resp 18   Temp 97.8 °F (36.6 °C)   Temp src Axillary   SpO2 90 %   O2 Device Non-rebreather mask       Current Vitals:   Vital Signs  BP: 109/74  Pulse: 92  Resp: 16  Temp: 97.8 °F (36.6 °C)  Temp src: Axillary  MAP (mmHg): 86    Oxygen Therapy  SpO2: 100 %  O2 Device: Ventilator  FiO2 (%): 100 %        Physical Exam        ED Course     Labs Reviewed   CBC WITH DIFFERENTIAL WITH PLATELET - Abnormal; Notable for the following components:       Result Value    WBC 13.1 (*)     RDW-SD 57.2 (*)     RDW 15.9 (*)     All other components within normal limits   BASIC METABOLIC PANEL (8) - Abnormal; Notable for the following components:    Glucose 157 (*)     CO2 17.0 (*)     All other components within normal limits   HEPATIC FUNCTION PANEL (7) - Abnormal; Notable for the following components:    ALT <7 (*)     Alkaline Phosphatase 117 (*)     Bilirubin, Total <0.2 (*)     Total Protein 8.4  (*)     All other components within normal limits   POCT GLUCOSE - Abnormal; Notable for the following components:    POC Glucose  162 (*)     All other components within normal limits   PROTHROMBIN TIME (PT) - Normal   PTT, ACTIVATED - Normal   LEVETIRACETAM, S   LAMOTRIGINE (LAMICTAL), SERUM   SCAN SLIDE   MD BLOOD SMEAR CONSULT   RAINBOW DRAW LAVENDER   RAINBOW DRAW LIGHT GREEN   RAINBOW DRAW BLUE   RAINBOW DRAW GOLD     EKG    Rate, intervals and axes as noted on EKG Report.  Rate: 91  Rhythm: Sinus Rhythm  Reading: Normal sinus rhythm without signs of acute ischemia or abnormal intervals.                         MDM      54-year-old female with a history of metastatic lung cancer with brain mets and seizures presents today unresponsive after seizure.  Per EMS report, the patient started having a witnessed generalized tonic-clonic seizure at home.  On their arrival, seizure was continuing for at least 10 minutes.  They gave intramuscular midazolam en route without improvement.  They also noted some possible left sided weakness.  Per , who later provided some collateral, she has been compliant on her medications without recent seizures, no recent illness, no changes to her medication regimen, patient does drink at least 8 alcoholic beverages per day but has not suddenly stopped.    On exam, hypoxic to the high 80s despite 15 L nonrebreather, agonal breathing, no response to painful stimuli, downward gaze bilaterally, equal pupils,    Differential: Status epilepticus, severe postictal state versus sedation status post midazolam, intracranial bleed    I made the decision to intubate the patient for airway protection in order to facilitate CT to rule out intracranial hemorrhage.  Patient was intubated at the first pass attempt with no hypoxia  Patient sent for CT head which showed stable encephalomalacia without hemorrhage.    Labs without significant abnormality.  While in the ED, patient did require  relatively large amounts of propofol for sedation likely secondary to her history of alcohol use.    Pulm/crit and neurology consulted.   Pt admitted to the ICU for further management.     Rapid sequence intubation:   Indication for the procedure was airway protection.  The patient was preoxygenated with 100% oxygen by face mask.  The patient was given the following IV medications:  etomidate, rocuronium .  The patient was orally endotracheally intubated under glidescope visualization with a 7.5 ETT.  In line stabilization was performed during the procedure.  There was good misting on the tube; breath sounds were auscultated equally bilaterally; good color change with [place] [employer] CO2 detector.  Chest X-ray shows ETT in good position.  The procedure was performed by myself.      I spent a total of 45 minutes of critical care time in obtaining history, performing a physical exam, bedside monitoring of interventions, collecting and interpreting tests and discussion with consultants but not including time spent performing procedures.        Admission disposition: 12/16/2024 10:00 PM           MDM    Disposition and Plan     Clinical Impression:  1. Status epilepticus (HCC)    2. Acute respiratory failure with hypoxia (HCC)         Disposition:  Admit  12/16/2024 10:00 pm    Follow-up:  No follow-up provider specified.        Medications Prescribed:  Current Discharge Medication List              Supplementary Documentation:         Hospital Problems       Present on Admission  Date Reviewed: 9/17/2024            ICD-10-CM Noted POA    Status epilepticus (HCC) G40.901 12/16/2024 Unknown

## 2024-12-17 NOTE — ED QUICK NOTES
Verbal order with verbal confirmation to titrate propofol to 60 mcg/kg/min due to patient is restless.

## 2024-12-17 NOTE — PROGRESS NOTES
[facility]  part of [facility]    Progress Note    Virginia Sinclair Patient Status:  Inpatient    1970 MRN [id number]   Location [facility] 2W/SW Attending Brunilda Victoria MD   Hosp Day # 0 PCP No primary care provider on file.     Chief Complaint:   Chief Complaint   Patient presents with    Seizure Disorder    Stroke   Seizure     Subjective:   Virginia Sinclair is intubated sedated on versed and propofol.   On continuous EEG    Objective:   Objective:    Blood pressure 101/67, pulse 58, temperature 98.1 °F (36.7 °C), temperature source Temporal, resp. rate 16, height 5' 5\" (1.651 m), weight 143 lb 4.8 oz (65 kg), SpO2 99%, not currently breastfeeding.    Physical Exam:    General: intubated sedated.   Respiratory: Coarse BS B/L   Cardiovascular: S1, S2. Regular rate and rhythm. No murmurs, rubs or gallops.   Abdomen: Soft, nontender, nondistended.  Positive bowel sounds. No rebound or guarding.  Neurologic: No focal neurological deficits.   Musculoskeletal: Moves all extremities.  Extremities: No edema.      Results:   Results:    Labs:  Recent Labs   Lab 24   WBC 13.1*   HGB 12.5   MCV 99.2   .0   INR 1.02       Recent Labs   Lab 24   *   BUN 13   CREATSERUM 0.98   CA 9.0   ALB 4.7      K 3.9      CO2 17.0*   ALKPHO 117*   AST 18   ALT <7*   BILT <0.2*   TP 8.4*       Estimated Creatinine Clearance: 58.4 mL/min (based on SCr of 0.98 mg/dL).    Recent Labs   Lab 24   PTP 14.0   INR 1.02            Culture:  No results found for this visit on 24.    Cardiac  No results for input(s): \"TROP\", \"PBNP\" in the last 168 hours.      Imaging: Imaging data reviewed in [facility].  XR CHEST AP PORTABLE  (CPT=71045)    Result Date: 2024  CONCLUSION:  1. ET tube in satisfactory position. 2. Nasogastric tube in gastric fundus. 3. Right basilar pleural effusion with atelectasis and or pneumonia in the right lung base.    Dictated by  (CST): Colt Marin MD on 12/16/2024 at 9:43 PM     Finalized by (CST): Colt Marin MD on 12/16/2024 at 9:46 PM          CT BRAIN OR HEAD (CPT=70450)    Result Date: 12/16/2024  CONCLUSION:  1. No acute intracranial finding. 2. Stable encephalomalacia in right frontal lobe, right parietal occipital region and left occipital lobe. 3. Post right frontal and left occipital craniotomy.    Dictated by (CST): Colt Marin MD on 12/16/2024 at 9:20 PM     Finalized by (CST): Colt Marin MD on 12/16/2024 at 9:28 PM           Medications:    heparin  5,000 Units Subcutaneous Q12H    ampicillin-sulbactam  3 g Intravenous q6h    senna  10 mL Per NG Tube BID    docusate  100 mg Per NG Tube BID    chlorhexidine gluconate  15 mL Mouth/Throat BID@0800,2000    mineral oil-white petrolatum  1 Application Both Eyes Nightly    famotidine  20 mg Intravenous BID    lacosamide  100 mg Intravenous Q12H    levETIRAcetam  60 mg/kg Intravenous Once    And    levETIRAcetam  1,500 mg Intravenous Q12H    valproate  500 mg Intravenous Q8H         Assessment and Plan:   Assessment & Plan:        Breakthrough Seizure   Status epilepticus   ICU care.   Neuro and critical care on consult.   Etiology of seizure unknown did have h/o brain mets but had intracranial resection done.   On continuous EEG at this time.   Needs MRI w/ and w/o contrast  Cont Keppra, Vimpat, depakote.   Monitor labs.   Acute respiratory failure   Intubated for airway protection  Pulm on consult.   Cont full vent support at this time   CXR possible L base asp PNA?   Empiric Unasyn at this time.   Metastatic Lung CA   Needs MRI brain.   Will ask Oncology to eval after MRI done. Follows Dr Armstrong   Other medical problems  Hypothyroidism   Hepatitis C   H/o VTE after knee surgery provoked in past    >55min spent, >50% spent counseling and coordinating care in the form of educating pt/family and d/w consultants and staff. Most of the time spent discussing the above plan.         Plan of care discussed with patient or family at bedside.    Brunilda Victoria MD  Hospitalist          Supplementary Documentation:     Quality:  DVT Prophylaxis: heparin   CODE status: Full  Dispo: per clinical course            Estimated date of discharge: TBD  Discharge is dependent on: clinical stability  At this point Ms. Sinclair is expected to be discharge to: TBD

## 2024-12-18 ENCOUNTER — APPOINTMENT (OUTPATIENT)
Dept: GENERAL RADIOLOGY | Facility: HOSPITAL | Age: 54
End: 2024-12-18
Attending: NURSE PRACTITIONER
Payer: COMMERCIAL

## 2024-12-18 ENCOUNTER — APPOINTMENT (OUTPATIENT)
Dept: GENERAL RADIOLOGY | Facility: HOSPITAL | Age: 54
End: 2024-12-18
Attending: INTERNAL MEDICINE
Payer: COMMERCIAL

## 2024-12-18 ENCOUNTER — APPOINTMENT (OUTPATIENT)
Dept: MRI IMAGING | Facility: HOSPITAL | Age: 54
End: 2024-12-18
Attending: Other
Payer: COMMERCIAL

## 2024-12-18 LAB
ALBUMIN SERPL-MCNC: 3.6 G/DL (ref 3.2–4.8)
ALP LIVER SERPL-CCNC: 95 U/L
ALT SERPL-CCNC: <7 U/L
ANION GAP SERPL CALC-SCNC: 12 MMOL/L (ref 0–18)
AST SERPL-CCNC: 12 U/L (ref ?–34)
BASOPHILS # BLD AUTO: 0.06 X10(3) UL (ref 0–0.2)
BASOPHILS NFR BLD AUTO: 0.3 %
BILIRUB DIRECT SERPL-MCNC: <0.1 MG/DL (ref ?–0.3)
BILIRUB SERPL-MCNC: 0.2 MG/DL (ref 0.3–1.2)
BUN BLD-MCNC: 10 MG/DL (ref 9–23)
BUN/CREAT SERPL: 14.7 (ref 10–20)
CALCIUM BLD-MCNC: 8.8 MG/DL (ref 8.7–10.4)
CHLORIDE SERPL-SCNC: 113 MMOL/L (ref 98–112)
CO2 SERPL-SCNC: 17 MMOL/L (ref 21–32)
CREAT BLD-MCNC: 0.68 MG/DL
DEPRECATED RDW RBC AUTO: 55.8 FL (ref 35.1–46.3)
EGFRCR SERPLBLD CKD-EPI 2021: 103 ML/MIN/1.73M2 (ref 60–?)
EOSINOPHIL # BLD AUTO: 0.01 X10(3) UL (ref 0–0.7)
EOSINOPHIL NFR BLD AUTO: 0.1 %
ERYTHROCYTE [DISTWIDTH] IN BLOOD BY AUTOMATED COUNT: 16 % (ref 11–15)
GLUCOSE BLD-MCNC: 140 MG/DL (ref 70–99)
HCT VFR BLD AUTO: 32.3 %
HGB BLD-MCNC: 11 G/DL
IMM GRANULOCYTES # BLD AUTO: 0.16 X10(3) UL (ref 0–1)
IMM GRANULOCYTES NFR BLD: 0.9 %
LYMPHOCYTES # BLD AUTO: 0.96 X10(3) UL (ref 1–4)
LYMPHOCYTES NFR BLD AUTO: 5.5 %
MAGNESIUM SERPL-MCNC: 2 MG/DL (ref 1.6–2.6)
MCH RBC QN AUTO: 32.5 PG (ref 26–34)
MCHC RBC AUTO-ENTMCNC: 34.1 G/DL (ref 31–37)
MCV RBC AUTO: 95.6 FL
MONOCYTES # BLD AUTO: 1.34 X10(3) UL (ref 0.1–1)
MONOCYTES NFR BLD AUTO: 7.7 %
MRSA DNA SPEC QL NAA+PROBE: NEGATIVE
NEUTROPHILS # BLD AUTO: 14.77 X10 (3) UL (ref 1.5–7.7)
NEUTROPHILS # BLD AUTO: 14.77 X10(3) UL (ref 1.5–7.7)
NEUTROPHILS NFR BLD AUTO: 85.5 %
OSMOLALITY SERPL CALC.SUM OF ELEC: 295 MOSM/KG (ref 275–295)
PLATELET # BLD AUTO: 247 10(3)UL (ref 150–450)
POTASSIUM SERPL-SCNC: 3 MMOL/L (ref 3.5–5.1)
POTASSIUM SERPL-SCNC: 3.1 MMOL/L (ref 3.5–5.1)
PROT SERPL-MCNC: 6.7 G/DL (ref 5.7–8.2)
RBC # BLD AUTO: 3.38 X10(6)UL
SODIUM SERPL-SCNC: 142 MMOL/L (ref 136–145)
WBC # BLD AUTO: 17.3 X10(3) UL (ref 4–11)

## 2024-12-18 PROCEDURE — 99233 SBSQ HOSP IP/OBS HIGH 50: CPT | Performed by: HOSPITALIST

## 2024-12-18 PROCEDURE — 71045 X-RAY EXAM CHEST 1 VIEW: CPT | Performed by: NURSE PRACTITIONER

## 2024-12-18 PROCEDURE — 71045 X-RAY EXAM CHEST 1 VIEW: CPT | Performed by: INTERNAL MEDICINE

## 2024-12-18 RX ORDER — ONDANSETRON 2 MG/ML
INJECTION INTRAMUSCULAR; INTRAVENOUS
Status: COMPLETED
Start: 2024-12-18 | End: 2024-12-18

## 2024-12-18 RX ORDER — ONDANSETRON 2 MG/ML
4 INJECTION INTRAMUSCULAR; INTRAVENOUS EVERY 4 HOURS PRN
Status: DISCONTINUED | OUTPATIENT
Start: 2024-12-18 | End: 2024-12-23

## 2024-12-18 RX ORDER — POTASSIUM CHLORIDE 14.9 MG/ML
20 INJECTION INTRAVENOUS ONCE
Status: COMPLETED | OUTPATIENT
Start: 2024-12-18 | End: 2024-12-18

## 2024-12-18 RX ORDER — ONDANSETRON 2 MG/ML
4 INJECTION INTRAMUSCULAR; INTRAVENOUS ONCE
Status: COMPLETED | OUTPATIENT
Start: 2024-12-18 | End: 2024-12-18

## 2024-12-18 NOTE — PROGRESS NOTES
Patient difficult to sedate in preparation for MRI. Writer notified MD about agitation, received orders. Writer contacted MRI to see if they would be able to take patient, was told they will call back when they have room.

## 2024-12-18 NOTE — PROGRESS NOTES
Writer received call from MRI that patient is able to come down. Patient prepped ready to transport, after one pass of endotracheal suctioning, patient vomitted large amount of bile x1, unsafe to take to MRI at this time due to risk of aspiration having to lay flat, will endorse to oncoming shift.

## 2024-12-18 NOTE — PROGRESS NOTES
[facility]  part of [facility]    Progress Note      Assessment and Plan:   1.  Recurrent seizure-no further seizure at present.     Recommendations:  1.  Antiepileptics as per neurology  2.  Awakening and breathing trial today  3.  Off continuous EEG  4.  Will follow clinically.     2.  Respiratory impairment-patient was intubated for airway protection.  Has a distant history of tobacco as well as lung cancer.  Has some haziness at the left lung base and I cannot rule out an aspiration phenomenon.  Will cover empirically with antibiotic.    However, the patient vomited and likely aspirated again.  Will repeat stat chest x-ray.     Recommendations:  1.  Awakening and breathing trial.  2.  Unasyn  3.  Will follow clinically.  4.  May need to forestall plans to extubate with recent regurgitation.  5.  Await chest x-ray.     3.  DVT prophylaxis-subcutaneous heparin     4.  Metastatic lung cancer-no evidence of recurrent disease.  Patient follows with oncology.     5.  History of PE after knee surgery-as above, we will prophylax     6.  Hepatitis C-will check LFTs      Subjective:   Virginia Sinclair is a(n) 54 year old female who had regurgitation and likely aspirated.    Objective:   Blood pressure 137/70, pulse 64, temperature 98 °F (36.7 °C), temperature source Temporal, resp. rate 16, height 5' 5\" (1.651 m), weight 136 lb 3.9 oz (61.8 kg), SpO2 100%, not currently breastfeeding.    Physical Exam sedate white female who is minimally arousable  HEENT examination is unremarkable with pupils equal round and reactive to light and accommodation.   Neck without adenopathy, thyromegaly, JVD nor bruit.   Lungs diminished to auscultation and percussion.  Cardiac regular rate and rhythm no murmur.   Abdomen nontender, without hepatosplenomegaly and no mass appreciable.   Extremities without clubbing cyanosis nor edema.   Neurologic sedate on ventilator.  Skin without gross abnormality     Results:     Lab  Results   Component Value Date    WBC 17.3 12/18/2024    HGB 11.0 12/18/2024    HCT 32.3 12/18/2024    .0 12/18/2024    CREATSERUM 0.68 12/18/2024    BUN 10 12/18/2024     12/18/2024    K 3.0 12/18/2024     12/18/2024    CO2 17.0 12/18/2024     12/18/2024    CA 8.8 12/18/2024    ALB 3.6 12/18/2024    ALKPHO 95 12/18/2024    BILT 0.2 12/18/2024    TP 6.7 12/18/2024    AST 12 12/18/2024    ALT <7 12/18/2024     Chest x-ray-right basilar haziness    Merritt Miller MD  Medical Director, Critical Care, [facility]  Medical Director, [facility]  Pager: [phone]  >35 min crit care

## 2024-12-18 NOTE — PLAN OF CARE
Restraints remain in place for safety.     Problem: Safety Risk - Non-Violent Restraints  Goal: Patient will remain free from self-harm  Description: INTERVENTIONS:  - Apply the least restrictive restraint to prevent harm  - Notify patient and family of reasons restraints applied  - Assess for any contributing factors to confusion (electrolyte disturbances, delirium, medications)  - Discontinue any unnecessary medical devices as soon as possible  - Assess the patient's physical comfort, circulation, skin condition, hydration, nutrition and elimination needs   - Reorient and redirection as needed  - Assess for the need to continue restraints  Outcome: Progressing

## 2024-12-18 NOTE — RESPIRATORY THERAPY NOTE
SAT Safety Screen: Performed by R.NJosé  Sedation holiday Time: 14:07    SBT Safety Screen: Performed by R.T.  No myocardial ischemia in past 24 hours  Spontaneous respiratory effort present    Off or decreasing vasopressors/inotrope doses  Stable hemodynamics, BP  (138/73)  HR    HR = 69  SpO2 > 90%   Saturation = 100    No new arrhythmias    SBT: PASS/FAIL? (PASS)  Start time:  14:25  Settings:  Pressure Support: 5    CPAP: 5    FiO2:  30  Reason for Failure if present: (Delete ALL reasons that do not apply)  RR <10, >35   Patient RR = 11  Respiratory distress (AMU, abdominal paradoxus, diaphoresis, dyspnea)  SpO2 < 90%   Saturation = 100  Acute cardiac arrhythmia  ICP > 20  Mental status change  Excessive secretions  RSBI > 104   Actual RSBI score: 40    Weaning Parameters:  RR: 11  RSBI: 40  NIF: 2.0  VT: 603  VC: -30       Returned to Full support: (Time:14:50)  MD notified: (Physician: DR. Miller)    Pt extubated at 15:10 to 2L NC.

## 2024-12-18 NOTE — RESPIRATORY THERAPY NOTE
Patient received intubated and on ventilator VC 16/500/+5/35%. Bilateral breath sounds auscultated. Suction provided when indicated. No acute events during the night. RT will continue to monitor.       12/18/24 0417   Readings   Total RR 16   Minute Ventilation (L/min) 8.1 L/min   Expiratory Tidal Volume 505 mL   PIP Observed (cm H2O) 26 cm H2O   MAP (cm H2O) 9.8

## 2024-12-18 NOTE — PLAN OF CARE
Pt extubated at 1518. Very tired but arousable. Moves all extremities.  On Room air. Pt did have episode of emesis this AM which delayed trial.  updated.  Problem: Safety Risk - Non-Violent Restraints  Goal: Patient will remain free from self-harm  Description: INTERVENTIONS:  - Apply the least restrictive restraint to prevent harm  - Notify patient and family of reasons restraints applied  - Assess for any contributing factors to confusion (electrolyte disturbances, delirium, medications)  - Discontinue any unnecessary medical devices as soon as possible  - Assess the patient's physical comfort, circulation, skin condition, hydration, nutrition and elimination needs   - Reorient and redirection as needed  - Assess for the need to continue restraints  12/18/2024 1654 by Rody Spring RN  Outcome: Progressing  12/18/2024 1359 by Rody Spring RN  Outcome: Progressing     Problem: Patient Centered Care  Goal: Patient preferences are identified and integrated in the patient's plan of care  Description: Interventions:  - What would you like us to know as we care for you?   - Provide timely, complete, and accurate information to patient/family  - Incorporate patient and family knowledge, values, beliefs, and cultural backgrounds into the planning and delivery of care  - Encourage patient/family to participate in care and decision-making at the level they choose  - Honor patient and family perspectives and choices  Outcome: Progressing     Problem: Patient/Family Goals  Goal: Patient/Family Long Term Goal  Description: Patient's Long Term Goal:     Interventions:  -   - See additional Care Plan goals for specific interventions  Outcome: Progressing  Goal: Patient/Family Short Term Goal  Description: Patient's Short Term Goal:     Interventions:   - See additional Care Plan goals for specific interventions  Outcome: Progressing     Problem: RESPIRATORY - ADULT  Goal: Achieves optimal ventilation and  oxygenation  Description: INTERVENTIONS:  - Assess for changes in respiratory status  - Assess for changes in mentation and behavior  - Position to facilitate oxygenation and minimize respiratory effort  - Oxygen supplementation based on oxygen saturation or ABGs  - Provide Smoking Cessation handout, if applicable  - Encourage broncho-pulmonary hygiene including cough, deep breathe, Incentive Spirometry  - Assess the need for suctioning and perform as needed  - Assess and instruct to report SOB or any respiratory difficulty  - Respiratory Therapy support as indicated  - Manage/alleviate anxiety  - Monitor for signs/symptoms of CO2 retention  Outcome: Progressing     Problem: NEUROLOGICAL - ADULT  Goal: Achieves stable or improved neurological status  Description: INTERVENTIONS  - Assess for and report changes in neurological status  - Initiate measures to prevent increased intracranial pressure  - Maintain blood pressure and fluid volume within ordered parameters to optimize cerebral perfusion and minimize risk of hemorrhage  - Monitor temperature, glucose, and sodium. Initiate appropriate interventions as ordered  Outcome: Progressing  Goal: Absence of seizures  Description: INTERVENTIONS  - Monitor for seizure activity  - Administer anti-seizure medications as ordered  - Monitor neurological status  Outcome: Progressing

## 2024-12-18 NOTE — PLAN OF CARE
Problem: Safety Risk - Non-Violent Restraints  Goal: Patient will remain free from self-harm  Description: INTERVENTIONS:  - Apply the least restrictive restraint to prevent harm  - Notify patient and family of reasons restraints applied  - Assess for any contributing factors to confusion (electrolyte disturbances, delirium, medications)  - Discontinue any unnecessary medical devices as soon as possible  - Assess the patient's physical comfort, circulation, skin condition, hydration, nutrition and elimination needs   - Reorient and redirection as needed  - Assess for the need to continue restraints  12/18/2024 0635 by Jeb Galeano, RN  Outcome: Progressing  12/17/2024 2301 by Jeb Galeano, RN  Outcome: Progressing     Problem: Patient Centered Care  Goal: Patient preferences are identified and integrated in the patient's plan of care  Description: Interventions:  - What would you like us to know as we care for you? BRENDA  - Provide timely, complete, and accurate information to patient/family  - Incorporate patient and family knowledge, values, beliefs, and cultural backgrounds into the planning and delivery of care  - Encourage patient/family to participate in care and decision-making at the level they choose  - Honor patient and family perspectives and choices  Outcome: Progressing     Problem: Patient/Family Goals  Goal: Patient/Family Long Term Goal  Description: Patient's Long Term Goal: BRENDA    Interventions:  - See additional Care Plan goals for specific interventions  Outcome: Progressing  Goal: Patient/Family Short Term Goal  Description: Patient's Short Term Goal: BRENDA    Interventions:   - See additional Care Plan goals for specific interventions  Outcome: Progressing     Problem: RESPIRATORY - ADULT  Goal: Achieves optimal ventilation and oxygenation  Description: INTERVENTIONS:  - Assess for changes in respiratory status  - Assess for changes in mentation and behavior  - Position to facilitate  oxygenation and minimize respiratory effort  - Oxygen supplementation based on oxygen saturation or ABGs  - Provide Smoking Cessation handout, if applicable  - Encourage broncho-pulmonary hygiene including cough, deep breathe, Incentive Spirometry  - Assess the need for suctioning and perform as needed  - Assess and instruct to report SOB or any respiratory difficulty  - Respiratory Therapy support as indicated  - Manage/alleviate anxiety  - Monitor for signs/symptoms of CO2 retention  Outcome: Progressing

## 2024-12-18 NOTE — CDS QUERY
Dear Dr De Jesus,  Can patient's respiratory status be clarified?    ( X  )   Mechanical ventilation for airway protection only without respiratory failure.  (   )   Acute hypoxic respiratory failure confirmed.     (   )   Other - please specify: .__________________________    Clinical indicators  ER note MDM - On Exam, hypoxic to the high 80s despite 15 L nonrebreather and HFNC , agonal breathing,  - patient does drink at least 8 alcoholic beverages per day but has not suddenly stopped.  ER note impression:  Acute respiratory failure hypoxic  H&P 12/16: Acute respiratory failure with hypoxia Currently intubated, continue full ventilatory support,  - she does not currently use alcohol.  Progress note 12/17: Acute respiratory failure , Intubated for airway protection  Pulmonary consult 12/17: Respiratory impairment-patient was intubated for airway protection.      Risk factors: 54yr F with lung cancer metastasis to brain, distant hx tobacco, Breakthrough Seizure  Treatment: Nasal trumpet, Placed on 15L non rebreather and HFNC, intubation and ventilation      Use of terms such as suspected, possible, or probable (associated with a specific diagnosis that is being evaluated, monitored, or treated as if it exists) are acceptable and can be coded in the inpatient setting, when documented at the time of discharge.     Please add any additional documentation to your progress note and continue to document this through discharge    If you have any questions, please contact Clinical Documentation  Specialist:  PRETTY Hwang at [phone]     Thank You!     THIS FORM IS A PERMANENT PART OF THE MEDICAL RECORD

## 2024-12-18 NOTE — PROCEDURES
Continuous Video EEG report    REFERRING PHYSICIAN: Celsa De Jesus MD      Date/TIME STARTED: 0639, 12/17/24  Date/TIME STOPPED: 0608, 12/18/24(  DURATION: 23hr 29min  TECHNIQUE: 21 channels of EEG, 2 channels of EOG, and 1 channel of EKG were recorded utilizing the 10-20 International System of Electrode Placement. The recording was performed in a digitized monopolar referential format and playback was reformatted into various referential and bipolar montages utilizing appropriate filter settings. Automatic seizure and spike detection programs were utilized throughout the recording. Video was recorded during the study  CLINICAL DATA:  Patient is sent for the evaluation of possible seizures.    MEDICATION:  Continuous Medications:   sodium chloride 75 mL/hr at 12/17/24 2127    propofol 50 mcg/kg/min (12/18/24 0412)    midazolam (Versed) 50 mg in sodium chloride 0.9% 100 mL infusion Stopped (12/18/24 0445)     Scheduled Medications:  No current outpatient medications on file.  PRN Medications:    atropine    EPINEPHrine    LORazepam    acetaminophen **OR** acetaminophen **OR** acetaminophen    polyethylene glycol (PEG 3350)    bisacodyl      BACKGROUND:  The posterior dominant rhythm consisted of well-organized 8-9 Hz rhythmic waveforms, symmetrically distributed over both posterior quadrants and was reactive to eye opening.  This was seen mostly in the latter half of the study    EEG ABNORMALITY:  Slowing: Semi-continuous diffuse slowing was seen, ranging from 2-3Hz during periods of sedation, to 4-6Hz during periods without sedation.  Remained responsive to stimulation.  Epileptiform activity: None  Seizures: None  Abnormal movements: None  A right frontal breach rhythm was seen.    IMPRESSION:  This is an abnormal awake and sleep EEG.      Mild diffuse slowing was seen which was responsive to stimulation.    No epileptiform activity or seizures were seen.    A right frontal breach rhythm was seen which  corresponds to the clinical history of prior craniotomy in that region.    This is overall suggestive of a global encephalopathy, improving.      Maritza Guadarrama M.D.  Neurology

## 2024-12-18 NOTE — PROGRESS NOTES
Received pt on the following vent settings:   12/17/24 0729   Vent Information   Vent Mode VC/AC   Settings   FiO2 (%) 40 %   Resp Rate (Set) 16   Vt (Set, mL) 500 mL   Waveform Decelerating ramp   PEEP/CPAP (cm H2O) 5 cm H20     No acute events observed during shift. No SBT performed d/t mental status. Pt observed breathing over vent while obtaining abg. Suction provided as needed. ETT remains secured at 21cm at the lip. RT to continue to monitor.

## 2024-12-19 ENCOUNTER — APPOINTMENT (OUTPATIENT)
Dept: GENERAL RADIOLOGY | Facility: HOSPITAL | Age: 54
End: 2024-12-19
Attending: INTERNAL MEDICINE
Payer: COMMERCIAL

## 2024-12-19 LAB
ANION GAP SERPL CALC-SCNC: 7 MMOL/L (ref 0–18)
BASOPHILS # BLD AUTO: 0.05 X10(3) UL (ref 0–0.2)
BASOPHILS NFR BLD AUTO: 0.4 %
BUN BLD-MCNC: 6 MG/DL (ref 9–23)
BUN/CREAT SERPL: 9.4 (ref 10–20)
C DIFF GDH + TOXINS A+B STL QL IA.RAPID: NOT DETECTED
C DIFF TOX B STL QL: POSITIVE
CALCIUM BLD-MCNC: 8.8 MG/DL (ref 8.7–10.4)
CHLORIDE SERPL-SCNC: 116 MMOL/L (ref 98–112)
CO2 SERPL-SCNC: 20 MMOL/L (ref 21–32)
CREAT BLD-MCNC: 0.64 MG/DL
DEPRECATED RDW RBC AUTO: 58.1 FL (ref 35.1–46.3)
EGFRCR SERPLBLD CKD-EPI 2021: 104 ML/MIN/1.73M2 (ref 60–?)
EOSINOPHIL # BLD AUTO: 0.05 X10(3) UL (ref 0–0.7)
EOSINOPHIL NFR BLD AUTO: 0.4 %
ERYTHROCYTE [DISTWIDTH] IN BLOOD BY AUTOMATED COUNT: 16.1 % (ref 11–15)
GLUCOSE BLD-MCNC: 89 MG/DL (ref 70–99)
GLUCOSE BLDC GLUCOMTR-MCNC: 95 MG/DL (ref 70–99)
HCT VFR BLD AUTO: 30.3 %
HGB BLD-MCNC: 9.8 G/DL
IMM GRANULOCYTES # BLD AUTO: 0.08 X10(3) UL (ref 0–1)
IMM GRANULOCYTES NFR BLD: 0.6 %
LAMOTRIGINE LVL: 8.5 UG/ML
LEVETIRACETAM LVL: 44.8 UG/ML
LYMPHOCYTES # BLD AUTO: 2.42 X10(3) UL (ref 1–4)
LYMPHOCYTES NFR BLD AUTO: 18.3 %
MCH RBC QN AUTO: 31.8 PG (ref 26–34)
MCHC RBC AUTO-ENTMCNC: 32.3 G/DL (ref 31–37)
MCV RBC AUTO: 98.4 FL
MONOCYTES # BLD AUTO: 1.04 X10(3) UL (ref 0.1–1)
MONOCYTES NFR BLD AUTO: 7.9 %
NEUTROPHILS # BLD AUTO: 9.57 X10 (3) UL (ref 1.5–7.7)
NEUTROPHILS # BLD AUTO: 9.57 X10(3) UL (ref 1.5–7.7)
NEUTROPHILS NFR BLD AUTO: 72.4 %
OSMOLALITY SERPL CALC.SUM OF ELEC: 293 MOSM/KG (ref 275–295)
PLATELET # BLD AUTO: 223 10(3)UL (ref 150–450)
POTASSIUM SERPL-SCNC: 3 MMOL/L (ref 3.5–5.1)
POTASSIUM SERPL-SCNC: 3 MMOL/L (ref 3.5–5.1)
POTASSIUM SERPL-SCNC: 3.8 MMOL/L (ref 3.5–5.1)
RBC # BLD AUTO: 3.08 X10(6)UL
SODIUM SERPL-SCNC: 143 MMOL/L (ref 136–145)
WBC # BLD AUTO: 13.2 X10(3) UL (ref 4–11)

## 2024-12-19 PROCEDURE — 99233 SBSQ HOSP IP/OBS HIGH 50: CPT | Performed by: HOSPITALIST

## 2024-12-19 PROCEDURE — 71045 X-RAY EXAM CHEST 1 VIEW: CPT | Performed by: INTERNAL MEDICINE

## 2024-12-19 RX ORDER — MORPHINE SULFATE 2 MG/ML
1 INJECTION, SOLUTION INTRAMUSCULAR; INTRAVENOUS EVERY 2 HOUR PRN
Status: DISCONTINUED | OUTPATIENT
Start: 2024-12-19 | End: 2024-12-23

## 2024-12-19 RX ORDER — POLYETHYLENE GLYCOL 3350 17 G/17G
17 POWDER, FOR SOLUTION ORAL DAILY PRN
Status: DISCONTINUED | OUTPATIENT
Start: 2024-12-19 | End: 2024-12-23

## 2024-12-19 RX ORDER — VANCOMYCIN HYDROCHLORIDE 125 MG/1
125 CAPSULE ORAL 4 TIMES DAILY
Status: DISCONTINUED | OUTPATIENT
Start: 2024-12-19 | End: 2024-12-23

## 2024-12-19 RX ORDER — POTASSIUM CHLORIDE 14.9 MG/ML
20 INJECTION INTRAVENOUS ONCE
Status: COMPLETED | OUTPATIENT
Start: 2024-12-19 | End: 2024-12-19

## 2024-12-19 RX ORDER — DOCUSATE SODIUM 100 MG/1
100 CAPSULE, LIQUID FILLED ORAL 2 TIMES DAILY
Status: DISCONTINUED | OUTPATIENT
Start: 2024-12-19 | End: 2024-12-23

## 2024-12-19 RX ORDER — HALOPERIDOL 5 MG/ML
1 INJECTION INTRAMUSCULAR EVERY 6 HOURS PRN
Status: DISCONTINUED | OUTPATIENT
Start: 2024-12-19 | End: 2024-12-21

## 2024-12-19 RX ORDER — LAMOTRIGINE 150 MG/1
150 TABLET ORAL 2 TIMES DAILY
Status: DISCONTINUED | OUTPATIENT
Start: 2024-12-19 | End: 2024-12-19

## 2024-12-19 RX ORDER — LORAZEPAM 2 MG/ML
1 INJECTION INTRAMUSCULAR EVERY 6 HOURS PRN
Status: DISCONTINUED | OUTPATIENT
Start: 2024-12-19 | End: 2024-12-23

## 2024-12-19 RX ORDER — SENNOSIDES 8.6 MG
17.2 TABLET ORAL 2 TIMES DAILY
Status: DISCONTINUED | OUTPATIENT
Start: 2024-12-19 | End: 2024-12-23

## 2024-12-19 NOTE — PLAN OF CARE
Safety precautions in place. Call light within reach. Frequent rounding done. Patient bed lowest position.   Problem: Safety Risk - Non-Violent Restraints  Goal: Patient will remain free from self-harm  Description: INTERVENTIONS:  - Apply the least restrictive restraint to prevent harm  - Notify patient and family of reasons restraints applied  - Assess for any contributing factors to confusion (electrolyte disturbances, delirium, medications)  - Discontinue any unnecessary medical devices as soon as possible  - Assess the patient's physical comfort, circulation, skin condition, hydration, nutrition and elimination needs   - Reorient and redirection as needed  - Assess for the need to continue restraints  Outcome: Progressing     Problem: Patient Centered Care  Goal: Patient preferences are identified and integrated in the patient's plan of care  Description: Interventions:  - What would you like us to know as we care for you?   - Provide timely, complete, and accurate information to patient/family  - Incorporate patient and family knowledge, values, beliefs, and cultural backgrounds into the planning and delivery of care  - Encourage patient/family to participate in care and decision-making at the level they choose  - Honor patient and family perspectives and choices  Outcome: Progressing     Problem: Patient/Family Goals  Goal: Patient/Family Long Term Goal  Description: Patient's Long Term Goal:     Interventions:  -   - See additional Care Plan goals for specific interventions  Outcome: Progressing  Goal: Patient/Family Short Term Goal  Description: Patient's Short Term Goal:     Interventions:   -   - See additional Care Plan goals for specific interventions  Outcome: Progressing     Problem: RESPIRATORY - ADULT  Goal: Achieves optimal ventilation and oxygenation  Description: INTERVENTIONS:  - Assess for changes in respiratory status  - Assess for changes in mentation and behavior  - Position to facilitate  oxygenation and minimize respiratory effort  - Oxygen supplementation based on oxygen saturation or ABGs  - Provide Smoking Cessation handout, if applicable  - Encourage broncho-pulmonary hygiene including cough, deep breathe, Incentive Spirometry  - Assess the need for suctioning and perform as needed  - Assess and instruct to report SOB or any respiratory difficulty  - Respiratory Therapy support as indicated  - Manage/alleviate anxiety  - Monitor for signs/symptoms of CO2 retention  Outcome: Progressing     Problem: NEUROLOGICAL - ADULT  Goal: Achieves stable or improved neurological status  Description: INTERVENTIONS  - Assess for and report changes in neurological status  - Initiate measures to prevent increased intracranial pressure  - Maintain blood pressure and fluid volume within ordered parameters to optimize cerebral perfusion and minimize risk of hemorrhage  - Monitor temperature, glucose, and sodium. Initiate appropriate interventions as ordered  Outcome: Progressing  Goal: Absence of seizures  Description: INTERVENTIONS  - Monitor for seizure activity  - Administer anti-seizure medications as ordered  - Monitor neurological status  Outcome: Progressing

## 2024-12-19 NOTE — SLP NOTE
ADULT SWALLOWING EVALUATION    ASSESSMENT    ASSESSMENT/OVERALL IMPRESSION:    Proper PPE worn. Hands sanitized upon entrance/exit Pt room.       BSE ordered 2/2 modified diet consistency (NPO); S/P extubation 12/18/24/intubated 12/16/24.Pt admitted 12/16/24 with status epilepticus. Pt on solid/thin liquids at home with spouse. PMH includes Metastatic Lung Cance with Mets to Brain, PNA, current long-time smoker. No PMH of dysphagia at [facility]. Pt denies any past swallowing difficulty. Currently, Pt NPO.    CXR 12/19/24:  CONCLUSION:   1. Borderline cardiomegaly.  Tortuous aorta.   2. Right perihilar and bilateral lower lobe mixed alveolar and interstitial opacification with progression.          Pt alert, on 1L/min 02 NC, afebrile and assessed sitting upright in chair (after consulting with RN). Pt agreed to participate. Learning preference verbal. Vocal quality/intensity weak. Oral motor exam revealed overall reduced labial and lingual skills for rate, ROM and strength. Functional dentition. Pt was fed pureed and thin liquid trials. Bilabial seal adequate; no anterior loss. Lingual skills slow for bolus formation and preparation pureed trials.  Pt declined soft and solid consistencies (reported fatigue).Oral cavity grossly clear post swallows.     Pharyngeal response appeared delayed per hyolaryngeal elevation to completion (considered reduced in strength/rise to palpation). No overt CSA on pureed nor thin liquids via open cup. Delayed throat clear chain swallows straw use. Overall, swallow function appeared weak. Sp02 ~96% during this assessment.        IMPRESSIONS:    Pt presents with mild oral dysphagia and possible pharyngeal dysfunction. Collaborated with RN regarding Pt's swallowing plan of care. BSE results/recommendations discussed with Pt. Pt with fair understanding; would benefit from additional reinforcement. Swallowing precautions written on white board in room for reinforcement/carry-over of skills for  Pt, family and staff. Call light within Pt's reach upon SLP discharge from room.     PLAN:    To f/u x2-3 sessions to ensure safe intake of diet and reinforce swallowing/aspiration precautions. To monitor for new CXR results. Diet upgrade as tolerated. VFSS IF appropriate. Family education as available.             RECOMMENDATIONS   Diet Recommendations - Solids: Puree  Diet Recommendations - Liquids: Thin Liquids    Compensatory Strategies Recommended:  (no straw)  Aspiration Precautions: Upright position;Slow rate;Small bites;Small sips;No straw  Medication Administration Recommendations: Whole in puree  Treatment Plan/Recommendations: Aspiration precautions    HISTORY   MEDICAL HISTORY  Reason for Referral: Altered diet consistency    Problem List  Principal Problem:    Status epilepticus (HCC)  Active Problems:    Acute respiratory failure with hypoxia (HCC)    Breakthrough seizure (HCC)      Past Medical History  Past Medical History:    Anemia    Brain cancer (HCC)    Depression    Disorder of thyroid    Exposure to radiation    DISCONTINUED AUG 2015     Gallbladder disease    Hepatitis C    High blood pressure    History of blood transfusion    after childbirth [facility]    Hypothyroid    Lung cancer (HCC)    Migraines    Osteoarthritis    Pancreatic cancer (HCC)    Personal history of antineoplastic chemotherapy    EVERY 2 WEEKS BEGINNING 2015     Pneumonia, organism unspecified(486)    Pulmonary embolism (HCC)    Seizure disorder (HCC)    can't remeber last one    Visual impairment    tunnel vision    Wears glasses       Prior Living Situation: Home with spouse  Diet Prior to Admission: Regular;Thin liquids  Precautions: Aspiration    Patient/Family Goals: to eat    SWALLOWING HISTORY  Current Diet Consistency: NPO    OBJECTIVE   ORAL MOTOR EXAMINATION  Dentition: Natural;Functional  Symmetry: Within Functional Limits  Strength: Overall reduced  Range of Motion: Overall reduced  Rate of Motion:  Reduced    Voice Quality: Weak  Respiratory Status: Supplemental O2;Nasal cannula  Consistencies Trialed: Puree;Thin liquids (Pt declined soft and solid consistencies.)  Method of Presentation: Staff/Clinician assistance;Straw;Spoon;Cup  Patient Positioned: Upright;Midline    Oral Phase of Swallow: Impaired  Bolus Formation: Impaired  Bolus Propulsion: Impaired  Pharyngeal Phase of Swallow: Appears Impaired  Laryngeal Elevation Timing: Appears impaired     (Please note: Silent aspiration cannot be evaluated clinically. Videofluoroscopic Swallow Study is required to rule-out silent aspiration.)    GOALS  Goal #1 The patient will tolerate PUREED consistency and thin liquids without overt signs or symptoms of aspiration with 100 % accuracy over 1-2 session(s).  In Progress   Goal #2 The patient/family/caregiver will demonstrate understanding and implementation of aspiration precautions and swallow strategies independently over 3 session(s).    In Progress   Goal #3 The patient will tolerate trial upgrade of BITE SIZE/SOFT ETC/SOLID consistency and thin liquids without overt signs or symptoms of aspiration with 100 % accuracy over 1-2 session(s).  In Progress   FOLLOW UP  Treatment Plan/Recommendations: Aspiration precautions  Duration: 1 week  Follow Up Needed (Documentation Required): Yes  SLP Follow-up Date: 12/20/24    Thank you for your referral.   If you have any questions, please contact   oRdy Liao M.S. [facility]/SLP  Speech-Language Pathologist  [facility]  #[phone]

## 2024-12-19 NOTE — OCCUPATIONAL THERAPY NOTE
OT orders rcvd; pt appears to be combative and agitated, per EMR \"CODE SUPPORT CALLED AT 0347 for pt attempting to kick and hit staff. Confused and aggressive during cleaning after being found soiled in BM. Ativan pulled and given after support staff arrived. Restraints intact. \"    Will defer evaluation and re-schedule for 12/20

## 2024-12-19 NOTE — PROGRESS NOTES
[facility]  [address] [place] [address], [address]  [facility] [address]  [phone]        INPATIENT NEUROLOGY   FOLLOW UP PROGRESS NOTE  [facility]  part of [facility]    Virginia Sinclair Patient Status:  Inpatient     1970 MRN [id number]    Location [facility] 2W/SW Attending Celsa De Jesus MD    Hosp Day # 2 PCP No primary care provider on file.    Date of Admission:  2024  Date of Consult Follow Up:  2024     Assessment and Plan:   Virginia Sinclair is a 54 year old  woman with a past medical history of focal epilepsy secondary to metastatic brain lesions from stage IV lung carcinoma status post multiple intracranial resections who follows with neurology at [place], depression, thyroid disorder, bipolar disorder, migraine, hepatitis C, and current daily tobacco use who presents with a breakthrough seizure with concern for status epilepticus.  She is currently not in status epilepticus, but was earlier.  Her EEG is been negative for any ictal or interictal discharges.  She withdraws to painful stimuli.       She was successfully extubated on 24. MRI brain wwo still pending.  On exam she is awake, alert, and is calm.  She reports adherence to her antiseizure medications.  Patient reports that recently she has been   sleep deprived due to her acute stressors.  This may be a possible trigger for breakthrough seizures.    Breakthrough seizure w/ status epilepticus  Differential Diagnosis:  Need to exclude new intracranial malignancy/interval change; last MRI of the brain from 2024 recommended repeat scan in 3 to 4 months.  Provoked seizure due to sleep deprivation; Will need to assess for other factors that could lead to provoked seizure  She reports that she is adherent with her antiseizure medications       Plan    Diagnostics:  Follow-up on MRI of the brain without contrast and sure there is no interval lesion; patient was taken down but  b/c of motion artifact images were nondiagnostic  Video EEG completed    Therapeutics:  Restarted home dose of lamotrigine 150 mg twice daily  Stopped IV Depakote  For the time being continue p.o. Keppra 1500 twice daily  For the time being continue p.o. lacosamide 100 mg twice daily  Neurochecks Q4            INTERVAL EVENTS  12/18/24:  extubated. No results from MRI yet   12/19/24: Still pending MRI.  Transferred to floor       SUBJECTIVE:     Less interactive than prior exams.  Per discussion with RN patient's exam very throughout the day.  No new deficits.  Denies having a headache.  No seizure auras.  No complaints.  Pertinent positive and negatives per HPI.  All others were reviewed and negative.       Objective   OBJECTIVE:   Last vitals and weight :  Blood pressure 93/57, pulse 74, temperature 99.2 °F (37.3 °C), temperature source Temporal, resp. rate 18, height 65\", weight 140 lb 14 oz (63.9 kg), SpO2 95%, not currently breastfeeding.   Vitals:    12/19/24 0800 12/19/24 0900 12/19/24 1000 12/19/24 1200   BP: 120/71 108/63 111/68 93/57   BP Location: Left arm Left arm Left arm Left arm   Pulse: 61 71 66 74   Resp: 20 (!) 29 18 18   Temp: 99 °F (37.2 °C)   99.2 °F (37.3 °C)   TempSrc: Temporal   Temporal   SpO2: 97% 96% 96% 95%   Weight:       Height:          Exam:  - General: appears stated age and no distress  - CV:    Carotids:   - Pulmonary: no sign of respiratory distress.   Neurologic Exam  - Mental Status: Alert and attentive.  Oriented to   person month, year (at 1924 and then 2024).  She thought the city was [place]. .  Speech is spontaneous, fluent, and prosodic. Comprehension intact. Repetition intact. Phrase length and rate are normal. No paraphasic errors, neologisms, anomia, neglect, apraxia, or R/L confusion.   - Cranial Nerves: No gaze preference. Visual fields:normal  Pupils are 4mm briskly constricting to 3mm and equally round and reactive to light  in a well lit room. EOMI. No nystagmus.  No ptosis. V1-V3 intact B/L to light touch.No pathological facial asymmetry. No flattening of the nasolabial fold. .  Hearing grossly intact.  Tongue midline. No atrophy or fasiculations of the tongue noted. Palate and uvula elevate symmetrically.  Shoulder shrug symmetric.  - Motor:  normal tone, normal bulk. No interosseous wasting. No flattening of hypothenar eminences.  She is in soft restraints.   Right Left     Motor Strength    5 5   Knee extensors 5 5    Pronator drift: No pronator drift   Index finger rolling: No orbiting.   Finger Taps: Finger taps are symmetric in rate and amplitude. .    Rapid movements: Rapid/fine movements are symmetric. As expected their dominant hand is slightly faster.   Leg Drift: none    Asterixis: No asterixis noted.   Tremor:   - Sensory:   Light touch:normal   Temperature: normal     - Cerebellum: No truncal ataxia. No titubations. No dysmetria, no dysdiadochokinesis. No overshoot.      Nonsustained clonus: Absent   Sustained clonus: Absent   - Plantar response: flexor bilaterally    Medications:   docusate sodium  100 mg Oral BID    sennosides  17.2 mg Oral BID    lamoTRIgine  150 mg Oral BID    heparin  5,000 Units Subcutaneous Q12H    ampicillin-sulbactam  3 g Intravenous q6h    famotidine  20 mg Intravenous BID    lacosamide  100 mg Intravenous Q12H    levETIRAcetam  60 mg/kg Intravenous Once    And    levETIRAcetam  1,500 mg Intravenous Q12H       PRNS:   polyethylene glycol (PEG 3350)    ondansetron    LORazepam    acetaminophen **OR** acetaminophen **OR** acetaminophen    bisacodyl    Infusions:    sodium chloride 75 mL/hr at 12/17/24 2127            Results:   Laboratory Data:  Lab Results   Component Value Date    WBC 13.2 (H) 12/19/2024    HGB 9.8 (L) 12/19/2024    HCT 30.3 (L) 12/19/2024    .0 12/19/2024    CREATSERUM 0.64 12/19/2024    BUN 6 (L) 12/19/2024     12/19/2024    K 3.0 (L) 12/19/2024    K 3.0 (L) 12/19/2024     (H) 12/19/2024    CO2  20.0 (L) 12/19/2024    GLU 89 12/19/2024    CA 8.8 12/19/2024    ALB 3.6 12/18/2024    ALKPHO 95 12/18/2024    TP 6.7 12/18/2024    AST 12 12/18/2024    ALT <7 (L) 12/18/2024    PTT 24.1 12/16/2024    INR 1.02 12/16/2024    PTP 14.0 12/16/2024    T4F 0.7 (L) 11/06/2024    TSH 0.064 (L) 11/06/2024    LIP 25 (L) 10/02/2021     (H) 12/18/2017    ESRML >130 (H) 11/16/2023    CRP 1.00 (H) 11/16/2023    MG 2.0 12/18/2024    PHOS 2.9 12/13/2017    TROP 0.00 04/17/2018    CK 49 10/11/2023    B12 1,119 (H) 07/22/2024    ETOH <3 02/28/2022     Recent Results (from the past 72 hours)   POCT Glucose    Collection Time: 12/16/24  8:39 PM   Result Value Ref Range    POC Glucose  162 (H) 70 - 99 mg/dL   RAINBOW DRAW LAVENDER    Collection Time: 12/16/24  8:53 PM   Result Value Ref Range    Hold Lavender Auto Resulted    RAINBOW DRAW LIGHT GREEN    Collection Time: 12/16/24  8:53 PM   Result Value Ref Range    Hold Lt Green Auto Resulted    RAINBOW DRAW BLUE    Collection Time: 12/16/24  8:53 PM   Result Value Ref Range    Hold Blue Auto Resulted    RAINBOW DRAW GOLD    Collection Time: 12/16/24  8:53 PM   Result Value Ref Range    Hold Gold Auto Resulted    CBC With Differential With Platelet    Collection Time: 12/16/24  8:53 PM   Result Value Ref Range    WBC 13.1 (H) 4.0 - 11.0 x10(3) uL    RBC 3.86 3.80 - 5.30 x10(6)uL    HGB 12.5 12.0 - 16.0 g/dL    HCT 38.3 35.0 - 48.0 %    MCV 99.2 80.0 - 100.0 fL    MCH 32.4 26.0 - 34.0 pg    MCHC 32.6 31.0 - 37.0 g/dL    RDW-SD 57.2 (H) 35.1 - 46.3 fL    RDW 15.9 (H) 11.0 - 15.0 %    .0 150.0 - 450.0 10(3)uL    Neutrophil Absolute Prelim 6.81 1.50 - 7.70 x10 (3) uL    Neutrophil Absolute 6.81 1.50 - 7.70 x10(3) uL    Lymphocyte Absolute 5.06 (H) 1.00 - 4.00 x10(3) uL    Monocyte Absolute 0.93 0.10 - 1.00 x10(3) uL    Eosinophil Absolute 0.03 0.00 - 0.70 x10(3) uL    Basophil Absolute 0.08 0.00 - 0.20 x10(3) uL    Immature Granulocyte Absolute 0.18 0.00 - 1.00 x10(3) uL     Neutrophil % 52.0 %    Lymphocyte % 38.7 %    Monocyte % 7.1 %    Eosinophil % 0.2 %    Basophil % 0.6 %    Immature Granulocyte % 1.4 %   Basic Metabolic Panel (8)    Collection Time: 12/16/24  8:53 PM   Result Value Ref Range    Glucose 157 (H) 70 - 99 mg/dL    Sodium 140 136 - 145 mmol/L    Potassium 3.9 3.5 - 5.1 mmol/L    Chloride 112 98 - 112 mmol/L    CO2 17.0 (L) 21.0 - 32.0 mmol/L    Anion Gap 11 0 - 18 mmol/L    BUN 13 9 - 23 mg/dL    Creatinine 0.98 0.55 - 1.02 mg/dL    BUN/CREA Ratio 13.3 10.0 - 20.0    Calcium, Total 9.0 8.7 - 10.4 mg/dL    Calculated Osmolality 293 275 - 295 mOsm/kg    eGFR-Cr 68 >=60 mL/min/1.73m2   Hepatic Function Panel (7)    Collection Time: 12/16/24  8:53 PM   Result Value Ref Range    AST 18 <34 U/L    ALT <7 (L) 10 - 49 U/L    Alkaline Phosphatase 117 (H) 41 - 108 U/L    Bilirubin, Total <0.2 (L) 0.3 - 1.2 mg/dL    Bilirubin, Direct <0.1 <=0.3 mg/dL    Total Protein 8.4 (H) 5.7 - 8.2 g/dL    Albumin 4.7 3.2 - 4.8 g/dL   Prothrombin Time (PT)    Collection Time: 12/16/24  8:53 PM   Result Value Ref Range    PT 14.0 11.6 - 14.8 seconds    INR 1.02 0.80 - 1.20   PTT, Activated    Collection Time: 12/16/24  8:53 PM   Result Value Ref Range    PTT 24.1 23.0 - 36.0 seconds   Scan slide    Collection Time: 12/16/24  8:53 PM   Result Value Ref Range    Slide Review See MD blood smear consultation.    MD BLOOD SMEAR CONSULT    Collection Time: 12/16/24  8:53 PM   Result Value Ref Range    MD Blood Smear Consult       Evaluation of the CBC with differential data and the peripheral blood smear demonstrates an absolute lymphocytosis. Lymphocytes are characterized by a polymorphous population comprised predominantly of enlarged, reactive-appearing forms. There are no significant morphologic abnormalities in the red blood cells, other white blood cell subsets, or platelets.     The differential considerations for the finding of a lymphocytosis consisting of mature-appearing cells includes  infection (most commonly viral), drug effects, transient stress and possibly manifestations of a chronic lymphoproliferative disorder (such as chronic lymphocytic leukemia, other).  Recommend clinical correlation and correlation with immunophenotyping of the peripheral blood by flow cytometry if the lymphocytosis has been persistent without apparent cause or if a possible lymphoproliferative disorder is suspected clinically.     Reviewed by Krystyna Rhodes M.D.     Triglycerides    Collection Time: 12/16/24  8:53 PM   Result Value Ref Range    Triglycerides 165 (H) 30 - 149 mg/dL   EKG 12 Lead    Collection Time: 12/16/24 11:20 PM   Result Value Ref Range    Ventricular rate 91 BPM    Atrial rate 91 BPM    P-R Interval 184 ms    QRS Duration 80 ms    Q-T Interval 400 ms    QTC Calculation (Bezet) 492 ms    P Axis 76 degrees    R Axis -26 degrees    T Axis 21 degrees   POCT Glucose    Collection Time: 12/17/24 12:59 AM   Result Value Ref Range    POC Glucose  96 70 - 99 mg/dL   MRSA Screen by PCR    Collection Time: 12/17/24  5:58 AM   Result Value Ref Range    MRSA Screen By PCR Negative Negative   MRSA Screen by PCR    Collection Time: 12/17/24  9:13 AM   Result Value Ref Range    MRSA Screen By PCR Negative Negative   Rapid SARS-CoV-2 by PCR    Collection Time: 12/17/24  9:13 AM    Specimen: Nares; Other   Result Value Ref Range    Rapid SARS-CoV-2 by PCR Not Detected Not Detected   Drug Screen 8 W/confirmation, urine    Collection Time: 12/17/24  6:38 PM   Result Value Ref Range    Amphetamine Urine Negative Negative    Benzodiazepines Urine Presumed Positive (A) Negative    Cocaine Urine Negative Negative    Cannabinoid Urine Presumed Positive (A) Negative    Ecstasy Urine Negative Negative    Fentanyl Urine Negative Negative    Opiate Urine Negative Negative    Oxycodone Urine Negative Negative    Creatinine Ur Random 108.90 mg/dL   Arterial blood gas    Collection Time: 12/17/24  6:58 PM   Result Value Ref  Range    Sample Site Right Radial     ABG pH 7.38 7.35 - 7.45    ABG pCO2 33 (L) 35 - 45 mm Hg    ABG PO2 79 (L) 80 - 100 mm Hg    ABG HCO3 21.1 21.0 - 27.0 mEq/L    Blood Gas Base Excess -4.9 (L) -2.0 - 2.0 mmol/L    ABG O2 Saturation 94.8 94.0 - 100.0 %    Oxygen Content 14.7 (L) 15.0 - 23.0 Vol%    Oxygen Delivery Device Vent     Blood Gas Vent Mode A/C     Blood Gas Respiration Rate 16 BPM    Tidal Volume 500.0 mL    FIO2 35.00     PEEP 5.0 cm H2O    Modified Allens Test Positive     Puncture Charge Yes     Blood Gas Analyzer [id number] CCU    Basic Metabolic Panel (8)    Collection Time: 12/18/24  4:28 AM   Result Value Ref Range    Glucose 140 (H) 70 - 99 mg/dL    Sodium 142 136 - 145 mmol/L    Potassium 3.0 (L) 3.5 - 5.1 mmol/L    Chloride 113 (H) 98 - 112 mmol/L    CO2 17.0 (L) 21.0 - 32.0 mmol/L    Anion Gap 12 0 - 18 mmol/L    BUN 10 9 - 23 mg/dL    Creatinine 0.68 0.55 - 1.02 mg/dL    BUN/CREA Ratio 14.7 10.0 - 20.0    Calcium, Total 8.8 8.7 - 10.4 mg/dL    Calculated Osmolality 295 275 - 295 mOsm/kg    eGFR-Cr 103 >=60 mL/min/1.73m2   CBC With Differential With Platelet    Collection Time: 12/18/24  4:28 AM   Result Value Ref Range    WBC 17.3 (H) 4.0 - 11.0 x10(3) uL    RBC 3.38 (L) 3.80 - 5.30 x10(6)uL    HGB 11.0 (L) 12.0 - 16.0 g/dL    HCT 32.3 (L) 35.0 - 48.0 %    MCV 95.6 80.0 - 100.0 fL    MCH 32.5 26.0 - 34.0 pg    MCHC 34.1 31.0 - 37.0 g/dL    RDW-SD 55.8 (H) 35.1 - 46.3 fL    RDW 16.0 (H) 11.0 - 15.0 %    .0 150.0 - 450.0 10(3)uL    Neutrophil Absolute Prelim 14.77 (H) 1.50 - 7.70 x10 (3) uL    Neutrophil Absolute 14.77 (H) 1.50 - 7.70 x10(3) uL    Lymphocyte Absolute 0.96 (L) 1.00 - 4.00 x10(3) uL    Monocyte Absolute 1.34 (H) 0.10 - 1.00 x10(3) uL    Eosinophil Absolute 0.01 0.00 - 0.70 x10(3) uL    Basophil Absolute 0.06 0.00 - 0.20 x10(3) uL    Immature Granulocyte Absolute 0.16 0.00 - 1.00 x10(3) uL    Neutrophil % 85.5 %    Lymphocyte % 5.5 %    Monocyte % 7.7 %    Eosinophil % 0.1 %     Basophil % 0.3 %    Immature Granulocyte % 0.9 %   Hepatic Function Panel (7)    Collection Time: 12/18/24  4:28 AM   Result Value Ref Range    AST 12 <34 U/L    ALT <7 (L) 10 - 49 U/L    Alkaline Phosphatase 95 41 - 108 U/L    Bilirubin, Total 0.2 (L) 0.3 - 1.2 mg/dL    Bilirubin, Direct <0.1 <=0.3 mg/dL    Total Protein 6.7 5.7 - 8.2 g/dL    Albumin 3.6 3.2 - 4.8 g/dL   Magnesium    Collection Time: 12/18/24  4:28 AM   Result Value Ref Range    Magnesium 2.0 1.6 - 2.6 mg/dL   Potassium    Collection Time: 12/18/24  4:25 PM   Result Value Ref Range    Potassium 3.1 (L) 3.5 - 5.1 mmol/L   POCT Glucose    Collection Time: 12/19/24  1:24 AM   Result Value Ref Range    POC Glucose  95 70 - 99 mg/dL   Basic Metabolic Panel (8)    Collection Time: 12/19/24  4:12 AM   Result Value Ref Range    Glucose 89 70 - 99 mg/dL    Sodium 143 136 - 145 mmol/L    Potassium 3.0 (L) 3.5 - 5.1 mmol/L    Chloride 116 (H) 98 - 112 mmol/L    CO2 20.0 (L) 21.0 - 32.0 mmol/L    Anion Gap 7 0 - 18 mmol/L    BUN 6 (L) 9 - 23 mg/dL    Creatinine 0.64 0.55 - 1.02 mg/dL    BUN/CREA Ratio 9.4 (L) 10.0 - 20.0    Calcium, Total 8.8 8.7 - 10.4 mg/dL    Calculated Osmolality 293 275 - 295 mOsm/kg    eGFR-Cr 104 >=60 mL/min/1.73m2   CBC With Differential With Platelet    Collection Time: 12/19/24  4:12 AM   Result Value Ref Range    WBC 13.2 (H) 4.0 - 11.0 x10(3) uL    RBC 3.08 (L) 3.80 - 5.30 x10(6)uL    HGB 9.8 (L) 12.0 - 16.0 g/dL    HCT 30.3 (L) 35.0 - 48.0 %    MCV 98.4 80.0 - 100.0 fL    MCH 31.8 26.0 - 34.0 pg    MCHC 32.3 31.0 - 37.0 g/dL    RDW-SD 58.1 (H) 35.1 - 46.3 fL    RDW 16.1 (H) 11.0 - 15.0 %    .0 150.0 - 450.0 10(3)uL    Neutrophil Absolute Prelim 9.57 (H) 1.50 - 7.70 x10 (3) uL    Neutrophil Absolute 9.57 (H) 1.50 - 7.70 x10(3) uL    Lymphocyte Absolute 2.42 1.00 - 4.00 x10(3) uL    Monocyte Absolute 1.04 (H) 0.10 - 1.00 x10(3) uL    Eosinophil Absolute 0.05 0.00 - 0.70 x10(3) uL    Basophil Absolute 0.05 0.00 - 0.20  x10(3) uL    Immature Granulocyte Absolute 0.08 0.00 - 1.00 x10(3) uL    Neutrophil % 72.4 %    Lymphocyte % 18.3 %    Monocyte % 7.9 %    Eosinophil % 0.4 %    Basophil % 0.4 %    Immature Granulocyte % 0.6 %   Potassium    Collection Time: 12/19/24  4:12 AM   Result Value Ref Range    Potassium 3.0 (L) 3.5 - 5.1 mmol/L   Clostridium difficile(toxigenic)PCR    Collection Time: 12/19/24  4:17 AM    Specimen: Stool   Result Value Ref Range    C. Difficile Toxin B Gene Positive (A) Negative   Clostridium difficile by EIA    Collection Time: 12/19/24  4:17 AM    Specimen: Stool   Result Value Ref Range    Expression of C. difficile toxin A/B Genes Not Detected Not Detected         Test results/Imaging:   CT BRAIN OR HEAD (CPT=70450)    Result Date: 12/16/2024  CONCLUSION:  1. No acute intracranial finding. 2. Stable encephalomalacia in right frontal lobe, right parietal occipital region and left occipital lobe. 3. Post right frontal and left occipital craniotomy.    Dictated by (CST): Colt Marin MD on 12/16/2024 at 9:20 PM     Finalized by (CST): Colt Marin MD on 12/16/2024 at 9:28 PM                Performed an independent visualization of:  CT brain WO   Imaging revealed: Agree with radiology read.    Education/Instructions given to: patient   Barriers to Learning:None  Content: Refer to note above. Evaluation/Outcome: Verbalized understanding    Disclaimer:   This record was dictated using Dragon software. There may be errors due to voice recognition problems that were not realized and corrected during the completion of the note.      This document is not intended to support charting by exception.  Sections left blank in a completed note should be presumed not to have been done.    Total time spent involved in the care of the patient including time spent writing the note, reviewing the labs, reviewing the relevant imaging, and face to face time was 35 minutes, more than 50% of the time was spent in  counseling and/or coordination of care related to  seizure.        Thank you.  Jorge Brandon D.O.   Vascular & General Neurology    12/19/2024  8:18 PM

## 2024-12-19 NOTE — PLAN OF CARE
Pt in safety restraints for safety d/t recent confusion and neuro status.    Problem: Safety Risk - Non-Violent Restraints  Goal: Patient will remain free from self-harm  Description: INTERVENTIONS:  - Apply the least restrictive restraint to prevent harm  - Notify patient and family of reasons restraints applied  - Assess for any contributing factors to confusion (electrolyte disturbances, delirium, medications)  - Discontinue any unnecessary medical devices as soon as possible  - Assess the patient's physical comfort, circulation, skin condition, hydration, nutrition and elimination needs   - Reorient and redirection as needed  - Assess for the need to continue restraints  Outcome: Not Progressing

## 2024-12-19 NOTE — SIGNIFICANT EVENT
CODE SUPPORT CALLED AT 0347 for pt attempting to kick and hit staff. Confused and aggressive during cleaning after being found soiled in BM. Ativan pulled and given after support staff arrived. Restraints intact.

## 2024-12-19 NOTE — PROGRESS NOTES
[facility]  [address] [place] [address], [address]  [facility] [id number]  [phone]        INPATIENT NEUROLOGY   FOLLOW UP PROGRESS NOTE  [facility]  part of [facility]    Virginia Sinclair Patient Status:  Inpatient     1970 MRN [id number]    Location [facility] 2W/SW Attending Celsa De Jesus MD    Hosp Day # 1 PCP No primary care provider on file.    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, depression, thyroid disorder, bipolar disorder, migraine, hepatitis C, and current daily tobacco use who presents with a breakthrough seizure with concern for status epilepticus.  She is currently not in status epilepticus, but was earlier.  Her EEG is been negative for any ictal or interictal discharges.  She withdraws to painful stimuli.       She was successfully extubated on 24. MRI brain wwo still pending.  On exam she is awake, alert, and is calm.  She reports adherence to her antiseizure medications.  Patient reports that recently she has been   sleep deprived due to her acute stressors.     Breakthrough seizure w/ status epilepticus  Differential Diagnosis:  Need to exclude new intracranial malignancy/interval change  Provoked seizure due to sleep deprivation; Will need to assess for other factors that could lead to provoked seizure  She reports that she is adherent with her antiseizure medications       Plan    Diagnostics:  Follow-up on MRI of the brain and sure there is no interval lesion  Video EEG completed    Therapeutics:  Restart home dose of lamotrigine 150 mg twice daily  Stopped IV Depakote  For the time being continue IV Keppra 1500 twice daily  For the time  being continue IV lacosamide 100 mg twice daily  Neurochecks Q42 if she still has critical care indications.  She has no critical or care indications and okay to transfer the floor with every 4 neurochecks.            INTERVAL EVENTS  12/18/24:  extubated. No results from MRI yet        SUBJECTIVE:       She reports adherence to her antiseizure medications.  Patient reports that recently she has been   sleep deprived due to her acute stressors.  Currently denies being in any pain.  Denies having a headache.  No seizure auras.  No complaints.  Pertinent positive and negatives per HPI.  All others were reviewed and negative.       Objective   OBJECTIVE:   Last vitals and weight :  Blood pressure 125/77, pulse 59, temperature 97.9 °F (36.6 °C), temperature source Temporal, resp. rate 18, height 65\", weight 136 lb 3.9 oz (61.8 kg), SpO2 99%, not currently breastfeeding.   Vitals:    12/18/24 1600 12/18/24 1700 12/18/24 1800 12/18/24 1900   BP: 117/74 129/73 129/69 125/77   BP Location: Left arm Left arm Left arm Left arm   Pulse: 72 72 65 59   Resp: 19 19 18 18   Temp: 97.9 °F (36.6 °C)      TempSrc: Temporal      SpO2: 92% 93% 100% 99%   Weight:       Height:          Exam:  - General: appears stated age and no distress  - CV:    Carotids:   - Pulmonary: no sign of respiratory distress.   Neurologic Exam  - Mental Status: Alert and attentive.  Oriented to   person month, year (at 1924 and then 2024).  She thought the city was [place]. .  Speech is spontaneous, fluent, and prosodic. Comprehension intact. Repetition intact. Phrase length and rate are normal. No paraphasic errors, neologisms, anomia, neglect, apraxia, or R/L confusion.   - Cranial Nerves: No gaze preference. Visual fields:normal  Pupils are 4mm briskly constricting to 3mm and equally round and reactive to light  in a well lit room. EOMI. No nystagmus. No ptosis. V1-V3 intact B/L to light touch.No pathological facial asymmetry. No flattening of the  nasolabial fold. .  Hearing grossly intact.  Tongue midline. No atrophy or fasiculations of the tongue noted. Palate and uvula elevate symmetrically.  Shoulder shrug symmetric.  - Motor:  normal tone, normal bulk. No interosseous wasting. No flattening of hypothenar eminences.  She is in soft restraints.   Right Left     Motor Strength    5 5   Knee extensors 5 5    Pronator drift: No pronator drift   Index finger rolling: No orbiting.   Finger Taps: Finger taps are symmetric in rate and amplitude. .    Rapid movements: Rapid/fine movements are symmetric. As expected their dominant hand is slightly faster.   Leg Drift: none    Asterixis: No asterixis noted.   Tremor:   - Sensory:   Light touch:normal   Temperature: normal     - Cerebellum: No truncal ataxia. No titubations. No dysmetria, no dysdiadochokinesis. No overshoot.      Nonsustained clonus: Absent   Sustained clonus: Absent   - Plantar response: flexor bilaterally    Medications:   potassium chloride  40 mEq Intravenous Once    heparin  5,000 Units Subcutaneous Q12H    ampicillin-sulbactam  3 g Intravenous q6h    senna  10 mL Per NG Tube BID    docusate  100 mg Per NG Tube BID    famotidine  20 mg Intravenous BID    lamoTRIgine  150 mg Per NG Tube BID    lacosamide  100 mg Intravenous Q12H    levETIRAcetam  60 mg/kg Intravenous Once    And    levETIRAcetam  1,500 mg Intravenous Q12H       PRNS:   ondansetron    LORazepam    acetaminophen **OR** acetaminophen **OR** acetaminophen    polyethylene glycol (PEG 3350)    bisacodyl    Infusions:    sodium chloride 75 mL/hr at 12/17/24 2127            Results:   Laboratory Data:  Lab Results   Component Value Date    WBC 17.3 (H) 12/18/2024    HGB 11.0 (L) 12/18/2024    HCT 32.3 (L) 12/18/2024    .0 12/18/2024    CREATSERUM 0.68 12/18/2024    BUN 10 12/18/2024     12/18/2024    K 3.1 (L) 12/18/2024     (H) 12/18/2024    CO2 17.0 (L) 12/18/2024     (H) 12/18/2024    CA 8.8 12/18/2024     ALB 3.6 12/18/2024    ALKPHO 95 12/18/2024    TP 6.7 12/18/2024    AST 12 12/18/2024    ALT <7 (L) 12/18/2024    PTT 24.1 12/16/2024    INR 1.02 12/16/2024    PTP 14.0 12/16/2024    T4F 0.7 (L) 11/06/2024    TSH 0.064 (L) 11/06/2024    LIP 25 (L) 10/02/2021     (H) 12/18/2017    ESRML >130 (H) 11/16/2023    CRP 1.00 (H) 11/16/2023    MG 2.0 12/18/2024    PHOS 2.9 12/13/2017    TROP 0.00 04/17/2018    CK 49 10/11/2023    B12 1,119 (H) 07/22/2024    ETOH <3 02/28/2022     Recent Results (from the past 72 hours)   POCT Glucose    Collection Time: 12/16/24  8:39 PM   Result Value Ref Range    POC Glucose  162 (H) 70 - 99 mg/dL   RAINBOW DRAW LAVENDER    Collection Time: 12/16/24  8:53 PM   Result Value Ref Range    Hold Lavender Auto Resulted    RAINBOW DRAW LIGHT GREEN    Collection Time: 12/16/24  8:53 PM   Result Value Ref Range    Hold Lt Green Auto Resulted    RAINBOW DRAW BLUE    Collection Time: 12/16/24  8:53 PM   Result Value Ref Range    Hold Blue Auto Resulted    RAINBOW DRAW GOLD    Collection Time: 12/16/24  8:53 PM   Result Value Ref Range    Hold Gold Auto Resulted    CBC With Differential With Platelet    Collection Time: 12/16/24  8:53 PM   Result Value Ref Range    WBC 13.1 (H) 4.0 - 11.0 x10(3) uL    RBC 3.86 3.80 - 5.30 x10(6)uL    HGB 12.5 12.0 - 16.0 g/dL    HCT 38.3 35.0 - 48.0 %    MCV 99.2 80.0 - 100.0 fL    MCH 32.4 26.0 - 34.0 pg    MCHC 32.6 31.0 - 37.0 g/dL    RDW-SD 57.2 (H) 35.1 - 46.3 fL    RDW 15.9 (H) 11.0 - 15.0 %    .0 150.0 - 450.0 10(3)uL    Neutrophil Absolute Prelim 6.81 1.50 - 7.70 x10 (3) uL    Neutrophil Absolute 6.81 1.50 - 7.70 x10(3) uL    Lymphocyte Absolute 5.06 (H) 1.00 - 4.00 x10(3) uL    Monocyte Absolute 0.93 0.10 - 1.00 x10(3) uL    Eosinophil Absolute 0.03 0.00 - 0.70 x10(3) uL    Basophil Absolute 0.08 0.00 - 0.20 x10(3) uL    Immature Granulocyte Absolute 0.18 0.00 - 1.00 x10(3) uL    Neutrophil % 52.0 %    Lymphocyte % 38.7 %    Monocyte % 7.1 %     Eosinophil % 0.2 %    Basophil % 0.6 %    Immature Granulocyte % 1.4 %   Basic Metabolic Panel (8)    Collection Time: 12/16/24  8:53 PM   Result Value Ref Range    Glucose 157 (H) 70 - 99 mg/dL    Sodium 140 136 - 145 mmol/L    Potassium 3.9 3.5 - 5.1 mmol/L    Chloride 112 98 - 112 mmol/L    CO2 17.0 (L) 21.0 - 32.0 mmol/L    Anion Gap 11 0 - 18 mmol/L    BUN 13 9 - 23 mg/dL    Creatinine 0.98 0.55 - 1.02 mg/dL    BUN/CREA Ratio 13.3 10.0 - 20.0    Calcium, Total 9.0 8.7 - 10.4 mg/dL    Calculated Osmolality 293 275 - 295 mOsm/kg    eGFR-Cr 68 >=60 mL/min/1.73m2   Hepatic Function Panel (7)    Collection Time: 12/16/24  8:53 PM   Result Value Ref Range    AST 18 <34 U/L    ALT <7 (L) 10 - 49 U/L    Alkaline Phosphatase 117 (H) 41 - 108 U/L    Bilirubin, Total <0.2 (L) 0.3 - 1.2 mg/dL    Bilirubin, Direct <0.1 <=0.3 mg/dL    Total Protein 8.4 (H) 5.7 - 8.2 g/dL    Albumin 4.7 3.2 - 4.8 g/dL   Prothrombin Time (PT)    Collection Time: 12/16/24  8:53 PM   Result Value Ref Range    PT 14.0 11.6 - 14.8 seconds    INR 1.02 0.80 - 1.20   PTT, Activated    Collection Time: 12/16/24  8:53 PM   Result Value Ref Range    PTT 24.1 23.0 - 36.0 seconds   Scan slide    Collection Time: 12/16/24  8:53 PM   Result Value Ref Range    Slide Review See MD blood smear consultation.    MD BLOOD SMEAR CONSULT    Collection Time: 12/16/24  8:53 PM   Result Value Ref Range    MD Blood Smear Consult       Evaluation of the CBC with differential data and the peripheral blood smear demonstrates an absolute lymphocytosis. Lymphocytes are characterized by a polymorphous population comprised predominantly of enlarged, reactive-appearing forms. There are no significant morphologic abnormalities in the red blood cells, other white blood cell subsets, or platelets.     The differential considerations for the finding of a lymphocytosis consisting of mature-appearing cells includes infection (most commonly viral), drug effects, transient stress and  possibly manifestations of a chronic lymphoproliferative disorder (such as chronic lymphocytic leukemia, other).  Recommend clinical correlation and correlation with immunophenotyping of the peripheral blood by flow cytometry if the lymphocytosis has been persistent without apparent cause or if a possible lymphoproliferative disorder is suspected clinically.     Reviewed by Krystyna Rhodes M.D.     Triglycerides    Collection Time: 12/16/24  8:53 PM   Result Value Ref Range    Triglycerides 165 (H) 30 - 149 mg/dL   EKG 12 Lead    Collection Time: 12/16/24 11:20 PM   Result Value Ref Range    Ventricular rate 91 BPM    Atrial rate 91 BPM    P-R Interval 184 ms    QRS Duration 80 ms    Q-T Interval 400 ms    QTC Calculation (Bezet) 492 ms    P Axis 76 degrees    R Axis -26 degrees    T Axis 21 degrees   POCT Glucose    Collection Time: 12/17/24 12:59 AM   Result Value Ref Range    POC Glucose  96 70 - 99 mg/dL   MRSA Screen by PCR    Collection Time: 12/17/24  5:58 AM   Result Value Ref Range    MRSA Screen By PCR Negative Negative   MRSA Screen by PCR    Collection Time: 12/17/24  9:13 AM   Result Value Ref Range    MRSA Screen By PCR Negative Negative   Rapid SARS-CoV-2 by PCR    Collection Time: 12/17/24  9:13 AM    Specimen: Nares; Other   Result Value Ref Range    Rapid SARS-CoV-2 by PCR Not Detected Not Detected   Drug Screen 8 W/confirmation, urine    Collection Time: 12/17/24  6:38 PM   Result Value Ref Range    Amphetamine Urine Negative Negative    Benzodiazepines Urine Presumed Positive (A) Negative    Cocaine Urine Negative Negative    Cannabinoid Urine Presumed Positive (A) Negative    Ecstasy Urine Negative Negative    Fentanyl Urine Negative Negative    Opiate Urine Negative Negative    Oxycodone Urine Negative Negative    Creatinine Ur Random 108.90 mg/dL   Arterial blood gas    Collection Time: 12/17/24  6:58 PM   Result Value Ref Range    Sample Site Right Radial     ABG pH 7.38 7.35 - 7.45    ABG  pCO2 33 (L) 35 - 45 mm Hg    ABG PO2 79 (L) 80 - 100 mm Hg    ABG HCO3 21.1 21.0 - 27.0 mEq/L    Blood Gas Base Excess -4.9 (L) -2.0 - 2.0 mmol/L    ABG O2 Saturation 94.8 94.0 - 100.0 %    Oxygen Content 14.7 (L) 15.0 - 23.0 Vol%    Oxygen Delivery Device Vent     Blood Gas Vent Mode A/C     Blood Gas Respiration Rate 16 BPM    Tidal Volume 500.0 mL    FIO2 35.00     PEEP 5.0 cm H2O    Modified Allens Test Positive     Puncture Charge Yes     Blood Gas Analyzer [id number] CCU    Basic Metabolic Panel (8)    Collection Time: 12/18/24  4:28 AM   Result Value Ref Range    Glucose 140 (H) 70 - 99 mg/dL    Sodium 142 136 - 145 mmol/L    Potassium 3.0 (L) 3.5 - 5.1 mmol/L    Chloride 113 (H) 98 - 112 mmol/L    CO2 17.0 (L) 21.0 - 32.0 mmol/L    Anion Gap 12 0 - 18 mmol/L    BUN 10 9 - 23 mg/dL    Creatinine 0.68 0.55 - 1.02 mg/dL    BUN/CREA Ratio 14.7 10.0 - 20.0    Calcium, Total 8.8 8.7 - 10.4 mg/dL    Calculated Osmolality 295 275 - 295 mOsm/kg    eGFR-Cr 103 >=60 mL/min/1.73m2   CBC With Differential With Platelet    Collection Time: 12/18/24  4:28 AM   Result Value Ref Range    WBC 17.3 (H) 4.0 - 11.0 x10(3) uL    RBC 3.38 (L) 3.80 - 5.30 x10(6)uL    HGB 11.0 (L) 12.0 - 16.0 g/dL    HCT 32.3 (L) 35.0 - 48.0 %    MCV 95.6 80.0 - 100.0 fL    MCH 32.5 26.0 - 34.0 pg    MCHC 34.1 31.0 - 37.0 g/dL    RDW-SD 55.8 (H) 35.1 - 46.3 fL    RDW 16.0 (H) 11.0 - 15.0 %    .0 150.0 - 450.0 10(3)uL    Neutrophil Absolute Prelim 14.77 (H) 1.50 - 7.70 x10 (3) uL    Neutrophil Absolute 14.77 (H) 1.50 - 7.70 x10(3) uL    Lymphocyte Absolute 0.96 (L) 1.00 - 4.00 x10(3) uL    Monocyte Absolute 1.34 (H) 0.10 - 1.00 x10(3) uL    Eosinophil Absolute 0.01 0.00 - 0.70 x10(3) uL    Basophil Absolute 0.06 0.00 - 0.20 x10(3) uL    Immature Granulocyte Absolute 0.16 0.00 - 1.00 x10(3) uL    Neutrophil % 85.5 %    Lymphocyte % 5.5 %    Monocyte % 7.7 %    Eosinophil % 0.1 %    Basophil % 0.3 %    Immature Granulocyte % 0.9 %   Hepatic  Function Panel (7)    Collection Time: 12/18/24  4:28 AM   Result Value Ref Range    AST 12 <34 U/L    ALT <7 (L) 10 - 49 U/L    Alkaline Phosphatase 95 41 - 108 U/L    Bilirubin, Total 0.2 (L) 0.3 - 1.2 mg/dL    Bilirubin, Direct <0.1 <=0.3 mg/dL    Total Protein 6.7 5.7 - 8.2 g/dL    Albumin 3.6 3.2 - 4.8 g/dL   Magnesium    Collection Time: 12/18/24  4:28 AM   Result Value Ref Range    Magnesium 2.0 1.6 - 2.6 mg/dL   Potassium    Collection Time: 12/18/24  4:25 PM   Result Value Ref Range    Potassium 3.1 (L) 3.5 - 5.1 mmol/L         Test results/Imaging:   CT BRAIN OR HEAD (CPT=70450)    Result Date: 12/16/2024  CONCLUSION:  1. No acute intracranial finding. 2. Stable encephalomalacia in right frontal lobe, right parietal occipital region and left occipital lobe. 3. Post right frontal and left occipital craniotomy.    Dictated by (CST): Colt Marin MD on 12/16/2024 at 9:20 PM     Finalized by (CST): Colt Marin MD on 12/16/2024 at 9:28 PM          EKG 12 Lead    Result Date: 12/17/2024  Normal sinus rhythm Normal ECG When compared with ECG of 18-AUG-2023 10:22, No significant change was found Confirmed by LISSETTE CHARLES ([id number]) on 12/17/2024 7:38:02 AM     Performed an independent visualization of:  CT brain WO   Imaging revealed: Agree with radiology read.    Education/Instructions given to: patient   Barriers to Learning:None  Content: Refer to note above. Evaluation/Outcome: Verbalized understanding    Disclaimer:   This record was dictated using Dragon software. There may be errors due to voice recognition problems that were not realized and corrected during the completion of the note.      This document is not intended to support charting by exception.  Sections left blank in a completed note should be presumed not to have been done.      Total critical care time spent exceeds 35 minutes.      Thank you.  Jorge Brandon D.O.   Vascular & General Neurology    12/18/2024  8:18 PM

## 2024-12-19 NOTE — PROGRESS NOTES
PT orders received chart reviewed. Pt presents as combative and agitated per EMR with security being called pt attempting to kick staff. Pt not appropriate to participate with PT evaluation at this time in restraints. PT will continue to follow and progress as medical progress allows.

## 2024-12-19 NOTE — PROGRESS NOTES
[facility]  part of [facility]    Progress Note      Assessment and Plan:   1.  Recurrent seizure-no further seizure at present.  The EEG was negative for epileptiform activity.     Recommendations:  1.  Antiepileptics as per neurology  2.  Will follow clinically.     2.  Respiratory impairment-patient was intubated for airway protection.  Has a distant history of tobacco as well as lung cancer.  Has some haziness at the left lung base and I cannot rule out an aspiration phenomenon.  Will cover empirically with antibiotic.    Chest x-ray looks about the same.  Has right basilar infiltrate.  Tolerated extubation.     Recommendations:  1.  Pulmonary toilet  2.  Unasyn  3.  Will follow clinically.     3.  DVT prophylaxis-subcutaneous heparin     4.  Metastatic lung cancer-no evidence of recurrent disease.  Patient follows with oncology.     5.  History of PE after knee surgery-as above, we will prophylax     6.  Hepatitis C-will check LFTs      Subjective:   Virginia Sinclair is a(n) 54 year old female who is pleasant and cooperative this morning but apparently was somewhat agitated last night.  Objective:   Blood pressure 108/63, pulse 71, temperature 99 °F (37.2 °C), temperature source Temporal, resp. rate (!) 29, height 5' 5\" (1.651 m), weight 140 lb 14 oz (63.9 kg), SpO2 96%, not currently breastfeeding.    Physical Exam sedate white female who is minimally arousable  HEENT examination is unremarkable with pupils equal round and reactive to light and accommodation.   Neck without adenopathy, thyromegaly, JVD nor bruit.   Lungs diminished to auscultation and percussion.  Cardiac regular rate and rhythm no murmur.   Abdomen nontender, without hepatosplenomegaly and no mass appreciable.   Extremities without clubbing cyanosis nor edema.   Neurologic slightly groggy but nonfocal.  Skin without gross abnormality     Results:     Lab Results   Component Value Date    WBC 13.2 12/19/2024    HGB 9.8  12/19/2024    HCT 30.3 12/19/2024    .0 12/19/2024    CREATSERUM 0.64 12/19/2024    BUN 6 12/19/2024     12/19/2024    K 3.0 12/19/2024    K 3.0 12/19/2024     12/19/2024    CO2 20.0 12/19/2024    GLU 89 12/19/2024    CA 8.8 12/19/2024     Chest x-ray-right basilar haziness    Merritt Miller MD  Medical Director, Critical Care, [facility]  Medical Director, [facility]  Pager: [phone]  >35 min crit care

## 2024-12-19 NOTE — PLAN OF CARE
~0000 while down in MRI pt became increasingly confused, restless, agitated, bordering on combative. Suspicious of staff and stated that she wanted to go home. \"You're holding me against my will. Where's my phone? Call my  to come get me.\" Pt only partially redirectable, argumentative when asking to have this writer call her  immediately while in MRI and refusing to go back to her room. Conceded to go back upstairs,  called to attempt de-escalation. Ativan given.      called back after event. Explained \"She does this. She can be really sweet, and then out of nowhere, suddenly a nightmare and making no sense.\" Psych consult placed per BPA for Anxiety/Depression screening.     0300- Pt yelling for \"Martin\", refusing to believe she is in the hospital. Ativan given. Dr. Gonzalez (nocturnist notified), instructed to attempt Ativan PRN to resolve agitation and if unsuccessful to consult Neurology.     0400 - CODE Support called for pt attempting to kick/hit staff, verbally hostile, making threats. Pt soiled in BM and was being cleaned and refusing cleaning immediately prior to calling CODE. Ativan    Problem: Safety Risk - Non-Violent Restraints  Goal: Patient will remain free from self-harm  Description: INTERVENTIONS:  - Apply the least restrictive restraint to prevent harm  - Notify patient and family of reasons restraints applied  - Assess for any contributing factors to confusion (electrolyte disturbances, delirium, medications)  - Discontinue any unnecessary medical devices as soon as possible  - Assess the patient's physical comfort, circulation, skin condition, hydration, nutrition and elimination needs   - Reorient and redirection as needed  - Assess for the need to continue restraints  Outcome: Not Progressing     Problem: RESPIRATORY - ADULT  Goal: Achieves optimal ventilation and oxygenation  Description: INTERVENTIONS:  - Assess for changes in respiratory status  - Assess for changes in  mentation and behavior  - Position to facilitate oxygenation and minimize respiratory effort  - Oxygen supplementation based on oxygen saturation or ABGs  - Provide Smoking Cessation handout, if applicable  - Encourage broncho-pulmonary hygiene including cough, deep breathe, Incentive Spirometry  - Assess the need for suctioning and perform as needed  - Assess and instruct to report SOB or any respiratory difficulty  - Respiratory Therapy support as indicated  - Manage/alleviate anxiety  - Monitor for signs/symptoms of CO2 retention  Outcome: Progressing     Problem: NEUROLOGICAL - ADULT  Goal: Achieves stable or improved neurological status  Description: INTERVENTIONS  - Assess for and report changes in neurological status  - Initiate measures to prevent increased intracranial pressure  - Maintain blood pressure and fluid volume within ordered parameters to optimize cerebral perfusion and minimize risk of hemorrhage  - Monitor temperature, glucose, and sodium. Initiate appropriate interventions as ordered  Outcome: Not Progressing  Goal: Absence of seizures  Description: INTERVENTIONS  - Monitor for seizure activity  - Administer anti-seizure medications as ordered  - Monitor neurological status  Outcome: Progressing

## 2024-12-19 NOTE — PROGRESS NOTES
[facility]  part of [facility]    Progress Note    Virginia Sinclair Patient Status:  Inpatient    1970 MRN [id number]   Location [facility] 2W/SW Attending Brunilda Victoria MD   Hosp Day # 2 PCP No primary care provider on file.     Chief Complaint:   Chief Complaint   Patient presents with    Seizure Disorder    Stroke   Seizure     Subjective:   Virginia Sinclair  is resting in bed. Afebrile. Was having diarrhea.      Objective:   Objective:    Blood pressure 93/57, pulse 74, temperature 99.2 °F (37.3 °C), temperature source Temporal, resp. rate 18, height 5' 5\" (1.651 m), weight 140 lb 14 oz (63.9 kg), SpO2 95%, not currently breastfeeding.    Physical Exam:    General: intubated sedated.   Respiratory: Coarse BS B/L   Cardiovascular: S1, S2. Regular rate and rhythm. No murmurs, rubs or gallops.   Abdomen: Soft, nontender, nondistended.  Positive bowel sounds. No rebound or guarding.  Neurologic: No focal neurological deficits.   Musculoskeletal: Moves all extremities.  Extremities: No edema.      Results:   Results:    Labs:  Recent Labs   Lab 248 24  0412   WBC 13.1* 17.3* 13.2*   HGB 12.5 11.0* 9.8*   MCV 99.2 95.6 98.4   .0 247.0 223.0   INR 1.02  --   --        Recent Labs   Lab 248 24  1625 24  0412   * 140*  --  89   BUN 13 10  --  6*   CREATSERUM 0.98 0.68  --  0.64   CA 9.0 8.8  --  8.8   ALB 4.7 3.6  --   --     142  --  143   K 3.9 3.0* 3.1* 3.0*  3.0*    113*  --  116*   CO2 17.0* 17.0*  --  20.0*   ALKPHO 117* 95  --   --    AST 18 12  --   --    ALT <7* <7*  --   --    BILT <0.2* 0.2*  --   --    TP 8.4* 6.7  --   --        Estimated Creatinine Clearance: 89.4 mL/min (based on SCr of 0.64 mg/dL).    Recent Labs   Lab 24   PTP 14.0   INR 1.02            Culture:  No results found for this visit on 24.    Cardiac  No results for input(s): \"TROP\", \"PBNP\" in the  last 168 hours.      Imaging: Imaging data reviewed in [facility].  XR CHEST AP PORTABLE  (CPT=71045)    Result Date: 12/19/2024  CONCLUSION:  1. Borderline cardiomegaly.  Tortuous aorta. 2. Right perihilar and bilateral lower lobe mixed alveolar and interstitial opacification with progression.    Dictated by (CST): Jasson Neri MD on 12/19/2024 at 11:46 AM     Finalized by (CST): Jasson Neri MD on 12/19/2024 at 11:48 AM          XR CHEST AP PORTABLE  (CPT=71045)    Result Date: 12/18/2024  CONCLUSION:  1. Borderline cardiomegaly.  Tortuous aorta. 2. Right basilar airspace opacification/consolidation.  Mild reticular prominence left basilar region has progressed.  Small left-sided effusion suspected 3. Support tubes and catheters are satisfactory.     Dictated by (CST): Jasson Neri MD on 12/18/2024 at 10:56 AM     Finalized by (CST): Jasson Neri MD on 12/18/2024 at 10:59 AM          XR CHEST AP PORTABLE  (CPT=71045)    Result Date: 12/18/2024  PROCEDURE: XR CHEST AP PORTABLE  (CPT=71045) TIME: 959.   COMPARISON: [facility], XR CHEST AP PORTABLE (CPT=71045), 12/18/2024, 7:22 AM.  INDICATIONS: Vomitting, possible aspiration.  TECHNIQUE:   Single view.   FINDINGS/IMPRESSION:   1. There is an endotracheal tube with tip approximately 4.7 cm above the chelsy.  The thoracic component of an enteric feeding tube is identified traversing the thoracic esophagus.  The distal tip is not visualized but lies below the GE junction.  2. The heart and mediastinal structures are minimally enlarged.  Pulmonary vascularity is upper limits of normal.  There are very small bilateral pleural effusions.  The findings are compatible with slight fluid overload.  3. There are hazy opacities at the lung bases which could represent atelectasis, infiltrate, or a combination in addition to the small pleural effusions.  4. There is a right IJ Port-A-Cath in expected position     Dictated by (CST): Eliseo  MD Paulino on 12/18/2024 at 10:33 AM     Finalized by (CST): Paulino Gomes MD on 12/18/2024 at 10:35 AM           Medications:    docusate sodium  100 mg Oral BID    sennosides  17.2 mg Oral BID    lamoTRIgine  150 mg Oral BID    heparin  5,000 Units Subcutaneous Q12H    ampicillin-sulbactam  3 g Intravenous q6h    famotidine  20 mg Intravenous BID    lacosamide  100 mg Intravenous Q12H    levETIRAcetam  60 mg/kg Intravenous Once    And    levETIRAcetam  1,500 mg Intravenous Q12H         Assessment and Plan:   Assessment & Plan:        Breakthrough Seizure   Status epilepticus   ICU care.   Neuro and critical care on consult.   Etiology of seizure unknown did have h/o brain mets but had intracranial resection done.   On continuous EEG at this time.   Needs MRI w/ and w/o contrast- pending   Cont Keppra, Vimpat, depakote.   Monitor labs.   EEG negative  Acute respiratory failure   Intubated for airway protection  Pulm on consult.   Now extubated   CXR possible L base asp PNA  Empiric Unasyn at this time per Pulm   Metastatic Lung CA   Needs MRI brain.   Will ask Oncology to eval after MRI done. Follows Dr Armstrong  Diarrhea                  - Stool for Cdiff positive- start oral vanc   Other medical problems  Hypothyroidism   Hepatitis C   H/o VTE after knee surgery provoked in past    >55min spent, >50% spent counseling and coordinating care in the form of educating pt/family and d/w consultants and staff. Most of the time spent discussing the above plan.        Plan of care discussed with patient or family at bedside.      Celsa De Jesus MD  Hospitalist          Supplementary Documentation:     Quality:  DVT Prophylaxis: heparin   CODE status: Full  Dispo: per clinical course            Estimated date of discharge: TBD  Discharge is dependent on: clinical stability  At this point Ms. Sinclair is expected to be discharge to: TBD

## 2024-12-19 NOTE — PROGRESS NOTES
[facility]  part of [facility]    Progress Note    Virginia Sinclair Patient Status:  Inpatient    1970 MRN [id number]   Location [facility] 2W/SW Attending Brunilda Victoria MD   Hosp Day # 1 PCP No primary care provider on file.     Chief Complaint:   Chief Complaint   Patient presents with    Seizure Disorder    Stroke   Seizure     Subjective:   Virginia Sinclair was found getting out of bed.with the tube in her mouth. I Called nursing to help with the situation. Patient is now extubated.     Objective:   Objective:    Blood pressure 125/77, pulse 59, temperature 97.9 °F (36.6 °C), temperature source Temporal, resp. rate 18, height 5' 5\" (1.651 m), weight 136 lb 3.9 oz (61.8 kg), SpO2 99%, not currently breastfeeding.    Physical Exam:    General: intubated sedated.   Respiratory: Coarse BS B/L   Cardiovascular: S1, S2. Regular rate and rhythm. No murmurs, rubs or gallops.   Abdomen: Soft, nontender, nondistended.  Positive bowel sounds. No rebound or guarding.  Neurologic: No focal neurological deficits.   Musculoskeletal: Moves all extremities.  Extremities: No edema.      Results:   Results:    Labs:  Recent Labs   Lab 24   WBC 13.1* 17.3*   HGB 12.5 11.0*   MCV 99.2 95.6   .0 247.0   INR 1.02  --        Recent Labs   Lab 248 24  1625   * 140*  --    BUN 13 10  --    CREATSERUM 0.98 0.68  --    CA 9.0 8.8  --    ALB 4.7 3.6  --     142  --    K 3.9 3.0* 3.1*    113*  --    CO2 17.0* 17.0*  --    ALKPHO 117* 95  --    AST 18 12  --    ALT <7* <7*  --    BILT <0.2* 0.2*  --    TP 8.4* 6.7  --        Estimated Creatinine Clearance: 84.1 mL/min (based on SCr of 0.68 mg/dL).    Recent Labs   Lab 24   PTP 14.0   INR 1.02            Culture:  No results found for this visit on 24.    Cardiac  No results for input(s): \"TROP\", \"PBNP\" in the last 168 hours.      Imaging: Imaging data reviewed  in Epic.  XR CHEST AP PORTABLE  (CPT=71045)    Result Date: 12/18/2024  CONCLUSION:  1. Borderline cardiomegaly.  Tortuous aorta. 2. Right basilar airspace opacification/consolidation.  Mild reticular prominence left basilar region has progressed.  Small left-sided effusion suspected 3. Support tubes and catheters are satisfactory.     Dictated by (CST): Jasson Neri MD on 12/18/2024 at 10:56 AM     Finalized by (CST): Jasson Neri MD on 12/18/2024 at 10:59 AM          XR CHEST AP PORTABLE  (CPT=71045)    Result Date: 12/18/2024  PROCEDURE: XR CHEST AP PORTABLE  (CPT=71045) TIME: 959.   COMPARISON: [facility], XR CHEST AP PORTABLE (CPT=71045), 12/18/2024, 7:22 AM.  INDICATIONS: Vomitting, possible aspiration.  TECHNIQUE:   Single view.   FINDINGS/IMPRESSION:   1. There is an endotracheal tube with tip approximately 4.7 cm above the chelsy.  The thoracic component of an enteric feeding tube is identified traversing the thoracic esophagus.  The distal tip is not visualized but lies below the GE junction.  2. The heart and mediastinal structures are minimally enlarged.  Pulmonary vascularity is upper limits of normal.  There are very small bilateral pleural effusions.  The findings are compatible with slight fluid overload.  3. There are hazy opacities at the lung bases which could represent atelectasis, infiltrate, or a combination in addition to the small pleural effusions.  4. There is a right IJ Port-A-Cath in expected position     Dictated by (CST): Paulino Gomes MD on 12/18/2024 at 10:33 AM     Finalized by (CST): Paulino Gomes MD on 12/18/2024 at 10:35 AM           Medications:    potassium chloride  40 mEq Intravenous Once    heparin  5,000 Units Subcutaneous Q12H    ampicillin-sulbactam  3 g Intravenous q6h    senna  10 mL Per NG Tube BID    docusate  100 mg Per NG Tube BID    famotidine  20 mg Intravenous BID    lamoTRIgine  150 mg Per NG Tube BID    lacosamide  100 mg  Intravenous Q12H    levETIRAcetam  60 mg/kg Intravenous Once    And    levETIRAcetam  1,500 mg Intravenous Q12H         Assessment and Plan:   Assessment & Plan:        Breakthrough Seizure   Status epilepticus   ICU care.   Neuro and critical care on consult.   Etiology of seizure unknown did have h/o brain mets but had intracranial resection done.   On continuous EEG at this time.   Needs MRI w/ and w/o contrast- pending   Cont Keppra, Vimpat, depakote.   Monitor labs.   EEG negative  Acute respiratory failure   Intubated for airway protection  Pulm on consult.   Cont full vent support at this time   CXR possible L base asp PNA?   Empiric Unasyn at this time.   Metastatic Lung CA   Needs MRI brain.   Will ask Oncology to eval after MRI done. Follows Dr Armstrong   Other medical problems  Hypothyroidism   Hepatitis C   H/o VTE after knee surgery provoked in past    >55min spent, >50% spent counseling and coordinating care in the form of educating pt/family and d/w consultants and staff. Most of the time spent discussing the above plan.        Plan of care discussed with patient or family at bedside.      Celsa De Jesus MD  Hospitalist          Supplementary Documentation:     Quality:  DVT Prophylaxis: heparin   CODE status: Full  Dispo: per clinical course            Estimated date of discharge: TBD  Discharge is dependent on: clinical stability  At this point Ms. Sinclair is expected to be discharge to: TBD

## 2024-12-19 NOTE — PLAN OF CARE
Patient alert and oriented to person, place, time, and situation. RA, on 1LNC when sleeping. Neuro assessments completed as ordered, see epic for further documentation. Neurology ok for patient transfer. Writer provided report to receiving RNLibby.     RN escorted patient due to non-violent restraints. Neuro assessments completed with receiving RN.     Double RN skin check done prior to transfer off unit. Skin check performed by this RN and MARIELLA Saenz.  Wounds are as follows: Intact.    Will remain available for any further questions or concerns.      Problem: Safety Risk - Non-Violent Restraints  Goal: Patient will remain free from self-harm  Description: INTERVENTIONS:  - Apply the least restrictive restraint to prevent harm  - Notify patient and family of reasons restraints applied  - Assess for any contributing factors to confusion (electrolyte disturbances, delirium, medications)  - Discontinue any unnecessary medical devices as soon as possible  - Assess the patient's physical comfort, circulation, skin condition, hydration, nutrition and elimination needs   - Reorient and redirection as needed  - Assess for the need to continue restraints  12/19/2024 1711 by Alondra Yepez RN  Outcome: Progressing  12/19/2024 1306 by Alondra Yepez RN  Outcome: Progressing     Problem: Patient Centered Care  Goal: Patient preferences are identified and integrated in the patient's plan of care  Description: Interventions:  - Provide timely, complete, and accurate information to patient/family  - Incorporate patient and family knowledge, values, beliefs, and cultural backgrounds into the planning and delivery of care  - Encourage patient/family to participate in care and decision-making at the level they choose  - Honor patient and family perspectives and choices  Outcome: Progressing       Problem: RESPIRATORY - ADULT  Goal: Achieves optimal ventilation and oxygenation  Description: INTERVENTIONS:  - Assess for changes  in respiratory status  - Assess for changes in mentation and behavior  - Position to facilitate oxygenation and minimize respiratory effort  - Oxygen supplementation based on oxygen saturation or ABGs  - Provide Smoking Cessation handout, if applicable  - Encourage broncho-pulmonary hygiene including cough, deep breathe, Incentive Spirometry  - Assess the need for suctioning and perform as needed  - Assess and instruct to report SOB or any respiratory difficulty  - Respiratory Therapy support as indicated  - Manage/alleviate anxiety  - Monitor for signs/symptoms of CO2 retention  Outcome: Progressing     Problem: NEUROLOGICAL - ADULT  Goal: Achieves stable or improved neurological status  Description: INTERVENTIONS  - Assess for and report changes in neurological status  - Initiate measures to prevent increased intracranial pressure  - Maintain blood pressure and fluid volume within ordered parameters to optimize cerebral perfusion and minimize risk of hemorrhage  - Monitor temperature, glucose, and sodium. Initiate appropriate interventions as ordered  Outcome: Progressing  Goal: Absence of seizures  Description: INTERVENTIONS  - Monitor for seizure activity  - Administer anti-seizure medications as ordered  - Monitor neurological status  Outcome: Progressing

## 2024-12-20 ENCOUNTER — APPOINTMENT (OUTPATIENT)
Dept: GENERAL RADIOLOGY | Facility: HOSPITAL | Age: 54
End: 2024-12-20
Attending: INTERNAL MEDICINE
Payer: COMMERCIAL

## 2024-12-20 LAB
ANION GAP SERPL CALC-SCNC: 11 MMOL/L (ref 0–18)
BASE EXCESS BLD CALC-SCNC: -5 MMOL/L (ref ?–2)
BASOPHILS # BLD AUTO: 0.06 X10(3) UL (ref 0–0.2)
BASOPHILS NFR BLD AUTO: 0.5 %
BUN BLD-MCNC: 7 MG/DL (ref 9–23)
BUN/CREAT SERPL: 11.7 (ref 10–20)
CALCIUM BLD-MCNC: 8.4 MG/DL (ref 8.7–10.4)
CHLORIDE SERPL-SCNC: 113 MMOL/L (ref 98–112)
CO2 SERPL-SCNC: 21 MMOL/L (ref 21–32)
CREAT BLD-MCNC: 0.6 MG/DL
DEPRECATED RDW RBC AUTO: 55.9 FL (ref 35.1–46.3)
EGFRCR SERPLBLD CKD-EPI 2021: 106 ML/MIN/1.73M2 (ref 60–?)
EOSINOPHIL # BLD AUTO: 0.06 X10(3) UL (ref 0–0.7)
EOSINOPHIL NFR BLD AUTO: 0.5 %
ERYTHROCYTE [DISTWIDTH] IN BLOOD BY AUTOMATED COUNT: 15.5 % (ref 11–15)
GLUCOSE BLD-MCNC: 77 MG/DL (ref 70–99)
HCO3 BLDA-SCNC: 21 MEQ/L (ref 21–27)
HCT VFR BLD AUTO: 29.4 %
HGB BLD-MCNC: 9.9 G/DL
IMM GRANULOCYTES # BLD AUTO: 0.05 X10(3) UL (ref 0–1)
IMM GRANULOCYTES NFR BLD: 0.5 %
LYMPHOCYTES # BLD AUTO: 2.11 X10(3) UL (ref 1–4)
LYMPHOCYTES NFR BLD AUTO: 19 %
MCH RBC QN AUTO: 33.2 PG (ref 26–34)
MCHC RBC AUTO-ENTMCNC: 33.7 G/DL (ref 31–37)
MCV RBC AUTO: 98.7 FL
MODIFIED ALLEN TEST: POSITIVE
MONOCYTES # BLD AUTO: 1 X10(3) UL (ref 0.1–1)
MONOCYTES NFR BLD AUTO: 9 %
NEUTROPHILS # BLD AUTO: 7.82 X10 (3) UL (ref 1.5–7.7)
NEUTROPHILS # BLD AUTO: 7.82 X10(3) UL (ref 1.5–7.7)
NEUTROPHILS NFR BLD AUTO: 70.5 %
O2 CT BLD-SCNC: 15.3 VOL% (ref 15–23)
O2/TOTAL GAS SETTING VFR VENT: 30 %
OSMOLALITY SERPL CALC.SUM OF ELEC: 297 MOSM/KG (ref 275–295)
PCO2 BLDA: 35 MM HG (ref 35–45)
PEEP SETTING VENT: 5 CM H2O
PH BLDA: 7.36 [PH] (ref 7.35–7.45)
PLATELET # BLD AUTO: 242 10(3)UL (ref 150–450)
PO2 BLDA: 100 MM HG (ref 80–100)
POTASSIUM SERPL-SCNC: 3.1 MMOL/L (ref 3.5–5.1)
PUNCTURE CHARGE: YES
RBC # BLD AUTO: 2.98 X10(6)UL
RESP RATE: 16 BPM
SAO2 % BLDA: 95.8 % (ref 94–100)
SODIUM SERPL-SCNC: 145 MMOL/L (ref 136–145)
SPECIMEN VOL 24H UR: 500 ML
WBC # BLD AUTO: 11.1 X10(3) UL (ref 4–11)

## 2024-12-20 PROCEDURE — 71045 X-RAY EXAM CHEST 1 VIEW: CPT | Performed by: INTERNAL MEDICINE

## 2024-12-20 PROCEDURE — 99233 SBSQ HOSP IP/OBS HIGH 50: CPT | Performed by: HOSPITALIST

## 2024-12-20 RX ORDER — LEVETIRACETAM 500 MG/1
500 TABLET ORAL 2 TIMES DAILY
Status: DISCONTINUED | OUTPATIENT
Start: 2024-12-20 | End: 2024-12-23

## 2024-12-20 NOTE — PHYSICAL THERAPY NOTE
PT orders received and chart reviewed. Per RN, patient not appropriate for participation in therapeutic interventions this time. RN reports that patient remains agitated, requiring x4 side rails, bilateral wrist restraints, and posey vest. Will re-schedule PT evaluation for tomorrow as appropriate.     Jenna Haase, PT, DPT  [facility], [place]  Ext: [phone]

## 2024-12-20 NOTE — PLAN OF CARE
Patient very agitated this evening, attempting to get out of bed. Patient demanding for phone and insisting to have  called, stating she needs to leave to see her son.  was reached out to, but patient got further agitated during conversation. Security called for de-escalation x 2. Dr. Daly notified of patient's agitation, IM Haldol ordered and given. IV Ativan for agitation also added PRN and given. Posey vest applied to patient. Patient intermittently in and out of agitated state.

## 2024-12-20 NOTE — PLAN OF CARE
Problem: Safety Risk - Non-Violent Restraints  Goal: Patient will remain free from self-harm  Description: INTERVENTIONS:  - Apply the least restrictive restraint to prevent harm  - Notify patient and family of reasons restraints applied  - Assess for any contributing factors to confusion (electrolyte disturbances, delirium, medications)  - Discontinue any unnecessary medical devices as soon as possible  - Assess the patient's physical comfort, circulation, skin condition, hydration, nutrition and elimination needs   - Reorient and redirection as needed  - Assess for the need to continue restraints  Outcome: Progressing     Problem: Patient Centered Care  Goal: Patient preferences are identified and integrated in the patient's plan of care  Description: Interventions:  - What would you like us to know as we care for you? From home with spouse  - Provide timely, complete, and accurate information to patient/family  - Incorporate patient and family knowledge, values, beliefs, and cultural backgrounds into the planning and delivery of care  - Encourage patient/family to participate in care and decision-making at the level they choose  - Honor patient and family perspectives and choices  Outcome: Progressing     Problem: Patient/Family Goals  Goal: Patient/Family Long Term Goal  Description: Patient's Long Term Goal: discharge    Interventions:  - Follow MD orders   - Take all prescribed medications   - Monitor labs, vitals  - See additional Care Plan goals for specific interventions  Outcome: Progressing  Goal: Patient/Family Short Term Goal  Description: Patient's Short Term Goal: free from injury     Interventions:   - Frequent rounding, bed alarms on, call light within reach   - See additional Care Plan goals for specific interventions  Outcome: Progressing     Problem: RESPIRATORY - ADULT  Goal: Achieves optimal ventilation and oxygenation  Description: INTERVENTIONS:  - Assess for changes in respiratory  status  - Assess for changes in mentation and behavior  - Position to facilitate oxygenation and minimize respiratory effort  - Oxygen supplementation based on oxygen saturation or ABGs  - Provide Smoking Cessation handout, if applicable  - Encourage broncho-pulmonary hygiene including cough, deep breathe, Incentive Spirometry  - Assess the need for suctioning and perform as needed  - Assess and instruct to report SOB or any respiratory difficulty  - Respiratory Therapy support as indicated  - Manage/alleviate anxiety  - Monitor for signs/symptoms of CO2 retention  Outcome: Progressing     Problem: NEUROLOGICAL - ADULT  Goal: Achieves stable or improved neurological status  Description: INTERVENTIONS  - Assess for and report changes in neurological status  - Initiate measures to prevent increased intracranial pressure  - Maintain blood pressure and fluid volume within ordered parameters to optimize cerebral perfusion and minimize risk of hemorrhage  - Monitor temperature, glucose, and sodium. Initiate appropriate interventions as ordered  Outcome: Progressing  Goal: Absence of seizures  Description: INTERVENTIONS  - Monitor for seizure activity  - Administer anti-seizure medications as ordered  - Monitor neurological status  Outcome: Progressing

## 2024-12-20 NOTE — PROGRESS NOTES
[facility]  part of [facility]     Progress Note    Virginia Sinclair Patient Status:  Inpatient    1970 MRN [id number]   Location [facility] 5SW/SE Attending Celsa De Jesus MD   Hosp Day # 3 PCP No primary care provider on file.       Subjective:   Patient seen and examined.  Resting in bed.  No significant distress.    Objective:   Blood pressure 138/77, pulse 64, temperature 99.2 °F (37.3 °C), temperature source Oral, resp. rate 18, height 5' 5\" (1.651 m), weight 140 lb 14 oz (63.9 kg), SpO2 94%, not currently breastfeeding.  Intake/Output:   Last 3 shifts: I/O last 3 completed shifts:  In: 2961 [P.O.:135; I.V.:854; IV PIGGYBACK:]  Out: 950 [Urine:950]   This shift: No intake/output data recorded.     Vent Settings:      Hemodynamic parameters (last 24 hours):      Scheduled Meds:   Current Facility-Administered Medications   Medication Dose Route Frequency    nicotine (Nicoderm CQ) 7 MG/24HR patch 1 patch  1 patch Transdermal Daily    potassium chloride 40 mEq in 250mL sodium chloride 0.9% IVPB premix  40 mEq Intravenous Once    docusate sodium (Colace) cap 100 mg  100 mg Oral BID    polyethylene glycol (PEG 3350) (Miralax) 17 g oral packet 17 g  17 g Oral Daily PRN    sennosides (Senokot) tab 17.2 mg  17.2 mg Oral BID    lamoTRIgine (LaMICtal) tab 150 mg  150 mg Oral BID    vancomycin (Vancocin) cap 125 mg  125 mg Oral QID    LORazepam (Ativan) 2 mg/mL injection 1 mg  1 mg Intravenous Q6H PRN    morphINE PF 2 MG/ML injection 1 mg  1 mg Intravenous Q2H PRN    haloperidol lactate (Haldol) 5 MG/ML injection 1 mg  1 mg Intramuscular Q6H PRN    ondansetron (Zofran) 4 MG/2ML injection 4 mg  4 mg Intravenous Q4H PRN    sodium chloride 0.9% infusion   Intravenous Continuous    LORazepam (Ativan) 2 mg/mL injection 1 mg  1 mg Intravenous Q10 Min PRN    heparin (Porcine) 5000 UNIT/ML injection 5,000 Units  5,000 Units Subcutaneous Q12H    ampicillin-sulbactam (Unasyn) 3 g in sodium  chloride 0.9% 100mL IVPB-ADD  3 g Intravenous q6h    acetaminophen (Tylenol) 160 MG/5ML oral liquid 650 mg  650 mg Per NG Tube Q4H PRN    Or    acetaminophen (Tylenol) rectal suppository 650 mg  650 mg Rectal Q4H PRN    Or    acetaminophen (Ofirmev) 10 mg/mL infusion premix 1,000 mg  1,000 mg Intravenous Q6H PRN    bisacodyl (Dulcolax) 10 MG rectal suppository 10 mg  10 mg Rectal Daily PRN    famotidine (Pepcid) 20 mg/2mL injection 20 mg  20 mg Intravenous BID    lacosamide (Vimpat) 200 mg/20mL injection 100 mg  100 mg Intravenous Q12H    levETIRAcetam (Keppra) 4,250 mg in sodium chloride 0.9% 100 mL IVPB  60 mg/kg Intravenous Once    And    levETIRAcetam (Keppra) 500 mg/5mL injection 1,500 mg  1,500 mg Intravenous Q12H       Continuous Infusions:    sodium chloride 75 mL/hr at 12/20/24 0020       Physical Exam  Constitutional: no acute distress, somnolent  Eyes: PERRL  ENT: nares pateint  Neck: supple, no JVD  Cardio: RRR, S1 S2  Respiratory: Diminished basilar breath sounds  GI: abdomen soft, non tender, active bowel sounds, no organomegaly  Extremities: no clubbing, cyanosis, edema  Neurologic: no gross motor deficits  Skin: warm, dry      Results:     Lab Results   Component Value Date    WBC 11.1 12/20/2024    HGB 9.9 12/20/2024    HCT 29.4 12/20/2024    .0 12/20/2024    CREATSERUM 0.60 12/20/2024    BUN 7 12/20/2024     12/20/2024    K 3.1 12/20/2024     12/20/2024    CO2 21.0 12/20/2024    GLU 77 12/20/2024    CA 8.4 12/20/2024       XR CHEST AP PORTABLE  (CPT=71045)    Result Date: 12/19/2024  CONCLUSION:  1. Borderline cardiomegaly.  Tortuous aorta. 2. Right perihilar and bilateral lower lobe mixed alveolar and interstitial opacification with progression.    Dictated by (CST): Jasson Neri MD on 12/19/2024 at 11:46 AM     Finalized by (CST): Jasson Neri MD on 12/19/2024 at 11:48 AM          XR CHEST AP PORTABLE  (CPT=71045)    Result Date: 12/18/2024  PROCEDURE: XR CHEST AP  PORTABLE  (CPT=71045) TIME: 959.   COMPARISON: [facility], XR CHEST AP PORTABLE (CPT=71045), 12/18/2024, 7:22 AM.  INDICATIONS: Vomitting, possible aspiration.  TECHNIQUE:   Single view.   FINDINGS/IMPRESSION:   1. There is an endotracheal tube with tip approximately 4.7 cm above the chelsy.  The thoracic component of an enteric feeding tube is identified traversing the thoracic esophagus.  The distal tip is not visualized but lies below the GE junction.  2. The heart and mediastinal structures are minimally enlarged.  Pulmonary vascularity is upper limits of normal.  There are very small bilateral pleural effusions.  The findings are compatible with slight fluid overload.  3. There are hazy opacities at the lung bases which could represent atelectasis, infiltrate, or a combination in addition to the small pleural effusions.  4. There is a right IJ Port-A-Cath in expected position     Dictated by (CST): Paulino Gomes MD on 12/18/2024 at 10:33 AM     Finalized by (CST): Paulino Gomes MD on 12/18/2024 at 10:35 AM                 Assessment   1.  Recurrent seizure  2.  Acute respiratory failure, resolved  3.  Possible left lower lobe pneumonia  4.  Metastatic lung cancer  5.  Prior pulmonary embolism  6.  Hepatitis C     Plan   -Patient presenting with evidence of recurrent seizures.  EEG negative for epileptiform discharges.  Further management per neurology  -Patient intubated for airway protection upon arrival.  Tolerating extubation at this time.  Weaned off oxygen  -Chest x-ray with concern for left basilar infiltrate.  Complete empiric course of antibiotic therapy  -Bronchial hygiene.  -Patient with history of metastatic lung cancer followed by oncology.  -DVT prophylaxis: Heparin    Fer Garcia DO  Pulmonary Critical Care Medicine  [facility]

## 2024-12-20 NOTE — PAYOR COMM NOTE
--------------  12/19 - 20 CONTINUED STAY REVIEW    Payor: [employer]  Subscriber #:  [id number]  Authorization Number: [id number]    12/19:     NURSING:    CODE SUPPORT CALLED AT 0347 for pt attempting to kick and hit staff. Confused and aggressive during cleaning after being found soiled in BM. Ativan pulled and given after support staff arrived. Restraints intact.      PULM:    Assessment and Plan:   1.  Recurrent seizure-no further seizure at present.  The EEG was negative for epileptiform activity.     Recommendations:  1.  Antiepileptics as per neurology  2.  Will follow clinically.     2.  Respiratory impairment-patient was intubated for airway protection.  Has a distant history of tobacco as well as lung cancer.  Has some haziness at the left lung base and I cannot rule out an aspiration phenomenon.  Will cover empirically with antibiotic.     Chest x-ray looks about the same.  Has right basilar infiltrate.  Tolerated extubation.     Recommendations:  1.  Pulmonary toilet  2.  Unasyn  3.  Will follow clinically.  3.  DVT prophylaxis-subcutaneous heparin     4.  Metastatic lung cancer-no evidence of recurrent disease.  Patient follows with oncology.     5.  History of PE after knee surgery-as above, we will prophylax     6.  Hepatitis C-will check LFTs      Subjective:   Virginia Sinclair is a(n) 54 year old female who is pleasant and cooperative this morning but apparently was somewhat agitated last night.  Objective:   Blood pressure 108/63, pulse 71, temperature 99 °F (37.2 °C), temperature source Temporal, resp. rate (!) 29, height 5' 5\" (1.651 m), weight 140 lb 14 oz (63.9 kg), SpO2 96%, not currently breastfeeding.     Physical Exam sedate white female who is minimally arousable  HEENT examination is unremarkable with pupils equal round and reactive to light and accommodation.   Neck without adenopathy, thyromegaly, JVD nor bruit.   Lungs diminished to auscultation and percussion.  Cardiac  regular rate and rhythm no murmur.   Abdomen nontender, without hepatosplenomegaly and no mass appreciable.   Extremities without clubbing cyanosis nor edema.   Neurologic slightly groggy but nonfocal.  Skin without gross abnormality     Lab Results   Component Value Date     WBC 13.2 12/19/2024     HGB 9.8 12/19/2024     HCT 30.3 12/19/2024     .0 12/19/2024     CREATSERUM 0.64 12/19/2024     BUN 6 12/19/2024      12/19/2024     K 3.0 12/19/2024     K 3.0 12/19/2024      12/19/2024     CO2 20.0 12/19/2024     GLU 89 12/19/2024     CA 8.8 12/19/2024      Chest x-ray-right basilar haziness    HOSPITALIST:    Afebrile. Was having diarrhea.       Blood pressure 93/57, pulse 74, temperature 99.2 °F (37.3 °C), temperature source Temporal, resp. rate 18, height 5' 5\" (1.651 m), weight 140 lb 14 oz (63.9 kg), SpO2 95%, not currently breastfeeding.     Physical Exam:    General: intubated sedated.   Respiratory: Coarse BS B/L   Cardiovascular: S1, S2. Regular rate and rhythm. No murmurs, rubs or gallops.   Abdomen: Soft, nontender, nondistended.  Positive bowel sounds. No rebound or guarding.  Neurologic: No focal neurological deficits.   Musculoskeletal: Moves all extremities.  Extremities: No edema.    Scheduled Medications    docusate sodium  100 mg Oral BID    sennosides  17.2 mg Oral BID    lamoTRIgine  150 mg Oral BID    heparin  5,000 Units Subcutaneous Q12H    ampicillin-sulbactam  3 g Intravenous q6h    famotidine  20 mg Intravenous BID    lacosamide  100 mg Intravenous Q12H    levETIRAcetam  60 mg/kg Intravenous Once     And    levETIRAcetam  1,500 mg Intravenous Q12H           sodium chloride 0.9% infusion  Intravenous,   75 mL/hr,   Continuous         Assessment & Plan:  Breakthrough Seizure   Status epilepticus   ICU care.   Neuro and critical care on consult.   Etiology of seizure unknown did have h/o brain mets but had intracranial resection done.   On continuous EEG at this time.   Needs  MRI w/ and w/o contrast- pending   Cont Keppra, Vimpat, depakote.   Monitor labs.   EEG negative  Acute respiratory failure   Intubated for airway protection  Pulm on consult.   Now extubated   CXR possible L base asp PNA  Empiric Unasyn at this time per Pulm   Metastatic Lung CA   Needs MRI brain.   Will ask Oncology to eval after MRI done. Follows Dr Armstrong  Diarrhea                  - Stool for Cdiff positive- start oral vanc   Other medical problems  Hypothyroidism   Hepatitis C   H/o VTE after knee surgery provoked in past       12/20:    NURSING:    3am: Patient very agitated this evening, attempting to get out of bed. Patient demanding for phone and insisting to have  called, stating she needs to leave to see her son.  was reached out to, but patient got further agitated during conversation. Security called for de-escalation x 2. Dr. Daly notified of patient's agitation, IM Haldol ordered and given. IV Ativan for agitation also added PRN and given. Posey vest applied to patient. Patient intermittently in and out of agitated state.     PHYSICAL TX:    PT orders received and chart reviewed. Per RN, patient not appropriate for participation in therapeutic interventions this time. RN reports that patient remains agitated, requiring x4 side rails, bilateral wrist restraints, and posey vest. Will re-schedule PT evaluation for tomorrow as appropriate.     PULM:    Patient seen and examined.  Resting in bed.  No significant distress.     Objective:   Blood pressure 138/77, pulse 64, temperature 99.2 °F (37.3 °C), temperature source Oral, resp. rate 18, height 5' 5\" (1.651 m), weight 140 lb 14 oz (63.9 kg), SpO2 94%, not currently breastfeeding.    Physical Exam  Constitutional: no acute distress, somnolent  Eyes: PERRL  ENT: nares pateint  Neck: supple, no JVD  Cardio: RRR, S1 S2  Respiratory: Diminished basilar breath sounds  GI: abdomen soft, non tender, active bowel sounds, no  organomegaly  Extremities: no clubbing, cyanosis, edema  Neurologic: no gross motor deficits  Skin: warm, dry           Lab Results   Component Value Date     WBC 11.1 12/20/2024     HGB 9.9 12/20/2024     HCT 29.4 12/20/2024     .0 12/20/2024     CREATSERUM 0.60 12/20/2024     BUN 7 12/20/2024      12/20/2024     K 3.1 12/20/2024      12/20/2024     CO2 21.0 12/20/2024     GLU 77 12/20/2024     CA 8.4 12/20/2024     Assessment   1.  Recurrent seizure  2.  Acute respiratory failure, resolved  3.  Possible left lower lobe pneumonia  4.  Metastatic lung cancer  5.  Prior pulmonary embolism  6.  Hepatitis C      Plan   -Patient presenting with evidence of recurrent seizures.  EEG negative for epileptiform discharges.  Further management per neurology  -Patient intubated for airway protection upon arrival.  Tolerating extubation at this time.  Weaned off oxygen  -Chest x-ray with concern for left basilar infiltrate.  Complete empiric course of antibiotic therapy  -Bronchial hygiene.  -Patient with history of metastatic lung cancer followed by oncology.  -DVT prophylaxis: Heparin        MEDICATIONS ADMINISTERED IN LAST 1 DAY:  ampicillin-sulbactam (Unasyn) 3 g in sodium chloride 0.9% 100mL IVPB-ADD       Date Action Dose Route User    12/20/2024 0504 New Bag 3 g Intravenous Lizabeth Ornelas RN    12/20/2024 0020 Restarted (none) Intravenous Eli Merchant RN    12/19/2024 2258 New Bag 3 g Intravenous Lizabeth Ornelas RN    12/19/2024 1719 New Bag 3 g Intravenous Libby Garcia RN     famotidine (Pepcid) 20 mg/2mL injection 20 mg       Date Action Dose Route User    12/20/2024 0821 Given 20 mg Intravenous Libby Garcia RN    12/19/2024 2015 Given 20 mg Intravenous Lizabeth Ornelas RN          haloperidol lactate (Haldol) 5 MG/ML injection 1 mg       Date Action Dose Route User    12/19/2024 2316 Given 1 mg Intramuscular (Right Deltoid) Eli Merchant RN          heparin (Porcine) 5000 UNIT/ML  injection 5,000 Units       Date Action Dose Route User    12/20/2024 0821 Given 5,000 Units Subcutaneous (Right Lower Abdomen) Libby Garcia RN          lacosamide (Vimpat) 200 mg/20mL injection 100 mg       Date Action Dose Route User    12/20/2024 0042 Given 100 mg Intravenous Eli Merchant RN    12/19/2024 1331 Given 100 mg Intravenous Alondra Yepez RN          levETIRAcetam (Keppra) 500 mg/5mL injection 1,500 mg       Date Action Dose Route User    12/20/2024 0043 Given 1,500 mg Intravenous Eli Merchant RN    12/19/2024 1331 Given 1,500 mg Intravenous Alondra Yepez RN          LORazepam (Ativan) 2 mg/mL injection 1 mg       Date Action Dose Route User    12/20/2024 0355 Given 1 mg Intravenous Eli Merchant RN    12/20/2024 0220 Given 1 mg Intravenous Eli Merchant RN    12/19/2024 2030 Given 1 mg Intravenous Lizabeth Ornelas RN          potassium chloride 40 mEq in 250mL sodium chloride 0.9% IVPB premix       Date Action Dose Route User    12/20/2024 1036 New Bag 40 mEq Intravenous Libby Garcia RN     sodium chloride 0.9% infusion       Date Action Dose Route User    12/20/2024 0020 Restarted (none) Intravenous Eli Merchant RN    12/19/2024 2257 New Bag (none) Intravenous Lizabeth Ornelas RN          vancomycin (Vancocin) cap 125 mg       Date Action Dose Route User    12/20/2024 0829 Given 125 mg Oral Libby Garcia RN    12/19/2024 1719 Given 125 mg Oral Libby Garcia RN          lamoTRIgine (LaMICtal) tab 150 mg       Date Action Dose Route User    12/20/2024 0820 Given 150 mg Oral Libby Garcia RN            Vitals (last day)       Date/Time Temp Pulse Resp BP SpO2 Weight O2 Device O2 Flow Rate (L/min) [facility]    12/20/24 0800 99.2 °F (37.3 °C) 64 18 138/77 94 % -- None (Room air) -- JR    12/20/24 0610 98.7 °F (37.1 °C) 59 18 138/79 96 % -- None (Room air) -- IB    12/19/24 2000 98.7 °F (37.1 °C) 61 18 136/73 94 % -- None (Room air) -- CL    12/19/24 1700 98.2 °F (36.8  °C) 65 18 122/72 95 % -- None (Room air) -- JR    12/19/24 1600 99.2 °F (37.3 °C) 66 18 109/59 96 % -- Nasal cannula 1 L/min JR    12/19/24 1400 -- 65 22 108/69 96 % -- Nasal cannula 1 L/min JR    12/19/24 1200 99.2 °F (37.3 °C) 74 18 93/57 95 % -- None (Room air) -- JR    12/19/24 1000 -- 66 18 111/68 96 % -- None (Room air) -- JR    12/19/24 0928 -- -- -- -- -- -- Nasal cannula 1 L/min JR    12/19/24 0900 -- 71 29 108/63 96 % -- Nasal cannula 2 L/min JR    12/19/24 0800 99 °F (37.2 °C) 61 20 120/71 97 % -- Nasal cannula 2 L/min JR    12/19/24 0700 -- 63 19 124/70 98 % -- Nasal cannula 2 L/min BT    12/19/24 0600 -- 61 17 126/73 98 % -- Nasal cannula 2 L/min BT    12/19/24 0500 -- 63 18 126/71 97 % 140 lb 14 oz (63.9 kg) Nasal cannula 2 L/min BT    12/19/24 0400 99.8 °F (37.7 °C) 58 16 115/92 98 % -- Nasal cannula 2 L/min BT    12/19/24 0300 -- 65 11 133/75 99 % -- Nasal cannula 2 L/min BT    12/19/24 0200 -- 76 19 143/79 98 % -- Nasal cannula 2 L/min BT    12/19/24 0100 -- 75 18 111/90 97 % -- Nasal cannula 2 L/min BT    12/19/24 0000 100 °F (37.8 °C) 68 8 125/68 95 % -- Nasal cannula 2 L/min BT

## 2024-12-20 NOTE — PROGRESS NOTES
[facility] up to psychiatry for treatment recommendations.

## 2024-12-20 NOTE — OCCUPATIONAL THERAPY NOTE
OT order received via fx mobility screen, chart reviewed. Patient currently with x4 siderails, B wrist restraints, and vest restraints. Per RN, patient remains agitated. OT will f/u tomorrow as appropriate.    DANIEL Agosto/L  [facility]  #[id number]

## 2024-12-20 NOTE — PLAN OF CARE
Safety precautions in place. Frequent rounding done. Call light within reach.   Problem: Safety Risk - Non-Violent Restraints  Goal: Patient will remain free from self-harm  Description: INTERVENTIONS:  - Apply the least restrictive restraint to prevent harm  - Notify patient and family of reasons restraints applied  - Assess for any contributing factors to confusion (electrolyte disturbances, delirium, medications)  - Discontinue any unnecessary medical devices as soon as possible  - Assess the patient's physical comfort, circulation, skin condition, hydration, nutrition and elimination needs   - Reorient and redirection as needed  - Assess for the need to continue restraints  Outcome: Progressing     Problem: Patient Centered Care  Goal: Patient preferences are identified and integrated in the patient's plan of care  Description: Interventions:  - What would you like us to know as we care for you?   - Provide timely, complete, and accurate information to patient/family  - Incorporate patient and family knowledge, values, beliefs, and cultural backgrounds into the planning and delivery of care  - Encourage patient/family to participate in care and decision-making at the level they choose  - Honor patient and family perspectives and choices  Outcome: Progressing     Problem: Patient/Family Goals  Goal: Patient/Family Long Term Goal  Description: Patient's Long Term Goal:     Interventions:  -   - See additional Care Plan goals for specific interventions  Outcome: Progressing  Goal: Patient/Family Short Term Goal  Description: Patient's Short Term Goal:     Interventions:   -   - See additional Care Plan goals for specific interventions  Outcome: Progressing     Problem: RESPIRATORY - ADULT  Goal: Achieves optimal ventilation and oxygenation  Description: INTERVENTIONS:  - Assess for changes in respiratory status  - Assess for changes in mentation and behavior  - Position to facilitate oxygenation and minimize  respiratory effort  - Oxygen supplementation based on oxygen saturation or ABGs  - Provide Smoking Cessation handout, if applicable  - Encourage broncho-pulmonary hygiene including cough, deep breathe, Incentive Spirometry  - Assess the need for suctioning and perform as needed  - Assess and instruct to report SOB or any respiratory difficulty  - Respiratory Therapy support as indicated  - Manage/alleviate anxiety  - Monitor for signs/symptoms of CO2 retention  Outcome: Progressing     Problem: NEUROLOGICAL - ADULT  Goal: Achieves stable or improved neurological status  Description: INTERVENTIONS  - Assess for and report changes in neurological status  - Initiate measures to prevent increased intracranial pressure  - Maintain blood pressure and fluid volume within ordered parameters to optimize cerebral perfusion and minimize risk of hemorrhage  - Monitor temperature, glucose, and sodium. Initiate appropriate interventions as ordered  Outcome: Progressing  Goal: Absence of seizures  Description: INTERVENTIONS  - Monitor for seizure activity  - Administer anti-seizure medications as ordered  - Monitor neurological status  Outcome: Progressing

## 2024-12-21 LAB
BENZODIAZEPINES CLASS UR: NEGATIVE
POTASSIUM SERPL-SCNC: 3 MMOL/L (ref 3.5–5.1)

## 2024-12-21 PROCEDURE — 99233 SBSQ HOSP IP/OBS HIGH 50: CPT | Performed by: HOSPITALIST

## 2024-12-21 PROCEDURE — 90792 PSYCH DIAG EVAL W/MED SRVCS: CPT | Performed by: NURSE PRACTITIONER

## 2024-12-21 RX ORDER — OLANZAPINE 5 MG/1
5 TABLET ORAL NIGHTLY
Status: DISCONTINUED | OUTPATIENT
Start: 2024-12-21 | End: 2024-12-23

## 2024-12-21 RX ORDER — ARIPIPRAZOLE 2 MG/1
2 TABLET ORAL DAILY
Status: DISCONTINUED | OUTPATIENT
Start: 2024-12-21 | End: 2024-12-23

## 2024-12-21 RX ORDER — POTASSIUM CHLORIDE 14.9 MG/ML
20 INJECTION INTRAVENOUS ONCE
Status: COMPLETED | OUTPATIENT
Start: 2024-12-21 | End: 2024-12-21

## 2024-12-21 RX ORDER — LORAZEPAM 1 MG/1
1 TABLET ORAL EVERY 6 HOURS PRN
Status: DISCONTINUED | OUTPATIENT
Start: 2024-12-21 | End: 2024-12-23

## 2024-12-21 RX ORDER — HALOPERIDOL 5 MG/ML
1 INJECTION INTRAMUSCULAR EVERY 6 HOURS PRN
Status: DISCONTINUED | OUTPATIENT
Start: 2024-12-21 | End: 2024-12-23

## 2024-12-21 NOTE — PROGRESS NOTES
Pulmonary Medicine Inpatient Progress Note                 Subjective:  Afebrile on RA  Reports she feels breathing is better but has social issues going on at home and that her  is going to come \"sign me out\" to go home.        ALLERGIES:  Allergies[1]       MEDS:  Home Medications:  Medications Taking[2]  Scheduled Medication:   nicotine  1 patch Transdermal Daily    levETIRAcetam  500 mg Oral BID    docusate sodium  100 mg Oral BID    sennosides  17.2 mg Oral BID    lamoTRIgine  150 mg Oral BID    vancomycin  125 mg Oral QID    heparin  5,000 Units Subcutaneous Q12H    ampicillin-sulbactam  3 g Intravenous q6h    famotidine  20 mg Intravenous BID     Continuous Infusing Medication:   sodium chloride 75 mL/hr at 12/20/24 1429     PRN Medications:    polyethylene glycol (PEG 3350)    LORazepam    morphINE PF    haloperidol lactate    ondansetron    LORazepam    acetaminophen **OR** acetaminophen **OR** acetaminophen    bisacodyl       PHYSICAL EXAM:  /71 (BP Location: Right arm)   Pulse 50   Temp 99 °F (37.2 °C) (Oral)   Resp 16   Ht 5' 5\" (1.651 m)   Wt 140 lb 14 oz (63.9 kg)   SpO2 96%   BMI 23.44 kg/m²   CONSTITUTIONAL: alert, oriented, no apparent distress  HEENT: atraumatic normocephalic  MOUTH: mucous membranes are moist. No OP exudates  NECK/THROAT: no JVD. Trachea midline. No obvious thyromegaly  LUNG: clear b/l no wheezing, crackles. Chest symmetric with respiratory motion  HEART: regular rate and rhythm, no obvious murmers or gallops note  ABD: soft non tender. + bowel sounds. No organomegaly noted  EXT: no clubbing, cyanosis, or edema noted. Pulses intact grossly  NEURO/MUSCULOSKELETAL: no gross deficits  SKIN: warm, dry. No obvious lesions noted  LYMPH: no obvious LAD       IMAGES:  CXR 12/20/24 improving infiltrates/interstitial changes at the lower lobes R>L  Official read:  Improving prominence of the interstitial markings.   Interval resolution of the bibasilar opacities.           LABS:  Recent Labs   Lab 12/18/24 0428 12/19/24 0412 12/20/24  0613   RBC 3.38* 3.08* 2.98*   HGB 11.0* 9.8* 9.9*   HCT 32.3* 30.3* 29.4*   MCV 95.6 98.4 98.7   MCH 32.5 31.8 33.2   MCHC 34.1 32.3 33.7   RDW 16.0* 16.1* 15.5*   NEPRELIM 14.77* 9.57* 7.82*   WBC 17.3* 13.2* 11.1*   .0 223.0 242.0       Recent Labs   Lab 12/16/24  2053 12/18/24  0428 12/18/24  1625 12/19/24  0412 12/19/24  1511 12/20/24  0613 12/21/24  0344   * 140*  --  89  --  77  --    BUN 13 10  --  6*  --  7*  --    CREATSERUM 0.98 0.68  --  0.64  --  0.60  --    EGFRCR 68 103  --  104  --  106  --    CA 9.0 8.8  --  8.8  --  8.4*  --    ALB 4.7 3.6  --   --   --   --   --     142  --  143  --  145  --    K 3.9 3.0*   < > 3.0*  3.0* 3.8 3.1* 3.0*    113*  --  116*  --  113*  --    CO2 17.0* 17.0*  --  20.0*  --  21.0  --    ALKPHO 117* 95  --   --   --   --   --    AST 18 12  --   --   --   --   --    ALT <7* <7*  --   --   --   --   --    BILT <0.2* 0.2*  --   --   --   --   --    TP 8.4* 6.7  --   --   --   --   --     < > = values in this interval not displayed.          ASSESSMENT/PLAN:  Acute hypoxemic respiratory failure (resolved now)  -CXR appears improved. On unasyn-complete 7 day course. Can switch to augmentin to complete the 7 day course if she's going to be discharged soon  -on min 02 support. Try to wean off as able/tolerated. Currently on RA when seen this am  -bronchial hygiene     Recurrent seizure  -s/p intubation for airway protection and now extubated  -EEG neg for seizures  -neurology following    Hx of metastatic lung CA  -follows with oncology    Proph:  -DVT: hep subcutaneous        Thank you for the opportunity to care for Virginia SinclairJosé Gonzalez DO, MPH  Pulmonary Critical Care Medicine  [facility]          [1] No Known Allergies  [2]   Outpatient Medications Marked as Taking for the 12/16/24 encounter (Hospital Encounter)   Medication  Sig Dispense Refill    levothyroxine 125 MCG Oral Tab Take 1 tablet (125 mcg total) by mouth before breakfast. 90 tablet 0    traMADol 50 MG Oral Tab Take 1 tablet (50 mg total) by mouth every 4 (four) hours as needed for Pain.      levetiracetam 750 MG Oral Tab Take 2 tablets (1,500 mg total) by mouth 2 (two) times daily.      RISPERIDONE OR Take 2.5 mg by mouth nightly.      gabapentin 800 MG Oral Tab Take 0.5 tablets (400 mg total) by mouth 2 (two) times daily.      sertraline 100 MG Oral Tab Take 2 tablets (200 mg total) by mouth every morning.

## 2024-12-21 NOTE — PLAN OF CARE
No acute changes. Patient resting comfortably, call light within reach.  at bedside earlier and updated on plan of care.    Problem: Safety Risk - Non-Violent Restraints  Goal: Patient will remain free from self-harm  Description: INTERVENTIONS:  - Apply the least restrictive restraint to prevent harm  - Notify patient and family of reasons restraints applied  - Assess for any contributing factors to confusion (electrolyte disturbances, delirium, medications)  - Discontinue any unnecessary medical devices as soon as possible  - Assess the patient's physical comfort, circulation, skin condition, hydration, nutrition and elimination needs   - Reorient and redirection as needed  - Assess for the need to continue restraints  Outcome: Progressing     Problem: Patient Centered Care  Goal: Patient preferences are identified and integrated in the patient's plan of care  Description: Interventions:  - What would you like us to know as we care for you? Home with   - Provide timely, complete, and accurate information to patient/family  - Incorporate patient and family knowledge, values, beliefs, and cultural backgrounds into the planning and delivery of care  - Encourage patient/family to participate in care and decision-making at the level they choose  - Honor patient and family perspectives and choices  Outcome: Progressing     Problem: Patient/Family Goals  Goal: Patient/Family Long Term Goal  Description: Patient's Long Term Goal: be discharged    Interventions:  -monitor VS  -monitor appropriate labs  -update patient and family on plan of care  -follow MD orders   - See additional Care Plan goals for specific interventions  Outcome: Progressing  Goal: Patient/Family Short Term Goal  Description: Patient's Short Term Goal: no seizures    Interventions:   -monitor VS  -monitor appropriate labs  -update patient and family on plan of care  -discharge planning  -follow MD orders   - See additional Care Plan goals  for specific interventions  Outcome: Progressing     Problem: RESPIRATORY - ADULT  Goal: Achieves optimal ventilation and oxygenation  Description: INTERVENTIONS:  - Assess for changes in respiratory status  - Assess for changes in mentation and behavior  - Position to facilitate oxygenation and minimize respiratory effort  - Oxygen supplementation based on oxygen saturation or ABGs  - Provide Smoking Cessation handout, if applicable  - Encourage broncho-pulmonary hygiene including cough, deep breathe, Incentive Spirometry  - Assess the need for suctioning and perform as needed  - Assess and instruct to report SOB or any respiratory difficulty  - Respiratory Therapy support as indicated  - Manage/alleviate anxiety  - Monitor for signs/symptoms of CO2 retention  Outcome: Progressing     Problem: NEUROLOGICAL - ADULT  Goal: Achieves stable or improved neurological status  Description: INTERVENTIONS  - Assess for and report changes in neurological status  - Initiate measures to prevent increased intracranial pressure  - Maintain blood pressure and fluid volume within ordered parameters to optimize cerebral perfusion and minimize risk of hemorrhage  - Monitor temperature, glucose, and sodium. Initiate appropriate interventions as ordered  Outcome: Progressing  Goal: Absence of seizures  Description: INTERVENTIONS  - Monitor for seizure activity  - Administer anti-seizure medications as ordered  - Monitor neurological status  Outcome: Progressing     Problem: PAIN - ADULT  Goal: Verbalizes/displays adequate comfort level or patient's stated pain goal  Description: INTERVENTIONS:  - Encourage pt to monitor pain and request assistance  - Assess pain using appropriate pain scale  - Administer analgesics based on type and severity of pain and evaluate response  - Implement non-pharmacological measures as appropriate and evaluate response  - Consider cultural and social influences on pain and pain management  -  Manage/alleviate anxiety  - Utilize distraction and/or relaxation techniques  - Monitor for opioid side effects  - Notify MD/LIP if interventions unsuccessful or patient reports new pain  - Anticipate increased pain with activity and pre-medicate as appropriate  Outcome: Progressing     Problem: SAFETY ADULT - FALL  Goal: Free from fall injury  Description: INTERVENTIONS:  - Assess pt frequently for physical needs  - Identify cognitive and physical deficits and behaviors that affect risk of falls.  - [place] fall precautions as indicated by assessment.  - Educate pt/family on patient safety including physical limitations  - Instruct pt to call for assistance with activity based on assessment  - Modify environment to reduce risk of injury  - Provide assistive devices as appropriate  - Consider OT/PT consult to assist with strengthening/mobility  - Encourage toileting schedule  Outcome: Progressing     Problem: DISCHARGE PLANNING  Goal: Discharge to home or other facility with appropriate resources  Description: INTERVENTIONS:  - Identify barriers to discharge w/pt and caregiver  - Include patient/family/discharge partner in discharge planning  - Arrange for needed discharge resources and transportation as appropriate  - Identify discharge learning needs (meds, wound care, etc)  - Arrange for interpreters to assist at discharge as needed  - Consider post-discharge preferences of patient/family/discharge partner  - Complete POLST form as appropriate  - Assess patient's ability to be responsible for managing their own health  - Refer to Case Management Department for coordinating discharge planning if the patient needs post-hospital services based on physician/LIP order or complex needs related to functional status, cognitive ability or social support system  Outcome: Progressing

## 2024-12-21 NOTE — PROGRESS NOTES
[facility]  [address] [place] [address], [address]  [facility] [id number]  [phone]        INPATIENT NEUROLOGY   FOLLOW UP PROGRESS NOTE  [facility]  part of [facility]    Virginia Sinclair Patient Status:  Inpatient     1970 MRN [id number]    Location [facility] 2W/SW Attending Celsa De Jesus MD    Hosp Day # 3 PCP No primary care provider on file.    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  She is currently not in status epilepticus, but was earlier.  Her EEG is been negative for any ictal or interictal discharges.  She withdraws to painful stimuli.       She was successfully extubated on 24. MRI brain wwo still pending.  On exam she is awake, alert, and is calm.  She reports adherence to her antiseizure medications.  Patient reports that recently she has been   sleep deprived due to her acute stressors.  This may be a possible trigger for breakthrough seizures.    On repeat exam on 2024 the patient is delirious.  She was attempted to get out of bed.  Her legs were dangling over the edge of the bed.  She had a hyperactive delirium.  She is still pending MRI    Breakthrough seizure w/ status epilepticus  Differential Diagnosis:  Need to exclude new intracranial malignancy/interval change; last MRI of the brain from 2024 recommended repeat scan in 3 to 4 months.  Provoked seizure due to sleep deprivation; Will need to assess for other factors that could lead to provoked  seizure  She reports that she is adherent with her antiseizure medications       Plan    Diagnostics:  Follow-up on MRI of the brain without contrast and sure there is no interval lesion; patient was taken down but b/c of motion artifact images were nondiagnostic  Video EEG completed    Therapeutics:  Restarted home dose of lamotrigine 150 mg twice daily  Stopped IV Depakote  Decrease dose of Keppra to 500 mg p.o. twice daily  Discontinued lacosamide 100 mg twice daily  Neurochecks Q4            INTERVAL EVENTS  12/18/24:  extubated. No results from MRI yet   12/19/24: Still pending MRI.  Transferred to floor       SUBJECTIVE:   Patient appears to have hyperactive delirium.  Entering the room the patient was attempting to get out of bed.  She is in soft restraints.  She requires a Mario harness.  She had difficulty her legs over the edge of the bed.  States she was try to get over the to the bathroom.  Focused on discussing college football on TV.  Explained the plan to her.  Advised her that she would need to be repositioned.  Denies having a headache.  No seizure auras.  No complaints.  Pertinent positive and negatives per HPI.  All others were reviewed and negative.       Objective   OBJECTIVE:   Last vitals and weight :  Vitals reviewed     Exam:  - General: appears stated age and no distress  - CV:    Carotids:   - Pulmonary: no sign of respiratory distress.   Neurologic Exam  - Mental Status: Alert and attentive.  Oriented to   person month, year (at 1924 and then 2024).  She thought the city was [place]. .  Speech is spontaneous, fluent, and prosodic. Comprehension intact. Repetition intact. Phrase length and rate are normal. No paraphasic errors, neologisms, anomia, neglect, apraxia, or R/L confusion.   - Cranial Nerves: No gaze preference. Visual fields:normal  Pupils are 4mm briskly constricting to 3mm and equally round and reactive to light  in a well lit room. EOMI. No nystagmus. No ptosis. V1-V3 intact  B/L to light touch.No pathological facial asymmetry. No flattening of the nasolabial fold. .  Hearing grossly intact.  Tongue midline. No atrophy or fasiculations of the tongue noted. Palate and uvula elevate symmetrically.  Shoulder shrug symmetric.  - Motor:  normal tone, normal bulk. No interosseous wasting. No flattening of hypothenar eminences.  She is in soft restraints.      Pronator drift: No pronator drift       Finger Taps: Finger taps are symmetric in rate and amplitude. .      Leg Drift: none      - Sensory:   Light touch:normal         - Cerebellum: No truncal ataxia. No titubations. No dysmetria,        Medications:   nicotine  1 patch Transdermal Daily    levETIRAcetam  500 mg Oral BID    docusate sodium  100 mg Oral BID    sennosides  17.2 mg Oral BID    lamoTRIgine  150 mg Oral BID    vancomycin  125 mg Oral QID    heparin  5,000 Units Subcutaneous Q12H    ampicillin-sulbactam  3 g Intravenous q6h    famotidine  20 mg Intravenous BID       PRNS:   polyethylene glycol (PEG 3350)    LORazepam    morphINE PF    haloperidol lactate    ondansetron    LORazepam    acetaminophen **OR** acetaminophen **OR** acetaminophen    bisacodyl    Infusions:    sodium chloride 75 mL/hr at 12/21/24 0614            Results:   Laboratory Data:  Lab Results   Component Value Date    WBC 11.1 (H) 12/20/2024    HGB 9.9 (L) 12/20/2024    HCT 29.4 (L) 12/20/2024    .0 12/20/2024    CREATSERUM 0.60 12/20/2024    BUN 7 (L) 12/20/2024     12/20/2024    K 3.0 (L) 12/21/2024     (H) 12/20/2024    CO2 21.0 12/20/2024    GLU 77 12/20/2024    CA 8.4 (L) 12/20/2024    ALB 3.6 12/18/2024    ALKPHO 95 12/18/2024    TP 6.7 12/18/2024    AST 12 12/18/2024    ALT <7 (L) 12/18/2024    PTT 24.1 12/16/2024    INR 1.02 12/16/2024    PTP 14.0 12/16/2024    T4F 0.7 (L) 11/06/2024    TSH 0.064 (L) 11/06/2024    LIP 25 (L) 10/02/2021     (H) 12/18/2017    ESRML >130 (H) 11/16/2023    CRP 1.00 (H) 11/16/2023    MG 2.0  12/18/2024    PHOS 2.9 12/13/2017    TROP 0.00 04/17/2018    CK 49 10/11/2023    B12 1,119 (H) 07/22/2024    ETOH <3 02/28/2022     Recent Results (from the past 72 hours)   Arterial blood gas    Collection Time: 12/18/24  2:53 PM   Result Value Ref Range    Sample Site Right Radial     ABG pH 7.36 7.35 - 7.45    ABG pCO2 35 35 - 45 mm Hg    ABG PO2 100 80 - 100 mm Hg    ABG HCO3 21.0 21.0 - 27.0 mEq/L    Blood Gas Base Excess -5.0 (L) -2.0 - 2.0 mmol/L    ABG O2 Saturation 95.8 94.0 - 100.0 %    Oxygen Content 15.3 15.0 - 23.0 Vol%    Oxygen Delivery Device Vent     Blood Gas Vent Mode A/C     Blood Gas Respiration Rate 16 BPM    Tidal Volume 500.0 mL    FIO2 30.00     PEEP 5.0 cm H2O    Modified Allens Test Positive     Puncture Charge Yes     Blood Gas Analyzer [id number] CCU    Potassium    Collection Time: 12/18/24  4:25 PM   Result Value Ref Range    Potassium 3.1 (L) 3.5 - 5.1 mmol/L   POCT Glucose    Collection Time: 12/19/24  1:24 AM   Result Value Ref Range    POC Glucose  95 70 - 99 mg/dL   Basic Metabolic Panel (8)    Collection Time: 12/19/24  4:12 AM   Result Value Ref Range    Glucose 89 70 - 99 mg/dL    Sodium 143 136 - 145 mmol/L    Potassium 3.0 (L) 3.5 - 5.1 mmol/L    Chloride 116 (H) 98 - 112 mmol/L    CO2 20.0 (L) 21.0 - 32.0 mmol/L    Anion Gap 7 0 - 18 mmol/L    BUN 6 (L) 9 - 23 mg/dL    Creatinine 0.64 0.55 - 1.02 mg/dL    BUN/CREA Ratio 9.4 (L) 10.0 - 20.0    Calcium, Total 8.8 8.7 - 10.4 mg/dL    Calculated Osmolality 293 275 - 295 mOsm/kg    eGFR-Cr 104 >=60 mL/min/1.73m2   CBC With Differential With Platelet    Collection Time: 12/19/24  4:12 AM   Result Value Ref Range    WBC 13.2 (H) 4.0 - 11.0 x10(3) uL    RBC 3.08 (L) 3.80 - 5.30 x10(6)uL    HGB 9.8 (L) 12.0 - 16.0 g/dL    HCT 30.3 (L) 35.0 - 48.0 %    MCV 98.4 80.0 - 100.0 fL    MCH 31.8 26.0 - 34.0 pg    MCHC 32.3 31.0 - 37.0 g/dL    RDW-SD 58.1 (H) 35.1 - 46.3 fL    RDW 16.1 (H) 11.0 - 15.0 %    .0 150.0 - 450.0 10(3)uL     Neutrophil Absolute Prelim 9.57 (H) 1.50 - 7.70 x10 (3) uL    Neutrophil Absolute 9.57 (H) 1.50 - 7.70 x10(3) uL    Lymphocyte Absolute 2.42 1.00 - 4.00 x10(3) uL    Monocyte Absolute 1.04 (H) 0.10 - 1.00 x10(3) uL    Eosinophil Absolute 0.05 0.00 - 0.70 x10(3) uL    Basophil Absolute 0.05 0.00 - 0.20 x10(3) uL    Immature Granulocyte Absolute 0.08 0.00 - 1.00 x10(3) uL    Neutrophil % 72.4 %    Lymphocyte % 18.3 %    Monocyte % 7.9 %    Eosinophil % 0.4 %    Basophil % 0.4 %    Immature Granulocyte % 0.6 %   Potassium    Collection Time: 12/19/24  4:12 AM   Result Value Ref Range    Potassium 3.0 (L) 3.5 - 5.1 mmol/L   Clostridium difficile(toxigenic)PCR    Collection Time: 12/19/24  4:17 AM    Specimen: Stool   Result Value Ref Range    C. Difficile Toxin B Gene Positive (A) Negative   Clostridium difficile by EIA    Collection Time: 12/19/24  4:17 AM    Specimen: Stool   Result Value Ref Range    Expression of C. difficile toxin A/B Genes Not Detected Not Detected   Potassium    Collection Time: 12/19/24  3:11 PM   Result Value Ref Range    Potassium 3.8 3.5 - 5.1 mmol/L   Basic Metabolic Panel (8)    Collection Time: 12/20/24  6:13 AM   Result Value Ref Range    Glucose 77 70 - 99 mg/dL    Sodium 145 136 - 145 mmol/L    Potassium 3.1 (L) 3.5 - 5.1 mmol/L    Chloride 113 (H) 98 - 112 mmol/L    CO2 21.0 21.0 - 32.0 mmol/L    Anion Gap 11 0 - 18 mmol/L    BUN 7 (L) 9 - 23 mg/dL    Creatinine 0.60 0.55 - 1.02 mg/dL    BUN/CREA Ratio 11.7 10.0 - 20.0    Calcium, Total 8.4 (L) 8.7 - 10.4 mg/dL    Calculated Osmolality 297 (H) 275 - 295 mOsm/kg    eGFR-Cr 106 >=60 mL/min/1.73m2   CBC With Differential With Platelet    Collection Time: 12/20/24  6:13 AM   Result Value Ref Range    WBC 11.1 (H) 4.0 - 11.0 x10(3) uL    RBC 2.98 (L) 3.80 - 5.30 x10(6)uL    HGB 9.9 (L) 12.0 - 16.0 g/dL    HCT 29.4 (L) 35.0 - 48.0 %    MCV 98.7 80.0 - 100.0 fL    MCH 33.2 26.0 - 34.0 pg    MCHC 33.7 31.0 - 37.0 g/dL    RDW-SD 55.9 (H) 35.1 -  46.3 fL    RDW 15.5 (H) 11.0 - 15.0 %    .0 150.0 - 450.0 10(3)uL    Neutrophil Absolute Prelim 7.82 (H) 1.50 - 7.70 x10 (3) uL    Neutrophil Absolute 7.82 (H) 1.50 - 7.70 x10(3) uL    Lymphocyte Absolute 2.11 1.00 - 4.00 x10(3) uL    Monocyte Absolute 1.00 0.10 - 1.00 x10(3) uL    Eosinophil Absolute 0.06 0.00 - 0.70 x10(3) uL    Basophil Absolute 0.06 0.00 - 0.20 x10(3) uL    Immature Granulocyte Absolute 0.05 0.00 - 1.00 x10(3) uL    Neutrophil % 70.5 %    Lymphocyte % 19.0 %    Monocyte % 9.0 %    Eosinophil % 0.5 %    Basophil % 0.5 %    Immature Granulocyte % 0.5 %   Potassium    Collection Time: 12/21/24  3:44 AM   Result Value Ref Range    Potassium 3.0 (L) 3.5 - 5.1 mmol/L         Test results/Imaging:   CT BRAIN OR HEAD (CPT=70450)    Result Date: 12/16/2024  CONCLUSION:  1. No acute intracranial finding. 2. Stable encephalomalacia in right frontal lobe, right parietal occipital region and left occipital lobe. 3. Post right frontal and left occipital craniotomy.    Dictated by (CST): Colt Marin MD on 12/16/2024 at 9:20 PM     Finalized by (CST): Colt Marin MD on 12/16/2024 at 9:28 PM                Performed an independent visualization of:  CT brain WO   Imaging revealed: Agree with radiology read.    Education/Instructions given to: patient   Barriers to Learning:None  Content: Refer to note above. Evaluation/Outcome: Verbalized understanding    Disclaimer:   This record was dictated using Dragon software. There may be errors due to voice recognition problems that were not realized and corrected during the completion of the note.      This document is not intended to support charting by exception.  Sections left blank in a completed note should be presumed not to have been done.    Total time spent involved in the care of the patient including time spent writing the note, reviewing the labs, reviewing the relevant imaging, and face to face time was 35 minutes, more than 50% of the time was  spent in counseling and/or coordination of care related to  seizure.        Thank you.  Jorge Brandon D.O.   Vascular & General Neurology    12/20/2024

## 2024-12-21 NOTE — CONSULTS
[facility]  part of [facility]    Report of Consultation    Virginia Sinclair Patient Status:  Inpatient    1970 MRN [id number]   Location [facility] 5SW/SE Attending Celsa De Jesus MD   Hosp Day # 4 PCP No primary care provider on file.     Date of Admission:  2024  Date of Consult:  24   Reason for Consultation:   Patient presented with increased confusion, restlessness, agitation, Celsa Ashford MD requested psychiatric consult for evaluation and advice.    Consult Duration     The patient seen for initial psychiatric consult evaluation.   Record reviewed, communication with attending, communication with RN and patient seen face to face evaluation.    History of Present Illness:   Patient is a 54 year old -American, , female with past medical history of metastatic lung cancer with mets to brain, anxiety, depression, bipolar disorder who was admitted to the hospital for Status epilepticus (HCC): The patient has been demonstrating confusion, restlessness.  Patient indicated for psych consult for evaluation and advise.    Per chart review, the patient presented to the hospital by ems after having seizure at home.     The patient received the following psychotropic medications Lamictal 150 mg BID. Patient has received PRN ativan and haldol.     Labs and imaging reviewed: mag 2.0, potassium 3.0, sodium 145, Ct head demonstrated Stable encephalomalacia in right frontal lobe, right parietal occipital region and left occipital lobe.   3. Post right frontal and left occipital craniotomy.     The patient is well known to the psychiatry team from previous consult. The patient was last seen  for confusion.     The patient seen today sitting in hospital bed. She presents anxious and restless. Patient is in posey vest and restraints.     The patient is alert and oriented to person and place. She states that she is in the hospital because she had a  seizure.    The patient makes effort to answer questions and engage in conversation. She otherwise demonstrates some confusion.    The patient endorses that she struggles with confusion and forgetfulness for many years.     The patient also reporting that her mood is up and down. She reports low mood, hopelessness, helplessness   with increased anxiety, worry, and impairment in sleep.     She reports she worries a lot about her children. She notes that her son was shot and killed in September.    The patient denies any auditory or visual hallucinations  She denies any suicidal or homicidal ideations.     Provided patient with supportive psychotherapy including listening, emotional support and encouragement.     The patient has been demonstrating alternation in mood and cognition with episodes of increased confusion, restlessness, agitation and response to internal stimuli.     Past Psychiatric/Medication History:  1. Prior diagnoses: anxiety, depression.   2. Past psychiatric inpatient: patient denies  3. Past outpatient history: reports seeing a psychiatrist and therapist in the past  4. Past suicide history: denies  5. Medication history: patient is not sure of her medications.    Social History:   The patient lives at home with her . She states that she has been  for 20 years. They have 6 children.     She reports drinking 6 beers nightly. She denies history of withdrawal.     She denies any drug use. Reports smoking 1/2 ppd. Reports social use of marijuana.     Family History:  None reported  Medical History:   Past Medical History  Past Medical History:    Anemia    Brain cancer (HCC)    Depression    Disorder of thyroid    Exposure to radiation    DISCONTINUED AUG 2015     Gallbladder disease    Hepatitis C    High blood pressure    History of blood transfusion    after childbirth [facility]    Hypothyroid    Lung cancer (HCC)    Migraines    Osteoarthritis    Pancreatic cancer (HCC)    Personal  history of antineoplastic chemotherapy    EVERY 2 WEEKS BEGINNING      Pneumonia, organism unspecified(486)    Pulmonary embolism (HCC)    Seizure disorder (HCC)    can't remeber last one    Visual impairment    tunnel vision    Wears glasses       Past Surgical History  Past Surgical History:   Procedure Laterality Date    Appendectomy  1999    Brain surgery  2015    TUMOR RESECTION    Brain surgery Left     OCCIPITAL CRANIOTOMY    Brain surgery Left     LOBECTOMY    Brain surgery  2016    REMOVAL OF DEAD TISSUE     Cholecystectomy  2009    Cyst removal      BREAST, BENIGN    Hemorrhoidectomy      Hysterectomy      heavy menstrual flow,     Knee replacement surgery Left 2022    @ [facility]    Knee surgery Left 2023    left knee resection with antibiotic spacer placement    Lumpectomy right  2012    FIBROADENOMA    Other  2015    CYBERKNIFE    Xs port-a-cath insertion/exch/chk         Family History  Family History   Problem Relation Age of Onset    Blood Disorder Mother     Stroke Mother     Clotting Disorder Mother     Psychiatric Mother     Cancer Father         PROSTATE       Social History  Social History     Socioeconomic History    Marital status:    Occupational History    Occupation: SELF EMPLOYED   Tobacco Use    Smoking status: Every Day     Current packs/day: 0.00     Average packs/day: 1.5 packs/day for 40.0 years (60.1 ttl pk-yrs)     Types: Cigarettes     Start date: 1985     Last attempt to quit: 10/31/2023     Years since quittin.1     Passive exposure: Past (15)    Smokeless tobacco: Never   Vaping Use    Vaping status: Never Used   Substance and Sexual Activity    Alcohol use: Not Currently     Comment: daily- 8beer/whiskey    Drug use: Yes     Types: Cannabis     Comment: smoking    Sexual activity: Yes     Partners: Male   Other Topics Concern    Caffeine Concern Yes     Comment: 2 or 1 cup of coffee daily     Social Drivers of Health      Financial Resource Strain: Low Risk  (12/4/2023)    Financial Resource Strain     Difficulty of Paying Living Expenses: Not very hard     Med Affordability: No   Food Insecurity: No Food Insecurity (12/17/2024)    Food Insecurity     Food Insecurity: Never true   Transportation Needs: No Transportation Needs (12/17/2024)    Transportation Needs     Lack of Transportation: No    [facility]    Social Connections   Housing Stability: Low Risk  (12/17/2024)    Housing Stability     Housing Instability: No           Current Medications:  Current Facility-Administered Medications   Medication Dose Route Frequency    potassium chloride 40 mEq in 250mL sodium chloride 0.9% IVPB premix  40 mEq Intravenous Once    Followed by    potassium chloride 20 mEq/100mL IVPB premix 20 mEq  20 mEq Intravenous Once    nicotine (Nicoderm CQ) 7 MG/24HR patch 1 patch  1 patch Transdermal Daily    levETIRAcetam (Keppra) tab 500 mg  500 mg Oral BID    docusate sodium (Colace) cap 100 mg  100 mg Oral BID    polyethylene glycol (PEG 3350) (Miralax) 17 g oral packet 17 g  17 g Oral Daily PRN    sennosides (Senokot) tab 17.2 mg  17.2 mg Oral BID    lamoTRIgine (LaMICtal) tab 150 mg  150 mg Oral BID    vancomycin (Vancocin) cap 125 mg  125 mg Oral QID    LORazepam (Ativan) 2 mg/mL injection 1 mg  1 mg Intravenous Q6H PRN    morphINE PF 2 MG/ML injection 1 mg  1 mg Intravenous Q2H PRN    haloperidol lactate (Haldol) 5 MG/ML injection 1 mg  1 mg Intramuscular Q6H PRN    ondansetron (Zofran) 4 MG/2ML injection 4 mg  4 mg Intravenous Q4H PRN    sodium chloride 0.9% infusion   Intravenous Continuous    LORazepam (Ativan) 2 mg/mL injection 1 mg  1 mg Intravenous Q10 Min PRN    heparin (Porcine) 5000 UNIT/ML injection 5,000 Units  5,000 Units Subcutaneous Q12H    ampicillin-sulbactam (Unasyn) 3 g in sodium chloride 0.9% 100mL IVPB-ADD  3 g Intravenous q6h    acetaminophen (Tylenol) 160  MG/5ML oral liquid 650 mg  650 mg Per NG Tube Q4H PRN    Or    acetaminophen (Tylenol) rectal suppository 650 mg  650 mg Rectal Q4H PRN    Or    acetaminophen (Ofirmev) 10 mg/mL infusion premix 1,000 mg  1,000 mg Intravenous Q6H PRN    bisacodyl (Dulcolax) 10 MG rectal suppository 10 mg  10 mg Rectal Daily PRN    famotidine (Pepcid) 20 mg/2mL injection 20 mg  20 mg Intravenous BID     Medications Prior to Admission   Medication Sig    levothyroxine 125 MCG Oral Tab Take 1 tablet (125 mcg total) by mouth before breakfast.    traMADol 50 MG Oral Tab Take 1 tablet (50 mg total) by mouth every 4 (four) hours as needed for Pain.    levetiracetam 750 MG Oral Tab Take 2 tablets (1,500 mg total) by mouth 2 (two) times daily.    RISPERIDONE OR Take 2.5 mg by mouth nightly.    gabapentin 800 MG Oral Tab Take 0.5 tablets (400 mg total) by mouth 2 (two) times daily.    sertraline 100 MG Oral Tab Take 2 tablets (200 mg total) by mouth every morning.    ergocalciferol 1.25 MG (93311 UT) Oral Cap Take 1 capsule (50,000 Units total) by mouth once a week.    lamoTRIgine 100 MG Oral Tab Take 2 tablets (200 mg total) by mouth 2 (two) times daily.       Allergies  Allergies[1]    Review of Systems:   As by Admitting/Attending    Results:   Laboratory Data:  Lab Results   Component Value Date    WBC 11.1 (H) 12/20/2024    HGB 9.9 (L) 12/20/2024    HCT 29.4 (L) 12/20/2024    .0 12/20/2024    CREATSERUM 0.60 12/20/2024    BUN 7 (L) 12/20/2024     12/20/2024    K 3.0 (L) 12/21/2024     (H) 12/20/2024    CO2 21.0 12/20/2024    GLU 77 12/20/2024    CA 8.4 (L) 12/20/2024    ALB 3.6 12/18/2024    ALKPHO 95 12/18/2024    TP 6.7 12/18/2024    AST 12 12/18/2024    ALT <7 (L) 12/18/2024    PTT 24.1 12/16/2024    INR 1.02 12/16/2024    PTP 14.0 12/16/2024    T4F 0.7 (L) 11/06/2024    TSH 0.064 (L) 11/06/2024    LIP 25 (L) 10/02/2021     (H) 12/18/2017    ESRML >130 (H) 11/16/2023    CRP 1.00 (H) 11/16/2023    MG 2.0  12/18/2024    PHOS 2.9 12/13/2017    TROP 0.00 04/17/2018    CK 49 10/11/2023    B12 1,119 (H) 07/22/2024    ETOH <3 02/28/2022         Imaging:  XR CHEST AP PORTABLE  (CPT=71045)    Result Date: 12/20/2024  CONCLUSION:   Improving prominence of the interstitial markings.  Interval resolution of the bibasilar opacities.     Dictated by (CST): Benedicto Boudreaux MD on 12/20/2024 at 12:04 PM     Finalized by (CST): Benedicto Boudreaux MD on 12/20/2024 at 12:08 PM          XR CHEST AP PORTABLE  (CPT=71045)    Result Date: 12/19/2024  CONCLUSION:  1. Borderline cardiomegaly.  Tortuous aorta. 2. Right perihilar and bilateral lower lobe mixed alveolar and interstitial opacification with progression.    Dictated by (CST): Jasson Neri MD on 12/19/2024 at 11:46 AM     Finalized by (CST): Jasson Neri MD on 12/19/2024 at 11:48 AM           Vital Signs:   Blood pressure 146/71, pulse 50, temperature 99 °F (37.2 °C), temperature source Oral, resp. rate 16, height 5' 5\" (1.651 m), weight 63.9 kg (140 lb 14 oz), SpO2 96%, not currently breastfeeding.    Mental Status Exam:   Appearance: Stated age female, in hospital gown, laying down in hospital bed.  Psychomotor: Patient has been demonstrating restlessness and agitation.  Orientation: Alert and oriented to person. Patient confused  Gait: Not evaluated.  Attitude/Coorperation: limited cooperation due to confusion, restlessness, agitation  Behavior: episodes of restlessness and agitation.  Speech: Regular rate and rhythm speech.  Mood: anxious  Affect: restricted  Thought process: Confused, disorganized  Thought content: No reports of  suicidal or homicidal ideation.  Perceptions: Patient has been demonstrating response to internal stimuli.  Concentration: Grossly impaired  Memory: Grossly impaired  Intellect: Average.  Judgment and Insight: Questionable.     Impression:     Delirium due to another medical condition  Bipolar disorder, chronic    Status epilepticus (HCC)     Acute respiratory failure with hypoxia (HCC)    Breakthrough seizure (HCC)    Patient is a 54 year old -American, , female with past medical history of metastatic lung cancer with mets to brain, anxiety, depression, bipolar disorder who was admitted to the hospital for Status epilepticus (HCC): The patient has been demonstrating confusion, restlessness.    The patient has been demonstrating alternation in mood and cognition with episodes of increased confusion, restlessness, agitation and response to internal stimuli.     Discussed risk and benefit, acknowledging the current symptom and severity.  At this point, I would recommend the following approach:     Focus on safety  Focus on education and support.  Focus on insight orientation helping the patient understand diagnosis and treatment plan.  Start abilify 2 mg daily  Start zyprexa 5 mg nightly  Continue Lamictal 150 mg BID  Utilize ativan 1 mg PO q 6 hours PRN   Utilize haldol 1 mg IV q 6 hours PRN  Processed with patient at length, the initiation of the above psychotropic medications I advised the patient of the risks, benefits, alternatives and potential side effects. The patient consents to administration of the medications and understands the right to refuse medications at any time. The patient verbalized understanding.   Coordinate plan with team    Orders This Visit:  Orders Placed This Encounter   Procedures    CBC With Differential With Platelet    Basic Metabolic Panel (8)    Levetiracetam, Serum    Lamotrigine (Lamictal), Serum    Hepatic Function Panel (7)    Prothrombin Time (PT)    PTT, Activated    Scan slide    MD BLOOD SMEAR CONSULT    Triglycerides    Basic Metabolic Panel (8)    CBC With Differential With Platelet    Hepatic Function Panel (7)    Arterial blood gas    Drug Screen 8 W/confirmation, urine    Cannabinoid Confirmation, Urine    Benzodiazepine Confirmation, Urine    Potassium    Basic Metabolic Panel (8)    CBC With  Differential With Platelet    Magnesium    Arterial blood gas    Potassium    Potassium    Basic Metabolic Panel (8)    CBC With Differential With Platelet    Potassium    Potassium    MRSA Screen by PCR    MRSA Screen by PCR    Rapid SARS-CoV-2 by PCR    Clostridium difficile(toxigenic)PCR    Clostridium difficile by EIA       Meds This Visit:  Requested Prescriptions      No prescriptions requested or ordered in this encounter       Lulú Jarrett, LOGAN  12/21/2024    Note to Patient: The 21st Century Cures Act makes medical notes like these available to patients in the interest of transparency. However, be advised this is a medical document. It is intended as peer to peer communication. It is written in medical language and may contain abbreviations or verbiage that are unfamiliar. It may appear blunt or direct. Medical documents are intended to carry relevant information, facts as evident, and the clinical opinion of the practitioner. This note may have been transcribed using a voice dictation system. Voice recognition errors may occur. This should not be taken to alter the content or meaning of this note.           [1] No Known Allergies

## 2024-12-21 NOTE — PLAN OF CARE
Problem: Safety Risk - Non-Violent Restraints  Goal: Patient will remain free from self-harm  Description: INTERVENTIONS:  - Apply the least restrictive restraint to prevent harm  - Notify patient and family of reasons restraints applied  - Assess for any contributing factors to confusion (electrolyte disturbances, delirium, medications)  - Discontinue any unnecessary medical devices as soon as possible  - Assess the patient's physical comfort, circulation, skin condition, hydration, nutrition and elimination needs   - Reorient and redirection as needed  - Assess for the need to continue restraints  Outcome: Not Progressing     Problem: Patient Centered Care  Goal: Patient preferences are identified and integrated in the patient's plan of care  Description: Interventions:  - What would you like us to know as we care for you? Pt fro home with   - Provide timely, complete, and accurate information to patient/family  - Incorporate patient and family knowledge, values, beliefs, and cultural backgrounds into the planning and delivery of care  - Encourage patient/family to participate in care and decision-making at the level they choose  - Honor patient and family perspectives and choices  Outcome: Progressing     Problem: Patient/Family Goals  Goal: Patient/Family Long Term Goal  Description: Patient's Long Term Goal: Monitor pt for seizures    Interventions:  - See additional Care Plan goals for specific interventions  Outcome: Progressing  Goal: Patient/Family Short Term Goal  Description: Patient's Short Term Goal: Home/Rehab    Interventions:     - See additional Care Plan goals for specific interventions  Outcome: Progressing     Problem: RESPIRATORY - ADULT  Goal: Achieves optimal ventilation and oxygenation  Description: INTERVENTIONS:  - Assess for changes in respiratory status  - Assess for changes in mentation and behavior  - Position to facilitate oxygenation and minimize respiratory effort  - Oxygen  supplementation based on oxygen saturation or ABGs  - Provide Smoking Cessation handout, if applicable  - Encourage broncho-pulmonary hygiene including cough, deep breathe, Incentive Spirometry  - Assess the need for suctioning and perform as needed  - Assess and instruct to report SOB or any respiratory difficulty  - Respiratory Therapy support as indicated  - Manage/alleviate anxiety  - Monitor for signs/symptoms of CO2 retention  Outcome: Progressing     Problem: NEUROLOGICAL - ADULT  Goal: Achieves stable or improved neurological status  Description: INTERVENTIONS  - Assess for and report changes in neurological status  - Initiate measures to prevent increased intracranial pressure  - Maintain blood pressure and fluid volume within ordered parameters to optimize cerebral perfusion and minimize risk of hemorrhage  - Monitor temperature, glucose, and sodium. Initiate appropriate interventions as ordered  Outcome: Progressing  Goal: Absence of seizures  Description: INTERVENTIONS  - Monitor for seizure activity  - Administer anti-seizure medications as ordered  - Monitor neurological status  Outcome: Progressing     Problem: PAIN - ADULT  Goal: Verbalizes/displays adequate comfort level or patient's stated pain goal  Description: INTERVENTIONS:  - Encourage pt to monitor pain and request assistance  - Assess pain using appropriate pain scale  - Administer analgesics based on type and severity of pain and evaluate response  - Implement non-pharmacological measures as appropriate and evaluate response  - Consider cultural and social influences on pain and pain management  - Manage/alleviate anxiety  - Utilize distraction and/or relaxation techniques  - Monitor for opioid side effects  - Notify MD/LIP if interventions unsuccessful or patient reports new pain  - Anticipate increased pain with activity and pre-medicate as appropriate  Outcome: Progressing     Problem: SAFETY ADULT - FALL  Goal: Free from fall  injury  Description: INTERVENTIONS:  - Assess pt frequently for physical needs  - Identify cognitive and physical deficits and behaviors that affect risk of falls.  - [place] fall precautions as indicated by assessment.  - Educate pt/family on patient safety including physical limitations  - Instruct pt to call for assistance with activity based on assessment  - Modify environment to reduce risk of injury  - Provide assistive devices as appropriate  - Consider OT/PT consult to assist with strengthening/mobility  - Encourage toileting schedule  Outcome: Progressing     Problem: DISCHARGE PLANNING  Goal: Discharge to home or other facility with appropriate resources  Description: INTERVENTIONS:  - Identify barriers to discharge w/pt and caregiver  - Include patient/family/discharge partner in discharge planning  - Arrange for needed discharge resources and transportation as appropriate  - Identify discharge learning needs (meds, wound care, etc)  - Arrange for interpreters to assist at discharge as needed  - Consider post-discharge preferences of patient/family/discharge partner  - Complete POLST form as appropriate  - Assess patient's ability to be responsible for managing their own health  - Refer to Case Management Department for coordinating discharge planning if the patient needs post-hospital services based on physician/LIP order or complex needs related to functional status, cognitive ability or social support system  Outcome: Progressing

## 2024-12-21 NOTE — PROGRESS NOTES
[facility]  part of [facility]    Progress Note    Virginia Sinclair Patient Status:  Inpatient    1970 MRN [id number]   Location [facility] 2W/SW Attending Brunilda Victoria MD   Hosp Day # 4 PCP No primary care provider on file.     Chief Complaint:   Chief Complaint   Patient presents with    Seizure Disorder    Stroke   Seizure     Subjective:   Virginia Sinclair  is up in bed. Very confused. Wrist restraints ordered.     Objective:   Objective:    Blood pressure 146/71, pulse 50, temperature 99 °F (37.2 °C), temperature source Oral, resp. rate 16, height 5' 5\" (1.651 m), weight 140 lb 14 oz (63.9 kg), SpO2 96%, not currently breastfeeding.    Physical Exam:    General: intubated sedated.   Respiratory: Coarse BS B/L   Cardiovascular: S1, S2. Regular rate and rhythm. No murmurs, rubs or gallops.   Abdomen: Soft, nontender, nondistended.  Positive bowel sounds. No rebound or guarding.  Neurologic: No focal neurological deficits.   Musculoskeletal: Moves all extremities.  Extremities: No edema.      Results:   Results:    Labs:  Recent Labs   Lab 24  0412 24  0613   WBC 13.1* 17.3* 13.2* 11.1*   HGB 12.5 11.0* 9.8* 9.9*   MCV 99.2 95.6 98.4 98.7   .0 247.0 223.0 242.0   INR 1.02  --   --   --        Recent Labs   Lab 24  1625 24  0412 24  1511 24  0613 24  0344   * 140*  --  89  --  77  --    BUN 13 10  --  6*  --  7*  --    CREATSERUM 0.98 0.68  --  0.64  --  0.60  --    CA 9.0 8.8  --  8.8  --  8.4*  --    ALB 4.7 3.6  --   --   --   --   --     142  --  143  --  145  --    K 3.9 3.0*   < > 3.0*  3.0* 3.8 3.1* 3.0*    113*  --  116*  --  113*  --    CO2 17.0* 17.0*  --  20.0*  --  21.0  --    ALKPHO 117* 95  --   --   --   --   --    AST 18 12  --   --   --   --   --    ALT <7* <7*  --   --   --   --   --    BILT <0.2* 0.2*  --   --   --   --   --    TP 8.4*  6.7  --   --   --   --   --     < > = values in this interval not displayed.       Estimated Creatinine Clearance: 95.3 mL/min (based on SCr of 0.6 mg/dL).    Recent Labs   Lab 12/16/24 2053   PTP 14.0   INR 1.02            Culture:  No results found for this visit on 12/16/24.    Cardiac  No results for input(s): \"TROP\", \"PBNP\" in the last 168 hours.      Imaging: Imaging data reviewed in [facility].  XR CHEST AP PORTABLE  (CPT=71045)    Result Date: 12/20/2024  CONCLUSION:   Improving prominence of the interstitial markings.  Interval resolution of the bibasilar opacities.     Dictated by (CST): Benedicto Boudreaux MD on 12/20/2024 at 12:04 PM     Finalized by (CST): Benedicto Boudreaux MD on 12/20/2024 at 12:08 PM           Medications:    nicotine  1 patch Transdermal Daily    levETIRAcetam  500 mg Oral BID    docusate sodium  100 mg Oral BID    sennosides  17.2 mg Oral BID    lamoTRIgine  150 mg Oral BID    vancomycin  125 mg Oral QID    heparin  5,000 Units Subcutaneous Q12H    ampicillin-sulbactam  3 g Intravenous q6h    famotidine  20 mg Intravenous BID         Assessment and Plan:   Assessment & Plan:        Breakthrough Seizure   Status epilepticus   ICU care.   Neuro and critical care on consult.   Etiology of seizure unknown did have h/o brain mets but had intracranial resection done.   On continuous EEG at this time.   Needs MRI w/ and w/o contrast- pending   Cont Keppra, Vimpat, depakote.   Monitor labs.   EEG negative  Acute respiratory failure   Intubated for airway protection  Pulm on consult.   Now extubated   CXR possible L base asp PNA  Empiric Unasyn at this time per Pulm   Metastatic Lung CA   Needs MRI brain.   Will ask Oncology to eval after MRI done. Follows Dr Armstrong  Diarrhea                  - Stool for Cdiff positive- start oral vanc     Agitation                 - Psych consult   Other medical problems  Hypothyroidism   Hepatitis C   H/o VTE after knee surgery provoked in past    >55min spent, >50%  spent counseling and coordinating care in the form of educating pt/family and d/w consultants and staff. Most of the time spent discussing the above plan.        Plan of care discussed with patient or family at bedside.      Celsa De Jesus MD  Hospitalist          Supplementary Documentation:     Quality:  DVT Prophylaxis: heparin   CODE status: Full  Dispo: per clinical course            Estimated date of discharge: TBD  Discharge is dependent on: clinical stability  At this point Ms. Sinclair is expected to be discharge to: TBD

## 2024-12-22 LAB
CREAT BLD-MCNC: 0.81 MG/DL
EGFRCR SERPLBLD CKD-EPI 2021: 86 ML/MIN/1.73M2 (ref 60–?)
POTASSIUM SERPL-SCNC: 2.7 MMOL/L (ref 3.5–5.1)
POTASSIUM SERPL-SCNC: 3.3 MMOL/L (ref 3.5–5.1)

## 2024-12-22 PROCEDURE — 99233 SBSQ HOSP IP/OBS HIGH 50: CPT | Performed by: HOSPITALIST

## 2024-12-22 RX ORDER — POTASSIUM CHLORIDE 1.5 G/1.58G
20 POWDER, FOR SOLUTION ORAL DAILY
Status: DISCONTINUED | OUTPATIENT
Start: 2024-12-22 | End: 2024-12-23

## 2024-12-22 NOTE — OCCUPATIONAL THERAPY NOTE
OCCUPATIONAL THERAPY EVALUATION - INPATIENT     Room Number: 539/539-A  Evaluation Date: 2024  Type of Evaluation: Initial  Presenting Problem: Acute hypoxemic respiratory failure    Physician Order: IP Consult to Occupational Therapy  Reason for Therapy: ADL/IADL Dysfunction and Discharge Planning    OCCUPATIONAL THERAPY ASSESSMENT   Patient is a 54 year old female admitted 2024 for  hypoxia and new onset seizures.  Prior to admission, patient's baseline is distant supervision.  Patient is currently functioning at baseline with toileting, bathing, upper body dressing, lower body dressing, and grooming.    PLAN  Patient has been evaluated and presents with no skilled Occupational Therapy needs  at this time.  Patient will be discharged from Occupational Therapy services. Please re-order if a new functional limitation presents during this admission.    OT Device Recommendations: None    OCCUPATIONAL THERAPY MEDICAL/SOCIAL HISTORY   Problem List  Principal Problem:    Status epilepticus (HCC)  Active Problems:    Delirium due to another medical condition    Bipolar 1 disorder (HCC)    Alcohol use    Acute respiratory failure with hypoxia (HCC)    Breakthrough seizure (HCC)    HOME SITUATION  Type of Home: House  Home Layout: Multi-level; Bed/bath upstairs  Lives With: Spouse; Son  Toilet and Equipment: Standard height toilet  Shower/Tub and Equipment: Walk-in shower  Other Equipment: None  Drives: No    Stairs in Home: 2  Use of Assistive Device(s): none has     Prior Level of [place]: supervision     SUBJECTIVE  \"I want to go home please\"    OCCUPATIONAL THERAPY EXAMINATION    OBJECTIVE  Precautions: Other (Comment) (seizures)  Fall Risk: Standard fall risk    WEIGHT BEARING RESTRICTION     PAIN ASSESSMENT  Ratin    ACTIVITY TOLERANCE                         O2 SATURATIONS       COGNITION  Impaired executive functioning     VISION  Legally blind    PERCEPTION  Overall Perception  Status:   WFL - within functional limits    SENSATION  Light touch:  intact    Communication: able to communicate basic wants and needs    Behavioral/Emotional/Social: cooperative     RANGE OF MOTION   Upper extremity ROM is within functional limits     STRENGTH ASSESSMENT  Upper extremity strength is within functional limits     COORDINATION  Gross Motor: WFL   Fine Motor: WFL     ACTIVITIES OF DAILY LIVING ASSESSMENT  AM-PAC ‘6-Clicks’ Inpatient Daily Activity Short Form  How much help from another person does the patient currently need…  -   Putting on and taking off regular lower body clothing?: A Little  -   Bathing (including washing, rinsing, drying)?: A Little  -   Toileting, which includes using toilet, bedpan or urinal? : A Little  -   Putting on and taking off regular upper body clothing?: None  -   Taking care of personal grooming such as brushing teeth?: None  -   Eating meals?: None    AM-PAC Score:  Score: 21  Approx Degree of Impairment: 32.79%  Standardized Score (AM-PAC Scale): 44.27  CMS Modifier (G-Code): CJ    FUNCTIONAL TRANSFER ASSESSMENT  Sit to Stand: Chair  Chair: Supervision  Toilet Transfer: Stand-by Assist    BED MOBILITY  Rolling: Supervision  Supine to Sit : Supervision  Sit to Supine (OT): Supervision    BALANCE ASSESSMENT  Static Sitting: Supervision    FUNCTIONAL ADL ASSESSMENT     THERAPEUTIC EXERCISE     Skilled Therapy Provided: Role of OT, t/f safety and fall prevention    EDUCATION PROVIDED  Patient Education : Role of Occupational Therapy; Plan of Care; Discharge Recommendations; DME Recommendations; Functional Transfer Techniques; Fall Prevention  Patient's Response to Education: Verbalized Understanding; Requires Further Education  Family/Caregiver Education : Role of Occupational Therapy; Plan of Care; Discharge Recommendations; Fall Prevention  Family/Caregiver's Response to Education: Verbalized Understanding; Returned Demonstration    The patient's Approx Degree of  Impairment: 32.79% has been calculated based on documentation in the [facility] '6 clicks' Inpatient Daily Activity Short Form.  Research supports that patients with this level of impairment may benefit from return home with 24/7.  Final disposition will be made by interdisciplinary medical team.    Patient End of Session: In bed;Needs met;Call light within reach;RN aware of session/findings;Hospital anti-slip socks;All patient questions and concerns addressed;Alarm set    Patient was able to achieve the following ...   Patient able to toilet transfer  at previous functional level    Patient able to dress lower extremities  at previous functional level    Patient/Caregiver able to demonstrate safety with ADLS  at previous functional level     Patient Evaluation Complexity Level:   Occupational Profile/Medical History LOW - Brief history including review of medical or therapy records    Specific performance deficits impacting engagement in ADL/IADL LOW  1 - 3 performance deficits    Client Assessment/Performance Deficits LOW - No comorbidities nor modifications of tasks    Clinical Decision Making LOW - Analysis of occupational profile, problem-focused assessments, limited treatment options    Overall Complexity LOW     OT Session Time: 25 minutes  Self-Care Home Management: 15 minutes  Therapeutic Activity: 10 minutes

## 2024-12-22 NOTE — PROGRESS NOTES
[facility]  part of [facility]     Progress Note    Virginia Sinclair Patient Status:  Inpatient    1970 MRN [id number]   Location [facility] 5SW/SE Attending Celsa De Jesus MD   Hosp Day # 5 PCP No primary care provider on file.       Subjective:   Patient seen and examined.  Resting in bed.  No significant distress.  Denies dyspnea.  Alert, awake.    Objective:   Blood pressure 143/82, pulse (!) 46, temperature 98.5 °F (36.9 °C), temperature source Oral, resp. rate 17, height 5' 5\" (1.651 m), weight 140 lb 14 oz (63.9 kg), SpO2 96%, not currently breastfeeding.  Intake/Output:   Last 3 shifts: I/O last 3 completed shifts:  In: 3825 [P.O.:745; I.V.:2580; IV PIGGYBACK:500]  Out: 1900 [Urine:1900]   This shift: No intake/output data recorded.     Vent Settings:      Hemodynamic parameters (last 24 hours):      Scheduled Meds:   Current Facility-Administered Medications   Medication Dose Route Frequency    OLANZapine (ZyPREXA) tab 5 mg  5 mg Oral Nightly    LORazepam (Ativan) tab 1 mg  1 mg Oral Q6H PRN    ARIPiprazole (Abilify) tab 2 mg  2 mg Oral Daily    haloperidol lactate (Haldol) 5 MG/ML injection 1 mg  1 mg Intravenous Q6H PRN    nicotine (Nicoderm CQ) 7 MG/24HR patch 1 patch  1 patch Transdermal Daily    levETIRAcetam (Keppra) tab 500 mg  500 mg Oral BID    docusate sodium (Colace) cap 100 mg  100 mg Oral BID    polyethylene glycol (PEG 3350) (Miralax) 17 g oral packet 17 g  17 g Oral Daily PRN    sennosides (Senokot) tab 17.2 mg  17.2 mg Oral BID    lamoTRIgine (LaMICtal) tab 150 mg  150 mg Oral BID    vancomycin (Vancocin) cap 125 mg  125 mg Oral QID    LORazepam (Ativan) 2 mg/mL injection 1 mg  1 mg Intravenous Q6H PRN    morphINE PF 2 MG/ML injection 1 mg  1 mg Intravenous Q2H PRN    ondansetron (Zofran) 4 MG/2ML injection 4 mg  4 mg Intravenous Q4H PRN    sodium chloride 0.9% infusion   Intravenous Continuous    LORazepam (Ativan) 2 mg/mL injection 1 mg  1 mg Intravenous  Q10 Min PRN    heparin (Porcine) 5000 UNIT/ML injection 5,000 Units  5,000 Units Subcutaneous Q12H    ampicillin-sulbactam (Unasyn) 3 g in sodium chloride 0.9% 100mL IVPB-ADD  3 g Intravenous q6h    acetaminophen (Tylenol) 160 MG/5ML oral liquid 650 mg  650 mg Per NG Tube Q4H PRN    Or    acetaminophen (Tylenol) rectal suppository 650 mg  650 mg Rectal Q4H PRN    Or    acetaminophen (Ofirmev) 10 mg/mL infusion premix 1,000 mg  1,000 mg Intravenous Q6H PRN    bisacodyl (Dulcolax) 10 MG rectal suppository 10 mg  10 mg Rectal Daily PRN    famotidine (Pepcid) 20 mg/2mL injection 20 mg  20 mg Intravenous BID       Continuous Infusions:    sodium chloride 75 mL/hr at 12/21/24 1754       Physical Exam  Constitutional: no acute distress, somnolent  Eyes: PERRL  ENT: nares pateint  Neck: supple, no JVD  Cardio: RRR, S1 S2  Respiratory: Diminished basilar breath sounds  GI: abdomen soft, non tender, active bowel sounds, no organomegaly  Extremities: no clubbing, cyanosis, edema  Neurologic: no gross motor deficits  Skin: warm, dry      Results:     Lab Results   Component Value Date    K 2.7 12/22/2024       XR CHEST AP PORTABLE  (CPT=71045)    Result Date: 12/20/2024  CONCLUSION:   Improving prominence of the interstitial markings.  Interval resolution of the bibasilar opacities.     Dictated by (CST): Benedicto Boudreaux MD on 12/20/2024 at 12:04 PM     Finalized by (CST): Benedicto Boudreaux MD on 12/20/2024 at 12:08 PM                 Assessment   1.  Recurrent seizure  2.  Acute respiratory failure, resolved  3.  Possible left lower lobe pneumonia  4.  Metastatic lung cancer  5.  Prior pulmonary embolism  6.  Hepatitis C     Plan   -Patient presenting with evidence of recurrent seizures.  EEG negative for epileptiform discharges.  Further management per neurology  -Patient intubated for airway protection upon arrival.  Tolerating extubation at this time.  Weaned off oxygen  -Chest x-ray with concern for left basilar infiltrate.   Complete empiric course of antibiotic therapy with 7-day course.  -Bronchial hygiene.  -Patient with history of metastatic lung cancer followed by oncology.  -DVT prophylaxis: Heparin  -Okay for discharge from pulmonary perspective. Will sign off    Fer Garcia DO  Pulmonary Critical Care Medicine  [facility]

## 2024-12-22 NOTE — PROGRESS NOTES
[facility]  part of [facility]    Progress Note    Virginia Sinclair Patient Status:  Inpatient    1970 MRN [id number]   Location [facility] 2W/SW Attending Brunilda Victoria MD   Hosp Day # 5 PCP No primary care provider on file.     Chief Complaint:   Chief Complaint   Patient presents with    Seizure Disorder    Stroke   Seizure     Subjective:   Virginia Sinclair  is more pleasant today. Off restraints.     Objective:   Objective:    Blood pressure 126/80, pulse 57, temperature 97.8 °F (36.6 °C), temperature source Oral, resp. rate 16, height 5' 5\" (1.651 m), weight 140 lb 14 oz (63.9 kg), SpO2 98%, not currently breastfeeding.    Physical Exam:    General: intubated sedated.   Respiratory: Coarse BS B/L   Cardiovascular: S1, S2. Regular rate and rhythm. No murmurs, rubs or gallops.   Abdomen: Soft, nontender, nondistended.  Positive bowel sounds. No rebound or guarding.  Neurologic: No focal neurological deficits.   Musculoskeletal: Moves all extremities.  Extremities: No edema.      Results:   Results:    Labs:  Recent Labs   Lab 24  0412 24  0613   WBC 13.1* 17.3* 13.2* 11.1*   HGB 12.5 11.0* 9.8* 9.9*   MCV 99.2 95.6 98.4 98.7   .0 247.0 223.0 242.0   INR 1.02  --   --   --        Recent Labs   Lab 24  1625 24  0412 24  1511 24  0613 24  0344 24  0704   * 140*  --  89  --  77  --   --    BUN 13 10  --  6*  --  7*  --   --    CREATSERUM 0.98 0.68  --  0.64  --  0.60  --   --    CA 9.0 8.8  --  8.8  --  8.4*  --   --    ALB 4.7 3.6  --   --   --   --   --   --     142  --  143  --  145  --   --    K 3.9 3.0*   < > 3.0*  3.0*   < > 3.1* 3.0* 2.7*    113*  --  116*  --  113*  --   --    CO2 17.0* 17.0*  --  20.0*  --  21.0  --   --    ALKPHO 117* 95  --   --   --   --   --   --    AST 18 12  --   --   --   --   --   --    ALT <7* <7*  --   --   --   --    --   --    BILT <0.2* 0.2*  --   --   --   --   --   --    TP 8.4* 6.7  --   --   --   --   --   --     < > = values in this interval not displayed.       Estimated Creatinine Clearance: 95.3 mL/min (based on SCr of 0.6 mg/dL).    Recent Labs   Lab 12/16/24 2053   PTP 14.0   INR 1.02            Culture:  No results found for this visit on 12/16/24.    Cardiac  No results for input(s): \"TROP\", \"PBNP\" in the last 168 hours.      Imaging: Imaging data reviewed in Epic.  No results found.    Medications:    OLANZapine  5 mg Oral Nightly    ARIPiprazole  2 mg Oral Daily    nicotine  1 patch Transdermal Daily    levETIRAcetam  500 mg Oral BID    docusate sodium  100 mg Oral BID    sennosides  17.2 mg Oral BID    lamoTRIgine  150 mg Oral BID    vancomycin  125 mg Oral QID    heparin  5,000 Units Subcutaneous Q12H    ampicillin-sulbactam  3 g Intravenous q6h    famotidine  20 mg Intravenous BID         Assessment and Plan:   Assessment & Plan:        Breakthrough Seizure   Status epilepticus   ICU care.   Neuro and critical care on consult.   Etiology of seizure unknown did have h/o brain mets but had intracranial resection done.   On continuous EEG at this time.   Needs MRI w/ and w/o contrast- pending   Cont Keppra, Vimpat, depakote.   Monitor labs.   EEG negative  Acute respiratory failure   Intubated for airway protection  Pulm on consult.   Now extubated   CXR possible L base asp PNA  Empiric Unasyn at this time per Pulm   Metastatic Lung CA   Needs MRI brain.   Will ask Oncology to eval after MRI done. Follows Dr Armstrong  Diarrhea                  - Stool for Cdiff positive- started on  oral vanc     Hypokalemia                   - add KCL 20mg daily on top of protocol     Agitation                 - Psych consult-await recs-continue on zyprexa, abilify and ativan   Other medical problems  Hypothyroidism   Hepatitis C   H/o VTE after knee surgery provoked in past    >55min spent, >50% spent counseling and  coordinating care in the form of educating pt/family and d/w consultants and staff. Most of the time spent discussing the above plan.        Plan of care discussed with patient or family at bedside.      Celsa De Jesus MD  Hospitalist          Supplementary Documentation:     Quality:  DVT Prophylaxis: heparin   CODE status: Full  Dispo: per clinical course            Estimated date of discharge: TBD  Discharge is dependent on: clinical stability  At this point Ms. Sinclair is expected to be discharge to: TBD
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
## 2024-12-22 NOTE — PLAN OF CARE
Problem: Safety Risk - Non-Violent Restraints  Goal: Patient will remain free from self-harm  Description: INTERVENTIONS:  - Apply the least restrictive restraint to prevent harm  - Notify patient and family of reasons restraints applied  - Assess for any contributing factors to confusion (electrolyte disturbances, delirium, medications)  - Discontinue any unnecessary medical devices as soon as possible  - Assess the patient's physical comfort, circulation, skin condition, hydration, nutrition and elimination needs   - Reorient and redirection as needed  - Assess for the need to continue restraints  Outcome: Progressing     Problem: Patient Centered Care  Goal: Patient preferences are identified and integrated in the patient's plan of care  Description: Interventions:  - What would you like us to know as we care for you? From home with family  - Provide timely, complete, and accurate information to patient/family  - Incorporate patient and family knowledge, values, beliefs, and cultural backgrounds into the planning and delivery of care  - Encourage patient/family to participate in care and decision-making at the level they choose  - Honor patient and family perspectives and choices  Outcome: Progressing     Problem: Patient/Family Goals  Goal: Patient/Family Long Term Goal  Description: Patient's Long Term Goal: be discharged    Interventions:  -monitor VS  -monitor appropriate labs  -update patient and family on plan of care  -follow MD orders     - See additional Care Plan goals for specific interventions  Outcome: Progressing  Goal: Patient/Family Short Term Goal  Description: Patient's Short Term Goal: no seizures    Interventions:   -monitor VS  -monitor appropriate labs  -update patient and family on plan of care  -discharge planning  -follow MD orders     - See additional Care Plan goals for specific interventions  Outcome: Progressing     Problem: RESPIRATORY - ADULT  Goal: Achieves optimal ventilation  and oxygenation  Description: INTERVENTIONS:  - Assess for changes in respiratory status  - Assess for changes in mentation and behavior  - Position to facilitate oxygenation and minimize respiratory effort  - Oxygen supplementation based on oxygen saturation or ABGs  - Provide Smoking Cessation handout, if applicable  - Encourage broncho-pulmonary hygiene including cough, deep breathe, Incentive Spirometry  - Assess the need for suctioning and perform as needed  - Assess and instruct to report SOB or any respiratory difficulty  - Respiratory Therapy support as indicated  - Manage/alleviate anxiety  - Monitor for signs/symptoms of CO2 retention  Outcome: Progressing     Problem: NEUROLOGICAL - ADULT  Goal: Achieves stable or improved neurological status  Description: INTERVENTIONS  - Assess for and report changes in neurological status  - Initiate measures to prevent increased intracranial pressure  - Maintain blood pressure and fluid volume within ordered parameters to optimize cerebral perfusion and minimize risk of hemorrhage  - Monitor temperature, glucose, and sodium. Initiate appropriate interventions as ordered  Outcome: Progressing  Goal: Absence of seizures  Description: INTERVENTIONS  - Monitor for seizure activity  - Administer anti-seizure medications as ordered  - Monitor neurological status  Outcome: Progressing     Problem: PAIN - ADULT  Goal: Verbalizes/displays adequate comfort level or patient's stated pain goal  Description: INTERVENTIONS:  - Encourage pt to monitor pain and request assistance  - Assess pain using appropriate pain scale  - Administer analgesics based on type and severity of pain and evaluate response  - Implement non-pharmacological measures as appropriate and evaluate response  - Consider cultural and social influences on pain and pain management  - Manage/alleviate anxiety  - Utilize distraction and/or relaxation techniques  - Monitor for opioid side effects  - Notify MD/LIP if  interventions unsuccessful or patient reports new pain  - Anticipate increased pain with activity and pre-medicate as appropriate  Outcome: Progressing     Problem: SAFETY ADULT - FALL  Goal: Free from fall injury  Description: INTERVENTIONS:  - Assess pt frequently for physical needs  - Identify cognitive and physical deficits and behaviors that affect risk of falls.  - [place] fall precautions as indicated by assessment.  - Educate pt/family on patient safety including physical limitations  - Instruct pt to call for assistance with activity based on assessment  - Modify environment to reduce risk of injury  - Provide assistive devices as appropriate  - Consider OT/PT consult to assist with strengthening/mobility  - Encourage toileting schedule  Outcome: Progressing     Problem: DISCHARGE PLANNING  Goal: Discharge to home or other facility with appropriate resources  Description: INTERVENTIONS:  - Identify barriers to discharge w/pt and caregiver  - Include patient/family/discharge partner in discharge planning  - Arrange for needed discharge resources and transportation as appropriate  - Identify discharge learning needs (meds, wound care, etc)  - Arrange for interpreters to assist at discharge as needed  - Consider post-discharge preferences of patient/family/discharge partner  - Complete POLST form as appropriate  - Assess patient's ability to be responsible for managing their own health  - Refer to Case Management Department for coordinating discharge planning if the patient needs post-hospital services based on physician/LIP order or complex needs related to functional status, cognitive ability or social support system  Outcome: Progressing

## 2024-12-22 NOTE — PROGRESS NOTES
[facility]  part of [facility]    Progress Note    Virginia Sinclair Patient Status:  Inpatient    1970 MRN [id number]   Location [facility] 2W/SW Attending Brunilda Victoria MD   Hosp Day # 5 PCP No primary care provider on file.     Chief Complaint:   Chief Complaint   Patient presents with    Seizure Disorder    Stroke   Seizure     Subjective:   Virginia Sinclair  is up in bed. Very confused. Wrist restraints ordered. Thinks she is out dancing.     Objective:   Objective:    Blood pressure 126/80, pulse 57, temperature 97.8 °F (36.6 °C), temperature source Oral, resp. rate 16, height 5' 5\" (1.651 m), weight 140 lb 14 oz (63.9 kg), SpO2 98%, not currently breastfeeding.    Physical Exam:    General: intubated sedated.   Respiratory: Coarse BS B/L   Cardiovascular: S1, S2. Regular rate and rhythm. No murmurs, rubs or gallops.   Abdomen: Soft, nontender, nondistended.  Positive bowel sounds. No rebound or guarding.  Neurologic: No focal neurological deficits.   Musculoskeletal: Moves all extremities.  Extremities: No edema.      Results:   Results:    Labs:  Recent Labs   Lab 24  0412 24  0613   WBC 13.1* 17.3* 13.2* 11.1*   HGB 12.5 11.0* 9.8* 9.9*   MCV 99.2 95.6 98.4 98.7   .0 247.0 223.0 242.0   INR 1.02  --   --   --        Recent Labs   Lab 24  1625 24  0412 24  1511 24  0613 24  0344 24  0704   * 140*  --  89  --  77  --   --    BUN 13 10  --  6*  --  7*  --   --    CREATSERUM 0.98 0.68  --  0.64  --  0.60  --   --    CA 9.0 8.8  --  8.8  --  8.4*  --   --    ALB 4.7 3.6  --   --   --   --   --   --     142  --  143  --  145  --   --    K 3.9 3.0*   < > 3.0*  3.0*   < > 3.1* 3.0* 2.7*    113*  --  116*  --  113*  --   --    CO2 17.0* 17.0*  --  20.0*  --  21.0  --   --    ALKPHO 117* 95  --   --   --   --   --   --    AST 18 12  --   --   --   --   --    --    ALT <7* <7*  --   --   --   --   --   --    BILT <0.2* 0.2*  --   --   --   --   --   --    TP 8.4* 6.7  --   --   --   --   --   --     < > = values in this interval not displayed.       Estimated Creatinine Clearance: 95.3 mL/min (based on SCr of 0.6 mg/dL).    Recent Labs   Lab 12/16/24 2053   PTP 14.0   INR 1.02            Culture:  No results found for this visit on 12/16/24.    Cardiac  No results for input(s): \"TROP\", \"PBNP\" in the last 168 hours.      Imaging: Imaging data reviewed in Epic.  No results found.    Medications:    OLANZapine  5 mg Oral Nightly    ARIPiprazole  2 mg Oral Daily    nicotine  1 patch Transdermal Daily    levETIRAcetam  500 mg Oral BID    docusate sodium  100 mg Oral BID    sennosides  17.2 mg Oral BID    lamoTRIgine  150 mg Oral BID    vancomycin  125 mg Oral QID    heparin  5,000 Units Subcutaneous Q12H    ampicillin-sulbactam  3 g Intravenous q6h    famotidine  20 mg Intravenous BID         Assessment and Plan:   Assessment & Plan:        Breakthrough Seizure   Status epilepticus   ICU care.   Neuro and critical care on consult.   Etiology of seizure unknown did have h/o brain mets but had intracranial resection done.   On continuous EEG at this time.   Needs MRI w/ and w/o contrast- pending   Cont Keppra, Vimpat, depakote.   Monitor labs.   EEG negative  Acute respiratory failure   Intubated for airway protection  Pulm on consult.   Now extubated   CXR possible L base asp PNA  Empiric Unasyn at this time per Pulm   Metastatic Lung CA   Needs MRI brain.   Will ask Oncology to eval after MRI done. 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  Most of the time spent discussing the above plan.        Plan of care discussed with patient or family at bedside.      Celsa De Jesus MD  Hospitalist          Supplementary Documentation:     Quality:  DVT Prophylaxis: heparin   CODE status: Full  Dispo: per clinical course            Estimated date of discharge: TBD  Discharge is dependent on: clinical stability  At this point Ms. Sinclair is expected to be discharge to: TBD

## 2024-12-22 NOTE — PLAN OF CARE
Problem: Safety Risk - Non-Violent Restraints  Goal: Patient will remain free from self-harm  Description: INTERVENTIONS:  - Apply the least restrictive restraint to prevent harm  - Notify patient and family of reasons restraints applied  - Assess for any contributing factors to confusion (electrolyte disturbances, delirium, medications)  - Discontinue any unnecessary medical devices as soon as possible  - Assess the patient's physical comfort, circulation, skin condition, hydration, nutrition and elimination needs   - Reorient and redirection as needed  - Assess for the need to continue restraints  Outcome: Progressing     Problem: Patient Centered Care  Goal: Patient preferences are identified and integrated in the patient's plan of care  Description: Interventions:  - What would you like us to know as we care for you? From home with family  - Provide timely, complete, and accurate information to patient/family  - Incorporate patient and family knowledge, values, beliefs, and cultural backgrounds into the planning and delivery of care  - Encourage patient/family to participate in care and decision-making at the level they choose  - Honor patient and family perspectives and choices  Outcome: Progressing     Problem: Patient/Family Goals  Goal: Patient/Family Long Term Goal  Description: Patient's Long Term Goal: be discharged    Interventions:  -monitor VS  -monitor appropriate labs  -update patient and family on plan of care  -follow MD orders     - See additional Care Plan goals for specific interventions  Outcome: Progressing  Goal: Patient/Family Short Term Goal  Description: Patient's Short Term Goal: no seizures    Interventions:   -monitor VS  -monitor appropriate labs  -update patient and family on plan of care  -discharge planning  -follow MD orders     - See additional Care Plan goals for specific interventions  Outcome: Progressing     Problem: RESPIRATORY - ADULT  Goal: Achieves optimal ventilation  and oxygenation  Description: INTERVENTIONS:  - Assess for changes in respiratory status  - Assess for changes in mentation and behavior  - Position to facilitate oxygenation and minimize respiratory effort  - Oxygen supplementation based on oxygen saturation or ABGs  - Provide Smoking Cessation handout, if applicable  - Encourage broncho-pulmonary hygiene including cough, deep breathe, Incentive Spirometry  - Assess the need for suctioning and perform as needed  - Assess and instruct to report SOB or any respiratory difficulty  - Respiratory Therapy support as indicated  - Manage/alleviate anxiety  - Monitor for signs/symptoms of CO2 retention  Outcome: Progressing     Problem: NEUROLOGICAL - ADULT  Goal: Achieves stable or improved neurological status  Description: INTERVENTIONS  - Assess for and report changes in neurological status  - Initiate measures to prevent increased intracranial pressure  - Maintain blood pressure and fluid volume within ordered parameters to optimize cerebral perfusion and minimize risk of hemorrhage  - Monitor temperature, glucose, and sodium. Initiate appropriate interventions as ordered  Outcome: Progressing  Goal: Absence of seizures  Description: INTERVENTIONS  - Monitor for seizure activity  - Administer anti-seizure medications as ordered  - Monitor neurological status  Outcome: Progressing     Problem: PAIN - ADULT  Goal: Verbalizes/displays adequate comfort level or patient's stated pain goal  Description: INTERVENTIONS:  - Encourage pt to monitor pain and request assistance  - Assess pain using appropriate pain scale  - Administer analgesics based on type and severity of pain and evaluate response  - Implement non-pharmacological measures as appropriate and evaluate response  - Consider cultural and social influences on pain and pain management  - Manage/alleviate anxiety  - Utilize distraction and/or relaxation techniques  - Monitor for opioid side effects  - Notify MD/LIP if  interventions unsuccessful or patient reports new pain  - Anticipate increased pain with activity and pre-medicate as appropriate  Outcome: Progressing     Problem: SAFETY ADULT - FALL  Goal: Free from fall injury  Description: INTERVENTIONS:  - Assess pt frequently for physical needs  - Identify cognitive and physical deficits and behaviors that affect risk of falls.  - [place] fall precautions as indicated by assessment.  - Educate pt/family on patient safety including physical limitations  - Instruct pt to call for assistance with activity based on assessment  - Modify environment to reduce risk of injury  - Provide assistive devices as appropriate  - Consider OT/PT consult to assist with strengthening/mobility  - Encourage toileting schedule  Outcome: Progressing     Problem: DISCHARGE PLANNING  Goal: Discharge to home or other facility with appropriate resources  Description: INTERVENTIONS:  - Identify barriers to discharge w/pt and caregiver  - Include patient/family/discharge partner in discharge planning  - Arrange for needed discharge resources and transportation as appropriate  - Identify discharge learning needs (meds, wound care, etc)  - Arrange for interpreters to assist at discharge as needed  - Consider post-discharge preferences of patient/family/discharge partner  - Complete POLST form as appropriate  - Assess patient's ability to be responsible for managing their own health  - Refer to Case Management Department for coordinating discharge planning if the patient needs post-hospital services based on physician/LIP order or complex needs related to functional status, cognitive ability or social support system  Outcome: Progressing

## 2024-12-23 VITALS
DIASTOLIC BLOOD PRESSURE: 75 MMHG | OXYGEN SATURATION: 95 % | SYSTOLIC BLOOD PRESSURE: 129 MMHG | HEART RATE: 60 BPM | RESPIRATION RATE: 20 BRPM | WEIGHT: 140.88 LBS | HEIGHT: 65 IN | BODY MASS INDEX: 23.47 KG/M2 | TEMPERATURE: 98 F

## 2024-12-23 LAB
ANION GAP SERPL CALC-SCNC: 4 MMOL/L (ref 0–18)
BUN BLD-MCNC: <5 MG/DL (ref 9–23)
CALCIUM BLD-MCNC: 8.1 MG/DL (ref 8.7–10.4)
CARBOXY THC GCMS UR: 14 NG/MG CREAT
CARBOXY THC GCMS UR: 14 NG/MG CREAT
CHLORIDE SERPL-SCNC: 115 MMOL/L (ref 98–112)
CO2 SERPL-SCNC: 22 MMOL/L (ref 21–32)
CREAT BLD-MCNC: 0.99 MG/DL
DEPRECATED RDW RBC AUTO: 53.7 FL (ref 35.1–46.3)
EGFRCR SERPLBLD CKD-EPI 2021: 67 ML/MIN/1.73M2 (ref 60–?)
ERYTHROCYTE [DISTWIDTH] IN BLOOD BY AUTOMATED COUNT: 15.6 % (ref 11–15)
GLUCOSE BLD-MCNC: 76 MG/DL (ref 70–99)
HCT VFR BLD AUTO: 29.8 %
HGB BLD-MCNC: 10.2 G/DL
MCH RBC QN AUTO: 32.8 PG (ref 26–34)
MCHC RBC AUTO-ENTMCNC: 34.2 G/DL (ref 31–37)
MCV RBC AUTO: 95.8 FL
PLATELET # BLD AUTO: 270 10(3)UL (ref 150–450)
POTASSIUM SERPL-SCNC: 3.4 MMOL/L (ref 3.5–5.1)
POTASSIUM SERPL-SCNC: 3.4 MMOL/L (ref 3.5–5.1)
RBC # BLD AUTO: 3.11 X10(6)UL
SODIUM SERPL-SCNC: 141 MMOL/L (ref 136–145)
WBC # BLD AUTO: 8.2 X10(3) UL (ref 4–11)

## 2024-12-23 PROCEDURE — 99239 HOSP IP/OBS DSCHRG MGMT >30: CPT | Performed by: HOSPITALIST

## 2024-12-23 NOTE — PLAN OF CARE
Patient left AMA.     Problem: Patient Centered Care  Goal: Patient preferences are identified and integrated in the patient's plan of care  Description: Interventions:  - What would you like us to know as we care for you? From home with family  - Provide timely, complete, and accurate information to patient/family  - Incorporate patient and family knowledge, values, beliefs, and cultural backgrounds into the planning and delivery of care  - Encourage patient/family to participate in care and decision-making at the level they choose  - Honor patient and family perspectives and choices  Outcome: Progressing     Problem: Patient/Family Goals  Goal: Patient/Family Long Term Goal  Description: Patient's Long Term Goal: be discharged    Interventions:  -monitor VS  -monitor appropriate labs  -update patient and family on plan of care  -follow MD orders     - See additional Care Plan goals for specific interventions  Outcome: Progressing  Goal: Patient/Family Short Term Goal  Description: Patient's Short Term Goal: no seizures    Interventions:   -monitor VS  -monitor appropriate labs  -update patient and family on plan of care  -discharge planning  -follow MD orders     - See additional Care Plan goals for specific interventions  Outcome: Progressing     Problem: RESPIRATORY - ADULT  Goal: Achieves optimal ventilation and oxygenation  Description: INTERVENTIONS:  - Assess for changes in respiratory status  - Assess for changes in mentation and behavior  - Position to facilitate oxygenation and minimize respiratory effort  - Oxygen supplementation based on oxygen saturation or ABGs  - Provide Smoking Cessation handout, if applicable  - Encourage broncho-pulmonary hygiene including cough, deep breathe, Incentive Spirometry  - Assess the need for suctioning and perform as needed  - Assess and instruct to report SOB or any respiratory difficulty  - Respiratory Therapy support as indicated  - Manage/alleviate anxiety  -  Monitor for signs/symptoms of CO2 retention  Outcome: Progressing     Problem: NEUROLOGICAL - ADULT  Goal: Achieves stable or improved neurological status  Description: INTERVENTIONS  - Assess for and report changes in neurological status  - Initiate measures to prevent increased intracranial pressure  - Maintain blood pressure and fluid volume within ordered parameters to optimize cerebral perfusion and minimize risk of hemorrhage  - Monitor temperature, glucose, and sodium. Initiate appropriate interventions as ordered  Outcome: Progressing  Goal: Absence of seizures  Description: INTERVENTIONS  - Monitor for seizure activity  - Administer anti-seizure medications as ordered  - Monitor neurological status  Outcome: Progressing     Problem: PAIN - ADULT  Goal: Verbalizes/displays adequate comfort level or patient's stated pain goal  Description: INTERVENTIONS:  - Encourage pt to monitor pain and request assistance  - Assess pain using appropriate pain scale  - Administer analgesics based on type and severity of pain and evaluate response  - Implement non-pharmacological measures as appropriate and evaluate response  - Consider cultural and social influences on pain and pain management  - Manage/alleviate anxiety  - Utilize distraction and/or relaxation techniques  - Monitor for opioid side effects  - Notify MD/LIP if interventions unsuccessful or patient reports new pain  - Anticipate increased pain with activity and pre-medicate as appropriate  Outcome: Progressing     Problem: SAFETY ADULT - FALL  Goal: Free from fall injury  Description: INTERVENTIONS:  - Assess pt frequently for physical needs  - Identify cognitive and physical deficits and behaviors that affect risk of falls.  - [place] fall precautions as indicated by assessment.  - Educate pt/family on patient safety including physical limitations  - Instruct pt to call for assistance with activity based on assessment  - Modify environment to reduce risk  of injury  - Provide assistive devices as appropriate  - Consider OT/PT consult to assist with strengthening/mobility  - Encourage toileting schedule  Outcome: Progressing     Problem: DISCHARGE PLANNING  Goal: Discharge to home or other facility with appropriate resources  Description: INTERVENTIONS:  - Identify barriers to discharge w/pt and caregiver  - Include patient/family/discharge partner in discharge planning  - Arrange for needed discharge resources and transportation as appropriate  - Identify discharge learning needs (meds, wound care, etc)  - Arrange for interpreters to assist at discharge as needed  - Consider post-discharge preferences of patient/family/discharge partner  - Complete POLST form as appropriate  - Assess patient's ability to be responsible for managing their own health  - Refer to Case Management Department for coordinating discharge planning if the patient needs post-hospital services based on physician/LIP order or complex needs related to functional status, cognitive ability or social support system  Outcome: Progressing     Problem: Safety Risk - Non-Violent Restraints  Goal: Patient will remain free from self-harm  Description: INTERVENTIONS:  - Apply the least restrictive restraint to prevent harm  - Notify patient and family of reasons restraints applied  - Assess for any contributing factors to confusion (electrolyte disturbances, delirium, medications)  - Discontinue any unnecessary medical devices as soon as possible  - Assess the patient's physical comfort, circulation, skin condition, hydration, nutrition and elimination needs   - Reorient and redirection as needed  - Assess for the need to continue restraints  Outcome: Completed

## 2024-12-23 NOTE — PLAN OF CARE
Problem: Patient Centered Care  Goal: Patient preferences are identified and integrated in the patient's plan of care  Description: Interventions:  - What would you like us to know as we care for you? From home with family  - Provide timely, complete, and accurate information to patient/family  - Incorporate patient and family knowledge, values, beliefs, and cultural backgrounds into the planning and delivery of care  - Encourage patient/family to participate in care and decision-making at the level they choose  - Honor patient and family perspectives and choices  Outcome: Progressing     Problem: Patient/Family Goals  Goal: Patient/Family Long Term Goal  Description: Patient's Long Term Goal: be discharged    Interventions:  -monitor VS  -monitor appropriate labs  -update patient and family on plan of care  -follow MD orders     - See additional Care Plan goals for specific interventions  Outcome: Progressing  Goal: Patient/Family Short Term Goal  Description: Patient's Short Term Goal: no seizures    Interventions:   -monitor VS  -monitor appropriate labs  -update patient and family on plan of care  -discharge planning  -follow MD orders     - See additional Care Plan goals for specific interventions  Outcome: Progressing     Problem: RESPIRATORY - ADULT  Goal: Achieves optimal ventilation and oxygenation  Description: INTERVENTIONS:  - Assess for changes in respiratory status  - Assess for changes in mentation and behavior  - Position to facilitate oxygenation and minimize respiratory effort  - Oxygen supplementation based on oxygen saturation or ABGs  - Provide Smoking Cessation handout, if applicable  - Encourage broncho-pulmonary hygiene including cough, deep breathe, Incentive Spirometry  - Assess the need for suctioning and perform as needed  - Assess and instruct to report SOB or any respiratory difficulty  - Respiratory Therapy support as indicated  - Manage/alleviate anxiety  - Monitor for  signs/symptoms of CO2 retention  Outcome: Progressing     Problem: NEUROLOGICAL - ADULT  Goal: Achieves stable or improved neurological status  Description: INTERVENTIONS  - Assess for and report changes in neurological status  - Initiate measures to prevent increased intracranial pressure  - Maintain blood pressure and fluid volume within ordered parameters to optimize cerebral perfusion and minimize risk of hemorrhage  - Monitor temperature, glucose, and sodium. Initiate appropriate interventions as ordered  Outcome: Progressing  Goal: Absence of seizures  Description: INTERVENTIONS  - Monitor for seizure activity  - Administer anti-seizure medications as ordered  - Monitor neurological status  Outcome: Progressing     Problem: PAIN - ADULT  Goal: Verbalizes/displays adequate comfort level or patient's stated pain goal  Description: INTERVENTIONS:  - Encourage pt to monitor pain and request assistance  - Assess pain using appropriate pain scale  - Administer analgesics based on type and severity of pain and evaluate response  - Implement non-pharmacological measures as appropriate and evaluate response  - Consider cultural and social influences on pain and pain management  - Manage/alleviate anxiety  - Utilize distraction and/or relaxation techniques  - Monitor for opioid side effects  - Notify MD/LIP if interventions unsuccessful or patient reports new pain  - Anticipate increased pain with activity and pre-medicate as appropriate  Outcome: Progressing     Problem: SAFETY ADULT - FALL  Goal: Free from fall injury  Description: INTERVENTIONS:  - Assess pt frequently for physical needs  - Identify cognitive and physical deficits and behaviors that affect risk of falls.  - [place] fall precautions as indicated by assessment.  - Educate pt/family on patient safety including physical limitations  - Instruct pt to call for assistance with activity based on assessment  - Modify environment to reduce risk of injury  -  Provide assistive devices as appropriate  - Consider OT/PT consult to assist with strengthening/mobility  - Encourage toileting schedule  Outcome: Progressing     Problem: DISCHARGE PLANNING  Goal: Discharge to home or other facility with appropriate resources  Description: INTERVENTIONS:  - Identify barriers to discharge w/pt and caregiver  - Include patient/family/discharge partner in discharge planning  - Arrange for needed discharge resources and transportation as appropriate  - Identify discharge learning needs (meds, wound care, etc)  - Arrange for interpreters to assist at discharge as needed  - Consider post-discharge preferences of patient/family/discharge partner  - Complete POLST form as appropriate  - Assess patient's ability to be responsible for managing their own health  - Refer to Case Management Department for coordinating discharge planning if the patient needs post-hospital services based on physician/LIP order or complex needs related to functional status, cognitive ability or social support system  Outcome: Progressing

## 2024-12-23 NOTE — DISCHARGE SUMMARY
[facility]    Discharge Summary    Virginia Sinclair Patient Status:  Inpatient    1970 MRN [id number]   Location [facility] 5SW/SE Attending No att. providers found   Hosp Day # 6 PCP No primary care provider on file.     Date of Admission: 2024   Date of Discharge: 2024 10:42 AM    Admitting Diagnosis: Status epilepticus (HCC) [G40.901]  Acute respiratory failure with hypoxia (HCC) [J96.01]    Disposition: Left AMA    Hospital Discharge Diagnoses: Acute Status Epilepticus    Lace+ Score: 61  59-90 High Risk  29-58 Medium Risk  0-28   Low Risk.    TCM Follow-Up Recommendation:  LACE > 58: High Risk of readmission after discharge from the hospital.        Discharge Diagnosis: .Principal Problem:    Status epilepticus (HCC)  Active Problems:    Delirium due to another medical condition    Bipolar 1 disorder (HCC)    Alcohol use    Acute respiratory failure with hypoxia (HCC)    Breakthrough seizure (HCC)      Hospital Course:   Reason for Admission:   Per Dr. Arellano    Discharge Artur Sinclair is a(n) 54 year old female, With a past medical history significant for metastatic lung CA with mets to the brain has been compliant with her medication including seizure medication of late have been feeling unwell, multiple stressors at home as well proceeded to have a full-blown tonic-clonic seizure at home lasting about 15 minutes until EMS came.  She was given benzos, procedures involved however patient was unresponsive agonal breathing in the ER prompting her intubation for airway protection and worsening hypoxia.  Patient became very agitated and demanded to be discharged ASAP without waiting for Dr. Osman or myself. Patient left AMA before I could see her.     Physical Exam:    NA    Hospital Course:   Breakthrough Seizure   Status epilepticus   ICU care.   Neuro and critical care on consult.   Etiology of seizure unknown did have h/o brain mets but had  intracranial resection done.   On continuous EEG at this time.   Needs MRI w/ and w/o contrast- pending   Cont Keppra, Vimpat, depakote.   Monitor labs.   EEG negative  Acute respiratory failure   Intubated for airway protection  Pulm on consult.   Now extubated   CXR possible L base asp PNA  Empiric Unasyn at this time per Pulm   Metastatic Lung CA   Needs MRI brain.   Will ask Oncology to eval after MRI done. Follows Dr Armstrong  Diarrhea                  - Stool for Cdiff positive- started on  oral vanc      Hypokalemia                   - add KCL 20mg daily on top of protocol      Agitation                 - Psych consult-await recs-continue on zyprexa, abilify and ativan   Other medical problems  Hypothyroidism   Hepatitis C   H/o VTE after knee surgery provoked in past         Complications: none    Consultants         Provider   Role Specialty     Magi Contreras MD      Consulting Physician NEUROLOGY     Jagjit Osman MD      Consulting Physician Psychiatry     Jorge Brandon DO      Consulting Physician NEUROLOGY     Merritt Miller MD      Consulting Physician PULMONARY DISEASES                Discharge Plan:   Discharge Condition: Stable    Discharge Medication List as of 12/23/2024 10:43 AM        Home Meds - Unchanged    Details   levothyroxine 125 MCG Oral Tab Take 1 tablet (125 mcg total) by mouth before breakfast., Normal, Disp-90 tablet, R-0      traMADol 50 MG Oral Tab Take 1 tablet (50 mg total) by mouth every 4 (four) hours as needed for Pain., Historical      levetiracetam 750 MG Oral Tab Take 2 tablets (1,500 mg total) by mouth 2 (two) times daily., Historical      RISPERIDONE OR Take 2.5 mg by mouth nightly., Historical      gabapentin 800 MG Oral Tab Take 0.5 tablets (400 mg total) by mouth 2 (two) times daily., Historical      sertraline 100 MG Oral Tab Take 2 tablets (200 mg total) by mouth every morning., Historical      ergocalciferol 1.25 MG (74846 UT) Oral Cap Take 1 capsule  (50,000 Units total) by mouth once a week., Normal, Disp-8 capsule, R-0      lamoTRIgine 100 MG Oral Tab Take 2 tablets (200 mg total) by mouth 2 (two) times daily., Historical                 Discharge Diet: As tolerated    Discharge Activity: As tolerated       Discharge Medications        ASK your doctor about these medications        Instructions Prescription details   ergocalciferol 1.25 MG (25165 UT) Caps  Commonly known as: Vitamin D2      Take 1 capsule (50,000 Units total) by mouth once a week.   Quantity: 8 capsule  Refills: 0     gabapentin 800 MG Tabs  Commonly known as: NEURONTIN      Take 0.5 tablets (400 mg total) by mouth 2 (two) times daily.   Refills: 0     lamoTRIgine 100 MG Tabs  Commonly known as: LaMICtal      Take 2 tablets (200 mg total) by mouth 2 (two) times daily.   Refills: 0     levetiracetam 750 MG Tabs  Commonly known as: KEPPRA      Take 2 tablets (1,500 mg total) by mouth 2 (two) times daily.   Refills: 0     levothyroxine 125 MCG Tabs  Commonly known as: Synthroid      Take 1 tablet (125 mcg total) by mouth before breakfast.   Quantity: 90 tablet  Refills: 0     RISPERIDONE OR      Take 2.5 mg by mouth nightly.   Refills: 0     sertraline 100 MG Tabs  Commonly known as: Zoloft      Take 2 tablets (200 mg total) by mouth every morning.   Refills: 0     traMADol 50 MG Tabs  Commonly known as: Ultram      Take 1 tablet (50 mg total) by mouth every 4 (four) hours as needed for Pain.   Refills: 0              Follow up:       Follow up Labs and imaging:         Other Discharge Instructions:         To find a PCP within the [facility] system, please call [phone].        Time spent:  > 30 minutes    ROBERT MUNIZ MD  12/23/2024

## 2024-12-23 NOTE — PROGRESS NOTES
[facility] Note:  Discontinuation of Stress Ulcer Prophylaxis     Virginia Sinclair is a 54 year old patient who was initiated on stress ulcer prophylaxis with famotidine (PEPCID).    The patient no longer meets criteria for stress ulcer prophylaxis.  It has been discontinued per the pharmacy consult.    Thank you,  Jarrell Ricardo, PharmD  12/23/2024 9:03 AM

## 2024-12-26 NOTE — PAYOR COMM NOTE
--------------  12/21 12/22:  CONTINUED STAY REVIEW    Payor: [employer]  Subscriber #:  [id number]  Authorization Number: [id number]    Admit date: 12/17/24  Admit time: 12:43 AM        12/21 Pulmonary Medicine Inpatient Progress Note          Subjective:  Afebrile on RA  Reports she feels breathing is better but has social issues going on at home and that her  is going to come \"sign me out\" to go home.        ASSESSMENT/PLAN:  Acute hypoxemic respiratory failure (resolved now)  -CXR appears improved. On unasyn-complete 7 day course. Can switch to augmentin to complete the 7 day course if she's going to be discharged soon  -on min 02 support. Try to wean off as able/tolerated. Currently on RA when seen this am  -bronchial hygiene      Recurrent seizure  -s/p intubation for airway protection and now extubated  -EEG neg for seizures  -neurology following     Hx of metastatic lung CA  -follows with oncology     Proph:  -DVT: hep subcutaneous         Thank you for the opportunity to care for Virginia Sinclair.     CHITRA Gonzalez DO, MPH    12/22 HOSPITALIST:    Chief Complaint:       Chief Complaint   Patient presents with    Seizure Disorder    Stroke   Seizure      Subjective:   Virginia Sinclair  is more pleasant today. Off restraints.      Objective:      Objective:  Blood pressure 126/80, pulse 57, temperature 97.8 °F (36.6 °C), temperature source Oral, resp. rate 16, height 5' 5\" (1.651 m), weight 140 lb 14 oz (63.9 kg), SpO2 98%, not currently breastfeeding.      Assessment and Plan:         Assessment & Plan:  Breakthrough Seizure   Status epilepticus   ICU care.   Neuro and critical care on consult.   Etiology of seizure unknown did have h/o brain mets but had intracranial resection done.   On continuous EEG at this time.   Needs MRI w/ and w/o contrast- pending   Cont Keppra, Vimpat, depakote.   Monitor labs.   EEG negative  Acute respiratory failure   Intubated for airway protection  Pulm on  consult.   Now extubated   CXR possible L base asp PNA  Empiric Unasyn at this time per Pulm   Metastatic Lung CA   Needs MRI brain.   Will ask Oncology to eval after MRI done. Follows Dr Armstrong  Diarrhea                  - Stool for Cdiff positive- started on  oral vanc      Hypokalemia                   - add KCL 20mg daily on top of protocol      Agitation                 - Psych consult-await recs-continue on zyprexa, abilify and ativan   Other medical problems  Hypothyroidism   Hepatitis C   H/o VTE after knee surgery provoked in past     >55min spent, >50% spent counseling and coordinating care in the form of educating pt/family and d/w consultants and staff. Most of the time spent discussing the above plan.         Plan of care discussed with patient or family at bedside.        Celsa De Jesus MD  Hospitalist                   Vitals (last day) before discharge       Date/Time Temp Pulse Resp BP SpO2 Weight O2 Device O2 Flow Rate (L/min) [facility]    12/23/24 0815 98 °F (36.7 °C) 60 20 129/75 95 % -- None (Room air) -- TQ    12/23/24 0620 -- 60 16 134/85 99 % -- None (Room air) -- PK    12/22/24 2300 98.3 °F (36.8 °C) 58 16 140/98 98 % -- None (Room air) -- PK    12/22/24 2009 -- 68 -- -- 96 % -- None (Room air) -- PK    12/22/24 1514 98.2 °F (36.8 °C) 49 16 137/86 99 % -- None (Room air) -- MS    12/22/24 0957 97.8 °F (36.6 °C) 57 16 126/80 98 % -- None (Room air) -- MS    12/22/24 0500 98.5 °F (36.9 °C) 46 17 143/82 96 % -- -- -- GP

## 2024-12-26 NOTE — PAYOR COMM NOTE
Discharge Notification    Patient Name: HENNY REID  Payor: [employer]  Subscriber #: [id number]  Authorization Number: [id number]  Admit Date/Time: 12/16/2024 8:36 PM  Discharge Date/Time: 12/23/2024 10:42 AM

## 2024-12-30 NOTE — PAYOR COMM NOTE
--------------  DISCHARGE REVIEW    Payor: [employer]  Subscriber #:  [id number]  Authorization Number: [id number]    Admit date: 24  Admit time:  12:43 AM  Discharge Date: 2024 10:42 AM     Admitting Physician: Adriana Daly MD  Attending Physician:  No att. providers found  Primary Care Physician: No primary care provider on file.          Discharge Summary Notes        Discharge Summary signed by Celsa De Jesus MD at 2024 12:16 PM       Author: Celsa De Jesus MD Specialty: HOSPITALIST Author Type: Physician    Filed: 2024 12:16 PM Date of Service: 2024 12:13 PM Status: Signed    : Celsa De Jesus MD (Physician)         [facility]  part of [facility]    Discharge Summary    Virginia Sinclair Patient Status:  Inpatient    1970 MRN [id number]   Location [facility] 5SW/SE Attending No att. providers found   Hosp Day # 6 PCP No primary care provider on file.     Date of Admission: 2024   Date of Discharge: 2024 10:42 AM    Admitting Diagnosis: Status epilepticus (HCC) [G40.901]  Acute respiratory failure with hypoxia (HCC) [J96.01]    Disposition: Left AMA    Hospital Discharge Diagnoses: Acute Status Epilepticus    Lace+ Score: 61  59-90 High Risk  29-58 Medium Risk  0-28   Low Risk.    TCM Follow-Up Recommendation:  LACE > 58: High Risk of readmission after discharge from the hospital.        Discharge Diagnosis: .Principal Problem:    Status epilepticus (HCC)  Active Problems:    Delirium due to another medical condition    Bipolar 1 disorder (HCC)    Alcohol use    Acute respiratory failure with hypoxia (HCC)    Breakthrough seizure (HCC)      Hospital Course:   Reason for Admission:   Per Dr. Arellano    Discharge Artur Sinclair is a(n) 54 year old female, With a past medical history significant for metastatic lung CA with mets to the brain has been compliant with her medication including seizure medication of late  have been feeling unwell, multiple stressors at home as well proceeded to have a full-blown tonic-clonic seizure at home lasting about 15 minutes until EMS came.  She was given benzos, procedures involved however patient was unresponsive agonal breathing in the ER prompting her intubation for airway protection and worsening hypoxia.  Patient became very agitated and demanded to be discharged ASA without waiting for Dr. Osman or myself. Patient left AMA before I could see her.     Physical Exam:    NA    Hospital Course:   Breakthrough Seizure   Status epilepticus   ICU care.   Neuro and critical care on consult.   Etiology of seizure unknown did have h/o brain mets but had intracranial resection done.   On continuous EEG at this time.   Needs MRI w/ and w/o contrast- pending   Cont Keppra, Vimpat, depakote.   Monitor labs.   EEG negative  Acute respiratory failure   Intubated for airway protection  Pulm on consult.   Now extubated   CXR possible L base asp PNA  Empiric Unasyn at this time per Pulm   Metastatic Lung CA   Needs MRI brain.   Will ask Oncology to eval after MRI done. Follows Dr Armstrong  Diarrhea                  - Stool for Cdiff positive- started on  oral vanc      Hypokalemia                   - add KCL 20mg daily on top of protocol      Agitation                 - Psych consult-await recs-continue on zyprexa, abilify and ativan   Other medical problems  Hypothyroidism   Hepatitis C   H/o VTE after knee surgery provoked in past         Complications: none    Consultants         Provider   Role Specialty     Magi Contreras MD      Consulting Physician NEUROLOGY     Jagjit Osman MD      Consulting Physician Psychiatry     Jorge Brandon DO      Consulting Physician NEUROLOGY     Merritt Miller MD      Consulting Physician PULMONARY DISEASES                Discharge Plan:   Discharge Condition: Stable    Discharge Medication List as of 12/23/2024 10:43 AM        Home Meds - Unchanged     Details   levothyroxine 125 MCG Oral Tab Take 1 tablet (125 mcg total) by mouth before breakfast., Normal, Disp-90 tablet, R-0      traMADol 50 MG Oral Tab Take 1 tablet (50 mg total) by mouth every 4 (four) hours as needed for Pain., Historical      levetiracetam 750 MG Oral Tab Take 2 tablets (1,500 mg total) by mouth 2 (two) times daily., Historical      RISPERIDONE OR Take 2.5 mg by mouth nightly., Historical      gabapentin 800 MG Oral Tab Take 0.5 tablets (400 mg total) by mouth 2 (two) times daily., Historical      sertraline 100 MG Oral Tab Take 2 tablets (200 mg total) by mouth every morning., Historical      ergocalciferol 1.25 MG (70593 UT) Oral Cap Take 1 capsule (50,000 Units total) by mouth once a week., Normal, Disp-8 capsule, R-0      lamoTRIgine 100 MG Oral Tab Take 2 tablets (200 mg total) by mouth 2 (two) times daily., Historical                 Discharge Diet: As tolerated    Discharge Activity: As tolerated       Discharge Medications        ASK your doctor about these medications        Instructions Prescription details   ergocalciferol 1.25 MG (23211 UT) Caps  Commonly known as: Vitamin D2      Take 1 capsule (50,000 Units total) by mouth once a week.   Quantity: 8 capsule  Refills: 0     gabapentin 800 MG Tabs  Commonly known as: NEURONTIN      Take 0.5 tablets (400 mg total) by mouth 2 (two) times daily.   Refills: 0     lamoTRIgine 100 MG Tabs  Commonly known as: LaMICtal      Take 2 tablets (200 mg total) by mouth 2 (two) times daily.   Refills: 0     levetiracetam 750 MG Tabs  Commonly known as: KEPPRA      Take 2 tablets (1,500 mg total) by mouth 2 (two) times daily.   Refills: 0     levothyroxine 125 MCG Tabs  Commonly known as: Synthroid      Take 1 tablet (125 mcg total) by mouth before breakfast.   Quantity: 90 tablet  Refills: 0     RISPERIDONE OR      Take 2.5 mg by mouth nightly.   Refills: 0     sertraline 100 MG Tabs  Commonly known as: Zoloft      Take 2 tablets (200 mg total)  by mouth every morning.   Refills: 0     traMADol 50 MG Tabs  Commonly known as: Ultram      Take 1 tablet (50 mg total) by mouth every 4 (four) hours as needed for Pain.   Refills: 0              Follow up:       Follow up Labs and imaging:         Other Discharge Instructions:         To find a PCP within the [facility] system, please call [phone].        Time spent:  > 30 minutes    CELSA MUNIZ MD  12/23/2024    Electronically signed by Celsa Muniz MD on 12/23/2024 12:16 PM         REVIEWER COMMENTS

## 2025-01-22 DIAGNOSIS — Z45.2 ENCOUNTER FOR CARE RELATED TO PORT-A-CATH: Primary | ICD-10-CM

## 2025-01-22 RX ORDER — SODIUM CHLORIDE 9 MG/ML
10 INJECTION, SOLUTION INTRAMUSCULAR; INTRAVENOUS; SUBCUTANEOUS ONCE
OUTPATIENT
Start: 2025-01-22

## 2025-01-27 ENCOUNTER — OFFICE VISIT (OUTPATIENT)
Age: 55
End: 2025-01-27
Attending: INTERNAL MEDICINE
Payer: COMMERCIAL

## 2025-01-27 ENCOUNTER — NURSE ONLY (OUTPATIENT)
Age: 55
End: 2025-01-27
Attending: INTERNAL MEDICINE
Payer: COMMERCIAL

## 2025-01-27 VITALS
HEART RATE: 91 BPM | TEMPERATURE: 98 F | HEIGHT: 65 IN | OXYGEN SATURATION: 92 % | BODY MASS INDEX: 23.49 KG/M2 | DIASTOLIC BLOOD PRESSURE: 76 MMHG | RESPIRATION RATE: 16 BRPM | WEIGHT: 141 LBS | SYSTOLIC BLOOD PRESSURE: 119 MMHG

## 2025-01-27 DIAGNOSIS — Z85.118 HISTORY OF LUNG CANCER: Primary | ICD-10-CM

## 2025-01-27 DIAGNOSIS — D50.9 IRON DEFICIENCY ANEMIA, UNSPECIFIED IRON DEFICIENCY ANEMIA TYPE: ICD-10-CM

## 2025-01-27 DIAGNOSIS — C79.31 MALIGNANT NEOPLASM METASTATIC TO BRAIN (HCC): ICD-10-CM

## 2025-01-27 DIAGNOSIS — Z08 ENCOUNTER FOR FOLLOW-UP SURVEILLANCE OF LUNG CANCER: ICD-10-CM

## 2025-01-27 DIAGNOSIS — Z51.81 MEDICATION MONITORING ENCOUNTER: ICD-10-CM

## 2025-01-27 DIAGNOSIS — C34.90 PRIMARY MALIGNANT NEOPLASM OF LUNG METASTATIC TO OTHER SITE, UNSPECIFIED LATERALITY (HCC): ICD-10-CM

## 2025-01-27 DIAGNOSIS — Z85.118 ENCOUNTER FOR FOLLOW-UP SURVEILLANCE OF LUNG CANCER: ICD-10-CM

## 2025-01-27 NOTE — PROGRESS NOTES
[facility] Progress Note       Patient Name: Virginia Sinclair   YOB: 1970  Medical Record Number: [id number]  Attending Physician: Moises Armstrong M.D.     The 21st Century Cures Act makes medical notes like these available to patients in the interest of transparency. Please be advised this is a medical document. Medical documents are intended to carry relevant information, facts as evident, and the clinical opinion of the practitioner. The medical note is intended as peer to peer communication and may appear blunt or direct. It is written in medical language and may contain abbreviations or verbiage that are unfamiliar.      Date of Visit: 1/27/2025       Chief Complaint  Adenocarcinoma of lung, stage IV - follow up.     Oncologic History  Virginia Sinclair is a 54 year old female who had a chest xray on 11/02/2014 after presenting to the ED with cough which showed a masslike density in the superior retrosternal region. Patient was advised to have a follow up CT scan but failed to do so.     She next presented to the ED on 07/15/2016 with headache and right visual field cut. Non-contrast head CT showed a 2.5 cm dominant mass in the left occipital lobe with associated edema and at least one other lesion. MRI brain w/wo contrast on the same day showed right frontal and left occipital masses with associated vasogenic edema and intralesional hemorrhage.     CT chest, abdomen, pelvis w contrast on 07/16/20215 showed a lobulated mass in the left upper lobe but no evidence of locoregional or distant metastasis.     On 07/20/2015 she underwent left occipital craniotomy. Pathology showed TTF-1 positive adenocarcinoma. The tumor was EGFR and ALK negative.     PET/CT scan on 08/01/2015 showed abnormal uptake in the left upper lobe but no other sites concerning for malignancy,.     In 08/2015 she underwent CyberKnife radiosurgery to both the right frontal and left occipital lesions.      On 08/28/2015 she underwent left upper lobectomy and mediastinal lymph node dissection. Pathology showed adenocarcinoma in the left upper lobe and no metastases to the resected lymph nodes.     Beginning on 10/12/2015 she received adjuvant cisplatin and vinorelbine for 1 cycle. On 11/09/2015 she switched to carboplatin and paclitaxel for treatment related toxicities. She received a total of 3 cycles.     PET/CT imaging on 01/18/2016 showed uptake in L4 and T10, in two foci in the pancreas, in a portocaval node. On 03/10/2016 she underwent EUS with biopsy of the pancreas.  Pathology showed metastatic poorly differentiated adenocarcinoma consistent with lung primary. Molecular testing showed no rearrangements of EGFR, ALK, or ROS-1.     On 04/07/2016 she began therapy with nivolumab. PET/CT scan on 06/07/2016 showed complete response with no abnormal SUV uptake. Treatment was complicated by hypothyroidism.     In 05/2017, she was diagnosed with iron deficiency anemia. In 11/2017 she was started on anti-epileptic therapy for seizures.      In 02/2018, she was admitted to the hospital with seizure despite anti-seizure therapy. Due to possible dural inflammation seen on MRI, patient was started on steroid therapy.     Nivolumab was restarted on 05/18/2018. She received her last dose on 06/05/2020. Therapy was discontinued due to recurrent arthritis requiring steroid therapy.     She was subsequently followed with active surveillance.     In 12/2022 she was incidentally found to have a PE one month after knee surgery. No lower extremity Dopplers were performed. She was treated with a limited course of anticoagulation.     History of Present Illness  Patient returns for scheduled follow up. She has no new pain. She has no new respiratory complaints. She denies unexplained weight loss.     Performance Status   Karnofsky 80% - Normal activity, but requires effort.    Past Medical History (historical data, reviewed by  physician)   Past Medical History:    Anemia    Brain cancer (HCC)    Depression    Disorder of thyroid    Exposure to radiation    DISCONTINUED AUG 2015     Gallbladder disease    Hepatitis C    High blood pressure    History of blood transfusion    after childbirth [facility]    Hypothyroid    Lung cancer (HCC)    Migraines    Osteoarthritis    Pancreatic cancer (HCC)    Personal history of antineoplastic chemotherapy    EVERY 2 WEEKS BEGINNING 2015     Pneumonia, organism unspecified(486)    Pulmonary embolism (HCC)    Seizure disorder (HCC)    can't remeber last one    Visual impairment    tunnel vision    Wears glasses     Past Surgical History (historical data, reviewed by physician)   Past Surgical History:   Procedure Laterality Date    Appendectomy  01/01/1999    Brain surgery  07/01/2015    TUMOR RESECTION    Brain surgery Left     OCCIPITAL CRANIOTOMY    Brain surgery Left     LOBECTOMY    Brain surgery  08/01/2016    REMOVAL OF DEAD TISSUE     Cholecystectomy  01/01/2009    Cyst removal      BREAST, BENIGN    Hemorrhoidectomy      Hysterectomy      heavy menstrual flow,     Knee replacement surgery Left 12/08/2022    @ [facility]    Knee surgery Left 08/30/2023    left knee resection with antibiotic spacer placement    Lumpectomy right  01/01/2012    FIBROADENOMA    Other  08/07/2015    CYBERKNIFE    Xs port-a-cath insertion/exch/chk       Family History (historical data, reviewed by physician)   Family History   Problem Relation Age of Onset    Blood Disorder Mother     Stroke Mother     Clotting Disorder Mother     Psychiatric Mother     Cancer Father         PROSTATE     Social History (historical data, reviewed by physician)   Social History     Socioeconomic History    Marital status:    Occupational History    Occupation: SELF EMPLOYED   Tobacco Use    Smoking status: Every Day     Current packs/day: 0.00     Average packs/day: 1.5 packs/day for 40.0 years (60.1 ttl pk-yrs)     Types: Cigarettes      Start date: 1985     Last attempt to quit: 10/31/2023     Years since quittin.2     Passive exposure: Past (15)    Smokeless tobacco: Never   Vaping Use    Vaping status: Never Used   Substance and Sexual Activity    Alcohol use: Not Currently     Comment: daily- 8beer/whiskey    Drug use: Yes     Types: Cannabis     Comment: smoking    Sexual activity: Yes     Partners: Male   Other Topics Concern    Caffeine Concern Yes     Comment: 2 or 1 cup of coffee daily     Current Medications    levothyroxine 125 MCG Oral Tab Take 1 tablet (125 mcg total) by mouth before breakfast. 90 tablet 0    ergocalciferol 1.25 MG (91396 UT) Oral Cap Take 1 capsule (50,000 Units total) by mouth once a week. 8 capsule 0    traMADol 50 MG Oral Tab Take 1 tablet (50 mg total) by mouth every 4 (four) hours as needed for Pain.      levetiracetam 750 MG Oral Tab Take 2 tablets (1,500 mg total) by mouth 2 (two) times daily.      RISPERIDONE OR Take 2.5 mg by mouth nightly.      gabapentin 800 MG Oral Tab Take 0.5 tablets (400 mg total) by mouth 2 (two) times daily.      sertraline 100 MG Oral Tab Take 2 tablets (200 mg total) by mouth every morning.      lamoTRIgine 100 MG Oral Tab Take 2 tablets (200 mg total) by mouth 2 (two) times daily.       Allergies   Ms. Sinclair has No Known Allergies.    Vital Signs   /76 (BP Location: Left arm, Patient Position: Sitting, Cuff Size: adult)   Pulse 91   Temp 98.2 °F (36.8 °C) (Oral)   Resp 16   Ht 1.651 m (5' 5\")   Wt 64 kg (141 lb)   SpO2 92%   BMI 23.46 kg/m²     Physical Examination   Constitutional      Thin; no apparent distress.   Head                   Normocephalic and atraumatic.  Eyes                   Conjunctiva clear; sclera anicteric.  ENMT                 External nose normal; external ears normal.  Neck                   Supple, without masses.  Respiratory          Normal effort; no respiratory distress; lungs clear to auscultation bilaterally.  Cardiovascular      Regular rate and rhythm; normal S1S2.  Abdomen            Non-tender; non-distended; no masses; no fluid wave; no hepatosplenomegaly.  Extremities          No lower extremity edema.  Neurologic           Motor and sensory grossly intact.  Psychiatric          Mood and affect appropriate.    Laboratory  No results found for this or any previous visit (from the past 24 hours).    Impression and Plan   1.   Metastatic adenocarcinoma, left lung: There is no clinical evidence of recurrent disease. I recommend annual imaging with CT chest, abdomen, pelvis w contrast to complete 10 years post the first negative PET/CT scan - 07/2026. Thereafter, she will be followed with annual low dose non-contrast CT chest. CT order provided for 07/2025.     2.   Port-a-cath in place: Flush port every 3 months.    Planned Follow Up   Patient will return in 3 months for port-a-cath flush and in 6 months for follow up.    Electronically Signed by:     Moises Armstrong M.D.  System Medical Director, Oncology Services  [facility]

## 2025-02-03 RX ORDER — LEVOTHYROXINE SODIUM 125 UG/1
125 TABLET ORAL
Qty: 90 TABLET | Refills: 0 | Status: SHIPPED | OUTPATIENT
Start: 2025-02-03

## 2025-04-14 ENCOUNTER — HOSPITAL ENCOUNTER (INPATIENT)
Facility: HOSPITAL | Age: 55
LOS: 1 days | Discharge: HOME | End: 2025-04-15
Attending: EMERGENCY MEDICINE | Admitting: HOSPITALIST
Payer: COMMERCIAL

## 2025-04-14 DIAGNOSIS — G40.909 RECURRENT SEIZURES (HCC): Primary | ICD-10-CM

## 2025-04-14 PROBLEM — R56.9 SEIZURE (HCC): Status: ACTIVE | Noted: 2025-04-14

## 2025-04-14 LAB
ALBUMIN SERPL-MCNC: 4.5 G/DL (ref 3.2–4.8)
ALP LIVER SERPL-CCNC: 131 U/L (ref 41–108)
ALT SERPL-CCNC: 13 U/L (ref 10–49)
ANION GAP SERPL CALC-SCNC: 9 MMOL/L (ref 0–18)
AST SERPL-CCNC: 32 U/L (ref ?–34)
ATRIAL RATE: 103 BPM
BASOPHILS # BLD AUTO: 0.08 X10(3) UL (ref 0–0.2)
BASOPHILS NFR BLD AUTO: 0.7 %
BILIRUB DIRECT SERPL-MCNC: <0.1 MG/DL (ref ?–0.3)
BILIRUB SERPL-MCNC: <0.2 MG/DL (ref 0.3–1.2)
BUN BLD-MCNC: 17 MG/DL (ref 9–23)
BUN/CREAT SERPL: 16.7 (ref 10–20)
CALCIUM BLD-MCNC: 8.4 MG/DL (ref 8.7–10.4)
CHLORIDE SERPL-SCNC: 113 MMOL/L (ref 98–112)
CO2 SERPL-SCNC: 18 MMOL/L (ref 21–32)
CREAT BLD-MCNC: 1.02 MG/DL (ref 0.55–1.02)
DEPRECATED RDW RBC AUTO: 58.7 FL (ref 35.1–46.3)
EGFRCR SERPLBLD CKD-EPI 2021: 65 ML/MIN/1.73M2 (ref 60–?)
EOSINOPHIL # BLD AUTO: 0.06 X10(3) UL (ref 0–0.7)
EOSINOPHIL NFR BLD AUTO: 0.5 %
ERYTHROCYTE [DISTWIDTH] IN BLOOD BY AUTOMATED COUNT: 16.1 % (ref 11–15)
GLUCOSE BLD-MCNC: 105 MG/DL (ref 70–99)
GLUCOSE BLDC GLUCOMTR-MCNC: 107 MG/DL (ref 70–99)
GLUCOSE BLDC GLUCOMTR-MCNC: 90 MG/DL (ref 70–99)
HCT VFR BLD AUTO: 36 % (ref 35–48)
HGB BLD-MCNC: 12 G/DL (ref 12–16)
IMM GRANULOCYTES # BLD AUTO: 0.1 X10(3) UL (ref 0–1)
IMM GRANULOCYTES NFR BLD: 0.9 %
LYMPHOCYTES # BLD AUTO: 3.29 X10(3) UL (ref 1–4)
LYMPHOCYTES NFR BLD AUTO: 28.7 %
MCH RBC QN AUTO: 33.1 PG (ref 26–34)
MCHC RBC AUTO-ENTMCNC: 33.3 G/DL (ref 31–37)
MCV RBC AUTO: 99.2 FL (ref 80–100)
MONOCYTES # BLD AUTO: 0.69 X10(3) UL (ref 0.1–1)
MONOCYTES NFR BLD AUTO: 6 %
NEUTROPHILS # BLD AUTO: 7.23 X10 (3) UL (ref 1.5–7.7)
NEUTROPHILS # BLD AUTO: 7.23 X10(3) UL (ref 1.5–7.7)
NEUTROPHILS NFR BLD AUTO: 63.2 %
OSMOLALITY SERPL CALC.SUM OF ELEC: 292 MOSM/KG (ref 275–295)
P AXIS: 80 DEGREES
P-R INTERVAL: 190 MS
PLATELET # BLD AUTO: 349 10(3)UL (ref 150–450)
POTASSIUM SERPL-SCNC: 4 MMOL/L (ref 3.5–5.1)
PROT SERPL-MCNC: 8.4 G/DL (ref 5.7–8.2)
Q-T INTERVAL: 364 MS
QRS DURATION: 80 MS
QTC CALCULATION (BEZET): 476 MS
R AXIS: 21 DEGREES
RBC # BLD AUTO: 3.63 X10(6)UL (ref 3.8–5.3)
SODIUM SERPL-SCNC: 140 MMOL/L (ref 136–145)
T AXIS: 65 DEGREES
VENTRICULAR RATE: 103 BPM
WBC # BLD AUTO: 11.5 X10(3) UL (ref 4–11)

## 2025-04-14 PROCEDURE — 99223 1ST HOSP IP/OBS HIGH 75: CPT | Performed by: HOSPITALIST

## 2025-04-14 RX ORDER — RISPERIDONE 0.5 MG/1
0.5 TABLET ORAL NIGHTLY
COMMUNITY

## 2025-04-14 RX ORDER — ZOLPIDEM TARTRATE 6.25 MG/1
6.25 TABLET, FILM COATED, EXTENDED RELEASE ORAL NIGHTLY
COMMUNITY
End: 2025-04-15

## 2025-04-14 RX ORDER — ONDANSETRON 2 MG/ML
4 INJECTION INTRAMUSCULAR; INTRAVENOUS EVERY 6 HOURS PRN
Status: DISCONTINUED | OUTPATIENT
Start: 2025-04-14 | End: 2025-04-15

## 2025-04-14 RX ORDER — ACETAMINOPHEN 500 MG
500 TABLET ORAL EVERY 4 HOURS PRN
Status: DISCONTINUED | OUTPATIENT
Start: 2025-04-14 | End: 2025-04-15

## 2025-04-14 RX ORDER — LEVETIRACETAM 500 MG/5ML
1500 INJECTION, SOLUTION, CONCENTRATE INTRAVENOUS EVERY 12 HOURS
Status: DISCONTINUED | OUTPATIENT
Start: 2025-04-15 | End: 2025-04-15

## 2025-04-14 RX ORDER — GABAPENTIN 400 MG/1
400 CAPSULE ORAL 2 TIMES DAILY
Status: DISCONTINUED | OUTPATIENT
Start: 2025-04-14 | End: 2025-04-15

## 2025-04-14 RX ORDER — LORAZEPAM 2 MG/ML
1 INJECTION INTRAMUSCULAR ONCE
Status: COMPLETED | OUTPATIENT
Start: 2025-04-14 | End: 2025-04-14

## 2025-04-14 RX ORDER — LEVETIRACETAM 500 MG/5ML
1500 INJECTION, SOLUTION, CONCENTRATE INTRAVENOUS ONCE
Status: COMPLETED | OUTPATIENT
Start: 2025-04-14 | End: 2025-04-14

## 2025-04-14 RX ORDER — SODIUM CHLORIDE 9 MG/ML
INJECTION, SOLUTION INTRAVENOUS CONTINUOUS
Status: DISCONTINUED | OUTPATIENT
Start: 2025-04-14 | End: 2025-04-15

## 2025-04-14 RX ORDER — RISPERIDONE 2 MG/1
2 TABLET ORAL NIGHTLY
COMMUNITY

## 2025-04-14 RX ORDER — PROCHLORPERAZINE EDISYLATE 5 MG/ML
5 INJECTION INTRAMUSCULAR; INTRAVENOUS EVERY 8 HOURS PRN
Status: DISCONTINUED | OUTPATIENT
Start: 2025-04-14 | End: 2025-04-15

## 2025-04-14 RX ORDER — LORAZEPAM 2 MG/ML
INJECTION INTRAMUSCULAR
Status: COMPLETED
Start: 2025-04-14 | End: 2025-04-14

## 2025-04-14 RX ORDER — LORAZEPAM 2 MG/ML
2 INJECTION INTRAMUSCULAR
Status: DISCONTINUED | OUTPATIENT
Start: 2025-04-14 | End: 2025-04-15

## 2025-04-14 RX ORDER — LAMOTRIGINE 100 MG/1
300 TABLET ORAL 2 TIMES DAILY
Status: DISCONTINUED | OUTPATIENT
Start: 2025-04-14 | End: 2025-04-15

## 2025-04-14 NOTE — PLAN OF CARE
Problem: Patient Centered Care  Goal: Patient preferences are identified and integrated in the patient's plan of care  Description: Interventions:- What would you like us to know as we care for you? - Provide timely, complete, and accurate information to patient/family- Incorporate patient and family knowledge, values, beliefs, and cultural backgrounds into the planning and delivery of care- Encourage patient/family to participate in care and decision-making at the level they choose- Honor patient and family perspectives and choices  Outcome: Progressing

## 2025-04-14 NOTE — ED QUICK NOTES
Pt is more alert post seizure, Aox3; could not recall the year. Pt and  updated on POC, pillow given.

## 2025-04-14 NOTE — ED QUICK NOTES
Orders for admission, patient is aware of plan and ready to go upstairs. Any questions, please call ED RN Rosalinda at extension [phone].     Patient Covid vaccination status: Fully vaccinated     COVID Test Ordered in ED: None    COVID Suspicion at Admission: N/A    Running Infusions: Medication Infusions[1] None    Mental Status/LOC at time of transport: BRENDA    Other pertinent information:   CIWA score: N/A   NIH score:  N/A             [1]

## 2025-04-14 NOTE — H&P
[facility]    PATIENT'S NAME: HENNY REID   ATTENDING PHYSICIAN: Kathleen Weems MD   PATIENT ACCOUNT#:   [id number]    LOCATION:  [address] [place] [address]  MEDICAL RECORD #:   [id number]       YOB: 1970  ADMISSION DATE:       04/14/2025    HISTORY AND PHYSICAL EXAMINATION    CHIEF COMPLAINT:  Breakthrough seizures.    HISTORY OF PRESENT ILLNESS:  The patient is a 54-year-old  female who presented to the emergency department for evaluation after she had multiple grand mal seizures at home.  She received IV Ativan for recurrent seizure in the emergency room and was started on IV Keppra.  She is slightly postictal but able to respond.  CBC showed white blood cell count of 11.5.  Chemistry showed bicarbonate of 18.     PAST MEDICAL HISTORY:  History of lung cancer status post radiation chemotherapy and oligo brain metastases, required right frontal and left occipital craniotomy for brain metastases.  She had prolonged history of seizure disorder with breakthrough seizures.  Last hospitalization at [facility] in December 2024.  At that time, she had video EEG monitoring which was negative.  She had history of osteoarthritis, hepatitis C, pulmonary embolism, migraines, hypothyroidism.    PAST SURGICAL HISTORY:  Left total knee arthroplasty, complicated by prosthetic joint infection, requires 2-stage revision.  Appendectomy, cholecystectomy, hysterectomy, right breast lumpectomy, hemorrhoidectomy.  Also right frontal and left occipital lobe craniotomy for brain metastases resection.  During recent hospitalization, imaging studies of the brain were negative including CT scan.    MEDICATIONS:  Please see medication reconciliation list.     FAMILY HISTORY:  Mother had cerebrovascular accident.  Father had prostate cancer.      SOCIAL HISTORY:  Chronic tobacco use.  No alcohol or drug use.     REVIEW OF SYSTEMS:  Difficult to obtain from the patient.  Per her family, patient had multiple  breakthrough grand mal seizures.  Usually, she had partial seizures.  Reportedly, she is on multiple antiseizure medications at home including Keppra, lamotrigine, gabapentin, and she is compliant.  Other 12-point review of systems is unobtainable from the patient.      PHYSICAL EXAMINATION:    GENERAL:  Patient is drowsy but easily arousable.   VITAL SIGNS:  Temperature 98.6, pulse 122, respiratory rate 26, blood pressure 134/72, pulse ox 93% on room air.    HEENT:  Atraumatic.  Oropharynx clear.  Dry mucous membranes.  Ears, nose normal.  Eyes:  Anicteric sclerae.   NECK:  Supple.  No lymphadenopathy.  LUNGS:  Clear to auscultation bilaterally.  Normal respiratory effort.   HEART:  Regular rate, rhythm.  S1 and S2 auscultated.  No murmur.   ABDOMEN:  Soft, nondistended.  No tenderness.  Positive bowel sounds.   EXTREMITIES:  No peripheral edema, clubbing, or cyanosis.  NEUROLOGIC:  Unable to asses.     ASSESSMENT AND PLAN:  Breakthrough seizure versus nonepileptic seizure.  Patient will be admitted to general medical floor.  She does have metabolic acidosis suggestive of possible seizure activity.  Will place her on IV Ativan p.r.n., IV Keppra per Neurology.  Seizure precautions.  IV fluids.  Further recommendations to follow.     Dictated By Kathleen Weems MD  d: 04/14/2025 17:45:54  t: 04/14/2025 18:02:59  Job [id number]  /

## 2025-04-14 NOTE — ED QUICK NOTES
Family pulled staff in the rom to notify us demario was seizing again  Dr Villa notified  1mg ativan administered

## 2025-04-15 VITALS
HEART RATE: 79 BPM | DIASTOLIC BLOOD PRESSURE: 74 MMHG | RESPIRATION RATE: 16 BRPM | SYSTOLIC BLOOD PRESSURE: 115 MMHG | TEMPERATURE: 98 F | BODY MASS INDEX: 23.27 KG/M2 | OXYGEN SATURATION: 94 % | WEIGHT: 144.81 LBS | HEIGHT: 66 IN

## 2025-04-15 LAB
ANION GAP SERPL CALC-SCNC: 9 MMOL/L (ref 0–18)
BASOPHILS # BLD AUTO: 0.06 X10(3) UL (ref 0–0.2)
BASOPHILS NFR BLD AUTO: 0.6 %
BUN BLD-MCNC: 13 MG/DL (ref 9–23)
BUN/CREAT SERPL: 16 (ref 10–20)
CALCIUM BLD-MCNC: 8.2 MG/DL (ref 8.7–10.4)
CHLORIDE SERPL-SCNC: 116 MMOL/L (ref 98–112)
CO2 SERPL-SCNC: 18 MMOL/L (ref 21–32)
CREAT BLD-MCNC: 0.81 MG/DL (ref 0.55–1.02)
DEPRECATED RDW RBC AUTO: 55.6 FL (ref 35.1–46.3)
EGFRCR SERPLBLD CKD-EPI 2021: 86 ML/MIN/1.73M2 (ref 60–?)
EOSINOPHIL # BLD AUTO: 0.06 X10(3) UL (ref 0–0.7)
EOSINOPHIL NFR BLD AUTO: 0.6 %
ERYTHROCYTE [DISTWIDTH] IN BLOOD BY AUTOMATED COUNT: 16.2 % (ref 11–15)
GLUCOSE BLD-MCNC: 77 MG/DL (ref 70–99)
HCT VFR BLD AUTO: 30.4 % (ref 35–48)
HGB BLD-MCNC: 10.2 G/DL (ref 12–16)
IMM GRANULOCYTES # BLD AUTO: 0.05 X10(3) UL (ref 0–1)
IMM GRANULOCYTES NFR BLD: 0.5 %
LYMPHOCYTES # BLD AUTO: 3.37 X10(3) UL (ref 1–4)
LYMPHOCYTES NFR BLD AUTO: 31.8 %
MCH RBC QN AUTO: 31.5 PG (ref 26–34)
MCHC RBC AUTO-ENTMCNC: 33.6 G/DL (ref 31–37)
MCV RBC AUTO: 93.8 FL (ref 80–100)
MONOCYTES # BLD AUTO: 0.88 X10(3) UL (ref 0.1–1)
MONOCYTES NFR BLD AUTO: 8.3 %
NEUTROPHILS # BLD AUTO: 6.19 X10 (3) UL (ref 1.5–7.7)
NEUTROPHILS # BLD AUTO: 6.19 X10(3) UL (ref 1.5–7.7)
NEUTROPHILS NFR BLD AUTO: 58.2 %
OSMOLALITY SERPL CALC.SUM OF ELEC: 295 MOSM/KG (ref 275–295)
PLATELET # BLD AUTO: 297 10(3)UL (ref 150–450)
POTASSIUM SERPL-SCNC: 3.1 MMOL/L (ref 3.5–5.1)
RBC # BLD AUTO: 3.24 X10(6)UL (ref 3.8–5.3)
SODIUM SERPL-SCNC: 143 MMOL/L (ref 136–145)
WBC # BLD AUTO: 10.6 X10(3) UL (ref 4–11)

## 2025-04-15 PROCEDURE — 99223 1ST HOSP IP/OBS HIGH 75: CPT | Performed by: OTHER

## 2025-04-15 PROCEDURE — 99233 SBSQ HOSP IP/OBS HIGH 50: CPT | Performed by: HOSPITALIST

## 2025-04-15 RX ORDER — LEVETIRACETAM 500 MG/1
2000 TABLET ORAL 2 TIMES DAILY
Status: DISCONTINUED | OUTPATIENT
Start: 2025-04-15 | End: 2025-04-15

## 2025-04-15 RX ORDER — POTASSIUM CHLORIDE 1500 MG/1
40 TABLET, EXTENDED RELEASE ORAL ONCE
Status: COMPLETED | OUTPATIENT
Start: 2025-04-15 | End: 2025-04-15

## 2025-04-15 RX ORDER — LEVETIRACETAM 1000 MG/1
2000 TABLET ORAL 2 TIMES DAILY
Qty: 360 TABLET | Refills: 0 | Status: SHIPPED | OUTPATIENT
Start: 2025-04-15 | End: 2025-07-14

## 2025-04-15 NOTE — ED PROVIDER NOTES
Patient Seen in: [facility] Emergency Department    History     Chief Complaint   Patient presents with    Seizures       HPI    Patient with a history of seizures secondary to brain metastasis from lung CA s/p resection presents to the ED after recurrent seizure.   states that he found the patient seizing with generalized tonic-clonic movements.  This is different than her usual seizure which is partial she is able to speak patient was admitted to this hospital several months ago after a prior generalized seizure.  Seizure resolved prior to ED arrival.  Postictal during transport per EMS.    History reviewed. Past Medical History[1]    History reviewed. Past Surgical History[2]      Medications :  Prescriptions Prior to Admission[3]     Family History[4]    Smoking Status: Social Hx on file[5]    Constitutional and vital signs reviewed.      Social History and Family History elements reviewed from today, pertinent positives to the presenting problem noted.    Physical Exam     ED Triage Vitals [04/14/25 1457]   BP (!) 154/98   Pulse 113   Resp 24   Temp 98.6 °F (37 °C)   Temp src Temporal   SpO2 95 %   O2 Device Nasal cannula       All measures to prevent infection transmission during my interaction with the patient were taken. Handwashing was performed prior to and after the exam.  Stethoscope and any equipment used during my examination was cleaned with super sani-cloth germicidal wipes following the exam.     Physical Exam  Vitals and nursing note reviewed.   Constitutional:       General: She is not in acute distress.     Appearance: She is well-developed. She is not ill-appearing.      Comments: Somnolent but arousable   HENT:      Head: Normocephalic and atraumatic.   Eyes:      General:         Right eye: No discharge.         Left eye: No discharge.      Extraocular Movements: Extraocular movements intact.      Conjunctiva/sclera: Conjunctivae normal.      Pupils: Pupils are equal, round,  and reactive to light.   Neck:      Trachea: No tracheal deviation.   Cardiovascular:      Rate and Rhythm: Normal rate and regular rhythm.   Pulmonary:      Effort: Pulmonary effort is normal. No respiratory distress.      Breath sounds: No stridor.   Abdominal:      General: There is no distension.      Palpations: Abdomen is soft.   Musculoskeletal:         General: No deformity.   Skin:     General: Skin is warm and dry.   Neurological:      General: No focal deficit present.      Mental Status: She is oriented to person, place, and time.      Cranial Nerves: No cranial nerve deficit.      Sensory: No sensory deficit.      Motor: No weakness.      Coordination: Coordination normal.      Gait: Gait normal.      Deep Tendon Reflexes: Reflexes normal.   Psychiatric:         Mood and Affect: Mood normal.         Behavior: Behavior normal.         ED Course        Labs Reviewed   BASIC METABOLIC PANEL (8) - Abnormal; Notable for the following components:       Result Value    Glucose 105 (*)     Chloride 113 (*)     CO2 18.0 (*)     Calcium, Total 8.4 (*)     All other components within normal limits   HEPATIC FUNCTION PANEL (7) - Abnormal; Notable for the following components:    Alkaline Phosphatase 131 (*)     Bilirubin, Total <0.2 (*)     Total Protein 8.4 (*)     All other components within normal limits   CBC WITH DIFFERENTIAL WITH PLATELET - Abnormal; Notable for the following components:    WBC 11.5 (*)     RBC 3.63 (*)     RDW-SD 58.7 (*)     RDW 16.1 (*)     All other components within normal limits   POCT GLUCOSE - Abnormal; Notable for the following components:    POC Glucose  107 (*)     All other components within normal limits   POCT GLUCOSE - Normal   RAINBOW DRAW LAVENDER   RAINBOW DRAW LIGHT GREEN   RAINBOW DRAW GOLD   RAINBOW DRAW BLUE     EKG    Rate, intervals and axes as noted on EKG Report.  Rate: Tachycardic, 103 bpm  Rhythm: Sinus Rhythm  Reading: Sinus tachycardia, normal ST segments, normal  intervals, normal EKG           As Interpreted by me    Imaging Results Available and Reviewed while in ED: No results found.  ED Medications Administered:   Medications   acetaminophen (Tylenol Extra Strength) tab 500 mg (has no administration in time range)   ondansetron (Zofran) 4 MG/2ML injection 4 mg (has no administration in time range)   prochlorperazine (Compazine) 10 MG/2ML injection 5 mg (has no administration in time range)   LORazepam (Ativan) 2 mg/mL injection 2 mg (has no administration in time range)   sodium chloride 0.9% infusion ( Intravenous New Bag 4/14/25 1807)   levETIRAcetam (Keppra) 500 mg/5mL injection 1,500 mg (has no administration in time range)   gabapentin (Neurontin) cap 400 mg (has no administration in time range)   lamoTRIgine (LaMICtal) tab 300 mg (has no administration in time range)   levETIRAcetam (Keppra) 500 mg/5mL injection 1,500 mg (1,500 mg Intravenous Given 4/14/25 1522)   LORazepam (Ativan) 2 mg/mL injection 1 mg (1 mg Intravenous Given 4/14/25 1618)         MDM     Vitals:    04/14/25 1545 04/14/25 1625 04/14/25 1747 04/14/25 1750   BP: 130/84 134/72 117/78    BP Location:   Right arm    Pulse: 96 (!) 122 93    Resp: 23 26 18    Temp:   98 °F (36.7 °C)    TempSrc:   Axillary    SpO2: 96% 93% 97%    Weight:    65.7 kg   Height:         *I personally reviewed and interpreted all ED vitals.    Pulse Ox: 97%, Room air, Normal     Monitor Interpretation:   normal sinus rhythm, sinus tachycardia as interpreted by me.  The cardiac monitor was ordered to monitor heart rate.    Differential Diagnosis/ Diagnostic Considerations: Breakthrough seizure, other    Complicating Factors: The patient already has does not have any pertinent problems on file. to contribute to the complexity of this ED evaluation.    Medical Decision Making  Patient presents to the ED after having a generalized seizure.  Postictal on arrival.  Complaints.  Patient was given IV Keppra and observed.  Workup  without concerning findings.  Patient in a recurrent seizure however and required a dose of Ativan which resolved her seizure activity.   at first he stated given ongoing seizures.  I discussed with Dr. Flynn for neurology consultation and Dr. Weems for admission.    Problems Addressed:  Recurrent seizures (HCC): acute illness or injury that poses a threat to life or bodily functions    Amount and/or Complexity of Data Reviewed  Independent Historian: EMS     Details: EMS provides history details  Labs: ordered. Decision-making details documented in ED Course.  Discussion of management or test interpretation with external provider(s): I discussed with Dr. Flynn for neurology consultation and Dr. Weems for admission.    Risk  Parenteral controlled substances.        Condition upon leaving the department: Stable    Disposition and Plan     Clinical Impression:  1. Recurrent seizures (HCC)        Disposition:  Admit    Follow-up:  No follow-up provider specified.    Medications Prescribed:  Current Discharge Medication List          Hospital Problems       Present on Admission  Date Reviewed: 1/28/2025          ICD-10-CM Noted POA    * (Principal) Recurrent seizures (HCC) G40.909 4/14/2025 Unknown    Seizure (HCC) R56.9 4/14/2025 Unknown                       [1]   Past Medical History:   Anemia    Brain cancer (HCC)    Depression    Disorder of thyroid    Exposure to radiation    DISCONTINUED AUG 2015     Gallbladder disease    Hepatitis C    High blood pressure    History of blood transfusion    after childbirth [facility]    Hypothyroid    Lung cancer (HCC)    Migraines    Osteoarthritis    Pancreatic cancer (HCC)    Personal history of antineoplastic chemotherapy    EVERY 2 WEEKS BEGINNING 2015     Pneumonia, organism unspecified(486)    Pulmonary embolism (HCC)    Seizure disorder (HCC)    can't remeber last one    Visual impairment    tunnel vision    Wears glasses   [2]   Past Surgical  History:  Procedure Laterality Date    Appendectomy  01/01/1999    Brain surgery  07/01/2015    TUMOR RESECTION    Brain surgery Left     OCCIPITAL CRANIOTOMY    Brain surgery Left     LOBECTOMY    Brain surgery  08/01/2016    REMOVAL OF DEAD TISSUE     Cholecystectomy  01/01/2009    Cyst removal      BREAST, BENIGN    Hemorrhoidectomy      Hysterectomy      heavy menstrual flow,     Knee replacement surgery Left 12/08/2022    @ [facility]    Knee surgery Left 08/30/2023    left knee resection with antibiotic spacer placement    Lumpectomy right  01/01/2012    FIBROADENOMA    Other  08/07/2015    CYBERKNIFE    Xs port-a-cath insertion/exch/chk     [3]   Medications Prior to Admission   Medication Sig Dispense Refill Last Dose/Taking    risperiDONE 0.5 MG Oral Tab Take 1 tablet (0.5 mg total) by mouth at bedtime.   4/13/2025 at  8:00 PM    risperiDONE 2 MG Oral Tab Take 1 tablet (2 mg total) by mouth at bedtime.   4/13/2025 at  8:00 PM    LEVOTHYROXINE 125 MCG Oral Tab TAKE 1 TABLET BY MOUTH BEFORE BREAKFAST. 90 tablet 0 4/14/2025 at  7:45 AM    levetiracetam 750 MG Oral Tab Take 2 tablets (1,500 mg total) by mouth 2 (two) times daily.   4/14/2025 at  8:45 AM    Gabapentin 400 MG Oral Tab Take 1 tablet (400 mg total) by mouth 2 (two) times daily.   4/14/2025 at  8:45 AM    sertraline 100 MG Oral Tab Take 2 tablets (200 mg total) by mouth daily.   4/14/2025 at  8:30 AM    lamoTRIgine 150 MG Oral Tab Take 2 tablets (300 mg total) by mouth 2 (two) times daily.   4/14/2025 at  8:45 AM    Zolpidem Tartrate ER 6.25 MG Oral Tab CR Take 1 tablet (6.25 mg total) by mouth at bedtime. (Patient not taking: Reported on 4/14/2025)   Not Taking   [4]   Family History  Problem Relation Age of Onset    Blood Disorder Mother     Stroke Mother     Clotting Disorder Mother     Psychiatric Mother     Cancer Father         PROSTATE   [5]   Social History  Socioeconomic History    Marital status:    Occupational History    Occupation:  SELF EMPLOYED   Tobacco Use    Smoking status: Every Day     Current packs/day: 0.00     Average packs/day: 1.5 packs/day for 40.0 years (60.1 ttl pk-yrs)     Types: Cigarettes     Start date: 1985     Last attempt to quit: 10/31/2023     Years since quittin.4     Passive exposure: Past (15)    Smokeless tobacco: Never   Vaping Use    Vaping status: Never Used   Substance and Sexual Activity    Alcohol use: Not Currently     Comment: daily- 8beer/whiskey    Drug use: Yes     Types: Cannabis     Comment: smoking    Sexual activity: Yes     Partners: Male   Other Topics Concern    Caffeine Concern Yes     Comment: 2 or 1 cup of coffee daily

## 2025-04-15 NOTE — PLAN OF CARE
Patient awake and alert this afternoon. Declined MRI, patient stated she has an appointment to follow up with her neurologist in one month. Cleared for discharge. Patient verbalized understanding of discharge instructions. Transportation home provided by , all personal belongings present with patient at time of discharge.    Problem: Patient Centered Care  Goal: Patient preferences are identified and integrated in the patient's plan of care  Description: Interventions:- What would you like us to know as we care for you? From home with family- Provide timely, complete, and accurate information to patient/family- Incorporate patient and family knowledge, values, beliefs, and cultural backgrounds into the planning and delivery of care- Encourage patient/family to participate in care and decision-making at the level they choose- Honor patient and family perspectives and choices  4/15/2025 1413 by Shelbi Zamudio  Outcome: Adequate for Discharge    Problem: RESPIRATORY - ADULT  Goal: Achieves optimal ventilation and oxygenation  Description: INTERVENTIONS:- Assess for changes in respiratory status- Assess for changes in mentation and behavior- Position to facilitate oxygenation and minimize respiratory effort- Oxygen supplementation based on oxygen saturation or ABGs- Provide Smoking Cessation handout, if applicable- Encourage broncho-pulmonary hygiene including cough, deep breathe, Incentive Spirometry- Assess the need for suctioning and perform as needed- Assess and instruct to report SOB or any respiratory difficulty- Respiratory Therapy support as indicated- Manage/alleviate anxiety- Monitor for signs/symptoms of CO2 retention  4/15/2025 1413 by Shelbi Zamudio  Outcome: Adequate for Discharge    Problem: NEUROLOGICAL - ADULT  Goal: Achieves stable or improved neurological status  Description: INTERVENTIONS- Assess for and report changes in neurological status- Initiate measures to prevent increased intracranial  pressure- Maintain blood pressure and fluid volume within ordered parameters to optimize cerebral perfusion and minimize risk of hemorrhage- Monitor temperature, glucose, and sodium. Initiate appropriate interventions as ordered  4/15/2025 1413 by Shelbi Zamudio  Outcome: Adequate for Discharge  Goal: Absence of seizures  Description: INTERVENTIONS- Monitor for seizure activity- Administer anti-seizure medications as ordered- Monitor neurological status  4/15/2025 1413 by Shelbi Zamudio  Outcome: Adequate for Discharge  Goal: Remains free of injury related to seizure activity  Description: INTERVENTIONS:- Maintain airway, patient safety  and administer oxygen as ordered- Monitor patient for seizure activity, document and report duration and description of seizure to MD/LIP- If seizure occurs, turn patient to side and suction secretions as needed- Reorient patient post seizure- Seizure pads on all 4 side rails- Instruct patient/family to notify RN of any seizure activity- Instruct patient/family to call for assistance with activity based on assessment  4/15/2025 1413 by Shelbi Zamudio  Outcome: Adequate for Discharge  Goal: Achieves maximal functionality and self care  Description: INTERVENTIONS- Monitor swallowing and airway patency with patient fatigue and changes in neurological status- Encourage and assist patient to increase activity and self care with guidance from PT/OT- Encourage visually impaired, hearing impaired and aphasic patients to use assistive/communication devices  4/15/2025 1413 by Shelbi Zamudio  Outcome: Adequate for Discharge     Problem: PAIN - ADULT  Goal: Verbalizes/displays adequate comfort level or patient's stated pain goal  Description: INTERVENTIONS:- Encourage pt to monitor pain and request assistance- Assess pain using appropriate pain scale- Administer analgesics based on type and severity of pain and evaluate response- Implement non-pharmacological measures as appropriate and evaluate  response- Consider cultural and social influences on pain and pain management- Manage/alleviate anxiety- Utilize distraction and/or relaxation techniques- Monitor for opioid side effects- Notify MD/LIP if interventions unsuccessful or patient reports new pain- Anticipate increased pain with activity and pre-medicate as appropriate  4/15/2025 1413 by Shelbi Zamudio  Outcome: Adequate for Discharge     Problem: SAFETY ADULT - FALL  Goal: Free from fall injury  Description: INTERVENTIONS:- Assess pt frequently for physical needs- Identify cognitive and physical deficits and behaviors that affect risk of falls.- [place] fall precautions as indicated by assessment.- Educate pt/family on patient safety including physical limitations- Instruct pt to call for assistance with activity based on assessment- Modify environment to reduce risk of injury- Provide assistive devices as appropriate- Consider OT/PT consult to assist with strengthening/mobility- Encourage toileting schedule  4/15/2025 1413 by Shelbi Zamudio  Outcome: Adequate for Discharge    Problem: DISCHARGE PLANNING  Goal: Discharge to home or other facility with appropriate resources  Description: INTERVENTIONS:- Identify barriers to discharge w/pt and caregiver- Include patient/family/discharge partner in discharge planning- Arrange for needed discharge resources and transportation as appropriate- Identify discharge learning needs (meds, wound care, etc)- Arrange for interpreters to assist at discharge as needed- Consider post-discharge preferences of patient/family/discharge partner- Complete POLST form as appropriate- Assess patient's ability to be responsible for managing their own health- Refer to Case Management Department for coordinating discharge planning if the patient needs post-hospital services based on physician/LIP order or complex needs related to functional status, cognitive ability or social support system  4/15/2025 1413 by Lizz  Shelbi  Outcome: Adequate for Discharge

## 2025-04-15 NOTE — PROGRESS NOTES
[facility]  part of [facility]    Progress Note    Virginia Sinclair Patient Status:  Inpatient    1970 MRN [id number]   Location [facility] 5SW/SE Attending Radha Saxena MD   Hosp Day # 1 PCP No primary care provider on file.     Chief Complaint: seizure    SUBJECTIVE:    No acute events overnight.  Patient denies chest pain, sob.  No fevers/chills.  No n/v/abd pain.  Tired.     10 ROS completed and otherwise negative.    OBJECTIVE:  Vital signs in last 24 hours:  /74 (BP Location: Right arm)   Pulse 73   Temp 98.6 °F (37 °C) (Oral)   Resp 16   Ht 5' 6\" (1.676 m)   Wt 144 lb 12.8 oz (65.7 kg)   SpO2 91%   BMI 23.37 kg/m²     Intake/Output:    Intake/Output Summary (Last 24 hours) at 4/15/2025 1216  Last data filed at 4/15/2025 1100  Gross per 24 hour   Intake 1730 ml   Output --   Net 1730 ml       Wt Readings from Last 3 Encounters:   25 144 lb 12.8 oz (65.7 kg)   25 141 lb (64 kg)   24 140 lb 14 oz (63.9 kg)       Exam     Gen: No acute distress  Pulm: Lungs clear, normal respiratory effort  CV: Heart with regular rate and rhythm  Abd: Abdomen soft, nontender, bowel sounds present  Neuro: No acute focal deficits  MSK: moves extremities  Skin: Warm and dry  Psych: Normal affect  Ext: no c/c/e    Data Review:     Labs:   Lab Results   Component Value Date    WBC 10.6 04/15/2025    HGB 10.2 04/15/2025    HCT 30.4 04/15/2025    .0 04/15/2025    CREATSERUM 0.81 04/15/2025    BUN 13 04/15/2025     04/15/2025    K 3.1 04/15/2025     04/15/2025    CO2 18.0 04/15/2025    GLU 77 04/15/2025    CA 8.2 04/15/2025    ALB 4.5 2025    ALKPHO 131 2025    BILT <0.2 2025    TP 8.4 2025    AST 32 2025    ALT 13 2025       Imaging:   No results found.    Meds:   Current Hospital Medications[1]      Assessment  Problem List[2]    Plan:     Acute seizure  - started IV keppra  - IV ativan prn  - seizure  precautions  - IVFs  - await neuro recs    Hx of lung ca with brain mets  - s/p radiation and chemo    Other PMH   Hypothyroidism  PE  Hep C  Migraines     Global A/P  - reviewed labs and vitals from today  - reviewed notes of the day  - cbc, bmp, mag ordered for tomorrow  - discussed need to stay in the hospital today due to above  - cont IV keppra  - discussed with patient/RN and care team  - dispo pending clinical course      Radha Saxena MD  4/15/2025  12:16 PM    Supplementary Documentation:   DVT Mechanical Prophylaxis:   SCDs,    DVT Pharmacologic Prophylaxis   Medication   None                Code Status: Full Code  Carreno: External urinary catheter in place  Carreno Duration (in days):   Central line:    CANDY: 4/16/2025                           MDM: High complexity- severe exacerbation of chronic illness posing a threat to life. IV meds requiring close inpatient monitoring. I personally spent time on chart/note review, review of labs/imaging, discussion with patient, physical exam, discussion with staff, writing progress note, and discussion of plan of care.         [1]   Current Facility-Administered Medications   Medication Dose Route Frequency    acetaminophen (Tylenol Extra Strength) tab 500 mg  500 mg Oral Q4H PRN    ondansetron (Zofran) 4 MG/2ML injection 4 mg  4 mg Intravenous Q6H PRN    prochlorperazine (Compazine) 10 MG/2ML injection 5 mg  5 mg Intravenous Q8H PRN    LORazepam (Ativan) 2 mg/mL injection 2 mg  2 mg Intravenous Q15 Min PRN    sodium chloride 0.9% infusion   Intravenous Continuous    levETIRAcetam (Keppra) 500 mg/5mL injection 1,500 mg  1,500 mg Intravenous Q12H    gabapentin (Neurontin) cap 400 mg  400 mg Oral BID    lamoTRIgine (LaMICtal) tab 300 mg  300 mg Oral BID   [2]   Patient Active Problem List  Diagnosis    Malignant neoplasm of lung (HCC)    Routine health maintenance    Large cell carcinoma of left lung, stage 2 (HCC)    Hypothyroidism    Iron deficiency anemia  secondary to inadequate dietary iron intake    Primary malignant neoplasm of lung metastatic to other site (HCC)    Delirium due to another medical condition    Hypokalemia    History of substance abuse (HCC)    Malignant neoplasm metastatic to brain (HCC)    Borderline personality disorder (HCC)    Bipolar 1 disorder (HCC)    Seizures (HCC)    Arthritis    Chronic anemia    History of lung cancer    Infection of prosthetic left knee joint    Infected prosthetic knee joint    Smoker    Marijuana smoker    Alcohol use    History of tobacco use    Preop testing    Status epilepticus (HCC)    Acute respiratory failure with hypoxia (HCC)    Breakthrough seizure (HCC)    Encounter for care related to Port-a-Cath    Recurrent seizures (HCC)    Seizure (HCC)

## 2025-04-15 NOTE — CONSULTS
[facility]  [address] [place] [address], [address]  [facility] [address]  [phone]          INPATIENT NEUROLOGY   INITIAL CONSULT NOTE       [facility]  part of [facility]    Report of Consultation    Virginia Sinclair Patient Status:  Inpatient     1970 MRN [id number]    Location [facility] 5SW/SE Attending Radha Saxena MD    Hosp Day # 1 PCP No primary care provider on file.      Date of Admission:  2025  Date of Consult:  4/15/2025    HPI:     Virginia Sinclair is a 54 year old woman with past medical history of seizure disorder, migraine headaches, jarvis cancer with brain metastasis status post right frontal left occipital craniotomy, hepatitis C, pulmonary embolism, hypothyroidism, osteoarthritis who presents with multiple breakthrough generalized seizures.    Admitted 2024 for status epilepticus.  At that time she was maintained on lamotrigine 200 mg twice daily, Keppra 1500 mg twice daily.  Also noted to be on tramadol.    Followed up with neurology 2025 at [place].  Seizure semiology is described as an intense feeling of fear or doom leading to an acetone smell then staring, hypersalivation, nausea with minimal impairment of consciousness.  Was recommended to increase lamotrigine to 300 mg twice daily.  Current seizure medications list includes this dose of lamotrigine, Keppra 1500 mg twice daily and gabapentin 40 mg twice daily.  No longer has a tramadol prescription.    Patient has been adherent to medications.  She says she thinks her seizure occurred due to severe stress.  Her son, with whom she is very close, is having auditory hallucinations.      She feels back to baseline.  After her seizures she had post-ictal lethargy causing her to sleep for almost a day.      She has baseline peripheral vision loss bilaterally due to her brain metastases and surgery.     CURRENT MEDICATIONS  Current Medications[1]    OUTPATIENT  MEDICATIONS  Medications Ordered Prior to Encounter[2]     MEDICAL HISTORY  Past Medical History[3]    ?SURGICAL HISTORY  Past Surgical History[4]    SOCIAL HISTORY  Social Hx on file[5]    FAMILY HISTORY  Family History[6]    ALLERGIES  Allergies[7]    ?REVIEW OF SYSTEMS:   13-point review of systems was done and is negative unless otherwise stated in HPI.     ?PHYSICAL EXAM:     /74 (BP Location: Right arm)   Pulse 73   Temp 98.6 °F (37 °C) (Oral)   Resp 16   Ht 66\"   Wt 144 lb 12.8 oz (65.7 kg)   SpO2 91%   BMI 23.37 kg/m²   General appearance: Well appearing, and in no acute distress  Skin: skin color, texture normal.  No rashes or lesions.    Head: Normocephalic, atraumatic.    Neurological:  Mental Status: Alert, oriented to situation/normal fund of knowledge, Follows commands, and Speech fluent and appropriate.  Cranial Nerves: peripheral vision loss bilaterally, extraocular movements intact, facial sensation intact, face symmetric, no facial droop or ptosis, normal bedside auditory acuity, no dysarthria  Motor: trace left arm paresis 5-/5; otherwise 5/5   Reflexes: UE and LE reflexes are equal and reactive  Sensation: intact to light touch  Coordination: Finger-to- nose-finger intact bilaterally   Gait: not assessed     LABS/DATA:    Lab Results   Component Value Date    WBC 10.6 04/15/2025    HGB 10.2 04/15/2025    HCT 30.4 04/15/2025    .0 04/15/2025    CREATSERUM 0.81 04/15/2025    BUN 13 04/15/2025     04/15/2025    K 3.1 04/15/2025     04/15/2025    CO2 18.0 04/15/2025    GLU 77 04/15/2025    CA 8.2 04/15/2025    ALB 4.5 04/14/2025    ALKPHO 131 04/14/2025    BILT <0.2 04/14/2025    TP 8.4 04/14/2025    AST 32 04/14/2025    ALT 13 04/14/2025       HGBA1C:    Lab Results   Component Value Date    A1C 5.9 (H) 11/06/2024    A1C 5.0 11/21/2023    A1C 5.1 08/18/2023     11/06/2024                IMAGING:       CT brain December 2024  I PERSONALLY REVIEWED THESE IMAGES.      ASSESSMENT:  The patient is a 54 year old woman with past medical history of seizure disorder, migraine headaches, jarvis cancer with brain metastasis status post right frontal left occipital craniotomy, hepatitis C, pulmonary embolism, hypothyroidism, osteoarthritis who presents with multiple breakthrough generalized seizures.    Breakthrough seizures  History of status epilepticus  -Continue Lamotrigine 300 mg twice daily  - Continue Gabapentin 400 mg twice daily  - Can increase Keppra to 2000 mg twice daily, patient is agreeable   -Check seizure medication blood levels in 2 weeks.  May not be helpful now as she has received doses of lamotrigine and Keppra already.  -Discussed obtaining MRI brain to check for worsening metastases but patient declined   - Seizure precautions including no driving for 6-month    She will follow up with her established neurologist within 1 month.  No further inpatient testing needed.     This note was prepared using Dragon Medical voice recognition dictation software and as a result, errors may occur. When identified, these errors have been corrected. While every attempt is made to correct errors during dictation, discrepancies may still exist    AJAY Flynn DO   Staff Neurologist   4/15/2025  12:43 PM                     [1]   No current outpatient medications on file.   [2]   No current facility-administered medications on file prior to encounter.     Current Outpatient Medications on File Prior to Encounter   Medication Sig Dispense Refill    risperiDONE 0.5 MG Oral Tab Take 1 tablet (0.5 mg total) by mouth at bedtime.      risperiDONE 2 MG Oral Tab Take 1 tablet (2 mg total) by mouth at bedtime.      LEVOTHYROXINE 125 MCG Oral Tab TAKE 1 TABLET BY MOUTH BEFORE BREAKFAST. 90 tablet 0    levetiracetam 750 MG Oral Tab Take 2 tablets (1,500 mg total) by mouth 2 (two) times daily.      Gabapentin 400 MG Oral Tab Take 1 tablet (400 mg total) by mouth 2 (two) times daily.      sertraline  100 MG Oral Tab Take 2 tablets (200 mg total) by mouth daily.      lamoTRIgine 150 MG Oral Tab Take 2 tablets (300 mg total) by mouth 2 (two) times daily.      Zolpidem Tartrate ER 6.25 MG Oral Tab CR Take 1 tablet (6.25 mg total) by mouth at bedtime. (Patient not taking: Reported on 4/14/2025)     [3]   Past Medical History:   Anemia    Brain cancer (HCC)    Depression    Disorder of thyroid    Exposure to radiation    DISCONTINUED AUG 2015     Gallbladder disease    Hepatitis C    High blood pressure    History of blood transfusion    after childbirth [facility]    Hypothyroid    Lung cancer (HCC)    Migraines    Osteoarthritis    Pancreatic cancer (HCC)    Personal history of antineoplastic chemotherapy    EVERY 2 WEEKS BEGINNING 2015     Pneumonia, organism unspecified(486)    Pulmonary embolism (HCC)    Seizure disorder (HCC)    can't remeber last one    Visual impairment    tunnel vision    Wears glasses   [4]   Past Surgical History:  Procedure Laterality Date    Appendectomy  01/01/1999    Brain surgery  07/01/2015    TUMOR RESECTION    Brain surgery Left     OCCIPITAL CRANIOTOMY    Brain surgery Left     LOBECTOMY    Brain surgery  08/01/2016    REMOVAL OF DEAD TISSUE     Cholecystectomy  01/01/2009    Cyst removal      BREAST, BENIGN    Hemorrhoidectomy      Hysterectomy      heavy menstrual flow,     Knee replacement surgery Left 12/08/2022    @ [facility]    Knee surgery Left 08/30/2023    left knee resection with antibiotic spacer placement    Lumpectomy right  01/01/2012    FIBROADENOMA    Other  08/07/2015    CYBERKNIFE    Xs port-a-cath insertion/exch/chk     [5]   Social History  Socioeconomic History    Marital status:    Occupational History    Occupation: SELF EMPLOYED   Tobacco Use    Smoking status: Every Day     Current packs/day: 0.00     Average packs/day: 1.5 packs/day for 40.0 years (60.1 ttl pk-yrs)     Types: Cigarettes     Start date: 8/5/1985     Last attempt to quit: 10/31/2023      Years since quittin.4     Passive exposure: Past (15)    Smokeless tobacco: Never   Vaping Use    Vaping status: Never Used   Substance and Sexual Activity    Alcohol use: Not Currently     Comment: daily- 8beer/whiskey    Drug use: Yes     Types: Cannabis     Comment: smoking    Sexual activity: Yes     Partners: Male   Other Topics Concern    Caffeine Concern Yes     Comment: 2 or 1 cup of coffee daily   [6]   Family History  Problem Relation Age of Onset    Blood Disorder Mother     Stroke Mother     Clotting Disorder Mother     Psychiatric Mother     Cancer Father         PROSTATE   [7] No Known Allergies

## 2025-04-15 NOTE — DISCHARGE SUMMARY
[facility]  part of Merged with [facility]    Discharge Summary    Virginia Sinclair Patient Status:  Inpatient    1970 MRN [id number]   Location [facility] 5SW/SE Attending Radha Saxena MD   Hosp Day # 1 PCP No primary care provider on file.     Date of Admission: 2025      Date of Discharge: 04/15/25      Admitting Diagnosis: Recurrent seizures (HCC) [G40.909]    Hospital Discharge Diagnoses:  Seizure    Lace+ Score: 73  59-90 High Risk  29-58 Medium Risk  0-28   Low Risk.    TCM Follow-Up Recommendation:  LACE > 58: High Risk of readmission after discharge from the hospital.          Problem List: Problem List[1]      Physical Exam:     Gen: No acute distress  Pulm: Lungs clear, normal respiratory effort  CV: Heart with regular rate and rhythm  Abd: Abdomen soft, nontender, nondistended, bowel sounds present  Neuro: No acute focal deficits  MSK: moves extremities  Skin: Warm and dry  Psych: Normal affect  Ext: no c/c/e      History of Present Illness: Per Dr Weems    The patient is a 54-year-old  female who presented to the emergency department for evaluation after she had multiple grand mal seizures at home. She received IV Ativan for recurrent seizure in the emergency room and was started on IV Keppra. She is slightly postictal but able to respond. CBC showed white blood cell count of 11.5. Chemistry showed bicarbonate of 18.     Hospital Course:     Expand All Collapse All       [facility]  part of Merged with [facility]     Progress Note           Virginia Sinclair Patient Status:  Inpatient    1970 MRN [id number]   Location [facility] 5SW/SE Attending Radha Saxena MD   Hosp Day # 1 PCP No primary care provider on file.      Chief Complaint: seizure     SUBJECTIVE:     No acute events overnight.  Patient denies chest pain, sob.  No fevers/chills.  No n/v/abd pain.  Tired.      10 ROS completed and otherwise negative.      OBJECTIVE:  Vital signs in last 24 hours:  /74 (BP Location: Right arm)   Pulse 73   Temp 98.6 °F (37 °C) (Oral)   Resp 16   Ht 5' 6\" (1.676 m)   Wt 144 lb 12.8 oz (65.7 kg)   SpO2 91%   BMI 23.37 kg/m²      Intake/Output:     Intake/Output Summary (Last 24 hours) at 4/15/2025 1216  Last data filed at 4/15/2025 1100      Gross per 24 hour   Intake 1730 ml   Output --   Net 1730 ml             Wt Readings from Last 3 Encounters:   04/14/25 144 lb 12.8 oz (65.7 kg)   01/27/25 141 lb (64 kg)   12/19/24 140 lb 14 oz (63.9 kg)         Exam      Gen: No acute distress  Pulm: Lungs clear, normal respiratory effort  CV: Heart with regular rate and rhythm  Abd: Abdomen soft, nontender, bowel sounds present  Neuro: No acute focal deficits  MSK: moves extremities  Skin: Warm and dry  Psych: Normal affect  Ext: no c/c/e     Data Review:      Labs:         Lab Results   Component Value Date     WBC 10.6 04/15/2025     HGB 10.2 04/15/2025     HCT 30.4 04/15/2025     .0 04/15/2025     CREATSERUM 0.81 04/15/2025     BUN 13 04/15/2025      04/15/2025     K 3.1 04/15/2025      04/15/2025     CO2 18.0 04/15/2025     GLU 77 04/15/2025     CA 8.2 04/15/2025     ALB 4.5 04/14/2025     ALKPHO 131 04/14/2025     BILT <0.2 04/14/2025     TP 8.4 04/14/2025     AST 32 04/14/2025     ALT 13 04/14/2025         Imaging:   No results found.     Meds:   [Current Hospital Medications]    [Current Hospital Medications]         Current Facility-Administered Medications   Medication Dose Route Frequency    acetaminophen (Tylenol Extra Strength) tab 500 mg  500 mg Oral Q4H PRN    ondansetron (Zofran) 4 MG/2ML injection 4 mg  4 mg Intravenous Q6H PRN    prochlorperazine (Compazine) 10 MG/2ML injection 5 mg  5 mg Intravenous Q8H PRN    LORazepam (Ativan) 2 mg/mL injection 2 mg  2 mg Intravenous Q15 Min PRN    sodium chloride 0.9% infusion   Intravenous Continuous    levETIRAcetam (Keppra) 500 mg/5mL injection 1,500 mg   1,500 mg Intravenous Q12H    gabapentin (Neurontin) cap 400 mg  400 mg Oral BID    lamoTRIgine (LaMICtal) tab 300 mg  300 mg Oral BID           Assessment  [Problem List]    [Problem List]  Patient Active Problem List      Diagnosis    Malignant neoplasm of lung (HCC)    Routine health maintenance    Large cell carcinoma of left lung, stage 2 (HCC)    Hypothyroidism    Iron deficiency anemia secondary to inadequate dietary iron intake    Primary malignant neoplasm of lung metastatic to other site (HCC)    Delirium due to another medical condition    Hypokalemia    History of substance abuse (HCC)    Malignant neoplasm metastatic to brain (HCC)    Borderline personality disorder (HCC)    Bipolar 1 disorder (HCC)    Seizures (HCC)    Arthritis    Chronic anemia    History of lung cancer    Infection of prosthetic left knee joint    Infected prosthetic knee joint    Smoker    Marijuana smoker    Alcohol use    History of tobacco use    Preop testing    Status epilepticus (HCC)    Acute respiratory failure with hypoxia (HCC)    Breakthrough seizure (HCC)    Encounter for care related to Port-a-Cath    Recurrent seizures (HCC)    Seizure (HCC)        Plan:      Acute seizure  - started IV keppra - changed to oral keppra for dc  - seizure precautions  - appreciate neuro recs     Hx of lung ca with brain mets  - s/p radiation and chemo     Other PMH   Hypothyroidism  PE  Hep C  Migraines           Discharge Condition: Stable    Discharge Medications:      Discharge Medications        CHANGE how you take these medications        Instructions Prescription details   levetiracetam 1000 MG Tabs  Commonly known as: KEPPRA  What changed:   medication strength  how much to take      Take 2 tablets (2,000 mg total) by mouth 2 (two) times daily.   Stop taking on: July 14, 2025  Quantity: 360 tablet  Refills: 0            CONTINUE taking these medications        Instructions Prescription details   Gabapentin 400 MG Tabs      Take 1  tablet (400 mg total) by mouth 2 (two) times daily.   Refills: 0     lamoTRIgine 150 MG Tabs  Commonly known as: LaMICtal      Take 2 tablets (300 mg total) by mouth 2 (two) times daily.   Refills: 0     levothyroxine 125 MCG Tabs  Commonly known as: Synthroid      TAKE 1 TABLET BY MOUTH BEFORE BREAKFAST.   Quantity: 90 tablet  Refills: 0     risperiDONE 0.5 MG Tabs  Commonly known as: RisperDAL      Take 1 tablet (0.5 mg total) by mouth at bedtime.   Refills: 0     risperiDONE 2 MG Tabs  Commonly known as: RisperDAL      Take 1 tablet (2 mg total) by mouth at bedtime.   Refills: 0     sertraline 100 MG Tabs  Commonly known as: Zoloft      Take 2 tablets (200 mg total) by mouth daily.   Refills: 0            STOP taking these medications      Zolpidem Tartrate ER 6.25 MG Tbcr  Commonly known as: AMBIEN CR                  Where to Get Your Medications        These medications were sent to [id number] [place], [place] - [address] [facility] [address], [phone], [phone] [address], [facility] [address]      Phone: [phone]   levetiracetam 1000 MG Tabs             Radha Saxena MD  4/15/2025  2:07 PM    Greater than 30 minutes spent on preparation and coordination of this discharge       [1]   Patient Active Problem List  Diagnosis    Malignant neoplasm of lung (HCC)    Routine health maintenance    Large cell carcinoma of left lung, stage 2 (HCC)    Hypothyroidism    Iron deficiency anemia secondary to inadequate dietary iron intake    Primary malignant neoplasm of lung metastatic to other site (HCC)    Delirium due to another medical condition    Hypokalemia    History of substance abuse (HCC)    Malignant neoplasm metastatic to brain (HCC)    Borderline personality disorder (HCC)    Bipolar 1 disorder (HCC)    Seizures (HCC)    Arthritis    Chronic anemia    History of lung cancer    Infection of prosthetic left knee joint    Infected prosthetic knee joint    Smoker     Marijuana smoker    Alcohol use    History of tobacco use    Preop testing    Status epilepticus (HCC)    Acute respiratory failure with hypoxia (HCC)    Breakthrough seizure (HCC)    Encounter for care related to Port-a-Cath    Recurrent seizures (HCC)    Seizure (HCC)

## 2025-04-15 NOTE — PLAN OF CARE
No acute changes. Remains drowsy, but easily arousable. No seizure activity today. Safety precautions in place.     Problem: Patient Centered Care  Goal: Patient preferences are identified and integrated in the patient's plan of care  Description: Interventions:- What would you like us to know as we care for you? From home with - Provide timely, complete, and accurate information to patient/family- Incorporate patient and family knowledge, values, beliefs, and cultural backgrounds into the planning and delivery of care- Encourage patient/family to participate in care and decision-making at the level they choose- Honor patient and family perspectives and choices  Outcome: Progressing     Problem: RESPIRATORY - ADULT  Goal: Achieves optimal ventilation and oxygenation  Description: INTERVENTIONS:- Assess for changes in respiratory status- Assess for changes in mentation and behavior- Position to facilitate oxygenation and minimize respiratory effort- Oxygen supplementation based on oxygen saturation or ABGs- Provide Smoking Cessation handout, if applicable- Encourage broncho-pulmonary hygiene including cough, deep breathe, Incentive Spirometry- Assess the need for suctioning and perform as needed- Assess and instruct to report SOB or any respiratory difficulty- Respiratory Therapy support as indicated- Manage/alleviate anxiety- Monitor for signs/symptoms of CO2 retention  Outcome: Progressing     Problem: NEUROLOGICAL - ADULT  Goal: Achieves stable or improved neurological status  Description: INTERVENTIONS- Assess for and report changes in neurological status- Initiate measures to prevent increased intracranial pressure- Maintain blood pressure and fluid volume within ordered parameters to optimize cerebral perfusion and minimize risk of hemorrhage- Monitor temperature, glucose, and sodium. Initiate appropriate interventions as ordered  Outcome: Progressing  Goal: Absence of seizures  Description:  INTERVENTIONS- Monitor for seizure activity- Administer anti-seizure medications as ordered- Monitor neurological status  Outcome: Progressing  Goal: Remains free of injury related to seizure activity  Description: INTERVENTIONS:- Maintain airway, patient safety  and administer oxygen as ordered- Monitor patient for seizure activity, document and report duration and description of seizure to MD/LIP- If seizure occurs, turn patient to side and suction secretions as needed- Reorient patient post seizure- Seizure pads on all 4 side rails- Instruct patient/family to notify RN of any seizure activity- Instruct patient/family to call for assistance with activity based on assessment  Outcome: Progressing  Goal: Achieves maximal functionality and self care  Description: INTERVENTIONS- Monitor swallowing and airway patency with patient fatigue and changes in neurological status- Encourage and assist patient to increase activity and self care with guidance from PT/OT- Encourage visually impaired, hearing impaired and aphasic patients to use assistive/communication devices  Outcome: Progressing     Problem: PAIN - ADULT  Goal: Verbalizes/displays adequate comfort level or patient's stated pain goal  Description: INTERVENTIONS:- Encourage pt to monitor pain and request assistance- Assess pain using appropriate pain scale- Administer analgesics based on type and severity of pain and evaluate response- Implement non-pharmacological measures as appropriate and evaluate response- Consider cultural and social influences on pain and pain management- Manage/alleviate anxiety- Utilize distraction and/or relaxation techniques- Monitor for opioid side effects- Notify MD/LIP if interventions unsuccessful or patient reports new pain- Anticipate increased pain with activity and pre-medicate as appropriate  Outcome: Progressing     Problem: SAFETY ADULT - FALL  Goal: Free from fall injury  Description: INTERVENTIONS:- Assess pt frequently  for physical needs- Identify cognitive and physical deficits and behaviors that affect risk of falls.- [place] fall precautions as indicated by assessment.- Educate pt/family on patient safety including physical limitations- Instruct pt to call for assistance with activity based on assessment- Modify environment to reduce risk of injury- Provide assistive devices as appropriate- Consider OT/PT consult to assist with strengthening/mobility- Encourage toileting schedule  Outcome: Progressing     Problem: DISCHARGE PLANNING  Goal: Discharge to home or other facility with appropriate resources  Description: INTERVENTIONS:- Identify barriers to discharge w/pt and caregiver- Include patient/family/discharge partner in discharge planning- Arrange for needed discharge resources and transportation as appropriate- Identify discharge learning needs (meds, wound care, etc)- Arrange for interpreters to assist at discharge as needed- Consider post-discharge preferences of patient/family/discharge partner- Complete POLST form as appropriate- Assess patient's ability to be responsible for managing their own health- Refer to Case Management Department for coordinating discharge planning if the patient needs post-hospital services based on physician/LIP order or complex needs related to functional status, cognitive ability or social support system  Outcome: Progressing

## 2025-04-15 NOTE — PLAN OF CARE
Problem: Patient Centered Care  Goal: Patient preferences are identified and integrated in the patient's plan of care  Description: Interventions:- What would you like us to know as we care for you? From home with    - Provide timely, complete, and accurate information to patient/family- Incorporate patient and family knowledge, values, beliefs, and cultural backgrounds into the planning and delivery of care- Encourage patient/family to participate in care and decision-making at the level they choose- Honor patient and family perspectives and choices  Outcome: Progressing     Problem: RESPIRATORY - ADULT  Goal: Achieves optimal ventilation and oxygenation  Description: INTERVENTIONS:- Assess for changes in respiratory status- Assess for changes in mentation and behavior- Position to facilitate oxygenation and minimize respiratory effort- Oxygen supplementation based on oxygen saturation or ABGs- Provide Smoking Cessation handout, if applicable- Encourage broncho-pulmonary hygiene including cough, deep breathe, Incentive Spirometry- Assess the need for suctioning and perform as needed- Assess and instruct to report SOB or any respiratory difficulty- Respiratory Therapy support as indicated- Manage/alleviate anxiety- Monitor for signs/symptoms of CO2 retention  Outcome: Progressing     Problem: NEUROLOGICAL - ADULT  Goal: Achieves stable or improved neurological status  Description: INTERVENTIONS- Assess for and report changes in neurological status- Initiate measures to prevent increased intracranial pressure- Maintain blood pressure and fluid volume within ordered parameters to optimize cerebral perfusion and minimize risk of hemorrhage- Monitor temperature, glucose, and sodium. Initiate appropriate interventions as ordered  Outcome: Progressing  Goal: Absence of seizures  Description: INTERVENTIONS- Monitor for seizure activity- Administer anti-seizure medications as ordered- Monitor neurological  status  Outcome: Progressing  Goal: Remains free of injury related to seizure activity  Description: INTERVENTIONS:- Maintain airway, patient safety  and administer oxygen as ordered- Monitor patient for seizure activity, document and report duration and description of seizure to MD/LIP- If seizure occurs, turn patient to side and suction secretions as needed- Reorient patient post seizure- Seizure pads on all 4 side rails- Instruct patient/family to notify RN of any seizure activity- Instruct patient/family to call for assistance with activity based on assessment  Outcome: Progressing  Goal: Achieves maximal functionality and self care  Description: INTERVENTIONS- Monitor swallowing and airway patency with patient fatigue and changes in neurological status- Encourage and assist patient to increase activity and self care with guidance from PT/OT- Encourage visually impaired, hearing impaired and aphasic patients to use assistive/communication devices  Outcome: Progressing     Problem: PAIN - ADULT  Goal: Verbalizes/displays adequate comfort level or patient's stated pain goal  Description: INTERVENTIONS:- Encourage pt to monitor pain and request assistance- Assess pain using appropriate pain scale- Administer analgesics based on type and severity of pain and evaluate response- Implement non-pharmacological measures as appropriate and evaluate response- Consider cultural and social influences on pain and pain management- Manage/alleviate anxiety- Utilize distraction and/or relaxation techniques- Monitor for opioid side effects- Notify MD/LIP if interventions unsuccessful or patient reports new pain- Anticipate increased pain with activity and pre-medicate as appropriate  Outcome: Progressing     Problem: SAFETY ADULT - FALL  Goal: Free from fall injury  Description: INTERVENTIONS:- Assess pt frequently for physical needs- Identify cognitive and physical deficits and behaviors that affect risk of falls.- Ezel  fall precautions as indicated by assessment.- Educate pt/family on patient safety including physical limitations- Instruct pt to call for assistance with activity based on assessment- Modify environment to reduce risk of injury- Provide assistive devices as appropriate- Consider OT/PT consult to assist with strengthening/mobility- Encourage toileting schedule  Outcome: Progressing     Problem: DISCHARGE PLANNING  Goal: Discharge to home or other facility with appropriate resources  Description: INTERVENTIONS:- Identify barriers to discharge w/pt and caregiver- Include patient/family/discharge partner in discharge planning- Arrange for needed discharge resources and transportation as appropriate- Identify discharge learning needs (meds, wound care, etc)- Arrange for interpreters to assist at discharge as needed- Consider post-discharge preferences of patient/family/discharge partner- Complete POLST form as appropriate- Assess patient's ability to be responsible for managing their own health- Refer to Case Management Department for coordinating discharge planning if the patient needs post-hospital services based on physician/LIP order or complex needs related to functional status, cognitive ability or social support system  Outcome: Progressing

## 2025-04-15 NOTE — DISCHARGE INSTRUCTIONS
Follow up with your established neurologist in a month as discussed with Dr. Flynn.    Check seizure medication blood levels in 2 weeks.       Per your request, here is the information to get in touch with the volunteer department:    Website: [email or web address]  Phone Number: [phone]

## 2025-04-17 ENCOUNTER — PATIENT OUTREACH (OUTPATIENT)
Dept: CASE MANAGEMENT | Age: 55
End: 2025-04-17

## 2025-04-17 NOTE — PROGRESS NOTES
TCM Request   Hospital Follow up for PCP (Discharge 4/16 elm)     PCP  No pcp on file     Attempt #1:  Left message on voicemail for patient to call transitions specialist back to schedule follow up appointments. Provided Transitions specialist scheduling phone number [phone].

## 2025-04-18 NOTE — PROGRESS NOTES
TCM Request   Hospital Follow up for PCP (Discharge 4/16 [place])        PCP Request :  No pcp on file           Attempt #2:  Left message on voicemail for patient to call transitions specialist back to schedule follow up appointments. Provided Transitions specialist scheduling phone number [phone].

## 2025-04-21 NOTE — PROGRESS NOTES
TCM Request   Hospital Follow up for PCP (Discharge 4/16 [place])         PCP Request :  No pcp on file     Per TCM request / Unable to reach pt after 3rd attempt      Attempt #3:  Left message on voicemail for patient to call transitions specialist back to schedule follow up appointments. Provided Transitions specialist scheduling phone number [phone]. Closing encounter. Will re-open if patient returns call.

## 2025-05-08 RX ORDER — LEVOTHYROXINE SODIUM 125 UG/1
125 TABLET ORAL
Qty: 90 TABLET | Refills: 0 | Status: SHIPPED | OUTPATIENT
Start: 2025-05-08

## 2025-05-08 NOTE — TELEPHONE ENCOUNTER
Endocrine refill protocol for medications for hypothyroidism and hyperthyroidism    Protocol Criteria:  FAILED Reason: Abnormal labs    If all below requirements are met, send a 90-day supply with 1 refill per provider protocol.    Verify appointment with Endocrinology completed in the last 12 months or scheduled in the next 6 months.    Normal TSH result in the past 12 months   Review recent telephone encounters and mychart communications with patient to ensure a dose change has not occurred since last office visit that was not updated in the medication history list     Last completed office visit:9/17/2024 Yary Tripathi MD   Last completed telemed visit: Visit date not found  Next scheduled Follow up:   Future Appointments   Date Time Provider Department Center   6/17/2025  1:30 PM Yary Tripathi MD [facility]      Last TSH result:   TSH   Date Value Ref Range Status   11/06/2024 0.064 (L) 0.550 - 4.780 uIU/mL Final     TSH (S)   Date Value Ref Range Status   09/21/2016 81.07 (H) 0.34 - 5.60 uIU/mL Final     Comment:     For children less than 1 month- High levels of TSH  may be present in the first 30 minutes of life and  may be slightly elevated for the first 72 hours.  TSH concentrations usually return to normal by 14  days but occasionally require 28 days.

## 2025-06-17 ENCOUNTER — OFFICE VISIT (OUTPATIENT)
Dept: ENDOCRINOLOGY CLINIC | Facility: CLINIC | Age: 55
End: 2025-06-17
Payer: COMMERCIAL

## 2025-06-17 VITALS
SYSTOLIC BLOOD PRESSURE: 114 MMHG | HEART RATE: 78 BPM | WEIGHT: 151 LBS | DIASTOLIC BLOOD PRESSURE: 71 MMHG | BODY MASS INDEX: 24 KG/M2

## 2025-06-17 DIAGNOSIS — E03.9 HYPOTHYROIDISM, UNSPECIFIED TYPE: Primary | ICD-10-CM

## 2025-06-17 DIAGNOSIS — R73.03 PRE-DIABETES: ICD-10-CM

## 2025-06-17 PROCEDURE — 3074F SYST BP LT 130 MM HG: CPT | Performed by: INTERNAL MEDICINE

## 2025-06-17 PROCEDURE — 3078F DIAST BP <80 MM HG: CPT | Performed by: INTERNAL MEDICINE

## 2025-06-17 PROCEDURE — 99213 OFFICE O/P EST LOW 20 MIN: CPT | Performed by: INTERNAL MEDICINE

## 2025-06-17 NOTE — PROGRESS NOTES
FU VISIT:     Patient follows with me for hypothyroidism      She has Stage IV NSCLCA (poorly diff adeno) and is on chemotherapy with Opdivo. She also has a h/o radiation treatment ( brain metastases).    She was noted to have a low FT 4 and elevated TSH levels ( 6/23/2016) and started on LT 4.   Current dose is 125 mcg daily   Weight gain: yes  Constipation: no   Skin changes: no   Fatigue: no   Muscle aches: no   Diarrhea: no         FH of thyroid disorders: denies     ROS as below  + weight gain    She also has Pre diabetes  Moderate compliance with diet   Not exercising       Past Medical History:    Anemia    Brain cancer (HCC)    Depression    Disorder of thyroid    Exposure to radiation    DISCONTINUED AUG 2015     Gallbladder disease    Hepatitis C    High blood pressure    History of blood transfusion    after childbirth [facility]    Hypothyroid    Lung cancer (HCC)    Migraines    Osteoarthritis    Pancreatic cancer (HCC)    Personal history of antineoplastic chemotherapy    EVERY 2 WEEKS BEGINNING 2015     Pneumonia, organism unspecified(486)    Pulmonary embolism (HCC)    Seizure disorder (HCC)    can't remeber last one    Visual impairment    tunnel vision    Wears glasses     Past Surgical History:   Procedure Laterality Date    Appendectomy  01/01/1999    Brain surgery  07/01/2015    TUMOR RESECTION    Brain surgery Left     OCCIPITAL CRANIOTOMY    Brain surgery Left     LOBECTOMY    Brain surgery  08/01/2016    REMOVAL OF DEAD TISSUE     Cholecystectomy  01/01/2009    Cyst removal      BREAST, BENIGN    Hemorrhoidectomy      Hysterectomy      heavy menstrual flow,     Knee replacement surgery Left 12/08/2022    @ [facility]    Knee surgery Left 08/30/2023    left knee resection with antibiotic spacer placement    Lumpectomy right  01/01/2012    FIBROADENOMA    Other  08/07/2015    CYBERKNIFE    Xs port-a-cath insertion/exch/chk          ALLERGY:  No Known Allergies     PAST MEDICAL, SOCIAL AND FAMILY  HISTORY:  See past medical history marked as reviewed.  See past surgical history marked as reviewed.  See past family history marked as reviewed.  See past social history marked as reviewed.     ASSESSMENTS:      REVIEW OF SYSTEMS:  Constitutional: Negative for:  fever, + fatigue,  + weight gain  Eyes: Negative for:  Visual changes, proptosis, blurring  ENT: Negative for:  dysphagia, neck swelling, dysphonia  Respiratory: Negative for:  dyspnea, cough  Cardiovascular: Negative for:  chest pain, palpitations, orthopnea  GI: Negative for:  abdominal pain, nausea, vomiting,  diarrhea, constipation, bleeding  Neurology: Negative for: headache, numbness, weakness  Genito-Urinary: Negative for: dysuria, frequency  Psychiatric: Negative for:  depression, anxiety  Hematology/Lymphatics: Negative for: bruising, lower extremity edema  Endocrine: Negative for: polyuria, polydypsia  Skin: Negative for: rash, blister, cellulitis          PHYSICAL EXAM:     Vitals reviewed    General Appearance:  alert, well developed, in no acute distress  Head: Atraumatic  Eyes:  normal conjunctivae, sclera., normal sclera and normal pupils  Throat/Neck: normal sound to voice. Normal hearing, normal speech, no thyroid enlargement palpated  Respiratory:  Speaking in full sentences, non-labored. no increased work of breathing, no audible wheezing    Neuro: motor grossly intact, moving all extremities without difficulty  Psychiatric:  oriented to time, self, and place      DATA:         Pertinent data reviewed.         ASSESSMENT AND PLAN:     Patient is a 54 year old female with Stage IV NSCLCA (poorly diff adeno) s/p radiation treatment ( brain) and  Opdivo.  Not on immunotherapy at this time     1. She has hypothyroidism.  Clinically weight gain   Reports compliance with LT4 125 mcg daily   Repeat labs  Will adjust dose as needed    The patient is aware that she needs to take LT4 daily; every am one hour before breakfast and atleast four hours  apart from other meds especially calcium, iron, multivitamins containing Ca/iron  Stressed the importance of compliance with meds  Side effects of noncompliance including the development of myxedema coma and death discussed     Pre DM  I reviewed the following:   - Diagnosis of prediabetes is based on the presence of impaired fasting glucose, impaired glucose tolerance, and/or elevated HbA1c levels between 5.7% and 6.4%  - The combination of diet and exercise is arguably the single most important factor that could halt the progression towards type 2 diabetes in patients with prediabetes.  - The first step in managing patients with prediabetes is to encourage strict lifestyle modifications consisting of >= 180 min of physical activity per week and a calorie intake of 1,200 to 1,800 kcal per day.  - If lifestyle changes fail, we can consider medications like metformin or GLP agonists.     Last A1c was elevated  Will check an A1c   Reading material on low carb diet provided  Start exercising     RTC in 10  months/ sooner depending on results  Call for results       Patient verbalized a complete  understanding of all of the above and did not have any further questions.             Yary Tripathi MD

## 2025-07-30 ENCOUNTER — HOSPITAL ENCOUNTER (OUTPATIENT)
Dept: CT IMAGING | Facility: HOSPITAL | Age: 55
Discharge: HOME | End: 2025-07-30
Attending: SPECIALIST

## 2025-07-30 DIAGNOSIS — C79.31 MALIGNANT NEOPLASM METASTATIC TO BRAIN (HCC): ICD-10-CM

## 2025-07-30 DIAGNOSIS — D50.9 IRON DEFICIENCY ANEMIA, UNSPECIFIED IRON DEFICIENCY ANEMIA TYPE: ICD-10-CM

## 2025-07-30 DIAGNOSIS — Z51.81 MEDICATION MONITORING ENCOUNTER: ICD-10-CM

## 2025-07-30 DIAGNOSIS — C34.90 PRIMARY MALIGNANT NEOPLASM OF LUNG METASTATIC TO OTHER SITE, UNSPECIFIED LATERALITY (HCC): ICD-10-CM

## 2025-07-30 PROCEDURE — 74177 CT ABD & PELVIS W/CONTRAST: CPT | Performed by: SPECIALIST

## 2025-07-30 PROCEDURE — 71260 CT THORAX DX C+: CPT | Performed by: SPECIALIST

## 2025-08-25 ENCOUNTER — OFFICE VISIT (OUTPATIENT)
Facility: LOCATION | Age: 55
End: 2025-08-25
Attending: SPECIALIST

## 2025-08-25 ENCOUNTER — NURSE ONLY (OUTPATIENT)
Facility: LOCATION | Age: 55
End: 2025-08-25
Attending: SPECIALIST

## 2025-08-25 VITALS
SYSTOLIC BLOOD PRESSURE: 132 MMHG | TEMPERATURE: 98 F | HEIGHT: 65 IN | HEART RATE: 82 BPM | RESPIRATION RATE: 18 BRPM | WEIGHT: 158 LBS | DIASTOLIC BLOOD PRESSURE: 83 MMHG | OXYGEN SATURATION: 100 % | BODY MASS INDEX: 26.33 KG/M2

## 2025-08-25 DIAGNOSIS — Z08 ENCOUNTER FOR FOLLOW-UP SURVEILLANCE OF LUNG CANCER: ICD-10-CM

## 2025-08-25 DIAGNOSIS — C34.90 PRIMARY MALIGNANT NEOPLASM OF LUNG METASTATIC TO OTHER SITE, UNSPECIFIED LATERALITY (HCC): Primary | ICD-10-CM

## 2025-08-25 DIAGNOSIS — Z85.118 ENCOUNTER FOR FOLLOW-UP SURVEILLANCE OF LUNG CANCER: ICD-10-CM

## 2025-08-25 DIAGNOSIS — Z95.828 PORT-A-CATH IN PLACE: ICD-10-CM

## 2025-08-27 ENCOUNTER — TELEPHONE (OUTPATIENT)
Dept: ENDOCRINOLOGY CLINIC | Facility: CLINIC | Age: 55
End: 2025-08-27

## 2025-08-29 RX ORDER — LEVOTHYROXINE SODIUM 125 UG/1
125 TABLET ORAL
Qty: 90 TABLET | Refills: 0 | Status: SHIPPED | OUTPATIENT
Start: 2025-08-29

## (undated) DIAGNOSIS — C34.90 MALIGNANT NEOPLASM OF LUNG, UNSPECIFIED LATERALITY, UNSPECIFIED PART OF LUNG (HCC): Primary | ICD-10-CM

## (undated) DIAGNOSIS — E03.9 HYPOTHYROIDISM, UNSPECIFIED TYPE: ICD-10-CM

## (undated) DEVICE — SOLUTION SURG DURAPREP 26ML

## (undated) DEVICE — PREVENA PLUS PEEL & PLACE SYSTEM KIT- 35 CM: Brand: PREVENA  PLUS™ PEEL & PLACE™

## (undated) DEVICE — SUT PDS II 2-0 CT-2 Z333H

## (undated) DEVICE — 20 ML SYRINGE LUER-LOCK TIP: Brand: MONOJECT

## (undated) DEVICE — YANKAUER,FLEXIBLE HANDLE,REGLR CAPACITY: Brand: MEDLINE INDUSTRIES, INC.

## (undated) DEVICE — TRAY SURESTEP 16 BARDEX DRAIN

## (undated) DEVICE — DRAPE PACK CHEST & U BAR

## (undated) DEVICE — KIT COLLECTION COPAN ESWAB 1ML

## (undated) DEVICE — HOOD: Brand: FLYTE

## (undated) DEVICE — PIN FX 1/8IN HDLS FLUT SQ END

## (undated) DEVICE — Device: Brand: STABLECUT®

## (undated) DEVICE — NEEDLE SPINAL 18X3-1/2 PINK.

## (undated) DEVICE — SOLUTION  .9 3000ML

## (undated) DEVICE — GAMMEX® PI HYBRID SIZE 8, STERILE POWDER-FREE SURGICAL GLOVE, POLYISOPRENE AND NEOPRENE BLEND: Brand: GAMMEX

## (undated) DEVICE — SPONGE LAPAROTOMY 18X18IN 7IN

## (undated) DEVICE — 2T11 #2 PDO 36 X 36: Brand: 2T11 #2 PDO 36 X 36

## (undated) DEVICE — SUT ETHILON 2-0 FS 664H

## (undated) DEVICE — GAMMEX® NON-LATEX PI ORTHO SIZE 8.5, STERILE POLYISOPRENE POWDER-FREE SURGICAL GLOVE: Brand: GAMMEX

## (undated) DEVICE — DRAPE SRG 70X60IN SPLT U IMPRV

## (undated) DEVICE — PRECISION OFFSET (9.0 X 0.51 X 25.0MM)

## (undated) DEVICE — PADDING 4YDX6IN CTTN STRL WBRL

## (undated) DEVICE — BOWL CEMENT MIX QUICK-VAC

## (undated) DEVICE — MEDI-VAC NON-CONDUCTIVE SUCTION TUBING: Brand: CARDINAL HEALTH

## (undated) DEVICE — CONTAINER,SPECIMEN,OR STERILE,4OZ: Brand: MEDLINE

## (undated) DEVICE — TOTAL KNEE: Brand: MEDLINE INDUSTRIES, INC.

## (undated) DEVICE — APPLICATOR CHLORAPREP 26ML

## (undated) DEVICE — SOL NACL IRRIG 0.9% 1000ML BTL

## (undated) DEVICE — BLADE SAW W1.14XL2.17IN

## (undated) DEVICE — HOOD, PEEL-AWAY: Brand: FLYTE

## (undated) DEVICE — SUT PDS II 1 CT-1 Z347H

## (undated) DEVICE — SYRINGE MNJCT 35ML LF STRL LL

## (undated) DEVICE — COVER TABLE BACK PADDED HVY DU

## (undated) DEVICE — HANDPIECE SET WITH HIGH FLOW TIP AND SUCTION TUBE: Brand: INTERPULSE

## (undated) DEVICE — 3.1MM X 19.1MM STRAIGHT ROUTER

## (undated) NOTE — MR AVS SNAPSHOT
Jerome Briggs   2017 11:30 AM   Office Visit   MRN:  [id number]    Description:  Female : 1970   Department:  [facility] - Infusion              Visit Summary      Primary Visit Diagnosis     Carcinoma of lung, unspe you may be pregnant, please consult with your physician prior to your exam.  To ENSURE YOUR SAFETY: If you have a medical implant device it is extremely important to present documentation of the implant information at time of your appointment.   FOR FEMALES procedure. IF A CHILD is scheduled for this procedure, parents should be advised that they will not be able to accompany the child in the testing room.  Parents will remain in the MRI waiting area and be reunited with the child upon completion of the MR

## (undated) NOTE — MR AVS SNAPSHOT
Agnes Johnson   2017 9:30 AM   Office Visit   MRN:  [id number]    Description:  Female : 1970   Department:  [facility] - Infusion              Visit Summary      Primary Visit Diagnosis     Carcinoma of lung, unspe

## (undated) NOTE — Clinical Note
Spoke with pt today for TCM--pt confirms ortho f/u 12/14/223--declines TCM appt--prefers to f/u with specialist only, at this time. Sent TE to office staff as FYI/TCM protocol--thank you.   Future Appointments 1/18/2024  1:00 PM    EM CC LAB1                 [facility] CHEMO           EMO 1/18/2024  2:00 PM    Tila Hendrickson MD          [facility] HEM ONC         EMO

## (undated) NOTE — MR AVS SNAPSHOT
Blanca May   2017 9:00 AM   Nurse Only   MRN:  [id number]    Description:  Female : 1970   Department:  [facility]              Visit Summary      Primary Visit Diagnosis     Encounter for adjustment o Result Summary for COMP METABOLIC PANEL (14)      Component Results     Component Value Flag Ref Range Units Status    Glucose 121 (H) 70-99  mg/dL Final    Sodium 137  136-144  mmol/L Final    Potassium 3.1 (L) 3.3-5.1  mmol/L Final    Chloride 101  95-11 Eosinophil Absolute 0.1  0.0-0.7  K/UL Final    Basophil Absolute 0.1  0.0-0.2  K/UL Final               MyChart     Call the [employer] for assistance with your inactive MyChart account    If you have questions, you can call [phone] to talk to our Cait Solis

## (undated) NOTE — MR AVS SNAPSHOT
Jeromy Fritz [address] [facility] [address] [address]  [phone]  [phone]               Thank you for choosing us for your health care visit with MENDOZA Vyas.   We are glad to serve you and happy to provide you wi Levothyroxine Sodium 175 MCG Tabs   Take 1 tablet (175 mcg total) by mouth before breakfast.   Commonly known as:  SYNTHROID, LEVOTHROID           * LORazepam 1 MG Tabs   Take 1 tablet by mouth daily as needed   Commonly known as:  ATIVAN           * Richard

## (undated) NOTE — MR AVS SNAPSHOT
Mynor Don   2017 12:00 PM   Office Visit   MRN:  [id number]    Description:  Female : 1970   Provider:  Rolly Felton   Department:  [facility] Hematology Oncology              Visit Summary      Primary Visit Diagnosis     I Thursday June 22, 2017 1:30 PM     Appointment with CONCEPCION REYES LAB2 at [address] [facility] ([phone])   [address] [address]       Thursday June 22, 2017 2:30 PM     Appointment with Shannon Perales at [facility]

## (undated) NOTE — MR AVS SNAPSHOT
Lilli Bocanegra   2017 9:00 AM   Office Visit   MRN:  [id number]    Description:  Female : 1970   Provider:  Bina Armas   Department:  [facility] Hematology Oncology              Visit Summary      Primary Visit Diagnosis time of your appointment. FOR FEMALES HAVING GADOLINIUM EXAMS :If you are breastfeeding and your exam requires an IV Contrast (Gadolinium) injection, you need to stop breastfeeding for 24-48 hours after the procedure.      IF A CHILD is scheduled for this

## (undated) NOTE — IP AVS SNAPSHOT
[facility]            (For Outpatient Use Only) Initial Admit Date: 4/23/2018   Inpt/Obs Admit Date: Inpt: 4/24/18 / Obs: N/A   Discharge Date:    Link Pattee:  [de-identified]   MRN: [de-identified]   CSN: [id number]        ENCOUNTER  Patient Class Hospital Account [place] [facility] [place]    April 25, 2018

## (undated) NOTE — MR AVS SNAPSHOT
[address]  [phone]               Thank you for choosing us for your health care visit with Mary Meyer MD.  We are glad to serve you and happy to provide you with this summary of your visit. This list is accurate as of: 6/12/17  1:25 PM.  Always use your most recent med list.                Levothyroxine Sodium 175 MCG Tabs   Take 1 tablet (175 mcg total) by mouth before breakfast.   Commonly known as:  Stormy Staples

## (undated) NOTE — MR AVS SNAPSHOT
Lilli Bocanegra   2017 10:00 AM   Office Visit   MRN:  [id number]    Description:  Female : 1970   Provider:  Sosa Burgess   Department:  [facility] AND [facility] Hematology Oncology              Visit Summary      Primary Visit Diagnosis Appointment with CONCEPCION ANDERSEN 2 at [address] [place] [address] ([phone])   [address]   [facility] [address]            MyChart     Call the Room [employer] for assistance with your inactive [employer] account    If you have questions, you

## (undated) NOTE — MR AVS SNAPSHOT
Jerome Briggs   2017 3:00 PM   Office Visit   MRN:  [id number]    Description:  Female : 1970   Provider:  Aron Mckeon   Department:  [facility] Hematology Oncology              Visit Summary      Primary Visit Diagnosis     C Return in about 2 weeks (around 6/1/2017). To Do List     Friday May 19, 2017 9:30 AM     Appointment with EM ERIC ANDERSEN 4 at [address] [place] [address] ([phone])   Caleb Olguin [address]       Tuesday May 23, 2017 Appointment with Juana Patricia at [facility] Hematology Oncology ([phone])   [address]   [address] [place] [address]       Thursday June 01, 2017 9:30 AM     Appointment with CONCEPCION ANDERSEN 1 at [address] [place] [address] [phone]

## (undated) NOTE — MR AVS SNAPSHOT
After Visit Summary   1/3/2020    Meghna Garcia    MRN: [id number]           Visit Information     Date & Time  1/3/2020  3:00 PM Provider  EM ERIC [address] - Infusion Dept.  Phone  [phone] 2/28/2020 1:00 PM [facility] SERVICES Hematology Oncology    2/28/2020 2:00 PM EM CC INFRN 2 [address] now offers Video Visits through [address] for adult and pediatric patients.   Video Visits [address]   Monday – Friday  10:00 am – 10:00 pm   Saturday – Sunday  10:00 am – 4:00 pm     [facility]   Monday – Friday  10:00 am – 4:00 pm   Saturday – Sunday  10:00 am –

## (undated) NOTE — MR AVS SNAPSHOT
Alcides Cordero   2017 1:00 PM   Nurse Only   MRN:  [id number]    Description:  Female : 1970   Department:  [facility] [place] [facility] - Infusion              Visit Summary      Primary Visit Diagnosis     Carcinoma of lung, unspeci WBC 10.5  4.0-11.0  K/UL Final    RBC 3.64 (L) 3.70-5.40  M/UL Final    HGB 9.9 (L) 12.0-16.0  g/dL Final    HCT 30.2 (L) 35.0-48.0  % Final    MCV 82.9  80.0-100.0  fL Final    MCH 27.1  27.0-32.0  pg Final    MCHC 32.7  32.0-37.0  g/dl Final    RDW 19.2

## (undated) NOTE — MR AVS SNAPSHOT
Delano Jordan   2017 9:00 AM   Office Visit   MRN:  [id number]    Description:  Female : 1970   Department:  [facility]              Visit Summary      Primary Visit Diagnosis     Encounter for adjustment extremely important to present documentation of the implant information at time of your appointment.   FOR FEMALES HAVING GADOLINIUM EXAMS :If you are breastfeeding and your exam requires an IV Contrast (Gadolinium) injection, you need to stop breastfeeding If you have questions, you can call [phone] to talk to our [facility] Staff. Remember, [employer] is NOT to be used for urgent needs. For medical emergencies, dial 911. Visit [email or web address] to learn more.

## (undated) NOTE — MR AVS SNAPSHOT
Светлана Romero   3/8/2017 10:00 AM   Nurse Only   MRN:  [id number]    Description:  Female : 1970   Department:  [facility]              Visit Summary      Primary Visit Diagnosis     Encounter for adjustment o Thursday March 23, 2017 9:30 AM     Appointment with CONCEPCION ANDERSEN 1 at [address] [place] [address] ([phone])   [address]   [facility] [address]            Result Summary for CBC WITH DIFFERENTIAL WITH PLATELET      Narrative

## (undated) NOTE — LETTER
09/23/20        Dayana Hall  [address] [facility]      Dear Sea Rachel,    [facility] records indicate that you have outstanding lab work and or testing that was ordered for you and has not yet been completed:  Orders Placed This Encounter      Cyc

## (undated) NOTE — MR AVS SNAPSHOT
Miller Calderon   2017 10:00 AM   Office Visit   MRN:  [id number]    Description:  Female : 1970   Provider:  Marielos Thrasher   Department:  [facility] AND [facility] Hematology Oncology              Visit Summary      Primary Visit Diagnosis To Do List     Thursday February 23, 2017 9:30 AM     Appointment with CONCEPCION ANDERSEN 1 at [address] [place] [address] ([phone])   [address]   [address] [place] [address]       Wednesday March 08, 2017 10:00 AM     Appointment with CONCEPCION DENNY

## (undated) NOTE — MR AVS SNAPSHOT
After Visit Summary   4/6/2017    Keiko Salter    MRN: [id number]           Visit Information        Provider Department Dept Phone    4/6/2017  9:00  [address] [facility] Chemo Infusion [phone]      Your Vitals Were     BP Pulse Temp( 5/3/2017 10:30 AM [facility] Hematology Oncology    5/4/2017 10:00 AM EM CC INFRN [address] - Infusion    5/8/2017 4:00 PM Jose Willett, [address] [place]       April 6, 2 Sincerely,      [facility] now offers Video Visits through [address] for adult and pediatric patients.   Video Visits are available Monday - Friday for many common conditions such as allergies, colds, cough, fever, rash, sore throa

## (undated) NOTE — LETTER
03/09/21        Milena Donovan  [address] [facility]      Dear Thierry Gómez,    [facility] records indicate that you have outstanding lab work and or testing that was ordered for you and has not yet been completed:  Orders Placed This Encounter      Cyc

## (undated) NOTE — MR AVS SNAPSHOT
Meghna [place]   2017 10:00 AM   Office Visit   MRN:  [id number]    Description:  Female : 1970   Provider:  Elan Corona   Department:  [facility] AND [facility] Hematology Oncology              Visit Summary      Primary Visit Diagnosis WinWeb     Call the [employer] for assistance with your inactive WinWeb account    If you have questions, you can call [phone] to talk to our [facility] Staff. Remember, WinWeb is NOT to be used for urgent needs.   For medical emergencies, dial

## (undated) NOTE — MR AVS SNAPSHOT
Meghna [place]   2017 10:00 AM   Nurse Only   MRN:  [id number]    Description:  Female : 1970   Department:  [facility] - Infusion              Visit Summary      Primary Visit Diagnosis     Encounter for adjustment o [place] [address]       Wednesday February 22, 2017 10:00 AM     Appointment with Dmitry Butcher at [facility] AND [facility] Hematology Oncology ([phone])   [phone]. [address]   [place] [address]       Thursday February 23, 2017 9:30 AM     Appointment santhosh

## (undated) NOTE — IP AVS SNAPSHOT
Patient Demographics     Address  [address] [place] [address] [address] [address] Phone  [phone] [facility])  [phone] (Mobile) *Preferred*      Emergency Contact(s)     Name Relation Home Work [address] [place] [address] [phone]      Allergie lamoTRIgine 25 MG Tabs  Commonly known as:  LAMICTAL  Next dose due:  2/8/18 morning      Take 2 tablets (50 mg total) by mouth 2 (two) times daily.    Aneesh Chavez DO         levETIRAcetam 250 MG Tabs  Commonly known as:  KEPPRA  Next dose due:  2/8/18 bedt [id number] QUEtiapine Fumarate (SEROQUEL) tab 50 mg 02/06/18 2054 Given      [id number] QUEtiapine Fumarate ER (SEROQUEL XR) tab 24 TB24 50 mg 02/06/18 2053 Given      [id number] Vitamin B-12 (VITAMIN B12) tab 1,000 mcg 02/07/18 0920 Given      [id number] dex Specimen Information    Type Source Collected On   Blood — 02/07/18 0624          Components    Component Value Reference Range Flag Lab   Valproic Acid 73 50 - 100 mcg/mL — [facility]            CBC WITH DIFFERENTIAL WITH PLATELET [[id number]]  Resulted Anion Gap 6 0 - 18 mmol/L — [facility]   BUN/CREA Ratio 28.0 10.0 - 20.0 H [facility]   Calculated Osmolality 290 275 - 295 mOsm/kg — [facility]   GFR, Non-African American >60 >=60 — [facility]   GFR, African-American >60 >=60 [employer]   Com order for seizures to resolve. She was on a combination of propofol, Versed, Dilantin, valproic acid, and Keppra in order to have resolution of her symptoms.   Subsequent to that, she had elevated temperatures which were deemed to be drug-induced and likel several hospitalizations, nausea, and vomiting. She had progression of disease and started [facility] April 2016. She has been tolerating Opdivo well and has had remission. She is now followed by Dr. Milana Carmichael.   She came in October 2016 with severe headache and GENERAL:  The patient appears in no apparent distress, is alert and oriented x3. [DN.1]     02/01/18  0800   BP: 133/79   Pulse: 68   Resp: 20   Temp: 97.9 °F (36.6 °C)[DN.2]     LUNGS:  Clear to auscultation bilaterally. HEART:  Regular rate and rhythm. Author Type:  Physician    Filed:  2/4/2018  7:12 PM Date of Service:  2/4/2018  5:30 PM Status:  Unsigned Transcription    :  Yas Hernandez MD (Physician)       [facility]    PATIENT'S NAME: [facility]   ATTENDING PHYSICIAN: Jimmy Seth, complications and readmission. The patient was seen by Occupational and Physical Therapy and participated well. She was minimum assist for bed mobility, tolerated edge of bed for 15 minutes.   She had motor apraxia, difficulty with left-sided neglect, amb VITAL SIGNS:  Temperature 98.6, heart rate 78, respirations 18, blood pressure 141/75, pulse ox 99%. HEENT:  Eyes:  Conjunctivae and lids are intact. Pupils are equal and round. ENMT:  External inspection of ears and nose within normal limits.   Normal f 6.   Deficits in short-term memory and insight. RECOMMENDATION:  Acute inpatient rehab.   The patient will benefit from acute inpatient rehab consisting of PT, OT, speech therapy, with goals to improve physical independence to the supervision to [facility] Patient received supine in bed, agreeable to physical therapy. MARIELLA Chacon approved participation in physical therapy and session coordinated with OT. Patient currently with SBA for bed mobility.  Patient with very slow, methodical, and deliberate movements WEIGHT BEARING RESTRICTION  Weight Bearing Restriction: None                PAIN ASSESSMENT   Ratin     Management Techniques:  Activity promotion    BALANCE Goal #2 Patient is able to demonstrate transfers Sit to/from Stand at assistance level: supervision with walker - rolling   Goal #2  Current Status CGA with rolling walker    Goal #3 Patient is able to ambulate 300 feet with assist device: walker - rolling visual cues for direction. Pt has poor safety awareness and poor insight with obstacles during AMB. Pt veers to left side into the walls with AMB. Returned pt to her room and back to bed with all needs within reach and alarm system activated.  Pt is on tra Distance (ft): 250'  Assistive Device: Rolling walker  Pattern: Shuffle  Stoop/Curb Assistance: Not tested  Comment : Required assist with RW management. Patient End of Session: In bed;Needs met;Call light within reach;RN aware of session/findings; Al Partial seizures (HCC)    Major depressive disorder, single episode, severe ([facility] [address]      ASSESSMENT   Received pt in the bed. Coordinated session with OT. Pt is received in the bed. Pt is SBA with her bed mobility and to transfer to the EOB.  Pt is able to -   Sitting down on and standing up from a chair with arms (e.g., wheelchair, bedside commode, etc.): A Lot   -   Moving from lying on back to sitting on the side of the bed?: A Little   How much help from another person does the patient currently need. .. RM.1 - Katy Cea, PTA on 2/4/2018  4:31 PM                        Occupational Therapy Notes (last 72 hours) (Notes from 2/4/2018  1:16 PM through 2/7/2018  1:16 PM)      Occupational Therapy Note signed by Prasanna Gerardo OT at 2/7/2018  1:11 PM Currently, she requires supervision for supine to sit EOB. She was found to have the phone on her ear, with no one on the other line, stating she was speaking to her . She was also found with her credit cards spread over her bed.  With her permission \"I think I did ok, I don't think I need to stay at rehab\"    OBJECTIVE[AO.1]  Precautions: Seizure[AO.2]    WEIGHT BEARING RESTRICTION[AO.1]  Weight Bearing Restriction: None[AO.2]      PAIN ASSESSMENT[AO.1]  Ratin[AO. 2]           ACTIVITY TOLERANCE up/min a for toileting    Patient will tolerate standing for 15 minutes in prep for adls with SBA    Comment: Tolerated dynamic standing for ~10 minutes with use of RW    Patient will complete item retrieval with SBA incorporating visual scanning strategie therapy and will only accept [facility] services. Pt agreeable to participation in ADL retraining/therapeutic tasks with therapist. Pt set up for new slipper socks, cued to doff while long sitting in bed, completed with Min VC to address LLE.  When donning, require -   Putting on and taking off regular lower body clothing?: A Little  -   Bathing (including washing, rinsing, drying)?: A Little  -   Toileting, which includes using toilet, bedpan or urinal? : A Little  -   Putting on and taking off regular upper body cl Author:  Teri Kirkland OT Service:  (none) Author Type:  Occupational Therapist    Filed:  2/5/2018 12:16 PM Date of Service:  2/5/2018 12:06 PM Status:  Signed    :  Teri Kirkland OT (Occupational Therapist)       . OCCUPATIONAL THERAPY TREATMENT NO to bed. Inidcated she would like to order breakfast, provided visual strategies to identify room service button on phone with color cues and blocking out unnecessary numbers.  Will assess if this helps patient performance and ability to order her own meals Shower Transfer: NT  Chair Transfer: NT    Bedroom Mobility: Min/Mod A with RW     BALANCE ASSESSMENT  Static Sitting: fair   Dynamic Sitting: fair-  Static Standing: poor  Dynamic Standing: poor    FUNCTIONAL ADL ASSESSMENT  Grooming: min a to place items Partial seizures (HCC)    Major depressive disorder, single episode, severe (HCC)[JR.2]    ASSESSMENT[JR.1]   RN contacted prior to start of care. Treatment coordinated w/ PT. Pt received in bed, alert and oriented to person and place; at bedside. Ratin[JR. 2]    ACTIVITY TOLERANCE  good    ACTIVITIES OF DAILY LIVING ASSESSMENT  AM-PAC ‘6-Clicks’ Inpatient Daily Activity Short Form  How much help from another person does the patient currently need…[JR.1]  -   Putting on and taking off regular low [employer] Ver[employer]    Melinda Niels. 1 - Oziel Wilson OT on 2/4/2018  4:19 PM  JR. 2 - Oziel Wilson OT on 2/4/2018  4:29 PM                     Video Swallow Study Notes     No notes of this type exist for this encounter.       SLP Notes     No notes of this

## (undated) NOTE — MR AVS SNAPSHOT
Melinda Malhotra   2017 11:00 AM   Nurse Only   MRN:  [id number]    Description:  Female : 1970   Department:  [facility] - Infusion              Visit Summary      Primary Visit Diagnosis     Carcinoma of lung, unspeci Appointment with EM ERIC JACKSONN 4 at [address] [place] [address] ([phone])   [address]   [facility] [address]            Result Summary for CBC WITH DIFFERENTIAL WITH PLATELET      Narrative     The following orders were created f

## (undated) NOTE — MR AVS SNAPSHOT
After Visit Summary   3/23/2017    Dayana Hall    MRN: [id number]           Visit Information        Provider Department Dept Phone    3/23/2017  9:30 AM [facility] Chemo Infusion [phone]      Your Vitals Were     BP Pulse Tem March 23, 2017        Henry Nails     [facility]  [address] [employer]       Dear Gaby Maldonadorochelle : Thank you for enrolling in [employer]. Please follow the instructions below to securely access your online medical record.  [employer] allows you to send message conditions such as allergies, colds, cough, fever, rash, sore throat, headache and pink eye. The cost for a Video Visit is currently $10.         If you receive a survey from [email or web address], please take a few minutes to complete it and provide feedback.  We s

## (undated) NOTE — MR AVS SNAPSHOT
Delano Jordan   2017 11:00 AM   Office Visit   MRN:  [id number]    Description:  Female : 1970   Provider:  Virginie Rivera   Department:  [facility] AND [facility] Hematology Oncology              Visit Summary      Primary Visit Diagnosis Friday February 10, 2017 1:20 PM     Appointment with Nohelia Pacheco at [facility] ([phone])   [address]  [address] [facility] [address]       Wednesday February 22, 2017 9:00 AM     Appointment with CONCEPCION REYES [facility]2 a

## (undated) NOTE — MR AVS SNAPSHOT
After Visit Summary   2/7/2017    Adriana Miranda    MRN: [id number]           Visit Information        Provider Department Dept Phone    2/7/2017 10:00  [address] [facility] Chemo Infusion [phone]      Allergies as of 2/7/2017  Fina 2/9/2017 10:00 AM EM CC INFRN [address] - Infusion    2/10/2017 1:20 PM [facility], [facility], Michelle Blank    2/22/2017 9:00 AM EM [address] - Infusion    2/22/2017 10:00 AM VERN If you have questions, you can call [phone] to talk to our [address] staff. Remember, MyChart is NOT to be used for urgent needs. For medical emergencies, dial 911.       Sincerely,      [facility] now offers Video Visit

## (undated) NOTE — MR AVS SNAPSHOT
[place]   2017 10:00 AM   Office Visit   MRN:  [id number]    Description:  Female : 1970   Department:  [facility] - Infusion              Visit Summary      Primary Visit Diagnosis     Encounter for adjustment Appointment with Maik Coleman at [facility] AND CLINICS Hematology Oncology ([phone])   [phone]. [address]   [address] [place] [address]       Thursday March 09, 2017 9:30 AM     Appointment with CONCEPCION ANDERSEN 6 at [address] [place] [address] (20

## (undated) NOTE — IP AVS SNAPSHOT
Patient Demographics     Address  [address] [place] [address] Phone  [phone] [facility])  [phone] (Mobile) *Preferred*      Emergency Contact(s)     Name Relation Home Work [address] [place] [address] Drive [phone]      Yovani Commonly known as:  ZOFRAN-ODT      Take 4 mg by mouth every 8 (eight) hours as needed for Nausea. OXcarbazepine 600 MG Tabs  Commonly known as:  TRILEPTAL  Start taking on:  4/26/2018      Take 1 tablet (600 mg total) by mouth every morning.    El [id number] lacosamide (VIMPAT) tab 04/24/18 2008 Given      [id number] lacosamide (VIMPAT) tab 04/25/18 0955 Given      [id number] levETIRAcetam (KEPPRA) tab 2,000 mg 04/24/18 2009 Given      [id number] levETIRAcetam (KEPPRA) tab 2,000 mg 04/25/18 0958 Given [address] [facility] [address]  [facility] [place] [address] 04/29/14 1547 - Present            Microbiology Results (All)     None         H&P - H&P Note      H&P signed by Eri Herrera DO at 4/24/2018  1:06 PM  Version 1 to [facility] on 2/9/18 with recurrent seizures. She was discharged on Vipmat 200 mg po BID, Keppra 2000 mg po BID, Depakote 1500 mg po BID, and Trileptal 600 mg po BID. Decadron was tapered to off by mid-February.   She discharged on 2/16/18 • Pneumonia, organism unspecified(486)    • Seizure disorder ([facility] [address]    • Wears glasses      Past Surgical History:  1999: APPENDECTOMY  JULY 2015: BRAIN SURGERY      Comment: TUMOR RESECTION  No date: BRAIN SURGERY Left      Comment: OCCIPITAL CRANIOTOMY  N Pantoprazole Sodium 40 MG Oral Tab EC Take 40 mg by mouth. docusate sodium 100 MG Oral Cap Take 100 mg by mouth 2 (two) times daily as needed for constipation. acetaminophen 325 MG Oral Tab Take 650 mg by mouth every 6 (six) hours as needed for Pain. Pulmonary/Chest: Effort normal and breath sounds normal.   Abdominal: Soft. Bowel sounds are normal.   Neurological: She is alert and oriented to person, place, and time. No cranial nerve deficit, sensory deficit or motor deficit.    Skin: Skin is warm and Continue Lexapro 10 mg daily     History of drug use  urine tox screen in ED negative.      DVT prophylaxis  Heparin 5000 U subcutaneous q8hrs for deep venous thrombosis prophylaxis.     FEN  On ensure bid; oral intake has improved.      Anemia   stable fr follow-up. At home, she is on Vimpat 200 mg twice daily, Depakote 50 mg twice daily, Keppra 2000 mg twice daily, and Trileptal 600 mg twice daily. \"      Medications:  Reviewed   Past Medical History:   Diagnosis Date   • Anemia    • Brain cancer ([facility] [address] Smoking status: Former Smoker                                                                Packs/day: 1.50      Years: 17.00          Types: Cigarettes       Start date: 8/5/1985       Quit date: 8/5/2015    Smokeless tobacco: Never Used Limited characterization on noncontrast CT head. 3. MRI of the brain with gadolinium would be more sensitive in further interpretation. .  4. No acute intracranial hemorrhage.     LABS: reviewed     ASSESSMENT AND PLAN:   Localization-related epilepsy  Intr PHYSICAL THERAPY ASSESSMENT     Patient is a 49year old female admitted 4/23/2018 for weakness, vomiting and seizure.  Per chart patient had been d/c from [facility] 4/23 and  unable to get patient up the stairs then noted RUE tremors, came back to ER ne deficits in preparation for discharge. DISCHARGE RECOMMENDATIONS  PT Discharge Recommendations: 24 hour care/supervision    PLAN  PT Treatment Plan: Endurance; Patient education; Family education;Gait training;Stair training;Transfer training  Rehab Noel Lives With: Family  Drives: No  Patient Owned Equipment: Rolling walker;Cane       Prior Level of [place]: No family member present to ask present levels. SUBJECTIVE  \"I ordered soup but I didn't get any soup\".  Reports her 4year old daught AM-PAC '6-Clicks' INPATIENT SHORT FORM - BASIC MOBILITY  How much difficulty does the patient currently have. ..  -   Turning over in bed (including adjusting bedclothes, sheets and blankets)?: None   -   Sitting down on and standing up from a chair with ar Goal #3 Patient is able to ambulate 20'x2 with RW with MIN A or less in order to navigate home environment short distances.     Goal #3   Current Status    Goal #4 Patient will negotiate 3 steps with 1 rail with MIN A or less in order to navigate up/down st 2/7 [facility], 2/8-1/16 [facility] then discharged to [facility] 2/16-3/1, [address] [place] [address] again 4/17-4/23 when she was discharged home into care of family. RN aware and approved intent to treat --aware of outcome of session. Per RN, pt recently worked with PT and back to bed.  Pt wit In this OT evaluation patient presents with the following impairments: decreased awareness and insight into impairments, motor deficits, attention deficits. These deficits manifest functionally while performing ADLs, IADLs.    The patient is below baseline Comment: REMOVAL OF DEAD TISSUE   2009: CHOLECYSTECTOMY  No date: CYST REMOVAL      Comment: BREAST, BENIGN  No date: HEMORRHOIDECTOMY  No date: HYSTERECTOMY      Comment: heavy menstrual flow,   2012: LUMPECTOMY RIGHT      Comment: FIBROADENOMA  8-7-1 decreased awareness of need for safety  Awareness of Deficits:  decreased awareness of deficits    VISION  Unable to assess    PERCEPTION  L visual field neglect    SENSATION  Impaired touch --unable to locate any touch on LUE, but able to generalize touch Static Standing: Mod A  Dynamic Standing: NT    FUNCTIONAL ADL ASSESSMENT  Grooming: NT   Feeding: NT   Bathing: NT  Toileting: NT   Upper Extremity Dressing: NT  Lower Extremity Dressing: total A    Education Provided: role of OT, activity promotion[KH. 1]

## (undated) NOTE — ED AVS SNAPSHOT
Dasia Arreaga   MRN: [id number]    Department:  [facility] Emergency Department   Date of Visit:  7/15/2019           Disclosure     Insurance plans vary and the physician(s) referred by the ER may not be covered by your plan.  Please contact you CARE PHYSICIAN AT ONCE OR RETURN IMMEDIATELY TO THE EMERGENCY DEPARTMENT. If you have been prescribed any medication(s), please fill your prescription right away and begin taking the medication(s) as directed.   If you believe that any of the medications

## (undated) NOTE — MR AVS SNAPSHOT
Dasia Arreaga   3/9/2017 9:30 AM   Office Visit   MRN:  [id number]    Description:  Female : 1970   Department:  [facility]              Visit Summary      Primary Visit Diagnosis     Encounter for adjustment Appointment with CONCEPCION ANDERSEN 2 at [address] [place] [address] ([phone])   [address]   [address] [place] [address]       Monday May 08, 2017 4:00 PM     Appointment with Zane Marcus at [facility], [facility] [address] [facility]

## (undated) NOTE — MR AVS SNAPSHOT
Meghna [place]   2017 7:30 AM   Office Visit   MRN:  [id number]    Description:  Female : 1970   Department:  [facility] - [facility]              Visit Summary      Primary Visit Diagnosis     Carcinoma of lung, unspe Infusion ([phone])   [address] [facility] [address]            Message Systems     Call the [employer] for assistance with your inactive [employer] account    If you have questions, you can call [phone] to talk to our [facility] Staff.  [place]

## (undated) NOTE — LETTER
10/10/2018              Jaspal Sheldon        [address]        [address] [address]         Dear Cherylene Scotts,    [facility] records indicate that the tests ordered for you by Jose Gutierrez MD  have not been done.   If you have, in fact, already completed the te

## (undated) NOTE — ED AVS SNAPSHOT
[facility] Emergency Department  [address]   [place] [address]  Phone:  [phone]  Fax:  [phone]          Tommy Velazquez   MRN: [id number]    Department:  [facility] Emergency Department   Date of Visit:  7/24/2017 visiting [email or web address].    IF THERE IS ANY CHANGE OR WORSENING OF YOUR CONDITION, CALL YOUR PRIMARY CARE PHYSICIAN AT ONCE OR RETURN IMMEDIATELY TO THE EMERGENCY DEPARTMENT.     If you have been prescribed any medication(s), please fill your prescription

## (undated) NOTE — MR AVS SNAPSHOT
Rui Polanco   5/3/2017 10:30 AM   Office Visit   MRN:  [id number]    Description:  Female : 1970   Provider:  Flaquito Ray   Department:  [facility] AND [facility] Hematology Oncology              Visit Summary      Allergies     No Known Allerg Call the [email or web address] for assistance with your inactive [employer] account    If you have questions, you can call [phone] to talk to our [facility] Staff. Remember, [employer] is NOT to be used for urgent needs. For medical emergencies, dial 911.     Vi

## (undated) NOTE — LETTER
7/17/2019              Ragini Ackerman        [address]  [facility]        [place] [address]         Dear Kyra Garcia,    [facility] records indicate that the tests ordered for you by Otoniel Brown MD  have not been done.   If you have, in fact, already completed the cam

## (undated) NOTE — MR AVS SNAPSHOT
Td Larson   3/8/2017 11:00 AM   Office Visit   MRN:  [id number]    Description:  Female : 1970   Provider:  Coby Meléndez   Department:  [facility] Hematology Oncology              Visit Summary      Primary Visit Diagnosis Call the [employer] for assistance with your inactive [employer] account    If you have questions, you can call [phone] to talk to our [facility] Staff. Remember, [employer] is NOT to be used for urgent needs. For medical emergencies, dial 911.     Vi

## (undated) NOTE — MR AVS SNAPSHOT
Td Larson   2017 9:00 AM   Nurse Only   MRN:  [id number]    Description:  Female : 1970   Department:  [facility]              Visit Summary      Primary Visit Diagnosis     Encounter for adjustment o [address] [facility] [address]       Thursday March 09, 2017 9:30 AM     Appointment with CONCEPCION ANDERSEN 6 at [address] [place] [address] ([phone])   [address]   [address] [facility] [address]       Wednesday March 22, 2017 10:00 AM     Appointment with JOANIE

## (undated) NOTE — MR AVS SNAPSHOT
Sparkle Dodd   2017 10:00 AM   Office Visit   MRN:  [id number]    Description:  Female : 1970   Department:  [facility]              Visit Summary      Primary Visit Diagnosis     Encounter for adjustmen

## (undated) NOTE — MR AVS SNAPSHOT
Grace Tristan   2017 2:30 PM   Office Visit   MRN:  [id number]    Description:  Female : 1970   Provider:  Felisa Peña   Department:  [facility] AND [facility] Hematology Oncology              Visit Summary      Primary Visit Diagnosis     C Appointment with EM ERIC INFRN 4 at [address] [place] [address] ([phone])   [address]   [address] [place] [address]       Friday July 07, 2017 8:30 AM     Appointment with [facility]1 at [address] [place] [address] [phone]

## (undated) NOTE — MR AVS SNAPSHOT
Mynor Don   2017 10:30 AM   Office Visit   MRN:  [id number]    Description:  Female : 1970   Department:  [facility]              Visit Summary      Primary Visit Diagnosis     Carcinoma of lung, unspe Infusion ([phone])   [address] [facility] [address]            Data Virtuality     Call the [employer] for assistance with your inactive Data Virtuality account    If you have questions, you can call [phone] to talk to our [facility] Staff.  Rememb

## (undated) NOTE — ED AVS SNAPSHOT
Delano Jordan   MRN: [id number]    Department:  [facility] Emergency Department   Date of Visit:  9/14/2019           Disclosure     Insurance plans vary and the physician(s) referred by the ER may not be covered by your plan.  Please contact you CARE PHYSICIAN AT ONCE OR RETURN IMMEDIATELY TO THE EMERGENCY DEPARTMENT. If you have been prescribed any medication(s), please fill your prescription right away and begin taking the medication(s) as directed.   If you believe that any of the medications

## (undated) NOTE — MR AVS SNAPSHOT
Sen Walsh   3/22/2017 11:00 AM   Office Visit   MRN:  [id number]    Description:  Female : 1970   Provider:  Vertie Schilder   Department:  [facility] AND [facility] Hematology Oncology              Visit Summary      Primary Visit Diagnosis To Do List     Thursday March 30, 2017 11:30 AM     Appointment with [facility] 3T at [facility] AND [facility] MRI [phone])   Very little preparation is required for an MRI. You can eat, drink, and take your medication(s).   Prior to the scan, you are e bring the referral from your physician or make sure a copy was sent to the clinic. If you are unable to make your appointment please cancel within 24 hrs of appointment. [address]       Wednesday April 05, 2017 9:30 AM     Ap

## (undated) NOTE — ED AVS SNAPSHOT
Agnes Johnson   MRN: [id number]    Department:  [facility] Emergency Department   Date of Visit:  3/28/2018           Disclosure     Insurance plans vary and the physician(s) referred by the ER may not be covered by your plan.  Please contact you CARE PHYSICIAN AT ONCE OR RETURN IMMEDIATELY TO THE EMERGENCY DEPARTMENT. If you have been prescribed any medication(s), please fill your prescription right away and begin taking the medication(s) as directed.   If you believe that any of the medications

## (undated) NOTE — MR AVS SNAPSHOT
Adriana Miranda   3/23/2017 9:30 AM   Office Visit   MRN:  [id number]    Description:  Female : 1970   Department:  [facility]              Visit Summary      Primary Visit Diagnosis     Encounter for adjustment extremely important to present documentation of the implant information at time of your appointment.   FOR FEMALES HAVING GADOLINIUM EXAMS :If you are breastfeeding and your exam requires an IV Contrast (Gadolinium) injection, you need to stop breastfeeding [employer]     Call the Dot[employer] for assistance with your inactive Adduplex account    If you have questions, you can call [phone] to talk to our [facility] Staff. Remember, Adduplex is NOT to be used for urgent needs.   For medical emergencies, d

## (undated) NOTE — MR AVS SNAPSHOT
Dayana Hall   3/22/2017 10:00 AM   Nurse Only   MRN:  [id number]    Description:  Female : 1970   Department:  [facility]              Visit Summary      Primary Visit Diagnosis     Encounter for adjustment is a prescription drug that is taken by mouth. Your physician is responsible for giving you a prescription for oral medication which you would fill at your local pharmacy. Please follow your doctor's instructions when taking oral sedation.   If you will be [address] [facility] [address]       Wednesday April 05, 2017 10:30 AM     Appointment with Greg Ngo at [facility] Hematology Oncology ([phone])   [address]   [address] [facility] [address]       Thursday April 06, 2017 9:00 AM     Appointment with CONCEPCION Call the [employer] for assistance with your inactive getupp account    If you have questions, you can call [phone] to talk to our [facility] Staff. Remember, getupp is NOT to be used for urgent needs. For medical emergencies, dial 911.     Vi

## (undated) NOTE — MR AVS SNAPSHOT
Carmela Ricardo   2017 10:30 AM   Office Visit   MRN:  [id number]    Description:  Female : 1970   Department:  [facility]              Visit Summary      Primary Visit Diagnosis     Encounter for adjustmen Thursday February 23, 2017 9:30 AM     Appointment with [facility] ANDERSEN 1 at [address] [place] [address] ([phone])   [address]   [facility] [address]            MyChart     Call the [facility] for assistance with your inactive [facility]

## (undated) NOTE — ED AVS SNAPSHOT
Td Larson   MRN: [id number]    Department:  [facility] Emergency Department   Date of Visit:  5/16/2018           Disclosure     Insurance plans vary and the physician(s) referred by the ER may not be covered by your plan.  Please contact you CARE PHYSICIAN AT ONCE OR RETURN IMMEDIATELY TO THE EMERGENCY DEPARTMENT. If you have been prescribed any medication(s), please fill your prescription right away and begin taking the medication(s) as directed.   If you believe that any of the medications

## (undated) NOTE — LETTER
Date & Time: 10/2/2021, 6:15 PM  Patient: Jaspal Sheldon  Encounter Provider(s):    Marbella Ulloa MD         This certifies that Leigh Ramos, a patient at an [facility] facility, am leaving the facility voluntarily and against the

## (undated) NOTE — ED AVS SNAPSHOT
Musa Kellogg   MRN: [id number]    Department:  [facility] Emergency Department   Date of Visit:  4/17/2018           Disclosure     Insurance plans vary and the physician(s) referred by the ER may not be covered by your plan.  Please contact you CARE PHYSICIAN AT ONCE OR RETURN IMMEDIATELY TO THE EMERGENCY DEPARTMENT. If you have been prescribed any medication(s), please fill your prescription right away and begin taking the medication(s) as directed.   If you believe that any of the medications

## (undated) NOTE — MR AVS SNAPSHOT
Arpit Caballero   2017 1:30 PM   Nurse Only   MRN:  [id number]    Description:  Female : 1970   Department:  [facility] - Infusion              Visit Summary      Primary Visit Diagnosis     Carcinoma of lung, unspeci Infusion ([phone])   [address] [place] [address] [facility] [address]            MyChart     Call the Z[employer]GO[employer]isk for assistance with your inactive VMLogixt account    If you have questions, you can call [phone] to talk to our [facility] Staff.  Rememb

## (undated) NOTE — MR AVS SNAPSHOT
Vidhi Schilling   2017 2:00 PM   Nurse Only   MRN:  [id number]    Description:  Female : 1970   Department:  [facility] - Infusion              Visit Summary      Primary Visit Diagnosis     Carcinoma of lung, unspeci responsible for giving you a prescription for oral medication which you would fill at your local pharmacy. Please follow your doctor's instructions when taking oral sedation.   If you will be taking oral sedation, you must bring a  who will drive you CBC W/ DIFFERENTIAL[[id number]]          Abnormal            Final result                 Please view results for these tests on the individual orders.          Result Summary for CBC W/ DIFFERENTIAL      Component Results     Component Value Flag Ref Range

## (undated) NOTE — MR AVS SNAPSHOT
Miller Calderon   2017 8:00 AM   Nurse Only   MRN:  [id number]    Description:  Female : 1970   Department:  [address] - Infusion              Visit Summary      Primary Visit Diagnosis     Carcinoma of lung [facility] Appointment with Marichuy Tanner at [address] [place] [address] Michelle Lombardo ([phone])   [address] [facility], [address]            Result Summary for CBC WITH DIFFERENTIAL WITH PLATELET      Narrative     The following order

## (undated) NOTE — MR AVS SNAPSHOT
Henry Nails   2017 10:30 AM   Office Visit   MRN:  [id number]    Description:  Female : 1970   Provider:  Glynn Cheadle   Department:  [facility] AND [facility] Hematology Oncology              Visit Summary      Primary Visit Diagnosis Thursday April 06, 2017 9:00 AM     Appointment with [facility] ANDERSEN 3 at [address] [place] [address] ([phone])   [address]   [address] [place] [address]       Monday April 10, 2017 3:45 PM     Appointment with [facility] 3T at [facility] If you have questions, you can call [phone] to talk to our [facility] Staff. Remember, In [employer] is NOT to be used for urgent needs. For medical emergencies, dial 911. Visit [email or web address] to learn more.

## (undated) NOTE — MR AVS SNAPSHOT
Fatoumata Redd   2017 9:30 AM   Nurse Only   MRN:  [id number]    Description:  Female : 1970   Department:  [facility]              Visit Summary      Primary Visit Diagnosis     Encounter for adjustment or is a prescription drug that is taken by mouth. Your physician is responsible for giving you a prescription for oral medication which you would fill at your local pharmacy. Please follow your doctor's instructions when taking oral sedation.   If you will be Component Value Flag Ref Range Units Status    WBC 10.5  4.0-11.0  K/UL Final    RBC 3.40 (L) 3.70-5.40  M/UL Final    HGB 9.4 (L) 12.0-16.0  g/dL Final    HCT 28.6 (L) 35.0-48.0  % Final    MCV 83.9  80.0-100.0  fL Final    MCH 27.7  27.0-32.0  pg Final

## (undated) NOTE — MR AVS SNAPSHOT
Musa Kellogg   2017 8:30 AM   Office Visit   MRN:  [id number]    Description:  Female : 1970   Provider:  Tahir Rao   Department:  [facility] AND [facility] Hematology Oncology              Visit Summary      Allergies     No Known Allerg Wednesday May 03, 2017 10:30 AM     Appointment with Maria Van at [facility] AND [facility] Hematology Oncology ([phone])   [address]   [facility] [address]       Thursday May 04, 2017 10:00 AM     Appointment with [facility] ANDERSEN 3 at [address]

## (undated) NOTE — MR AVS SNAPSHOT
Wade Chiu   2017 9:30 AM   Office Visit   MRN:  [id number]    Description:  Female : 1970   Department:  [facility]              Visit Summary      Primary Visit Diagnosis     Encounter for adjustment extremely important to present documentation of the implant information at time of your appointment.   FOR FEMALES HAVING GADOLINIUM EXAMS :If you are breastfeeding and your exam requires an IV Contrast (Gadolinium) injection, you need to stop breastfeeding

## (undated) NOTE — IP AVS SNAPSHOT
[facility]            (For Outpatient Use Only) Initial Admit Date: 1/31/2018   Inpt/Obs Admit Date: Inpt: 1/31/18 / Obs: N/A   Discharge Date:    Homa Hernández:  [de-identified]   MRN: [de-identified]   CSN: [id number]        ENCOUNTER  Patient Class Hospital Account Financial Class: [facility]    February 7, 2018

## (undated) NOTE — MR AVS SNAPSHOT
Meghna Paauilo   2017 9:30 AM   Office Visit   MRN:  [id number]    Description:  Female : 1970   Department:  [facility]              Visit Summary      Primary Visit Diagnosis     Encounter for adjustment Appointment with CONCEPCION JACKSONN 1 at [address] [place] [address] [phone])   [address]   [facility] [address]            MyChart     Call the [employer]k for assistance with your inactive [employer] account    If you have questions, you

## (undated) NOTE — MR AVS SNAPSHOT
After Visit Summary   1/11/2017    Td Larson    MRN: [id number]           Visit Information        Provider Department Dept Phone    1/11/2017  9:00  S. Main [facility] [phone]      Allergies as of 1/11/2017  Re COMP METABOLIC PANEL (14) [[id number] CUSTOM]  1/11/2017 (Approximate) 1/11/2018 January 12, 2017        Jaspal Sheldon     [address] [facility]       Dear Cherylene Scotts : Thank you for enrolling in [address] [facility].  Please follow the instructions b patients. Video Visits are available Monday - Friday for many common conditions such as allergies, colds, cough, fever, rash, sore throat, headache and pink eye.   The cost for a Video Visit is currently $10.         If you receive a survey from [employer]

## (undated) NOTE — MR AVS SNAPSHOT
John Felton   5/3/2017 9:30 AM   Nurse Only   MRN:  [id number]    Description:  Female : 1970   Department:  [facility] [address] - Infusion              Visit Summary      Primary Visit Diagnosis     Carcinoma of lung, unspecif Appointment with [facility]CEPCION CC LAB2 at [facility] ([phone])   [address]   [address] [place] [address]       Wednesday May 17, 2017 10:15 AM     Appointment with Jewell Lund at [facility] Hematology Oncology ([phone] Call the [employer] for assistance with your inactive MTX Connect account    If you have questions, you can call [phone] to talk to our [facility] Staff. Remember, MTX Connect is NOT to be used for urgent needs. For medical emergencies, dial 911.     Vi

## (undated) NOTE — MR AVS SNAPSHOT
Talia Ward   2017 9:00 AM   Nurse Only   MRN:  [id number]    Description:  Female : 1970   Department:  [facility] - Infusion              Visit Summary      Primary Visit Diagnosis     Encounter for adjustment o Component Value Flag Ref Range Units Status    WBC 11.9 (H) 4.0-11.0  K/UL Final    RBC 3.41 (L) 3.70-5.40  M/UL Final    HGB 9.6 (L) 12.0-16.0  g/dL Final    HCT 29.5 (L) 35.0-48.0  % Final    MCV 86.4  80.0-100.0  fL Final    MCH 28.1  27.0-32.0  pg [employer]

## (undated) NOTE — ED AVS SNAPSHOT
Carmela Ricardo   MRN: [id number]    Department:  [facility] Emergency Department   Date of Visit:  10/10/2018           Disclosure     Insurance plans vary and the physician(s) referred by the ER may not be covered by your plan.  Please contact yo CARE PHYSICIAN AT ONCE OR RETURN IMMEDIATELY TO THE EMERGENCY DEPARTMENT. If you have been prescribed any medication(s), please fill your prescription right away and begin taking the medication(s) as directed.   If you believe that any of the medications

## (undated) NOTE — LETTER
4/2/2021              Melyssa Kellogg        [address]        [place] [address]         Dear Jose Griderr,    [facility] records indicate that the tests ordered for you by Eunice Duvall MD  have not been done.   If you have, in fact, already completed the test

## (undated) NOTE — LETTER
5/30/2019              Alcides Cordero        [address] [facility]        [address] [address]         Dear Chava Pinzon,    [facility] records indicate that the repeat blood tests ordered for you by Tona Gilmore MD  have not been done.   If you have, in fact, already comp